# Patient Record
Sex: FEMALE | Race: BLACK OR AFRICAN AMERICAN | NOT HISPANIC OR LATINO | Employment: OTHER | URBAN - METROPOLITAN AREA
[De-identification: names, ages, dates, MRNs, and addresses within clinical notes are randomized per-mention and may not be internally consistent; named-entity substitution may affect disease eponyms.]

---

## 2017-10-06 ENCOUNTER — HOSPITAL ENCOUNTER (EMERGENCY)
Facility: OTHER | Age: 82
Discharge: ANOTHER HOSPITAL | End: 2017-10-07
Attending: EMERGENCY MEDICINE
Payer: MEDICARE

## 2017-10-06 DIAGNOSIS — R10.9 ABDOMINAL PAIN: ICD-10-CM

## 2017-10-06 DIAGNOSIS — N17.9 AKI (ACUTE KIDNEY INJURY): Primary | ICD-10-CM

## 2017-10-06 LAB
ALBUMIN SERPL-MCNC: 3.3 G/DL (ref 3.3–5.5)
ALBUMIN SERPL-MCNC: 3.4 G/DL (ref 3.3–5.5)
ALBUMIN SERPL-MCNC: 3.7 G/DL (ref 3.3–5.5)
ALP SERPL-CCNC: 114 U/L (ref 42–141)
ALP SERPL-CCNC: 125 U/L (ref 42–141)
ALP SERPL-CCNC: 126 U/L (ref 42–141)
BILIRUB SERPL-MCNC: 1.7 MG/DL (ref 0.2–1.6)
BILIRUB SERPL-MCNC: 1.8 MG/DL (ref 0.2–1.6)
BILIRUB SERPL-MCNC: 1.9 MG/DL (ref 0.2–1.6)
BILIRUBIN, POC UA: NEGATIVE
BLOOD, POC UA: ABNORMAL
BUN SERPL-MCNC: 21 MG/DL (ref 7–22)
BUN SERPL-MCNC: 23 MG/DL (ref 7–22)
CALCIUM SERPL-MCNC: 9.2 MG/DL (ref 8–10.3)
CALCIUM SERPL-MCNC: 9.4 MG/DL (ref 8–10.3)
CHLORIDE SERPL-SCNC: 102 MMOL/L (ref 98–108)
CHLORIDE SERPL-SCNC: 108 MMOL/L (ref 98–108)
CLARITY, POC UA: CLEAR
COLOR, POC UA: YELLOW
CREAT SERPL-MCNC: 1.6 MG/DL (ref 0.6–1.2)
CREAT SERPL-MCNC: 1.8 MG/DL (ref 0.6–1.2)
GLUCOSE SERPL-MCNC: 176 MG/DL (ref 73–118)
GLUCOSE SERPL-MCNC: 205 MG/DL (ref 73–118)
GLUCOSE, POC UA: ABNORMAL
HCO3 UR-SCNC: 19.9 MMOL/L (ref 24–28)
KETONES, POC UA: NEGATIVE
LDH SERPL L TO P-CCNC: 1.05 MMOL/L (ref 0.5–2.2)
LEUKOCYTE EST, POC UA: NEGATIVE
NITRITE, POC UA: NEGATIVE
PCO2 BLDA: 26.6 MMHG (ref 35–45)
PH SMN: 7.48 [PH] (ref 7.35–7.45)
PH UR STRIP: 8.5 [PH]
PO2 BLDA: 49 MMHG (ref 40–60)
POC ALT (SGPT): 13 U/L (ref 10–47)
POC ALT (SGPT): 15 U/L (ref 10–47)
POC ALT (SGPT): 17 U/L (ref 10–47)
POC AMYLASE: 41 U/L (ref 14–97)
POC AST (SGOT): 22 U/L (ref 11–38)
POC AST (SGOT): 23 U/L (ref 11–38)
POC AST (SGOT): 24 U/L (ref 11–38)
POC BE: -4 MMOL/L
POC GGT: 49 U/L (ref 5–65)
POC SATURATED O2: 88 % (ref 95–100)
POC TCO2: 21 MMOL/L (ref 24–29)
POC TCO2: 24 MMOL/L (ref 18–33)
POC TCO2: 24 MMOL/L (ref 18–33)
POCT GLUCOSE: 238 MG/DL (ref 70–110)
POTASSIUM BLD-SCNC: 3.8 MMOL/L (ref 3.6–5.1)
POTASSIUM BLD-SCNC: 4.1 MMOL/L (ref 3.6–5.1)
PROTEIN, POC UA: ABNORMAL
PROTEIN, POC: 6.1 G/DL (ref 6.4–8.1)
PROTEIN, POC: 6.8 G/DL (ref 6.4–8.1)
PROTEIN, POC: 7 G/DL (ref 6.4–8.1)
SAMPLE: ABNORMAL
SODIUM BLD-SCNC: 142 MMOL/L (ref 128–145)
SODIUM BLD-SCNC: 143 MMOL/L (ref 128–145)
SPECIFIC GRAVITY, POC UA: 1.02
UROBILINOGEN, POC UA: 0.2 E.U./DL

## 2017-10-06 PROCEDURE — 80053 COMPREHEN METABOLIC PANEL: CPT

## 2017-10-06 PROCEDURE — 85025 COMPLETE CBC W/AUTO DIFF WBC: CPT

## 2017-10-06 PROCEDURE — 82977 ASSAY OF GGT: CPT

## 2017-10-06 PROCEDURE — 99285 EMERGENCY DEPT VISIT HI MDM: CPT | Mod: 25

## 2017-10-06 PROCEDURE — 96361 HYDRATE IV INFUSION ADD-ON: CPT

## 2017-10-06 PROCEDURE — 96360 HYDRATION IV INFUSION INIT: CPT

## 2017-10-06 PROCEDURE — 81003 URINALYSIS AUTO W/O SCOPE: CPT

## 2017-10-06 PROCEDURE — 87086 URINE CULTURE/COLONY COUNT: CPT

## 2017-10-06 PROCEDURE — 25000003 PHARM REV CODE 250: Performed by: EMERGENCY MEDICINE

## 2017-10-06 PROCEDURE — 83605 ASSAY OF LACTIC ACID: CPT

## 2017-10-06 PROCEDURE — 82040 ASSAY OF SERUM ALBUMIN: CPT

## 2017-10-06 RX ORDER — SODIUM CHLORIDE 9 MG/ML
1000 INJECTION, SOLUTION INTRAVENOUS
Status: COMPLETED | OUTPATIENT
Start: 2017-10-06 | End: 2017-10-06

## 2017-10-06 RX ORDER — ACETAMINOPHEN 500 MG
1000 TABLET ORAL
Status: COMPLETED | OUTPATIENT
Start: 2017-10-06 | End: 2017-10-06

## 2017-10-06 RX ADMIN — SODIUM CHLORIDE 1000 ML: 0.9 INJECTION, SOLUTION INTRAVENOUS at 08:10

## 2017-10-06 RX ADMIN — SODIUM CHLORIDE 1000 ML: 0.9 INJECTION, SOLUTION INTRAVENOUS at 10:10

## 2017-10-06 RX ADMIN — ACETAMINOPHEN 1000 MG: 500 TABLET ORAL at 11:10

## 2017-10-07 VITALS
WEIGHT: 155 LBS | HEART RATE: 74 BPM | TEMPERATURE: 99 F | SYSTOLIC BLOOD PRESSURE: 185 MMHG | RESPIRATION RATE: 20 BRPM | OXYGEN SATURATION: 100 % | BODY MASS INDEX: 29.27 KG/M2 | HEIGHT: 61 IN | DIASTOLIC BLOOD PRESSURE: 89 MMHG

## 2017-10-07 PROCEDURE — 96360 HYDRATION IV INFUSION INIT: CPT

## 2017-10-07 PROCEDURE — 25000003 PHARM REV CODE 250: Performed by: EMERGENCY MEDICINE

## 2017-10-07 RX ORDER — SODIUM CHLORIDE 9 MG/ML
1000 INJECTION, SOLUTION INTRAVENOUS
Status: COMPLETED | OUTPATIENT
Start: 2017-10-07 | End: 2017-10-07

## 2017-10-07 RX ADMIN — LIDOCAINE HYDROCHLORIDE: 20 SOLUTION ORAL; TOPICAL at 12:10

## 2017-10-07 RX ADMIN — SODIUM CHLORIDE 1000 ML: 0.9 INJECTION, SOLUTION INTRAVENOUS at 01:10

## 2017-10-07 NOTE — ED NOTES
Report completed at this time; report given to SUKUMAR Georges at ; pt currently awaiting [facility] for transfer

## 2017-10-07 NOTE — ED NOTES
Attempt x3 obtain IV access; all attempts unsuccessful; pt's family member reports, pt has not been eating x1 weeks and reports pt rarely drinks fluids; family member reports when pt does drink, it is usually a Coke

## 2017-10-07 NOTE — ED NOTES
Pt presents with c/o epigastric pain x2 days; pain rated at 10/10 on pain scale; pain described as constant, cramping pain; pt denies GI history; abd soft, tender to palpation, nondistended; BS active x4 quads; pt reports she is unable to eat due to pain; per family member, pt is ordered to take her Rx meds with food as to not upset her stomach, but states she refuses to eat with her meds; pt also c/o nausea, constipation; pt reports taking Pepto Bismol recently due to symptoms; pt reports minimal improvement in pain level; pt reports dark colored stools following self-administration of Pepto Bismol; denies SOB, reports incrseased pain with deep breathing; no resp distress noted; resp clear, E/U; pt on RA; AAOx4; follows commands; speech clear, coherent; moves all extremities; cap refill less than 3 sec; no edema noted; +2 radial pulses; skin warm, dry, intact; NADN: will continue to monitor

## 2017-10-07 NOTE — ED PROVIDER NOTES
Encounter Date: 10/6/2017       History     Chief Complaint   Patient presents with    Abdominal Pain     pt presents to ER with c/o upper abdominal pain since yesterday accompanied by nausea but no vomiting.     83 y.o. Female with past medical history of diabetes, hypertension, GERD and others presents to the emergency department complaining of acute epigastric abdominal cramping that has been constant since yesterday.  She reports taking Pepto-Bismol with persistent symptoms.  She reports mild nausea, constipation (2 scant stool since yesterday), and dysuria without vomiting, fever, appetite change, or bright red blood per rectum.  She states her stools have been darker since she is taking Pepto-Bismol.  She denies chest pain, shortness of breath, lightheadedness, dizziness or syncope.    Patient states she has limited mobility due to frequent falls and uses a wheelchair.  She lives with her daughter and goes to a day program      The history is provided by the patient.     Review of patient's allergies indicates:  No Known Allergies  Past Medical History:   Diagnosis Date    Arthritis     Cataract     Diabetes mellitus     GERD (gastroesophageal reflux disease)     Hypertension      Past Surgical History:   Procedure Laterality Date    CHOLECYSTECTOMY      EYE SURGERY      HYSTERECTOMY      JOINT REPLACEMENT       Family History   Problem Relation Age of Onset    Cancer Mother     Diabetes Mother     Hypertension Mother     Cancer Brother      Social History   Substance Use Topics    Smoking status: Never Smoker    Smokeless tobacco: Not on file    Alcohol use No     Review of Systems   Constitutional: Positive for appetite change (decreased x 4-5 days). Negative for chills, diaphoresis, fatigue, fever and unexpected weight change.   HENT: Negative.    Eyes: Negative.    Respiratory: Negative for cough, shortness of breath and stridor.    Cardiovascular: Positive for leg swelling (left leg,  chronic swelling). Negative for chest pain and palpitations.   Gastrointestinal: Positive for abdominal pain and constipation. Negative for blood in stool, diarrhea, nausea and vomiting.   Genitourinary: Positive for dysuria. Negative for frequency, hematuria and vaginal discharge.   Musculoskeletal: Negative.  Negative for back pain.   Skin: Negative for color change and wound.   Neurological: Negative for dizziness and headaches.   Psychiatric/Behavioral: Negative.    All other systems reviewed and are negative.      Physical Exam     Initial Vitals [10/06/17 1900]   BP Pulse Resp Temp SpO2   (!) 169/67 85 18 98.8 °F (37.1 °C) 98 %      MAP       101         Physical Exam    Nursing note and vitals reviewed.  Constitutional: She appears well-developed. She is not diaphoretic. She is cooperative.  Non-toxic appearance. She has a sickly appearance (appears chronically ill). She appears distressed (appear uncomfortable).   Elderly, frail   HENT:   Head: Normocephalic and atraumatic.   Mouth/Throat: Uvula is midline and oropharynx is clear and moist. Mucous membranes are pale and dry. Abnormal dentition (missing teeth). No oropharyngeal exudate.   Eyes: EOM are normal. Pupils are equal, round, and reactive to light.   Neck: Normal range of motion. Neck supple.   Cardiovascular: Normal rate, regular rhythm and intact distal pulses.   No murmur heard.  Pulmonary/Chest: Breath sounds normal. No stridor. No respiratory distress.   Abdominal: Soft. She exhibits distension. She exhibits no shifting dullness, no fluid wave and no pulsatile midline mass. Bowel sounds are decreased. There is no tenderness. There is no rigidity, no rebound, no guarding, no CVA tenderness, no tenderness at McBurney's point and negative Reddy's sign.       Musculoskeletal: Normal range of motion. She exhibits no edema or tenderness.   Neurological: She is alert and oriented to person, place, and time. She has normal strength. No cranial nerve  deficit. GCS eye subscore is 4. GCS verbal subscore is 5. GCS motor subscore is 6.   Skin: Skin is warm and dry. No erythema. No pallor.   Psychiatric: She has a normal mood and affect.         ED Course   Procedures  Labs Reviewed   POCT URINALYSIS W/O SCOPE - Abnormal; Notable for the following:        Result Value    Glucose, UA Trace (*)     Bilirubin, UA Negative (*)     Ketones, UA Negative (*)     Blood, UA 1+ (*)     Protein, UA 3+ (*)     Nitrite, UA Negative (*)     Leukocytes, UA Negative (*)     All other components within normal limits   POCT GLUCOSE - Abnormal; Notable for the following:     POCT Glucose 238 (*)     All other components within normal limits   ISTAT PROCEDURE - Abnormal; Notable for the following:     POC PH 7.482 (*)     POC PCO2 26.6 (*)     POC HCO3 19.9 (*)     POC SATURATED O2 88 (*)     POC TCO2 21 (*)     All other components within normal limits   POCT LIVER PANEL - Abnormal; Notable for the following:     Bilirubin 1.9 (*)     All other components within normal limits   POCT CMP - Abnormal; Notable for the following:     POC BUN 23 (*)     POC Creatinine 1.8 (*)     POC Glucose 205 (*)     Bilirubin 1.8 (*)     All other components within normal limits   POCT CMP - Abnormal; Notable for the following:     Albumin, POC 3.3 (*)     POC Creatinine 1.6 (*)     POC Glucose 176 (*)     Bilirubin 1.7 (*)     Protein 6.1 (*)     All other components within normal limits   CULTURE, URINE   POCT CBC   POCT URINALYSIS W/O SCOPE   POCT CMP   POCT LIVER PANEL   POCT CMP     EKG Readings: (Independently Interpreted)   Initial Reading: No STEMI. Rhythm: Sinus Arrhythmia. Heart Rate: 90. Ectopy: PACs. Conduction: Normal. ST Segments: Non-Specific ST Segment Depression. ST Segment Depression: V4, V5, V6, I and AVL. T Waves: Normal. Axis: Left Axis Deviation.          Medical Decision Making:   Clinical Tests:   Lab Tests: Ordered and Reviewed       <> Summary of Lab: White count 8.4 H&H 11.4  and 33.9 platelets 232 MCV 85.1 RDW 14.2  Radiological Study: Ordered and Reviewed  Medical Tests: Ordered and Reviewed  ED Management:  Additional history obtained from son.  States patient has not been eating over the last 4 days.  He states she sometimes complains that eating makes her stomach hurt or that she gets food stuck in her throat.    Patient of Dr. LIAM Staton at [place]                   ED Course as of Oct 13 0645   Fri Oct 06, 2017   2236 Pending IVFs then repeat CMP for CTAP with IV contrast  [DL]      ED Course User Index  [DL] Sravani Cassidy MD     Labs Reviewed  Admission on 10/06/2017, Discharged on 10/07/2017   Component Date Value Ref Range Status    POCT Glucose 10/06/2017 238* 70 - 110 mg/dL Final    POC PH 10/06/2017 7.482* 7.35 - 7.45 Final    POC PCO2 10/06/2017 26.6* 35 - 45 mmHg Final    POC PO2 10/06/2017 49  40 - 60 mmHg Final    POC HCO3 10/06/2017 19.9* 24 - 28 mmol/L Final    POC BE 10/06/2017 -4  -2 to 2 mmol/L Final    POC SATURATED O2 10/06/2017 88* 95 - 100 % Final    POC Lactate 10/06/2017 1.05  0.5 - 2.2 mmol/L Final    POC TCO2 10/06/2017 21* 24 - 29 mmol/L Final    Sample 10/06/2017 VENOUS   Final    Albumin, POC 10/06/2017 3.7  3.3 - 5.5 g/dL Final    Alkaline Phosphatase, POC 10/06/2017 125  42 - 141 U/L Final    ALT (SGPT), POC 10/06/2017 17  10 - 47 U/L Final    Amylase, POC 10/06/2017 41  14 - 97 U/L Final    AST (SGOT), POC 10/06/2017 24  11 - 38 U/L Final    POC GGT 10/06/2017 49  5 - 65 U/L Final    Bilirubin 10/06/2017 1.9* 0.2 - 1.6 mg/dL Final    Protein 10/06/2017 7.0  6.4 - 8.1 g/dL Final    Albumin, POC 10/06/2017 3.4  3.3 - 5.5 g/dL Final    Alkaline Phosphatase, POC 10/06/2017 126  42 - 141 U/L Final    ALT (SGPT), POC 10/06/2017 15  10 - 47 U/L Final    AST (SGOT), POC 10/06/2017 23  11 - 38 U/L Final    POC BUN 10/06/2017 23* 7 - 22 mg/dL Final    Calcium, POC 10/06/2017 9.4  8.0 - 10.3 mg/dL Final    POC Chloride 10/06/2017  102  98 - 108 mmol/L Final    POC Creatinine 10/06/2017 1.8* 0.6 - 1.2 mg/dL Final    POC Glucose 10/06/2017 205* 73 - 118 mg/dL Final    POC Potassium 10/06/2017 3.8  3.6 - 5.1 mmol/L Final    POC Sodium 10/06/2017 143  128 - 145 mmol/L Final    Bilirubin 10/06/2017 1.8* 0.2 - 1.6 mg/dL Final    POC TCO2 10/06/2017 24  18 - 33 mmol/L Final    Protein 10/06/2017 6.8  6.4 - 8.1 g/dL Final    Albumin, POC 10/06/2017 3.3* 3.3 - 5.5 g/dL Final    Alkaline Phosphatase, POC 10/06/2017 114  42 - 141 U/L Final    ALT (SGPT), POC 10/06/2017 13  10 - 47 U/L Final    AST (SGOT), POC 10/06/2017 22  11 - 38 U/L Final    POC BUN 10/06/2017 21  7 - 22 mg/dL Final    Calcium, POC 10/06/2017 9.2  8.0 - 10.3 mg/dL Final    POC Chloride 10/06/2017 108  98 - 108 mmol/L Final    POC Creatinine 10/06/2017 1.6* 0.6 - 1.2 mg/dL Final    POC Glucose 10/06/2017 176* 73 - 118 mg/dL Final    POC Potassium 10/06/2017 4.1  3.6 - 5.1 mmol/L Final    POC Sodium 10/06/2017 142  128 - 145 mmol/L Final    Bilirubin 10/06/2017 1.7* 0.2 - 1.6 mg/dL Final    POC TCO2 10/06/2017 24  18 - 33 mmol/L Final    Protein 10/06/2017 6.1* 6.4 - 8.1 g/dL Final    Glucose, UA 10/06/2017 Trace*  Final    Bilirubin, UA 10/06/2017 Negative*  Final    Ketones, UA 10/06/2017 Negative*  Final    Spec Grav UA 10/06/2017 1.020   Final    Blood, UA 10/06/2017 1+*  Final    PH, UA 10/06/2017 8.5   Final    Protein, UA 10/06/2017 3+*  Final    Urobilinogen, UA 10/06/2017 0.2  E.U./dL Final    Nitrite, UA 10/06/2017 Negative*  Final    Leukocytes, UA 10/06/2017 Negative*  Final    Color, UA 10/06/2017 Yellow   Final    Clarity, UA 10/06/2017 Clear   Final    Urine Culture, Routine 10/09/2017 No growth   Final        Imaging Reviewed    Imaging Results          CT Renal Stone Study ABD Pelvis WO (Final result)  Result time 10/07/17 00:26:27    Final result by Bryant Galeana MD (10/07/17 00:26:27)                 Impression:        1. No CT  evidence of acute intra-abdominal abnormality.    2. 4-mm right lower lobe pulmonary micronodule. Per Fleischner Society 2017 guidelines: in a low risk patient, no follow up recommend. In a high risk patient history of cancer/ smoker, consider 12 month CT chest follow up to exclude neoplasia.    3. Hiatal hernia.    4. Air within the urinary bladder. Correlation for recent catheterization is advised.    5. Status post hysterectomy and cholecystectomy.    6. Colonic diverticulosis without evidence of acute diverticulitis.    6. Additional incidental findings as above.      Electronically signed by: JESSICA DENTON  Date:     10/07/17  Time:    00:26              Narrative:    Procedure comments: The patient was surveyed from the lung bases through the pelvis without IV or oral contrast per renal stone protocol and data was reconstructed for coronal, sagittal, and axial images.    Comparison: None.    Findings:    There is a 4 mm pulmonary micronodule within the right lower lobe. The lung bases are otherwise unremarkable.  There is prominent calcification of the mitral valve apparatus. There is no significant pericardial effusion.    The liver is unremarkable in appearance on this limited non-contrast examination.  The gallbladder is surgically absent.  There is no intra-or extrahepatic biliary ductal dilatation.    There is a hiatal hernia. Stomach appears within normal limits. The spleen, pancreas, and adrenal glands are unremarkable.    The kidneys are normal in size and location. There is no evidence of hydronephrosis or nephrolithiasis.  There are multiple low attenuation right renal lesion suggestive of renal cysts. There is air within the urinary bladder. Correlation for recent catheterization is advised. No significant bladder wall thickening is appreciated. The patient is status post hysterectomy. There is no significant free fluid within the pelvis.    The abdominal aorta is normal in course and caliber with  mild atherosclerotic change along its course.The visualized loops of small and large bowel show no evidence of obstruction or inflammation. The appendix appears within normal limits. There is extensive colonic diverticulosis without evidence of acute diverticulitis.  There is no ascites or free intraperitoneal air.     The osseous structures demonstrate advanced degenerative changes of the spine. There is diffuse osteopenia.  The extraperitoneal soft tissues are unremarkable.                             X-Ray Abdomen Flat And Erect (Final result)  Result time 10/06/17 20:11:40    Final result by Refugio Smith MD (10/06/17 20:11:40)                 Impression:    Nonobstructive bowel gas pattern.      Electronically signed by: Refugio Smith  Date:     10/06/17  Time:    20:11              Narrative:    EXAM: ABDOMEN FLAT AND UPRIGHT    CLINICAL INDICATION:  Abdominal pain.    COMPARISON:  None.    TECHNIQUE: Flat and upright views of the abdomen were obtained.    FINDINGS:  Examination is limited by poor penetration on the upright views. No dilated bowel loops are seen.   There is no evidence of mass effect.  No evidence of pneumoperitoneum. No abnormal calcifications are detected. Degenerative changes are noted in the lumbar spine.                              Medications given in ED    Medications   0.9%  NaCl infusion (0 mLs Intravenous Stopped 10/6/17 2230)   0.9%  NaCl infusion (0 mLs Intravenous Stopped 10/6/17 2355)   acetaminophen tablet 1,000 mg (1,000 mg Oral Given 10/6/17 2359)   (pyxis) gi cocktail (mylanta 30 mL, lidocaine 2 % viscous 10 mL, dicyclomine 10 mL) 50 mL ( Oral Given 10/7/17 0058)   0.9%  NaCl infusion (1,000 mLs Intravenous New Bag 10/7/17 0130)         Note was created using voice recognition software. Note may have occasional typographical errors that may not have been identified and edited despite good carlos initial review prior to signing.    Clinical Impression:   The primary  encounter diagnosis was ROXANNE (acute kidney injury). A diagnosis of Abdominal pain was also pertinent to this visit.                           Sravani Cassidy MD  10/13/17 0602

## 2017-10-07 NOTE — ED NOTES
Per Sagrario RN at [facility], pt has been accepting by Dr Hutchison at [facility]; ED to ED transfer

## 2017-10-09 LAB — BACTERIA UR CULT: NO GROWTH

## 2019-03-31 PROBLEM — Z79.4 TYPE 2 DIABETES MELLITUS, WITH LONG-TERM CURRENT USE OF INSULIN: Status: ACTIVE | Noted: 2019-03-31

## 2019-03-31 PROBLEM — E11.9 TYPE 2 DIABETES MELLITUS, WITH LONG-TERM CURRENT USE OF INSULIN: Status: ACTIVE | Noted: 2019-03-31

## 2019-03-31 PROBLEM — N18.6 ESRD (END STAGE RENAL DISEASE): Status: ACTIVE | Noted: 2019-03-31

## 2019-03-31 NOTE — PROGRESS NOTES
Outside Transfer Acceptance Note     Patients name: Joyce Riley    Transferring Physician or Mid-Level provider/Clinic giving report: Lilly Meneses MD (Hospitalist, [facility])    Accepting Physician for admission to hospital: Danya Beckham MD     Date of acceptance:  03/31/2019    Allergies/Intolerances: NKDA     Reason for transfer: Need for second opinion for hemodialysis     HPI:  This is a 85-year-old female with 4, restrictive lung disease, paroxysmal atrial fibrillation, type 2 diabetes mellitus, hypertension, and cerebrovascular disease presented to [facility] on 03/20/2019 with complaints of increased shortness of breath and lower extremity edema. The patient had an H&H of 7.8 and 25 and was transfused 2 units of packed red blood cells.  She has a history of GI bleeding, however there were no signs of hemorrhage during this hospitalization.  The patient was thought to be in fluid overload and was diuresed with an initial creatinine of 2.3 which has worsened to 4.5 today.  There has been a steady decline in renal function and Nephrology does not recommend hemodialysis.  The patient continues to make urine and has a fair appetite.  The family is not willing to proceed to hospice care without a 2nd opinion.  Transfer to another facility for a different Nephrology evaluation is requested.    VS: /75  P 85  R 16  T 98.6F  O2 Sats 99% on RA    Labs: See scanned records in media tab    Radiographs:  As above    To Do List upon arrival:  1.  Consult Nephrology  2.  Consult palliative care  3.  Obtain CMP, Mag, phosphorus

## 2019-04-01 ENCOUNTER — HOSPITAL ENCOUNTER (INPATIENT)
Facility: OTHER | Age: 84
LOS: 8 days | Discharge: NURSING, REHAB OR LONG-TERM CARE | DRG: 682 | End: 2019-04-09
Attending: HOSPITALIST | Admitting: HOSPITALIST
Payer: MEDICARE

## 2019-04-01 DIAGNOSIS — N18.9 ACUTE KIDNEY INJURY SUPERIMPOSED ON CHRONIC KIDNEY DISEASE: ICD-10-CM

## 2019-04-01 DIAGNOSIS — N18.6 ESRD (END STAGE RENAL DISEASE): ICD-10-CM

## 2019-04-01 DIAGNOSIS — I27.23 PULMONARY HYPERTENSION DUE TO LUNG DISEASE: Chronic | ICD-10-CM

## 2019-04-01 DIAGNOSIS — R00.1 BRADYCARDIA: ICD-10-CM

## 2019-04-01 DIAGNOSIS — I50.33 ACUTE ON CHRONIC DIASTOLIC HEART FAILURE: ICD-10-CM

## 2019-04-01 DIAGNOSIS — Z79.4 TYPE 2 DIABETES MELLITUS WITH STAGE 4 CHRONIC KIDNEY DISEASE, WITH LONG-TERM CURRENT USE OF INSULIN: Primary | ICD-10-CM

## 2019-04-01 DIAGNOSIS — N18.4 TYPE 2 DIABETES MELLITUS WITH STAGE 4 CHRONIC KIDNEY DISEASE, WITH LONG-TERM CURRENT USE OF INSULIN: Primary | ICD-10-CM

## 2019-04-01 DIAGNOSIS — H53.9 VISUAL DISTURBANCE: ICD-10-CM

## 2019-04-01 DIAGNOSIS — N18.4 CKD (CHRONIC KIDNEY DISEASE), STAGE IV: ICD-10-CM

## 2019-04-01 DIAGNOSIS — N17.9 ACUTE KIDNEY INJURY SUPERIMPOSED ON CHRONIC KIDNEY DISEASE: ICD-10-CM

## 2019-04-01 DIAGNOSIS — E11.22 TYPE 2 DIABETES MELLITUS WITH STAGE 4 CHRONIC KIDNEY DISEASE, WITH LONG-TERM CURRENT USE OF INSULIN: Primary | ICD-10-CM

## 2019-04-01 DIAGNOSIS — Q21.12 PATENT FORAMEN OVALE WITH RIGHT TO LEFT SHUNT: ICD-10-CM

## 2019-04-01 PROBLEM — J96.00 ACUTE RESPIRATORY FAILURE: Status: ACTIVE | Noted: 2019-04-01

## 2019-04-01 PROBLEM — D64.9 ANEMIA: Status: ACTIVE | Noted: 2019-04-01

## 2019-04-01 LAB
ALBUMIN SERPL BCP-MCNC: 3 G/DL (ref 3.5–5.2)
ALLENS TEST: ABNORMAL
ALP SERPL-CCNC: 112 U/L (ref 55–135)
ALT SERPL W/O P-5'-P-CCNC: 19 U/L (ref 10–44)
ANION GAP SERPL CALC-SCNC: 13 MMOL/L (ref 8–16)
AORTIC ROOT ANNULUS: 2.95 CM
AORTIC VALVE CUSP SEPERATION: 1.84 CM
AST SERPL-CCNC: 23 U/L (ref 10–40)
AV INDEX (PROSTH): 0.75
AV MEAN GRADIENT: 4.84 MMHG
AV PEAK GRADIENT: 9.12 MMHG
AV VALVE AREA: 2.32 CM2
AV VELOCITY RATIO: 0.76
BASOPHILS # BLD AUTO: 0.02 K/UL (ref 0–0.2)
BASOPHILS NFR BLD: 0.2 % (ref 0–1.9)
BILIRUB SERPL-MCNC: 0.5 MG/DL (ref 0.1–1)
BNP SERPL-MCNC: 480 PG/ML (ref 0–99)
BSA FOR ECHO PROCEDURE: 2.11 M2
BUN SERPL-MCNC: 148 MG/DL (ref 8–23)
CALCIUM SERPL-MCNC: 8.3 MG/DL (ref 8.7–10.5)
CHLORIDE SERPL-SCNC: 96 MMOL/L (ref 95–110)
CK SERPL-CCNC: 111 U/L (ref 20–180)
CO2 SERPL-SCNC: 15 MMOL/L (ref 23–29)
CREAT SERPL-MCNC: 4.9 MG/DL (ref 0.5–1.4)
CREAT UR-MCNC: 61.1 MG/DL (ref 15–325)
CV ECHO LV RWT: 0.63 CM
DELSYS: ABNORMAL
DIFFERENTIAL METHOD: ABNORMAL
DOP CALC AO PEAK VEL: 1.51 M/S
DOP CALC AO VTI: 29.82 CM
DOP CALC LVOT AREA: 3.11 CM2
DOP CALC LVOT DIAMETER: 1.99 CM
DOP CALC LVOT PEAK VEL: 1.15 M/S
DOP CALC LVOT STROKE VOLUME: 69.23 CM3
DOP CALCLVOT PEAK VEL VTI: 22.27 CM
E WAVE DECELERATION TIME: 294.44 MSEC
E/A RATIO: 1.29
E/E' RATIO: 23.85
ECHO LV POSTERIOR WALL: 1.42 CM (ref 0.6–1.1)
EOSINOPHIL # BLD AUTO: 0 K/UL (ref 0–0.5)
EOSINOPHIL NFR BLD: 0.1 % (ref 0–8)
ERYTHROCYTE [DISTWIDTH] IN BLOOD BY AUTOMATED COUNT: 18.2 % (ref 11.5–14.5)
EST. GFR  (AFRICAN AMERICAN): 9 ML/MIN/1.73 M^2
EST. GFR  (NON AFRICAN AMERICAN): 8 ML/MIN/1.73 M^2
ESTIMATED AVG GLUCOSE: 143 MG/DL (ref 68–131)
FIO2: 28
FLOW: 2
FRACTIONAL SHORTENING: 45 % (ref 28–44)
GLUCOSE SERPL-MCNC: 196 MG/DL (ref 70–110)
GLUCOSE SERPL-MCNC: 197 MG/DL (ref 70–110)
HBA1C MFR BLD HPLC: 6.6 % (ref 4–5.6)
HCO3 UR-SCNC: 16.6 MMOL/L (ref 24–28)
HCT VFR BLD AUTO: 31.3 % (ref 37–48.5)
HGB BLD-MCNC: 10 G/DL (ref 12–16)
HIV1+2 IGG SERPL QL IA.RAPID: NEGATIVE
INTERVENTRICULAR SEPTUM: 1.41 CM (ref 0.6–1.1)
LA MAJOR: 6.21 CM
LA MINOR: 6.26 CM
LA WIDTH: 4.34 CM
LEFT ATRIUM SIZE: 5.03 CM
LEFT ATRIUM VOLUME INDEX: 57 ML/M2
LEFT ATRIUM VOLUME: 115.69 CM3
LEFT INTERNAL DIMENSION IN SYSTOLE: 2.46 CM (ref 2.1–4)
LEFT VENTRICLE DIASTOLIC VOLUME INDEX: 45.26 ML/M2
LEFT VENTRICLE DIASTOLIC VOLUME: 91.87 ML
LEFT VENTRICLE MASS INDEX: 124 G/M2
LEFT VENTRICLE SYSTOLIC VOLUME INDEX: 10.6 ML/M2
LEFT VENTRICLE SYSTOLIC VOLUME: 21.55 ML
LEFT VENTRICULAR INTERNAL DIMENSION IN DIASTOLE: 4.49 CM (ref 3.5–6)
LEFT VENTRICULAR MASS: 251.62 G
LV LATERAL E/E' RATIO: 22.14
LV SEPTAL E/E' RATIO: 25.83
LYMPHOCYTES # BLD AUTO: 0.6 K/UL (ref 1–4.8)
LYMPHOCYTES NFR BLD: 4.6 % (ref 18–48)
MAGNESIUM SERPL-MCNC: 1.1 MG/DL (ref 1.6–2.6)
MCH RBC QN AUTO: 29.2 PG (ref 27–31)
MCHC RBC AUTO-ENTMCNC: 31.9 G/DL (ref 32–36)
MCV RBC AUTO: 92 FL (ref 82–98)
MODE: ABNORMAL
MONOCYTES # BLD AUTO: 0.8 K/UL (ref 0.3–1)
MONOCYTES NFR BLD: 6.3 % (ref 4–15)
MV PEAK A VEL: 1.2 M/S
MV PEAK E VEL: 1.55 M/S
NEUTROPHILS # BLD AUTO: 10.6 K/UL (ref 1.8–7.7)
NEUTROPHILS NFR BLD: 86 % (ref 38–73)
PCO2 BLDA: 45.1 MMHG (ref 35–45)
PH SMN: 7.17 [PH] (ref 7.35–7.45)
PHOSPHATE SERPL-MCNC: 8.2 MG/DL (ref 2.7–4.5)
PISA TR MAX VEL: 4.06 M/S
PLATELET # BLD AUTO: 286 K/UL (ref 150–350)
PMV BLD AUTO: 11.3 FL (ref 9.2–12.9)
PO2 BLDA: 93 MMHG (ref 80–100)
POC BE: -12 MMOL/L
POC SATURATED O2: 95 % (ref 95–100)
POCT GLUCOSE: 197 MG/DL (ref 70–110)
POTASSIUM SERPL-SCNC: 5.9 MMOL/L (ref 3.5–5.1)
PROT SERPL-MCNC: 6.6 G/DL (ref 6–8.4)
PV PEAK VELOCITY: 1.22 CM/S
RA MAJOR: 6.23 CM
RA PRESSURE: 8 MMHG
RA WIDTH: 4.81 CM
RBC # BLD AUTO: 3.42 M/UL (ref 4–5.4)
SAMPLE: ABNORMAL
SINUS: 2.8 CM
SITE: ABNORMAL
SODIUM SERPL-SCNC: 124 MMOL/L (ref 136–145)
SODIUM UR-SCNC: <20 MMOL/L (ref 20–250)
STJ: 2.27 CM
TDI LATERAL: 0.07
TDI SEPTAL: 0.06
TDI: 0.07
TR MAX PG: 65.93 MMHG
TSH SERPL DL<=0.005 MIU/L-ACNC: 3.67 UIU/ML (ref 0.4–4)
TV REST PULMONARY ARTERY PRESSURE: 74 MMHG
URATE SERPL-MCNC: 7.7 MG/DL (ref 2.4–5.7)
URATE UR-MCNC: 8.3 MG/DL
WBC # BLD AUTO: 12.26 K/UL (ref 3.9–12.7)

## 2019-04-01 PROCEDURE — 86235 NUCLEAR ANTIGEN ANTIBODY: CPT | Mod: 59

## 2019-04-01 PROCEDURE — 83735 ASSAY OF MAGNESIUM: CPT

## 2019-04-01 PROCEDURE — 20000000 HC ICU ROOM

## 2019-04-01 PROCEDURE — 63600175 PHARM REV CODE 636 W HCPCS: Performed by: NURSE PRACTITIONER

## 2019-04-01 PROCEDURE — 86039 ANTINUCLEAR ANTIBODIES (ANA): CPT

## 2019-04-01 PROCEDURE — 25000003 PHARM REV CODE 250: Performed by: INTERNAL MEDICINE

## 2019-04-01 PROCEDURE — 83036 HEMOGLOBIN GLYCOSYLATED A1C: CPT

## 2019-04-01 PROCEDURE — 86592 SYPHILIS TEST NON-TREP QUAL: CPT

## 2019-04-01 PROCEDURE — 84100 ASSAY OF PHOSPHORUS: CPT

## 2019-04-01 PROCEDURE — 94640 AIRWAY INHALATION TREATMENT: CPT

## 2019-04-01 PROCEDURE — 80053 COMPREHEN METABOLIC PANEL: CPT

## 2019-04-01 PROCEDURE — 25000242 PHARM REV CODE 250 ALT 637 W/ HCPCS: Performed by: HOSPITALIST

## 2019-04-01 PROCEDURE — 99900035 HC TECH TIME PER 15 MIN (STAT)

## 2019-04-01 PROCEDURE — 82728 ASSAY OF FERRITIN: CPT

## 2019-04-01 PROCEDURE — 84550 ASSAY OF BLOOD/URIC ACID: CPT

## 2019-04-01 PROCEDURE — 63600175 PHARM REV CODE 636 W HCPCS: Performed by: HOSPITALIST

## 2019-04-01 PROCEDURE — 25000003 PHARM REV CODE 250: Performed by: NURSE PRACTITIONER

## 2019-04-01 PROCEDURE — 99223 1ST HOSP IP/OBS HIGH 75: CPT | Mod: AI,,, | Performed by: HOSPITALIST

## 2019-04-01 PROCEDURE — 86038 ANTINUCLEAR ANTIBODIES: CPT

## 2019-04-01 PROCEDURE — 86160 COMPLEMENT ANTIGEN: CPT | Mod: 59

## 2019-04-01 PROCEDURE — 87205 SMEAR GRAM STAIN: CPT

## 2019-04-01 PROCEDURE — 99223 PR INITIAL HOSPITAL CARE,LEVL III: ICD-10-PCS | Mod: AI,,, | Performed by: HOSPITALIST

## 2019-04-01 PROCEDURE — 94660 CPAP INITIATION&MGMT: CPT

## 2019-04-01 PROCEDURE — 36415 COLL VENOUS BLD VENIPUNCTURE: CPT

## 2019-04-01 PROCEDURE — 86160 COMPLEMENT ANTIGEN: CPT

## 2019-04-01 PROCEDURE — 25000003 PHARM REV CODE 250: Performed by: HOSPITALIST

## 2019-04-01 PROCEDURE — 84300 ASSAY OF URINE SODIUM: CPT

## 2019-04-01 PROCEDURE — 82607 VITAMIN B-12: CPT

## 2019-04-01 PROCEDURE — 36600 WITHDRAWAL OF ARTERIAL BLOOD: CPT

## 2019-04-01 PROCEDURE — S5571 INSULIN DISPOS PEN 3 ML: HCPCS | Performed by: NURSE PRACTITIONER

## 2019-04-01 PROCEDURE — 86255 FLUORESCENT ANTIBODY SCREEN: CPT | Mod: 91

## 2019-04-01 PROCEDURE — 82306 VITAMIN D 25 HYDROXY: CPT

## 2019-04-01 PROCEDURE — 27000221 HC OXYGEN, UP TO 24 HOURS

## 2019-04-01 PROCEDURE — 27000190 HC CPAP FULL FACE MASK W/VALVE

## 2019-04-01 PROCEDURE — 94761 N-INVAS EAR/PLS OXIMETRY MLT: CPT

## 2019-04-01 PROCEDURE — 82550 ASSAY OF CK (CPK): CPT

## 2019-04-01 PROCEDURE — 82803 BLOOD GASES ANY COMBINATION: CPT

## 2019-04-01 PROCEDURE — 82746 ASSAY OF FOLIC ACID SERUM: CPT

## 2019-04-01 PROCEDURE — 85025 COMPLETE CBC W/AUTO DIFF WBC: CPT

## 2019-04-01 PROCEDURE — 83540 ASSAY OF IRON: CPT

## 2019-04-01 PROCEDURE — 80074 ACUTE HEPATITIS PANEL: CPT

## 2019-04-01 PROCEDURE — 84443 ASSAY THYROID STIM HORMONE: CPT

## 2019-04-01 PROCEDURE — 82570 ASSAY OF URINE CREATININE: CPT

## 2019-04-01 PROCEDURE — 84560 ASSAY OF URINE/URIC ACID: CPT

## 2019-04-01 PROCEDURE — 86703 HIV-1/HIV-2 1 RESULT ANTBDY: CPT

## 2019-04-01 PROCEDURE — 83880 ASSAY OF NATRIURETIC PEPTIDE: CPT

## 2019-04-01 RX ORDER — INSULIN ASPART 100 [IU]/ML
4 INJECTION, SOLUTION INTRAVENOUS; SUBCUTANEOUS
Status: DISCONTINUED | OUTPATIENT
Start: 2019-04-01 | End: 2019-04-02

## 2019-04-01 RX ORDER — ACETAMINOPHEN 325 MG/1
650 TABLET ORAL EVERY 4 HOURS PRN
Status: DISCONTINUED | OUTPATIENT
Start: 2019-04-01 | End: 2019-04-09 | Stop reason: HOSPADM

## 2019-04-01 RX ORDER — SODIUM CHLORIDE 0.9 % (FLUSH) 0.9 %
10 SYRINGE (ML) INJECTION
Status: DISCONTINUED | OUTPATIENT
Start: 2019-04-01 | End: 2019-04-09 | Stop reason: HOSPADM

## 2019-04-01 RX ORDER — ONDANSETRON 8 MG/1
8 TABLET, ORALLY DISINTEGRATING ORAL EVERY 8 HOURS PRN
Status: DISCONTINUED | OUTPATIENT
Start: 2019-04-01 | End: 2019-04-09 | Stop reason: HOSPADM

## 2019-04-01 RX ORDER — AMLODIPINE BESYLATE 5 MG/1
10 TABLET ORAL DAILY
Status: DISCONTINUED | OUTPATIENT
Start: 2019-04-02 | End: 2019-04-05

## 2019-04-01 RX ORDER — GLUCAGON 1 MG
1 KIT INJECTION
Status: DISCONTINUED | OUTPATIENT
Start: 2019-04-01 | End: 2019-04-09 | Stop reason: HOSPADM

## 2019-04-01 RX ORDER — GABAPENTIN 300 MG/1
300 CAPSULE ORAL NIGHTLY
Status: DISCONTINUED | OUTPATIENT
Start: 2019-04-01 | End: 2019-04-01

## 2019-04-01 RX ORDER — CARVEDILOL 3.12 MG/1
3.12 TABLET ORAL 2 TIMES DAILY WITH MEALS
Status: DISCONTINUED | OUTPATIENT
Start: 2019-04-01 | End: 2019-04-04

## 2019-04-01 RX ORDER — INSULIN ASPART 100 [IU]/ML
4 INJECTION, SOLUTION INTRAVENOUS; SUBCUTANEOUS
Status: DISCONTINUED | OUTPATIENT
Start: 2019-04-01 | End: 2019-04-01

## 2019-04-01 RX ORDER — CLONIDINE HYDROCHLORIDE 0.1 MG/1
0.1 TABLET ORAL 2 TIMES DAILY
Status: DISCONTINUED | OUTPATIENT
Start: 2019-04-01 | End: 2019-04-01

## 2019-04-01 RX ORDER — IPRATROPIUM BROMIDE AND ALBUTEROL SULFATE 2.5; .5 MG/3ML; MG/3ML
3 SOLUTION RESPIRATORY (INHALATION) EVERY 4 HOURS
Status: DISCONTINUED | OUTPATIENT
Start: 2019-04-01 | End: 2019-04-02

## 2019-04-01 RX ORDER — HEPARIN SODIUM 5000 [USP'U]/ML
5000 INJECTION, SOLUTION INTRAVENOUS; SUBCUTANEOUS EVERY 8 HOURS
Status: DISCONTINUED | OUTPATIENT
Start: 2019-04-01 | End: 2019-04-01

## 2019-04-01 RX ORDER — INSULIN ASPART 100 [IU]/ML
0-5 INJECTION, SOLUTION INTRAVENOUS; SUBCUTANEOUS
Status: DISCONTINUED | OUTPATIENT
Start: 2019-04-01 | End: 2019-04-09 | Stop reason: HOSPADM

## 2019-04-01 RX ORDER — IBUPROFEN 200 MG
16 TABLET ORAL
Status: DISCONTINUED | OUTPATIENT
Start: 2019-04-01 | End: 2019-04-09 | Stop reason: HOSPADM

## 2019-04-01 RX ORDER — ATORVASTATIN CALCIUM 10 MG/1
10 TABLET, FILM COATED ORAL DAILY
Status: DISCONTINUED | OUTPATIENT
Start: 2019-04-01 | End: 2019-04-03

## 2019-04-01 RX ORDER — SODIUM BICARBONATE 650 MG/1
1300 TABLET ORAL 3 TIMES DAILY
Status: DISCONTINUED | OUTPATIENT
Start: 2019-04-01 | End: 2019-04-02

## 2019-04-01 RX ORDER — IBUPROFEN 200 MG
24 TABLET ORAL
Status: DISCONTINUED | OUTPATIENT
Start: 2019-04-01 | End: 2019-04-09 | Stop reason: HOSPADM

## 2019-04-01 RX ADMIN — SODIUM BICARBONATE 650 MG TABLET 1300 MG: at 06:04

## 2019-04-01 RX ADMIN — APIXABAN 2.5 MG: 2.5 TABLET, FILM COATED ORAL at 10:04

## 2019-04-01 RX ADMIN — SODIUM BICARBONATE 650 MG TABLET 1300 MG: at 10:04

## 2019-04-01 RX ADMIN — ATORVASTATIN CALCIUM 10 MG: 10 TABLET, FILM COATED ORAL at 03:04

## 2019-04-01 RX ADMIN — IPRATROPIUM BROMIDE AND ALBUTEROL SULFATE 3 ML: .5; 3 SOLUTION RESPIRATORY (INHALATION) at 11:04

## 2019-04-01 RX ADMIN — IPRATROPIUM BROMIDE AND ALBUTEROL SULFATE 3 ML: .5; 3 SOLUTION RESPIRATORY (INHALATION) at 08:04

## 2019-04-01 RX ADMIN — CARVEDILOL 3.12 MG: 3.12 TABLET, FILM COATED ORAL at 06:04

## 2019-04-01 RX ADMIN — IPRATROPIUM BROMIDE AND ALBUTEROL SULFATE 3 ML: .5; 3 SOLUTION RESPIRATORY (INHALATION) at 04:04

## 2019-04-01 RX ADMIN — INSULIN ASPART 4 UNITS: 100 INJECTION, SOLUTION INTRAVENOUS; SUBCUTANEOUS at 06:04

## 2019-04-01 RX ADMIN — INSULIN DETEMIR 10 UNITS: 100 INJECTION, SOLUTION SUBCUTANEOUS at 10:04

## 2019-04-01 NOTE — SUBJECTIVE & OBJECTIVE
Past Medical History:   Diagnosis Date    Arthritis     Cataract     Diabetes mellitus     GERD (gastroesophageal reflux disease)     GI bleed 2018    Gout     Hypertension        Past Surgical History:   Procedure Laterality Date     SECTION      Patient unsure, believe she had 3-4    CHOLECYSTECTOMY      EYE SURGERY      HYSTERECTOMY      TOTAL KNEE ARTHROPLASTY Right        Review of patient's allergies indicates:  No Known Allergies    No current facility-administered medications on file prior to encounter.      Current Outpatient Medications on File Prior to Encounter   Medication Sig    acetaminophen (TYLENOL) 325 MG tablet Take 325 mg by mouth every 6 (six) hours as needed.      atorvastatin (LIPITOR) 10 MG tablet Take by mouth once daily.      carvedilol (COREG) 3.125 MG tablet Take by mouth 2 (two) times daily with meals.      clonidine (CATAPRES) 0.1 MG tablet Take by mouth 2 (two) times daily.      furosemide (LASIX) 40 MG tablet Take 40 mg by mouth 2 (two) times daily.      insulin aspart (NOVOLOG) 100 unit/mL injection Inject 8 Units into the skin 3 (three) times daily before meals.      insulin glargine (LANTUS) 100 unit/mL injection Inject 10 Units into the skin every evening.     potassium chloride (KLOR-CON) 10 MEQ CR tablet Take 10 mEq by mouth once daily.       Family History     Problem Relation (Age of Onset)    Cancer Mother, Brother    Diabetes Mother    Hypertension Mother        Tobacco Use    Smoking status: Never Smoker    Smokeless tobacco: Never Used   Substance and Sexual Activity    Alcohol use: No    Drug use: No    Sexual activity: Never     Review of Systems   Constitutional: Negative for chills and fever.   HENT: Negative for rhinorrhea and sore throat.    Eyes: Positive for visual disturbance. Negative for photophobia.   Respiratory: Positive for shortness of breath and wheezing. Negative for cough.    Cardiovascular: Negative for chest pain and  palpitations.   Gastrointestinal: Positive for constipation. Negative for blood in stool, diarrhea, nausea and vomiting.   Endocrine: Negative for polydipsia and polyuria.   Genitourinary: Negative for dysuria and frequency.   Musculoskeletal: Positive for gait problem. Negative for back pain.   Neurological: Positive for weakness. Negative for dizziness and headaches.   Psychiatric/Behavioral: Negative for confusion and dysphoric mood.     Objective:     Vital Signs (Most Recent):  Temp: 97.6 °F (36.4 °C) (04/01/19 1518)  Pulse: 74 (04/01/19 1518)  Resp: 18 (04/01/19 1518)  BP: 138/65 (04/01/19 1518)  SpO2: 95 % (04/01/19 1518) Vital Signs (24h Range):  Temp:  [97.6 °F (36.4 °C)-98.3 °F (36.8 °C)] 97.6 °F (36.4 °C)  Pulse:  [71-74] 74  Resp:  [14-75] 18  SpO2:  [95 %-99 %] 95 %  BP: (131-138)/(63-65) 138/65        There is no height or weight on file to calculate BMI.    Physical Exam   Constitutional: She is oriented to person, place, and time. She appears well-developed and well-nourished.   HENT:   Head: Normocephalic.   Eyes: Pupils are equal, round, and reactive to light. Conjunctivae are normal.   Neck: Neck supple. No thyromegaly present.   Cardiovascular: Normal rate, regular rhythm and intact distal pulses. Exam reveals no gallop and no friction rub.   Murmur heard.  Pulmonary/Chest: She is in respiratory distress. She has wheezes. She has rales.   Breathing labored, poor air movement, diffuse crackles and expiratory wheezing throughout.   Abdominal: Soft. Bowel sounds are normal. She exhibits no distension. There is no tenderness.   Musculoskeletal: Normal range of motion. She exhibits no edema.   Lymphadenopathy:     She has no cervical adenopathy.   Neurological: She is alert and oriented to person, place, and time.   Strength equal and symmetric   Skin: Skin is warm and dry. No rash noted.   Psychiatric: She has a normal mood and affect. Her behavior is normal. Thought content normal.         CRANIAL  NERVES     CN III, IV, VI   Pupils are equal, round, and reactive to light.       Significant Labs: All pertinent labs within the past 24 hours have been reviewed.    Significant Imaging: I have reviewed all pertinent imaging results/findings within the past 24 hours.

## 2019-04-01 NOTE — CONSULTS
Consult Note  Nephrology    Consult Requested By: Es Chinchilla MD  Reason for Consult: ROXANNE/ CKD stage 4    SUBJECTIVE:     History of Present Illness:  Patient is a 85 y.o. female was transferred earlier today from [facility] where she was treated for acute respiratory failure, acute on chronic congestive heart failure diastolic, paroxysmal atrial fibrillation, acute anemia which was felt to be secondary to diverticular hemorrhage and ROXANNE on CKD stage 4.  The patient has a baseline creatinine from early February of 2.5.  With diuresis her creatinine has slowly risen to 4.47 as of 03/31/2019.  She also has been persistently hyperkalemic and as of yesterday developed a worsening metabolic acidosis.    The etiology of her respiratory failure is somewhat unclear per the record she has pulmonary function testing from 2012 which notes severe pulmonary hypertension with a restrictive lung disease pattern.  However on this admission she seems to be treated for an acute COPD exacerbation with nebulizer therapy Spiriva and prednisone.  Does not appear that this treatment yielded in the 6s.  She apparently previously had a right heart catheterization performed on 04/09/2014 which confirmed severe pulmonary hypertension with pulmonary artery pressure 71 mm Hg.  Her last echocardiogram appears to have been 02/07/2017 showing right ventricular systolic pressure of 54 and moderate pulmonary hypertension.  It seems that she has not had a follow-up echo since then.  In 2014 the patient had a stroke during that workup she was found to have a patent foramen ovale.  She has been committed to lifelong anticoagulation since that time initially she was on Coumadin but had a gastrointestinal bleeding episode in December of this past year at which time the family tells me that she was switched to Eliquis.    The patient and the family deny any history of melena from the December bleed in till her readmission at [facility] however she was found to be anemic with a hemoglobin of 7.8 during this current admission at [facility].  She had a bleeding scan which showed active bleeding in the left colon thought to be consistent with diverticular bleed.  She was transfused 2 units of packed RBCs which on 2019 and it seems somewhat unclear as to whether she had an appropriate rise since on  hemoglobin was 9.6.    There is previous discussion with Nephrology at [place] and the notes reflected from that consultation states that the patient is not felt to be candidate for dialysis.  The patient's family is certainly willing to consider a palliative approach but wants a 2nd opinion 1st.    Today the patient feels very short of breath stitting upright and has some fluid apparent in the peripheral areas of the thigh and buttock and some crackles on exam most predominantly at the left base    Past Medical History:   Diagnosis Date    Arthritis     Cataract     Diabetes mellitus     GERD (gastroesophageal reflux disease)     GI bleed 2018    Gout     Hypertension      Past Surgical History:   Procedure Laterality Date     SECTION      Patient unsure, believe she had 3-4    CHOLECYSTECTOMY      EYE SURGERY      HYSTERECTOMY      TOTAL KNEE ARTHROPLASTY Right      Family History   Problem Relation Age of Onset    Cancer Mother          age 98    Diabetes Mother     Hypertension Mother     Cancer Brother      Social History     Tobacco Use    Smoking status: Never Smoker    Smokeless tobacco: Never Used   Substance Use Topics    Alcohol use: No    Drug use: No       Medications Prior to Admission   Medication Sig Dispense Refill Last Dose    acetaminophen (TYLENOL) 325 MG tablet Take 325 mg by mouth every 6 (six) hours as needed.     Taking    atorvastatin (LIPITOR) 10 MG tablet Take by mouth once daily.     Taking    carvedilol (COREG) 3.125 MG tablet Take by mouth 2 (two) times daily with  meals.     Taking    clonidine (CATAPRES) 0.1 MG tablet Take by mouth 2 (two) times daily.     Taking    furosemide (LASIX) 40 MG tablet Take 40 mg by mouth 2 (two) times daily.     Taking    insulin aspart (NOVOLOG) 100 unit/mL injection Inject 8 Units into the skin 3 (three) times daily before meals.     Taking    insulin glargine (LANTUS) 100 unit/mL injection Inject 10 Units into the skin every evening.    Taking    potassium chloride (KLOR-CON) 10 MEQ CR tablet Take 10 mEq by mouth once daily.     Taking       Review of patient's allergies indicates:  No Known Allergies     Review of Systems:  Constitutional: No fever or chills, no weight changes.  Eyes: No visual changes or photophobia  HEENT: No nasal congestion or sore throat  Respiratory: No cough +shorness of breath  Cardiovascular: No chest pain or palpitations  Gastrointestinal: poor appetite, no nausea or vomiting, no change in bowel habits  Genitourinary: No hematuria or dysuria  Skin: No rash or pruritis  Hematologic/lymphatic: No easy bruising, bleeding or lymphadenopathy  Musculoskeletal: No arthralgias or myalgias  Neurological: No seizures or tremors  Endocrine: No heat/cold intolerance.  No polyuria/polydipsia.  Psychiatric:  No depression or anxiety.     OBJECTIVE:     Vital Signs (Most Recent)  Temp: 97.6 °F (36.4 °C) (04/01/19 1518)  Pulse: 74 (04/01/19 1518)  Resp: 18 (04/01/19 1518)  BP: 138/65 (04/01/19 1518)  SpO2: 95 % (04/01/19 1518)    Vital Signs Range (Last 24H):  Temp:  [97.6 °F (36.4 °C)-98.3 °F (36.8 °C)]   Pulse:  [71-74]   Resp:  [14-75]   BP: (131-138)/(63-65)   SpO2:  [95 %-99 %]     Physical Exam:    General appearance: Well developed, well nourished  Head: Normocephalic, atraumatic  Eyes:  Conjunctivae nl. Sclera anicteric. PERRL.  HEENT: Lips, mucosa, and tongue normal; teeth and gums normal and oropharynx clear.  Neck: Supple, trachea midline, thyroid not enlarged,   Lungs: Clear to auscultation bilaterally and normal  respiratory effort  Heart: Regular rate and rhythm, 4/6 sys normal, no murmur, click, rub or gallop  Abdomen: obese, Soft, non-tender non-distended; bowel sounds normal; no masses,  no organomegaly  Extremities: +2+ proximal thigh edema. No cyanosis or clubbing. No peripheral edema  Pulses: 2+ and symmetric  Skin: Skin color, texture, turgor normal. No rashes or lesions  Lymph nodes: Cervical, supraclavicular, and axillary nodes normal.  Neurologic: Normal strength and tone. No focal numbness or weakness  Psychiatric:  Alert and oriented times 3.  Affect appropriate.     Diagnostic Results:  Labs: Reviewed  X-Ray: Reviewed  US: Reviewed    ASSESSMENT/PLAN:     1. Multi factorial ROXANNE due to cardiorenal syndrome and acute anemia that may have progressed to ATN at this point on CKD likely due to DM (underlying retinopathy) with proteinuria. Hyperkalemia.  -Baseline Cr 2.5 and recent Pr/ Cr ration of 2.34g/day.  -she still making a reasonable amount of urine and the urine appears mildly dilute.    -Awaiting her chemistry from today.  -An echocardiogram has been ordered along with cardiology consultation.  -Her chemistry from yesterday peers is he has some degree of intravascular volume depletion but in the face of likely ATN am not sure that her renal function will improve.  -Will quantitate urine protein and send proteinuria lab, but feel this is likely DM.  -I think for today it would be prudent to use BiPAP to treat her underlying lung disease obtain adequate studies including echocardiogram urine electrolytes uric acid CPK change omeprazole to H2 blockade.  -After the echo is available would like to discuss with Cardiology and perhaps pulmonology the utility or lack thereof of offering hemodialysis to this patient.  It seems that her renal function just earlier this month was actually advanced but not end-stage with a GFR in the low 20s.  If indeed her underlying lung disease PFO and diastolic failure or such that  she is not going to recover from this then perhaps dialysis would not be reasonable option.  However if they are felt to be reasonably treatable I would feel that a trial of dialysis would be worthwhile.    2. ABLA/ Anemia of chronic disease/ Recent diverticular bleed/ Hx GERD  -recheck Fe, B12, folate and likely start AHSAN.  -monitor Hgb  -change PPI to H2B    3. Acute Hypercapnic on Chronic Respiratory Failure due to ? Restrictive lung disease/ severe Pulmonary HTN ? Mixed respiratory and metabolic acidosis  -ABG noted  -Rec BiPAP, ICU moniotring , unable to give Bicarb due to shortage, thus will try oral until  Decision about HD made. If pH < 7.15 then rec IV bicarb    4. PFO/ Pulm HTN/ Diastolic CHF/ A. Fib  -On renal dose Eliquis  -Awaiting new echo and Cards consult.  -hold diuresis for now.    5. Hyponatremia  -developed with diuresis and now has developed hypochloremia which ngoc points to intravascular volume depletion and renal failure over CHF.  -check urine lytes and follow     6. Chronic gout and Hyperuricemia  -was on Allopurinol now off.  -Await re-check    Case discussed in detail with Dr. Chinchilla  CCT 90min

## 2019-04-01 NOTE — HPI
Ms. Riley is an 85 year old woman who was transferred here from [facility] for a second opinion regarding starting HD vs entering hospice care.  The patient was hospitalized there on 3/20/19 with shortness of breath and lower extremity edema and was found to have fluid overload with heart failure and anemia with H/H 7.8/25.  She was transfused 2 units PRBCs and extensively diuresed while there.  Creatinine was initially 2.3 and gradually worsened to 4.5 at the time of transfer here.  Azotemia was out of proportion to the creatinine with an increase in BUN to 150.  On nephrology evaluation they did not feel she was a candidate for dialysis and consulted palliative care to follow.  Family requested a transfer to see a different nephrology service to see if they agreed.    Medical history includes chronic diastolic heart failure with pulmonary hypertension associated with chronic restrictive lung disease.  She has a patent foramen ovale and a right to left shunt that was thought to contribute to a stroke she had in 2014.  She is anemic and had a diverticular hemorrhage last December while on warfarin.  After recovery she was started on apixaban.  She has paroxysmal atrial fibrillation that is rate controlled, hypertension, diabetes and stage IV CKD.  She has chronically elevated alkaline phosphatase that is not thought to be significant.  At home she uses a wheelchair but can walk to the bathroom with assistance.  She attends an adult day care center.  She used to take allopurinol for gout attacks but this was discontinued when she had the GI bleed.

## 2019-04-01 NOTE — ASSESSMENT & PLAN NOTE
- Patient states her eyes are bothering her but is not much more clear than that.  - Seen by Dr. Thomas (ophthalmology) on 3/28.  Noted cataract surgery both eyes 2013 and history of diabetic retinopathy with macular edema and vitreomacular traction diagnosed in 2013.  Patient had been told to follow up at that time but did not.  On hospital consultation he again recommended outpatient follow up for a complete eye examination and retina evaluation.

## 2019-04-01 NOTE — PLAN OF CARE
Problem: Adult Inpatient Plan of Care  Goal: Plan of Care Review  Outcome: Ongoing (interventions implemented as appropriate)  Patient on 2 liters nasal cannula.

## 2019-04-01 NOTE — ASSESSMENT & PLAN NOTE
- Anemia seems multifactorial.  She has had chronic blood loss and had a diverticular bleed in December.  No bleeding noted on current presentation but she required transfusions at [facility] and they felt she needed a pill endoscopy as the rest of the workup was negative.  - Additional contributing factor of chronic kidney disease.  - currently stable  - monitor labs and for bleeding

## 2019-04-01 NOTE — H&P
[facility] Medicine  History & Physical    Patient Name: Joyce Riley  MRN: [id number]  Admission Date: 4/1/2019  Attending Physician: Es Chinchilla MD   Primary Care Provider: Kalpana Staton MD         Patient information was obtained from patient, relative(s) and past medical records.     Subjective:     Principal Problem:Acute kidney injury superimposed on chronic kidney disease    Chief Complaint: No chief complaint on file.       HPI: Ms. Riley is an 85 year old woman who was transferred here from [facility] for a second opinion regarding starting HD vs entering hospice care.  The patient was hospitalized there on 3/20/19 with shortness of breath and lower extremity edema and was found to have fluid overload with heart failure and anemia with H/H 7.8/25.  She was transfused 2 units PRBCs and extensively diuresed while there.  Creatinine was initially 2.3 and gradually worsened to 4.5 at the time of transfer here.  Azotemia was out of proportion to the creatinine with an increase in BUN to 150.  On nephrology evaluation they did not feel she was a candidate for dialysis and consulted palliative care to follow.  Family requested a transfer to see a different nephrology service to see if they agreed.    Medical history includes chronic diastolic heart failure with pulmonary hypertension associated with chronic restrictive lung disease.  She has a patent foramen ovale and a right to left shunt that was thought to contribute to a stroke she had in 2014.  She is anemic and had a diverticular hemorrhage last December while on warfarin.  After recovery she was started on apixaban.  She has paroxysmal atrial fibrillation that is rate controlled, hypertension, diabetes and stage IV CKD.  She has chronically elevated alkaline phosphatase that is not thought to be significant.  At home she uses a wheelchair but can walk to the bathroom with assistance.  She attends  an adult day care center.  She used to take allopurinol for gout attacks but this was discontinued when she had the GI bleed.    Past Medical History:   Diagnosis Date    Arthritis     Cataract     Diabetes mellitus     GERD (gastroesophageal reflux disease)     GI bleed 2018    Gout     Hypertension        Past Surgical History:   Procedure Laterality Date     SECTION      Patient unsure, believe she had 3-4    CHOLECYSTECTOMY      EYE SURGERY      HYSTERECTOMY      TOTAL KNEE ARTHROPLASTY Right        Review of patient's allergies indicates:  No Known Allergies    No current facility-administered medications on file prior to encounter.      Current Outpatient Medications on File Prior to Encounter   Medication Sig    acetaminophen (TYLENOL) 325 MG tablet Take 325 mg by mouth every 6 (six) hours as needed.      atorvastatin (LIPITOR) 10 MG tablet Take by mouth once daily.      carvedilol (COREG) 3.125 MG tablet Take by mouth 2 (two) times daily with meals.      clonidine (CATAPRES) 0.1 MG tablet Take by mouth 2 (two) times daily.      furosemide (LASIX) 40 MG tablet Take 40 mg by mouth 2 (two) times daily.      insulin aspart (NOVOLOG) 100 unit/mL injection Inject 8 Units into the skin 3 (three) times daily before meals.      insulin glargine (LANTUS) 100 unit/mL injection Inject 10 Units into the skin every evening.     potassium chloride (KLOR-CON) 10 MEQ CR tablet Take 10 mEq by mouth once daily.       Family History     Problem Relation (Age of Onset)    Cancer Mother, Brother    Diabetes Mother    Hypertension Mother        Tobacco Use    Smoking status: Never Smoker    Smokeless tobacco: Never Used   Substance and Sexual Activity    Alcohol use: No    Drug use: No    Sexual activity: Never     Review of Systems   Constitutional: Negative for chills and fever.   HENT: Negative for rhinorrhea and sore throat.    Eyes: Positive for visual disturbance. Negative for photophobia.    Respiratory: Positive for shortness of breath and wheezing. Negative for cough.    Cardiovascular: Negative for chest pain and palpitations.   Gastrointestinal: Positive for constipation. Negative for blood in stool, diarrhea, nausea and vomiting.   Endocrine: Negative for polydipsia and polyuria.   Genitourinary: Negative for dysuria and frequency.   Musculoskeletal: Positive for gait problem. Negative for back pain.   Neurological: Positive for weakness. Negative for dizziness and headaches.   Psychiatric/Behavioral: Negative for confusion and dysphoric mood.     Objective:     Vital Signs (Most Recent):  Temp: 97.6 °F (36.4 °C) (04/01/19 1518)  Pulse: 74 (04/01/19 1518)  Resp: 18 (04/01/19 1518)  BP: 138/65 (04/01/19 1518)  SpO2: 95 % (04/01/19 1518) Vital Signs (24h Range):  Temp:  [97.6 °F (36.4 °C)-98.3 °F (36.8 °C)] 97.6 °F (36.4 °C)  Pulse:  [71-74] 74  Resp:  [14-75] 18  SpO2:  [95 %-99 %] 95 %  BP: (131-138)/(63-65) 138/65        There is no height or weight on file to calculate BMI.    Physical Exam   Constitutional: She is oriented to person, place, and time. She appears well-developed and well-nourished.   HENT:   Head: Normocephalic.   Eyes: Pupils are equal, round, and reactive to light. Conjunctivae are normal.   Neck: Neck supple. No thyromegaly present.   Cardiovascular: Normal rate, regular rhythm and intact distal pulses. Exam reveals no gallop and no friction rub.   Murmur heard.  Pulmonary/Chest: She is in respiratory distress. She has wheezes. She has rales.   Breathing labored, poor air movement, diffuse crackles and expiratory wheezing throughout.   Abdominal: Soft. Bowel sounds are normal. She exhibits no distension. There is no tenderness.   Musculoskeletal: Normal range of motion. She exhibits no edema.   Lymphadenopathy:     She has no cervical adenopathy.   Neurological: She is alert and oriented to person, place, and time.   Strength equal and symmetric   Skin: Skin is warm and dry.  No rash noted.   Psychiatric: She has a normal mood and affect. Her behavior is normal. Thought content normal.         CRANIAL NERVES     CN III, IV, VI   Pupils are equal, round, and reactive to light.       Significant Labs: All pertinent labs within the past 24 hours have been reviewed.    Significant Imaging: I have reviewed all pertinent imaging results/findings within the past 24 hours.    Assessment/Plan:     * Acute kidney injury superimposed on chronic kidney disease  - Patient admitted on 3/20/19 to [facility] with heart failure symptoms and anemia.  Developed severe azotemia and doubled baseline creatinine from 2.3 to 4.5 while there.  Appears to have cardiorenal syndrome.  - Patient and family have been told she is not a HD candidate.  Likely this is due to severe chronic underlying problems including chronic restrictive lung disease, heart failure with a right to left shunt, atrial fibrillation and pulmonary hypertension.  - Nephrology evaluating.  Patient with fluid overload and needs diuresis, although she is making urine.      Acute on chronic diastolic heart failure  - Reportedly has grade II diastolic dysfunction.  Her problem likely stems from the right to left shunt across the PFO due to pulmonary hypertension and restrictive lung disease.  - Nephrology evaluating to consider HD.  In the meantime consult cardiologist to look at echo and evaluate severity of heart failure complicated by shunt.  Reportedly she has a preserved EF.      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  - Pulmonary HTN associated with restrictive lung disease and subsequent diastolic heart failure, as above.  - Needs diuresis.      Patent foramen ovale with right to left shunt  - As above        Visual disturbance  - Patient states her eyes are bothering her but is not much more clear than that.  - Seen by Dr. Thomas (ophthalmology) on 3/28.  Noted cataract surgery both eyes 2013 and history of diabetic retinopathy with  macular edema and vitreomacular traction diagnosed in 2013.  Patient had been told to follow up at that time but did not.  On hospital consultation he again recommended outpatient follow up for a complete eye examination and retina evaluation.      Anemia  - Anemia seems multifactorial.  She has had chronic blood loss and had a diverticular bleed in December.  No bleeding noted on current presentation but she required transfusions at [facility] and they felt she needed a pill endoscopy as the rest of the workup was negative.  - Additional contributing factor of chronic kidney disease.        CKD (chronic kidney disease), stage IV  - As above.  Baseline creatinine around 2.5.      Type 2 diabetes mellitus, with long-term current use of insulin  - HbA1c pending.  - Low dose SSI, Levemir 10 units qhs and 4 units aspart insulin with meals.      VTE Risk Mitigation (From admission, onward)        Ordered     heparin (porcine) injection 5,000 Units  Every 8 hours      04/01/19 1404     IP VTE HIGH RISK PATIENT  Once      04/01/19 1404             Es Oscar MD  Department of Hospital Medicine   [facility]

## 2019-04-01 NOTE — ASSESSMENT & PLAN NOTE
- Patient admitted on 3/20/19 to [facility] with heart failure symptoms and anemia.  Developed severe azotemia and doubled baseline creatinine from 2.3 to 4.5 while there.  Appears to have cardiorenal syndrome.  - Patient and family have been told she is not a HD candidate.  Likely this is due to severe chronic underlying problems including chronic restrictive lung disease, heart failure with a right to left shunt, atrial fibrillation and pulmonary hypertension.  - Nephrology evaluating.  Patient with fluid overload and needs diuresis, although she is making urine.

## 2019-04-01 NOTE — ASSESSMENT & PLAN NOTE
- Reportedly has grade II diastolic dysfunction.  Her problem likely stems from the right to left shunt across the PFO due to pulmonary hypertension and restrictive lung disease.  - Nephrology evaluating to consider HD.  In the meantime consult cardiologist to look at echo and evaluate severity of heart failure complicated by shunt.  Reportedly she has a preserved EF.

## 2019-04-01 NOTE — ASSESSMENT & PLAN NOTE
- Pulmonary HTN associated with restrictive lung disease and subsequent diastolic heart failure, as above.  - Needs diuresis.

## 2019-04-01 NOTE — LETTER
April 4, 2019    Joyce Riley  [address] [place] [id number] [address]  [place] [address]  Phone: [phone]  Fax: [phone] To Whom it may concern,    The above name was hospitalized on April 1, 2019 and is currently still hospitalized.      Thank you,        Tina Simon BSN, RN

## 2019-04-02 PROBLEM — E83.42 HYPOMAGNESEMIA: Status: ACTIVE | Noted: 2019-04-02

## 2019-04-02 PROBLEM — E87.20 METABOLIC ACIDOSIS: Status: ACTIVE | Noted: 2019-04-02

## 2019-04-02 PROBLEM — E87.1 HYPONATREMIA: Status: ACTIVE | Noted: 2019-04-02

## 2019-04-02 PROBLEM — I27.23 PULMONARY HYPERTENSION DUE TO LUNG DISEASE: Chronic | Status: ACTIVE | Noted: 2019-04-01

## 2019-04-02 PROBLEM — E87.5 HYPERKALEMIA: Status: ACTIVE | Noted: 2019-04-02

## 2019-04-02 LAB
25(OH)D3+25(OH)D2 SERPL-MCNC: 11 NG/ML (ref 30–96)
ALBUMIN SERPL BCP-MCNC: 2.5 G/DL (ref 3.5–5.2)
ALLENS TEST: ABNORMAL
ALP SERPL-CCNC: 103 U/L (ref 55–135)
ALT SERPL W/O P-5'-P-CCNC: 20 U/L (ref 10–44)
ANA SER QL IF: POSITIVE
ANA TITR SER IF: NORMAL {TITER}
ANION GAP SERPL CALC-SCNC: 15 MMOL/L (ref 8–16)
AST SERPL-CCNC: 27 U/L (ref 10–40)
BASOPHILS # BLD AUTO: 0.01 K/UL (ref 0–0.2)
BASOPHILS NFR BLD: 0.1 % (ref 0–1.9)
BILIRUB SERPL-MCNC: 0.4 MG/DL (ref 0.1–1)
BUN SERPL-MCNC: 159 MG/DL (ref 8–23)
C3 SERPL-MCNC: 96 MG/DL (ref 50–180)
C4 SERPL-MCNC: 21 MG/DL (ref 11–44)
CALCIUM SERPL-MCNC: 8.1 MG/DL (ref 8.7–10.5)
CHLORIDE SERPL-SCNC: 97 MMOL/L (ref 95–110)
CO2 SERPL-SCNC: 13 MMOL/L (ref 23–29)
CREAT SERPL-MCNC: 4.8 MG/DL (ref 0.5–1.4)
DELSYS: ABNORMAL
DIFFERENTIAL METHOD: ABNORMAL
EOSINOPHIL # BLD AUTO: 0 K/UL (ref 0–0.5)
EOSINOPHIL NFR BLD: 0 % (ref 0–8)
EOSINOPHIL URNS QL WRIGHT STN: ABNORMAL
ERYTHROCYTE [DISTWIDTH] IN BLOOD BY AUTOMATED COUNT: 18.3 % (ref 11.5–14.5)
ERYTHROCYTE [SEDIMENTATION RATE] IN BLOOD BY WESTERGREN METHOD: 19 MM/H
EST. GFR  (AFRICAN AMERICAN): 9 ML/MIN/1.73 M^2
EST. GFR  (NON AFRICAN AMERICAN): 8 ML/MIN/1.73 M^2
FERRITIN SERPL-MCNC: 76 NG/ML (ref 20–300)
FLOW: 2
FOLATE SERPL-MCNC: 8.8 NG/ML (ref 4–24)
GLUCOSE SERPL-MCNC: 163 MG/DL (ref 70–110)
HAV IGM SERPL QL IA: NEGATIVE
HBV CORE IGM SERPL QL IA: NEGATIVE
HBV SURFACE AG SERPL QL IA: NEGATIVE
HCO3 UR-SCNC: 16.4 MMOL/L (ref 24–28)
HCT VFR BLD AUTO: 30.6 % (ref 37–48.5)
HCV AB SERPL QL IA: NEGATIVE
HGB BLD-MCNC: 9.6 G/DL (ref 12–16)
IRON SERPL-MCNC: 127 UG/DL (ref 30–160)
LYMPHOCYTES # BLD AUTO: 0.5 K/UL (ref 1–4.8)
LYMPHOCYTES NFR BLD: 4.9 % (ref 18–48)
MAGNESIUM SERPL-MCNC: 1.2 MG/DL (ref 1.6–2.6)
MCH RBC QN AUTO: 29 PG (ref 27–31)
MCHC RBC AUTO-ENTMCNC: 31.4 G/DL (ref 32–36)
MCV RBC AUTO: 92 FL (ref 82–98)
MODE: ABNORMAL
MONOCYTES # BLD AUTO: 0.7 K/UL (ref 0.3–1)
MONOCYTES NFR BLD: 6.5 % (ref 4–15)
NEUTROPHILS # BLD AUTO: 9.6 K/UL (ref 1.8–7.7)
NEUTROPHILS NFR BLD: 86.5 % (ref 38–73)
PCO2 BLDA: 42.5 MMHG (ref 35–45)
PH SMN: 7.2 [PH] (ref 7.35–7.45)
PHOSPHATE SERPL-MCNC: 8.6 MG/DL (ref 2.7–4.5)
PLATELET # BLD AUTO: 217 K/UL (ref 150–350)
PMV BLD AUTO: 12.2 FL (ref 9.2–12.9)
PO2 BLDA: 93 MMHG (ref 80–100)
POC BE: -12 MMOL/L
POC SATURATED O2: 95 % (ref 95–100)
POCT GLUCOSE: 116 MG/DL (ref 70–110)
POCT GLUCOSE: 124 MG/DL (ref 70–110)
POCT GLUCOSE: 125 MG/DL (ref 70–110)
POCT GLUCOSE: 141 MG/DL (ref 70–110)
POCT GLUCOSE: 154 MG/DL (ref 70–110)
POCT GLUCOSE: 196 MG/DL (ref 70–110)
POCT GLUCOSE: 219 MG/DL (ref 70–110)
POCT GLUCOSE: 60 MG/DL (ref 70–110)
POCT GLUCOSE: 86 MG/DL (ref 70–110)
POTASSIUM SERPL-SCNC: 6.3 MMOL/L (ref 3.5–5.1)
PROT 24H UR-MRATE: 714 MG/SPEC (ref 0–100)
PROT SERPL-MCNC: 6.1 G/DL (ref 6–8.4)
PROT UR-MCNC: 61 MG/DL (ref 0–15)
PTH-INTACT SERPL-MCNC: 780.8 PG/ML (ref 9–77)
RBC # BLD AUTO: 3.31 M/UL (ref 4–5.4)
RPR SER QL: NORMAL
SAMPLE: ABNORMAL
SATURATED IRON: 42 % (ref 20–50)
SITE: ABNORMAL
SODIUM SERPL-SCNC: 125 MMOL/L (ref 136–145)
SP02: 98
TOTAL IRON BINDING CAPACITY: 305 UG/DL (ref 250–450)
TRANSFERRIN SERPL-MCNC: 206 MG/DL (ref 200–375)
URINE COLLECTION DURATION: 24 HR
URINE VOLUME: 1170 ML
VIT B12 SERPL-MCNC: 586 PG/ML (ref 210–950)
WBC # BLD AUTO: 11.04 K/UL (ref 3.9–12.7)

## 2019-04-02 PROCEDURE — 25000003 PHARM REV CODE 250

## 2019-04-02 PROCEDURE — 63600175 PHARM REV CODE 636 W HCPCS: Performed by: HOSPITALIST

## 2019-04-02 PROCEDURE — 36600 WITHDRAWAL OF ARTERIAL BLOOD: CPT

## 2019-04-02 PROCEDURE — 25000003 PHARM REV CODE 250: Performed by: INTERNAL MEDICINE

## 2019-04-02 PROCEDURE — 99233 SBSQ HOSP IP/OBS HIGH 50: CPT | Mod: ,,, | Performed by: INTERNAL MEDICINE

## 2019-04-02 PROCEDURE — 80100014 HC HEMODIALYSIS 1:1

## 2019-04-02 PROCEDURE — 20000000 HC ICU ROOM

## 2019-04-02 PROCEDURE — 84156 ASSAY OF PROTEIN URINE: CPT

## 2019-04-02 PROCEDURE — 27000221 HC OXYGEN, UP TO 24 HOURS

## 2019-04-02 PROCEDURE — 94660 CPAP INITIATION&MGMT: CPT

## 2019-04-02 PROCEDURE — 80053 COMPREHEN METABOLIC PANEL: CPT

## 2019-04-02 PROCEDURE — 83970 ASSAY OF PARATHORMONE: CPT

## 2019-04-02 PROCEDURE — 83735 ASSAY OF MAGNESIUM: CPT

## 2019-04-02 PROCEDURE — 99223 PR INITIAL HOSPITAL CARE,LEVL III: ICD-10-PCS | Mod: ,,, | Performed by: INTERNAL MEDICINE

## 2019-04-02 PROCEDURE — 63600175 PHARM REV CODE 636 W HCPCS: Performed by: INTERNAL MEDICINE

## 2019-04-02 PROCEDURE — 94640 AIRWAY INHALATION TREATMENT: CPT

## 2019-04-02 PROCEDURE — 25000242 PHARM REV CODE 250 ALT 637 W/ HCPCS: Performed by: HOSPITALIST

## 2019-04-02 PROCEDURE — 25000003 PHARM REV CODE 250: Performed by: HOSPITALIST

## 2019-04-02 PROCEDURE — 36415 COLL VENOUS BLD VENIPUNCTURE: CPT

## 2019-04-02 PROCEDURE — 99900035 HC TECH TIME PER 15 MIN (STAT)

## 2019-04-02 PROCEDURE — 63600175 PHARM REV CODE 636 W HCPCS: Performed by: NURSE PRACTITIONER

## 2019-04-02 PROCEDURE — 82803 BLOOD GASES ANY COMBINATION: CPT

## 2019-04-02 PROCEDURE — 36556 INSERT NON-TUNNEL CV CATH: CPT

## 2019-04-02 PROCEDURE — 99223 1ST HOSP IP/OBS HIGH 75: CPT | Mod: ,,, | Performed by: INTERNAL MEDICINE

## 2019-04-02 PROCEDURE — 85025 COMPLETE CBC W/AUTO DIFF WBC: CPT

## 2019-04-02 PROCEDURE — 84100 ASSAY OF PHOSPHORUS: CPT

## 2019-04-02 PROCEDURE — 94761 N-INVAS EAR/PLS OXIMETRY MLT: CPT

## 2019-04-02 PROCEDURE — 99233 PR SUBSEQUENT HOSPITAL CARE,LEVL III: ICD-10-PCS | Mod: ,,, | Performed by: INTERNAL MEDICINE

## 2019-04-02 RX ORDER — IPRATROPIUM BROMIDE AND ALBUTEROL SULFATE 2.5; .5 MG/3ML; MG/3ML
3 SOLUTION RESPIRATORY (INHALATION) EVERY 4 HOURS PRN
Status: DISCONTINUED | OUTPATIENT
Start: 2019-04-02 | End: 2019-04-09 | Stop reason: HOSPADM

## 2019-04-02 RX ORDER — SODIUM POLYSTYRENE SULFONATE 4.1 MEQ/G
60 POWDER, FOR SUSPENSION ORAL; RECTAL ONCE
Status: COMPLETED | OUTPATIENT
Start: 2019-04-02 | End: 2019-04-02

## 2019-04-02 RX ORDER — SODIUM CHLORIDE 9 MG/ML
INJECTION, SOLUTION INTRAVENOUS ONCE
Status: DISCONTINUED | OUTPATIENT
Start: 2019-04-02 | End: 2019-04-03

## 2019-04-02 RX ORDER — LIDOCAINE HYDROCHLORIDE 10 MG/ML
INJECTION INFILTRATION; PERINEURAL
Status: COMPLETED
Start: 2019-04-02 | End: 2019-04-02

## 2019-04-02 RX ORDER — MAGNESIUM SULFATE 1 G/100ML
1 INJECTION INTRAVENOUS ONCE
Status: DISCONTINUED | OUTPATIENT
Start: 2019-04-02 | End: 2019-04-03

## 2019-04-02 RX ORDER — DEXTROSE MONOHYDRATE 25 G/50ML
25 INJECTION, SOLUTION INTRAVENOUS ONCE
Status: COMPLETED | OUTPATIENT
Start: 2019-04-02 | End: 2019-04-02

## 2019-04-02 RX ORDER — AMLODIPINE BESYLATE 5 MG/1
10 TABLET ORAL ONCE
Status: COMPLETED | OUTPATIENT
Start: 2019-04-02 | End: 2019-04-02

## 2019-04-02 RX ORDER — SODIUM BICARBONATE 1 MEQ/ML
100 SYRINGE (ML) INTRAVENOUS ONCE
Status: COMPLETED | OUTPATIENT
Start: 2019-04-02 | End: 2019-04-02

## 2019-04-02 RX ORDER — HEPARIN SODIUM 5000 [USP'U]/ML
5000 INJECTION, SOLUTION INTRAVENOUS; SUBCUTANEOUS
Status: DISCONTINUED | OUTPATIENT
Start: 2019-04-02 | End: 2019-04-09 | Stop reason: HOSPADM

## 2019-04-02 RX ADMIN — INSULIN ASPART 4 UNITS: 100 INJECTION, SOLUTION INTRAVENOUS; SUBCUTANEOUS at 11:04

## 2019-04-02 RX ADMIN — INSULIN HUMAN 10 UNITS: 100 INJECTION, SOLUTION PARENTERAL at 05:04

## 2019-04-02 RX ADMIN — HEPARIN SODIUM 5000 UNITS: 5000 INJECTION, SOLUTION INTRAVENOUS; SUBCUTANEOUS at 04:04

## 2019-04-02 RX ADMIN — HEPARIN SODIUM 5000 UNITS: 5000 INJECTION, SOLUTION INTRAVENOUS; SUBCUTANEOUS at 05:04

## 2019-04-02 RX ADMIN — AMLODIPINE BESYLATE 10 MG: 5 TABLET ORAL at 09:04

## 2019-04-02 RX ADMIN — APIXABAN 2.5 MG: 2.5 TABLET, FILM COATED ORAL at 09:04

## 2019-04-02 RX ADMIN — DEXTROSE MONOHYDRATE 25 G: 500 INJECTION PARENTERAL at 05:04

## 2019-04-02 RX ADMIN — IPRATROPIUM BROMIDE AND ALBUTEROL SULFATE 3 ML: .5; 3 SOLUTION RESPIRATORY (INHALATION) at 07:04

## 2019-04-02 RX ADMIN — IPRATROPIUM BROMIDE AND ALBUTEROL SULFATE 3 ML: .5; 3 SOLUTION RESPIRATORY (INHALATION) at 03:04

## 2019-04-02 RX ADMIN — CARVEDILOL 3.12 MG: 3.12 TABLET, FILM COATED ORAL at 06:04

## 2019-04-02 RX ADMIN — SODIUM POLYSTYRENE SULFONATE 60 G: 1 POWDER ORAL; RECTAL at 05:04

## 2019-04-02 RX ADMIN — DEXTROSE MONOHYDRATE 12.5 G: 500 INJECTION PARENTERAL at 06:04

## 2019-04-02 RX ADMIN — LIDOCAINE HYDROCHLORIDE 200 MG: 10 INJECTION, SOLUTION INFILTRATION; PERINEURAL at 01:04

## 2019-04-02 RX ADMIN — CALCIUM GLUCONATE 1000 MG: 94 INJECTION, SOLUTION INTRAVENOUS at 04:04

## 2019-04-02 RX ADMIN — SODIUM BICARBONATE 100 MEQ: 84 INJECTION, SOLUTION INTRAVENOUS at 04:04

## 2019-04-02 NOTE — PLAN OF CARE
D/C Plan:  Will re-assess  Critical care in room, Ms Riley sedated.  Will follow closely  No family at the bedside.       04/02/19 1313   Discharge Assessment   Assessment Type Discharge Planning Assessment   Confirmed/corrected address and phone number on facesheet? No   Assessment information obtained from? Medical Record   Current cognitive status: Coma/Sedated/Intubated  (dialysis line today (unable to fully assess))   Patient currently being followed by outpatient case management? Unable to determine (comments)   Do you have any problems affording any of your prescribed medications? TBD   Patient/Family in Agreement with Plan unable to assess

## 2019-04-02 NOTE — PLAN OF CARE
Problem: Adult Inpatient Plan of Care  Goal: Plan of Care Review  Pt confused this AM and attempting to get OOB. ABG done which showed slight improvement of acidosis. Pt on nasal cannula. 24 hour urine in progress. Critical lab values treated per nephrology orders. Difficult to obtain IV access after 3 RNs attempted a second access. Bed alarm on and bed low.

## 2019-04-02 NOTE — ASSESSMENT & PLAN NOTE
? Due to fluid overload  bipap as needed  Monitor with hd  Ct chest showed mild cardiomegaly and pulmonary interstitial edema

## 2019-04-02 NOTE — PROCEDURES
"Joyce Riley is a 85 y.o. female patient.    Temp: 97.9 °F (36.6 °C) (04/02/19 1110)  Pulse: 68 (04/02/19 1200)  Resp: 16 (04/02/19 1200)  BP: (!) 169/72 (04/02/19 1200)  SpO2: (!) 93 % (04/02/19 1200)  Weight: 89.7 kg (197 lb 12 oz) (04/02/19 0430)  Height: 5' 4" (162.6 cm) (04/01/19 1340)       Central Line  Date/Time: 4/2/2019 1:26 PM  Location procedure was performed: [facility] PULMONARY MEDICINE  Performed by: Arcadio Peralta MD  Pre-operative Diagnosis: Renal failure  Post-operative diagnosis: renal failure  Consent Done: Yes  Time out: Immediately prior to procedure a "time out" was called to verify the correct patient, procedure, equipment, support staff and site/side marked as required.  Indications: hemodialysis  Anesthesia: local infiltration    Anesthesia:  Local Anesthetic: lidocaine 1% without epinephrine  Preparation: skin prepped with ChloraPrep  Skin prep agent dried: skin prep agent completely dried prior to procedure  Sterile barriers: all five maximum sterile barriers used - cap, mask, sterile gown, sterile gloves, and large sterile sheet  Hand hygiene: hand hygiene performed prior to central venous catheter insertion  Location details: right internal jugular  Catheter type: trialysis  Catheter size: 13 Fr  Catheter Length: 15cm    Ultrasound guidance: yes  Vessel Caliber: medium, patent, compressibility normal  Vascular Doppler: flow caudad  Needle advanced into vessel with real time Ultrasound guidance.  Guidewire confirmed in vessel.  Sterile sheath used.  Manometry: No   Number of attempts: 1  Assessment: placement verified by x-ray,  no pneumothorax on x-ray,  verified by fluoroscopy,  tip termination and successful placement  Estimated blood loss (mL): 7  Post-procedure: line sutured,  chlorhexidine patch,  sterile dressing applied and blood return through all ports  Complications: No          Arcadio Peralta  4/2/2019  "

## 2019-04-02 NOTE — SUBJECTIVE & OBJECTIVE
Interval History: Patient lethargic but arousable, was on nasal cannula for sometime at night as was pulling the mask, was put back on bipap this am, talked to family in room.    Review of Systems   Unable to perform ROS: Mental status change     Objective:     Vital Signs (Most Recent):  Temp: 97.6 °F (36.4 °C) (04/02/19 1515)  Pulse: 64 (04/02/19 1500)  Resp: (!) 25 (04/02/19 1500)  BP: (!) 171/77 (04/02/19 1500)  SpO2: 97 % (04/02/19 1500) Vital Signs (24h Range):  Temp:  [97.3 °F (36.3 °C)-98.2 °F (36.8 °C)] 97.6 °F (36.4 °C)  Pulse:  [58-78] 64  Resp:  [14-31] 25  SpO2:  [91 %-98 %] 97 %  BP: (107-177)/(47-79) 171/77     Weight: 89.7 kg (197 lb 12 oz)  Body mass index is 33.94 kg/m².    Intake/Output Summary (Last 24 hours) at 4/2/2019 1535  Last data filed at 4/2/2019 1510  Gross per 24 hour   Intake --   Output 1675 ml   Net -1675 ml      Physical Exam   Constitutional: No distress.   obese   HENT:   Head: Normocephalic and atraumatic.   Eyes: Pupils are equal, round, and reactive to light. EOM are normal.   Neck: Normal range of motion. Neck supple.   Cardiovascular: Normal rate and regular rhythm.   Pulmonary/Chest: Effort normal and breath sounds normal.   On bipap   Abdominal: Soft. Bowel sounds are normal. She exhibits no distension. There is no tenderness.   Musculoskeletal: She exhibits no edema.   Able to move her toes and hands   Neurological: No cranial nerve deficit.   Arousable, was not able to tell me her name   Skin: Skin is warm.   Psychiatric:   lethargic   Vitals reviewed.      Significant Labs:   BMP:   Recent Labs   Lab 04/02/19  0324   *   *   K 6.3*   CL 97   CO2 13*   *   CREATININE 4.8*   CALCIUM 8.1*   MG 1.2*     CBC:   Recent Labs   Lab 04/01/19  1617 04/02/19  0247   WBC 12.26 11.04   HGB 10.0* 9.6*   HCT 31.3* 30.6*    217       Significant Imaging: I have reviewed all pertinent imaging results/findings within the past 24 hours.

## 2019-04-02 NOTE — PROGRESS NOTES
"Nephrology  Progress Note    Admit Date: 4/1/2019   LOS: 1 day     SUBJECTIVE:     Follow-up For:  Acute kidney injury superimposed on chronic kidney disease    Interval History:     Uneventful night.  Remains essentially Bipap dependent.  Extensive discussion with treatment team and family at bedside.  Answers very simple questions at times.      Review of Systems:  Constitutional: No fever or chills  Respiratory:  shortness of breath  Cardiovascular: No chest pain or palpitations  Gastrointestinal: No nausea or vomiting  Neurological: No confusion or weakness    OBJECTIVE:     Vital Signs Range (Last 24H):  BP (!) 169/77 (BP Location: Left arm, Patient Position: Lying)   Pulse 64   Temp 97.8 °F (36.6 °C) (Axillary)   Resp (!) 25   Ht 5' 4" (1.626 m)   Wt 89.7 kg (197 lb 12 oz)   SpO2 97%   Breastfeeding? No   BMI 33.94 kg/m²     Temp:  [97.3 °F (36.3 °C)-98.3 °F (36.8 °C)]   Pulse:  [61-78]   Resp:  [14-31]   BP: (107-172)/(47-79)   SpO2:  [94 %-99 %]     I & O (Last 24H):    Intake/Output Summary (Last 24 hours) at 4/2/2019 0943  Last data filed at 4/2/2019 0500  Gross per 24 hour   Intake --   Output 1100 ml   Net -1100 ml       Physical Exam:  General appearance:  Ill appearing  Eyes:  Conjunctivae/corneas clear. PERRL.  Lungs:  BiPAP with few rhonchi   Heart: Regular rate and rhythm, S1, S2 normal, + murmur  Abdomen: Soft, non-tender non-distended; bowel sounds normal; no masses,  no organomegaly, obese  Extremities: No cyanosis or clubbing. 1+ edema.    Skin: Skin color, texture, turgor normal. No rashes or lesions  Neurologic:  No focal numbness or weakness   Nava catheter    Laboratory Data:  Recent Labs   Lab 04/02/19  0247   WBC 11.04   RBC 3.31*   HGB 9.6*   HCT 30.6*      MCV 92   MCH 29.0   MCHC 31.4*       BMP:   Recent Labs   Lab 04/02/19  0324   *   *   K 6.3*   CL 97   CO2 13*   *   CREATININE 4.8*   CALCIUM 8.1*   MG 1.2*     Lab Results   Component Value Date "    CALCIUM 8.1 (L) 04/02/2019    PHOS 8.6 (H) 04/02/2019       Lab Results   Component Value Date    CALCIUM 8.1 (L) 04/02/2019    PHOS 8.6 (H) 04/02/2019       Lab Results   Component Value Date    URICACID 7.7 (H) 04/01/2019       BNP  Recent Labs   Lab 04/01/19  1617   *       Medications:  Medication list was reviewed and changes noted under Assessment/Plan.    Diagnostic Results:        ASSESSMENT/PLAN:     1. Multi factorial ROXANNE due to cardiorenal syndrome and acute anemia that may have progressed to ATN at this point on CKD likely due to DM (underlying retinopathy) with proteinuria. Hyperkalemia.  -Baseline Cr 2.5 and recent Pr/ Cr ration of 2.34g/day.  -she still making a reasonable amount of urine and the urine appears mildly dilute.    -Will quantitate urine protein and send proteinuria lab, but feel this is likely DM.  -use BiPAP to treat her underlying lung disease and changed omeprazole to H2 blockade.  -echo noted with hyperdynamic ventricle, diastolic dysfunction and severe pulmonary hypertension.  Discussed with Cardiology and feels that it is not a congested picture.    Plan:  Extensive discussion with treatment team and family at bedside about options.  Offered trial of dialysis to see how she will tolerate.  Awaiting final family decision and discussed with critical care and they will place line if in agreement.  Time frame of renal recovery unknown at this time and will be a day-to-day trial.  Not a great candidate for long-term dialysis.     2. ABLA/ Anemia of chronic disease/ Recent diverticular bleed/ Hx GERD  -repeat iron, B12, folate within limits  -monitor Hgb  -changed PPI to H2B  -will likely start AHSAN soon     3. Acute Hypercapnic on Chronic Respiratory Failure due to ? Restrictive lung disease/ severe Pulmonary HTN ? Mixed respiratory and metabolic acidosis  -ABG noted  -Rec BiPAP, ICU moniotring , unable to give Bicarb due to shortage, thus will try oral until  Decision about HD  made.     4. ?PFO/ Pulm HTN/ Diastolic CHF/ A. Fib  -On renal dose Eliquis  -cardiac consult noted  -hold diuresis for now.     5. Hyponatremia  -developed with diuresis and now has developed hypochloremia which ngoc points to intravascular volume depletion and renal failure over CHF.  -hold IV fluids and diuretics at this time       6. Chronic gout and Hyperuricemia  -was on Allopurinol now off.  -level noted and will resume if family decides against dialysis.       7.  Mixed anion gap metabolic acidosis with hyperkalemia:  -given 2 amps of bicarb IV and will continue oral bicarb for now.      8.  Hypo Mag:  -replace IV      See above  Will follow closely

## 2019-04-02 NOTE — PROGRESS NOTES
[facility]  Cardiology  Progress Note    Patient Name: Joyce Riley  MRN: [id number]  Admission Date: 4/1/2019  Hospital Length of Stay: 1 days  Code Status: Partial Code   Attending Physician: Thelma Mc MD   Primary Care Physician: Kalpana Staton MD  Expected Discharge Date:   Principal Problem:Acute kidney injury superimposed on chronic kidney disease    Subjective:     Brief HPI:    Joyce Riley is a 85 y.o.female with advanced renal failure and fluid overload being transferred to [facility] for consideration of HD. She has advanced hypertensive heart disease with severe diastolic dysfunction. She has pulmonary hypertension. A PFO has been seen. She is SOB on BIPAP. She denies chest pain.    Hospital Course:     4/1/2019: Echo: Normal left ventricular size with hyperdynamic systolic function. EF 80%. Moderate LVH. Moderate diastolic dysfunction. Severely dilated LA. SPAP 74 mmHg. No shunt is visualized with agitated saline.    Interval History:     Remains short of breath. No CP. No arrhythmias.    Review of Systems   Constitution: Positive for malaise/fatigue.   Cardiovascular: Negative for chest pain.   Respiratory: Positive for shortness of breath. Negative for wheezing.      Objective:     Vital Signs (Most Recent):  Temp: 97.8 °F (36.6 °C) (04/02/19 0710)  Pulse: 64 (04/02/19 1100)  Resp: (!) 25 (04/02/19 1100)  BP: (!) 169/73 (04/02/19 1100)  SpO2: (!) 91 % (04/02/19 1100) Vital Signs (24h Range):  Temp:  [97.3 °F (36.3 °C)-98.3 °F (36.8 °C)] 97.8 °F (36.6 °C)  Pulse:  [58-78] 64  Resp:  [14-31] 25  SpO2:  [91 %-99 %] 91 %  BP: (107-177)/(47-79) 169/73     Weight: 89.7 kg (197 lb 12 oz)  Body mass index is 33.94 kg/m².    SpO2: (!) 91 %  O2 Device (Oxygen Therapy): room air      Intake/Output Summary (Last 24 hours) at 4/2/2019 1113  Last data filed at 4/2/2019 1000  Gross per 24 hour   Intake --   Output 1300 ml   Net -1300 ml       Lines/Drains/Airways     Drain                  Urethral Catheter 03/24/19 2300 8 days          Peripheral Intravenous Line                 Peripheral IV - Single Lumen 10/06/17 2035 Left Hand 542 days         Peripheral IV - Single Lumen 04/01/19 Left;Posterior Forearm 1 day         Peripheral IV - Single Lumen 04/02/19 0530 Left Forearm less than 1 day                Physical Exam   Cardiovascular: Normal rate, regular rhythm, S1 normal and S2 normal. Exam reveals gallop and S4.   Murmur heard.  Pulmonary/Chest: She has rales in the right lower field and the left lower field.       Current Medications:     sodium chloride 0.9%   Intravenous Once    albuterol-ipratropium  3 mL Nebulization Q4H    amLODIPine  10 mg Oral Daily    apixaban  2.5 mg Oral BID    atorvastatin  10 mg Oral Daily    carvedilol  3.125 mg Oral BID WM    insulin aspart U-100  4 Units Subcutaneous TIDWM    insulin detemir U-100  10 Units Subcutaneous QHS    sodium bicarbonate  1,300 mg Oral TID     Current Laboratory Results:    Recent Results (from the past 24 hour(s))   Uric acid    Collection Time: 04/01/19  4:10 PM   Result Value Ref Range    Uric Acid 7.7 (H) 2.4 - 5.7 mg/dL   Hemoglobin A1c    Collection Time: 04/01/19  4:17 PM   Result Value Ref Range    Hemoglobin A1C 6.6 (H) 4.0 - 5.6 %    Estimated Avg Glucose 143 (H) 68 - 131 mg/dL   Brain natriuretic peptide    Collection Time: 04/01/19  4:17 PM   Result Value Ref Range     (H) 0 - 99 pg/mL   CBC with Automated Differential    Collection Time: 04/01/19  4:17 PM   Result Value Ref Range    WBC 12.26 3.90 - 12.70 K/uL    RBC 3.42 (L) 4.00 - 5.40 M/uL    Hemoglobin 10.0 (L) 12.0 - 16.0 g/dL    Hematocrit 31.3 (L) 37.0 - 48.5 %    MCV 92 82 - 98 fL    MCH 29.2 27.0 - 31.0 pg    MCHC 31.9 (L) 32.0 - 36.0 g/dL    RDW 18.2 (H) 11.5 - 14.5 %    Platelets 286 150 - 350 K/uL    MPV 11.3 9.2 - 12.9 fL    Gran # (ANC) 10.6 (H) 1.8 - 7.7 K/uL    Lymph # 0.6 (L) 1.0 - 4.8 K/uL    Mono # 0.8 0.3 - 1.0 K/uL    Eos #  0.0 0.0 - 0.5 K/uL    Baso # 0.02 0.00 - 0.20 K/uL    Gran% 86.0 (H) 38.0 - 73.0 %    Lymph% 4.6 (L) 18.0 - 48.0 %    Mono% 6.3 4.0 - 15.0 %    Eosinophil% 0.1 0.0 - 8.0 %    Basophil% 0.2 0.0 - 1.9 %    Differential Method Automated    Comprehensive Metabolic Panel (CMP)    Collection Time: 04/01/19  4:17 PM   Result Value Ref Range    Sodium 124 (L) 136 - 145 mmol/L    Potassium 5.9 (H) 3.5 - 5.1 mmol/L    Chloride 96 95 - 110 mmol/L    CO2 15 (L) 23 - 29 mmol/L    Glucose 196 (H) 70 - 110 mg/dL    BUN, Bld 148 (H) 8 - 23 mg/dL    Creatinine 4.9 (H) 0.5 - 1.4 mg/dL    Calcium 8.3 (L) 8.7 - 10.5 mg/dL    Total Protein 6.6 6.0 - 8.4 g/dL    Albumin 3.0 (L) 3.5 - 5.2 g/dL    Total Bilirubin 0.5 0.1 - 1.0 mg/dL    Alkaline Phosphatase 112 55 - 135 U/L    AST 23 10 - 40 U/L    ALT 19 10 - 44 U/L    Anion Gap 13 8 - 16 mmol/L    eGFR if African American 9 (A) >60 mL/min/1.73 m^2    eGFR if non African American 8 (A) >60 mL/min/1.73 m^2   Magnesium    Collection Time: 04/01/19  4:17 PM   Result Value Ref Range    Magnesium 1.1 (L) 1.6 - 2.6 mg/dL   Phosphorus    Collection Time: 04/01/19  4:17 PM   Result Value Ref Range    Phosphorus 8.2 (H) 2.7 - 4.5 mg/dL   ISTAT PROCEDURE    Collection Time: 04/01/19  4:55 PM   Result Value Ref Range    POC PH 7.175 (LL) 7.35 - 7.45    POC PCO2 45.1 (H) 35 - 45 mmHg    POC PO2 93 80 - 100 mmHg    POC HCO3 16.6 (L) 24 - 28 mmol/L    POC BE -12 -2 to 2 mmol/L    POC SATURATED O2 95 95 - 100 %    Sample ARTERIAL     Site RR     Allens Test Pass     DelSys Nasal Can     Mode SPONT     Flow 2     FiO2 28    POCT glucose    Collection Time: 04/01/19  5:13 PM   Result Value Ref Range    POCT Glucose 197 (H) 70 - 110 mg/dL   POCT glucose    Collection Time: 04/01/19  5:17 PM   Result Value Ref Range    POC Glucose 197 (A) 70 - 110 MG/DL   Ferritin    Collection Time: 04/01/19  6:07 PM   Result Value Ref Range    Ferritin 76 20.0 - 300.0 ng/mL   Iron and TIBC    Collection Time: 04/01/19   6:07 PM   Result Value Ref Range    Iron 127 30 - 160 ug/dL    Transferrin 206 200 - 375 mg/dL    TIBC 305 250 - 450 ug/dL    Saturated Iron 42 20 - 50 %   CK    Collection Time: 04/01/19  6:07 PM   Result Value Ref Range     20 - 180 U/L   Vitamin B12    Collection Time: 04/01/19  6:07 PM   Result Value Ref Range    Vitamin B-12 586 210 - 950 pg/mL   Folate    Collection Time: 04/01/19  6:07 PM   Result Value Ref Range    Folate 8.8 4.0 - 24.0 ng/mL   TSH    Collection Time: 04/01/19  6:07 PM   Result Value Ref Range    TSH 3.665 0.400 - 4.000 uIU/mL   Vitamin D    Collection Time: 04/01/19  6:07 PM   Result Value Ref Range    Vit D, 25-Hydroxy 11 (L) 30 - 96 ng/mL   C4 complement    Collection Time: 04/01/19  6:07 PM   Result Value Ref Range    Complement (C-4) 21 11 - 44 mg/dL   C3 complement    Collection Time: 04/01/19  6:07 PM   Result Value Ref Range    Complement (C-3) 96 50 - 180 mg/dL   Hepatitis panel, acute    Collection Time: 04/01/19  6:07 PM   Result Value Ref Range    Hepatitis B Surface Ag Negative     Hep B C IgM Negative     Hep A IgM Negative     Hepatitis C Ab Negative    Rapid HIV    Collection Time: 04/01/19  6:07 PM   Result Value Ref Range    HIV Rapid Testing Negative Negative   Transthoracic echo (TTE) complete (Cupid Only)    Collection Time: 04/01/19  7:34 PM   Result Value Ref Range    BSA 2.11 m2    TDI SEPTAL 0.06     LV LATERAL E/E' RATIO 22.14     LV SEPTAL E/E' RATIO 25.83     LA WIDTH 4.34 cm    AORTIC VALVE CUSP SEPERATION 1.84 cm    TDI LATERAL 0.07     PV PEAK VELOCITY 1.22 cm/s    LVIDD 4.49 3.5 - 6.0 cm    IVS 1.41 (A) 0.6 - 1.1 cm    PW 1.42 (A) 0.6 - 1.1 cm    Ao root annulus 2.95 cm    LVIDS 2.46 2.1 - 4.0 cm    FS 45 28 - 44 %    LA volume 115.69 cm3    Sinus 2.80 cm    STJ 2.27 cm    LV mass 251.62 g    LA size 5.03 cm    Left Ventricle Relative Wall Thickness 0.63 cm    AV mean gradient 4.84 mmHg    AV valve area 2.32 cm2    AV Velocity Ratio 0.76     AV index  (prosthetic) 0.75     E/A ratio 1.29     Mean e' 0.07     E wave decelartion time 294.44 msec    LVOT diameter 1.99 cm    LVOT area 3.11 cm2    LVOT peak afshin 1.2305932838 m/s    LVOT peak VTI 22.27 cm    Ao peak afshin 1.51 m/s    Ao VTI 29.82 cm    LVOT stroke volume 69.23 cm3    AV peak gradient 9.12 mmHg    E/E' ratio 23.85     MV Peak E Afshin 1.55 m/s    TR Max Afshin 4.06 m/s    MV Peak A Afshin 1.20 m/s    LV Systolic Volume 21.55 mL    LV Systolic Volume Index 10.6 mL/m2    LV Diastolic Volume 91.87 mL    LV Diastolic Volume Index 45.26 mL/m2    LA Volume Index 57.0 mL/m2    LV Mass Index 124.0 g/m2    RA Major Axis 6.23 cm    Left Atrium Minor Axis 6.26 cm    Left Atrium Major Axis 6.21 cm    Triscuspid Valve Regurgitation Peak Gradient 65.93 mmHg    RA Width 4.81 cm    Right Atrial Pressure (from IVC) 8 mmHg    TV rest pulmonary artery pressure 74 mmHg   Sodium, urine, random    Collection Time: 04/01/19  8:11 PM   Result Value Ref Range    Sodium Urine Random <20 20 - 250 mmol/L   Uric Acid, Urine Random    Collection Time: 04/01/19  8:11 PM   Result Value Ref Range    Uric Acid, Ur 8.3 <70.0 mg/dL   Creatinine, urine, random    Collection Time: 04/01/19  8:11 PM   Result Value Ref Range    Creatinine, Random Ur 61.1 15.0 - 325.0 mg/dL   Smith's Stain, Urine Random    Collection Time: 04/01/19  8:11 PM   Result Value Ref Range    Smith's Stain, Ur Occ eosinophils seen (A) No eosinophils seen   POCT glucose    Collection Time: 04/01/19 10:13 PM   Result Value Ref Range    POCT Glucose 219 (H) 70 - 110 mg/dL   CBC with Automated Differential    Collection Time: 04/02/19  2:47 AM   Result Value Ref Range    WBC 11.04 3.90 - 12.70 K/uL    RBC 3.31 (L) 4.00 - 5.40 M/uL    Hemoglobin 9.6 (L) 12.0 - 16.0 g/dL    Hematocrit 30.6 (L) 37.0 - 48.5 %    MCV 92 82 - 98 fL    MCH 29.0 27.0 - 31.0 pg    MCHC 31.4 (L) 32.0 - 36.0 g/dL    RDW 18.3 (H) 11.5 - 14.5 %    Platelets 217 150 - 350 K/uL    MPV 12.2 9.2 - 12.9 fL    Gran #  (ANC) 9.6 (H) 1.8 - 7.7 K/uL    Lymph # 0.5 (L) 1.0 - 4.8 K/uL    Mono # 0.7 0.3 - 1.0 K/uL    Eos # 0.0 0.0 - 0.5 K/uL    Baso # 0.01 0.00 - 0.20 K/uL    Gran% 86.5 (H) 38.0 - 73.0 %    Lymph% 4.9 (L) 18.0 - 48.0 %    Mono% 6.5 4.0 - 15.0 %    Eosinophil% 0.0 0.0 - 8.0 %    Basophil% 0.1 0.0 - 1.9 %    Differential Method Automated    Comprehensive Metabolic Panel (CMP)    Collection Time: 04/02/19  3:24 AM   Result Value Ref Range    Sodium 125 (L) 136 - 145 mmol/L    Potassium 6.3 (HH) 3.5 - 5.1 mmol/L    Chloride 97 95 - 110 mmol/L    CO2 13 (L) 23 - 29 mmol/L    Glucose 163 (H) 70 - 110 mg/dL    BUN, Bld 159 (H) 8 - 23 mg/dL    Creatinine 4.8 (H) 0.5 - 1.4 mg/dL    Calcium 8.1 (L) 8.7 - 10.5 mg/dL    Total Protein 6.1 6.0 - 8.4 g/dL    Albumin 2.5 (L) 3.5 - 5.2 g/dL    Total Bilirubin 0.4 0.1 - 1.0 mg/dL    Alkaline Phosphatase 103 55 - 135 U/L    AST 27 10 - 40 U/L    ALT 20 10 - 44 U/L    Anion Gap 15 8 - 16 mmol/L    eGFR if African American 9 (A) >60 mL/min/1.73 m^2    eGFR if non African American 8 (A) >60 mL/min/1.73 m^2   Magnesium    Collection Time: 04/02/19  3:24 AM   Result Value Ref Range    Magnesium 1.2 (L) 1.6 - 2.6 mg/dL   Phosphorus    Collection Time: 04/02/19  3:24 AM   Result Value Ref Range    Phosphorus 8.6 (H) 2.7 - 4.5 mg/dL   ISTAT PROCEDURE    Collection Time: 04/02/19  4:47 AM   Result Value Ref Range    POC PH 7.195 (LL) 7.35 - 7.45    POC PCO2 42.5 35 - 45 mmHg    POC PO2 93 80 - 100 mmHg    POC HCO3 16.4 (L) 24 - 28 mmol/L    POC BE -12 -2 to 2 mmol/L    POC SATURATED O2 95 95 - 100 %    Rate 19     Sample ARTERIAL     Site RR     Allens Test Pass     DelSys Nasal Can     Mode SPONT     Flow 2     Sp02 98    POCT glucose    Collection Time: 04/02/19  5:47 AM   Result Value Ref Range    POCT Glucose 154 (H) 70 - 110 mg/dL   POCT glucose    Collection Time: 04/02/19  7:52 AM   Result Value Ref Range    POCT Glucose 141 (H) 70 - 110 mg/dL     Current Imaging Results:    CT Chest  Without Contrast   Final Result      1. Mild cardiomegaly and pulmonary interstitial edema without pleural effusion.   2. Diffuse mild dilatation of the pulmonary arteries suggesting a component of pulmonary arterial hypertension.   3. Mild distention of the IVC and hepatic veins as can be seen in the setting of pulmonary arterial hypertension and/or right heart dysfunction.         Electronically signed by: El Hollingsworth   Date:    04/02/2019   Time:    08:48      X-Ray Chest 1 View    (Results Pending)       Assessment and Plan:     Problem List:    Active Diagnoses:    Diagnosis Date Noted POA    PRINCIPAL PROBLEM:  Acute kidney injury superimposed on chronic kidney disease [N17.9, N18.9] 04/01/2019 Yes    CKD (chronic kidney disease), stage IV [N18.4] 04/01/2019 Yes    Patent foramen ovale with right to left shunt [Q21.1] 04/01/2019 Not Applicable    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 Heart Failure, Diastolic, Acute on Chronic              4/1/2019: Echo: Normal left ventricular size with hyperdynamic systolic function. EF 80%. Moderate LVH. Moderate diastolic dysfunction. Severely dilated LA. SPAP 74 mmHg. No shunt is visualized with agitated saline.              Defer decision regarding fluid removal to Dr. Bonita Bardales.     2. Atrial Fibrillation              Paroxysmal atrial fibrillation.              On apixiban.     3. Chronic Anticoagulation              On apixiban.     4. Chronic Kidney Disease, Stage 5        VTE Risk Mitigation (From admission, onward)        Ordered      heparin (porcine) injection 5,000 Units  As needed (PRN)      04/02/19 1001     apixaban tablet 2.5 mg  2 times daily      04/01/19 1716     IP VTE HIGH RISK PATIENT  Once      04/01/19 1404          Marcella Cornejo MD  Cardiology  [facility]

## 2019-04-02 NOTE — UM SECONDARY REVIEW
DMIT FROM AM SHIFT- RE REVIEW MEDICARE - EMAILED TO EHR LEVEL CARE ISSUE -  OBS M/C  Dxl after hr team tdb 4-1      breast and formula feeding

## 2019-04-02 NOTE — CONSULTS
"[facility]  Cardiology  Consult Note    Patient Name: Joyce Riley  MRN: [id number]  Admission Date: 4/1/2019  Hospital Length of Stay: 0 days  Code Status: Partial Code   Attending Provider: Es Chinchilla MD   Consulting Provider: Marcella Cornejo MD  Primary Care Physician: Kalpana Staton MD  Principal Problem:Acute kidney injury superimposed on chronic kidney disease    Patient information was obtained from patient and past medical records.     Inpatient consult to Cardiology  Consult performed by: Marcella Cornejo MD  Consult ordered by: Es Chinchilla MD  Reason for consult: Cardiac evaluation        Subjective:     Chief Complaint:  Shortness of Breath    HPI:     Joyce Riley is a 85 y.o.female with fluid overload and advanced renal failure being transferred to [facility] for consideration of HD.    The history is complex and outlined by a very comnrehensive note by Dr. Bonita Bardales:    "Patient is a 85 y.o. female was transferred earlier today from [facility] where she was treated for acute respiratory failure, acute on chronic congestive heart failure diastolic, paroxysmal atrial fibrillation, acute anemia which was felt to be secondary to diverticular hemorrhage and ROXANNE on CKD stage 4.  The patient has a baseline creatinine from early February of 2.5.  With diuresis her creatinine has slowly risen to 4.47 as of 03/31/2019.  She also has been persistently hyperkalemic and as of yesterday developed a worsening metabolic acidosis.     The etiology of her respiratory failure is somewhat unclear per the record she has pulmonary function testing from 2012 which notes severe pulmonary hypertension with a restrictive lung disease pattern.  However on this admission she seems to be treated for an acute COPD exacerbation with nebulizer therapy Spiriva and prednisone.  Does not appear that this treatment yielded in the 6s.  She apparently previously had " "a right heart catheterization performed on 04/09/2014 which confirmed severe pulmonary hypertension with pulmonary artery pressure 71 mm Hg.  Her last echocardiogram appears to have been 02/07/2017 showing right ventricular systolic pressure of 54 and moderate pulmonary hypertension.  It seems that she has not had a follow-up echo since then.  In 2014 the patient had a stroke during that workup she was found to have a patent foramen ovale.  She has been committed to lifelong anticoagulation since that time initially she was on Coumadin but had a gastrointestinal bleeding episode in December of this past year at which time the family tells me that she was switched to Eliquis.     The patient and the family deny any history of melena from the December bleed in till her readmission at [facility] however she was found to be anemic with a hemoglobin of 7.8 during this current admission at [facility].  She had a bleeding scan which showed active bleeding in the left colon thought to be consistent with diverticular bleed.  She was transfused 2 units of packed RBCs which on 03/20/2019 and it seems somewhat unclear as to whether she had an appropriate rise since on 03/31 hemoglobin was 9.6.     There is previous discussion with Nephrology at [facility] and the notes reflected from that consultation states that the patient is not felt to be candidate for dialysis.  The patient's family is certainly willing to consider a palliative approach but wants a 2nd opinion 1st.     Today the patient feels very short of breath stitting upright and has some fluid apparent in the peripheral areas of the thigh and buttock and some crackles on exam most predominantly at the left base"    When I see her she is SOB on BIPAP. She denies chest pain.    Past Medical History:   Diagnosis Date    Arthritis     Cataract     Diabetes mellitus     GERD (gastroesophageal reflux disease)     GI bleed 12/2018    Gout     Hypertension  "       Past Surgical History:   Procedure Laterality Date     SECTION      Patient unsure, believe she had 3-4    CHOLECYSTECTOMY      EYE SURGERY      HYSTERECTOMY      TOTAL KNEE ARTHROPLASTY Right        Review of patient's allergies indicates:  No Known Allergies    No current facility-administered medications on file prior to encounter.      Current Outpatient Medications on File Prior to Encounter   Medication Sig    acetaminophen (TYLENOL) 325 MG tablet Take 325 mg by mouth every 6 (six) hours as needed.      atorvastatin (LIPITOR) 10 MG tablet Take by mouth once daily.      carvedilol (COREG) 3.125 MG tablet Take by mouth 2 (two) times daily with meals.      clonidine (CATAPRES) 0.1 MG tablet Take by mouth 2 (two) times daily.      furosemide (LASIX) 40 MG tablet Take 40 mg by mouth 2 (two) times daily.      insulin aspart (NOVOLOG) 100 unit/mL injection Inject 8 Units into the skin 3 (three) times daily before meals.      insulin glargine (LANTUS) 100 unit/mL injection Inject 10 Units into the skin every evening.     potassium chloride (KLOR-CON) 10 MEQ CR tablet Take 10 mEq by mouth once daily.       Family History     Problem Relation (Age of Onset)    Cancer Mother, Brother    Diabetes Mother    Hypertension Mother        Tobacco Use    Smoking status: Never Smoker    Smokeless tobacco: Never Used   Substance and Sexual Activity    Alcohol use: No    Drug use: No    Sexual activity: Never     Review of Systems   Constitution: Positive for malaise/fatigue.   Cardiovascular: Negative for chest pain.   Respiratory: Positive for shortness of breath. Negative for wheezing.      Objective:     Vital Signs (Most Recent):  Temp: 97.8 °F (36.6 °C) (19)  Pulse: 66 (19)  Resp: (!) 29 (19)  BP: (!) 141/65 (19)  SpO2: 97 % (19) Vital Signs (24h Range):  Temp:  [97.6 °F (36.4 °C)-98.3 °F (36.8 °C)] 97.8 °F (36.6 °C)  Pulse:  [64-78]  66  Resp:  [14-75] 29  SpO2:  [95 %-99 %] 97 %  BP: (131-157)/(63-70) 141/65     Weight: 99 kg (218 lb 4.1 oz)  Body mass index is 37.46 kg/m².    SpO2: 97 %  O2 Device (Oxygen Therapy): BiPAP      Intake/Output Summary (Last 24 hours) at 4/1/2019 2139  Last data filed at 4/1/2019 2000  Gross per 24 hour   Intake --   Output 675 ml   Net -675 ml       Lines/Drains/Airways     Drain                 Urethral Catheter 03/24/19 2300 7 days          Peripheral Intravenous Line                 Peripheral IV - Single Lumen 10/06/17 2035 Left Hand 542 days                Physical Exam   Constitutional: She appears well-developed and well-nourished. She appears ill. She appears distressed.   Cardiovascular: Normal rate, regular rhythm, S1 normal and S2 normal. Exam reveals gallop and S4. Exam reveals no S3.   Pulmonary/Chest: She has no wheezes. She has rhonchi in the right middle field and the left middle field. She has rales in the right lower field and the left lower field.   Musculoskeletal:        Right ankle: She exhibits swelling.        Left ankle: She exhibits swelling.     Current Medications:     albuterol-ipratropium  3 mL Nebulization Q4H    [START ON 4/2/2019] amLODIPine  10 mg Oral Daily    apixaban  2.5 mg Oral BID    atorvastatin  10 mg Oral Daily    carvedilol  3.125 mg Oral BID WM    insulin aspart U-100  4 Units Subcutaneous TIDWM    insulin detemir U-100  10 Units Subcutaneous QHS    sodium bicarbonate  1,300 mg Oral TID     Current Laboratory Results:    Recent Results (from the past 24 hour(s))   Uric acid    Collection Time: 04/01/19  4:10 PM   Result Value Ref Range    Uric Acid 7.7 (H) 2.4 - 5.7 mg/dL   Hemoglobin A1c    Collection Time: 04/01/19  4:17 PM   Result Value Ref Range    Hemoglobin A1C 6.6 (H) 4.0 - 5.6 %    Estimated Avg Glucose 143 (H) 68 - 131 mg/dL   Brain natriuretic peptide    Collection Time: 04/01/19  4:17 PM   Result Value Ref Range     (H) 0 - 99 pg/mL   CBC with  Automated Differential    Collection Time: 04/01/19  4:17 PM   Result Value Ref Range    WBC 12.26 3.90 - 12.70 K/uL    RBC 3.42 (L) 4.00 - 5.40 M/uL    Hemoglobin 10.0 (L) 12.0 - 16.0 g/dL    Hematocrit 31.3 (L) 37.0 - 48.5 %    MCV 92 82 - 98 fL    MCH 29.2 27.0 - 31.0 pg    MCHC 31.9 (L) 32.0 - 36.0 g/dL    RDW 18.2 (H) 11.5 - 14.5 %    Platelets 286 150 - 350 K/uL    MPV 11.3 9.2 - 12.9 fL    Gran # (ANC) 10.6 (H) 1.8 - 7.7 K/uL    Lymph # 0.6 (L) 1.0 - 4.8 K/uL    Mono # 0.8 0.3 - 1.0 K/uL    Eos # 0.0 0.0 - 0.5 K/uL    Baso # 0.02 0.00 - 0.20 K/uL    Gran% 86.0 (H) 38.0 - 73.0 %    Lymph% 4.6 (L) 18.0 - 48.0 %    Mono% 6.3 4.0 - 15.0 %    Eosinophil% 0.1 0.0 - 8.0 %    Basophil% 0.2 0.0 - 1.9 %    Differential Method Automated    Comprehensive Metabolic Panel (CMP)    Collection Time: 04/01/19  4:17 PM   Result Value Ref Range    Sodium 124 (L) 136 - 145 mmol/L    Potassium 5.9 (H) 3.5 - 5.1 mmol/L    Chloride 96 95 - 110 mmol/L    CO2 15 (L) 23 - 29 mmol/L    Glucose 196 (H) 70 - 110 mg/dL    BUN, Bld 148 (H) 8 - 23 mg/dL    Creatinine 4.9 (H) 0.5 - 1.4 mg/dL    Calcium 8.3 (L) 8.7 - 10.5 mg/dL    Total Protein 6.6 6.0 - 8.4 g/dL    Albumin 3.0 (L) 3.5 - 5.2 g/dL    Total Bilirubin 0.5 0.1 - 1.0 mg/dL    Alkaline Phosphatase 112 55 - 135 U/L    AST 23 10 - 40 U/L    ALT 19 10 - 44 U/L    Anion Gap 13 8 - 16 mmol/L    eGFR if African American 9 (A) >60 mL/min/1.73 m^2    eGFR if non African American 8 (A) >60 mL/min/1.73 m^2   Magnesium    Collection Time: 04/01/19  4:17 PM   Result Value Ref Range    Magnesium 1.1 (L) 1.6 - 2.6 mg/dL   Phosphorus    Collection Time: 04/01/19  4:17 PM   Result Value Ref Range    Phosphorus 8.2 (H) 2.7 - 4.5 mg/dL   ISTAT PROCEDURE    Collection Time: 04/01/19  4:55 PM   Result Value Ref Range    POC PH 7.175 (LL) 7.35 - 7.45    POC PCO2 45.1 (H) 35 - 45 mmHg    POC PO2 93 80 - 100 mmHg    POC HCO3 16.6 (L) 24 - 28 mmol/L    POC BE -12 -2 to 2 mmol/L    POC SATURATED O2 95 95 -  100 %    Sample ARTERIAL     Site RR     Allens Test Pass     DelSys Nasal Can     Mode SPONT     Flow 2     FiO2 28    POCT glucose    Collection Time: 04/01/19  5:13 PM   Result Value Ref Range    POCT Glucose 197 (H) 70 - 110 mg/dL   POCT glucose    Collection Time: 04/01/19  5:17 PM   Result Value Ref Range    POC Glucose 197 (A) 70 - 110 MG/DL   CK    Collection Time: 04/01/19  6:07 PM   Result Value Ref Range     20 - 180 U/L   TSH    Collection Time: 04/01/19  6:07 PM   Result Value Ref Range    TSH 3.665 0.400 - 4.000 uIU/mL   Rapid HIV    Collection Time: 04/01/19  6:07 PM   Result Value Ref Range    HIV Rapid Testing Negative Negative   Transthoracic echo (TTE) complete (Cupid Only)    Collection Time: 04/01/19  7:34 PM   Result Value Ref Range    BSA 2.11 m2    TDI SEPTAL 0.06     LV LATERAL E/E' RATIO 22.14     LV SEPTAL E/E' RATIO 25.83     LA WIDTH 4.34 cm    AORTIC VALVE CUSP SEPERATION 1.84 cm    TDI LATERAL 0.07     PV PEAK VELOCITY 1.22 cm/s    LVIDD 4.49 3.5 - 6.0 cm    IVS 1.41 (A) 0.6 - 1.1 cm    PW 1.42 (A) 0.6 - 1.1 cm    Ao root annulus 2.95 cm    LVIDS 2.46 2.1 - 4.0 cm    FS 45 28 - 44 %    LA volume 115.69 cm3    Sinus 2.80 cm    STJ 2.27 cm    LV mass 251.62 g    LA size 5.03 cm    Left Ventricle Relative Wall Thickness 0.63 cm    AV mean gradient 4.84 mmHg    AV valve area 2.32 cm2    AV Velocity Ratio 0.76     AV index (prosthetic) 0.75     E/A ratio 1.29     Mean e' 0.07     E wave decelartion time 294.44 msec    LVOT diameter 1.99 cm    LVOT area 3.11 cm2    LVOT peak afshin 5.8408631672 m/s    LVOT peak VTI 22.27 cm    Ao peak afshin 1.51 m/s    Ao VTI 29.82 cm    LVOT stroke volume 69.23 cm3    AV peak gradient 9.12 mmHg    E/E' ratio 23.85     MV Peak E Afshin 1.55 m/s    TR Max Afshin 4.06 m/s    MV Peak A Afshin 1.20 m/s    LV Systolic Volume 21.55 mL    LV Systolic Volume Index 10.6 mL/m2    LV Diastolic Volume 91.87 mL    LV Diastolic Volume Index 45.26 mL/m2    LA Volume Index 57.0  mL/m2    LV Mass Index 124.0 g/m2    RA Major Axis 6.23 cm    Left Atrium Minor Axis 6.26 cm    Left Atrium Major Axis 6.21 cm    Triscuspid Valve Regurgitation Peak Gradient 65.93 mmHg    RA Width 4.81 cm    Right Atrial Pressure (from IVC) 8 mmHg    TV rest pulmonary artery pressure 74 mmHg   Sodium, urine, random    Collection Time: 04/01/19  8:11 PM   Result Value Ref Range    Sodium Urine Random <20 20 - 250 mmol/L   Uric Acid, Urine Random    Collection Time: 04/01/19  8:11 PM   Result Value Ref Range    Uric Acid, Ur 8.3 <70.0 mg/dL   Creatinine, urine, random    Collection Time: 04/01/19  8:11 PM   Result Value Ref Range    Creatinine, Random Ur 61.1 15.0 - 325.0 mg/dL     4/1/2019: Echo:    Moderate concentric left ventricular hypertrophy.  Severe left atrial enlargement.  Increased (hyperdynamic) left ventricular systolic function. The estimated ejection fraction is 80%  Grade II (moderate) left ventricular diastolic dysfunction consistent with pseudonormalization.  Elevated left atrial pressure.  Normal right ventricular systolic function.  Moderate mitral sclerosis.  Mild mitral regurgitation.  Mild tricuspid regurgitation.  Intermediate central venous pressure (8 mm Hg).  The estimated PA systolic pressure is 74 mm Hg  Pulmonary hypertension present.  No shunting seen with agitated saline.      Current Imaging Results:    X-Ray Chest 1 View    (Results Pending)   CT Chest Without Contrast    (Results Pending)       Assessment and Plan:     Active Diagnoses:    Diagnosis Date Noted POA    PRINCIPAL PROBLEM:  Acute kidney injury superimposed on chronic kidney disease [N17.9, N18.9] 04/01/2019 Yes    CKD (chronic kidney disease), stage IV [N18.4] 04/01/2019 Yes    Patent foramen ovale with right to left shunt [Q21.1] 04/01/2019 Not Applicable    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 [I27.23] 04/01/2019 Yes    Acute on chronic diastolic heart failure [I50.33] 04/01/2019 Yes    Anemia [D64.9]  04/01/2019 Yes    Visual disturbance [H53.9] 04/01/2019 Yes    Acute respiratory failure [J96.00] 04/01/2019 Yes    Type 2 diabetes mellitus, with long-term current use of insulin [E11.9, Z79.4] 03/31/2019 Not Applicable      Problems Resolved During this Admission:     Assessment and Plan:    1. Heart Failure, Diastolic, Acute on Chronic   4/1/2019: Echo: Normal left ventricular six\ze with hyperdynamic systolic function. EF 80%. Moderate LVH. Moderate diastolic dysfunction. Severely dilated LA. SPAP 74 mmHg. No shunt is visualized with agitated saline.   Defer decision regarding fluid removal to Dr. Bonita Bardales.    2. Atrial Fibrillation   Paroxysmal atrial fibrillation.   On apixiban.    3. Chronic Anticoagulation   On apixiban.    4. Chronic Kidney Disease, Stage 5       VTE Risk Mitigation (From admission, onward)        Ordered     apixaban tablet 2.5 mg  2 times daily      04/01/19 1716     IP VTE HIGH RISK PATIENT  Once      04/01/19 1404          Thank you for your consult.     I will follow-up with patient. Please contact us if you have any additional questions.    Marcella Cornejo MD  Cardiology   [facility]

## 2019-04-02 NOTE — ASSESSMENT & PLAN NOTE
- Patient admitted on 3/20/19 to [facility] with heart failure symptoms and anemia.  Developed severe azotemia and doubled baseline creatinine from 2.3 to 4.5 while there.    - Patient and family have been told she is not a HD candidate.  Likely this is due to severe chronic underlying problems including chronic restrictive lung disease, heart failure with a right to left shunt, atrial fibrillation and pulmonary hypertension.  - Nephrology following  - has high bun/creatinine  - family decided to go ahead with hd and monitor response

## 2019-04-02 NOTE — PLAN OF CARE
Problem: Noninvasive Ventilation Acute  Goal: Effective Unassisted Ventilation and Oxygenation  Outcome: Ongoing (interventions implemented as appropriate)  Patient received on 3L nc. Upon arrival to ICU Dr. Peralta ordered pt be placed on her PRN Bipap, settings as documented.  Sats 97 to 98%. Txs given. Pt  Placed on 3L nc @0200 because she was no longer tolerating Bipap. Sats 97 to 98%. Pt weaned to 2L nc @0352. Bipap on stand by in room. Pt. in no distress, will continue to monitor.

## 2019-04-02 NOTE — EICU
New admit    86 y/o F transferred earlier today from [facility] after presenting there with shortness of breath and LE edema where she was treated for acute respiratory failure, restrictive lung disease, HFpEF, pulmonary HTN SPAP 74, PFO, paroxysmal atrial fibrillation.  She was anemic which was felt to be secondary to diverticular bleed and has received 2 units PRBC. She also developed ROXANNE on CKD stage 4 after diuresis. She was transferred for a second opinion from nephrology before deciding on palliative care.    Camera assessment:  Patient seen resting on BiPap    Telemetry:  /63 HR 72 O2 96%    Data:  WBC 12, H/H 10/31, plt 286  Na 124, K 5.9, Cl 96, CO2 15, albumin 3, corrected AG 15.5    ABG 7.175/45/93  Chest CT, no significant effusion or ground glass opacities to suggest congestion    · Acute respiratory acidosis and non-anion gap metabolic acidosis likely from restrictive lung disease etiology of which unclear to me at his time.   Chest CT without significant evidence of parenchymal disease nor congestion on my review (will await official read).  Agree with BiPap for now

## 2019-04-02 NOTE — CONSULTS
"Pulmonary / Critical Care Medicine  Consult Note    Primary Attending:  Thelma Mc MD   Primary Team: Hospital Medicine   Consultant Attending: Elías Berry MD   Consultant Fellow: Arcadio Peralta MD     Reason for Consult  "restrictive lung disease, pulmonary HTN, right to left shunt across PFO, possible need for HD"     History of Present Illness:  Ms. Riley is an 85 year old lady with a history of HFpEF and CKD III, who presented to [facility] with several days of progressive dyspnea.  She was found to be hypoxemic with acutely decompensated heart failure and diuresed for several days.  Unfortunately, her renal function worsened, precluding additional diuresis.  Nephrology was consulted but deemed her not to be a candidate for dialysis.  Palliative care was consulted but after several lengthy discussions, her family decided that they would like a second opinion regarding her dialysis candidacy.  She was therefore transferred to [facility] [place], operated by [facility].  She was seen by Nephrology yesterday and this morning a decision was reached to attempt a trial of RRT.  Pulmonology was consulted for the above reasons.  When I examined Ms. Riley, she was resting comfortably without complaint.     Past Medical History:   Arthritis     Cataract     Diabetes mellitus     GERD (gastroesophageal reflux disease)     GI bleed 2018    Gout     Hypertension         Past Surgical History:    SECTION      Patient unsure, believe she had 3-4    CHOLECYSTECTOMY      EYE SURGERY      HYSTERECTOMY      TOTAL KNEE ARTHROPLASTY Right         Allergies:  No Known Allergies     Medications:   Home Medications:     acetaminophen (TYLENOL) 325 MG tablet Take 325 mg by mouth every 6 (six) hours as needed.      atorvastatin (LIPITOR) 10 MG tablet Take by mouth once daily.      carvedilol (COREG) 3.125 MG tablet Take by mouth 2 (two) times daily with meals.      clonidine (CATAPRES) 0.1 MG tablet Take by mouth 2 (two) times daily.      " furosemide (LASIX) 40 MG tablet Take 40 mg by mouth 2 (two) times daily.      insulin aspart (NOVOLOG) 100 unit/mL injection Inject 8 Units into the skin 3 (three) times daily before meals.      insulin glargine (LANTUS) 100 unit/mL injection Inject 10 Units into the skin every evening.     potassium chloride (KLOR-CON) 10 MEQ CR tablet Take 10 mEq by mouth once daily.           Current Medications:  Scheduled:   lidocaine HCL 10 mg/ml (1%)        sodium chloride 0.9%   Intravenous Once    albuterol-ipratropium  3 mL Nebulization Q4H    amLODIPine  10 mg Oral Daily    apixaban  2.5 mg Oral BID    atorvastatin  10 mg Oral Daily    carvedilol  3.125 mg Oral BID WM    insulin aspart U-100  4 Units Subcutaneous TIDWM    insulin detemir U-100  10 Units Subcutaneous QHS    sodium bicarbonate  1,300 mg Oral TID       PRN:   acetaminophen, dextrose 50%, dextrose 50%, glucagon (human recombinant), glucose, glucose, heparin (porcine), insulin aspart U-100, ondansetron, sodium chloride 0.9%     Social History:  Tobacco: Lifelong non-smoker   EtOH:  reports that she does not drink alcohol.   Illicit Drugs: None   Occupation Data Unavailable.       Family History:  Mother: family history includes Cancer in her brother and mother; Diabetes in her mother; Hypertension in her mother.   Father: AS above     Review of Systems:  · Other than those symptoms mentioned above, an extensive review of systems was unremarkable.     Vital Signs   Temp:  [97.3 °F (36.3 °C)-98.3 °F (36.8 °C)]   Pulse:  [58-78]   Resp:  [14-31]   BP: (107-177)/(47-79)   SpO2:  [91 %-99 %]    Physical Exam   Gen: well developed, well nourished, no distress   HEENT: lips, mucosa, and tongue normal; teeth and gums normal and no throat erythema; conjunctivae/corneas clear. PERRL.   CVS: regular rate and rhythm, S1, S2 normal, no murmur   Chest: normal respiratory effort, normal percussion bilaterally and rales bilaterally   Abdomen: soft, non-tender  non-distended; bowel sounds normal   Ext: warm, well perfused and no cyanosis or edema, or clubbing   Skin: no rashes, no ecchymoses, no petechiae   Neuro: oriented, normal mood, grossly non-focal   Lines: Central line, Day# 1      Labs      Lab 04/02/19 04/02/19 04/02/19   WBC 11.04  --   --    RBC 3.31*  --   --    HGB 9.6*  --   --    HCT 30.6*  --   --      --   --    MCV 92  --   --    MCH 29.0  --   --    MCHC 31.4*  --   --    NA  --  125*  --    K  --  6.3*  --    CL  --  97  --    CO2  --  13*  --    BUN  --  159*  --    CREATININE  --  4.8*  --    MG  --  1.2*  --    ALT  --  20  --    AST  --  27  --    ALKPHOS  --  103  --    BILITOT  --  0.4  --    PROT  --  6.1  --    ALBUMIN  --  2.5*  --    PH  --   --  7.195*   PCO2  --   --  42.5   PO2  --   --  93   HCO3  --   --  16.4*   POCSATURATED  --   --  95   BE  --   --  -12   CPK  --   --   --       Imaging CT Chest 04/02/2019   I personally reviewed the films and findings are:,    1. Mild cardiomegaly and pulmonary interstitial edema without pleural effusion.  2. Diffuse mild dilatation of the pulmonary arteries suggesting a component of pulmonary arterial hypertension.  3. Mild distention of the IVC and hepatic veins         Other Studies:   PFTs      None on file   Echo 4/2/2019 · Moderate concentric left ventricular hypertrophy.  · Severe left atrial enlargement.  · Increased (hyperdynamic) left ventricular systolic function. The estimated ejection fraction is 80%  · Grade II (moderate) left ventricular diastolic dysfunction consistent with pseudonormalization.  · Elevated left atrial pressure.  · Normal right ventricular systolic function.  · Moderate mitral sclerosis.  · Mild mitral regurgitation.  · Mild tricuspid regurgitation.  · Intermediate central venous pressure (8 mm Hg).  · The estimated PA systolic pressure is 74 mm Hg  · Pulmonary hypertension present.  · No shunting seen with agitated saline.           Micro Blood Cx  None  collected   Sputum Cx  None collected      Assessment/Plan:  1. WHO Group II PAH  2. HFpEF  3. Cardiogenic pulmonary edema  4. No objective evidence of restrictive lung disease  5. Acute renal failure  6. Metabolic acidosis  7. Hyperkalemia  · Will defer to nephrology re: dialysis  · HD catheter placed.  · No evidence of pulmonary pathology and not critically ill.  · Consider transfer out of the Intensive Care Unit after HD.  · PCCM will sign off at this time, please re-consult as needed.    Arcadio Peralta MD  Pulmonary / Critical Care Fellow  Cell [phone]  04/02/2019  1:29 PM

## 2019-04-02 NOTE — HOSPITAL COURSE
Evaluation included a 2D echo that showed hyperdynamic EF of 80%, grade II diastolic dysfunction and pulmonary HTN with PA pressure 74.  CT of the chest showed mild cardiomegaly, pulmonary interstitial edema and diffuse dilation of pulmonary arteries consistent with pulmonary hypertension.  Patient was seen by the nephrology service, and she and her family were offered a trial of HD.  She was dialyzed on 3 consecutive days between 4/2 and 4/4 with good improvement in BUN/creatinine, but she did not tolerate it well and was not considered a good long term candidate for HD.  She was evaluated by PT and OT and a stay in SNF was recommended.  Her renal function was at baseline on discharge but gradually worsening and is not expected to recover fully.

## 2019-04-02 NOTE — ASSESSMENT & PLAN NOTE
-   Lab Results   Component Value Date    HGBA1C 6.6 (H) 04/01/2019       - Low dose SSI, Levemir 10 units qhs   - monitor sugars

## 2019-04-02 NOTE — ASSESSMENT & PLAN NOTE
- Pulmonary HTN associated with ?restrictive lung disease and subsequent diastolic heart failure, as above.

## 2019-04-02 NOTE — PROGRESS NOTES
[facility] Hospital Medicine  Progress Note    Patient Name: Joyce Riley  MRN: [id number]  Patient Class: IP- Inpatient   Admission Date: 4/1/2019  Length of Stay: 1 days  Attending Physician: Thelma Mc MD  Primary Care Provider: Kalpana Staton MD        Subjective:     Principal Problem:Acute kidney injury superimposed on chronic kidney disease    HPI:  Ms. Riley is an 85 year old woman who was transferred here from [facility] for a second opinion regarding starting HD vs entering hospice care.  The patient was hospitalized there on 3/20/19 with shortness of breath and lower extremity edema and was found to have fluid overload with heart failure and anemia with H/H 7.8/25.  She was transfused 2 units PRBCs and extensively diuresed while there.  Creatinine was initially 2.3 and gradually worsened to 4.5 at the time of transfer here.  Azotemia was out of proportion to the creatinine with an increase in BUN to 150.  On nephrology evaluation they did not feel she was a candidate for dialysis and consulted palliative care to follow.  Family requested a transfer to see a different nephrology service to see if they agreed.    Medical history includes chronic diastolic heart failure with pulmonary hypertension associated with chronic restrictive lung disease.  She has a patent foramen ovale and a right to left shunt that was thought to contribute to a stroke she had in 2014.  She is anemic and had a diverticular hemorrhage last December while on warfarin.  After recovery she was started on apixaban.  She has paroxysmal atrial fibrillation that is rate controlled, hypertension, diabetes and stage IV CKD.  She has chronically elevated alkaline phosphatase that is not thought to be significant.  At home she uses a wheelchair but can walk to the bathroom with assistance.  She attends an adult day care center.  She used to take allopurinol for gout attacks but this was  discontinued when she had the GI bleed.    Hospital Course:  Echo showed moderate concentric lvh, severe left atrial enlargement, ef 80%, grade 2 diastolic dysfunction, pa pressure 74, no shunting seen with agitated saline.Her bun and creatinine were worsening, family decided to have HD.Ct chest showed . Mild cardiomegaly and pulmonary interstitial edema without pleural effusion.  2. Diffuse mild dilatation of the pulmonary arteries suggesting a component of pulmonary arterial hypertension.. Mild distention of the IVC and hepatic veins as can be seen in the setting of pulmonary arterial hypertension and/or right heart dysfunction.    Interval History: Patient lethargic but arousable, was on nasal cannula for sometime at night as was pulling the mask, was put back on bipap this am, talked to family in room.    Review of Systems   Unable to perform ROS: Mental status change     Objective:     Vital Signs (Most Recent):  Temp: 97.6 °F (36.4 °C) (04/02/19 1515)  Pulse: 64 (04/02/19 1500)  Resp: (!) 25 (04/02/19 1500)  BP: (!) 171/77 (04/02/19 1500)  SpO2: 97 % (04/02/19 1500) Vital Signs (24h Range):  Temp:  [97.3 °F (36.3 °C)-98.2 °F (36.8 °C)] 97.6 °F (36.4 °C)  Pulse:  [58-78] 64  Resp:  [14-31] 25  SpO2:  [91 %-98 %] 97 %  BP: (107-177)/(47-79) 171/77     Weight: 89.7 kg (197 lb 12 oz)  Body mass index is 33.94 kg/m².    Intake/Output Summary (Last 24 hours) at 4/2/2019 1535  Last data filed at 4/2/2019 1510  Gross per 24 hour   Intake --   Output 1675 ml   Net -1675 ml      Physical Exam   Constitutional: No distress.   obese   HENT:   Head: Normocephalic and atraumatic.   Eyes: Pupils are equal, round, and reactive to light. EOM are normal.   Neck: Normal range of motion. Neck supple.   Cardiovascular: Normal rate and regular rhythm.   Pulmonary/Chest: Effort normal and breath sounds normal.   On bipap   Abdominal: Soft. Bowel sounds are normal. She exhibits no distension. There is no tenderness.    Musculoskeletal: She exhibits no edema.   Able to move her toes and hands   Neurological: No cranial nerve deficit.   Arousable, was not able to tell me her name   Skin: Skin is warm.   Psychiatric:   lethargic   Vitals reviewed.      Significant Labs:   BMP:   Recent Labs   Lab 04/02/19  0324   *   *   K 6.3*   CL 97   CO2 13*   *   CREATININE 4.8*   CALCIUM 8.1*   MG 1.2*     CBC:   Recent Labs   Lab 04/01/19  1617 04/02/19  0247   WBC 12.26 11.04   HGB 10.0* 9.6*   HCT 31.3* 30.6*    217       Significant Imaging: I have reviewed all pertinent imaging results/findings within the past 24 hours.    Assessment/Plan:      * Acute kidney injury superimposed on chronic kidney disease  - Patient admitted on 3/20/19 to [facility] with heart failure symptoms and anemia.  Developed severe azotemia and doubled baseline creatinine from 2.3 to 4.5 while there.    - Patient and family have been told she is not a HD candidate.  Likely this is due to severe chronic underlying problems including chronic restrictive lung disease, heart failure with a right to left shunt, atrial fibrillation and pulmonary hypertension.  - Nephrology following  - has high bun/creatinine  - family decided to go ahead with hd and monitor response    Hyponatremia  Monitor with hd      Hypomagnesemia  Replace iv and monitor labs      Metabolic acidosis  Likely with rosita on ckd  On oral bicarb as short of iv bicarb  Monitor with hd      Hyperkalemia  S/p iv calcium, bicarb, kayelexate today   monitor with hd      Acute respiratory failure  ? Due to fluid overload  bipap as needed  Monitor with hd  Ct chest showed mild cardiomegaly and pulmonary interstitial edema    Visual disturbance  - Patient states her eyes are bothering her but is not much more clear than that.  - Seen by Dr. Thomas (ophthalmology) on 3/28.  Noted cataract surgery both eyes 2013 and history of diabetic retinopathy with macular edema and vitreomacular  traction diagnosed in 2013.  Patient had been told to follow up at that time but did not.  On hospital consultation he again recommended outpatient follow up for a complete eye examination and retina evaluation.      Anemia  - Anemia seems multifactorial.  She has had chronic blood loss and had a diverticular bleed in December.  No bleeding noted on current presentation but she required transfusions at [facility] and they felt she needed a pill endoscopy as the rest of the workup was negative.  - Additional contributing factor of chronic kidney disease.  - currently stable  - monitor labs and for bleeding        Acute on chronic diastolic heart failure  - Reportedly has grade II diastolic dysfunction.    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      Patent foramen ovale with right to left shunt  -no shunting seen on echo  - on eliquis      CKD (chronic kidney disease), stage IV  - As above.  Baseline creatinine around 2.5.      Type 2 diabetes mellitus, with long-term current use of insulin  -   Lab Results   Component Value Date    HGBA1C 6.6 (H) 04/01/2019       - Low dose SSI, Levemir 10 units qhs   - monitor sugars        VTE Risk Mitigation (From admission, onward)        Ordered     heparin (porcine) injection 5,000 Units  As needed (PRN)      04/02/19 1001     apixaban tablet 2.5 mg  2 times daily      04/01/19 1716     IP VTE HIGH RISK PATIENT  Once      04/01/19 1404              Thelma Mc MD  Department of Hospital Medicine   [facility]

## 2019-04-03 PROBLEM — N18.6 ESRD (END STAGE RENAL DISEASE): Status: ACTIVE | Noted: 2019-04-03

## 2019-04-03 PROBLEM — I15.0 RENOVASCULAR HYPERTENSION: Status: ACTIVE | Noted: 2019-04-03

## 2019-04-03 PROBLEM — M79.673 FOOT PAIN: Status: ACTIVE | Noted: 2019-04-03

## 2019-04-03 LAB
ANCA AB TITR SER IF: NORMAL TITER
ANION GAP SERPL CALC-SCNC: 10 MMOL/L (ref 8–16)
ANTI SM ANTIBODY: 2.73 EU (ref 0–19.99)
ANTI SM/RNP ANTIBODY: 1.8 EU (ref 0–19.99)
ANTI-SM INTERPRETATION: NEGATIVE
ANTI-SM/RNP INTERPRETATION: NEGATIVE
ANTI-SSA ANTIBODY: 0.86 EU (ref 0–19.99)
ANTI-SSA INTERPRETATION: NEGATIVE
ANTI-SSB ANTIBODY: 0.42 EU (ref 0–19.99)
ANTI-SSB INTERPRETATION: NEGATIVE
BASOPHILS # BLD AUTO: 0.01 K/UL (ref 0–0.2)
BASOPHILS NFR BLD: 0.1 % (ref 0–1.9)
BUN SERPL-MCNC: 96 MG/DL (ref 8–23)
CALCIUM SERPL-MCNC: 8.2 MG/DL (ref 8.7–10.5)
CHLORIDE SERPL-SCNC: 98 MMOL/L (ref 95–110)
CO2 SERPL-SCNC: 25 MMOL/L (ref 23–29)
CREAT SERPL-MCNC: 3 MG/DL (ref 0.5–1.4)
DIFFERENTIAL METHOD: ABNORMAL
DSDNA AB SER-ACNC: NORMAL [IU]/ML
EOSINOPHIL # BLD AUTO: 0.1 K/UL (ref 0–0.5)
EOSINOPHIL NFR BLD: 1.5 % (ref 0–8)
ERYTHROCYTE [DISTWIDTH] IN BLOOD BY AUTOMATED COUNT: 17.9 % (ref 11.5–14.5)
EST. GFR  (AFRICAN AMERICAN): 16 ML/MIN/1.73 M^2
EST. GFR  (NON AFRICAN AMERICAN): 14 ML/MIN/1.73 M^2
GLUCOSE SERPL-MCNC: 104 MG/DL (ref 70–110)
HCT VFR BLD AUTO: 26.5 % (ref 37–48.5)
HGB BLD-MCNC: 8.6 G/DL (ref 12–16)
LYMPHOCYTES # BLD AUTO: 0.7 K/UL (ref 1–4.8)
LYMPHOCYTES NFR BLD: 7.2 % (ref 18–48)
MAGNESIUM SERPL-MCNC: 1 MG/DL (ref 1.6–2.6)
MCH RBC QN AUTO: 28.7 PG (ref 27–31)
MCHC RBC AUTO-ENTMCNC: 32.5 G/DL (ref 32–36)
MCV RBC AUTO: 88 FL (ref 82–98)
MONOCYTES # BLD AUTO: 0.8 K/UL (ref 0.3–1)
MONOCYTES NFR BLD: 8.5 % (ref 4–15)
NEUTROPHILS # BLD AUTO: 7.6 K/UL (ref 1.8–7.7)
NEUTROPHILS NFR BLD: 81.4 % (ref 38–73)
P-ANCA TITR SER IF: NORMAL TITER
PHOSPHATE SERPL-MCNC: 5.4 MG/DL (ref 2.7–4.5)
PLATELET # BLD AUTO: 197 K/UL (ref 150–350)
PMV BLD AUTO: 11 FL (ref 9.2–12.9)
POCT GLUCOSE: 128 MG/DL (ref 70–110)
POCT GLUCOSE: 133 MG/DL (ref 70–110)
POCT GLUCOSE: 146 MG/DL (ref 70–110)
POCT GLUCOSE: 146 MG/DL (ref 70–110)
POTASSIUM SERPL-SCNC: 4 MMOL/L (ref 3.5–5.1)
RBC # BLD AUTO: 3 M/UL (ref 4–5.4)
SODIUM SERPL-SCNC: 133 MMOL/L (ref 136–145)
WBC # BLD AUTO: 9.32 K/UL (ref 3.9–12.7)

## 2019-04-03 PROCEDURE — 25000003 PHARM REV CODE 250: Performed by: NURSE PRACTITIONER

## 2019-04-03 PROCEDURE — 99233 PR SUBSEQUENT HOSPITAL CARE,LEVL III: ICD-10-PCS | Mod: ,,, | Performed by: INTERNAL MEDICINE

## 2019-04-03 PROCEDURE — 25000003 PHARM REV CODE 250: Performed by: INTERNAL MEDICINE

## 2019-04-03 PROCEDURE — S5571 INSULIN DISPOS PEN 3 ML: HCPCS | Performed by: INTERNAL MEDICINE

## 2019-04-03 PROCEDURE — 83735 ASSAY OF MAGNESIUM: CPT

## 2019-04-03 PROCEDURE — 84100 ASSAY OF PHOSPHORUS: CPT

## 2019-04-03 PROCEDURE — 63600175 PHARM REV CODE 636 W HCPCS: Performed by: NURSE PRACTITIONER

## 2019-04-03 PROCEDURE — 99900035 HC TECH TIME PER 15 MIN (STAT)

## 2019-04-03 PROCEDURE — 25000003 PHARM REV CODE 250: Performed by: HOSPITALIST

## 2019-04-03 PROCEDURE — 11000001 HC ACUTE MED/SURG PRIVATE ROOM

## 2019-04-03 PROCEDURE — 85025 COMPLETE CBC W/AUTO DIFF WBC: CPT

## 2019-04-03 PROCEDURE — 80048 BASIC METABOLIC PNL TOTAL CA: CPT

## 2019-04-03 PROCEDURE — 80100016 HC MAINTENANCE HEMODIALYSIS

## 2019-04-03 PROCEDURE — 94640 AIRWAY INHALATION TREATMENT: CPT

## 2019-04-03 PROCEDURE — 25000242 PHARM REV CODE 250 ALT 637 W/ HCPCS: Performed by: INTERNAL MEDICINE

## 2019-04-03 PROCEDURE — 63600175 PHARM REV CODE 636 W HCPCS: Performed by: INTERNAL MEDICINE

## 2019-04-03 PROCEDURE — 94761 N-INVAS EAR/PLS OXIMETRY MLT: CPT

## 2019-04-03 PROCEDURE — 99233 SBSQ HOSP IP/OBS HIGH 50: CPT | Mod: ,,, | Performed by: INTERNAL MEDICINE

## 2019-04-03 RX ORDER — ALLOPURINOL 100 MG/1
100 TABLET ORAL DAILY
Status: ON HOLD | COMMUNITY
End: 2019-04-09 | Stop reason: HOSPADM

## 2019-04-03 RX ORDER — LIDOCAINE 50 MG/G
1 PATCH TOPICAL
Status: ON HOLD | COMMUNITY
End: 2019-04-09 | Stop reason: HOSPADM

## 2019-04-03 RX ORDER — INSULIN DEGLUDEC 100 U/ML
8 INJECTION, SOLUTION SUBCUTANEOUS NIGHTLY
Status: ON HOLD | COMMUNITY
End: 2019-04-09 | Stop reason: SDUPTHER

## 2019-04-03 RX ORDER — HYDRALAZINE HYDROCHLORIDE 100 MG/1
100 TABLET, FILM COATED ORAL 3 TIMES DAILY
Status: ON HOLD | COMMUNITY
End: 2019-04-09 | Stop reason: HOSPADM

## 2019-04-03 RX ORDER — DEXTROSE MONOHYDRATE 50 MG/ML
INJECTION, SOLUTION INTRAVENOUS CONTINUOUS
Status: DISCONTINUED | OUTPATIENT
Start: 2019-04-03 | End: 2019-04-03

## 2019-04-03 RX ORDER — ATORVASTATIN CALCIUM 20 MG/1
40 TABLET, FILM COATED ORAL DAILY
Status: DISCONTINUED | OUTPATIENT
Start: 2019-04-04 | End: 2019-04-09 | Stop reason: HOSPADM

## 2019-04-03 RX ORDER — HYDROCODONE BITARTRATE AND ACETAMINOPHEN 5; 325 MG/1; MG/1
1 TABLET ORAL EVERY 6 HOURS PRN
Status: DISCONTINUED | OUTPATIENT
Start: 2019-04-03 | End: 2019-04-07

## 2019-04-03 RX ORDER — MORPHINE SULFATE 2 MG/ML
1 INJECTION, SOLUTION INTRAMUSCULAR; INTRAVENOUS ONCE
Status: COMPLETED | OUTPATIENT
Start: 2019-04-03 | End: 2019-04-03

## 2019-04-03 RX ORDER — FAMOTIDINE 20 MG/1
20 TABLET, FILM COATED ORAL DAILY
Status: DISCONTINUED | OUTPATIENT
Start: 2019-04-03 | End: 2019-04-09 | Stop reason: HOSPADM

## 2019-04-03 RX ORDER — AMLODIPINE BESYLATE 5 MG/1
5 TABLET ORAL 2 TIMES DAILY
Status: ON HOLD | COMMUNITY
End: 2019-04-09 | Stop reason: HOSPADM

## 2019-04-03 RX ORDER — OMEPRAZOLE 20 MG/1
20 CAPSULE, DELAYED RELEASE ORAL DAILY
Status: ON HOLD | COMMUNITY
End: 2019-04-09 | Stop reason: HOSPADM

## 2019-04-03 RX ORDER — ERGOCALCIFEROL 1.25 MG/1
50000 CAPSULE ORAL
Status: DISCONTINUED | OUTPATIENT
Start: 2019-04-03 | End: 2019-04-09 | Stop reason: HOSPADM

## 2019-04-03 RX ORDER — LANOLIN ALCOHOL/MO/W.PET/CERES
400 CREAM (GRAM) TOPICAL 2 TIMES DAILY
Status: DISCONTINUED | OUTPATIENT
Start: 2019-04-03 | End: 2019-04-09 | Stop reason: HOSPADM

## 2019-04-03 RX ORDER — METOPROLOL SUCCINATE 50 MG/1
50 TABLET, EXTENDED RELEASE ORAL DAILY
Status: ON HOLD | COMMUNITY
End: 2019-04-09 | Stop reason: HOSPADM

## 2019-04-03 RX ORDER — MAGNESIUM SULFATE 1 G/100ML
1 INJECTION INTRAVENOUS ONCE
Status: COMPLETED | OUTPATIENT
Start: 2019-04-03 | End: 2019-04-03

## 2019-04-03 RX ORDER — CALCITRIOL 0.25 UG/1
0.25 CAPSULE ORAL DAILY
Status: DISCONTINUED | OUTPATIENT
Start: 2019-04-03 | End: 2019-04-09 | Stop reason: HOSPADM

## 2019-04-03 RX ORDER — COLCHICINE 0.6 MG/1
0.6 TABLET, FILM COATED ORAL ONCE
Status: COMPLETED | OUTPATIENT
Start: 2019-04-03 | End: 2019-04-03

## 2019-04-03 RX ADMIN — MORPHINE SULFATE 1 MG: 2 INJECTION, SOLUTION INTRAMUSCULAR; INTRAVENOUS at 03:04

## 2019-04-03 RX ADMIN — ERYTHROPOIETIN 10000 UNITS: 10000 INJECTION, SOLUTION INTRAVENOUS; SUBCUTANEOUS at 01:04

## 2019-04-03 RX ADMIN — APIXABAN 2.5 MG: 2.5 TABLET, FILM COATED ORAL at 08:04

## 2019-04-03 RX ADMIN — IPRATROPIUM BROMIDE AND ALBUTEROL SULFATE 3 ML: .5; 3 SOLUTION RESPIRATORY (INHALATION) at 12:04

## 2019-04-03 RX ADMIN — ONDANSETRON 8 MG: 8 TABLET, ORALLY DISINTEGRATING ORAL at 03:04

## 2019-04-03 RX ADMIN — INSULIN DETEMIR 5 UNITS: 100 INJECTION, SOLUTION SUBCUTANEOUS at 10:04

## 2019-04-03 RX ADMIN — ATORVASTATIN CALCIUM 10 MG: 10 TABLET, FILM COATED ORAL at 08:04

## 2019-04-03 RX ADMIN — CALCITRIOL 0.25 MCG: 0.25 CAPSULE ORAL at 08:04

## 2019-04-03 RX ADMIN — CARVEDILOL 3.12 MG: 3.12 TABLET, FILM COATED ORAL at 04:04

## 2019-04-03 RX ADMIN — CARVEDILOL 3.12 MG: 3.12 TABLET, FILM COATED ORAL at 07:04

## 2019-04-03 RX ADMIN — AMLODIPINE BESYLATE 10 MG: 5 TABLET ORAL at 08:04

## 2019-04-03 RX ADMIN — MAGNESIUM SULFATE IN DEXTROSE 1 G: 10 INJECTION, SOLUTION INTRAVENOUS at 07:04

## 2019-04-03 RX ADMIN — Medication 400 MG: at 08:04

## 2019-04-03 RX ADMIN — COLCHICINE 0.6 MG: 0.6 TABLET, FILM COATED ORAL at 08:04

## 2019-04-03 RX ADMIN — HEPARIN SODIUM 5000 UNITS: 5000 INJECTION, SOLUTION INTRAVENOUS; SUBCUTANEOUS at 01:04

## 2019-04-03 RX ADMIN — FAMOTIDINE 20 MG: 20 TABLET, FILM COATED ORAL at 08:04

## 2019-04-03 RX ADMIN — ERGOCALCIFEROL 50000 UNITS: 1.25 CAPSULE ORAL at 08:04

## 2019-04-03 RX ADMIN — DEXTROSE: 5 SOLUTION INTRAVENOUS at 07:04

## 2019-04-03 RX ADMIN — ACETAMINOPHEN 650 MG: 325 TABLET ORAL at 07:04

## 2019-04-03 NOTE — ASSESSMENT & PLAN NOTE
-   Lab Results   Component Value Date    HGBA1C 6.6 (H) 04/01/2019       - Low dose SSI, Levemir decreased to 5 units qhs   - monitor sugars

## 2019-04-03 NOTE — PROGRESS NOTES
Pt received on RA with normal saturation. RT was called to pts room with complaints of slight distress per Nurse Jd;PRN treatment was given;tolerated well. Will continue to monitor.

## 2019-04-03 NOTE — PROGRESS NOTES
"Nephrology  Progress Note    Admit Date: 4/1/2019   LOS: 2 days     SUBJECTIVE:     Follow-up For:  Acute kidney injury superimposed on chronic kidney disease    Interval History:     Uneventful night.  Tolerated 1st HD session w/o issues.  C/o blurry vision for over a week and gout pains in ankles.  Discussed with team.  Labs noted.  No CP/SOB.  On room air this am.      Review of Systems:  Constitutional: No fever or chills  Respiratory:  shortness of breath better.   Cardiovascular: No chest pain or palpitations  Gastrointestinal: No nausea or vomiting  Neurological: No confusion or weakness    OBJECTIVE:     Vital Signs Range (Last 24H):  BP (!) 165/68 (BP Location: Left arm, Patient Position: Lying)   Pulse 74   Temp 98 °F (36.7 °C) (Oral)   Resp 19   Ht 5' 4" (1.626 m)   Wt 90.9 kg (200 lb 6.4 oz)   SpO2 97%   Breastfeeding? No   BMI 34.40 kg/m²     Temp:  [97.6 °F (36.4 °C)-98 °F (36.7 °C)]   Pulse:  [58-76]   Resp:  [13-29]   BP: (137-185)/(67-95)   SpO2:  [91 %-99 %]     I & O (Last 24H):    Intake/Output Summary (Last 24 hours) at 4/3/2019 0801  Last data filed at 4/3/2019 0700  Gross per 24 hour   Intake 740 ml   Output 2690 ml   Net -1950 ml       Physical Exam:  General appearance:  NAD this am.  Looks better.  Eyes:  Conjunctivae/corneas clear.   Lungs: CTA   Heart: Regular rate and rhythm, S1, S2 normal, + murmur  Abdomen: Soft, non-tender non-distended; bowel sounds normal; no masses,  no organomegaly, obese  Extremities: No cyanosis or clubbing. 1+ edema.    Skin: Skin color, texture, turgor normal. No rashes or lesions  Neurologic:  No focal numbness or weakness   Nava catheter  Right IJ Trialysis    Laboratory Data:  Recent Labs   Lab 04/03/19 0429   WBC 9.32   RBC 3.00*   HGB 8.6*   HCT 26.5*      MCV 88   MCH 28.7   MCHC 32.5       BMP:   Recent Labs   Lab 04/03/19 0429      *   K 4.0   CL 98   CO2 25   BUN 96*   CREATININE 3.0*   CALCIUM 8.2*   MG 1.0*     Lab " Results   Component Value Date    CALCIUM 8.2 (L) 04/03/2019    PHOS 5.4 (H) 04/03/2019       Lab Results   Component Value Date    .8 (H) 04/02/2019    CALCIUM 8.2 (L) 04/03/2019    PHOS 5.4 (H) 04/03/2019       Lab Results   Component Value Date    URICACID 7.7 (H) 04/01/2019       BNP  Recent Labs   Lab 04/01/19  1617   *       Medications:  Medication list was reviewed and changes noted under Assessment/Plan.    Diagnostic Results:        ASSESSMENT/PLAN:     1. Multi factorial ROXANNE due to cardiorenal syndrome and acute anemia that may have progressed to ATN at this point on CKD likely due to DM (underlying retinopathy) with proteinuria. Hyperkalemia.  -Baseline Cr 2.5 and recent Pr/ Cr ration of 2.34g/day.  -she still making a reasonable amount of urine.    -quantifying urine protein and sent proteinuria lab, but feel this is likely DM.  Minimal SOPHIE titer.   -echo noted with hyperdynamic ventricle, diastolic dysfunction and severe pulmonary hypertension.  Discussed with Cardiology and feels that it is not a congested picture.    Plan:  Extensive discussion with treatment team and family at bedside about options yesterday.  Offered trial of dialysis to see how she will tolerate.  Family agreed and had trialysis placed by CCT and underwent 1st HD w/o issues. Continue gentle daily HD for now and monitor response.  Not a great candidate for long-term dialysis.     2. ABLA/ Anemia of chronic disease/ Recent diverticular bleed/ Hx GERD  -repeat iron, B12, folate within limits  -monitor Hgb  -changed PPI to H2B  -will start AHSAN      3. Acute Hypercapnic on Chronic Respiratory Failure due to ? Restrictive lung disease/ severe Pulmonary HTN ? Mixed respiratory and metabolic acidosis  -ABG noted  -Bipap prn.  On room air this am and doing well.     4. ?PFO/ Pulm HTN/ Diastolic CHF/ A. Fib  -On renal dose Eliquis  -cardiac consult noted       5. Hyponatremia  -developed with diuresis  -mild overcorrection  with HD so will use D5 until HD and use lower Na bath.        6. Chronic gout and Hyperuricemia  -was on Allopurinol now off.  -level noted.  -will clear with HD.   -colchicine X 1 for attack.        7.  Mixed anion gap metabolic acidosis with hyperkalemia:  -corrected with HD    8.  Hypo Mag:  -replace IV/PO    9. Vit D/HPTH:  -ergo/calcitriol    10.  Blurry vision  -defer to primary/opthal    See above  Ok to transfer to floor from Renal

## 2019-04-03 NOTE — PHYSICIAN QUERY
PT Name: Joyce Riley  MR #: [id number]     Physician Query Form - Documentation Clarification      CDS/: Yessy Forman RN            Contact information: [phone]    This form is a permanent document in the medical record.     Query Date: April 3, 2019    By submitting this query, we are merely seeking further clarification of documentation. Please utilize your independent clinical judgment when addressing the question(s) below.    The Medical record reflects the following:    Supporting Clinical Findings Location in Medical Record     Vit D/HPTH:    -ergo/calcitriol         4/3 Renal note   Vitamin D, 25-hydroxy  11 (L )    PTH  780.8   4/1 Lab                                                                            Doctor, Please clarify the meaning of the diagnosis  Vit D/HPTH to mean:       Provider Use Only        [   x] Vitamin D deficiency and hyperparathroidism    [   ] Other - please specify meaning__________________________________                                                                                                                         [  ] Clinically Undetermined

## 2019-04-03 NOTE — PLAN OF CARE
Problem: Adult Inpatient Plan of Care  Goal: Plan of Care Review  Outcome: Ongoing (interventions implemented as appropriate)  Received patient on room air, bipap prn as needed at bedside. No respiratory distress noted. Will continue to monitor.

## 2019-04-03 NOTE — PLAN OF CARE
[facility]emo Dialysis status long term ?       04/03/19 1817   Discharge Reassessment   Assessment Type Discharge Planning Reassessment   Provided patient/caregiver education on the expected discharge date and the discharge plan Yes   Do you have any problems affording any of your prescribed medications? TBD   Discharge Plan A Home with family   Discharge Plan B Skilled Nursing Facility   Patient choice form signed by patient/caregiver N/A   Can the patient answer the patient profile reliably? Yes, cognitively intact

## 2019-04-03 NOTE — ASSESSMENT & PLAN NOTE
- Patient admitted on 3/20/19 to [facility] with heart failure symptoms and anemia.  Developed severe azotemia and doubled baseline creatinine from 2.3 to 4.5 while there.    - Patient and family have been told she is not a HD candidate.  Likely this is due to severe chronic underlying problems including chronic restrictive lung disease, heart failure with a right to left shunt, atrial fibrillation and pulmonary hypertension.  - Nephrology following  - had high bun/creatinine  - family decided to go ahead with hd   - s/p hd on 4/2 and plan for 4/3  - improved labs

## 2019-04-03 NOTE — SUBJECTIVE & OBJECTIVE
Interval History: Patient awake, eating food, c/o blurry vision for the past week, no pain or discharge, c/o pain in bilateral foot? Gout    Review of Systems   Constitutional: Negative for chills and fever.   HENT: Negative for trouble swallowing.    Eyes: Positive for visual disturbance.   Respiratory: Negative for cough and shortness of breath.    Cardiovascular: Negative for chest pain and leg swelling.   Gastrointestinal: Negative for abdominal pain, nausea and vomiting.   Genitourinary: Negative for dysuria and hematuria.   Musculoskeletal: Positive for arthralgias.   Skin: Negative for rash.   Neurological: Positive for weakness. Negative for headaches.   Psychiatric/Behavioral: Negative for confusion.     Objective:     Vital Signs (Most Recent):  Temp: 98 °F (36.7 °C) (04/03/19 0705)  Pulse: 70 (04/03/19 1013)  Resp: 16 (04/03/19 0900)  BP: (!) 155/67 (04/03/19 0900)  SpO2: 96 % (04/03/19 0900) Vital Signs (24h Range):  Temp:  [97.6 °F (36.4 °C)-98 °F (36.7 °C)] 98 °F (36.7 °C)  Pulse:  [62-76] 70  Resp:  [13-29] 16  SpO2:  [93 %-99 %] 96 %  BP: (137-185)/(67-95) 155/67     Weight: 90.9 kg (200 lb 6.4 oz)  Body mass index is 34.4 kg/m².    Intake/Output Summary (Last 24 hours) at 4/3/2019 1109  Last data filed at 4/3/2019 0900  Gross per 24 hour   Intake 1100 ml   Output 2665 ml   Net -1565 ml      Physical Exam   Constitutional: No distress.   obese   HENT:   Head: Normocephalic and atraumatic.   Eyes: Pupils are equal, round, and reactive to light. Conjunctivae and EOM are normal.   Neck: Normal range of motion. Neck supple.   Cardiovascular: Normal rate and regular rhythm.   Pulmonary/Chest: Effort normal and breath sounds normal. No respiratory distress.   On bipap   Abdominal: Soft. Bowel sounds are normal. She exhibits no distension. There is no tenderness.   Musculoskeletal: Normal range of motion.   Strength 4/5 bilateral, no redness or swelling seen in feet, 2 plus pulses, 1 -2 plus edema bilateral  lower extremities   Neurological: She is alert. No cranial nerve deficit.   Oriented to person, place   Skin: Skin is warm.   Psychiatric: She has a normal mood and affect.   Vitals reviewed.      Significant Labs:   BMP:   Recent Labs   Lab 04/03/19 0429      *   K 4.0   CL 98   CO2 25   BUN 96*   CREATININE 3.0*   CALCIUM 8.2*   MG 1.0*     CBC:   Recent Labs   Lab 04/01/19  1617 04/02/19  0247 04/03/19 0429   WBC 12.26 11.04 9.32   HGB 10.0* 9.6* 8.6*   HCT 31.3* 30.6* 26.5*    217 197       Significant Imaging: I have reviewed all pertinent imaging results/findings within the past 24 hours.

## 2019-04-03 NOTE — PLAN OF CARE
Problem: Adult Inpatient Plan of Care  Goal: Plan of Care Review  Outcome: Ongoing (interventions implemented as appropriate)  Pt remained free of falls and injuries throughout shift. IV flushed and saline locked. No complaints of pain. VSS on room air. Managed pain with PRN medications. Bed low and locked, call light within reach, side rails up X2. Will continue to monitor.

## 2019-04-03 NOTE — PLAN OF CARE
Problem: Adult Inpatient Plan of Care  Goal: Plan of Care Review  Outcome: Ongoing (interventions implemented as appropriate)  Patient on room air saturation within normal range ,prn aerosol treatment not needed BS clear.

## 2019-04-03 NOTE — PROGRESS NOTES
[facility] Hospital Medicine  Progress Note    Patient Name: Joyce Riley  MRN: [id number]  Patient Class: IP- Inpatient   Admission Date: 4/1/2019  Length of Stay: 2 days  Attending Physician: Thelma Mc MD  Primary Care Provider: Kalpana Staton MD        Subjective:     Principal Problem:Acute kidney injury superimposed on chronic kidney disease    HPI:  Ms. Riley is an 85 year old woman who was transferred here from [facility] for a second opinion regarding starting HD vs entering hospice care.  The patient was hospitalized there on 3/20/19 with shortness of breath and lower extremity edema and was found to have fluid overload with heart failure and anemia with H/H 7.8/25.  She was transfused 2 units PRBCs and extensively diuresed while there.  Creatinine was initially 2.3 and gradually worsened to 4.5 at the time of transfer here.  Azotemia was out of proportion to the creatinine with an increase in BUN to 150.  On nephrology evaluation they did not feel she was a candidate for dialysis and consulted palliative care to follow.  Family requested a transfer to see a different nephrology service to see if they agreed.    Medical history includes chronic diastolic heart failure with pulmonary hypertension associated with chronic restrictive lung disease.  She has a patent foramen ovale and a right to left shunt that was thought to contribute to a stroke she had in 2014.  She is anemic and had a diverticular hemorrhage last December while on warfarin.  After recovery she was started on apixaban.  She has paroxysmal atrial fibrillation that is rate controlled, hypertension, diabetes and stage IV CKD.  She has chronically elevated alkaline phosphatase that is not thought to be significant.  At home she uses a wheelchair but can walk to the bathroom with assistance.  She attends an adult day care center.  She used to take allopurinol for gout attacks but this was  discontinued when she had the GI bleed.    Hospital Course:  Echo showed moderate concentric lvh, severe left atrial enlargement, ef 80%, grade 2 diastolic dysfunction, pa pressure 74, no shunting seen with agitated saline.Her bun and creatinine were worsening, family decided to have HD.Ct chest showed . Mild cardiomegaly and pulmonary interstitial edema without pleural effusion.  2. Diffuse mild dilatation of the pulmonary arteries suggesting a component of pulmonary arterial hypertension.. Mild distention of the IVC and hepatic veins as can be seen in the setting of pulmonary arterial hypertension and/or right heart dysfunction.    Patient was started on HD A on 4/2/2019.    Interval History: Patient awake, eating food, c/o blurry vision for the past week, no pain or discharge, c/o pain in bilateral foot? Gout    Review of Systems   Constitutional: Negative for chills and fever.   HENT: Negative for trouble swallowing.    Eyes: Positive for visual disturbance.   Respiratory: Negative for cough and shortness of breath.    Cardiovascular: Negative for chest pain and leg swelling.   Gastrointestinal: Negative for abdominal pain, nausea and vomiting.   Genitourinary: Negative for dysuria and hematuria.   Musculoskeletal: Positive for arthralgias.   Skin: Negative for rash.   Neurological: Positive for weakness. Negative for headaches.   Psychiatric/Behavioral: Negative for confusion.     Objective:     Vital Signs (Most Recent):  Temp: 98 °F (36.7 °C) (04/03/19 0705)  Pulse: 70 (04/03/19 1013)  Resp: 16 (04/03/19 0900)  BP: (!) 155/67 (04/03/19 0900)  SpO2: 96 % (04/03/19 0900) Vital Signs (24h Range):  Temp:  [97.6 °F (36.4 °C)-98 °F (36.7 °C)] 98 °F (36.7 °C)  Pulse:  [62-76] 70  Resp:  [13-29] 16  SpO2:  [93 %-99 %] 96 %  BP: (137-185)/(67-95) 155/67     Weight: 90.9 kg (200 lb 6.4 oz)  Body mass index is 34.4 kg/m².    Intake/Output Summary (Last 24 hours) at 4/3/2019 1109  Last data filed at 4/3/2019 0900  Gross  per 24 hour   Intake 1100 ml   Output 2665 ml   Net -1565 ml      Physical Exam   Constitutional: No distress.   obese   HENT:   Head: Normocephalic and atraumatic.   Eyes: Pupils are equal, round, and reactive to light. Conjunctivae and EOM are normal.   Neck: Normal range of motion. Neck supple.   Cardiovascular: Normal rate and regular rhythm.   Pulmonary/Chest: Effort normal and breath sounds normal. No respiratory distress.   On bipap   Abdominal: Soft. Bowel sounds are normal. She exhibits no distension. There is no tenderness.   Musculoskeletal: Normal range of motion.   Strength 4/5 bilateral, no redness or swelling seen in feet, 2 plus pulses, 1 -2 plus edema bilateral lower extremities   Neurological: She is alert. No cranial nerve deficit.   Oriented to person, place   Skin: Skin is warm.   Psychiatric: She has a normal mood and affect.   Vitals reviewed.      Significant Labs:   BMP:   Recent Labs   Lab 04/03/19  0429      *   K 4.0   CL 98   CO2 25   BUN 96*   CREATININE 3.0*   CALCIUM 8.2*   MG 1.0*     CBC:   Recent Labs   Lab 04/01/19  1617 04/02/19  0247 04/03/19  0429   WBC 12.26 11.04 9.32   HGB 10.0* 9.6* 8.6*   HCT 31.3* 30.6* 26.5*    217 197       Significant Imaging: I have reviewed all pertinent imaging results/findings within the past 24 hours.    Assessment/Plan:      * Acute kidney injury superimposed on chronic kidney disease  - Patient admitted on 3/20/19 to [facility] with heart failure symptoms and anemia.  Developed severe azotemia and doubled baseline creatinine from 2.3 to 4.5 while there.    - Patient and family have been told she is not a HD candidate.  Likely this is due to severe chronic underlying problems including chronic restrictive lung disease, heart failure with a right to left shunt, atrial fibrillation and pulmonary hypertension.  - Nephrology following  - had high bun/creatinine  - family decided to go ahead with hd   - s/p hd on 4/2 and plan for  4/3  - improved labs    Foot pain  No redness  ? Gout/neuropathy  Uric acid 7.7  Given a dose of colchicine      Renovascular hypertension  Little high, monitor  On norvasc , coreg      Hyponatremia  Improved with hd  Monitor labs    Hypomagnesemia  Replace iv and oral and  monitor labs      Metabolic acidosis  Likely with rosita on ckd  dcd oral bicarb  Improved with hd      Hyperkalemia  S/p iv calcium, bicarb, kayelexate today   monitor with hd, improved      Acute respiratory failure  ? Due to fluid overload  bipap as needed  On room air today   Monitor with hd  Ct chest showed mild cardiomegaly and pulmonary interstitial edema    Visual disturbance  - Patient states her eyes are bothering her but is not much more clear than that.  - Seen by Dr. Thomas (ophthalmology) on 3/28.  Noted cataract surgery both eyes 2013 and history of diabetic retinopathy with macular edema and vitreomacular traction diagnosed in 2013.  Patient had been told to follow up at that time but did not.  On hospital consultation he again recommended outpatient follow up for a complete eye examination and retina evaluation.      Anemia  - Anemia seems multifactorial.  She has had chronic blood loss and had a diverticular bleed in December.  No bleeding noted on current presentation but she required transfusions at [facility] and they felt she needed a pill endoscopy as the rest of the workup was negative.  - Additional contributing factor of chronic kidney disease.  - currently stable  - monitor labs , on epogen      Acute on chronic diastolic heart failure  - Reportedly has grade II diastolic dysfunction.    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      Patent foramen ovale with right to left shunt  -no shunting seen on echo  - on eliquis      CKD (chronic kidney disease), stage IV  - As above.  Baseline creatinine  around 2.5.      Type 2 diabetes mellitus, with long-term current use of insulin  -   Lab Results   Component Value Date    HGBA1C 6.6 (H) 04/01/2019       - Low dose SSI, Levemir decreased to 5 units qhs   - monitor sugars        VTE Risk Mitigation (From admission, onward)        Ordered     heparin (porcine) injection 5,000 Units  As needed (PRN)      04/02/19 1001     apixaban tablet 2.5 mg  2 times daily      04/01/19 1716     IP VTE HIGH RISK PATIENT  Once      04/01/19 1404              Thelma Mc MD  Department of Hospital Medicine   [facility]

## 2019-04-03 NOTE — NURSING TRANSFER
Nursing Transfer Note      4/3/2019     Transfer To: Dialysis     Transfer via stretcher    Transfer with cardiac monitoring    Transported by RN     Medicines sent: yes    Chart send with patient: Yes    Notified: daughter    Patient reassessed at: 4/3/19 at 1000    Upon arrival to floor: cardiac monitor applied, patient oriented to room, call bell in reach and bed in lowest position

## 2019-04-03 NOTE — ASSESSMENT & PLAN NOTE
- Pulmonary HTN associated with ?restrictive lung disease and  diastolic heart failure, as above.

## 2019-04-03 NOTE — PHARMACY MED REC
"Admission Medication Reconciliation - Pharmacy Consult Note    The home medication history was taken by Keri Guardado CPhT.  Based on information gathered and subsequent review by the clinical pharmacist, the items below may need attention.     You may go to "Admission" then "Reconcile Home Medications" tabs to review and/or act upon these items.     Potentially problematic discrepancies with current MAR  o Patient IS taking the following which was not ordered upon admit  o Hydralazine 100 mg PO TID  o Patient is taking a DIFFERENT DRUG than that ordered upon admit  o Metoprolol succinate 50 mg PO daily  - Inpatient order = carvedilol 3.125 mg PO BID  o Patient is taking a drug DIFFERENTLY than how ordered upon admit  o Atorvastatin 80 mg PO daily  - Inpatient order = atorvastatin 10 mg PO daily    Medications Prior to Admission   Medication Sig Dispense Refill Last Dose    acetaminophen (TYLENOL) 325 MG tablet Take 325 mg by mouth every 6 (six) hours as needed.     Taking    allopurinol (ZYLOPRIM) 100 MG tablet Take 100 mg by mouth once daily.       amLODIPine (NORVASC) 5 MG tablet Take 5 mg by mouth 2 (two) times daily.       apixaban (ELIQUIS) 2.5 mg Tab Take 2.5 mg by mouth 2 (two) times daily.       atorvastatin (LIPITOR) 80 MG tablet Take 80 mg by mouth once daily.    Taking    hydrALAZINE (APRESOLINE) 100 MG tablet Take 100 mg by mouth 3 (three) times daily.       insulin degludec (TRESIBA FLEXTOUCH U-100) 100 unit/mL (3 mL) InPn Inject 8 Units into the skin every evening.       lidocaine (LIDODERM) 5 % Place 1 patch onto the skin as needed. Remove & Discard patch within 12 hours or as directed by MD       metoprolol succinate (TOPROL-XL) 50 MG 24 hr tablet Take 50 mg by mouth once daily.       omeprazole (PRILOSEC) 20 MG capsule Take 20 mg by mouth once daily.       furosemide (LASIX) 40 MG tablet Take 40 mg by mouth once daily.    Taking       Please address this information as you see fit.  " Feel free to contact us if you have any questions or require assistance.    Onesimo Fritz, Pharm.D., BCPS  [phone]                .  .

## 2019-04-03 NOTE — EICU
eICU asked about Insulin management.   Patient with A1C 6.5, on Levemir 10 Units QHS, sliding scale Insulin   Has ROXANNE/CKD  Had episode of hypoglycemia FSBS 60, got D50 and came upto 80 , Now it is 116   - will decrease dose of Lantus from 10 to 5 Units   - give only 4 units tonight

## 2019-04-03 NOTE — ASSESSMENT & PLAN NOTE
? Due to fluid overload  bipap as needed  On room air today   Monitor with hd  Ct chest showed mild cardiomegaly and pulmonary interstitial edema

## 2019-04-03 NOTE — PROGRESS NOTES
[facility]  Cardiology  Progress Note    Patient Name: Joyce Riley  MRN: [id number]  Admission Date: 4/1/2019  Hospital Length of Stay: 2 days  Code Status: Partial Code   Attending Physician: Thelma Mc MD   Primary Care Physician: Kalpana Staton MD  Expected Discharge Date:   Principal Problem:Acute kidney injury superimposed on chronic kidney disease    Subjective:     Brief HPI:    Joyce Riley is a 85 y.o.female with advanced renal failure and fluid overload being transferred to [facility] for consideration of HD. She has advanced hypertensive heart disease with severe diastolic dysfunction. She has pulmonary hypertension. A PFO has been seen. She is SOB on BIPAP. She denies chest pain.    Hospital Course:     4/1/2019: Echo: Normal left ventricular size with hyperdynamic systolic function. EF 80%. Moderate LVH. Moderate diastolic dysfunction. Severely dilated LA. SPAP 74 mmHg. No shunt is visualized with agitated saline.    4/3/2019: Began HD.    Interval History:     Breathing easier after HD. Some shortness of breath. No CP. No arrhythmias.    Review of Systems   Constitution: Positive for malaise/fatigue.   Cardiovascular: Negative for chest pain.   Respiratory: Positive for shortness of breath. Negative for wheezing.      Objective:     Vital Signs (Most Recent):  Temp: 97.6 °F (36.4 °C) (04/03/19 1419)  Pulse: 78 (04/03/19 1543)  Resp: 14 (04/03/19 1543)  BP: (!) 168/72 (04/03/19 1543)  SpO2: (!) 94 % (04/03/19 1543) Vital Signs (24h Range):  Temp:  [97.6 °F (36.4 °C)-98 °F (36.7 °C)] 97.6 °F (36.4 °C)  Pulse:  [] 78  Resp:  [13-29] 14  SpO2:  [94 %-99 %] 94 %  BP: (118-185)/(65-95) 168/72     Weight: 90.9 kg (200 lb 6.4 oz)  Body mass index is 34.4 kg/m².    SpO2: (!) 94 %  O2 Device (Oxygen Therapy): room air      Intake/Output Summary (Last 24 hours) at 4/3/2019 1647  Last data filed at 4/3/2019 1330  Gross per 24 hour   Intake 1600 ml   Output 3790 ml    Net -2190 ml       Lines/Drains/Airways     Central Venous Catheter Line                 Trialysis (Dialysis) Catheter 04/02/19 1319 right internal jugular 1 day          Drain                 Urethral Catheter 03/24/19 2300 9 days          Peripheral Intravenous Line                 Peripheral IV - Single Lumen 10/06/17 2035 Left Hand 543 days         Peripheral IV - Single Lumen 04/01/19 Left;Posterior Forearm 2 days         Peripheral IV - Single Lumen 04/02/19 0530 Left Forearm 1 day                Physical Exam   Cardiovascular: Normal rate, regular rhythm, S1 normal and S2 normal. Exam reveals gallop and S4.   Murmur heard.  Pulmonary/Chest: She has rales in the right lower field and the left lower field.       Current Medications:     amLODIPine  10 mg Oral Daily    apixaban  2.5 mg Oral BID    [START ON 4/4/2019] atorvastatin  40 mg Oral Daily    calcitRIOL  0.25 mcg Oral Daily    carvedilol  3.125 mg Oral BID WM    epoetin diana (PROCRIT) injection  10,000 Units Intravenous Every Mon, Wed, Fri    ergocalciferol  50,000 Units Oral Q7 Days    famotidine  20 mg Oral Daily    insulin detemir U-100  4 Units Subcutaneous Once    insulin detemir U-100  5 Units Subcutaneous QHS    magnesium oxide  400 mg Oral BID     Current Laboratory Results:    Recent Results (from the past 24 hour(s))   POCT glucose    Collection Time: 04/02/19  6:33 PM   Result Value Ref Range    POCT Glucose 60 (L) 70 - 110 mg/dL   POCT glucose    Collection Time: 04/02/19  7:19 PM   Result Value Ref Range    POCT Glucose 86 70 - 110 mg/dL   POCT glucose    Collection Time: 04/02/19  9:11 PM   Result Value Ref Range    POCT Glucose 116 (H) 70 - 110 mg/dL   Protein, urine, timed    Collection Time: 04/02/19 10:00 PM   Result Value Ref Range    Urine Volume 1170 mL    Urine Collection Duration 24 Hr    Protein, Urine 61 (H) 0 - 15 mg/dL    Urine Protein, Timed 714 (H) 0 - 100 mg/Spec   POCT glucose    Collection Time: 04/02/19 10:40  PM   Result Value Ref Range    POCT Glucose 125 (H) 70 - 110 mg/dL   CBC with Automated Differential    Collection Time: 04/03/19  4:29 AM   Result Value Ref Range    WBC 9.32 3.90 - 12.70 K/uL    RBC 3.00 (L) 4.00 - 5.40 M/uL    Hemoglobin 8.6 (L) 12.0 - 16.0 g/dL    Hematocrit 26.5 (L) 37.0 - 48.5 %    MCV 88 82 - 98 fL    MCH 28.7 27.0 - 31.0 pg    MCHC 32.5 32.0 - 36.0 g/dL    RDW 17.9 (H) 11.5 - 14.5 %    Platelets 197 150 - 350 K/uL    MPV 11.0 9.2 - 12.9 fL    Gran # (ANC) 7.6 1.8 - 7.7 K/uL    Lymph # 0.7 (L) 1.0 - 4.8 K/uL    Mono # 0.8 0.3 - 1.0 K/uL    Eos # 0.1 0.0 - 0.5 K/uL    Baso # 0.01 0.00 - 0.20 K/uL    Gran% 81.4 (H) 38.0 - 73.0 %    Lymph% 7.2 (L) 18.0 - 48.0 %    Mono% 8.5 4.0 - 15.0 %    Eosinophil% 1.5 0.0 - 8.0 %    Basophil% 0.1 0.0 - 1.9 %    Differential Method Automated    Magnesium    Collection Time: 04/03/19  4:29 AM   Result Value Ref Range    Magnesium 1.0 (L) 1.6 - 2.6 mg/dL   Phosphorus    Collection Time: 04/03/19  4:29 AM   Result Value Ref Range    Phosphorus 5.4 (H) 2.7 - 4.5 mg/dL   Basic metabolic panel    Collection Time: 04/03/19  4:29 AM   Result Value Ref Range    Sodium 133 (L) 136 - 145 mmol/L    Potassium 4.0 3.5 - 5.1 mmol/L    Chloride 98 95 - 110 mmol/L    CO2 25 23 - 29 mmol/L    Glucose 104 70 - 110 mg/dL    BUN, Bld 96 (H) 8 - 23 mg/dL    Creatinine 3.0 (H) 0.5 - 1.4 mg/dL    Calcium 8.2 (L) 8.7 - 10.5 mg/dL    Anion Gap 10 8 - 16 mmol/L    eGFR if African American 16 (A) >60 mL/min/1.73 m^2    eGFR if non African American 14 (A) >60 mL/min/1.73 m^2   POCT glucose    Collection Time: 04/03/19  7:41 AM   Result Value Ref Range    POCT Glucose 133 (H) 70 - 110 mg/dL   POCT glucose    Collection Time: 04/03/19 12:42 PM   Result Value Ref Range    POCT Glucose 146 (H) 70 - 110 mg/dL     Current Imaging Results:    X-Ray Chest 1 View   Final Result      Please see above         Electronically signed by: Tez Garza DO   Date:    04/02/2019   Time:    14:19      CT  Chest Without Contrast   Final Result      1. Mild cardiomegaly and pulmonary interstitial edema without pleural effusion.   2. Diffuse mild dilatation of the pulmonary arteries suggesting a component of pulmonary arterial hypertension.   3. Mild distention of the IVC and hepatic veins as can be seen in the setting of pulmonary arterial hypertension and/or right heart dysfunction.         Electronically signed by: El Hollingsworth   Date:    04/02/2019   Time:    08:48      X-Ray Chest 1 View    (Results Pending)       Assessment and Plan:     Problem List:    Active Diagnoses:    Diagnosis Date Noted POA    PRINCIPAL PROBLEM:  Acute kidney injury superimposed on chronic kidney disease [N17.9, N18.9] 04/01/2019 Yes    ESRD (end stage renal disease) [N18.6] 04/03/2019 Yes    Renovascular hypertension [I15.0] 04/03/2019 Yes    Foot pain [M79.673] 04/03/2019 Yes    Hyperkalemia [E87.5] 04/02/2019 Yes    Metabolic acidosis [E87.2] 04/02/2019 Yes    Hypomagnesemia [E83.42] 04/02/2019 Yes    Hyponatremia [E87.1] 04/02/2019 Yes    CKD (chronic kidney disease), stage IV [N18.4] 04/01/2019 Yes    Patent foramen ovale with right to left shunt [Q21.1] 04/01/2019 Not Applicable    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 Heart Failure, Diastolic, Acute on Chronic              4/1/2019: Echo: Normal left ventricular size with hyperdynamic systolic function. EF 80%. Moderate LVH. Moderate diastolic dysfunction. Severely dilated LA. SPAP 74 mmHg. No shunt is visualized with agitated saline.             Fluid removal with HD.     2. Atrial Fibrillation              Paroxysmal atrial fibrillation.              On apixiban.     3. Chronic Anticoagulation              On apixiban.     4. Chronic Kidney Disease, Stage 5     4/3/2019: Began HD.   Feeling better.    VTE Risk Mitigation (From admission, onward)        Ordered     heparin (porcine) injection 5,000 Units  As needed (PRN)      04/02/19 1001     apixaban tablet 2.5 mg  2 times daily      04/01/19 1716     IP VTE HIGH RISK PATIENT  Once      04/01/19 1404          Marcella Cornejo MD  Cardiology  [facility]

## 2019-04-03 NOTE — PLAN OF CARE
Problem: Adult Inpatient Plan of Care  Goal: Plan of Care Review  Outcome: Ongoing (interventions implemented as appropriate)  Ms. Riley rested well overnight, VSS, NAD. Remains oriented x 3 but periodically disoriented to situation. Spoke with Dr. Bardales last night regarding elevated BP; one time dose 10mg amlodipine administered with moderate affect. Remains on room air, denies SOB or chest pain. Urine output adequate via mclaughlin. 24 hour urine complete and delivered to lab. BM x 2 overnight, large, soft, and brown; what appears to be a prolapsed rectum noted; pt reports this is baseline. Tolerated dinner last night without difficulty; good appetite. Spoke with MD last PM regarding scheduled insulin and hypoglycemic event prior to shift; he ordered 4 units levemir but the patient and her daughter both refused her receiving; glucose on AM labs, 104. Assisted pt with repositioning Q2H overnight. AM bath given and linens changed. Will continue to monitor.

## 2019-04-03 NOTE — ASSESSMENT & PLAN NOTE
- Anemia seems multifactorial.  She has had chronic blood loss and had a diverticular bleed in December.  No bleeding noted on current presentation but she required transfusions at [facility] and they felt she needed a pill endoscopy as the rest of the workup was negative.  - Additional contributing factor of chronic kidney disease.  - currently stable  - monitor labs , on epogen

## 2019-04-04 PROBLEM — E87.5 HYPERKALEMIA: Status: RESOLVED | Noted: 2019-04-02 | Resolved: 2019-04-04

## 2019-04-04 PROBLEM — E87.20 METABOLIC ACIDOSIS: Status: RESOLVED | Noted: 2019-04-02 | Resolved: 2019-04-04

## 2019-04-04 LAB
ANION GAP SERPL CALC-SCNC: 8 MMOL/L (ref 8–16)
BASOPHILS # BLD AUTO: 0.01 K/UL (ref 0–0.2)
BASOPHILS NFR BLD: 0.1 % (ref 0–1.9)
BUN SERPL-MCNC: 48 MG/DL (ref 8–23)
CALCIUM SERPL-MCNC: 8.5 MG/DL (ref 8.7–10.5)
CHLORIDE SERPL-SCNC: 98 MMOL/L (ref 95–110)
CO2 SERPL-SCNC: 29 MMOL/L (ref 23–29)
CREAT SERPL-MCNC: 2 MG/DL (ref 0.5–1.4)
DIFFERENTIAL METHOD: ABNORMAL
EOSINOPHIL # BLD AUTO: 0.2 K/UL (ref 0–0.5)
EOSINOPHIL NFR BLD: 1.7 % (ref 0–8)
ERYTHROCYTE [DISTWIDTH] IN BLOOD BY AUTOMATED COUNT: 18.3 % (ref 11.5–14.5)
EST. GFR  (AFRICAN AMERICAN): 26 ML/MIN/1.73 M^2
EST. GFR  (NON AFRICAN AMERICAN): 22 ML/MIN/1.73 M^2
GLUCOSE SERPL-MCNC: 94 MG/DL (ref 70–110)
HCT VFR BLD AUTO: 26.9 % (ref 37–48.5)
HGB BLD-MCNC: 8.7 G/DL (ref 12–16)
LYMPHOCYTES # BLD AUTO: 0.9 K/UL (ref 1–4.8)
LYMPHOCYTES NFR BLD: 10.1 % (ref 18–48)
MAGNESIUM SERPL-MCNC: 1.4 MG/DL (ref 1.6–2.6)
MCH RBC QN AUTO: 28.9 PG (ref 27–31)
MCHC RBC AUTO-ENTMCNC: 32.3 G/DL (ref 32–36)
MCV RBC AUTO: 89 FL (ref 82–98)
MONOCYTES # BLD AUTO: 0.7 K/UL (ref 0.3–1)
MONOCYTES NFR BLD: 7.5 % (ref 4–15)
NEUTROPHILS # BLD AUTO: 7 K/UL (ref 1.8–7.7)
NEUTROPHILS NFR BLD: 79.4 % (ref 38–73)
PHOSPHATE SERPL-MCNC: 4.1 MG/DL (ref 2.7–4.5)
PLATELET # BLD AUTO: 177 K/UL (ref 150–350)
PMV BLD AUTO: 11.5 FL (ref 9.2–12.9)
POCT GLUCOSE: 103 MG/DL (ref 70–110)
POCT GLUCOSE: 119 MG/DL (ref 70–110)
POCT GLUCOSE: 230 MG/DL (ref 70–110)
POCT GLUCOSE: 77 MG/DL (ref 70–110)
POTASSIUM SERPL-SCNC: 3.6 MMOL/L (ref 3.5–5.1)
RBC # BLD AUTO: 3.01 M/UL (ref 4–5.4)
SODIUM SERPL-SCNC: 135 MMOL/L (ref 136–145)
WBC # BLD AUTO: 8.84 K/UL (ref 3.9–12.7)

## 2019-04-04 PROCEDURE — 25000003 PHARM REV CODE 250: Performed by: INTERNAL MEDICINE

## 2019-04-04 PROCEDURE — 27000221 HC OXYGEN, UP TO 24 HOURS

## 2019-04-04 PROCEDURE — 63600175 PHARM REV CODE 636 W HCPCS: Performed by: INTERNAL MEDICINE

## 2019-04-04 PROCEDURE — 99900035 HC TECH TIME PER 15 MIN (STAT)

## 2019-04-04 PROCEDURE — 11000001 HC ACUTE MED/SURG PRIVATE ROOM

## 2019-04-04 PROCEDURE — 99233 SBSQ HOSP IP/OBS HIGH 50: CPT | Mod: ,,, | Performed by: INTERNAL MEDICINE

## 2019-04-04 PROCEDURE — 94761 N-INVAS EAR/PLS OXIMETRY MLT: CPT

## 2019-04-04 PROCEDURE — 84100 ASSAY OF PHOSPHORUS: CPT

## 2019-04-04 PROCEDURE — 80048 BASIC METABOLIC PNL TOTAL CA: CPT

## 2019-04-04 PROCEDURE — 83735 ASSAY OF MAGNESIUM: CPT

## 2019-04-04 PROCEDURE — 99233 PR SUBSEQUENT HOSPITAL CARE,LEVL III: ICD-10-PCS | Mod: ,,, | Performed by: INTERNAL MEDICINE

## 2019-04-04 PROCEDURE — 85025 COMPLETE CBC W/AUTO DIFF WBC: CPT

## 2019-04-04 PROCEDURE — 80100016 HC MAINTENANCE HEMODIALYSIS

## 2019-04-04 RX ORDER — CARVEDILOL 6.25 MG/1
6.25 TABLET ORAL 2 TIMES DAILY WITH MEALS
Status: DISCONTINUED | OUTPATIENT
Start: 2019-04-04 | End: 2019-04-07

## 2019-04-04 RX ADMIN — HEPARIN SODIUM 5000 UNITS: 5000 INJECTION, SOLUTION INTRAVENOUS; SUBCUTANEOUS at 11:04

## 2019-04-04 RX ADMIN — INSULIN ASPART 2 UNITS: 100 INJECTION, SOLUTION INTRAVENOUS; SUBCUTANEOUS at 01:04

## 2019-04-04 RX ADMIN — APIXABAN 2.5 MG: 2.5 TABLET, FILM COATED ORAL at 09:04

## 2019-04-04 RX ADMIN — Medication 400 MG: at 09:04

## 2019-04-04 RX ADMIN — AMLODIPINE BESYLATE 10 MG: 5 TABLET ORAL at 01:04

## 2019-04-04 RX ADMIN — CALCITRIOL 0.25 MCG: 0.25 CAPSULE ORAL at 09:04

## 2019-04-04 RX ADMIN — CARVEDILOL 6.25 MG: 6.25 TABLET, FILM COATED ORAL at 05:04

## 2019-04-04 RX ADMIN — FAMOTIDINE 20 MG: 20 TABLET, FILM COATED ORAL at 09:04

## 2019-04-04 RX ADMIN — ATORVASTATIN CALCIUM 40 MG: 20 TABLET, FILM COATED ORAL at 09:04

## 2019-04-04 NOTE — PLAN OF CARE
Problem: Adult Inpatient Plan of Care  Goal: Plan of Care Review  Outcome: Ongoing (interventions implemented as appropriate)  Pt in no distress on room air, PRN tx not required. Bipap on standby at bedside. Will continue to monitor.

## 2019-04-04 NOTE — PLAN OF CARE
Problem: Adult Inpatient Plan of Care  Goal: Plan of Care Review  Outcome: Ongoing (interventions implemented as appropriate)  Pt remained free of falls and injuries throughout shift. IV flushed and saline locked. No complaints of pain. VSS on room air. New IV, 22g to right FA.  Bed low and locked, call light within reach, side rails up X2, family at bedside. Will continue to monitor.

## 2019-04-04 NOTE — ASSESSMENT & PLAN NOTE
? Due to fluid overload  IMPROVED  bipap as needed  On room air   Ct chest showed mild cardiomegaly and pulmonary interstitial edema

## 2019-04-04 NOTE — NURSING
Plan of care reviewed with Pt, purposeful hourly rounding performed. VSS on RA. Patient confused throughout the night, reoriented as needed. Telesitter order placed. Bed alarm on and audible. NAD during shift. No c/o at this time. Bed locked and lowered, call light in reach. Will continue to monitor.

## 2019-04-04 NOTE — ASSESSMENT & PLAN NOTE
- Patient admitted on 3/20/19 to [facility] with heart failure symptoms and anemia.  Developed severe azotemia and doubled baseline creatinine from 2.3 to 4.5 while there.    - Patient and family have been told she is not a HD candidate.  Likely this is due to severe chronic underlying problems including chronic restrictive lung disease, heart failure with a right to left shunt, atrial fibrillation and pulmonary hypertension.  - Nephrology following  - had high bun/creatinine on admit   - family decided to go ahead with hd   - s/p hd on 4/2 , 4/3, 4/4  - improved labs  - plan to hold hd and monitor

## 2019-04-04 NOTE — SUBJECTIVE & OBJECTIVE
Interval History: Patient seen in HD,  c/o blurry vision for the past week, no pain or discharge,pain in foot better , also confusion per nursing report  Review of Systems   Constitutional: Negative for chills and fever.   HENT: Negative for trouble swallowing.    Eyes: Positive for visual disturbance.   Respiratory: Negative for cough and shortness of breath.    Cardiovascular: Negative for chest pain and leg swelling.   Gastrointestinal: Negative for abdominal pain, nausea and vomiting.   Genitourinary: Negative for dysuria and hematuria.   Musculoskeletal: Positive for arthralgias.   Skin: Negative for rash.   Neurological: Positive for weakness. Negative for headaches.   Psychiatric/Behavioral: Positive for confusion.     Objective:     Vital Signs (Most Recent):  Temp: 98.2 °F (36.8 °C) (04/04/19 0706)  Pulse: 70 (04/04/19 1000)  Resp: 20 (04/04/19 0706)  BP: (!) 155/65 (04/04/19 0706)  SpO2: 95 % (04/04/19 0706) Vital Signs (24h Range):  Temp:  [97.6 °F (36.4 °C)-98.4 °F (36.9 °C)] 98.2 °F (36.8 °C)  Pulse:  [] 70  Resp:  [14-20] 20  SpO2:  [91 %-95 %] 95 %  BP: (131-179)/(50-89) 155/65     Weight: 98.5 kg (217 lb 2.5 oz)  Body mass index is 37.27 kg/m².    Intake/Output Summary (Last 24 hours) at 4/4/2019 1129  Last data filed at 4/4/2019 0500  Gross per 24 hour   Intake 620 ml   Output 2050 ml   Net -1430 ml      Physical Exam   Constitutional: No distress.   obese   HENT:   Head: Normocephalic and atraumatic.   Eyes: Pupils are equal, round, and reactive to light. Conjunctivae and EOM are normal.   Neck: Normal range of motion. Neck supple.   Cardiovascular: Normal rate and regular rhythm.   Pulmonary/Chest: Effort normal and breath sounds normal. No respiratory distress.   Decreased breath sounds at bases   Abdominal: Soft. Bowel sounds are normal. She exhibits no distension. There is no tenderness.   Musculoskeletal: Normal range of motion.   Strength 4/5 bilateral, no redness or swelling seen in  feet, 2 plus pulses, 1 -2 plus edema bilateral lower extremities   Neurological: She is alert. No cranial nerve deficit.   Oriented to person, place   Skin: Skin is warm.   Psychiatric: She has a normal mood and affect.   Vitals reviewed.      Significant Labs:   BMP:   Recent Labs   Lab 04/04/19  0324   GLU 94   *   K 3.6   CL 98   CO2 29   BUN 48*   CREATININE 2.0*   CALCIUM 8.5*   MG 1.4*     CBC:   Recent Labs   Lab 04/03/19  0429 04/04/19  0324   WBC 9.32 8.84   HGB 8.6* 8.7*   HCT 26.5* 26.9*    177       Significant Imaging: I have reviewed all pertinent imaging results/findings within the past 24 hours.

## 2019-04-04 NOTE — ASSESSMENT & PLAN NOTE
- Anemia seems multifactorial.  She has had chronic blood loss and had a diverticular bleed in December.  No bleeding noted on current presentation but she required transfusions at [facility] and they felt she needed a pill endoscopy as the rest of the workup was negative.  - Additional contributing factor of chronic kidney disease.  - currently stable  - monitor labs , on epogen

## 2019-04-04 NOTE — PROGRESS NOTES
[facility]  Cardiology  Progress Note    Patient Name: Joyce Riley  MRN: [id number]  Admission Date: 4/1/2019  Hospital Length of Stay: 3 days  Code Status: Partial Code   Attending Physician: Thelma Mc MD   Primary Care Physician: Kalpana Staton MD  Expected Discharge Date:   Principal Problem:Acute kidney injury superimposed on chronic kidney disease    Subjective:     Brief HPI:    Joyce Riley is a 85 y.o.female with advanced renal failure and fluid overload being transferred to [facility] for consideration of HD. She has advanced hypertensive heart disease with severe diastolic dysfunction. She has pulmonary hypertension. A PFO has been seen. She is SOB on BIPAP. She denies chest pain.    Hospital Course:     4/1/2019: Echo: Normal left ventricular size with hyperdynamic systolic function. EF 80%. Moderate LVH. Moderate diastolic dysfunction. Severely dilated LA. SPAP 74 mmHg. No shunt is visualized with agitated saline.    4/3/2019: Began HD.    Interval History:     Breathing easier after she began HD and had fluid removed. No CP. No arrhythmias. Quite confused.    Review of Systems   Constitution: Positive for malaise/fatigue.   Cardiovascular: Negative for chest pain.   Respiratory: Positive for shortness of breath. Negative for wheezing.    Psychiatric/Behavioral: Positive for memory loss.     Objective:     Vital Signs (Most Recent):  Temp: 98.2 °F (36.8 °C) (04/04/19 0706)  Pulse: 70 (04/04/19 1000)  Resp: 20 (04/04/19 0706)  BP: (!) 155/65 (04/04/19 0706)  SpO2: 95 % (04/04/19 0706) Vital Signs (24h Range):  Temp:  [97.6 °F (36.4 °C)-98.4 °F (36.9 °C)] 98.2 °F (36.8 °C)  Pulse:  [] 70  Resp:  [14-20] 20  SpO2:  [91 %-95 %] 95 %  BP: (131-179)/(50-89) 155/65     Weight: 98.5 kg (217 lb 2.5 oz)  Body mass index is 37.27 kg/m².    SpO2: 95 %  O2 Device (Oxygen Therapy): room air      Intake/Output Summary (Last 24 hours) at 4/4/2019 1052  Last data filed at  4/4/2019 0500  Gross per 24 hour   Intake 620 ml   Output 2050 ml   Net -1430 ml       Lines/Drains/Airways     Central Venous Catheter Line                 Trialysis (Dialysis) Catheter 04/02/19 1319 right internal jugular 1 day          Drain                 Urethral Catheter 03/24/19 2300 10 days          Peripheral Intravenous Line                 Peripheral IV - Single Lumen 10/06/17 2035 Left Hand 544 days         Peripheral IV - Single Lumen 04/01/19 Left;Posterior Forearm 3 days         Peripheral IV - Single Lumen 04/02/19 0530 Left Forearm 2 days                Physical Exam   Cardiovascular: Normal rate, regular rhythm, S1 normal and S2 normal. Exam reveals gallop and S4.   Murmur heard.  Pulmonary/Chest: She has rales in the right lower field and the left lower field.   Neurological: She is alert. She is disoriented.       Current Medications:     amLODIPine  10 mg Oral Daily    apixaban  2.5 mg Oral BID    atorvastatin  40 mg Oral Daily    calcitRIOL  0.25 mcg Oral Daily    carvedilol  3.125 mg Oral BID WM    epoetin diana (PROCRIT) injection  10,000 Units Intravenous Every Mon, Wed, Fri    ergocalciferol  50,000 Units Oral Q7 Days    famotidine  20 mg Oral Daily    insulin detemir U-100  4 Units Subcutaneous Once    insulin detemir U-100  5 Units Subcutaneous QHS    magnesium oxide  400 mg Oral BID     Current Laboratory Results:    Recent Results (from the past 24 hour(s))   POCT glucose    Collection Time: 04/03/19 12:42 PM   Result Value Ref Range    POCT Glucose 146 (H) 70 - 110 mg/dL   POCT glucose    Collection Time: 04/03/19  5:13 PM   Result Value Ref Range    POCT Glucose 128 (H) 70 - 110 mg/dL   POCT glucose    Collection Time: 04/03/19 10:09 PM   Result Value Ref Range    POCT Glucose 146 (H) 70 - 110 mg/dL   CBC with Automated Differential    Collection Time: 04/04/19  3:24 AM   Result Value Ref Range    WBC 8.84 3.90 - 12.70 K/uL    RBC 3.01 (L) 4.00 - 5.40 M/uL    Hemoglobin 8.7  (L) 12.0 - 16.0 g/dL    Hematocrit 26.9 (L) 37.0 - 48.5 %    MCV 89 82 - 98 fL    MCH 28.9 27.0 - 31.0 pg    MCHC 32.3 32.0 - 36.0 g/dL    RDW 18.3 (H) 11.5 - 14.5 %    Platelets 177 150 - 350 K/uL    MPV 11.5 9.2 - 12.9 fL    Gran # (ANC) 7.0 1.8 - 7.7 K/uL    Lymph # 0.9 (L) 1.0 - 4.8 K/uL    Mono # 0.7 0.3 - 1.0 K/uL    Eos # 0.2 0.0 - 0.5 K/uL    Baso # 0.01 0.00 - 0.20 K/uL    Gran% 79.4 (H) 38.0 - 73.0 %    Lymph% 10.1 (L) 18.0 - 48.0 %    Mono% 7.5 4.0 - 15.0 %    Eosinophil% 1.7 0.0 - 8.0 %    Basophil% 0.1 0.0 - 1.9 %    Differential Method Automated    Magnesium    Collection Time: 04/04/19  3:24 AM   Result Value Ref Range    Magnesium 1.4 (L) 1.6 - 2.6 mg/dL   Phosphorus    Collection Time: 04/04/19  3:24 AM   Result Value Ref Range    Phosphorus 4.1 2.7 - 4.5 mg/dL   Basic metabolic panel    Collection Time: 04/04/19  3:24 AM   Result Value Ref Range    Sodium 135 (L) 136 - 145 mmol/L    Potassium 3.6 3.5 - 5.1 mmol/L    Chloride 98 95 - 110 mmol/L    CO2 29 23 - 29 mmol/L    Glucose 94 70 - 110 mg/dL    BUN, Bld 48 (H) 8 - 23 mg/dL    Creatinine 2.0 (H) 0.5 - 1.4 mg/dL    Calcium 8.5 (L) 8.7 - 10.5 mg/dL    Anion Gap 8 8 - 16 mmol/L    eGFR if African American 26 (A) >60 mL/min/1.73 m^2    eGFR if non African American 22 (A) >60 mL/min/1.73 m^2   POCT glucose    Collection Time: 04/04/19  7:37 AM   Result Value Ref Range    POCT Glucose 77 70 - 110 mg/dL     Current Imaging Results:    X-Ray Chest 1 View   Final Result      Please see above         Electronically signed by: Tez Garza DO   Date:    04/02/2019   Time:    14:19      CT Chest Without Contrast   Final Result      1. Mild cardiomegaly and pulmonary interstitial edema without pleural effusion.   2. Diffuse mild dilatation of the pulmonary arteries suggesting a component of pulmonary arterial hypertension.   3. Mild distention of the IVC and hepatic veins as can be seen in the setting of pulmonary arterial hypertension and/or right heart  dysfunction.         Electronically signed by: El Hollingsworth   Date:    04/02/2019   Time:    08:48      X-Ray Chest 1 View    (Results Pending)       Assessment and Plan:     Problem List:    Active Diagnoses:    Diagnosis Date Noted POA    PRINCIPAL PROBLEM:  Acute kidney injury superimposed on chronic kidney disease [N17.9, N18.9] 04/01/2019 Yes    ESRD (end stage renal disease) [N18.6] 04/03/2019 Yes    Renovascular hypertension [I15.0] 04/03/2019 Yes    Foot pain [M79.673] 04/03/2019 Yes    Hyperkalemia [E87.5] 04/02/2019 Yes    Metabolic acidosis [E87.2] 04/02/2019 Yes    Hypomagnesemia [E83.42] 04/02/2019 Yes    Hyponatremia [E87.1] 04/02/2019 Yes    CKD (chronic kidney disease), stage IV [N18.4] 04/01/2019 Yes    Patent foramen ovale with right to left shunt [Q21.1] 04/01/2019 Not Applicable    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 Heart Failure, Diastolic, Acute on Chronic              4/1/2019: Echo: Normal left ventricular size with hyperdynamic systolic function. EF 80%. Moderate LVH. Moderate diastolic dysfunction. Severely dilated LA. SPAP 74 mmHg. No shunt is visualized with agitated saline.            Fluid removal with HD.     2. Atrial Fibrillation              Paroxysmal atrial fibrillation.              On apixiban.     3. Chronic Anticoagulation              On apixiban.     4. Chronic Kidney Disease, Stage 5     4/3/2019: Began HD.   Feeling better.    VTE Risk Mitigation (From admission, onward)        Ordered     heparin (porcine) injection 5,000 Units  As  needed (PRN)      04/02/19 1001     apixaban tablet 2.5 mg  2 times daily      04/01/19 1716     IP VTE HIGH RISK PATIENT  Once      04/01/19 1404          Marcella Cornejo MD  Cardiology  [facility]

## 2019-04-04 NOTE — PROGRESS NOTES
Pt received on RA;SPO2 89-90%. Placed pt on 2LNC;SPO2 95%. No PRN treatments were needed. Will continue to monitor.

## 2019-04-04 NOTE — ASSESSMENT & PLAN NOTE
-   Lab Results   Component Value Date    HGBA1C 6.6 (H) 04/01/2019       - Low dose SSI, dc levemir as sugars on lower side  - monitor sugars

## 2019-04-04 NOTE — PROGRESS NOTES
[facility] Hospital Medicine  Progress Note    Patient Name: Joyce Riley  MRN: [id number]  Patient Class: IP- Inpatient   Admission Date: 4/1/2019  Length of Stay: 3 days  Attending Physician: Thelma Mc MD  Primary Care Provider: Kalpana Staton MD        Subjective:     Principal Problem:Acute kidney injury superimposed on chronic kidney disease    HPI:  Ms. Riley is an 85 year old woman who was transferred here from [facility] for a second opinion regarding starting HD vs entering hospice care.  The patient was hospitalized there on 3/20/19 with shortness of breath and lower extremity edema and was found to have fluid overload with heart failure and anemia with H/H 7.8/25.  She was transfused 2 units PRBCs and extensively diuresed while there.  Creatinine was initially 2.3 and gradually worsened to 4.5 at the time of transfer here.  Azotemia was out of proportion to the creatinine with an increase in BUN to 150.  On nephrology evaluation they did not feel she was a candidate for dialysis and consulted palliative care to follow.  Family requested a transfer to see a different nephrology service to see if they agreed.    Medical history includes chronic diastolic heart failure with pulmonary hypertension associated with chronic restrictive lung disease.  She has a patent foramen ovale and a right to left shunt that was thought to contribute to a stroke she had in 2014.  She is anemic and had a diverticular hemorrhage last December while on warfarin.  After recovery she was started on apixaban.  She has paroxysmal atrial fibrillation that is rate controlled, hypertension, diabetes and stage IV CKD.  She has chronically elevated alkaline phosphatase that is not thought to be significant.  At home she uses a wheelchair but can walk to the bathroom with assistance.  She attends an adult day care center.  She used to take allopurinol for gout attacks but this was  discontinued when she had the GI bleed.    Hospital Course:  Echo showed moderate concentric lvh, severe left atrial enlargement, ef 80%, grade 2 diastolic dysfunction, pa pressure 74, no shunting seen with agitated saline.Her bun and creatinine were worsening, family decided to have HD.Ct chest showed . Mild cardiomegaly and pulmonary interstitial edema without pleural effusion.  2. Diffuse mild dilatation of the pulmonary arteries suggesting a component of pulmonary arterial hypertension.. Mild distention of the IVC and hepatic veins as can be seen in the setting of pulmonary arterial hypertension and/or right heart dysfunction.    Patient was started on HD on 4/2/2019, 4/3, 4/4.    Interval History: Patient seen in HD,  c/o blurry vision for the past week, no pain or discharge,pain in foot better , also confusion per nursing report  Review of Systems   Constitutional: Negative for chills and fever.   HENT: Negative for trouble swallowing.    Eyes: Positive for visual disturbance.   Respiratory: Negative for cough and shortness of breath.    Cardiovascular: Negative for chest pain and leg swelling.   Gastrointestinal: Negative for abdominal pain, nausea and vomiting.   Genitourinary: Negative for dysuria and hematuria.   Musculoskeletal: Positive for arthralgias.   Skin: Negative for rash.   Neurological: Positive for weakness. Negative for headaches.   Psychiatric/Behavioral: Positive for confusion.     Objective:     Vital Signs (Most Recent):  Temp: 98.2 °F (36.8 °C) (04/04/19 0706)  Pulse: 70 (04/04/19 1000)  Resp: 20 (04/04/19 0706)  BP: (!) 155/65 (04/04/19 0706)  SpO2: 95 % (04/04/19 0706) Vital Signs (24h Range):  Temp:  [97.6 °F (36.4 °C)-98.4 °F (36.9 °C)] 98.2 °F (36.8 °C)  Pulse:  [] 70  Resp:  [14-20] 20  SpO2:  [91 %-95 %] 95 %  BP: (131-179)/(50-89) 155/65     Weight: 98.5 kg (217 lb 2.5 oz)  Body mass index is 37.27 kg/m².    Intake/Output Summary (Last 24 hours) at 4/4/2019 1129  Last data  filed at 4/4/2019 0500  Gross per 24 hour   Intake 620 ml   Output 2050 ml   Net -1430 ml      Physical Exam   Constitutional: No distress.   obese   HENT:   Head: Normocephalic and atraumatic.   Eyes: Pupils are equal, round, and reactive to light. Conjunctivae and EOM are normal.   Neck: Normal range of motion. Neck supple.   Cardiovascular: Normal rate and regular rhythm.   Pulmonary/Chest: Effort normal and breath sounds normal. No respiratory distress.   Decreased breath sounds at bases   Abdominal: Soft. Bowel sounds are normal. She exhibits no distension. There is no tenderness.   Musculoskeletal: Normal range of motion.   Strength 4/5 bilateral, no redness or swelling seen in feet, 2 plus pulses, 1 -2 plus edema bilateral lower extremities   Neurological: She is alert. No cranial nerve deficit.   Oriented to person, place   Skin: Skin is warm.   Psychiatric: She has a normal mood and affect.   Vitals reviewed.      Significant Labs:   BMP:   Recent Labs   Lab 04/04/19  0324   GLU 94   *   K 3.6   CL 98   CO2 29   BUN 48*   CREATININE 2.0*   CALCIUM 8.5*   MG 1.4*     CBC:   Recent Labs   Lab 04/03/19  0429 04/04/19  0324   WBC 9.32 8.84   HGB 8.6* 8.7*   HCT 26.5* 26.9*    177       Significant Imaging: I have reviewed all pertinent imaging results/findings within the past 24 hours.    Assessment/Plan:      * Acute kidney injury superimposed on chronic kidney disease  - Patient admitted on 3/20/19 to [facility] with heart failure symptoms and anemia.  Developed severe azotemia and doubled baseline creatinine from 2.3 to 4.5 while there.    - Patient and family have been told she is not a HD candidate.  Likely this is due to severe chronic underlying problems including chronic restrictive lung disease, heart failure with a right to left shunt, atrial fibrillation and pulmonary hypertension.  - Nephrology following  - had high bun/creatinine on admit   - family decided to go ahead with hd   - s/p  hd on 4/2 , 4/3, 4/4  - improved labs  - plan to hold hd and monitor    Foot pain  No redness  ? Gout/neuropathy  Uric acid 7.7  Given a dose of colchicine 4/3      Renovascular hypertension  Little high, monitor  On norvasc , coreg, increase coreg to 6.25 bid.      Hyponatremia  Improved with hd  Monitor labs    Hypomagnesemia  Replaced iv , continue oral   Improving slowly      Acute respiratory failure  ? Due to fluid overload  IMPROVED  bipap as needed  On room air   Ct chest showed mild cardiomegaly and pulmonary interstitial edema    Visual disturbance  - Patient states her eyes are bothering her but is not much more clear than that.  - Seen by Dr. Thomas (ophthalmology) on 3/28.  Noted cataract surgery both eyes 2013 and history of diabetic retinopathy with macular edema and vitreomacular traction diagnosed in 2013.  Patient had been told to follow up at that time but did not.  On hospital consultation he again recommended outpatient follow up for a complete eye examination and retina evaluation.      Anemia  - Anemia seems multifactorial.  She has had chronic blood loss and had a diverticular bleed in December.  No bleeding noted on current presentation but she required transfusions at [facility] and they felt she needed a pill endoscopy as the rest of the workup was negative.  - Additional contributing factor of chronic kidney disease.  - currently stable  - monitor labs , on epogen      Acute on chronic diastolic heart failure  - Has grade II diastolic dysfunction.    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      Patent foramen ovale with right to left shunt  -no shunting seen on echo  - on eliquis      CKD (chronic kidney disease), stage IV  - As above.  Baseline creatinine around 2.5.      Type 2 diabetes mellitus, with long-term current use of insulin  -   Lab Results   Component  Value Date    HGBA1C 6.6 (H) 04/01/2019       - Low dose SSI, dc levemir as sugars on lower side  - monitor sugars        VTE Risk Mitigation (From admission, onward)        Ordered     heparin (porcine) injection 5,000 Units  As needed (PRN)      04/02/19 1001     apixaban tablet 2.5 mg  2 times daily      04/01/19 1716     IP VTE HIGH RISK PATIENT  Once      04/01/19 1404              Thelma Mc MD  Department of Hospital Medicine   [facility]

## 2019-04-04 NOTE — PROGRESS NOTES
"Nephrology  Progress Note    Admit Date: 4/1/2019   LOS: 3 days     SUBJECTIVE:     Follow-up For:  Acute kidney injury superimposed on chronic kidney disease    Interval History:     Confused overnight.  Complains of eyesight difficulty this morning.  Tolerated 2nd dialysis session without difficulty.  No chest pain or shortness of Breath.    Review of Systems:  Constitutional: No fever or chills  Respiratory:  shortness of breath better.   Cardiovascular: No chest pain or palpitations  Gastrointestinal: No nausea or vomiting  Neurological: No confusion or weakness    OBJECTIVE:     Vital Signs Range (Last 24H):  BP (!) 155/65 (BP Location: Right arm, Patient Position: Lying)   Pulse 70   Temp 98.2 °F (36.8 °C) (Oral)   Resp 20   Ht 5' 4" (1.626 m)   Wt 98.5 kg (217 lb 2.5 oz)   SpO2 95%   Breastfeeding? No   BMI 37.27 kg/m²     Temp:  [97.6 °F (36.4 °C)-98.4 °F (36.9 °C)]   Pulse:  []   Resp:  [14-20]   BP: (131-179)/(50-89)   SpO2:  [91 %-95 %]     I & O (Last 24H):    Intake/Output Summary (Last 24 hours) at 4/4/2019 1024  Last data filed at 4/4/2019 0500  Gross per 24 hour   Intake 620 ml   Output 2050 ml   Net -1430 ml       Physical Exam:  General appearance:  NAD this am.  Looks better.  Eyes:  Conjunctivae/corneas clear.   Lungs: CTA   Heart: Regular rate and rhythm, S1, S2 normal, + murmur  Abdomen: Soft, non-tender non-distended; bowel sounds normal; no masses,  no organomegaly, obese  Extremities: No cyanosis or clubbing. 1+ edema.    Skin: Skin color, texture, turgor normal. No rashes or lesions  Neurologic:  No focal numbness or weakness   Nava catheter  Right IJ Trialysis    Laboratory Data:  Recent Labs   Lab 04/04/19  0324   WBC 8.84   RBC 3.01*   HGB 8.7*   HCT 26.9*      MCV 89   MCH 28.9   MCHC 32.3       BMP:   Recent Labs   Lab 04/04/19  0324   GLU 94   *   K 3.6   CL 98   CO2 29   BUN 48*   CREATININE 2.0*   CALCIUM 8.5*   MG 1.4*     Lab Results   Component Value " Date    CALCIUM 8.5 (L) 04/04/2019    PHOS 4.1 04/04/2019       Lab Results   Component Value Date    .8 (H) 04/02/2019    CALCIUM 8.5 (L) 04/04/2019    PHOS 4.1 04/04/2019       Lab Results   Component Value Date    URICACID 7.7 (H) 04/01/2019       BNP  Recent Labs   Lab 04/01/19  1617   *       Medications:  Medication list was reviewed and changes noted under Assessment/Plan.    Diagnostic Results:        ASSESSMENT/PLAN:     1. Multi factorial ROXANNE due to cardiorenal syndrome and acute anemia that may have progressed to ATN at this point on CKD likely due to DM (underlying retinopathy) with proteinuria. Hyperkalemia.  -Baseline Cr 2.5 and recent Pr/ Cr ration of 2.34g/day.  -she still making a reasonable amount of urine.    -quantified 24 hr urine with 714 mg of protein.  Minimal SOPHIE titer.   -echo noted with hyperdynamic ventricle, diastolic dysfunction and severe pulmonary hypertension.  Discussed with Cardiology and feels that it is not a congested picture.    Plan:  Extensive discussion with treatment team and family at bedside about options yesterday.  Offered trial of dialysis to see how she will tolerate.  Family agreed and had trialysis placed by CCT and underwent 2 HD w/o issues.  Plan on holding dialysis after today and monitor response.  Not a great candidate for long-term dialysis.     2. ABLA/ Anemia of chronic disease/ Recent diverticular bleed/ Hx GERD  -repeat iron, B12, folate within limits  -monitor Hgb  -changed PPI to H2B  -started AHSAN      3. Acute Hypercapnic on Chronic Respiratory Failure due to ? Restrictive lung disease/ severe Pulmonary HTN ? Mixed respiratory and metabolic acidosis  -ABG noted  -Bipap prn.       4. ?PFO/ Pulm HTN/ Diastolic CHF/ A. Fib  -On renal dose Eliquis  -cardiac consult noted       5. Hyponatremia  -developed with diuresis  -correct with dialysis        6. Chronic gout and Hyperuricemia  -was on Allopurinol now off.  -level noted.  -will clear with  HD.   -colchicine X 1 for attack and seems to have helped        7.  Mixed anion gap metabolic acidosis with hyperkalemia:  -corrected with HD    8.  Hypo Mag:  -replace IV/PO    9. Vit D/HPTH:  -ergo/calcitriol    10.  Blurry vision  -defer to primary/opthal    See above

## 2019-04-05 PROBLEM — E87.6 HYPOKALEMIA: Status: ACTIVE | Noted: 2019-04-05

## 2019-04-05 PROBLEM — E87.1 HYPONATREMIA: Status: RESOLVED | Noted: 2019-04-02 | Resolved: 2019-04-05

## 2019-04-05 LAB
ANION GAP SERPL CALC-SCNC: 6 MMOL/L (ref 8–16)
BUN SERPL-MCNC: 34 MG/DL (ref 8–23)
CALCIUM SERPL-MCNC: 8.4 MG/DL (ref 8.7–10.5)
CHLORIDE SERPL-SCNC: 101 MMOL/L (ref 95–110)
CO2 SERPL-SCNC: 29 MMOL/L (ref 23–29)
CREAT SERPL-MCNC: 1.8 MG/DL (ref 0.5–1.4)
EST. GFR  (AFRICAN AMERICAN): 29 ML/MIN/1.73 M^2
EST. GFR  (NON AFRICAN AMERICAN): 25 ML/MIN/1.73 M^2
GLUCOSE SERPL-MCNC: 101 MG/DL (ref 70–110)
MAGNESIUM SERPL-MCNC: 1.4 MG/DL (ref 1.6–2.6)
PHOSPHATE SERPL-MCNC: 3.1 MG/DL (ref 2.7–4.5)
POCT GLUCOSE: 114 MG/DL (ref 70–110)
POCT GLUCOSE: 141 MG/DL (ref 70–110)
POCT GLUCOSE: 164 MG/DL (ref 70–110)
POCT GLUCOSE: 174 MG/DL (ref 70–110)
POTASSIUM SERPL-SCNC: 3.4 MMOL/L (ref 3.5–5.1)
SODIUM SERPL-SCNC: 136 MMOL/L (ref 136–145)

## 2019-04-05 PROCEDURE — 25000003 PHARM REV CODE 250: Performed by: INTERNAL MEDICINE

## 2019-04-05 PROCEDURE — 94761 N-INVAS EAR/PLS OXIMETRY MLT: CPT

## 2019-04-05 PROCEDURE — 63600175 PHARM REV CODE 636 W HCPCS: Performed by: INTERNAL MEDICINE

## 2019-04-05 PROCEDURE — 97162 PT EVAL MOD COMPLEX 30 MIN: CPT

## 2019-04-05 PROCEDURE — 27000221 HC OXYGEN, UP TO 24 HOURS

## 2019-04-05 PROCEDURE — 80048 BASIC METABOLIC PNL TOTAL CA: CPT

## 2019-04-05 PROCEDURE — 99233 PR SUBSEQUENT HOSPITAL CARE,LEVL III: ICD-10-PCS | Mod: ,,, | Performed by: INTERNAL MEDICINE

## 2019-04-05 PROCEDURE — 99233 SBSQ HOSP IP/OBS HIGH 50: CPT | Mod: ,,, | Performed by: INTERNAL MEDICINE

## 2019-04-05 PROCEDURE — 83735 ASSAY OF MAGNESIUM: CPT

## 2019-04-05 PROCEDURE — 84100 ASSAY OF PHOSPHORUS: CPT

## 2019-04-05 PROCEDURE — 99900035 HC TECH TIME PER 15 MIN (STAT)

## 2019-04-05 PROCEDURE — 11000001 HC ACUTE MED/SURG PRIVATE ROOM

## 2019-04-05 PROCEDURE — 97530 THERAPEUTIC ACTIVITIES: CPT

## 2019-04-05 PROCEDURE — 97165 OT EVAL LOW COMPLEX 30 MIN: CPT

## 2019-04-05 RX ORDER — NIFEDIPINE 30 MG/1
60 TABLET, EXTENDED RELEASE ORAL DAILY
Status: DISCONTINUED | OUTPATIENT
Start: 2019-04-05 | End: 2019-04-05

## 2019-04-05 RX ORDER — POTASSIUM CHLORIDE 20 MEQ/15ML
20 SOLUTION ORAL ONCE
Status: COMPLETED | OUTPATIENT
Start: 2019-04-05 | End: 2019-04-05

## 2019-04-05 RX ORDER — NIFEDIPINE 30 MG/1
30 TABLET, EXTENDED RELEASE ORAL DAILY
Status: DISCONTINUED | OUTPATIENT
Start: 2019-04-05 | End: 2019-04-09 | Stop reason: HOSPADM

## 2019-04-05 RX ADMIN — Medication 400 MG: at 09:04

## 2019-04-05 RX ADMIN — ATORVASTATIN CALCIUM 40 MG: 20 TABLET, FILM COATED ORAL at 10:04

## 2019-04-05 RX ADMIN — CARVEDILOL 6.25 MG: 6.25 TABLET, FILM COATED ORAL at 05:04

## 2019-04-05 RX ADMIN — FAMOTIDINE 20 MG: 20 TABLET, FILM COATED ORAL at 10:04

## 2019-04-05 RX ADMIN — NIFEDIPINE 30 MG: 30 TABLET, FILM COATED, EXTENDED RELEASE ORAL at 03:04

## 2019-04-05 RX ADMIN — CALCITRIOL 0.25 MCG: 0.25 CAPSULE ORAL at 10:04

## 2019-04-05 RX ADMIN — AMLODIPINE BESYLATE 10 MG: 5 TABLET ORAL at 10:04

## 2019-04-05 RX ADMIN — POTASSIUM CHLORIDE 20 MEQ: 40 SOLUTION ORAL at 03:04

## 2019-04-05 RX ADMIN — APIXABAN 2.5 MG: 2.5 TABLET, FILM COATED ORAL at 10:04

## 2019-04-05 RX ADMIN — CARVEDILOL 6.25 MG: 6.25 TABLET, FILM COATED ORAL at 10:04

## 2019-04-05 RX ADMIN — Medication 400 MG: at 10:04

## 2019-04-05 RX ADMIN — APIXABAN 2.5 MG: 2.5 TABLET, FILM COATED ORAL at 09:04

## 2019-04-05 RX ADMIN — ERYTHROPOIETIN 10000 UNITS: 10000 INJECTION, SOLUTION INTRAVENOUS; SUBCUTANEOUS at 10:04

## 2019-04-05 NOTE — PT/OT/SLP EVAL
Physical Therapy Evaluation    Patient Name:  Joyce Riley   MRN:  [id number]    Recommendations:     Discharge Recommendations:  home, home health PT   Discharge Equipment Recommendations: none   Barriers to discharge: None    Assessment:     Joyce Riley is a 85 y.o. female admitted with a medical diagnosis of Acute kidney injury superimposed on chronic kidney disease.  She presents with the following impairments/functional limitations:  weakness, impaired endurance, impaired balance, gait instability, impaired functional mobilty, pain, decreased lower extremity function, decreased safety awareness   Pt living with daughter and needing assistance with all care.  Pt was able to stand and transfer to transport w/c with supervision and to amb short household distances with supervision.  During this evaluation, pt refused to stand 2/2 c/o B foot pain which she and her daughter attributes to gout.  According to renal note:   6. Chronic gout and Hyperuricemia  -was on Allopurinol now off.  -level noted.  -will clear with HD.   -colchicine X 1 for attack and seems to have helped   pt will improve function with physical therapy.     Rehab Prognosis: Fair; patient would benefit from acute skilled PT services to address these deficits and reach maximum level of function.    Recent Surgery: * No surgery found *      Plan:     During this hospitalization, patient to be seen 6 x/week to address the identified rehab impairments via gait training, therapeutic activities, therapeutic exercises and progress toward the following goals:    · Plan of Care Expires:  05/05/19    Subjective     Chief Complaint: B foot pain  Patient/Family Comments/goals: I cant move my feet it really hurts  Pain/Comfort:  · Pain Rating 1: 3/10  · Location 1: abdomen  · Pain Addressed 1: Pre-medicate for activity, Distraction  · Pain Rating Post-Intervention 1: 3/10  · Pain Rating 2: 9/10(attibutes to gout)  · Location - Side 2:  Bilateral  · Location 2: foot  · Pain Addressed 2: Distraction  · Pain Rating Post-Intervention 2: 9/10    Patients cultural, spiritual, Confucianist conflicts given the current situation: no    Living Environment:  Lives with daughter in SS house w 6 SPENCER . Tub/shower combo with tub bench and 0 GB.    Prior to admission, patients level of function was supervision.  Equipment used at home: walker, rolling, wheelchair, bath bench.  DME owned (not currently used): none.  Upon discharge, patient will have assistance from daughter.    Objective:     Communicated with patient and daughter prior to session.  Patient found with bed in chair position with central line, peripheral IV, mclaughlin catheter (B mitts on hands 2/2 attempts to pull out central line)  upon PT entry to room.    General Precautions: Standard, fall   Orthopedic Precautions:N/A   Braces: N/A     Exams:  · Cognitive Exam:  Patient is oriented to Person  · Fine Motor Coordination:    · -       Intact  · Gross Motor Coordination:  WFL  · Postural Exam:  Patient presented with the following abnormalities:    · -       Rounded shoulders  · -       Forward head  · Sensation:    · -       Intact  · Skin Integrity/Edema:      · -       Skin integrity: Visible skin intact  · -       Edema: None noted B LE  · RLE ROM: WFL  · RLE Strength: WFL  · LLE ROM: WFL  · LLE Strength: WFL    Functional Mobility:  · Bed Mobility:     · Rolling Right: contact guard assistance  · Supine to Sit: contact guard assistance  · Sit to Supine: contact guard assistance  · Transfers:     · Sit to Stand:  refused 2/2 B foot pain with no AD  · Balance: good sitting dynamic balance    AM-PAC 6 CLICK MOBILITY  Total Score:10     Patient left with bed in chair position with all lines intact, call button in reach, bed alarm on, nurse notified and nurse present.    GOALS:   Multidisciplinary Problems     Physical Therapy Goals        Problem: Physical Therapy Goal    Goal Priority Disciplines  Outcome Goal Variances Interventions   Physical Therapy Goal     PT, PT/OT      Description:  Goals to be met by 19.  1.Roll R/L mod I   2.Sup<>sit mod I  2. Evaluate Sit<>stand when appropriate  3. Evaluate transfer to w/c when appropriate  4. Evaluate amb when appropriate                      History:     Past Medical History:   Diagnosis Date    Arthritis     Cataract     Diabetes mellitus     GERD (gastroesophageal reflux disease)     GI bleed 2018    Gout     Hypertension        Past Surgical History:   Procedure Laterality Date     SECTION      Patient unsure, believe she had 3-4    CHOLECYSTECTOMY      EYE SURGERY      HYSTERECTOMY      TOTAL KNEE ARTHROPLASTY Right        Time Tracking:     PT Received On: 19  PT Start Time: 924     PT Stop Time: 1002  PT Total Time (min): 38 min     Billable Minutes: Evaluation 23 and Therapeutic Activity 15      Cole Ayoub, PT  2019

## 2019-04-05 NOTE — PROGRESS NOTES
[facility]  Cardiology  Progress Note    Patient Name: Joyce Riley  MRN: [id number]  Admission Date: 4/1/2019  Hospital Length of Stay: 4 days  Code Status: Partial Code   Attending Physician: Thelma Mc MD   Primary Care Physician: Kalpana Staton MD  Expected Discharge Date:   Principal Problem:Acute kidney injury superimposed on chronic kidney disease    Subjective:     Brief HPI:    Joyce Riley is a 85 y.o.female with advanced renal failure and fluid overload being transferred to [facility] for consideration of HD. She has advanced hypertensive heart disease with severe diastolic dysfunction. She has pulmonary hypertension. A PFO has been seen. She is SOB on BIPAP. She denies chest pain.    Hospital Course:     4/1/2019: Echo: Normal left ventricular size with hyperdynamic systolic function. EF 80%. Moderate LVH. Moderate diastolic dysfunction. Severely dilated LA. SPAP 74 mmHg. No shunt is visualized with agitated saline.    4/3/2019: Began HD.    Interval History:     Breathing easier after she began HD and had fluid removed. No CP. No arrhythmias. Remains confused.    Review of Systems   Constitution: Positive for malaise/fatigue.   Cardiovascular: Negative for chest pain.   Respiratory: Positive for shortness of breath. Negative for wheezing.    Psychiatric/Behavioral: Positive for memory loss.     Objective:     Vital Signs (Most Recent):  Temp: 97.9 °F (36.6 °C) (04/05/19 1544)  Pulse: 72 (04/05/19 1544)  Resp: 18 (04/05/19 1544)  BP: (!) 173/81 (04/05/19 1544)  SpO2: (!) 94 % (04/05/19 1544) Vital Signs (24h Range):  Temp:  [97.7 °F (36.5 °C)-98.9 °F (37.2 °C)] 97.9 °F (36.6 °C)  Pulse:  [60-85] 72  Resp:  [18-22] 18  SpO2:  [94 %-98 %] 94 %  BP: (150-186)/() 173/81     Weight: 98 kg (216 lb 0.8 oz)  Body mass index is 37.09 kg/m².    SpO2: (!) 94 %  O2 Device (Oxygen Therapy): room air      Intake/Output Summary (Last 24 hours) at 4/5/2019 1556  Last data  filed at 4/4/2019 1600  Gross per 24 hour   Intake --   Output 300 ml   Net -300 ml       Lines/Drains/Airways     Central Venous Catheter Line                 Trialysis (Dialysis) Catheter 04/02/19 1319 right internal jugular 3 days          Drain                 Urethral Catheter 03/24/19 2300 11 days          Peripheral Intravenous Line                 Peripheral IV - Single Lumen 10/06/17 2035 Left Hand 545 days         Peripheral IV - Single Lumen 04/04/19 1850 Anterior;Right Forearm less than 1 day                Physical Exam   Cardiovascular: Normal rate, regular rhythm, S1 normal and S2 normal. Exam reveals gallop and S4.   Murmur heard.  Pulmonary/Chest: She has rales in the right lower field and the left lower field.   Neurological: She is alert. She is disoriented.       Current Medications:     apixaban  2.5 mg Oral BID    atorvastatin  40 mg Oral Daily    calcitRIOL  0.25 mcg Oral Daily    carvedilol  6.25 mg Oral BID WM    epoetin diana (PROCRIT) injection  10,000 Units Intravenous Every Mon, Wed, Fri    ergocalciferol  50,000 Units Oral Q7 Days    famotidine  20 mg Oral Daily    insulin detemir U-100  4 Units Subcutaneous Once    magnesium oxide  400 mg Oral BID    NIFEdipine  30 mg Oral Daily     Current Laboratory Results:    Recent Results (from the past 24 hour(s))   POCT glucose    Collection Time: 04/04/19  4:16 PM   Result Value Ref Range    POCT Glucose 103 70 - 110 mg/dL   POCT glucose    Collection Time: 04/04/19  8:55 PM   Result Value Ref Range    POCT Glucose 119 (H) 70 - 110 mg/dL   Magnesium    Collection Time: 04/05/19  6:16 AM   Result Value Ref Range    Magnesium 1.4 (L) 1.6 - 2.6 mg/dL   Phosphorus    Collection Time: 04/05/19  6:16 AM   Result Value Ref Range    Phosphorus 3.1 2.7 - 4.5 mg/dL   Basic metabolic panel    Collection Time: 04/05/19  6:16 AM   Result Value Ref Range    Sodium 136 136 - 145 mmol/L    Potassium 3.4 (L) 3.5 - 5.1 mmol/L    Chloride 101 95 - 110  mmol/L    CO2 29 23 - 29 mmol/L    Glucose 101 70 - 110 mg/dL    BUN, Bld 34 (H) 8 - 23 mg/dL    Creatinine 1.8 (H) 0.5 - 1.4 mg/dL    Calcium 8.4 (L) 8.7 - 10.5 mg/dL    Anion Gap 6 (L) 8 - 16 mmol/L    eGFR if African American 29 (A) >60 mL/min/1.73 m^2    eGFR if non African American 25 (A) >60 mL/min/1.73 m^2   POCT glucose    Collection Time: 04/05/19  7:43 AM   Result Value Ref Range    POCT Glucose 114 (H) 70 - 110 mg/dL   POCT glucose    Collection Time: 04/05/19 12:11 PM   Result Value Ref Range    POCT Glucose 164 (H) 70 - 110 mg/dL   POCT glucose    Collection Time: 04/05/19  3:42 PM   Result Value Ref Range    POCT Glucose 174 (H) 70 - 110 mg/dL     Current Imaging Results:    X-Ray Chest 1 View   Final Result      Please see above         Electronically signed by: Tez Garza DO   Date:    04/02/2019   Time:    14:19      CT Chest Without Contrast   Final Result      1. Mild cardiomegaly and pulmonary interstitial edema without pleural effusion.   2. Diffuse mild dilatation of the pulmonary arteries suggesting a component of pulmonary arterial hypertension.   3. Mild distention of the IVC and hepatic veins as can be seen in the setting of pulmonary arterial hypertension and/or right heart dysfunction.         Electronically signed by: El Hollingsworth   Date:    04/02/2019   Time:    08:48      X-Ray Chest 1 View    (Results Pending)       Assessment and Plan:     Problem List:    Active Diagnoses:    Diagnosis Date Noted POA    PRINCIPAL PROBLEM:  Acute kidney injury superimposed on chronic kidney disease [N17.9, N18.9] 04/01/2019 Yes    Hypokalemia [E87.6] 04/05/2019 No    ESRD (end stage renal disease) [N18.6] 04/03/2019 Yes    Renovascular hypertension [I15.0] 04/03/2019 Yes    Foot pain [M79.673] 04/03/2019 Yes    Hypomagnesemia [E83.42] 04/02/2019 Yes    CKD (chronic kidney disease), stage IV [N18.4] 04/01/2019 Yes    Patent foramen ovale with right to left shunt [Q21.1] 04/01/2019 Not  Applicable    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 [I27.23] 04/01/2019 Yes     Chronic    Acute on chronic diastolic heart failure [I50.33] 04/01/2019 Yes    Anemia [D64.9] 04/01/2019 Yes    Visual disturbance [H53.9] 04/01/2019 Yes    Acute respiratory failure [J96.00] 04/01/2019 Yes    Type 2 diabetes mellitus, with long-term current use of insulin [E11.9, Z79.4] 03/31/2019 Not Applicable      Problems Resolved During this Admission:    Diagnosis Date Noted Date Resolved POA    Hyperkalemia [E87.5] 04/02/2019 04/04/2019 Yes    Metabolic acidosis [E87.2] 04/02/2019 04/04/2019 Yes    Hyponatremia [E87.1] 04/02/2019 04/05/2019 Yes     Assessment and Plan:     1. Heart Failure, Diastolic, Acute on Chronic              4/1/2019: Echo: Normal left ventricular size with hyperdynamic systolic function. EF 80%. Moderate LVH. Moderate diastolic dysfunction. Severely dilated LA. SPAP 74 mmHg. No shunt is visualized with agitated saline.            Fluid removal with HD.     2. Atrial Fibrillation              Paroxysmal atrial fibrillation.              On apixiban 2.5 mg Q12.     3. Chronic Anticoagulation              On apixiban 2.5 mg Q12.     4. Chronic Kidney Disease, Stage 5     4/3/2019: Began HD.   Feeling better.    VTE Risk Mitigation (From admission, onward)        Ordered     heparin (porcine) injection 5,000 Units  As needed (PRN)      04/02/19 1001     apixaban tablet 2.5 mg  2 times daily      04/01/19 1716     IP VTE HIGH RISK PATIENT  Once      04/01/19 1404          Marcella Cornejo MD  Cardiology  [facility]

## 2019-04-05 NOTE — PLAN OF CARE
Problem: Adult Inpatient Plan of Care  Goal: Plan of Care Review  Outcome: Ongoing (interventions implemented as appropriate)  Patient free from falls or injury throughout shift. Family to remain at bedside. All safety measures in place.

## 2019-04-05 NOTE — PLAN OF CARE
Problem: Adult Inpatient Plan of Care  Goal: Plan of Care Review  Outcome: Ongoing (interventions implemented as appropriate)  PRN tx not required, bipap on standby. Will continue to monitor.

## 2019-04-05 NOTE — ASSESSMENT & PLAN NOTE
- Anemia seems multifactorial.  She has had chronic blood loss and had a diverticular bleed in December.  No bleeding noted on current presentation but she required transfusions at [facility] and they felt she needed a pill endoscopy as the rest of the workup was negative.  - Additional contributing factor of chronic kidney disease.  - currently stable  - monitor labs , on epogen

## 2019-04-05 NOTE — PT/OT/SLP EVAL
Occupational Therapy   Evaluation    Name: Joyce Riley  MRN: [id number]  Admitting Diagnosis:  Acute kidney injury superimposed on chronic kidney disease      Recommendations:     Discharge Recommendations: home with home health  Discharge Equipment Recommendations:  none  Barriers to discharge:  None    Assessment:     Joyce Riley is a 85 y.o. female with a medical diagnosis of Acute kidney injury superimposed on chronic kidney disease.  She presents with pain in stomach.  Performance deficits affecting function: weakness, impaired endurance, impaired self care skills, impaired functional mobilty, decreased lower extremity function, decreased safety awareness, impaired cognition.  Pt somewhat confused and agitated during evaluation.  Pt agreeable to sitting at EOB; however, once seated she stated she wanted to let her food digest so she returned to supine position with HOB elevated after a few minutes.  Pt demonstrates strength and ROM in (B) UE needed for ADLs that is WFL.  Pt able to maintain upright seated position with SBA.  PTA daughter states pt was requiring supervision and light assist for ADLs, and was utilizing transport chair and RW for mobility.  Pt would benefit from skilled OT services to address problems listed below and increase independence with ADLs.  Home health with 24/7 supervision is recommended upon d/c from acute care to further address deficits and help pt improve overall functional independence.        Rehab Prognosis: Fair; patient would benefit from acute skilled OT services to address these deficits and reach maximum level of function.       Plan:     Patient to be seen 4 x/week to address the above listed problems via self-care/home management, therapeutic activities, therapeutic exercises  · Plan of Care Expires:    · Plan of Care Reviewed with: patient, daughter    Subjective     Chief Complaint: Pain in stomach; does not want to wear soft mitten  restraints  Patient/Family Comments/goals: Return home    Occupational Profile:  *Information provided by daughter    Living Environment: Pt lives with daughter in [facility] [place], [address]; bathroom has tub/shower.  Previous level of function: Daughter states pt could do about 90% of ADLs, but required supervision and light assist.  She was utilizing a transport chair and RW for mobility.  Daughter states she would wheel her into bathroom using transport chair, then pt would stand with RW and do what she needed to do.  When she was finished she would wheel her back into another room.  Roles and Routines: Does not drive.  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  She enjoys watching TV, traveling, getting in her car to go somewhere, outside activity, and going to the store.    Equipment Used at Home:  walker, rolling, wheelchair, bath bench(transport chair)  Assistance upon Discharge: Daughter states pt has  from 8:30-4 everyday and then someone is with her in the evenings.  Pt has rotated between children in the past for a few weeks at a time with daughter stating someone is always with her.    Pain/Comfort:  · Pain Rating 1: (pt reported pain in stomach, but did not rate)  · Pain Rating Post-Intervention 1: 0/10    Patients cultural, spiritual, Restorationism conflicts given the current situation: no    Objective:     Communicated with: RN prior to session.  Patient found HOB elevated with central line, peripheral IV, mclaughlin catheter(bilateral soft mittens) upon OT entry to room.  Daughter present    General Precautions: Standard, fall   Orthopedic Precautions:N/A   Braces: N/A     Occupational Performance:    Bed Mobility:    · Patient completed Rolling/Turning to Left with  contact guard assistance  · Patient completed Scooting/Bridging with contact guard assistance    Functional Mobility/Transfers:  · Transfers:  Unable to assess this date 2* pt unwilling to attempt  · Functional Mobility: Unable to assess this  date    Activities of Daily Living:  · Unable to assess this date 2* pt's willingness to participate with therapy limited this date    Cognitive/Visual Perceptual:  Cognitive/Psychosocial Skills:    -       Oriented to: Person, Place, date of birth.  Pt kept asking about how much money she had in the bank and wanted OT to check.  Pt pointed to camera with OT letting pt know that  -       Follows Commands/attention: One step commands inconsistently  -       Communication: Clear  -       Memory:Deficits present  -       Safety awareness/insight to disability: Impaired  -       Mood/Affect/Coping skills/emotional control: slightly agitated/frustrated    Physical Exam:  Postural examination/scapula alignment:    -       Rounded Shoulders  Skin integrity: Visible skin intact  Edema:  None noted  Sensation: -       Intact  Motor Planning: -       WfL  Dominant hand: -       Right  Upper Extremity Range of Motion:    -       Right Upper Extremity: WFL  -       Left Upper Extremity: WFL  Upper Extremity Strength:   -       Right Upper Extremity: WFL  -       Left Upper Extremity: WFL   Strength: 4/5 both hands   Fine Motor Coordination: -       Intact  Gross motor coordination: WFL; need to further assess OOB activity  Balance:  Sitting- SBA    AMPAC 6 Click ADL:  AMPAC Total Score: 12    Treatment & Education:  *Pt educated on role of OT and POC discussed  Education:    Patient left HOB elevated with all lines intact, call button in reach, RN notified and daughter present; soft mittens placed on both hands    GOALS:   Multidisciplinary Problems     Occupational Therapy Goals        Problem: Occupational Therapy Goal    Goal Priority Disciplines Outcome Interventions   Occupational Therapy Goal     OT, PT/OT     Description:  Goals to be met by: 4/12/2019     Patient will increase functional independence with ADLs by performing:    UE Dressing with Minimal Assistance.  LE Dressing with Moderate Assistance.  Grooming  while seated with Stand-by Assistance.  Toileting from bedside commode with Minimal Assistance for hygiene and clothing management.   Toilet transfer to bedside commode with Moderate Assistance.  Pt will follow 75% of one step commands.                        History:     Past Medical History:   Diagnosis Date    Arthritis     Cataract     Diabetes mellitus     GERD (gastroesophageal reflux disease)     GI bleed 2018    Gout     Hypertension        Past Surgical History:   Procedure Laterality Date     SECTION      Patient unsure, believe she had 3-4    CHOLECYSTECTOMY      EYE SURGERY      HYSTERECTOMY      TOTAL KNEE ARTHROPLASTY Right        Time Tracking:     OT Date of Treatment: 19  OT Start Time: 1055  OT Stop Time: 1121  OT Total Time (min): 26 min    Billable Minutes:Evaluation 26    RAHEEL Wiggins  2019

## 2019-04-05 NOTE — ASSESSMENT & PLAN NOTE
-   Lab Results   Component Value Date    HGBA1C 6.6 (H) 04/01/2019       - Low dose SSI, dcd levemir as sugars on lower side  - monitor sugars

## 2019-04-05 NOTE — PLAN OF CARE
Problem: Adult Inpatient Plan of Care  Goal: Plan of Care Review  Outcome: Ongoing (interventions implemented as appropriate)  Pt remains on 1LNC. No resp distress noted.

## 2019-04-05 NOTE — ASSESSMENT & PLAN NOTE
? Due to fluid overload  IMPROVED  bipap as needed  On room air /1 l nasal cannula  Ct chest showed mild cardiomegaly and pulmonary interstitial edema

## 2019-04-05 NOTE — PROGRESS NOTES
[facility] Hospital Medicine  Progress Note    Patient Name: Joyce Riley  MRN: [id number]  Patient Class: IP- Inpatient   Admission Date: 4/1/2019  Length of Stay: 4 days  Attending Physician: Thelma Mc MD  Primary Care Provider: Kalpana Staton MD        Subjective:     Principal Problem:Acute kidney injury superimposed on chronic kidney disease    HPI:  Ms. Riley is an 85 year old woman who was transferred here from [facility] for a second opinion regarding starting HD vs entering hospice care.  The patient was hospitalized there on 3/20/19 with shortness of breath and lower extremity edema and was found to have fluid overload with heart failure and anemia with H/H 7.8/25.  She was transfused 2 units PRBCs and extensively diuresed while there.  Creatinine was initially 2.3 and gradually worsened to 4.5 at the time of transfer here.  Azotemia was out of proportion to the creatinine with an increase in BUN to 150.  On nephrology evaluation they did not feel she was a candidate for dialysis and consulted palliative care to follow.  Family requested a transfer to see a different nephrology service to see if they agreed.    Medical history includes chronic diastolic heart failure with pulmonary hypertension associated with chronic restrictive lung disease.  She has a patent foramen ovale and a right to left shunt that was thought to contribute to a stroke she had in 2014.  She is anemic and had a diverticular hemorrhage last December while on warfarin.  After recovery she was started on apixaban.  She has paroxysmal atrial fibrillation that is rate controlled, hypertension, diabetes and stage IV CKD.  She has chronically elevated alkaline phosphatase that is not thought to be significant.  At home she uses a wheelchair but can walk to the bathroom with assistance.  She attends an adult day care center.  She used to take allopurinol for gout attacks but this was  discontinued when she had the GI bleed.    Hospital Course:  Echo showed moderate concentric lvh, severe left atrial enlargement, ef 80%, grade 2 diastolic dysfunction, pa pressure 74, no shunting seen with agitated saline.Her bun and creatinine were worsening, family decided to have HD.Ct chest showed . Mild cardiomegaly and pulmonary interstitial edema without pleural effusion.  2. Diffuse mild dilatation of the pulmonary arteries suggesting a component of pulmonary arterial hypertension.. Mild distention of the IVC and hepatic veins as can be seen in the setting of pulmonary arterial hypertension and/or right heart dysfunction.    Patient was started on HD on 4/2/2019, 4/3, 4/4.    Interval History: Patient seen while eating food, in mittens as tries to pull her dialysis catheter, denies any pain, off and on confusionReview of Systems   Constitutional: Negative for chills and fever.   HENT: Negative for trouble swallowing.    Eyes: Positive for visual disturbance.   Respiratory: Negative for cough and shortness of breath.    Cardiovascular: Negative for chest pain and leg swelling.   Gastrointestinal: Negative for abdominal pain, nausea and vomiting.   Genitourinary: Negative for dysuria and hematuria.   Musculoskeletal: Positive for arthralgias.   Skin: Negative for rash.   Neurological: Positive for weakness. Negative for headaches.   Psychiatric/Behavioral: Positive for confusion.     Objective:     Vital Signs (Most Recent):  Temp: 98.5 °F (36.9 °C) (04/05/19 1213)  Pulse: 78 (04/05/19 1213)  Resp: 18 (04/05/19 1213)  BP: (!) 185/77 (04/05/19 1213)  SpO2: 97 % (04/05/19 1213) Vital Signs (24h Range):  Temp:  [97.7 °F (36.5 °C)-98.9 °F (37.2 °C)] 98.5 °F (36.9 °C)  Pulse:  [60-85] 78  Resp:  [18-22] 18  SpO2:  [95 %-99 %] 97 %  BP: (151-186)/() 185/77     Weight: 98 kg (216 lb 0.8 oz)  Body mass index is 37.09 kg/m².    Intake/Output Summary (Last 24 hours) at 4/5/2019 1350  Last data filed at 4/4/2019  1600  Gross per 24 hour   Intake --   Output 300 ml   Net -300 ml      Physical Exam   Constitutional: No distress.   obese   HENT:   Head: Normocephalic and atraumatic.   Eyes: Pupils are equal, round, and reactive to light. Conjunctivae and EOM are normal.   Neck: Normal range of motion. Neck supple.   Cardiovascular: Normal rate and regular rhythm.   Pulmonary/Chest: Effort normal and breath sounds normal. No respiratory distress.   Decreased breath sounds at bases   Abdominal: Soft. Bowel sounds are normal. She exhibits no distension. There is no tenderness.   Musculoskeletal: Normal range of motion.   Strength 4/5 bilateral, no redness or swelling seen in feet, 2 plus pulses, 1 -2 plus edema bilateral lower extremities   Neurological: She is alert. No cranial nerve deficit.   Oriented to person, place   Skin: Skin is warm.   Psychiatric: She has a normal mood and affect.   Vitals reviewed.      Significant Labs:   BMP:   Recent Labs   Lab 04/05/19  0616         K 3.4*      CO2 29   BUN 34*   CREATININE 1.8*   CALCIUM 8.4*   MG 1.4*     CBC:   Recent Labs   Lab 04/04/19  0324   WBC 8.84   HGB 8.7*   HCT 26.9*          Significant Imaging: I have reviewed all pertinent imaging results/findings within the past 24 hours.    Assessment/Plan:      * Acute kidney injury superimposed on chronic kidney disease  - Patient admitted on 3/20/19 to [facility] with heart failure symptoms and anemia.  Developed severe azotemia and doubled baseline creatinine from 2.3 to 4.5 while there.    - Patient and family have been told she is not a HD candidate.  Likely this is due to severe chronic underlying problems including chronic restrictive lung disease, heart failure with a right to left shunt, atrial fibrillation and pulmonary hypertension.  - Nephrology following  - had high bun/creatinine on admit   - family decided to go ahead with hd   - s/p hd on 4/2 , 4/3, 4/4  - improved labs  - plan to hold hd  and monitor    Hypokalemia  Replace 20 meq and monitor      Foot pain  No redness  ? Gout/neuropathy  Uric acid 7.7  Given a dose of colchicine 4/3  improved      Renovascular hypertension  Running high  Dc norvasc , start procardia 30 mg  Continue  coreg 6.25 bid.      Hypomagnesemia  Replaced iv , continue oral   Improving slowly      Acute respiratory failure  ? Due to fluid overload  IMPROVED  bipap as needed  On room air /1 l nasal cannula  Ct chest showed mild cardiomegaly and pulmonary interstitial edema    Visual disturbance  - Patient states her eyes are bothering her but is not much more clear than that.  - Seen by Dr. Thomas (ophthalmology) on 3/28.  Noted cataract surgery both eyes 2013 and history of diabetic retinopathy with macular edema and vitreomacular traction diagnosed in 2013.  Patient had been told to follow up at that time but did not.  On hospital consultation he again recommended outpatient follow up for a complete eye examination and retina evaluation.      Anemia  - Anemia seems multifactorial.  She has had chronic blood loss and had a diverticular bleed in December.  No bleeding noted on current presentation but she required transfusions at [facility] and they felt she needed a pill endoscopy as the rest of the workup was negative.  - Additional contributing factor of chronic kidney disease.  - currently stable  - monitor labs , on epogen      Acute on chronic diastolic heart failure  - Has grade II diastolic dysfunction.    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      Patent foramen ovale with right to left shunt  -no shunting seen on echo  - on eliquis      CKD (chronic kidney disease), stage IV  - As above.  Baseline creatinine around 2.5.      Type 2 diabetes mellitus, with long-term current use of insulin  -   Lab Results   Component Value Date    HGBA1C 6.6  (H) 04/01/2019       - Low dose SSI, dcd levemir as sugars on lower side  - monitor sugars        VTE Risk Mitigation (From admission, onward)        Ordered     heparin (porcine) injection 5,000 Units  As needed (PRN)      04/02/19 1001     apixaban tablet 2.5 mg  2 times daily      04/01/19 1716     IP VTE HIGH RISK PATIENT  Once      04/01/19 1404              Thelma Mc MD  Department of Hospital Medicine   [facility]

## 2019-04-05 NOTE — ASSESSMENT & PLAN NOTE
- Patient admitted on 3/20/19 to [facility] with heart failure symptoms and anemia.  Developed severe azotemia and doubled baseline creatinine from 2.3 to 4.5 while there.    - Patient and family have been told she is not a HD candidate.  Likely this is due to severe chronic underlying problems including chronic restrictive lung disease, heart failure with a right to left shunt, atrial fibrillation and pulmonary hypertension.  - Nephrology following  - had high bun/creatinine on admit   - family decided to go ahead with hd   - s/p hd on 4/2 , 4/3, 4/4  - improved labs  - plan to hold hd and monitor

## 2019-04-05 NOTE — PROGRESS NOTES
"Nephrology  Progress Note    Admit Date: 4/1/2019   LOS: 4 days     SUBJECTIVE:     Follow-up For:  Acute kidney injury superimposed on chronic kidney disease    Interval History:     Confused overnight and trying to pull out HD catheter.  Extensive discussion with daughter at bedside.  Updated Dr. Mc.  Hold HD for now and monitor over weekend.      Review of Systems:  Constitutional: No fever or chills  Respiratory:  shortness of breath better.   Cardiovascular: No chest pain or palpitations  Gastrointestinal: No nausea or vomiting  Neurological: No confusion or weakness    OBJECTIVE:     Vital Signs Range (Last 24H):  BP (!) 176/107 (BP Location: Right arm, Patient Position: Lying)   Pulse 72   Temp 98.9 °F (37.2 °C) (Oral)   Resp 18   Ht 5' 4" (1.626 m)   Wt 98 kg (216 lb 0.8 oz)   SpO2 98%   Breastfeeding? No   BMI 37.09 kg/m²     Temp:  [97.7 °F (36.5 °C)-98.9 °F (37.2 °C)]   Pulse:  [60-83]   Resp:  [16-22]   BP: (133-186)/()   SpO2:  [95 %-99 %]     I & O (Last 24H):    Intake/Output Summary (Last 24 hours) at 4/5/2019 0939  Last data filed at 4/4/2019 1600  Gross per 24 hour   Intake 620 ml   Output 1800 ml   Net -1180 ml       Physical Exam:  General appearance:  Elderly and confused this am.   Eyes:  Conjunctivae/corneas clear.   Lungs: CTA   Heart: Regular rate and rhythm, S1, S2 normal, + murmur  Abdomen: Soft, non-tender non-distended; bowel sounds normal; no masses,  no organomegaly, obese  Extremities: No cyanosis or clubbing. 1+ edema.    Skin: Skin color, texture, turgor normal. No rashes or lesions  Neurologic:  No focal numbness or weakness   Nava catheter  Right IJ Trialysis    Laboratory Data:  No results for input(s): WBC, RBC, HGB, HCT, PLT, MCV, MCH, MCHC in the last 24 hours.    BMP:   Recent Labs   Lab 04/05/19  0616         K 3.4*      CO2 29   BUN 34*   CREATININE 1.8*   CALCIUM 8.4*   MG 1.4*     Lab Results   Component Value Date    CALCIUM 8.4 (L) " 04/05/2019    PHOS 3.1 04/05/2019       Lab Results   Component Value Date    .8 (H) 04/02/2019    CALCIUM 8.4 (L) 04/05/2019    PHOS 3.1 04/05/2019       Lab Results   Component Value Date    URICACID 7.7 (H) 04/01/2019       BNP  Recent Labs   Lab 04/01/19  1617   *       Medications:  Medication list was reviewed and changes noted under Assessment/Plan.    Diagnostic Results:        ASSESSMENT/PLAN:     1. Initial Multi factorial ROXANNE due to cardiorenal syndrome and acute anemia that may have progressed to ATN at this point on CKD likely due to DM (underlying retinopathy) with proteinuria. Hyperkalemia.  -Baseline Cr 2.5 and recent Pr/ Cr ration of 2.34g/day.  -she still making a reasonable amount of urine.    -quantified 24 hr urine with 714 mg of protein.  Minimal SOPHIE titer.   -echo noted with hyperdynamic ventricle, diastolic dysfunction and severe pulmonary hypertension.  Discussed with Cardiology and feels that it is not a congested picture.    Plan:  Extensive discussion with treatment team and family at bedside about options yesterday.  Offered trial of dialysis to see how she will tolerate.  Family agreed and had trialysis placed by CCT and underwent 3 HD sessions.  Issues with pt trying to pull out dialysis line noted.  Plan on holding dialysis and monitor renal trends over weekend.  Not a great candidate for long-term dialysis.     2. ABLA/ Anemia of chronic disease/ Recent diverticular bleed/ Hx GERD  -repeat iron, B12, folate within limits  -monitor Hgb  -changed PPI to H2B  -started AHSAN      3. Acute Hypercapnic on Chronic Respiratory Failure due to ? Restrictive lung disease/ severe Pulmonary HTN ? Mixed respiratory and metabolic acidosis  -ABG noted  -Bipap prn.       4. ?PFO/ Pulm HTN/ Diastolic CHF/ A. Fib  -On renal dose Eliquis  -cardiac consult noted       5. Hyponatremia  -developed with diuresis  -corrected with dialysis        6. Chronic gout and Hyperuricemia  -was on  Allopurinol now off.  -level noted.  -will clear with HD.   -colchicine X 1 for attack and seems to have helped        7.  Mixed anion gap metabolic acidosis with hyperkalemia:  -corrected with HD    8.  Hypo Mag:  -replace IV/PO prn    9. Vit D/HPTH:  -ergo/calcitriol        See above

## 2019-04-05 NOTE — PLAN OF CARE
Problem: Occupational Therapy Goal  Goal: Occupational Therapy Goal  Goals to be met by: 4/12/2019     Patient will increase functional independence with ADLs by performing:    UE Dressing with Minimal Assistance.  LE Dressing with Moderate Assistance.  Grooming while seated with Stand-by Assistance.  Toileting from bedside commode with Minimal Assistance for hygiene and clothing management.   Toilet transfer to bedside commode with Moderate Assistance.  Pt will follow 75% of one step commands.        OT evaluation complete and POC established.  Home health is recommended upon d/c from acute care to further address deficits and help pt improve overall functional independence.     RAHEEL Wiggins  4/5/2019

## 2019-04-05 NOTE — PLAN OF CARE
Prior to hospitalization pt lived with son and daughter, uses RW and WC at home.     Spoke with pt and son at bedside, pt confused, in mittens to prevent pulling at lines.    Son states his sister has been looking into nursing homes but is not ready to make that kind of decision yet.    Family is expecting a conference on Monday after pt has been off dialysis a couple days to decide what kind of treatment the pt will need.      Discharge dispo: Hospice vs NH.  CM to discuss again with family after rounds on Monday.        04/05/19 8536   Discharge Reassessment   Assessment Type Discharge Planning Reassessment   Provided patient/caregiver education on the expected discharge date and the discharge plan Yes   Do you have any problems affording any of your prescribed medications? No   Discharge Plan A Inpatient Hospice   Discharge Plan B New Nursing Home placement - half-way care facility   DME Needed Upon Discharge  none   Patient choice form signed by patient/caregiver N/A   Anticipated Discharge Disposition [facility]   Can the patient answer the patient profile reliably? Yes, cognitively intact   How does the patient rate their overall health at the present time? Fair   Describe the patient's ability to walk at the present time. Walks with the help of equipment   How often would a person be available to care for the patient? Whenever needed

## 2019-04-05 NOTE — PLAN OF CARE
Problem: Physical Therapy Goal  Goal: Physical Therapy Goal  Goals to be met by 4-12-19.  1.Roll R/L mod I   2.Sup<>sit mod I  2. Evaluate Sit<>stand when appropriate  3. Evaluate transfer to w/c when appropriate  4. Evaluate amb when appropriate    -    Comments: Physical therapy eval completed.  Recommend home with  PT.  No equipment needed.

## 2019-04-05 NOTE — SUBJECTIVE & OBJECTIVE
Interval History: Patient seen while eating food, in mittens as tries to pull her dialysis catheter, denies any pain, off and on confusionReview of Systems   Constitutional: Negative for chills and fever.   HENT: Negative for trouble swallowing.    Eyes: Positive for visual disturbance.   Respiratory: Negative for cough and shortness of breath.    Cardiovascular: Negative for chest pain and leg swelling.   Gastrointestinal: Negative for abdominal pain, nausea and vomiting.   Genitourinary: Negative for dysuria and hematuria.   Musculoskeletal: Positive for arthralgias.   Skin: Negative for rash.   Neurological: Positive for weakness. Negative for headaches.   Psychiatric/Behavioral: Positive for confusion.     Objective:     Vital Signs (Most Recent):  Temp: 98.5 °F (36.9 °C) (04/05/19 1213)  Pulse: 78 (04/05/19 1213)  Resp: 18 (04/05/19 1213)  BP: (!) 185/77 (04/05/19 1213)  SpO2: 97 % (04/05/19 1213) Vital Signs (24h Range):  Temp:  [97.7 °F (36.5 °C)-98.9 °F (37.2 °C)] 98.5 °F (36.9 °C)  Pulse:  [60-85] 78  Resp:  [18-22] 18  SpO2:  [95 %-99 %] 97 %  BP: (151-186)/() 185/77     Weight: 98 kg (216 lb 0.8 oz)  Body mass index is 37.09 kg/m².    Intake/Output Summary (Last 24 hours) at 4/5/2019 1350  Last data filed at 4/4/2019 1600  Gross per 24 hour   Intake --   Output 300 ml   Net -300 ml      Physical Exam   Constitutional: No distress.   obese   HENT:   Head: Normocephalic and atraumatic.   Eyes: Pupils are equal, round, and reactive to light. Conjunctivae and EOM are normal.   Neck: Normal range of motion. Neck supple.   Cardiovascular: Normal rate and regular rhythm.   Pulmonary/Chest: Effort normal and breath sounds normal. No respiratory distress.   Decreased breath sounds at bases   Abdominal: Soft. Bowel sounds are normal. She exhibits no distension. There is no tenderness.   Musculoskeletal: Normal range of motion.   Strength 4/5 bilateral, no redness or swelling seen in feet, 2 plus pulses, 1 -2  plus edema bilateral lower extremities   Neurological: She is alert. No cranial nerve deficit.   Oriented to person, place   Skin: Skin is warm.   Psychiatric: She has a normal mood and affect.   Vitals reviewed.      Significant Labs:   BMP:   Recent Labs   Lab 04/05/19  0616         K 3.4*      CO2 29   BUN 34*   CREATININE 1.8*   CALCIUM 8.4*   MG 1.4*     CBC:   Recent Labs   Lab 04/04/19  0324   WBC 8.84   HGB 8.7*   HCT 26.9*          Significant Imaging: I have reviewed all pertinent imaging results/findings within the past 24 hours.

## 2019-04-06 PROBLEM — R41.0 DELIRIUM: Status: ACTIVE | Noted: 2019-04-06

## 2019-04-06 LAB
ANION GAP SERPL CALC-SCNC: 8 MMOL/L (ref 8–16)
BUN SERPL-MCNC: 35 MG/DL (ref 8–23)
CALCIUM SERPL-MCNC: 9.2 MG/DL (ref 8.7–10.5)
CHLORIDE SERPL-SCNC: 101 MMOL/L (ref 95–110)
CO2 SERPL-SCNC: 29 MMOL/L (ref 23–29)
CREAT SERPL-MCNC: 2.1 MG/DL (ref 0.5–1.4)
EST. GFR  (AFRICAN AMERICAN): 24 ML/MIN/1.73 M^2
EST. GFR  (NON AFRICAN AMERICAN): 21 ML/MIN/1.73 M^2
GLUCOSE SERPL-MCNC: 129 MG/DL (ref 70–110)
MAGNESIUM SERPL-MCNC: 1.4 MG/DL (ref 1.6–2.6)
POCT GLUCOSE: 148 MG/DL (ref 70–110)
POCT GLUCOSE: 164 MG/DL (ref 70–110)
POCT GLUCOSE: 164 MG/DL (ref 70–110)
POCT GLUCOSE: 168 MG/DL (ref 70–110)
POTASSIUM SERPL-SCNC: 3.8 MMOL/L (ref 3.5–5.1)
SODIUM SERPL-SCNC: 138 MMOL/L (ref 136–145)

## 2019-04-06 PROCEDURE — 97110 THERAPEUTIC EXERCISES: CPT

## 2019-04-06 PROCEDURE — 94761 N-INVAS EAR/PLS OXIMETRY MLT: CPT

## 2019-04-06 PROCEDURE — 97530 THERAPEUTIC ACTIVITIES: CPT

## 2019-04-06 PROCEDURE — 80048 BASIC METABOLIC PNL TOTAL CA: CPT

## 2019-04-06 PROCEDURE — 99233 SBSQ HOSP IP/OBS HIGH 50: CPT | Mod: ,,, | Performed by: INTERNAL MEDICINE

## 2019-04-06 PROCEDURE — 63600175 PHARM REV CODE 636 W HCPCS: Performed by: INTERNAL MEDICINE

## 2019-04-06 PROCEDURE — 11000001 HC ACUTE MED/SURG PRIVATE ROOM

## 2019-04-06 PROCEDURE — 25000003 PHARM REV CODE 250: Performed by: PHYSICIAN ASSISTANT

## 2019-04-06 PROCEDURE — 27000221 HC OXYGEN, UP TO 24 HOURS

## 2019-04-06 PROCEDURE — 83735 ASSAY OF MAGNESIUM: CPT

## 2019-04-06 PROCEDURE — 25000003 PHARM REV CODE 250: Performed by: INTERNAL MEDICINE

## 2019-04-06 PROCEDURE — 99233 PR SUBSEQUENT HOSPITAL CARE,LEVL III: ICD-10-PCS | Mod: ,,, | Performed by: INTERNAL MEDICINE

## 2019-04-06 RX ORDER — RISPERIDONE 0.25 MG/1
0.5 TABLET ORAL NIGHTLY
Status: DISCONTINUED | OUTPATIENT
Start: 2019-04-06 | End: 2019-04-07

## 2019-04-06 RX ORDER — HYDRALAZINE HYDROCHLORIDE 25 MG/1
25 TABLET, FILM COATED ORAL ONCE
Status: COMPLETED | OUTPATIENT
Start: 2019-04-06 | End: 2019-04-06

## 2019-04-06 RX ORDER — MAGNESIUM SULFATE 1 G/100ML
1 INJECTION INTRAVENOUS ONCE
Status: COMPLETED | OUTPATIENT
Start: 2019-04-06 | End: 2019-04-06

## 2019-04-06 RX ADMIN — Medication 400 MG: at 09:04

## 2019-04-06 RX ADMIN — APIXABAN 2.5 MG: 2.5 TABLET, FILM COATED ORAL at 09:04

## 2019-04-06 RX ADMIN — CARVEDILOL 6.25 MG: 6.25 TABLET, FILM COATED ORAL at 04:04

## 2019-04-06 RX ADMIN — HYDRALAZINE HYDROCHLORIDE 25 MG: 25 TABLET, FILM COATED ORAL at 01:04

## 2019-04-06 RX ADMIN — ATORVASTATIN CALCIUM 40 MG: 20 TABLET, FILM COATED ORAL at 09:04

## 2019-04-06 RX ADMIN — NIFEDIPINE 30 MG: 30 TABLET, FILM COATED, EXTENDED RELEASE ORAL at 09:04

## 2019-04-06 RX ADMIN — ONDANSETRON 8 MG: 8 TABLET, ORALLY DISINTEGRATING ORAL at 05:04

## 2019-04-06 RX ADMIN — CARVEDILOL 6.25 MG: 6.25 TABLET, FILM COATED ORAL at 09:04

## 2019-04-06 RX ADMIN — CALCITRIOL 0.25 MCG: 0.25 CAPSULE ORAL at 09:04

## 2019-04-06 RX ADMIN — MAGNESIUM SULFATE IN DEXTROSE 1 G: 10 INJECTION, SOLUTION INTRAVENOUS at 10:04

## 2019-04-06 RX ADMIN — RISPERIDONE 0.5 MG: 0.25 TABLET ORAL at 09:04

## 2019-04-06 RX ADMIN — FAMOTIDINE 20 MG: 20 TABLET, FILM COATED ORAL at 09:04

## 2019-04-06 NOTE — PLAN OF CARE
Problem: Adult Inpatient Plan of Care  Goal: Plan of Care Review  Outcome: Ongoing (interventions implemented as appropriate)  Pt resting in bed. NAD, VSS on 2L NC. Alert to self. Pt not complaining of pain. Pt is bedfast. Worked with PT/OT. DARIA in place. Soft mitten restraints in place. Pt on telemetry. POC reviewed with pt. Bed low and locked. Personal items and call light within reach. Will continue to monitor.

## 2019-04-06 NOTE — PROGRESS NOTES
"Cardiology  Progress Notes            Subjective: "I am doing okay".  Denies shortness of breath.    Vital Signs:  Vitals:    04/06/19 0523 04/06/19 0600 04/06/19 0900 04/06/19 1157   BP: (!) 180/91  (!) 162/74 (!) 142/81   BP Location:   Left arm Left arm   Patient Position:   Lying Lying   Pulse:  88 83 74   Resp:   16 18   Temp:   98.5 °F (36.9 °C) 98.1 °F (36.7 °C)   TempSrc:   Oral Oral   SpO2:   (!) 94% (!) 93%   Weight:       Height:           Physical Exam:  Chest clear  Heart regular no murmur or gallop.  No ankle edema.    Laboratory:  CBC:   Recent Labs   Lab 04/04/19  0324   WBC 8.84   RBC 3.01*   HGB 8.7*   HCT 26.9*      MCV 89   MCH 28.9   MCHC 32.3     BMP:   Recent Labs   Lab 04/06/19  0326   *      K 3.8      CO2 29   BUN 35*   CREATININE 2.1*   CALCIUM 9.2   MG 1.4*     CMP:   Recent Labs   Lab 04/02/19  0324  04/06/19  0326   *   < > 129*   CALCIUM 8.1*   < > 9.2   ALBUMIN 2.5*  --   --    PROT 6.1  --   --    *   < > 138   K 6.3*   < > 3.8   CO2 13*   < > 29   CL 97   < > 101   *   < > 35*   CREATININE 4.8*   < > 2.1*   ALKPHOS 103  --   --    ALT 20  --   --    AST 27  --   --    BILITOT 0.4  --   --     < > = values in this interval not displayed.     LFTs:   Recent Labs   Lab 04/02/19  0324   ALT 20   AST 27   ALKPHOS 103   BILITOT 0.4   PROT 6.1   ALBUMIN 2.5*     Coagulation: No results for input(s): PT, INR, APTT in the last 168 hours.  Cardiac markers: No results for input(s): CKMB, CPKMB, TROPONINT, TROPONINI, MYOGLOBIN in the last 168 hours.    Imaging:  X-Ray Chest 1 View   Final Result      Please see above         Electronically signed by: Tez Garza DO   Date:    04/02/2019   Time:    14:19      CT Chest Without Contrast   Final Result      1. Mild cardiomegaly and pulmonary interstitial edema without pleural effusion.   2. Diffuse mild dilatation of the pulmonary arteries suggesting a component of pulmonary arterial hypertension. "   3. Mild distention of the IVC and hepatic veins as can be seen in the setting of pulmonary arterial hypertension and/or right heart dysfunction.         Electronically signed by: El Hollingsworth   Date:    04/02/2019   Time:    08:48      X-Ray Chest 1 View    (Results Pending)         Problems:   Hypertensive heart with diastolic left ventricular dysfunction.  Paroxysmal atrial fibrillation  End-stage renal disease       Plan of Care: See Orders          Juno Edge

## 2019-04-06 NOTE — PLAN OF CARE
Problem: Adult Inpatient Plan of Care  Goal: Plan of Care Review  Pt remained free of falls and injuries throughout shift. IV flushed and saline locked. No complaints of pain. A. VSS on 1L oxygen per NC. Restraints intact with no signs of injury. Restraint order to  19 at 0254. Bed low and locked, call light within reach, side rails up X2, family at bedside. Will continue to monitor.

## 2019-04-06 NOTE — PROGRESS NOTES
[facility] Hospital Medicine  Progress Note    Patient Name: Joyce Riley  MRN: [id number]  Patient Class: IP- Inpatient   Admission Date: 4/1/2019  Length of Stay: 5 days  Attending Physician: Thelma Mc MD  Primary Care Provider: Kalpana Staton MD        Subjective:     Principal Problem:Acute kidney injury superimposed on chronic kidney disease    HPI:  Ms. Riley is an 85 year old woman who was transferred here from [facility] for a second opinion regarding starting HD vs entering hospice care.  The patient was hospitalized there on 3/20/19 with shortness of breath and lower extremity edema and was found to have fluid overload with heart failure and anemia with H/H 7.8/25.  She was transfused 2 units PRBCs and extensively diuresed while there.  Creatinine was initially 2.3 and gradually worsened to 4.5 at the time of transfer here.  Azotemia was out of proportion to the creatinine with an increase in BUN to 150.  On nephrology evaluation they did not feel she was a candidate for dialysis and consulted palliative care to follow.  Family requested a transfer to see a different nephrology service to see if they agreed.    Medical history includes chronic diastolic heart failure with pulmonary hypertension associated with chronic restrictive lung disease.  She has a patent foramen ovale and a right to left shunt that was thought to contribute to a stroke she had in 2014.  She is anemic and had a diverticular hemorrhage last December while on warfarin.  After recovery she was started on apixaban.  She has paroxysmal atrial fibrillation that is rate controlled, hypertension, diabetes and stage IV CKD.  She has chronically elevated alkaline phosphatase that is not thought to be significant.  At home she uses a wheelchair but can walk to the bathroom with assistance.  She attends an adult day care center.  She used to take allopurinol for gout attacks but this was  discontinued when she had the GI bleed.    Hospital Course:  Echo showed moderate concentric lvh, severe left atrial enlargement, ef 80%, grade 2 diastolic dysfunction, pa pressure 74, no shunting seen with agitated saline.Her bun and creatinine were worsening, family decided to have HD.Ct chest showed . Mild cardiomegaly and pulmonary interstitial edema without pleural effusion.  2. Diffuse mild dilatation of the pulmonary arteries suggesting a component of pulmonary arterial hypertension.. Mild distention of the IVC and hepatic veins as can be seen in the setting of pulmonary arterial hypertension and/or right heart dysfunction.    Patient was started on HD on 4/2/2019, 4/3, 4/4.    Interval History: Patient in soft restraints as tries to pull the hd catheter, eating ok, no painReview of Systems   Constitutional: Negative for chills and fever.   HENT: Negative for trouble swallowing.    Eyes: Positive for visual disturbance.   Respiratory: Negative for cough and shortness of breath.    Cardiovascular: Negative for chest pain and leg swelling.   Gastrointestinal: Negative for abdominal pain, nausea and vomiting.   Genitourinary: Negative for dysuria and hematuria.   Musculoskeletal: Positive for arthralgias.   Skin: Negative for rash.   Neurological: Positive for weakness. Negative for headaches.   Psychiatric/Behavioral: Positive for confusion.     Objective:     Vital Signs (Most Recent):  Temp: 98.1 °F (36.7 °C) (04/06/19 1157)  Pulse: 74 (04/06/19 1157)  Resp: 18 (04/06/19 1157)  BP: (!) 142/81 (04/06/19 1157)  SpO2: (!) 93 % (04/06/19 1157) Vital Signs (24h Range):  Temp:  [97.9 °F (36.6 °C)-98.7 °F (37.1 °C)] 98.1 °F (36.7 °C)  Pulse:  [66-92] 74  Resp:  [16-19] 18  SpO2:  [93 %-95 %] 93 %  BP: (142-198)/(74-91) 142/81     Weight: 98 kg (216 lb 0.8 oz)  Body mass index is 37.09 kg/m².    Intake/Output Summary (Last 24 hours) at 4/6/2019 2079  Last data filed at 4/6/2019 0337  Gross per 24 hour   Intake --    Output 1100 ml   Net -1100 ml      Physical Exam   Constitutional: No distress.   obese   HENT:   Head: Normocephalic and atraumatic.   Eyes: Pupils are equal, round, and reactive to light. Conjunctivae and EOM are normal.   Neck: Normal range of motion. Neck supple.   Cardiovascular: Normal rate and regular rhythm.   Pulmonary/Chest: Effort normal and breath sounds normal.   Decreased breath sounds at bases, mild tachypnea   Abdominal: Soft. Bowel sounds are normal. She exhibits no distension. There is no tenderness.   Musculoskeletal: Normal range of motion.   Strength 4/5 bilateral, no redness or swelling seen in feet, 2 plus pulses, 1 -2 plus edema bilateral lower extremities   Neurological: She is alert. No cranial nerve deficit.   Oriented to person   Skin: Skin is warm.   Psychiatric:   confused   Vitals reviewed.      Significant Labs:   BMP:   Recent Labs   Lab 04/06/19  0326   *      K 3.8      CO2 29   BUN 35*   CREATININE 2.1*   CALCIUM 9.2   MG 1.4*     CBC:   No results for input(s): WBC, HGB, HCT, PLT in the last 48 hours.    Significant Imaging: I have reviewed all pertinent imaging results/findings within the past 24 hours.    Assessment/Plan:      * Acute kidney injury superimposed on chronic kidney disease  - Patient admitted on 3/20/19 to [facility] with heart failure symptoms and anemia.  Developed severe azotemia and doubled baseline creatinine from 2.3 to 4.5 while there.    - Patient and family have been told she is not a HD candidate.  Likely this is due to severe chronic underlying problems including chronic restrictive lung disease, heart failure with a right to left shunt, atrial fibrillation and pulmonary hypertension.  - Nephrology following  - had high bun/creatinine on admit   - family decided to go ahead with hd   - s/p hd on 4/2 , 4/3, 4/4  - stable labs   - plan to hold hd and monitor till Monday     Delirium  In soft restraints  Try low dose seroquel at  night      Hypokalemia  Replaced, improved      Foot pain  No redness  ? Gout/neuropathy  Uric acid 7.7  Given a dose of colchicine 4/3  improved      Renovascular hypertension  Running high  Dcd norvasc , started procardia 30 mg  Continue  coreg 6.25 bid.      Hypomagnesemia  Replace iv , continue oral         Acute respiratory failure  ? Due to fluid overload  IMPROVED  bipap as needed  On room air /1 l nasal cannula  Ct chest showed mild cardiomegaly and pulmonary interstitial edema    Visual disturbance  - Patient states her eyes are bothering her but is not much more clear than that.  - Seen by Dr. Thomas (ophthalmology) on 3/28.  Noted cataract surgery both eyes 2013 and history of diabetic retinopathy with macular edema and vitreomacular traction diagnosed in 2013.  Patient had been told to follow up at that time but did not.  On hospital consultation he again recommended outpatient follow up for a complete eye examination and retina evaluation.      Anemia  - Anemia seems multifactorial.  She has had chronic blood loss and had a diverticular bleed in December.  No bleeding noted on current presentation but she required transfusions at [facility] and they felt she needed a pill endoscopy as the rest of the workup was negative.  - Additional contributing factor of chronic kidney disease.  - currently stable  - monitor labs , on epogen      Acute on chronic diastolic heart failure  - Has grade II diastolic dysfunction.    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      Patent foramen ovale with right to left shunt  -no shunting seen on echo  - on eliquis      CKD (chronic kidney disease), stage IV  - As above.  Baseline creatinine around 2.5.      Type 2 diabetes mellitus, with long-term current use of insulin  -   Lab Results   Component Value Date    HGBA1C 6.6 (H) 04/01/2019       - Low  dose SSI, dcd levemir as sugars on lower side  - monitor sugars        VTE Risk Mitigation (From admission, onward)        Ordered     heparin (porcine) injection 5,000 Units  As needed (PRN)      04/02/19 1001     apixaban tablet 2.5 mg  2 times daily      04/01/19 1716     IP VTE HIGH RISK PATIENT  Once      04/01/19 1404              Thelma Mc MD  Department of Hospital Medicine   [facility]

## 2019-04-06 NOTE — PROGRESS NOTES
"Nephrology  Progress Note    Admit Date: 4/1/2019   LOS: 5 days     SUBJECTIVE:     Follow-up For:  ROXANNE on CKD    Interval History:     No acute issues overnight.  Discussed that we are holding HD for now and monitor renal function over weekend.      Review of Systems:  Constitutional: No fever or chills  Respiratory:  shortness of breath better.   Cardiovascular: No chest pain or palpitations  Gastrointestinal: No nausea or vomiting  Neurological: No confusion or weakness    OBJECTIVE:     Vital Signs Range (Last 24H):  BP (!) 162/74 (BP Location: Left arm, Patient Position: Lying)   Pulse 83   Temp 98.5 °F (36.9 °C) (Oral)   Resp 16   Ht 5' 4" (1.626 m)   Wt 98 kg (216 lb 0.8 oz)   SpO2 (!) 94%   Breastfeeding? No   BMI 37.09 kg/m²     Temp:  [97.9 °F (36.6 °C)-98.7 °F (37.1 °C)]   Pulse:  [66-92]   Resp:  [16-19]   BP: (150-198)/(67-91)   SpO2:  [94 %-97 %]     I & O (Last 24H):    Intake/Output Summary (Last 24 hours) at 4/6/2019 0924  Last data filed at 4/6/2019 0339  Gross per 24 hour   Intake --   Output 1100 ml   Net -1100 ml       Physical Exam:  General appearance:  Elderly and confused this am.   Eyes:  Conjunctivae/corneas clear.   Lungs: CTA   Heart: Regular rate and rhythm, S1, S2 normal, + murmur  Abdomen: Soft, non-tender non-distended; bowel sounds normal; no masses,  no organomegaly, obese  Extremities: No cyanosis or clubbing. 1+ edema.    Skin: Skin color, texture, turgor normal. No rashes or lesions  Neurologic:  No focal numbness or weakness   Nava catheter  Right IJ Trialysis    Laboratory Data:  No results for input(s): WBC, RBC, HGB, HCT, PLT, MCV, MCH, MCHC in the last 24 hours.    BMP:   Recent Labs   Lab 04/06/19  0326   *      K 3.8      CO2 29   BUN 35*   CREATININE 2.1*   CALCIUM 9.2   MG 1.4*     Lab Results   Component Value Date    CALCIUM 9.2 04/06/2019    PHOS 3.1 04/05/2019       Lab Results   Component Value Date    .8 (H) 04/02/2019    CALCIUM " 9.2 04/06/2019    PHOS 3.1 04/05/2019       Lab Results   Component Value Date    URICACID 7.7 (H) 04/01/2019       BNP  Recent Labs   Lab 04/01/19  1617   *       Medications:  Medication list was reviewed and changes noted under Assessment/Plan.    Diagnostic Results:        ASSESSMENT/PLAN:     1. Initial Multi factorial ROXANNE due to cardiorenal syndrome and acute anemia that may have progressed to ATN at this point on CKD likely due to DM (underlying retinopathy) with proteinuria. Hyperkalemia.  -Baseline Cr 2.5 and recent Pr/ Cr ration of 2.34g/day.  -she still making a reasonable amount of urine.    -quantified 24 hr urine with 714 mg of protein.  Minimal SOPHIE titer.   -echo noted with hyperdynamic ventricle, diastolic dysfunction and severe pulmonary hypertension.  Discussed with Cardiology and feels that it is not a congested picture.  -Extensive discussion with treatment team and family at bedside about options yesterday.  Offered trial of dialysis to see how she will tolerate.  Family agreed and had trialysis placed by CCT and underwent 3 HD sessions.  Issues with pt trying to pull out dialysis line.    -Plan on holding dialysis and monitor renal trends over weekend which so far are trending up. Continue to monitor overnight. Not a good candidate for long-term dialysis.     2. ABLA/ Anemia of chronic disease/ Recent diverticular bleed/ Hx GERD  -repeat iron, B12, folate within limits  -monitor Hgb  -changed PPI to H2B  -started AHSAN      3. Acute Hypercapnic on Chronic Respiratory Failure due to ? Restrictive lung disease/ severe Pulmonary HTN ? Mixed respiratory and metabolic acidosis  -ABG noted  -Bipap prn.       4. ?PFO/ Pulm HTN/ Diastolic CHF/ A. Fib  -On renal dose Eliquis  -cardiac consult noted       5. Hyponatremia  -developed with diuresis  -corrected with dialysis        6. Chronic gout and Hyperuricemia  -was on Allopurinol now off.  -level noted.  -colchicine X 1 for attack has resolved  issue       7.  Mixed anion gap metabolic acidosis with hyperkalemia:  -corrected with HD    8.  Hypo Mag:  -replace IV/PO prn again today.    9. Vit D/HPTH:  -ergo/calcitriol

## 2019-04-06 NOTE — SUBJECTIVE & OBJECTIVE
Interval History: Patient in soft restraints as tries to pull the hd catheter, eating ok, no painReview of Systems   Constitutional: Negative for chills and fever.   HENT: Negative for trouble swallowing.    Eyes: Positive for visual disturbance.   Respiratory: Negative for cough and shortness of breath.    Cardiovascular: Negative for chest pain and leg swelling.   Gastrointestinal: Negative for abdominal pain, nausea and vomiting.   Genitourinary: Negative for dysuria and hematuria.   Musculoskeletal: Positive for arthralgias.   Skin: Negative for rash.   Neurological: Positive for weakness. Negative for headaches.   Psychiatric/Behavioral: Positive for confusion.     Objective:     Vital Signs (Most Recent):  Temp: 98.1 °F (36.7 °C) (04/06/19 1157)  Pulse: 74 (04/06/19 1157)  Resp: 18 (04/06/19 1157)  BP: (!) 142/81 (04/06/19 1157)  SpO2: (!) 93 % (04/06/19 1157) Vital Signs (24h Range):  Temp:  [97.9 °F (36.6 °C)-98.7 °F (37.1 °C)] 98.1 °F (36.7 °C)  Pulse:  [66-92] 74  Resp:  [16-19] 18  SpO2:  [93 %-95 %] 93 %  BP: (142-198)/(74-91) 142/81     Weight: 98 kg (216 lb 0.8 oz)  Body mass index is 37.09 kg/m².    Intake/Output Summary (Last 24 hours) at 4/6/2019 1429  Last data filed at 4/6/2019 0339  Gross per 24 hour   Intake --   Output 1100 ml   Net -1100 ml      Physical Exam   Constitutional: No distress.   obese   HENT:   Head: Normocephalic and atraumatic.   Eyes: Pupils are equal, round, and reactive to light. Conjunctivae and EOM are normal.   Neck: Normal range of motion. Neck supple.   Cardiovascular: Normal rate and regular rhythm.   Pulmonary/Chest: Effort normal and breath sounds normal.   Decreased breath sounds at bases, mild tachypnea   Abdominal: Soft. Bowel sounds are normal. She exhibits no distension. There is no tenderness.   Musculoskeletal: Normal range of motion.   Strength 4/5 bilateral, no redness or swelling seen in feet, 2 plus pulses, 1 -2 plus edema bilateral lower extremities    Neurological: She is alert. No cranial nerve deficit.   Oriented to person   Skin: Skin is warm.   Psychiatric:   confused   Vitals reviewed.      Significant Labs:   BMP:   Recent Labs   Lab 04/06/19  0326   *      K 3.8      CO2 29   BUN 35*   CREATININE 2.1*   CALCIUM 9.2   MG 1.4*     CBC:   No results for input(s): WBC, HGB, HCT, PLT in the last 48 hours.    Significant Imaging: I have reviewed all pertinent imaging results/findings within the past 24 hours.

## 2019-04-06 NOTE — ASSESSMENT & PLAN NOTE
- Patient admitted on 3/20/19 to [facility] with heart failure symptoms and anemia.  Developed severe azotemia and doubled baseline creatinine from 2.3 to 4.5 while there.    - Patient and family have been told she is not a HD candidate.  Likely this is due to severe chronic underlying problems including chronic restrictive lung disease, heart failure with a right to left shunt, atrial fibrillation and pulmonary hypertension.  - Nephrology following  - had high bun/creatinine on admit   - family decided to go ahead with hd   - s/p hd on 4/2 , 4/3, 4/4  - stable labs   - plan to hold hd and monitor till Monday

## 2019-04-06 NOTE — PLAN OF CARE
Problem: Adult Inpatient Plan of Care  Goal: Plan of Care Review  Outcome: Ongoing (interventions implemented as appropriate)  Patient placed back on 1 l/m nasal cannula. Will continue to monitor.

## 2019-04-06 NOTE — PLAN OF CARE
Problem: Adult Inpatient Plan of Care  Goal: Plan of Care Review  Outcome: Ongoing (interventions implemented as appropriate)  Plan of care reviewed with Pt, purposeful hourly rounding performed. VSS on RA, except BP. Bilateral soft wrist restraints in place. Telesitter at the bedside. NAD during shift. No c/o at this time. Bed locked and lowered, call light in reach. Bed alarm on and audible. Will continue to monitor.

## 2019-04-06 NOTE — PLAN OF CARE
Problem: Adult Inpatient Plan of Care  Goal: Plan of Care Review  Outcome: Ongoing (interventions implemented as appropriate)  Pt in no distress on room 1L NC, prn tx not required. Bipap on standby at bedside.

## 2019-04-06 NOTE — PLAN OF CARE
"Problem: Physical Therapy Goal  Goal: Physical Therapy Goal  Goals to be met by 4-12-19.  1.Roll R/L mod I   2.Sup<>sit mod I  2. Evaluate Sit<>stand when appropriate-REVISED 4/6/19  Sit to stand with min A and RW   3. Evaluate transfer to w/c when appropriate-REVISED 4/6/19  Transfer to chair with mod A  4. Evaluate amb when appropriate-REVISED 4/6/19  Pt amb 5' with RW and Mod A     Outcome: Ongoing (interventions implemented as appropriate)  Goals revised.  Able to stand EOB with assist of 2.  Took sidestep with R leg only.  Very confused-at "[facility]" despite repeated attempts to reorient  REC:  Per CM note, family wants hospice versus NH longterm placement       "

## 2019-04-06 NOTE — PT/OT/SLP PROGRESS
Physical Therapy Treatment    Patient Name:  Joyce Riley   MRN:  [id number]    Recommendations:     Discharge Recommendations:  ([facility] NH vs hospice)   Discharge Equipment Recommendations: none   Barriers to discharge: increased CG burden     Assessment:     Joyce Riley is a 85 y.o. female admitted with a medical diagnosis of Acute kidney injury superimposed on chronic kidney disease.  She presents with the following impairments/functional limitations:  weakness, impaired endurance, impaired self care skills, impaired balance, gait instability, impaired functional mobilty, impaired cognition, decreased safety awareness, decreased lower extremity function .  Very confused , poor endurance and overall weakness.  Will need 24/7 care     Rehab Prognosis: Fair; patient would benefit from acute skilled PT services to address these deficits and reach maximum level of function.    Recent Surgery: * No surgery found *      Plan:     During this hospitalization, patient to be seen 5 x/week to address the identified rehab impairments via gait training, therapeutic activities, therapeutic exercises, neuromuscular re-education and progress toward the following goals:    · Plan of Care Expires:  05/05/19    Subjective     Chief Complaint: tired  Patient/Family Comments/goals: need to sit down, cant step   Pain/Comfort:  · Pain Rating 1: 0/10  · Pain Rating Post-Intervention 1: 0/10      Objective:     Communicated with nurse prior to session.  Patient found HOB elevated with central line, peripheral IV, mclaughlin catheter, restraints upon PT entry to room.     General Precautions: Standard, fall   Orthopedic Precautions:N/A   Braces: N/A     Functional Mobility:  · Bed Mobility:     · Supine to Sit: minimum assistance and HOB elevated with use of side rail  · Scooting-total assist of 2  · Sit to Supine: moderate assistance  · Transfers:     · Sit to Stand:  min x1 + mod x 1 to stand EOB with RW, cues for  sequence and hand placement   · Gait: pt was able to sidestep to HOB with R foot only.    · Balance: sitting-fair, standing poor      AM-PAC 6 CLICK MOBILITY  Turning over in bed (including adjusting bedclothes, sheets and blankets)?: 3  Sitting down on and standing up from a chair with arms (e.g., wheelchair, bedside commode, etc.): 2  Moving from lying on back to sitting on the side of the bed?: 3  Moving to and from a bed to a chair (including a wheelchair)?: 1  Need to walk in hospital room?: 1  Climbing 3-5 steps with a railing?: 1  Basic Mobility Total Score: 11       Therapeutic Activities and Exercises:   assist to EOB and 3 trials of sit to stand.  Pt stood for < 30 secs each time and required assist to lower self onto bed.  Unable to control descent.  On last stand able to take 1 sidestep.  Performed supine ex  Ankle pumps  Heel slides  Hip abd./add  SLR with assist    Repositioned in bed and pillows placed under hands/forearms    Patient left HOB elevated with all lines intact, call button in reach, bed alarm on, restraints reapplied at end of session and nurse notified..    GOALS:   Multidisciplinary Problems     Physical Therapy Goals        Problem: Physical Therapy Goal    Goal Priority Disciplines Outcome Goal Variances Interventions   Physical Therapy Goal     PT, PT/OT Ongoing (interventions implemented as appropriate)     Description:  Goals to be met by 4-12-19.  1.Roll R/L mod I   2.Sup<>sit mod I  2. Evaluate Sit<>stand when appropriate-REVISED 4/6/19  Sit to stand with min A and RW   3. Evaluate transfer to w/c when appropriate-REVISED 4/6/19  Transfer to chair with mod A  4. Evaluate amb when appropriate-REVISED 4/6/19  Pt amb 5' with RW and Mod A                       Time Tracking:     PT Received On: 04/06/19  PT Start Time: 1349     PT Stop Time: 1417  PT Total Time (min): 28 min     Billable Minutes: Therapeutic Activity 18 and Therapeutic Exercise 10    Treatment Type:  Treatment  PT/PTA: PT     PTA Visit Number: 0     Siri Sheldon, PT  04/06/2019

## 2019-04-07 PROBLEM — M79.673 FOOT PAIN: Status: RESOLVED | Noted: 2019-04-03 | Resolved: 2019-04-07

## 2019-04-07 PROBLEM — E87.6 HYPOKALEMIA: Status: RESOLVED | Noted: 2019-04-05 | Resolved: 2019-04-07

## 2019-04-07 PROBLEM — R40.0 DROWSINESS: Status: ACTIVE | Noted: 2019-04-07

## 2019-04-07 LAB
ALLENS TEST: ABNORMAL
ANION GAP SERPL CALC-SCNC: 6 MMOL/L (ref 8–16)
BASOPHILS # BLD AUTO: 0.02 K/UL (ref 0–0.2)
BASOPHILS NFR BLD: 0.2 % (ref 0–1.9)
BUN SERPL-MCNC: 39 MG/DL (ref 8–23)
CALCIUM SERPL-MCNC: 8.9 MG/DL (ref 8.7–10.5)
CHLORIDE SERPL-SCNC: 102 MMOL/L (ref 95–110)
CO2 SERPL-SCNC: 30 MMOL/L (ref 23–29)
CREAT SERPL-MCNC: 2.6 MG/DL (ref 0.5–1.4)
DELSYS: ABNORMAL
DIFFERENTIAL METHOD: ABNORMAL
EOSINOPHIL # BLD AUTO: 0.2 K/UL (ref 0–0.5)
EOSINOPHIL NFR BLD: 2.4 % (ref 0–8)
ERYTHROCYTE [DISTWIDTH] IN BLOOD BY AUTOMATED COUNT: 19 % (ref 11.5–14.5)
ERYTHROCYTE [SEDIMENTATION RATE] IN BLOOD BY WESTERGREN METHOD: 25 MM/H
EST. GFR  (AFRICAN AMERICAN): 19 ML/MIN/1.73 M^2
EST. GFR  (NON AFRICAN AMERICAN): 16 ML/MIN/1.73 M^2
FIO2: 100
FLOW: 8
GLUCOSE SERPL-MCNC: 104 MG/DL (ref 70–110)
HCO3 UR-SCNC: 31.9 MMOL/L (ref 24–28)
HCT VFR BLD AUTO: 28.7 % (ref 37–48.5)
HGB BLD-MCNC: 8.9 G/DL (ref 12–16)
LYMPHOCYTES # BLD AUTO: 1.4 K/UL (ref 1–4.8)
LYMPHOCYTES NFR BLD: 13.7 % (ref 18–48)
MAGNESIUM SERPL-MCNC: 1.6 MG/DL (ref 1.6–2.6)
MCH RBC QN AUTO: 29.2 PG (ref 27–31)
MCHC RBC AUTO-ENTMCNC: 31 G/DL (ref 32–36)
MCV RBC AUTO: 94 FL (ref 82–98)
MODE: ABNORMAL
MONOCYTES # BLD AUTO: 0.4 K/UL (ref 0.3–1)
MONOCYTES NFR BLD: 4.3 % (ref 4–15)
NEUTROPHILS # BLD AUTO: 7.9 K/UL (ref 1.8–7.7)
NEUTROPHILS NFR BLD: 78.9 % (ref 38–73)
PCO2 BLDA: 59.8 MMHG (ref 35–45)
PH SMN: 7.33 [PH] (ref 7.35–7.45)
PHOSPHATE SERPL-MCNC: 3.8 MG/DL (ref 2.7–4.5)
PLATELET # BLD AUTO: 165 K/UL (ref 150–350)
PMV BLD AUTO: 11.1 FL (ref 9.2–12.9)
PO2 BLDA: 199 MMHG (ref 80–100)
POC BE: 6 MMOL/L
POC SATURATED O2: 100 % (ref 95–100)
POCT GLUCOSE: 101 MG/DL (ref 70–110)
POCT GLUCOSE: 104 MG/DL (ref 70–110)
POCT GLUCOSE: 115 MG/DL (ref 70–110)
POCT GLUCOSE: 130 MG/DL (ref 70–110)
POCT GLUCOSE: 152 MG/DL (ref 70–110)
POTASSIUM SERPL-SCNC: 4.3 MMOL/L (ref 3.5–5.1)
RBC # BLD AUTO: 3.05 M/UL (ref 4–5.4)
SAMPLE: ABNORMAL
SITE: ABNORMAL
SODIUM SERPL-SCNC: 138 MMOL/L (ref 136–145)
SP02: 100
WBC # BLD AUTO: 10.05 K/UL (ref 3.9–12.7)

## 2019-04-07 PROCEDURE — 93010 ELECTROCARDIOGRAM REPORT: CPT | Mod: ,,, | Performed by: INTERNAL MEDICINE

## 2019-04-07 PROCEDURE — 93005 ELECTROCARDIOGRAM TRACING: CPT

## 2019-04-07 PROCEDURE — 84100 ASSAY OF PHOSPHORUS: CPT

## 2019-04-07 PROCEDURE — 99233 PR SUBSEQUENT HOSPITAL CARE,LEVL III: ICD-10-PCS | Mod: ,,, | Performed by: INTERNAL MEDICINE

## 2019-04-07 PROCEDURE — 11000001 HC ACUTE MED/SURG PRIVATE ROOM

## 2019-04-07 PROCEDURE — 93010 EKG 12-LEAD: ICD-10-PCS | Mod: ,,, | Performed by: INTERNAL MEDICINE

## 2019-04-07 PROCEDURE — 25000242 PHARM REV CODE 250 ALT 637 W/ HCPCS: Performed by: INTERNAL MEDICINE

## 2019-04-07 PROCEDURE — 27000221 HC OXYGEN, UP TO 24 HOURS

## 2019-04-07 PROCEDURE — 99900035 HC TECH TIME PER 15 MIN (STAT)

## 2019-04-07 PROCEDURE — 25000003 PHARM REV CODE 250: Performed by: INTERNAL MEDICINE

## 2019-04-07 PROCEDURE — 80048 BASIC METABOLIC PNL TOTAL CA: CPT

## 2019-04-07 PROCEDURE — 85025 COMPLETE CBC W/AUTO DIFF WBC: CPT

## 2019-04-07 PROCEDURE — 99233 SBSQ HOSP IP/OBS HIGH 50: CPT | Mod: ,,, | Performed by: INTERNAL MEDICINE

## 2019-04-07 PROCEDURE — 94640 AIRWAY INHALATION TREATMENT: CPT

## 2019-04-07 PROCEDURE — 94761 N-INVAS EAR/PLS OXIMETRY MLT: CPT

## 2019-04-07 PROCEDURE — 83735 ASSAY OF MAGNESIUM: CPT

## 2019-04-07 PROCEDURE — 94660 CPAP INITIATION&MGMT: CPT

## 2019-04-07 RX ADMIN — NIFEDIPINE 30 MG: 30 TABLET, FILM COATED, EXTENDED RELEASE ORAL at 01:04

## 2019-04-07 RX ADMIN — ACETAMINOPHEN 650 MG: 325 TABLET ORAL at 11:04

## 2019-04-07 RX ADMIN — ATORVASTATIN CALCIUM 40 MG: 20 TABLET, FILM COATED ORAL at 01:04

## 2019-04-07 RX ADMIN — IPRATROPIUM BROMIDE AND ALBUTEROL SULFATE 3 ML: .5; 3 SOLUTION RESPIRATORY (INHALATION) at 05:04

## 2019-04-07 RX ADMIN — CALCITRIOL 0.25 MCG: 0.25 CAPSULE ORAL at 01:04

## 2019-04-07 RX ADMIN — APIXABAN 2.5 MG: 2.5 TABLET, FILM COATED ORAL at 01:04

## 2019-04-07 RX ADMIN — Medication 400 MG: at 01:04

## 2019-04-07 RX ADMIN — APIXABAN 2.5 MG: 2.5 TABLET, FILM COATED ORAL at 09:04

## 2019-04-07 RX ADMIN — FAMOTIDINE 20 MG: 20 TABLET, FILM COATED ORAL at 01:04

## 2019-04-07 RX ADMIN — ACETAMINOPHEN 650 MG: 325 TABLET ORAL at 06:04

## 2019-04-07 RX ADMIN — Medication 400 MG: at 09:04

## 2019-04-07 NOTE — PROGRESS NOTES
[facility] Hospital Medicine  Progress Note    Patient Name: Joyce Riley  MRN: [id number]  Patient Class: IP- Inpatient   Admission Date: 4/1/2019  Length of Stay: 6 days  Attending Physician: Thelma Mc MD  Primary Care Provider: Kalpana Staton MD        Subjective:     Principal Problem:Acute kidney injury superimposed on chronic kidney disease    HPI:  Ms. Riley is an 85 year old woman who was transferred here from [facility] for a second opinion regarding starting HD vs entering hospice care.  The patient was hospitalized there on 3/20/19 with shortness of breath and lower extremity edema and was found to have fluid overload with heart failure and anemia with H/H 7.8/25.  She was transfused 2 units PRBCs and extensively diuresed while there.  Creatinine was initially 2.3 and gradually worsened to 4.5 at the time of transfer here.  Azotemia was out of proportion to the creatinine with an increase in BUN to 150.  On nephrology evaluation they did not feel she was a candidate for dialysis and consulted palliative care to follow.  Family requested a transfer to see a different nephrology service to see if they agreed.    Medical history includes chronic diastolic heart failure with pulmonary hypertension associated with chronic restrictive lung disease.  She has a patent foramen ovale and a right to left shunt that was thought to contribute to a stroke she had in 2014.  She is anemic and had a diverticular hemorrhage last December while on warfarin.  After recovery she was started on apixaban.  She has paroxysmal atrial fibrillation that is rate controlled, hypertension, diabetes and stage IV CKD.  She has chronically elevated alkaline phosphatase that is not thought to be significant.  At home she uses a wheelchair but can walk to the bathroom with assistance.  She attends an adult day care center.  She used to take allopurinol for gout attacks but this was  discontinued when she had the GI bleed.    Hospital Course:  Echo showed moderate concentric lvh, severe left atrial enlargement, ef 80%, grade 2 diastolic dysfunction, pa pressure 74, no shunting seen with agitated saline.Her bun and creatinine were worsening, family decided to have HD.Ct chest showed . Mild cardiomegaly and pulmonary interstitial edema without pleural effusion.  2. Diffuse mild dilatation of the pulmonary arteries suggesting a component of pulmonary arterial hypertension.. Mild distention of the IVC and hepatic veins as can be seen in the setting of pulmonary arterial hypertension and/or right heart dysfunction.    Patient was started on HD on 4/2/2019, 4/3, 4/4.  Patient was given resperidal on 4/6 and was more drowsy and had to be put on bipap.    Interval History: Patient seen on bipap,was drowsy this am, abg showed mild respiratory acidosis , patient was put on bipap , cxr showed vascular congestion,  arousable, follows commandsiReview of Systems   Unable to perform ROS: Mental status change     Objective:     Vital Signs (Most Recent):  Temp: 98.6 °F (37 °C) (04/07/19 1140)  Pulse: 67 (04/07/19 1140)  Resp: 19 (04/07/19 1140)  BP: (!) 163/70 (04/07/19 1140)  SpO2: (!) 94 % (04/07/19 1140) Vital Signs (24h Range):  Temp:  [97.4 °F (36.3 °C)-99.1 °F (37.3 °C)] 98.6 °F (37 °C)  Pulse:  [48-77] 67  Resp:  [17-24] 19  SpO2:  [84 %-96 %] 94 %  BP: (139-181)/(65-75) 163/70     Weight: 98 kg (216 lb 0.8 oz)  Body mass index is 37.09 kg/m².    Intake/Output Summary (Last 24 hours) at 4/7/2019 1407  Last data filed at 4/7/2019 1025  Gross per 24 hour   Intake 240 ml   Output 950 ml   Net -710 ml      Physical Exam   Constitutional: No distress.   obese   HENT:   Head: Normocephalic and atraumatic.   Eyes: Pupils are equal, round, and reactive to light. Conjunctivae and EOM are normal.   Neck: Normal range of motion. Neck supple.   Cardiovascular: Normal rate and regular rhythm.   Pulmonary/Chest:  Effort normal and breath sounds normal.   Decreased breath sounds at bases, on bipap   Abdominal: Soft. Bowel sounds are normal. She exhibits no distension. There is no tenderness.   Musculoskeletal: Normal range of motion.   Strength 4/5 bilateral, no redness or swelling seen in feet, 2 plus pulses, 1 -2 plus edema bilateral lower extremities   Neurological: No cranial nerve deficit.   Drowsy, arousable, follows commands   Skin: Skin is warm.   Psychiatric:   confused   Vitals reviewed.      Significant Labs:   BMP:   Recent Labs   Lab 04/07/19  0401         K 4.3      CO2 30*   BUN 39*   CREATININE 2.6*   CALCIUM 8.9   MG 1.6     CBC:   Recent Labs   Lab 04/07/19  0521   WBC 10.05   HGB 8.9*   HCT 28.7*          Significant Imaging: I have reviewed all pertinent imaging results/findings within the past 24 hours.    Assessment/Plan:      * Acute kidney injury superimposed on chronic kidney disease  - Patient admitted on 3/20/19 to [facility] with heart failure symptoms and anemia.  Developed severe azotemia and doubled baseline creatinine from 2.3 to 4.5 while there.    - Patient and family have been told she is not a HD candidate.  Likely this is due to severe chronic underlying problems including chronic restrictive lung disease, heart failure with a right to left shunt, atrial fibrillation and pulmonary hypertension.  - Nephrology following  - had high bun/creatinine on admit   - family decided to go ahead with hd   - s/p hd on 4/2 , 4/3, 4/4  - creatinine worsening   - plan to hold hd and monitor till Monday   - talked to son, does not look like want to stop HD if needed  - also will discuss code status with family    Drowsiness  Likely due to med side effect  Dc resperidone  Patient getting more awake      Delirium   soft restraints as needed  Dc resperidol as caused drowsiness      Renovascular hypertension  Running high  Dcd norvasc , started procardia 30 mg  Hold coreg with  bradycardia today  ekg showed sinus kranthi      Hypomagnesemia  Replace iv , continue oral         Acute respiratory failure  ? Due to fluid overload  Was able to be taken off bipap until 4/7 which was likely due to med effect  Ct chest showed mild cardiomegaly and pulmonary interstitial edema  Wean off bipap today    Visual disturbance  - Patient states her eyes are bothering her but is not much more clear than that.  - Seen by Dr. Thomas (ophthalmology) on 3/28.  Noted cataract surgery both eyes 2013 and history of diabetic retinopathy with macular edema and vitreomacular traction diagnosed in 2013.  Patient had been told to follow up at that time but did not.  On hospital consultation he again recommended outpatient follow up for a complete eye examination and retina evaluation.      Anemia  - Anemia seems multifactorial.  She has had chronic blood loss and had a diverticular bleed in December.  No bleeding noted on current presentation but she required transfusions at [facility] and they felt she needed a pill endoscopy as the rest of the workup was negative.  - Additional contributing factor of chronic kidney disease.  - currently stable  - monitor labs , on epogen      Acute on chronic diastolic heart failure  - Has grade II diastolic dysfunction.    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      Patent foramen ovale with right to left shunt  -no shunting seen on echo  - on eliquis      CKD (chronic kidney disease), stage IV  - As above.  Baseline creatinine around 2.5.      Type 2 diabetes mellitus, with long-term current use of insulin  -   Lab Results   Component Value Date    HGBA1C 6.6 (H) 04/01/2019       - Low dose SSI, dcd levemir as sugars on lower side  - monitor sugars        VTE Risk Mitigation (From admission, onward)        Ordered     heparin (porcine) injection 5,000 Units   As needed (PRN)      04/02/19 1001     apixaban tablet 2.5 mg  2 times daily      04/01/19 1716     IP VTE HIGH RISK PATIENT  Once      04/01/19 1404              Thelma Mc MD  Department of Hospital Medicine   [facility]

## 2019-04-07 NOTE — SIGNIFICANT EVENT
Called to rapid response. Patient with decreased LOC and responsiveness as compared to normal. RN states tonight was first night patient got Risperidone. Upon entering room, patient receiving neb breathing treatment. Responds to calling her name by tracking with eyes. Answers questions with shaking and nodding her head. Appears mildly to moderately distressed. O2 sats curently 100% on 3L supplemental O2 during breathing treatment. Auscultated lungs, air movement seems good. Patient does have some grunting with respirations. STAT CXR & ABG ordered. Will continue to monitor.

## 2019-04-07 NOTE — ASSESSMENT & PLAN NOTE
? Due to fluid overload  Was able to be taken off bipap until 4/7 which was likely due to med effect  Ct chest showed mild cardiomegaly and pulmonary interstitial edema  Wean off bipap today

## 2019-04-07 NOTE — SUBJECTIVE & OBJECTIVE
Interval History: Patient seen on bipap,was drowsy this am, abg showed mild respiratory acidosis , patient was put on bipap , cxr showed vascular congestion,  arousable, follows commandsiReview of Systems   Unable to perform ROS: Mental status change     Objective:     Vital Signs (Most Recent):  Temp: 98.6 °F (37 °C) (04/07/19 1140)  Pulse: 67 (04/07/19 1140)  Resp: 19 (04/07/19 1140)  BP: (!) 163/70 (04/07/19 1140)  SpO2: (!) 94 % (04/07/19 1140) Vital Signs (24h Range):  Temp:  [97.4 °F (36.3 °C)-99.1 °F (37.3 °C)] 98.6 °F (37 °C)  Pulse:  [48-77] 67  Resp:  [17-24] 19  SpO2:  [84 %-96 %] 94 %  BP: (139-181)/(65-75) 163/70     Weight: 98 kg (216 lb 0.8 oz)  Body mass index is 37.09 kg/m².    Intake/Output Summary (Last 24 hours) at 4/7/2019 1407  Last data filed at 4/7/2019 1025  Gross per 24 hour   Intake 240 ml   Output 950 ml   Net -710 ml      Physical Exam   Constitutional: No distress.   obese   HENT:   Head: Normocephalic and atraumatic.   Eyes: Pupils are equal, round, and reactive to light. Conjunctivae and EOM are normal.   Neck: Normal range of motion. Neck supple.   Cardiovascular: Normal rate and regular rhythm.   Pulmonary/Chest: Effort normal and breath sounds normal.   Decreased breath sounds at bases, on bipap   Abdominal: Soft. Bowel sounds are normal. She exhibits no distension. There is no tenderness.   Musculoskeletal: Normal range of motion.   Strength 4/5 bilateral, no redness or swelling seen in feet, 2 plus pulses, 1 -2 plus edema bilateral lower extremities   Neurological: No cranial nerve deficit.   Drowsy, arousable, follows commands   Skin: Skin is warm.   Psychiatric:   confused   Vitals reviewed.      Significant Labs:   BMP:   Recent Labs   Lab 04/07/19  0401         K 4.3      CO2 30*   BUN 39*   CREATININE 2.6*   CALCIUM 8.9   MG 1.6     CBC:   Recent Labs   Lab 04/07/19  0521   WBC 10.05   HGB 8.9*   HCT 28.7*          Significant Imaging: I have  reviewed all pertinent imaging results/findings within the past 24 hours.

## 2019-04-07 NOTE — NURSING
Nurse informed by PCT that patient had an O2 sat of 84% on NC. Upon entering room, nurse assessed patient was drowsier than usual, diaphoretic, taking shallow breaths and slow to respond to tactile stimulation. Nurse increased O2 to 3L, Charge nurse informed. Respiratory called for breathing treatment, rapid response called. . (See rapid response charting). Patient now on continuous Bipap. VSS. Awaiting Stat CXR. Will continue to monitor.

## 2019-04-07 NOTE — PLAN OF CARE
Problem: Adult Inpatient Plan of Care  Goal: Plan of Care Review  Outcome: Ongoing (interventions implemented as appropriate)  Pt resting in bed. NAD, VSS on NC, Alert to self. Pt not complaining of pain. Pt bedfast. Nava catheter in place and draining to gravity. Bipap at bedside.POC reviewed with pt. Family at bedside. Bed low and locked. Personal items and call light within reach. Will continue to monitor.

## 2019-04-07 NOTE — ASSESSMENT & PLAN NOTE
Running high  Dcd norvasc , started procardia 30 mg  Hold coreg with bradycardia today  ekg showed sinus kranthi

## 2019-04-07 NOTE — ASSESSMENT & PLAN NOTE
- Anemia seems multifactorial.  She has had chronic blood loss and had a diverticular bleed in December.  No bleeding noted on current presentation but she required transfusions at [facility] and they felt she needed a pill endoscopy as the rest of the workup was negative.  - Additional contributing factor of chronic kidney disease.  - currently stable  - monitor labs , on epogen

## 2019-04-07 NOTE — PLAN OF CARE
Problem: Adult Inpatient Plan of Care  Goal: Plan of Care Review  Outcome: Ongoing (interventions implemented as appropriate)  Adequate saturation on nasal O2. Has not been requiring CPAP.

## 2019-04-07 NOTE — ASSESSMENT & PLAN NOTE
- Patient admitted on 3/20/19 to [facility] with heart failure symptoms and anemia.  Developed severe azotemia and doubled baseline creatinine from 2.3 to 4.5 while there.    - Patient and family have been told she is not a HD candidate.  Likely this is due to severe chronic underlying problems including chronic restrictive lung disease, heart failure with a right to left shunt, atrial fibrillation and pulmonary hypertension.  - Nephrology following  - had high bun/creatinine on admit   - family decided to go ahead with hd   - s/p hd on 4/2 , 4/3, 4/4  - creatinine worsening   - plan to hold hd and monitor till Monday   - talked to son, does not look like want to stop HD if needed  - also will discuss code status with family

## 2019-04-07 NOTE — PROGRESS NOTES
"Nephrology  Progress Note    Admit Date: 4/1/2019   LOS: 6 days     SUBJECTIVE:     Follow-up For:  ROXANNE on CKD    Interval History:     Seen still on BiPAP this am. Awakens and denies c/o. UOP 650mL    Review of Systems:  Constitutional: No fever or chills  Respiratory:  shortness of breath better.   Cardiovascular: No chest pain or palpitations  Gastrointestinal: No nausea or vomiting  Neurological: No confusion or weakness    OBJECTIVE:     Vital Signs Range (Last 24H):  BP (!) 163/70 (BP Location: Right arm, Patient Position: Lying)   Pulse 67   Temp 98.6 °F (37 °C) (Oral)   Resp 19   Ht 5' 4" (1.626 m)   Wt 98 kg (216 lb 0.8 oz)   SpO2 (!) 94%   Breastfeeding? No   BMI 37.09 kg/m²     Temp:  [97.4 °F (36.3 °C)-99.1 °F (37.3 °C)]   Pulse:  [48-77]   Resp:  [17-24]   BP: (139-181)/(65-75)   SpO2:  [84 %-96 %]     I & O (Last 24H):    Intake/Output Summary (Last 24 hours) at 4/7/2019 1207  Last data filed at 4/7/2019 1025  Gross per 24 hour   Intake 340 ml   Output 950 ml   Net -610 ml       Physical Exam:  General appearance:  BiPAP in place.   Eyes:  Conjunctivae/corneas clear.   Lungs: CTA   Heart: Regular rate and rhythm, S1, S2 normal, + murmur  Abdomen: Soft, non-tender non-distended; bowel sounds normal; no masses,  no organomegaly, obese  Extremities: No cyanosis or clubbing. 1+ edema.    Skin: Skin color, texture, turgor normal. No rashes or lesions  Neurologic:  No focal numbness or weakness   Nava catheter  Right IJ Trialysis    Laboratory Data:  Recent Labs   Lab 04/07/19  0521   WBC 10.05   RBC 3.05*   HGB 8.9*   HCT 28.7*      MCV 94   MCH 29.2   MCHC 31.0*       BMP:   Recent Labs   Lab 04/07/19  0401         K 4.3      CO2 30*   BUN 39*   CREATININE 2.6*   CALCIUM 8.9   MG 1.6     Lab Results   Component Value Date    CALCIUM 8.9 04/07/2019    PHOS 3.8 04/07/2019       Lab Results   Component Value Date    .8 (H) 04/02/2019    CALCIUM 8.9 04/07/2019    PHOS " 3.8 04/07/2019       Lab Results   Component Value Date    URICACID 7.7 (H) 04/01/2019       BNP  Recent Labs   Lab 04/01/19  1617   *       Medications:  Medication list was reviewed and changes noted under Assessment/Plan.    Diagnostic Results:        ASSESSMENT/PLAN:     1. Initial Multi factorial ROXANNE due to cardiorenal syndrome and acute anemia that may have progressed to ATN at this point on CKD likely due to DM (underlying retinopathy) with proteinuria. Hyperkalemia.  -Baseline Cr 2.5 and recent Pr/ Cr ration of 2.34g/day.  -she still making a reasonable amount of urine.    -quantified 24 hr urine with 714 mg of protein.  Minimal SOPHIE titer.   -echo noted with hyperdynamic ventricle, diastolic dysfunction and severe pulmonary hypertension.  Discussed with Cardiology and feels that it is not a congested picture.  -Extensive discussion with treatment team and family at bedside about options yesterday.  Offered trial of dialysis to see how she will tolerate.  Family agreed and had trialysis placed by CCT and underwent 3 HD sessions.  Issues with pt trying to pull out dialysis line.    -Plan on holding dialysis and monitor renal trends over weekend which so far are trending up. Baseline though 2.5. Continue to monitor overnight. Not a good candidate for long-term dialysis.     2. ABLA/ Anemia of chronic disease/ Recent diverticular bleed/ Hx GERD  -repeat iron, B12, folate within limits  -monitor Hgb  -changed PPI to H2B  -started AHSAN      3. Acute Hypercapnic on Chronic Respiratory Failure due to ? Restrictive lung disease/ severe Pulmonary HTN ? Mixed respiratory and metabolic acidosis  -ABG noted  -Bipap prn.       4. ?PFO/ Pulm HTN/ Diastolic CHF/ A. Fib  -On renal dose Eliquis  -cardiac consult noted       5. Hyponatremia  -developed with diuresis  -corrected with dialysis        6. Chronic gout and Hyperuricemia  -was on Allopurinol now off.  -level noted.  -colchicine X 1 for attack has resolved  issue       7.  Mixed anion gap metabolic acidosis with hyperkalemia:  -corrected with HD    8.  Hypo Mag:  -replace IV/PO prn again today.    9. Vit D/HPTH:  -ergo/calcitriol

## 2019-04-07 NOTE — PROGRESS NOTES
Cardiology  Progress Notes            Subjective:  Sleeping soundly, on BiPAP    Vital Signs:  Vitals:    04/07/19 1023 04/07/19 1025 04/07/19 1058 04/07/19 1140   BP:   (!) 160/75 (!) 163/70   BP Location:    Right arm   Patient Position:    Lying   Pulse: (!) 48 (!) 49  67   Resp:    19   Temp:    98.6 °F (37 °C)   TempSrc:    Oral   SpO2:    (!) 94%   Weight:       Height:           Physical Exam:  Chest clear  Heart regular no gallop.  Extremities trace ankle edema.  Laboratory:  CBC:   Recent Labs   Lab 04/07/19  0521   WBC 10.05   RBC 3.05*   HGB 8.9*   HCT 28.7*      MCV 94   MCH 29.2   MCHC 31.0*     BMP:   Recent Labs   Lab 04/07/19  0401         K 4.3      CO2 30*   BUN 39*   CREATININE 2.6*   CALCIUM 8.9   MG 1.6     CMP:   Recent Labs   Lab 04/02/19  0324  04/07/19  0401   *   < > 104   CALCIUM 8.1*   < > 8.9   ALBUMIN 2.5*  --   --    PROT 6.1  --   --    *   < > 138   K 6.3*   < > 4.3   CO2 13*   < > 30*   CL 97   < > 102   *   < > 39*   CREATININE 4.8*   < > 2.6*   ALKPHOS 103  --   --    ALT 20  --   --    AST 27  --   --    BILITOT 0.4  --   --     < > = values in this interval not displayed.     LFTs:   Recent Labs   Lab 04/02/19 0324   ALT 20   AST 27   ALKPHOS 103   BILITOT 0.4   PROT 6.1   ALBUMIN 2.5*     Coagulation: No results for input(s): PT, INR, APTT in the last 168 hours.  Cardiac markers: No results for input(s): CKMB, CPKMB, TROPONINT, TROPONINI, MYOGLOBIN in the last 168 hours.    Imaging:  X-Ray Chest AP Portable   Final Result      As above         Electronically signed by: Bryant Galeana MD   Date:    04/07/2019   Time:    06:37      X-Ray Chest 1 View   Final Result      Please see above         Electronically signed by: Tez Garza DO   Date:    04/02/2019   Time:    14:19      CT Chest Without Contrast   Final Result      1. Mild cardiomegaly and pulmonary interstitial edema without pleural effusion.   2. Diffuse mild dilatation  of the pulmonary arteries suggesting a component of pulmonary arterial hypertension.   3. Mild distention of the IVC and hepatic veins as can be seen in the setting of pulmonary arterial hypertension and/or right heart dysfunction.         Electronically signed by: El Hollingsworth   Date:    04/02/2019   Time:    08:48      X-Ray Chest 1 View    (Results Pending)         Problems:   1. Acute on chronic hypercapnic respiratory failure  2. Hypertensive heart disease  3. Chronic renal failure  4. Anemia of chronic disease  5.   Paroxysmal atrial fibrillation      Plan of Care: See Orders          Juno Edge

## 2019-04-07 NOTE — PLAN OF CARE
Problem: Adult Inpatient Plan of Care  Goal: Plan of Care Review  Outcome: Ongoing (interventions implemented as appropriate)  Called for PRN rx and ABG, results given to PRESTON Mays. Patient placed on Bipap.

## 2019-04-08 PROBLEM — R40.0 DROWSINESS: Status: RESOLVED | Noted: 2019-04-07 | Resolved: 2019-04-08

## 2019-04-08 PROBLEM — R19.7 DIARRHEA: Status: ACTIVE | Noted: 2019-04-08

## 2019-04-08 PROBLEM — J96.02 ACUTE RESPIRATORY FAILURE WITH HYPERCAPNIA: Status: ACTIVE | Noted: 2019-04-01

## 2019-04-08 PROBLEM — R53.81 PHYSICAL DECONDITIONING: Status: ACTIVE | Noted: 2019-04-08

## 2019-04-08 PROBLEM — E83.42 HYPOMAGNESEMIA: Status: RESOLVED | Noted: 2019-04-02 | Resolved: 2019-04-08

## 2019-04-08 LAB
ANION GAP SERPL CALC-SCNC: 6 MMOL/L (ref 8–16)
BUN SERPL-MCNC: 42 MG/DL (ref 8–23)
C DIFF GDH STL QL: NEGATIVE
C DIFF TOX A+B STL QL IA: NEGATIVE
CALCIUM SERPL-MCNC: 9.2 MG/DL (ref 8.7–10.5)
CHLORIDE SERPL-SCNC: 102 MMOL/L (ref 95–110)
CO2 SERPL-SCNC: 29 MMOL/L (ref 23–29)
CREAT SERPL-MCNC: 2.6 MG/DL (ref 0.5–1.4)
EST. GFR  (AFRICAN AMERICAN): 19 ML/MIN/1.73 M^2
EST. GFR  (NON AFRICAN AMERICAN): 16 ML/MIN/1.73 M^2
GLUCOSE SERPL-MCNC: 117 MG/DL (ref 70–110)
MAGNESIUM SERPL-MCNC: 1.7 MG/DL (ref 1.6–2.6)
POCT GLUCOSE: 107 MG/DL (ref 70–110)
POCT GLUCOSE: 133 MG/DL (ref 70–110)
POCT GLUCOSE: 158 MG/DL (ref 70–110)
POCT GLUCOSE: 169 MG/DL (ref 70–110)
POTASSIUM SERPL-SCNC: 4.5 MMOL/L (ref 3.5–5.1)
SODIUM SERPL-SCNC: 137 MMOL/L (ref 136–145)

## 2019-04-08 PROCEDURE — 97530 THERAPEUTIC ACTIVITIES: CPT

## 2019-04-08 PROCEDURE — 25000003 PHARM REV CODE 250: Performed by: INTERNAL MEDICINE

## 2019-04-08 PROCEDURE — 80048 BASIC METABOLIC PNL TOTAL CA: CPT

## 2019-04-08 PROCEDURE — 99900035 HC TECH TIME PER 15 MIN (STAT)

## 2019-04-08 PROCEDURE — 87449 NOS EACH ORGANISM AG IA: CPT

## 2019-04-08 PROCEDURE — 94761 N-INVAS EAR/PLS OXIMETRY MLT: CPT

## 2019-04-08 PROCEDURE — 86580 TB INTRADERMAL TEST: CPT | Performed by: INTERNAL MEDICINE

## 2019-04-08 PROCEDURE — 99233 PR SUBSEQUENT HOSPITAL CARE,LEVL III: ICD-10-PCS | Mod: ,,, | Performed by: INTERNAL MEDICINE

## 2019-04-08 PROCEDURE — 63600175 PHARM REV CODE 636 W HCPCS: Performed by: INTERNAL MEDICINE

## 2019-04-08 PROCEDURE — 83735 ASSAY OF MAGNESIUM: CPT

## 2019-04-08 PROCEDURE — 25000242 PHARM REV CODE 250 ALT 637 W/ HCPCS: Performed by: INTERNAL MEDICINE

## 2019-04-08 PROCEDURE — 94660 CPAP INITIATION&MGMT: CPT

## 2019-04-08 PROCEDURE — 25000003 PHARM REV CODE 250: Performed by: PHYSICIAN ASSISTANT

## 2019-04-08 PROCEDURE — 94640 AIRWAY INHALATION TREATMENT: CPT

## 2019-04-08 PROCEDURE — 99233 SBSQ HOSP IP/OBS HIGH 50: CPT | Mod: ,,, | Performed by: INTERNAL MEDICINE

## 2019-04-08 PROCEDURE — 11000001 HC ACUTE MED/SURG PRIVATE ROOM

## 2019-04-08 RX ORDER — HYDRALAZINE HYDROCHLORIDE 25 MG/1
25 TABLET, FILM COATED ORAL ONCE
Status: COMPLETED | OUTPATIENT
Start: 2019-04-08 | End: 2019-04-08

## 2019-04-08 RX ADMIN — CALCITRIOL 0.25 MCG: 0.25 CAPSULE ORAL at 09:04

## 2019-04-08 RX ADMIN — FAMOTIDINE 20 MG: 20 TABLET, FILM COATED ORAL at 09:04

## 2019-04-08 RX ADMIN — APIXABAN 2.5 MG: 2.5 TABLET, FILM COATED ORAL at 09:04

## 2019-04-08 RX ADMIN — TUBERCULIN PURIFIED PROTEIN DERIVATIVE 5 UNITS: 5 INJECTION, SOLUTION INTRADERMAL at 02:04

## 2019-04-08 RX ADMIN — NIFEDIPINE 30 MG: 30 TABLET, FILM COATED, EXTENDED RELEASE ORAL at 09:04

## 2019-04-08 RX ADMIN — IPRATROPIUM BROMIDE AND ALBUTEROL SULFATE 3 ML: .5; 3 SOLUTION RESPIRATORY (INHALATION) at 06:04

## 2019-04-08 RX ADMIN — HYDRALAZINE HYDROCHLORIDE 25 MG: 25 TABLET, FILM COATED ORAL at 10:04

## 2019-04-08 RX ADMIN — ATORVASTATIN CALCIUM 40 MG: 20 TABLET, FILM COATED ORAL at 09:04

## 2019-04-08 RX ADMIN — Medication 400 MG: at 09:04

## 2019-04-08 RX ADMIN — ONDANSETRON 8 MG: 8 TABLET, ORALLY DISINTEGRATING ORAL at 05:04

## 2019-04-08 NOTE — ASSESSMENT & PLAN NOTE
-   Lab Results   Component Value Date    HGBA1C 6.6 (H) 04/01/2019       - Low dose SSI, not on any long acting as sugars were low  - monitor sugars

## 2019-04-08 NOTE — PLAN OF CARE
Problem: Occupational Therapy Goal  Goal: Occupational Therapy Goal  Goals to be met by: 4/12/2019     Patient will increase functional independence with ADLs by performing:    UE Dressing with Minimal Assistance.  LE Dressing with Moderate Assistance.  Grooming while seated with Stand-by Assistance.  MET 4/8/2019  Toileting from bedside commode with Minimal Assistance for hygiene and clothing management.   Toilet transfer to bedside commode with Moderate Assistance.  Pt will follow 75% of one step commands.         Pt is making progress towards goals.     RAHEEL Wiggins  4/8/2019

## 2019-04-08 NOTE — PLAN OF CARE
Problem: Adult Inpatient Plan of Care  Goal: Plan of Care Review  Outcome: Ongoing (interventions implemented as appropriate)  Pt on RA. Sats 93%. No distress noted. PRN tx not needed. Will continue to monitor.

## 2019-04-08 NOTE — PLAN OF CARE
Spoke with pt and family at bedside.  They have been reviewing NH for several months, [facility]    Top choices for SNF to alf: [facility], [facility], [facility], [facility] and [facility].      All clinicals sent via referral in [facility].  Spoke with [facility], and [facility] , they will reach out to family to arrange a tour.  Aura at [facility] states chart is under review by DON.      CM to continue to follow.       04/08/19 1416   Discharge Reassessment   Assessment Type Discharge Planning Reassessment   Provided patient/caregiver education on the expected discharge date and the discharge plan Yes   Do you have any problems affording any of your prescribed medications? No   Discharge Plan A Skilled Nursing Facility   Discharge Plan B New Nursing Home placement - alf care facility   DME Needed Upon Discharge  none   Anticipated Discharge Disposition SNF

## 2019-04-08 NOTE — ASSESSMENT & PLAN NOTE
- Hypercapnic respiratory failure, treated with BiPAP as needed initially, resumed 4/7 due to over sedation from risperidone (discontinued)  - Multifactorial, due to heart failure with interstitial edema, and restrictive lung disease

## 2019-04-08 NOTE — PT/OT/SLP PROGRESS
Occupational Therapy   Treatment    Name: Joyce Riley  MRN: [id number]  Admitting Diagnosis:  Acute kidney injury superimposed on chronic kidney disease       Recommendations:     Discharge Recommendations: SNF  Discharge Equipment Recommendations:  none  Barriers to discharge:  None    Assessment:     Joyce Riley is a 85 y.o. female with a medical diagnosis of Acute kidney injury superimposed on chronic kidney disease.  She presents with the following performance deficits affecting function are weakness, impaired endurance, impaired self care skills, impaired balance, gait instability, impaired functional mobilty, decreased safety awareness, edema, decreased lower extremity function.  Pt pleasant and agreeable to participating in therapy.  She demonstrated improvement with level of alertness and participation as compared to evaluation on Friday (4/5/19).  In addition, pt able to perform grooming task while seated up in chair with SBA.  Pt displayed some fatigue during UB exercises, but overall tolerated session well.  OT returned about an hour after session ended to assist pt with getting back to bed.  Pt required Max A and RW (with assist of 2) to perform stand pivot from chair to bed with cues required for sequencing of task and RW management.  At this time pt requires increased level of assist for ADLs and mobility.  Pt would continue to benefit from skilled OT services to address problems listed above and increase independence with ADLs.  SNF is recommended upon d/c from acute care to further address deficits and help pt improve overall functional independence.            Rehab Prognosis:  Good; patient would benefit from acute skilled OT services to address these deficits and reach maximum level of function.       Plan:     Patient to be seen 4 x/week to address the above listed problems via self-care/home management, therapeutic activities, therapeutic exercises  · Plan of Care Expires:   "  · Plan of Care Reviewed with: patient, family    Subjective     Pain/Comfort:  · Pain Rating 1: 0/10  · Pain Rating Post-Intervention 1: 0/10    Objective:     Communicated with: RN prior to session.  Patient found up in chair with central line, mclaughlin catheter, telemetry; (daughter and son present) upon OT entry to room.    General Precautions: Standard, fall   Orthopedic Precautions:N/A   Braces: N/A     Occupational Performance:     Bed Mobility:    · Patient completed Scooting/Bridging with total assistance via draw sheet towards HOB  · Patient completed Supine to Sit with moderate assistance for LE management     Functional Mobility/Transfers:  · Stand Pivot from chair <> bed:  Max A and RW (with assist of 2).  OT stood in front of pt while RN stood on pt's right side.  Additional nurse present standing behind pt for safety.  During transfer pt stated, "I can't do this" and attempted to sit down mid way.  OT and RN provided encouragement and cues to help pt safely moved body and sit at EOB.    · Functional Mobility: Pt unable to take steps this date    Activities of Daily Living:  · Grooming: stand by assistance for washing face with cloth while seated up in chair.  · Feeding:  SBA for taking sips of water from styafoam cup      [facility] 6 Click ADL: 14    Treatment & Education:  *Pt performed grooming task as noted above  *Pt performed UB exercises while seated up in chair to promote increased endurance and ROM needed for ADLs.  Education provided on purpose of performing exercises; pt and family verbalized understanding.  Fatigue noted during task.  Rest break taken in between each set.  -Shoulder flexion: 2 sets x 10 reps  -Chest press: 2 sets x 10 reps  -Bicep curls: 2 sets x 5 reps  -Wrist flexion/extension: 2 sets x 10 reps  *Pt performed seated marches: 2 sets x 10 reps to provide stretch  *POC reviewed with pt.      Other:   *At end of session pt expressed she would like to remain seated up in chair.  OT " returned an hour later with RN (Mary Alice) to assist pt with performing stand pivot transfer back to bed    Patient left HOB elevated with call button in reach and RN and son presentEducation:  ; LUE positioned in elevated position on purple wedge cushion and (B) LE elevated in recliner    GOALS:   Multidisciplinary Problems     Occupational Therapy Goals        Problem: Occupational Therapy Goal    Goal Priority Disciplines Outcome Interventions   Occupational Therapy Goal     OT, PT/OT     Description:  Goals to be met by: 4/12/2019     Patient will increase functional independence with ADLs by performing:    UE Dressing with Minimal Assistance.  LE Dressing with Moderate Assistance.  Grooming while seated with Stand-by Assistance.  MET 4/8/2019  Toileting from bedside commode with Minimal Assistance for hygiene and clothing management.   Toilet transfer to bedside commode with Moderate Assistance.  Pt will follow 75% of one step commands.                         Time Tracking:     OT Date of Treatment: 04/08/19  OT Start Time: 1105  OT Stop Time: 1134  OT Total Time (min): 41 min   *OT returned to room to help pt transfer back to bed from 12:40-12:52    Billable Minutes:Therapeutic Activity 41    RAHEEL Wiggins  4/8/2019

## 2019-04-08 NOTE — ASSESSMENT & PLAN NOTE
Initially  Due to fluid overload  Was able to be taken off bipap until 4/7 when put back on it, which was likely due to med effect  Ct chest showed mild cardiomegaly and pulmonary interstitial edema  Off bipap and on room air, monitor

## 2019-04-08 NOTE — ASSESSMENT & PLAN NOTE
- Anemia seems multifactorial.  She has had chronic blood loss and had a diverticular bleed in December.  No bleeding noted on current presentation but she required transfusions at [facility] and they felt she needed a pill endoscopy as the rest of the workup was negative.  - Additional contributing factor of chronic kidney disease.  - currently stable  - monitor labs , on epogen

## 2019-04-08 NOTE — PLAN OF CARE
KENDAL contacted [place] [facility], patient does not require a LOCET as she already has a medicaid wavier.     No mental health dx listed. KENDAL completed [place] and faxed in to the state with KAM Ley email address listed for 611       04/08/19 8888   Post-Acute Status   Post-Acute Authorization Placement   Post-Acute Placement Status Referrals Sent

## 2019-04-08 NOTE — ASSESSMENT & PLAN NOTE
- Anemia seems multifactorial.  She has had chronic blood loss and had a diverticular bleed in December.  No bleeding noted on current presentation but she required transfusions at [facility] [place] [facility] and they felt she needed a pill endoscopy as the rest of the workup was negative.  - Additional contributing factor of chronic kidney disease.  - Stable currently on epogen

## 2019-04-08 NOTE — ASSESSMENT & PLAN NOTE
With likely some underlying dementia   soft restraints as needed  Dcd resperidol as caused drowsiness  Will likely not need it with removal of mclaughlin and hd line

## 2019-04-08 NOTE — PROGRESS NOTES
"Nephrology  Progress Note    Admit Date: 4/1/2019   LOS: 7 days     SUBJECTIVE:     Follow-up For:  ROXANNE on CKD    Interval History:     C/o nausea and dizziness this am.  Diarrhea about the same.  Labs stable and UOP great.  Extensive discussion with pt/family/Dr. Mc at bedside.  See below.     Review of Systems:  Constitutional: No fever or chills  Respiratory:  shortness of breath better.   Cardiovascular: No chest pain or palpitations  Gastrointestinal: No nausea or vomiting  Neurological: No confusion or weakness    OBJECTIVE:     Vital Signs Range (Last 24H):  BP (!) (P) 169/74   Pulse 87   Temp 97.9 °F (36.6 °C) (Oral)   Resp 20   Ht 5' 4" (1.626 m)   Wt 93.1 kg (205 lb 4 oz)   SpO2 (!) 92%   Breastfeeding? No   BMI 35.23 kg/m²     Temp:  [97.9 °F (36.6 °C)-99 °F (37.2 °C)]   Pulse:  [48-87]   Resp:  [18-24]   BP: (160-176)/(70-75)   SpO2:  [91 %-94 %]     I & O (Last 24H):    Intake/Output Summary (Last 24 hours) at 4/8/2019 0950  Last data filed at 4/8/2019 0600  Gross per 24 hour   Intake 1200 ml   Output 1925 ml   Net -725 ml       Physical Exam:  General appearance:  Elderly/frail and pleasantly confused at times.    Eyes:  Conjunctivae/corneas clear.   Lungs: few rhonchi.   Heart: Regular rate and rhythm, S1, S2 normal, + murmur  Abdomen: Soft, non-tender non-distended; bowel sounds normal; no masses,  no organomegaly, obese  Extremities: No cyanosis or clubbing. 1+ edema.    Skin: Skin color, texture, turgor normal. No rashes or lesions  Neurologic:  No focal numbness or weakness   Nava catheter  Right IJ Trialysis    Laboratory Data:  No results for input(s): WBC, RBC, HGB, HCT, PLT, MCV, MCH, MCHC in the last 24 hours.    BMP:   Recent Labs   Lab 04/08/19  0320   *      K 4.5      CO2 29   BUN 42*   CREATININE 2.6*   CALCIUM 9.2   MG 1.7     Lab Results   Component Value Date    CALCIUM 9.2 04/08/2019    PHOS 3.8 04/07/2019       Lab Results   Component Value Date    " .8 (H) 04/02/2019    CALCIUM 9.2 04/08/2019    PHOS 3.8 04/07/2019       Lab Results   Component Value Date    URICACID 7.7 (H) 04/01/2019       BNP  Recent Labs   Lab 04/01/19  1617   *       Medications:  Medication list was reviewed and changes noted under Assessment/Plan.    Diagnostic Results:        ASSESSMENT/PLAN:     1. Initial Multi factorial ROXANNE due to cardiorenal syndrome and acute anemia that may have progressed to ATN at this point on CKD likely due to DM (underlying retinopathy) with proteinuria. Hyperkalemia.  -Baseline Cr 2.5 and recent Pr/ Cr ration of 2.34g/day.  -making a good amount of urine.    -quantified 24 hr urine with 714 mg of protein.  Minimal SOPHIE titer.   -echo noted with hyperdynamic ventricle, diastolic dysfunction and severe pulmonary hypertension.  Discussed with Cardiology and feels that it is not a congested picture.  -Extensive discussion with treatment team and family at bedside about options yesterday.  Offered trial of dialysis to see how she will tolerate.  Family agreed and had trialysis placed by CCT and underwent 3 HD sessions.  Issues with pt trying to pull out dialysis line on each session.    -held HD over weekend and creat remains stable and likely at her baseline.  No need for further HD at this time.  Explained to family in detail that with her age, mental status, heart fxn, etc that she would not be a great candidate for long term HD.  Would benefit from Hospice eval for long term goals.  Remove HD line and mclaughlin.       2. ABLA/ Anemia of chronic disease/ Recent diverticular bleed/ Hx GERD  -repeat iron, B12, folate within limits  -monitor Hgb  -changed PPI to H2B  -started AHSAN      3. Acute Hypercapnic on Chronic Respiratory Failure due to ? Restrictive lung disease/ severe Pulmonary HTN ? Mixed respiratory and metabolic acidosis  -ABG noted  -Bipap prn.       4. ?PFO/ Pulm HTN/ Diastolic CHF/ A. Fib  -On renal dose Eliquis  -cardiac consult  noted       5. Hyponatremia  -developed with diuresis  -corrected with dialysis        6. Chronic gout and Hyperuricemia  -was on Allopurinol now off.  -level noted.  -colchicine X 1 for attack has resolved issue       7.  Mixed anion gap metabolic acidosis with hyperkalemia:  -corrected with HD    8.  Hypo Mag:  -replace IV/PO prn     9. Vit D/HPTH:  -ergo/calcitriol      Dispo:    See above  No more HD.  Consider palliative care eval for long term goals.

## 2019-04-08 NOTE — SUBJECTIVE & OBJECTIVE
Interval History: Patient on room air, c/o weakness when sat up in bed this am, is confused, did have diarrhea last night but patient says did not, orthostatics from lying to sitting were negative, talked to daughter in room and explained the plan.iReview of Systems   Constitutional: Negative for chills and fever.   HENT: Negative for trouble swallowing.    Respiratory: Positive for shortness of breath. Negative for cough.    Cardiovascular: Negative for chest pain and leg swelling.   Gastrointestinal: Positive for diarrhea. Negative for abdominal pain, nausea and vomiting.   Genitourinary: Negative for dysuria.   Musculoskeletal: Negative for gait problem.   Skin: Negative for rash.   Neurological: Positive for weakness. Negative for headaches.   Psychiatric/Behavioral: Positive for confusion.     Objective:     Vital Signs (Most Recent):  Temp: 97 °F (36.1 °C) (04/08/19 0952)  Pulse: 82 (04/08/19 0952)  Resp: (!) 24 (04/08/19 0952)  BP: (!) 166/77 (04/08/19 0952)  SpO2: (!) 91 % (04/08/19 0952) Vital Signs (24h Range):  Temp:  [97 °F (36.1 °C)-99 °F (37.2 °C)] 97 °F (36.1 °C)  Pulse:  [67-87] 82  Resp:  [18-24] 24  SpO2:  [91 %-94 %] 91 %  BP: (160-176)/(70-77) 166/77     Weight: 93.1 kg (205 lb 4 oz)  Body mass index is 35.23 kg/m².    Intake/Output Summary (Last 24 hours) at 4/8/2019 1032  Last data filed at 4/8/2019 0600  Gross per 24 hour   Intake 1200 ml   Output 1650 ml   Net -450 ml      Physical Exam   Constitutional: No distress.   obese   HENT:   Head: Normocephalic and atraumatic.   Eyes: Pupils are equal, round, and reactive to light. Conjunctivae and EOM are normal.   Neck: Normal range of motion. Neck supple.   Cardiovascular: Normal rate and regular rhythm.   Pulmonary/Chest: Breath sounds normal.   Decreased breath sounds at bases, mild tachypnea   Abdominal: Soft. Bowel sounds are normal. She exhibits no distension. There is no tenderness.   Musculoskeletal: Normal range of motion.   Strength 4/5  bilateral, no redness or swelling seen in feet, 2 plus pulses, 1 -2 plus edema bilateral lower extremities   Neurological: No cranial nerve deficit.   Awake, oriented to person,  follows commands   Skin: Skin is warm.   Psychiatric:   confused   Vitals reviewed.      Significant Labs:   BMP:   Recent Labs   Lab 04/08/19  0320   *      K 4.5      CO2 29   BUN 42*   CREATININE 2.6*   CALCIUM 9.2   MG 1.7     CBC:   Recent Labs   Lab 04/07/19  0521   WBC 10.05   HGB 8.9*   HCT 28.7*          Significant Imaging: I have reviewed all pertinent imaging results/findings within the past 24 hours.

## 2019-04-08 NOTE — ASSESSMENT & PLAN NOTE
- Has grade II diastolic dysfunction associated with restrictive lung disease and pulmonary HTN.    - Continue diuretics as needed.

## 2019-04-08 NOTE — PLAN OF CARE
Problem: Physical Therapy Goal  Goal: Physical Therapy Goal  Goals to be met by 4-12-19.  1.Roll R/L mod I   2.Sup<>sit mod I  2. Evaluate Sit<>stand when appropriate-REVISED 4/6/19  Sit to stand with min A and RW   3. Evaluate transfer to w/c when appropriate-REVISED 4/6/19  Transfer to chair with mod A  4. Evaluate amb when appropriate-REVISED 4/6/19  Pt amb 5' with RW and Mod A      Outcome: Ongoing (interventions implemented as appropriate)    Patient tolerated treatment session well but still presents with limited endurance and strength. Bed mobility with Moderate A, transfers Moderate A of 2 people, SPT from bed to bedside chair with RW with Min A.

## 2019-04-08 NOTE — ASSESSMENT & PLAN NOTE
- Patient transferred here 4/1 for consideration of HD after an 11 day stay at [facility] where she developed severe azotemia and doubled baseline creatinine from 2.3 to 4.5.  - Patient dialyzed 4/2-4/4 and HD was discontinued.  Since then creatinine has worsened but is returned to her baseline.  - Urine output improved without diuretics  - Poor long term HD candidate due to confusion and frailty, advanced age.  Family has been resistant to stopping.  - Family considering patient's code status, no compressions for now in chart  - Palliative care consulted for advance directives and goals of care

## 2019-04-08 NOTE — ASSESSMENT & PLAN NOTE
- Patient admitted on 3/20/19 to [facility] with heart failure symptoms and anemia.  Developed severe azotemia and doubled baseline creatinine from 2.3 to 4.5 while there.    - Patient and family have been told she is not a HD candidate.  Likely this is due to severe chronic underlying problems including chronic restrictive lung disease, heart failure with a right to left shunt, atrial fibrillation and pulmonary hypertension.  - Nephrology following  - had high bun/creatinine on admit   - family decided to go ahead with hd   - s/p hd on 4/2 , 4/3, 4/4  - creatinine worsening since hd stopped but back to her baseline  - no plan for hd , dc hd catheter today  - had good diuresis yesterday evening without diuretics  - talked to son yesterday and daughter today, explained not a good long term HD candidate with confusion, age,  not sure if ready to accept that as her kidney function will get worse in future  - family to decide on her code status, no compressions for now in chart  - will consult palliative care  For advance directives and goals of care

## 2019-04-08 NOTE — PROGRESS NOTES
Nava catheter and HD access removed.  Pt tolerated procedures well.  Urine output of 1000 mL recorded.  Pressure applied to pt's left neck for 15 minutes.  No bleeding observed.  Gauze and tegaderm dressing applied.

## 2019-04-08 NOTE — PLAN OF CARE
[facility]   Department of Hospital Medicine  [address], [place] [address]  [phone] (phone)  [phone] (fax)      Facility Transfer Orders                        04/09/2019    Joyce Riley    Admit to: SNF    Diagnoses:  Active Hospital Problems    Diagnosis  POA    *Acute kidney injury superimposed on chronic kidney disease [N17.9, N18.9]  Yes    Diarrhea [R19.7]  Yes    Physical deconditioning [R53.81]  Yes    Delirium [R41.0]  Yes    ESRD (end stage renal disease) [N18.6]  Yes    Renovascular hypertension [I15.0]  Yes    Hypomagnesemia [E83.42]  Yes    CKD (chronic kidney disease), stage IV [N18.4]  Yes    Patent foramen ovale with right to left shunt [Q21.1]  Not Applicable    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 [I27.23]  Yes     Chronic    Acute on chronic diastolic heart failure [I50.33]  Yes    Anemia [D64.9]  Yes    Visual disturbance [H53.9]  Yes    Acute respiratory failure [J96.00]  Yes    Type 2 diabetes mellitus, with long-term current use of insulin [E11.9, Z79.4]  Not Applicable      Resolved Hospital Problems    Diagnosis Date Resolved POA    Drowsiness [R40.0] 04/08/2019 No    Hypokalemia [E87.6] 04/07/2019 No    Foot pain [M79.673] 04/07/2019 Yes    Hyperkalemia [E87.5] 04/04/2019 Yes    Metabolic acidosis [E87.2] 04/04/2019 Yes    Hyponatremia [E87.1] 04/05/2019 Yes       Allergies:Review of patient's allergies indicates:  No Known Allergies    Vitals:       Every shift (Skilled Nursing patients)    Diet: Diabetic 2000 calorie, Renal     Supplement:  1 can every three times a day with meals                         Type:    Nepro     Activity:      - Up in a chair each morning as tolerated   - Ambulate with assistance to bathroom    Nursing Precautions:     - Fall precautions   - Decubitus precautions:   - Pressure reducing foam mattress   - Turn patient every two hours. Use wedge pillows to anchor  "patient    Oxygen to keep sats >91%  BiPAP with settings 10/5 at night and as needed for shortness of breath during the day.  Titrate settings for patient comfort.    CONSULTS:      PT to evaluate and treat - three times a week     OT to evaluate and treat - three times a week     ST to evaluate and treat     Nutrition to evaluate and recommend diet        DIABETES CARE:      Check blood sugar:      Fingerstick blood sugar AC and HS   Fingerstick blood sugar every 6 hours if unable to eat      Report CBG < 60 or > 400 to physician.                                       **LOW CORRECTION DOSE**   Blood Glucose   mg/dL                  Pre-meal                Bedtime   151-200                0 unit                      0 unit   201-250                2 units                    1 unit   251-300                3 units                    1 unit   301-350                4 units                    2 units   >350                     5 units                    3 units   Administer subcutaneously if needed at times designated by monitoring schedule.    DO NOT HOLD correction dose insulin for patients who are  NPO.   "HIGH ALERT MEDICATION" - Administer with meals or TF/TPN.            Medications: Discontinue all previous medication orders, if any. See new list below.   Joyce Riley   Home Medication Instructions WILLA:[id number]    Printed on:04/09/19 4099   Medication Information                      acetaminophen (TYLENOL) 325 MG tablet  Take 2 tablets (650 mg total) by mouth every 4 (four) hours as needed.             albuterol-ipratropium (DUO-NEB) 2.5 mg-0.5 mg/3 mL nebulizer solution  Take 3 mLs by nebulization every 4 (four) hours as needed for Wheezing. Rescue             apixaban (ELIQUIS) 2.5 mg Tab  Take 2.5 mg by mouth 2 (two) times daily.             atorvastatin (LIPITOR) 40 MG tablet  Take 1 tablet (40 mg total) by mouth once daily.             calcitRIOL (ROCALTROL) 0.25 MCG Cap  Take 1 capsule (0.25 " mcg total) by mouth once daily.             ergocalciferol (ERGOCALCIFEROL) 50,000 unit Cap  Take 1 capsule (50,000 Units total) by mouth every 7 days.             famotidine (PEPCID) 20 MG tablet  Take 1 tablet (20 mg total) by mouth once daily.             furosemide (LASIX) 40 MG tablet  Take 1 tablet (40 mg total) by mouth 2 (two) times daily.             insulin degludec (TRESIBA FLEXTOUCH U-100) 100 unit/mL (3 mL) InPn  Inject 5 Units into the skin every evening.             NIFEdipine (PROCARDIA-XL) 30 MG (OSM) 24 hr tablet  Take 1 tablet (30 mg total) by mouth once daily.                   _______________________  Es Chinchilla MD  04/09/2019

## 2019-04-08 NOTE — ASSESSMENT & PLAN NOTE
Dcd norvasc ,on procardia 30 mg  Holding  coreg with bradycardia 4/7  ekg showed sinus kranthi  monitor

## 2019-04-08 NOTE — PROGRESS NOTES
Lab results showed patient negative for C. Diff antigen and toxin. Special Contact isolation dc'd.

## 2019-04-08 NOTE — PROGRESS NOTES
[facility] Hospital Medicine  Progress Note    Patient Name: Joyce Riley  MRN: [id number]  Patient Class: IP- Inpatient   Admission Date: 4/1/2019  Length of Stay: 7 days  Attending Physician: Thelma Mc MD  Primary Care Provider: Kalpana Staton MD        Subjective:     Principal Problem:Acute kidney injury superimposed on chronic kidney disease    HPI:  Ms. Riley is an 85 year old woman who was transferred here from [facility] for a second opinion regarding starting HD vs entering hospice care.  The patient was hospitalized there on 3/20/19 with shortness of breath and lower extremity edema and was found to have fluid overload with heart failure and anemia with H/H 7.8/25.  She was transfused 2 units PRBCs and extensively diuresed while there.  Creatinine was initially 2.3 and gradually worsened to 4.5 at the time of transfer here.  Azotemia was out of proportion to the creatinine with an increase in BUN to 150.  On nephrology evaluation they did not feel she was a candidate for dialysis and consulted palliative care to follow.  Family requested a transfer to see a different nephrology service to see if they agreed.    Medical history includes chronic diastolic heart failure with pulmonary hypertension associated with chronic restrictive lung disease.  She has a patent foramen ovale and a right to left shunt that was thought to contribute to a stroke she had in 2014.  She is anemic and had a diverticular hemorrhage last December while on warfarin.  After recovery she was started on apixaban.  She has paroxysmal atrial fibrillation that is rate controlled, hypertension, diabetes and stage IV CKD.  She has chronically elevated alkaline phosphatase that is not thought to be significant.  At home she uses a wheelchair but can walk to the bathroom with assistance.  She attends an adult day care center.  She used to take allopurinol for gout attacks but this was  discontinued when she had the GI bleed.    Hospital Course:  Echo showed moderate concentric lvh, severe left atrial enlargement, ef 80%, grade 2 diastolic dysfunction, pa pressure 74, no shunting seen with agitated saline.Her bun and creatinine were worsening, family decided to have HD.Ct chest showed . Mild cardiomegaly and pulmonary interstitial edema without pleural effusion.  2. Diffuse mild dilatation of the pulmonary arteries suggesting a component of pulmonary arterial hypertension.. Mild distention of the IVC and hepatic veins as can be seen in the setting of pulmonary arterial hypertension and/or right heart dysfunction.    Patient was started on HD on 4/2/2019, 4/3, 4/4.    Patient was given resperidal on 4/6 and was more drowsy and had to be put on bipap which was discontinued in the evening of 4/7 when patient was more awake.Creatinine stable, no plan for hd now and not a long term candidate for HD per renal recs.    Interval History: Patient on room air, c/o weakness when sat up in bed this am, is confused, did have diarrhea last night but patient says did not, orthostatics from lying to sitting were negative, talked to daughter in room and explained the plan.iReview of Systems   Constitutional: Negative for chills and fever.   HENT: Negative for trouble swallowing.    Respiratory: Positive for shortness of breath. Negative for cough.    Cardiovascular: Negative for chest pain and leg swelling.   Gastrointestinal: Positive for diarrhea. Negative for abdominal pain, nausea and vomiting.   Genitourinary: Negative for dysuria.   Musculoskeletal: Negative for gait problem.   Skin: Negative for rash.   Neurological: Positive for weakness. Negative for headaches.   Psychiatric/Behavioral: Positive for confusion.     Objective:     Vital Signs (Most Recent):  Temp: 97 °F (36.1 °C) (04/08/19 0952)  Pulse: 82 (04/08/19 0952)  Resp: (!) 24 (04/08/19 0952)  BP: (!) 166/77 (04/08/19 0952)  SpO2: (!) 91 % (04/08/19  0952) Vital Signs (24h Range):  Temp:  [97 °F (36.1 °C)-99 °F (37.2 °C)] 97 °F (36.1 °C)  Pulse:  [67-87] 82  Resp:  [18-24] 24  SpO2:  [91 %-94 %] 91 %  BP: (160-176)/(70-77) 166/77     Weight: 93.1 kg (205 lb 4 oz)  Body mass index is 35.23 kg/m².    Intake/Output Summary (Last 24 hours) at 4/8/2019 1032  Last data filed at 4/8/2019 0600  Gross per 24 hour   Intake 1200 ml   Output 1650 ml   Net -450 ml      Physical Exam   Constitutional: No distress.   obese   HENT:   Head: Normocephalic and atraumatic.   Eyes: Pupils are equal, round, and reactive to light. Conjunctivae and EOM are normal.   Neck: Normal range of motion. Neck supple.   Cardiovascular: Normal rate and regular rhythm.   Pulmonary/Chest: Breath sounds normal.   Decreased breath sounds at bases, mild tachypnea   Abdominal: Soft. Bowel sounds are normal. She exhibits no distension. There is no tenderness.   Musculoskeletal: Normal range of motion.   Strength 4/5 bilateral, no redness or swelling seen in feet, 2 plus pulses, 1 -2 plus edema bilateral lower extremities   Neurological: No cranial nerve deficit.   Awake, oriented to person,  follows commands   Skin: Skin is warm.   Psychiatric:   confused   Vitals reviewed.      Significant Labs:   BMP:   Recent Labs   Lab 04/08/19  0320   *      K 4.5      CO2 29   BUN 42*   CREATININE 2.6*   CALCIUM 9.2   MG 1.7     CBC:   Recent Labs   Lab 04/07/19  0521   WBC 10.05   HGB 8.9*   HCT 28.7*          Significant Imaging: I have reviewed all pertinent imaging results/findings within the past 24 hours.    Assessment/Plan:      * Acute kidney injury superimposed on chronic kidney disease  - Patient admitted on 3/20/19 to [facility] with heart failure symptoms and anemia.  Developed severe azotemia and doubled baseline creatinine from 2.3 to 4.5 while there.    - Patient and family have been told she is not a HD candidate.  Likely this is due to severe chronic underlying problems  including chronic restrictive lung disease, heart failure with a right to left shunt, atrial fibrillation and pulmonary hypertension.  - Nephrology following  - had high bun/creatinine on admit   - family decided to go ahead with hd   - s/p hd on 4/2 , 4/3, 4/4  - creatinine worsening since hd stopped but back to her baseline  - no plan for hd , dc hd catheter today  - had good diuresis yesterday evening without diuretics  - talked to son yesterday and daughter today, explained not a good long term HD candidate with confusion, age,  not sure if ready to accept that as her kidney function will get worse in future  - family to decide on her code status, no compressions for now in chart  - will consult palliative care  For advance directives and goals of care    Physical deconditioning  Pt/ot following  Family says cant take home  Skilled eval      Diarrhea  Follow cdiff  monitor      Delirium  With likely some underlying dementia   soft restraints as needed  Dcd resperidol as caused drowsiness  Will likely not need it with removal of mclaughlin and hd line    Renovascular hypertension    Dcd norvasc ,on procardia 30 mg  Holding  coreg with bradycardia 4/7  ekg showed sinus kranthi  monitor      Hypomagnesemia  Replaced iv , continue oral , improved        Acute respiratory failure  Initially  Due to fluid overload  Was able to be taken off bipap until 4/7 when put back on it, which was likely due to med effect  Ct chest showed mild cardiomegaly and pulmonary interstitial edema  Off bipap and on room air, monitor    Visual disturbance  - Patient states her eyes are bothering her but is not much more clear than that.  - Seen by Dr. Thomas (ophthalmology) on 3/28.  Noted cataract surgery both eyes 2013 and history of diabetic retinopathy with macular edema and vitreomacular traction diagnosed in 2013.  Patient had been told to follow up at that time but did not.  On hospital consultation he again recommended outpatient  follow up for a complete eye examination and retina evaluation.      Anemia  - Anemia seems multifactorial.  She has had chronic blood loss and had a diverticular bleed in December.  No bleeding noted on current presentation but she required transfusions at [facility] and they felt she needed a pill endoscopy as the rest of the workup was negative.  - Additional contributing factor of chronic kidney disease.  - currently stable  - monitor labs , on epogen      Acute on chronic diastolic heart failure  - Has grade II diastolic dysfunction.    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      Patent foramen ovale with right to left shunt  -no shunting seen on echo  - on eliquis      CKD (chronic kidney disease), stage IV  - As above.  Baseline creatinine around 2.5.      Type 2 diabetes mellitus, with long-term current use of insulin  -   Lab Results   Component Value Date    HGBA1C 6.6 (H) 04/01/2019       - Low dose SSI, not on any long acting as sugars were low  - monitor sugars        VTE Risk Mitigation (From admission, onward)        Ordered     heparin (porcine) injection 5,000 Units  As needed (PRN)      04/02/19 1001     apixaban tablet 2.5 mg  2 times daily      04/01/19 1716     IP VTE HIGH RISK PATIENT  Once      04/01/19 1404              Thelma Mc MD  Department of Hospital Medicine   [facility]

## 2019-04-08 NOTE — PT/OT/SLP PROGRESS
"Physical Therapy Treatment    Patient Name:  Joyce Riley   MRN:  [id number]    Recommendations:     Discharge Recommendations:  (custodal care vs Hospice per Initial eval )   Discharge Equipment Recommendations: (TBD )   Barriers to discharge: patient will required increase assistance upon discharge     Assessment:     Joyce Riley is a 85 y.o. female admitted with a medical diagnosis of Acute kidney injury superimposed on chronic kidney disease.  She presents with the following impairments/functional limitations:  weakness, impaired endurance, impaired self care skills, impaired balance, gait instability, impaired functional mobilty, decreased safety awareness, edema patient tolerated treatment session well but complained of being dizzy. BP seated 171/74, then after standing 184/84.    Rehab Prognosis: Good; patient would benefit from acute skilled PT services to address these deficits and reach maximum level of function.    Recent Surgery: * No surgery found *      Plan:     During this hospitalization, patient to be seen 5 x/week to address the identified rehab impairments via gait training, therapeutic activities, therapeutic exercises, neuromuscular re-education and progress toward the following goals:    · Plan of Care Expires:  05/05/19    Subjective     Chief Complaint: patient stated " I feel dizzy"   Patient/Family Comments/goals: I want to go home   Pain/Comfort:  · Pain Rating 1: 0/10(patient complained of feeling weak)  · Pain Rating Post-Intervention 1: 0/10      Objective:     Communicated with nurse prior to session.  Patient found supine with central line, mclaughlin catheter, telemetry upon PT entry to room.     General Precautions: Standard, fall   Orthopedic Precautions:N/A   Braces: N/A     Functional Mobility:  · Supine to sit with Moderate Assistance for trunk elevation  · Sit to stand from bedside with Rolling walker with Moderate A of 2 people X 3 trials   · SPT from bed to " bedside chair with Rolling walker with Moderate A for weight shifting and walker management.     AM-PAC 6 CLICK MOBILITY  Turning over in bed (including adjusting bedclothes, sheets and blankets)?: 3  Sitting down on and standing up from a chair with arms (e.g., wheelchair, bedside commode, etc.): 2  Moving from lying on back to sitting on the side of the bed?: 3  Moving to and from a bed to a chair (including a wheelchair)?: 3  Need to walk in hospital room?: 1  Climbing 3-5 steps with a railing?: 1  Basic Mobility Total Score: 13       Therapeutic Activities and Exercises:   patient sat on edge of bed 25 minutes working on trunk and postural control  Patient left up in chair with all lines intact, call button in reach, nurse notified and family present..    GOALS:   Multidisciplinary Problems     Physical Therapy Goals        Problem: Physical Therapy Goal    Goal Priority Disciplines Outcome Goal Variances Interventions   Physical Therapy Goal     PT, PT/OT Ongoing (interventions implemented as appropriate)     Description:  Goals to be met by 4-12-19.  1.Roll R/L mod I   2.Sup<>sit mod I  2. Evaluate Sit<>stand when appropriate-REVISED 4/6/19  Sit to stand with min A and RW   3. Evaluate transfer to w/c when appropriate-REVISED 4/6/19  Transfer to chair with mod A  4. Evaluate amb when appropriate-REVISED 4/6/19  Pt amb 5' with RW and Mod A                       Time Tracking:     PT Received On: 04/08/19  PT Start Time: 0813     PT Stop Time: 0907  PT Total Time (min): 54 min     Billable Minutes: Therapeutic Activity 54    Treatment Type: Treatment  PT/PTA: PTA     PTA Visit Number: 1     Yaquelin Sol PTA  04/08/2019

## 2019-04-08 NOTE — PLAN OF CARE
Problem: Adult Inpatient Plan of Care  Goal: Plan of Care Review  Outcome: Ongoing (interventions implemented as appropriate)  This evening the patient was resting comfortably in no distress, no Bipap or PRN rx given.

## 2019-04-09 VITALS
RESPIRATION RATE: 18 BRPM | TEMPERATURE: 99 F | SYSTOLIC BLOOD PRESSURE: 175 MMHG | BODY MASS INDEX: 35.04 KG/M2 | HEIGHT: 64 IN | WEIGHT: 205.25 LBS | DIASTOLIC BLOOD PRESSURE: 80 MMHG | OXYGEN SATURATION: 97 % | HEART RATE: 84 BPM

## 2019-04-09 LAB
ANION GAP SERPL CALC-SCNC: 7 MMOL/L (ref 8–16)
BUN SERPL-MCNC: 39 MG/DL (ref 8–23)
CALCIUM SERPL-MCNC: 9.8 MG/DL (ref 8.7–10.5)
CHLORIDE SERPL-SCNC: 103 MMOL/L (ref 95–110)
CO2 SERPL-SCNC: 27 MMOL/L (ref 23–29)
CREAT SERPL-MCNC: 2.7 MG/DL (ref 0.5–1.4)
EST. GFR  (AFRICAN AMERICAN): 18 ML/MIN/1.73 M^2
EST. GFR  (NON AFRICAN AMERICAN): 15 ML/MIN/1.73 M^2
GLUCOSE SERPL-MCNC: 162 MG/DL (ref 70–110)
MAGNESIUM SERPL-MCNC: 1.9 MG/DL (ref 1.6–2.6)
POCT GLUCOSE: 157 MG/DL (ref 70–110)
POCT GLUCOSE: 179 MG/DL (ref 70–110)
POTASSIUM SERPL-SCNC: 4.8 MMOL/L (ref 3.5–5.1)
SODIUM SERPL-SCNC: 137 MMOL/L (ref 136–145)

## 2019-04-09 PROCEDURE — 25000003 PHARM REV CODE 250: Performed by: NURSE PRACTITIONER

## 2019-04-09 PROCEDURE — 25000003 PHARM REV CODE 250: Performed by: INTERNAL MEDICINE

## 2019-04-09 PROCEDURE — 36415 COLL VENOUS BLD VENIPUNCTURE: CPT

## 2019-04-09 PROCEDURE — 99900035 HC TECH TIME PER 15 MIN (STAT)

## 2019-04-09 PROCEDURE — 83735 ASSAY OF MAGNESIUM: CPT

## 2019-04-09 PROCEDURE — 94640 AIRWAY INHALATION TREATMENT: CPT

## 2019-04-09 PROCEDURE — 94660 CPAP INITIATION&MGMT: CPT

## 2019-04-09 PROCEDURE — 25000242 PHARM REV CODE 250 ALT 637 W/ HCPCS: Performed by: INTERNAL MEDICINE

## 2019-04-09 PROCEDURE — 80048 BASIC METABOLIC PNL TOTAL CA: CPT

## 2019-04-09 PROCEDURE — 27000221 HC OXYGEN, UP TO 24 HOURS

## 2019-04-09 PROCEDURE — 99238 HOSP IP/OBS DSCHRG MGMT 30/<: CPT | Mod: ,,, | Performed by: HOSPITALIST

## 2019-04-09 PROCEDURE — 25000003 PHARM REV CODE 250: Performed by: PHYSICIAN ASSISTANT

## 2019-04-09 PROCEDURE — 99238 PR HOSPITAL DISCHARGE DAY,<30 MIN: ICD-10-PCS | Mod: ,,, | Performed by: HOSPITALIST

## 2019-04-09 PROCEDURE — 94761 N-INVAS EAR/PLS OXIMETRY MLT: CPT

## 2019-04-09 RX ORDER — ATORVASTATIN CALCIUM 40 MG/1
40 TABLET, FILM COATED ORAL DAILY
Start: 2019-04-10 | End: 2019-06-12

## 2019-04-09 RX ORDER — ACETAMINOPHEN 325 MG/1
650 TABLET ORAL EVERY 4 HOURS PRN
Refills: 0 | Status: ON HOLD | COMMUNITY
Start: 2019-04-09 | End: 2021-05-12 | Stop reason: SDUPTHER

## 2019-04-09 RX ORDER — FUROSEMIDE 40 MG/1
40 TABLET ORAL 2 TIMES DAILY
Status: DISCONTINUED | OUTPATIENT
Start: 2019-04-09 | End: 2019-04-09

## 2019-04-09 RX ORDER — IPRATROPIUM BROMIDE AND ALBUTEROL SULFATE 2.5; .5 MG/3ML; MG/3ML
3 SOLUTION RESPIRATORY (INHALATION) EVERY 4 HOURS PRN
Status: ON HOLD
Start: 2019-04-09 | End: 2021-06-28 | Stop reason: HOSPADM

## 2019-04-09 RX ORDER — FUROSEMIDE 40 MG/1
40 TABLET ORAL 2 TIMES DAILY
Status: ON HOLD
Start: 2019-04-09 | End: 2019-04-17 | Stop reason: HOSPADM

## 2019-04-09 RX ORDER — INSULIN DEGLUDEC 100 U/ML
5 INJECTION, SOLUTION SUBCUTANEOUS NIGHTLY
Status: ON HOLD
Start: 2019-04-09 | End: 2021-01-09 | Stop reason: HOSPADM

## 2019-04-09 RX ORDER — FUROSEMIDE 40 MG/1
40 TABLET ORAL 2 TIMES DAILY
Status: DISCONTINUED | OUTPATIENT
Start: 2019-04-09 | End: 2019-04-09 | Stop reason: HOSPADM

## 2019-04-09 RX ORDER — NIFEDIPINE 30 MG/1
30 TABLET, EXTENDED RELEASE ORAL DAILY
Status: ON HOLD
Start: 2019-04-10 | End: 2019-04-17 | Stop reason: HOSPADM

## 2019-04-09 RX ORDER — CALCITRIOL 0.25 UG/1
0.25 CAPSULE ORAL DAILY
Status: ON HOLD
Start: 2019-04-10 | End: 2019-04-15 | Stop reason: HOSPADM

## 2019-04-09 RX ORDER — FAMOTIDINE 20 MG/1
20 TABLET, FILM COATED ORAL DAILY
Status: ON HOLD
Start: 2019-04-10 | End: 2020-03-13 | Stop reason: HOSPADM

## 2019-04-09 RX ORDER — HYDRALAZINE HYDROCHLORIDE 25 MG/1
25 TABLET, FILM COATED ORAL ONCE
Status: COMPLETED | OUTPATIENT
Start: 2019-04-09 | End: 2019-04-09

## 2019-04-09 RX ORDER — ERGOCALCIFEROL 1.25 MG/1
50000 CAPSULE ORAL
Status: ON HOLD
Start: 2019-04-10 | End: 2021-06-28 | Stop reason: HOSPADM

## 2019-04-09 RX ADMIN — IPRATROPIUM BROMIDE AND ALBUTEROL SULFATE 3 ML: .5; 3 SOLUTION RESPIRATORY (INHALATION) at 05:04

## 2019-04-09 RX ADMIN — FAMOTIDINE 20 MG: 20 TABLET, FILM COATED ORAL at 09:04

## 2019-04-09 RX ADMIN — IPRATROPIUM BROMIDE AND ALBUTEROL SULFATE 3 ML: .5; 3 SOLUTION RESPIRATORY (INHALATION) at 10:04

## 2019-04-09 RX ADMIN — Medication 400 MG: at 09:04

## 2019-04-09 RX ADMIN — FUROSEMIDE 40 MG: 40 TABLET ORAL at 10:04

## 2019-04-09 RX ADMIN — ACETAMINOPHEN 650 MG: 325 TABLET ORAL at 04:04

## 2019-04-09 RX ADMIN — APIXABAN 2.5 MG: 2.5 TABLET, FILM COATED ORAL at 09:04

## 2019-04-09 RX ADMIN — HYDRALAZINE HYDROCHLORIDE 25 MG: 25 TABLET, FILM COATED ORAL at 06:04

## 2019-04-09 RX ADMIN — ATORVASTATIN CALCIUM 40 MG: 20 TABLET, FILM COATED ORAL at 09:04

## 2019-04-09 RX ADMIN — CALCITRIOL 0.25 MCG: 0.25 CAPSULE ORAL at 09:04

## 2019-04-09 RX ADMIN — NIFEDIPINE 30 MG: 30 TABLET, FILM COATED, EXTENDED RELEASE ORAL at 09:04

## 2019-04-09 RX ADMIN — ONDANSETRON 8 MG: 8 TABLET, ORALLY DISINTEGRATING ORAL at 03:04

## 2019-04-09 NOTE — PLAN OF CARE
Spoke with daughter, Cassie [phone].  All paperwork signed.   [facility] has received orcers.  Pt going to room 209B.   Report to be called to [address] Nurse [phone].     Packet provided to bedside RN.   [facility] to arrive by 1530. ADT 30 placed at 1400.  Any issues with transport: please call the Patient Flow Center at EXT: [phone].     No further DC needs from  perspective.       04/09/19 1412   Final Note   Assessment Type Final Discharge Note   What phone number can be called within the next 1-3 days to see how you are doing after discharge? [phone]   Hospital Follow Up  Appt(s) scheduled? Yes   Discharge plans and expectations educations in teach back method with documentation complete? Yes   Right Care Referral Info   Post Acute Recommendation SNF / Sub-Acute Rehab   Facility Name [facility]

## 2019-04-09 NOTE — NURSING
Patient educated on discharge instructions and verbalized understanding.  All questions answered to patient's satisfaction.  Patient to be transported off unit via stretcher to [facility].  Report given to Sandra Sexton at [place].  Patient will travel on 3L 02.

## 2019-04-09 NOTE — PROGRESS NOTES
"Nephrology  Progress Note    Admit Date: 4/1/2019   LOS: 8 days     SUBJECTIVE:     Follow-up For:  ROXANNE on CKD    Interval History:     Confused but able to answer simple questions.  Labored resp at times but more AGUILAR.  Discussed with family at bedside.  Labs noted.  Awaiting palliative care conference today. No CP.      Review of Systems:  Constitutional: No fever or chills  Respiratory:  shortness of breath better.   Cardiovascular: No chest pain or palpitations  Gastrointestinal: No nausea or vomiting  Neurological: No confusion or weakness    OBJECTIVE:     Vital Signs Range (Last 24H):  BP (!) 145/66 (BP Location: Left arm, Patient Position: Lying)   Pulse 82   Temp 98.7 °F (37.1 °C) (Axillary)   Resp 18   Ht 5' 4" (1.626 m)   Wt 93.1 kg (205 lb 4 oz)   SpO2 (!) 94%   Breastfeeding? No   BMI 35.23 kg/m²     Temp:  [98.4 °F (36.9 °C)-98.7 °F (37.1 °C)]   Pulse:  []   Resp:  [18-20]   BP: (145-197)/(66-86)   SpO2:  [92 %-97 %]     I & O (Last 24H):    Intake/Output Summary (Last 24 hours) at 4/9/2019 1031  Last data filed at 4/9/2019 0600  Gross per 24 hour   Intake 1320 ml   Output 1100 ml   Net 220 ml       Physical Exam:  General appearance:  Elderly/frail and pleasantly confused at times.    Eyes:  Conjunctivae/corneas clear.   Lungs: few rhonchi.   Heart: Regular rate and rhythm, S1, S2 normal, + murmur  Abdomen: Soft, non-tender non-distended; bowel sounds normal; no masses,  no organomegaly, obese  Extremities: No cyanosis or clubbing. 1+ edema.    Skin: Skin color, texture, turgor normal. No rashes or lesions  Neurologic:  No focal numbness or weakness; confused/demented.      Laboratory Data:  No results for input(s): WBC, RBC, HGB, HCT, PLT, MCV, MCH, MCHC in the last 24 hours.    BMP:   Recent Labs   Lab 04/09/19  0440   *      K 4.8      CO2 27   BUN 39*   CREATININE 2.7*   CALCIUM 9.8   MG 1.9     Lab Results   Component Value Date    CALCIUM 9.8 04/09/2019    PHOS " 3.8 04/07/2019       Lab Results   Component Value Date    .8 (H) 04/02/2019    CALCIUM 9.8 04/09/2019    PHOS 3.8 04/07/2019       Lab Results   Component Value Date    URICACID 7.7 (H) 04/01/2019       BNP  No results for input(s): BNP, BNPTRIAGEBLO in the last 168 hours.    Medications:  Medication list was reviewed and changes noted under Assessment/Plan.    Diagnostic Results:        ASSESSMENT/PLAN:     1. Initial Multi factorial ROXANNE due to cardiorenal syndrome and acute anemia that may have progressed to ATN at this point on CKD likely due to DM (underlying retinopathy) with proteinuria. Hyperkalemia.  -Baseline Cr 2.5 and recent Pr/ Cr ration of 2.34g/day.  -making a good amount of urine.    -quantified 24 hr urine with 714 mg of protein.  Minimal SOPHIE titer.   -echo noted with hyperdynamic ventricle, diastolic dysfunction and severe pulmonary hypertension.  Discussed with Cardiology and feels that it is not a congested picture.  -Extensive discussion with treatment team and family at bedside about options yesterday.  Offered trial of dialysis to see how she will tolerate.  Family agreed and had trialysis placed by CCT and underwent 3 HD sessions.  Issues with pt trying to pull out dialysis line on each session.    -held HD over weekend and creat remained stable and likely at her baseline.  No need for further HD at this time.  Explained to family in detail that with her age, mental status, heart fxn, etc that she would not be a great candidate for long term HD.  Would benefit from Hospice eval for long term goals and meeting planned for today.  Removed HD line and mclaughlin.    -start lasix po and monitor.  -Renally dose meds, avoid nephrotoxins, and monitor I/O's closely.       2. ABLA/ Anemia of chronic disease/ Recent diverticular bleed/ Hx GERD  -repeat iron, B12, folate within limits  -monitor Hgb  -changed PPI to H2B  -started AHSAN      3. Acute Hypercapnic on Chronic Respiratory Failure due to ?  Restrictive lung disease/ severe Pulmonary HTN ? Mixed respiratory and metabolic acidosis  -ABG noted  -Bipap prn.       4. ?PFO/ Pulm HTN/ Diastolic CHF/ A. Fib  -On renal dose Eliquis  -cardiac consult noted       5. Hyponatremia  -developed with diuresis  -corrected with dialysis        6. Chronic gout and Hyperuricemia  -was on Allopurinol now off.  -level noted.  -colchicine X 1 for attack has resolved issue       7.  Mixed anion gap metabolic acidosis with hyperkalemia:  -corrected with HD    8.  Hypo Mag:  -replace IV/PO prn     9. Vit D/HPTH:  -ergo/calcitriol      Dispo:    See above  No more HD.  Awaiting palliative care eval for long term goals.

## 2019-04-09 NOTE — PLAN OF CARE
Pt accepted to [facility] to assisted.    Spoke with daughter, Cassie Long [phone].  Their first choice is [facility] in [facility].  They plan to go sign paperwork there today before noon.      Still awaiting 142 and signed transfer orders prior to discharge.    CM to continue to follow.

## 2019-04-09 NOTE — PLAN OF CARE
Problem: Adult Inpatient Plan of Care  Goal: Plan of Care Review  Outcome: Ongoing (interventions implemented as appropriate)  No respiratory distress noted, pt on 2 lpm nasal cannula SpO2 97%. BiPAP on standby as needed. Will continue to monitor.

## 2019-04-09 NOTE — CONSULTS
Consult Note  Palliative Care      Consult Requested By: Thelma Zimmerman MD   Reason for Consult: GOC and advance directives    SUBJECTIVE:     History of Present Illness:  Subjective:            Chief Complaint:  Shortness of Breath     HPI:      Joyce Riley is a 85 y.o.female with fluid overload and advanced renal failure being [facility] of [facility].     Patient is a 85 y.o. female was transferred from [facility] where she was treated for acute respiratory failure, acute on chronic congestive heart failure diastolic, paroxysmal atrial fibrillation, acute anemia which was felt to be secondary to diverticular hemorrhage and ROXANNE on CKD IV.  The patient has a baseline creatinine from early February of 2.5.  With diuresis, her creatinine had slowly risen to 4.47 as of 03/31/2019.  4/9/19 creatinine 2.7. She also has been persistently hyperkalemic. Potassium has been slowly increasing, 4/9/19 K+ 4.8 from 4.5.     The etiology of her respiratory failure is somewhat unclear. Per the record, she has pulmonary function testing from 2012 which notes severe pulmonary hypertension with a restrictive lung disease pattern.  She has been  treated for an acute COPD exacerbation with nebulizer therapy Spiriva and prednisone during this admission.  She previously had a right heart catheterization performed on 04/09/2014 which confirmed severe pulmonary hypertension with pulmonary artery pressure 71 mm Hg.  Echocardiogram on  02/07/2017 showed right ventricular systolic pressure of 54 and moderate pulmonary hypertension.  It seems that she has not had a follow-up echo since then.  In 2014 the patient had a stroke, during which time it was noted that she was found to have a patent foramen ovale.  She was initially on Coumadin, but had a gastrointestinal bleeding episode in December 2018. It was at this  time she was changed to Eliquis.     The patient and the family deny any history  of melena from the December bleed in till her readmission at [facility] however she was found to be anemic with a hemoglobin of 7.8 during this current admission at [facility].  She had a bleeding scan which showed active bleeding in the left colon thought to be consistent with diverticular bleed.  She was transfused 2 units of packed RBCs which on 2019 and it seems somewhat unclear as to whether she had an appropriate rise since on  hemoglobin was 9.6.     There is previous discussion with Nephrology at [facility] and the notes reflected from that consultation states that the patient is not felt to be candidate for dialysis.  The patient's family states they are willing to consider a palliative approach but wanted a 2nd opinion 1st.     Hospital Course:  Echo showed moderate concentric LVH, severe left atrial enlargement, EF 80%, Diastolic Dysfunction Grade 2, PA pressure 74. Her kidney function was worsening, family decided to have HD.  CT chest showed  (1)mild cardiomegaly and pulmonary interstitial edema without pleural effusion.  (2) Diffuse mild dilatation of the pulmonary arteries suggesting a component of pulmonary arterial hypertension (3) mild distention of the IVC and hepatic veins as can be seen in the setting of pulmonary arterial hypertension and/or right heart dysfunction.     Patient was started on HD on , 4/3, . Nephrology had discussion with family regarding GOC. Nephrology reports Pt is likely at creatinine baseline of 2.5. Pt is stable at this time  and not a good candidate for long-term HD. Nephrology recommends Hospice evaluation. Nava catheter and HD access have been removed.          Past Medical History:   Diagnosis Date    Arthritis     Cataract     Diabetes mellitus     GERD (gastroesophageal reflux disease)     GI bleed 2018    Gout     Hypertension      Past Surgical History:   Procedure Laterality Date     SECTION      Patient unsure, believe  "she had 3-4    CHOLECYSTECTOMY      EYE SURGERY      HYSTERECTOMY      TOTAL KNEE ARTHROPLASTY Right      Family History   Problem Relation Age of Onset    Cancer Mother          age 98    Diabetes Mother     Hypertension Mother     Cancer Brother      Social History     Tobacco Use    Smoking status: Never Smoker    Smokeless tobacco: Never Used   Substance Use Topics    Alcohol use: No    Drug use: No       Mental Status: AAOx2. Pt is able to state full name and month/day of birthday, not year. Pt is unable to state date, time of day. Pt demonstrates confusion regarding place. Pt believes she is at home.     ECOG Performance Status rdGrdrrdarddrderd:rd rd3rd Review of Systems:  Constitutional: Well nourished, elderly female  Eyes: Positive for slightly cloudy bilateral pupils and glasses.  Respiratory: positive for SOB, increased respiratory effort with accessory muscle usage, fine crackles at bases R >L, 93% O2 sat noted  Cardiovascular: Positive murmur, +2 bilateral edema to feet, +1 BLE edema noted from ankles extending to lower thighs.  Gastrointestinal: Positive loose, brown BM x2 in early am on 19  Genitourinary: Positive moderate amount of urine noted on pad  Integument/Breast: Positive discoloration noted to bilateral interior forearms  Neurological: Positive for confusion.   Behavioral/Psych: Negative for agitation or behavior disturbance    OBJECTIVE:     Pain Assessment: No pain reported at this time    Decision-Making Capacity: Pt is unable to make complex medical decisions but is able to state her desires.    Advanced Directives:  Living Will: None  Do Not Resuscitate Status: Partial per family, No CPR  Medical Power of : None  Registered Organ Donor: Unknown    Living Arrangements: [address] [place],  [address]    Psychosocial, Spiritual, Cultural:  Patient's most important priorities:  1. "Be with family"    Patient's biggest concerns/fears:  1. "Nothing"    Previous " "death/end of life care history:  Unable to assess- "I don't know"    Patient's goals/hopes:  Unable to assess- "I don't know"        ASSESSMENT/PLAN:   Son Javed called this am to make contact and schedule family conference meeting. Introduced Palliative Care program intended to support Pt and caregivers who have chronic illness (es).  Javed to talk to siblings and call back to notify of time. He reports the family toured 4 or 5 facilities yesterday.     Pt sitting up in bed. Primary RN and CNA at . Pt respirations labored with accessory muscle usage on 4L O2 via NC. 24 to 28/BPM counted. 93% O2 saturation noted on pulse oxygen.  Pt denies SOB. Pt is able to answer questions and hold minimum conversation. Pt able to swallow am medications. Pt BP was elevated this am at 183/77, given 1x dose of Hydralazine 25mg PO at 0636. BP reassessed at 0740 and noted to be 145/66. Liajuany states she has notified primary MD. HD access to right neck removed, DSG is D and I. Pt voided moderate amount of dark urine on pad. Assisted CNA in bed bath and linen change. Pt repeatedly asks, "Is the bank open? I have to get money to pay for these medications.". Pt reoriented that she is in the hospital and does not need to pay for medication or services. Pt denies pain or distress. Pt refused breakfast, reports she isn't hungry. Grandson came at end of assessment to sit with Pt.    Recommendations:  1. Skilled Facility placement of family's choosing    2. Hospice information visit of family's choosing    3.  Consider restarting BP medication and adding a PRN medication with parameters for      treatment    4.  Emotional support    5. Education    Thank you for allowing Palliative Care to participate in Mrs. Cook's care.    Sveta Palomo  Palliative Care RN  [facility]    Time Spent: 25 minute chart review,10 minute family phone call,  5 minute team update, 60 minute BS assessment, 10 minute team update, 20 minutes to " chart

## 2019-04-09 NOTE — PT/OT/SLP DISCHARGE
Physical Therapy Discharge Summary    Name: Joyce Riley  MRN: [id number]   Principal Problem: Acute kidney injury superimposed on chronic kidney disease     Patient Discharged from acute Physical Therapy on 4/9/19.  Please refer to prior PT noted date on 4/9/19 for functional status.     Assessment:     Patient appropriate for care in another setting. Patient has not met goals.    Objective:     GOALS:   Multidisciplinary Problems     Physical Therapy Goals        Problem: Physical Therapy Goal    Goal Priority Disciplines Outcome Goal Variances Interventions   Physical Therapy Goal     PT, PT/OT Ongoing (interventions implemented as appropriate)     Description:  Goals to be met by 4-12-19.  1.Roll R/L mod I   2.Sup<>sit mod I  2. Evaluate Sit<>stand when appropriate-REVISED 4/6/19  Sit to stand with min A and RW   3. Evaluate transfer to w/c when appropriate-REVISED 4/6/19  Transfer to chair with mod A  4. Evaluate amb when appropriate-REVISED 4/6/19  Pt amb 5' with RW and Mod A                       Reasons for Discontinuation of Therapy Services  Transfer to alternate level of care.      Plan:     Patient Discharged to: Skilled Nursing Facility. Prior CM progress/POC notes state dispo NH vs prison placement with therapy rec in agreement.    PT of record not available for documentation.    Latanya Rodriguez, PT  4/9/2019

## 2019-04-09 NOTE — PROGRESS NOTES
[facility]  Cardiology  Progress Note    Patient Name: Joyce Riley  MRN: [id number]  Admission Date: 4/1/2019  Hospital Length of Stay: 8 days  Code Status: Partial Code   Attending Physician: Es Chinchilla MD   Primary Care Physician: Kalpana Staton MD  Expected Discharge Date:   Principal Problem:Acute kidney injury superimposed on chronic kidney disease    Subjective:     Brief HPI:    Joyce Riley is a 85 y.o.female with advanced renal failure and fluid overload being transferred to [facility] for consideration of HD. She has advanced hypertensive heart disease with severe diastolic dysfunction. She has pulmonary hypertension. A PFO has been seen. She is SOB on BIPAP. She denies chest pain.    Hospital Course:     4/1/2019: Echo: Normal left ventricular size with hyperdynamic systolic function. EF 80%. Moderate LVH. Moderate diastolic dysfunction. Severely dilated LA. SPAP 74 mmHg. No shunt is visualized with agitated saline.    4/3/2019: Began HD.    4/8/2019: Removal of HD catheter,    Interval History:     Denies CP. Denies being SOB this am. Remains confused.    Review of Systems   Constitution: Positive for malaise/fatigue.   Cardiovascular: Negative for chest pain.   Respiratory: Positive for shortness of breath. Negative for wheezing.    Psychiatric/Behavioral: Positive for memory loss.     Objective:     Vital Signs (Most Recent):  Temp: 98.7 °F (37.1 °C) (04/09/19 0740)  Pulse: 88 (04/09/19 1004)  Resp: 18 (04/09/19 1004)  BP: (!) 145/66 (04/09/19 0740)  SpO2: (!) 94 % (04/09/19 1004) Vital Signs (24h Range):  Temp:  [98.4 °F (36.9 °C)-98.7 °F (37.1 °C)] 98.7 °F (37.1 °C)  Pulse:  [] 88  Resp:  [18-20] 18  SpO2:  [92 %-97 %] 94 %  BP: (145-197)/(66-86) 145/66     Weight: 93.1 kg (205 lb 4 oz)  Body mass index is 35.23 kg/m².    SpO2: (!) 94 %  O2 Device (Oxygen Therapy): nasal cannula      Intake/Output Summary (Last 24 hours) at 4/9/2019 1007  Last data  filed at 4/9/2019 0600  Gross per 24 hour   Intake 1320 ml   Output 1100 ml   Net 220 ml       Lines/Drains/Airways          None          Physical Exam   Constitutional: She appears well-developed and well-nourished.   Cardiovascular: Normal rate, regular rhythm, S1 normal and S2 normal. Exam reveals gallop and S4. Exam reveals no S3.   Murmur heard.  Pulmonary/Chest: She has rales in the right lower field and the left lower field.   Neurological: She is alert. She is disoriented.       Current Medications:     apixaban  2.5 mg Oral BID    atorvastatin  40 mg Oral Daily    calcitRIOL  0.25 mcg Oral Daily    [START ON 4/10/2019] epoetin diana (PROCRIT) injection  10,000 Units Subcutaneous Q7 Days    ergocalciferol  50,000 Units Oral Q7 Days    famotidine  20 mg Oral Daily    furosemide  40 mg Oral BID    magnesium oxide  400 mg Oral BID    NIFEdipine  30 mg Oral Daily     Current Laboratory Results:    Recent Results (from the past 24 hour(s))   POCT glucose    Collection Time: 04/08/19 12:27 PM   Result Value Ref Range    POCT Glucose 133 (H) 70 - 110 mg/dL   POCT glucose    Collection Time: 04/08/19  4:04 PM   Result Value Ref Range    POCT Glucose 158 (H) 70 - 110 mg/dL   POCT glucose    Collection Time: 04/08/19  9:14 PM   Result Value Ref Range    POCT Glucose 169 (H) 70 - 110 mg/dL   Magnesium    Collection Time: 04/09/19  4:40 AM   Result Value Ref Range    Magnesium 1.9 1.6 - 2.6 mg/dL   Basic metabolic panel    Collection Time: 04/09/19  4:40 AM   Result Value Ref Range    Sodium 137 136 - 145 mmol/L    Potassium 4.8 3.5 - 5.1 mmol/L    Chloride 103 95 - 110 mmol/L    CO2 27 23 - 29 mmol/L    Glucose 162 (H) 70 - 110 mg/dL    BUN, Bld 39 (H) 8 - 23 mg/dL    Creatinine 2.7 (H) 0.5 - 1.4 mg/dL    Calcium 9.8 8.7 - 10.5 mg/dL    Anion Gap 7 (L) 8 - 16 mmol/L    eGFR if African American 18 (A) >60 mL/min/1.73 m^2    eGFR if non African American 15 (A) >60 mL/min/1.73 m^2   POCT glucose    Collection  Time: 04/09/19  7:41 AM   Result Value Ref Range    POCT Glucose 157 (H) 70 - 110 mg/dL     Current Imaging Results:    X-Ray Chest 1 View   Final Result      Findings suggestive of interstitial edema with small bilateral pleural effusions, similar to prior exam.         Electronically signed by: Bryant Galeana MD   Date:    04/09/2019   Time:    06:30      X-Ray Chest AP Portable   Final Result      As above         Electronically signed by: Bryant Galeana MD   Date:    04/07/2019   Time:    06:37      X-Ray Chest 1 View   Final Result      Please see above         Electronically signed by: Tez Garza DO   Date:    04/02/2019   Time:    14:19      CT Chest Without Contrast   Final Result      1. Mild cardiomegaly and pulmonary interstitial edema without pleural effusion.   2. Diffuse mild dilatation of the pulmonary arteries suggesting a component of pulmonary arterial hypertension.   3. Mild distention of the IVC and hepatic veins as can be seen in the setting of pulmonary arterial hypertension and/or right heart dysfunction.         Electronically signed by: El Hollingsworth   Date:    04/02/2019   Time:    08:48      X-Ray Chest 1 View    (Results Pending)       Assessment and Plan:     Problem List:    Active Diagnoses:    Diagnosis Date Noted POA    PRINCIPAL PROBLEM:  Acute kidney injury superimposed on chronic kidney disease [N17.9, N18.9] 04/01/2019 Yes    Diarrhea [R19.7] 04/08/2019 Yes    Physical deconditioning [R53.81] 04/08/2019 Yes    Delirium [R41.0] 04/06/2019 Yes    Renovascular hypertension [I15.0] 04/03/2019 Yes    CKD (chronic kidney disease), stage IV [N18.4] 04/01/2019 Yes    Patent foramen ovale with right to left shunt [Q21.1] 04/01/2019 Not Applicable    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 [I27.23] 04/01/2019 Yes     Chronic    Acute on chronic diastolic heart failure [I50.33] 04/01/2019 Yes    Anemia [D64.9] 04/01/2019 Yes    Visual disturbance [H53.9] 04/01/2019 Yes     Acute respiratory failure with hypercapnia [J96.02] 04/01/2019 Yes    Type 2 diabetes mellitus, with long-term current use of insulin [E11.9, Z79.4] 03/31/2019 Not Applicable      Problems Resolved During this Admission:    Diagnosis Date Noted Date Resolved POA    Drowsiness [R40.0] 04/07/2019 04/08/2019 No    Hypokalemia [E87.6] 04/05/2019 04/07/2019 No    Foot pain [M79.673] 04/03/2019 04/07/2019 Yes    Hyperkalemia [E87.5] 04/02/2019 04/04/2019 Yes    Metabolic acidosis [E87.2] 04/02/2019 04/04/2019 Yes    Hypomagnesemia [E83.42] 04/02/2019 04/08/2019 Yes    Hyponatremia [E87.1] 04/02/2019 04/05/2019 Yes     Assessment and Plan:     1. Heart Failure, Diastolic, Acute on Chronic              4/1/2019: Echo: Normal left ventricular size with hyperdynamic systolic function. EF 80%. Moderate LVH. Moderate diastolic dysfunction. Severely dilated LA. SPAP 74 mmHg. No shunt is visualized with agitated saline.            Received fluid removal with HD.   On furosemide 40 mg Q12.     2. Atrial Fibrillation              Paroxysmal atrial fibrillation.              On apixiban 2.5 mg Q12.     3. Chronic Anticoagulation              On apixiban 2.5 mg Q12.     4. Chronic Kidney Disease, Stage 5     4/3/2019: Began trial of HD.   4/8/2019: BUN/crea 42/2.6. CrCl 19.    VTE Risk Mitigation (From admission, onward)        Ordered     heparin (porcine) injection 5,000 Units  As needed (PRN)      04/02/19 1001     apixaban tablet 2.5 mg  2 times daily      04/01/19 1716     IP VTE HIGH RISK PATIENT  Once      04/01/19 1404          Marcella Cornejo MD  Cardiology  [facility]

## 2019-04-09 NOTE — NURSING
Called RT for a prn neb treatment for this patient rt wheezing.  They said they were on their way.

## 2019-04-09 NOTE — PROGRESS NOTES
Patient was attempted this a.m. patient was lethargic and difficulty arousing. Respiratory therapist and nurse were present  patient with noted  labored breathing. Per nurse hold will attempt as scheduled Yaquelin Sol 4/9/2019

## 2019-04-09 NOTE — PROGRESS NOTES
Arrived to patient room, pt seems to use more accessory muscles with audible wheezes. Prn breathing treatment given, and pt placed on BiPAP.  NP notified, will continue to monitor.

## 2019-04-09 NOTE — PLAN OF CARE
Problem: Adult Inpatient Plan of Care  Goal: Plan of Care Review  Pt's mclaughlin was removed and HD catheter taken out at approximately 2:30 p.m.  Pt had urinated by 5:30 p.m.  C diff test came back negative.  No complaints of pain today but pt did complain of nausea late in the afternoon and was given Zofran.  Purposeful hourly rounding completed.  Safety measures in place.  Will continue to monitor.

## 2019-04-09 NOTE — ASSESSMENT & PLAN NOTE
- Likely has contributor of underlying dementia  - Risperidone discontinued due to drowsiness  - Removal of HD line and Nava catheter might help.

## 2019-04-09 NOTE — PT/OT/SLP DISCHARGE
Occupational Therapy Discharge Summary    Joyce Riley  MRN: [id number]   Principal Problem: Acute kidney injury superimposed on chronic kidney disease      Patient Discharged from acute Occupational Therapy on 4/9/2019.  Please refer to prior OT note dated 4/8/2019 for functional status.    Assessment:      Patient appropriate for care in another setting.    Objective:     GOALS:   Multidisciplinary Problems     Occupational Therapy Goals        Problem: Occupational Therapy Goal    Goal Priority Disciplines Outcome Interventions   Occupational Therapy Goal     OT, PT/OT     Description:  Goals to be met by: 4/12/2019     Patient will increase functional independence with ADLs by performing:    UE Dressing with Minimal Assistance.  LE Dressing with Moderate Assistance.  Grooming while seated with Stand-by Assistance.  MET 4/8/2019  Toileting from bedside commode with Minimal Assistance for hygiene and clothing management.   Toilet transfer to bedside commode with Moderate Assistance.  Pt will follow 75% of one step commands.                         Reasons for Discontinuation of Therapy Services  Transfer to alternate level of care.      Plan:     Patient Discharged to: Skilled Nursing Facility    RAHEEL Wiggins  4/9/2019

## 2019-04-09 NOTE — NURSING
Patient using more accessory muscles to breathe accompanied by audible wheezing.  RT came to give prn neb treatment.  He also placed the patient back on bi-pap.  Called to let Dr. Chavez know.  She ordered a stat chest X-ray.  The patient also had a bp of 197/79 with a HR of 93.  Dr. Chavez also ordered another dose of PO hydralazine which I gave.

## 2019-04-09 NOTE — NURSING
I made two attempts to gain IV access to no avail.  My charge nurse Fanta attempted to gain IV access a couple of times to no avail.  New nurse to attempt.

## 2019-04-09 NOTE — PLAN OF CARE
Signed snf orders and 142 sent to [facility].   Family scheduled to sign paperwork at noon, awaiting confirmation from [facility].     CM to continue to follow.

## 2019-04-09 NOTE — PLAN OF CARE
Problem: Adult Inpatient Plan of Care  Goal: Plan of Care Review  Outcome: Ongoing (interventions implemented as appropriate)  Report given to SUKUMAR Díaz. Pt resting in bed. NAD, VSS on 2L NC, alert to self and place. Pt not complaining of pain. Pt chair fast. Pure wick in place. POC reviewed with pt and son at bedside. Bed low and locked. Personal items and call light within reach. Will continue to monitor.

## 2019-04-09 NOTE — PLAN OF CARE
04/09/19 0900   Medicare Message   Important Message from Medicare regarding Discharge Appeal Rights Given to patient/caregiver;Explained to patient/caregiver;Signed/date by patient/caregiver   Date IMM was signed 04/09/19   Time IMM was signed 0900

## 2019-04-09 NOTE — PROGRESS NOTES
[facility]  Cardiology  Progress Note    Patient Name: Joyce Riley  MRN: [id number]  Admission Date: 4/1/2019  Hospital Length of Stay: 7 days  Code Status: Partial Code   Attending Physician: Thelma Mc MD   Primary Care Physician: Kalpana Staton MD  Expected Discharge Date:   Principal Problem:Acute kidney injury superimposed on chronic kidney disease    Subjective:     Brief HPI:    Joyce Riley is a 85 y.o.female with advanced renal failure and fluid overload being transferred to [facility] for consideration of HD. She has advanced hypertensive heart disease with severe diastolic dysfunction. She has pulmonary hypertension. A PFO has been seen. She is SOB on BIPAP. She denies chest pain.    Hospital Course:     4/1/2019: Echo: Normal left ventricular size with hyperdynamic systolic function. EF 80%. Moderate LVH. Moderate diastolic dysfunction. Severely dilated LA. SPAP 74 mmHg. No shunt is visualized with agitated saline.    4/3/2019: Began HD.    4/8/2019: Removal of HD catheter,    Interval History:     Says she is SOB. Denied CP. Remains confused.    Review of Systems   Constitution: Positive for malaise/fatigue.   Cardiovascular: Negative for chest pain.   Respiratory: Positive for shortness of breath. Negative for wheezing.    Psychiatric/Behavioral: Positive for memory loss.     Objective:     Vital Signs (Most Recent):  Temp: 98.7 °F (37.1 °C) (04/08/19 2030)  Pulse: 90 (04/08/19 2030)  Resp: 20 (04/08/19 2030)  BP: (!) 183/77 (04/08/19 2030)  SpO2: (!) 92 % (04/08/19 2030) Vital Signs (24h Range):  Temp:  [97 °F (36.1 °C)-98.7 °F (37.1 °C)] 98.7 °F (37.1 °C)  Pulse:  [74-90] 90  Resp:  [18-24] 20  SpO2:  [91 %-93 %] 92 %  BP: (166-183)/(70-86) 183/77     Weight: 93.1 kg (205 lb 4 oz)  Body mass index is 35.23 kg/m².    SpO2: (!) 92 %  O2 Device (Oxygen Therapy): nasal cannula      Intake/Output Summary (Last 24 hours) at 4/8/2019 2045  Last data filed at 4/8/2019  1800  Gross per 24 hour   Intake 1200 ml   Output 2125 ml   Net -925 ml       Lines/Drains/Airways     Peripheral Intravenous Line                 Peripheral IV - Single Lumen 10/06/17 2035 Left Hand 549 days         Peripheral IV - Single Lumen 04/04/19 1850 Anterior;Right Forearm 4 days                Physical Exam   Cardiovascular: Normal rate, regular rhythm, S1 normal and S2 normal. Exam reveals gallop and S4.   Murmur heard.  Pulmonary/Chest: She has rales in the right lower field and the left lower field.   Neurological: She is alert. She is disoriented.       Current Medications:     apixaban  2.5 mg Oral BID    atorvastatin  40 mg Oral Daily    calcitRIOL  0.25 mcg Oral Daily    [START ON 4/10/2019] epoetin diana (PROCRIT) injection  10,000 Units Subcutaneous Q7 Days    ergocalciferol  50,000 Units Oral Q7 Days    famotidine  20 mg Oral Daily    magnesium oxide  400 mg Oral BID    NIFEdipine  30 mg Oral Daily     Current Laboratory Results:    Recent Results (from the past 24 hour(s))   POCT glucose    Collection Time: 04/07/19  9:15 PM   Result Value Ref Range    POCT Glucose 152 (H) 70 - 110 mg/dL   Clostridium difficile EIA    Collection Time: 04/08/19  2:44 AM   Result Value Ref Range    C. diff Antigen Negative Negative    C difficile Toxins A+B, EIA Negative Negative   Magnesium    Collection Time: 04/08/19  3:20 AM   Result Value Ref Range    Magnesium 1.7 1.6 - 2.6 mg/dL   Basic metabolic panel    Collection Time: 04/08/19  3:20 AM   Result Value Ref Range    Sodium 137 136 - 145 mmol/L    Potassium 4.5 3.5 - 5.1 mmol/L    Chloride 102 95 - 110 mmol/L    CO2 29 23 - 29 mmol/L    Glucose 117 (H) 70 - 110 mg/dL    BUN, Bld 42 (H) 8 - 23 mg/dL    Creatinine 2.6 (H) 0.5 - 1.4 mg/dL    Calcium 9.2 8.7 - 10.5 mg/dL    Anion Gap 6 (L) 8 - 16 mmol/L    eGFR if African American 19 (A) >60 mL/min/1.73 m^2    eGFR if non African American 16 (A) >60 mL/min/1.73 m^2   POCT glucose    Collection Time:  04/08/19  7:51 AM   Result Value Ref Range    POCT Glucose 107 70 - 110 mg/dL   POCT glucose    Collection Time: 04/08/19 12:27 PM   Result Value Ref Range    POCT Glucose 133 (H) 70 - 110 mg/dL   POCT glucose    Collection Time: 04/08/19  4:04 PM   Result Value Ref Range    POCT Glucose 158 (H) 70 - 110 mg/dL     Current Imaging Results:    X-Ray Chest AP Portable   Final Result      As above         Electronically signed by: Bryant Galeana MD   Date:    04/07/2019   Time:    06:37      X-Ray Chest 1 View   Final Result      Please see above         Electronically signed by: Tez Garza DO   Date:    04/02/2019   Time:    14:19      CT Chest Without Contrast   Final Result      1. Mild cardiomegaly and pulmonary interstitial edema without pleural effusion.   2. Diffuse mild dilatation of the pulmonary arteries suggesting a component of pulmonary arterial hypertension.   3. Mild distention of the IVC and hepatic veins as can be seen in the setting of pulmonary arterial hypertension and/or right heart dysfunction.         Electronically signed by: El Hollingsworth   Date:    04/02/2019   Time:    08:48      X-Ray Chest 1 View    (Results Pending)       Assessment and Plan:     Problem List:    Active Diagnoses:    Diagnosis Date Noted POA    PRINCIPAL PROBLEM:  Acute kidney injury superimposed on chronic kidney disease [N17.9, N18.9] 04/01/2019 Yes    Diarrhea [R19.7] 04/08/2019 Yes    Physical deconditioning [R53.81] 04/08/2019 Yes    Delirium [R41.0] 04/06/2019 Yes    Renovascular hypertension [I15.0] 04/03/2019 Yes    CKD (chronic kidney disease), stage IV [N18.4] 04/01/2019 Yes    Patent foramen ovale with right to left shunt [Q21.1] 04/01/2019 Not Applicable    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 [I27.23] 04/01/2019 Yes     Chronic    Acute on chronic diastolic heart failure [I50.33] 04/01/2019 Yes    Anemia [D64.9] 04/01/2019 Yes    Visual disturbance [H53.9] 04/01/2019 Yes    Acute  respiratory failure with hypercapnia [J96.02] 04/01/2019 Yes    Type 2 diabetes mellitus, with long-term current use of insulin [E11.9, Z79.4] 03/31/2019 Not Applicable      Problems Resolved During this Admission:    Diagnosis Date Noted Date Resolved POA    Drowsiness [R40.0] 04/07/2019 04/08/2019 No    Hypokalemia [E87.6] 04/05/2019 04/07/2019 No    Foot pain [M79.673] 04/03/2019 04/07/2019 Yes    Hyperkalemia [E87.5] 04/02/2019 04/04/2019 Yes    Metabolic acidosis [E87.2] 04/02/2019 04/04/2019 Yes    Hypomagnesemia [E83.42] 04/02/2019 04/08/2019 Yes    Hyponatremia [E87.1] 04/02/2019 04/05/2019 Yes     Assessment and Plan:     1. Heart Failure, Diastolic, Acute on Chronic              4/1/2019: Echo: Normal left ventricular size with hyperdynamic systolic function. EF 80%. Moderate LVH. Moderate diastolic dysfunction. Severely dilated LA. SPAP 74 mmHg. No shunt is visualized with agitated saline.            Received fluid removal with HD.     2. Atrial Fibrillation              Paroxysmal atrial fibrillation.              On apixiban 2.5 mg Q12.     3. Chronic Anticoagulation              On apixiban 2.5 mg Q12.     4. Chronic Kidney Disease, Stage 5     4/3/2019: Began trial of HD.   4/8/2019: BUN/crea 42/2.6. CrCl 19.    VTE Risk Mitigation (From admission, onward)        Ordered     heparin (porcine) injection 5,000 Units  As needed (PRN)      04/02/19 1001     apixaban tablet 2.5 mg  2 times daily      04/01/19 1716     IP VTE HIGH RISK PATIENT  Once      04/01/19 1404          Marcella Cornejo MD  Cardiology  [facility]

## 2019-04-09 NOTE — DISCHARGE SUMMARY
[facility] Hospital Medicine  Discharge Summary      Patient Name: Joyce Riley  MRN: [id number]  Admission Date: 4/1/2019  Hospital Length of Stay: 8 days  Discharge Date and Time: 4/9/2019  4:02 PM  Attending Physician: Reanna att. providers found   Discharging Provider: Es Oscar MD  Primary Care Provider: Kalpana Staton MD      HPI:   Ms. Riley is an 85 year old woman who was transferred here from [facility] for a second opinion regarding starting HD vs entering hospice care.  The patient was hospitalized there on 3/20/19 with shortness of breath and lower extremity edema and was found to have fluid overload with heart failure and anemia with H/H 7.8/25.  She was transfused 2 units PRBCs and extensively diuresed while there.  Creatinine was initially 2.3 and gradually worsened to 4.5 at the time of transfer here.  Azotemia was out of proportion to the creatinine with an increase in BUN to 150.  On nephrology evaluation they did not feel she was a candidate for dialysis and consulted palliative care to follow.  Family requested a transfer to see a different nephrology service to see if they agreed.    Medical history includes chronic diastolic heart failure with pulmonary hypertension associated with chronic restrictive lung disease.  She has a patent foramen ovale and a right to left shunt that was thought to contribute to a stroke she had in 2014.  She is anemic and had a diverticular hemorrhage last December while on warfarin.  After recovery she was started on apixaban.  She has paroxysmal atrial fibrillation that is rate controlled, hypertension, diabetes and stage IV CKD.  She has chronically elevated alkaline phosphatase that is not thought to be significant.  At home she uses a wheelchair but can walk to the bathroom with assistance.  She attends an adult day care center.  She used to take allopurinol for gout attacks but this was discontinued when she had  the GI bleed.          Hospital Course:   Evaluation included a 2D echo that showed hyperdynamic EF of 80%, grade II diastolic dysfunction and pulmonary HTN with PA pressure 74.  CT of the chest showed mild cardiomegaly, pulmonary interstitial edema and diffuse dilation of pulmonary arteries consistent with pulmonary hypertension.  Patient was seen by the nephrology service, and she and her family were offered a trial of HD.  She was dialyzed on 3 consecutive days between 4/2 and 4/4 with good improvement in BUN/creatinine, but she did not tolerate it well and was not considered a good long term candidate for HD.  She was evaluated by PT and OT and a stay in SNF was recommended.  Her renal function was at baseline on discharge but gradually worsening and is not expected to recover fully.     Consults:   Consults (From admission, onward)        Status Ordering Provider     Inpatient consult to Cardiology  Once     Provider:  Marcella Cornejo MD    Completed JOSE CARLOS CASTELAN     Inpatient consult to Nephrology-[facility]  Once     Provider:  Bonita Bardales MD    Completed CHELY SIDDIQI     Inpatient consult to Palliative Care  Once     Provider:  Nicole Palomo RN    Completed LIZZY JASSO     Inpatient consult to Pulmonary Critical Care  Once     Provider:  Arcadio Peralta MD    Completed JOSE CARLOS CASTELAN          Final Active Diagnoses:    Diagnosis Date Noted POA    PRINCIPAL PROBLEM:  Acute kidney injury superimposed on chronic kidney disease [N17.9, N18.9] 04/01/2019 Yes    Acute on chronic diastolic heart failure [I50.33] 04/01/2019 Yes    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 [I27.23] 04/01/2019 Yes     Chronic    Patent foramen ovale with right to left shunt [Q21.1] 04/01/2019 Not Applicable    Diarrhea [R19.7] 04/08/2019 Yes    Physical deconditioning [R53.81] 04/08/2019 Yes    Delirium [R41.0] 04/06/2019 Yes    Renovascular hypertension [I15.0] 04/03/2019 Yes    CKD (chronic  kidney disease), stage IV [N18.4] 04/01/2019 Yes    Anemia [D64.9] 04/01/2019 Yes    Visual disturbance [H53.9] 04/01/2019 Yes    Acute respiratory failure with hypercapnia [J96.02] 04/01/2019 Yes    Type 2 diabetes mellitus, with long-term current use of insulin [E11.9, Z79.4] 03/31/2019 Not Applicable      Problems Resolved During this Admission:    Diagnosis Date Noted Date Resolved POA    Drowsiness [R40.0] 04/07/2019 04/08/2019 No    Hypokalemia [E87.6] 04/05/2019 04/07/2019 No    Foot pain [M79.673] 04/03/2019 04/07/2019 Yes    Hyperkalemia [E87.5] 04/02/2019 04/04/2019 Yes    Metabolic acidosis [E87.2] 04/02/2019 04/04/2019 Yes    Hypomagnesemia [E83.42] 04/02/2019 04/08/2019 Yes    Hyponatremia [E87.1] 04/02/2019 04/05/2019 Yes       Discharged Condition: fair    Disposition: Skilled Nursing Facility    Follow Up:  Follow-up Information     Kalpana Staton MD.    Specialty:  General Practice  Why:  Follow up for the next available appointment after discharge from SNF  Contact information:  [address] [place] [address]  [id number]  [place] [address] [phone]                 Patient Instructions:      Diet diabetic     Activity as tolerated       Medications:  Reconciled Home Medications:      Medication List      START taking these medications    albuterol-ipratropium 2.5 mg-0.5 mg/3 mL nebulizer solution  Commonly known as:  DUO-NEB  Take 3 mLs by nebulization every 4 (four) hours as needed for Wheezing. Rescue     calcitRIOL 0.25 MCG Cap  Commonly known as:  ROCALTROL  Take 1 capsule (0.25 mcg total) by mouth once daily.  Start taking on:  4/10/2019     ergocalciferol 50,000 unit Cap  Commonly known as:  ERGOCALCIFEROL  Take 1 capsule (50,000 Units total) by mouth every 7 days.  Start taking on:  4/10/2019     famotidine 20 MG tablet  Commonly known as:  PEPCID  Take 1 tablet (20 mg total) by mouth once daily.  Start taking on:  4/10/2019     NIFEdipine 30 MG (OSM) 24 hr tablet  Commonly known  as:  PROCARDIA-XL  Take 1 tablet (30 mg total) by mouth once daily.  Start taking on:  4/10/2019        CHANGE how you take these medications    acetaminophen 325 MG tablet  Commonly known as:  TYLENOL  Take 2 tablets (650 mg total) by mouth every 4 (four) hours as needed.  What changed:    · how much to take  · when to take this     atorvastatin 40 MG tablet  Commonly known as:  LIPITOR  Take 1 tablet (40 mg total) by mouth once daily.  Start taking on:  4/10/2019  What changed:    · medication strength  · how much to take     furosemide 40 MG tablet  Commonly known as:  LASIX  Take 1 tablet (40 mg total) by mouth 2 (two) times daily.  What changed:  when to take this     insulin degludec 100 unit/mL (3 mL) Inpn  Commonly known as:  TRESIBA FLEXTOUCH U-100  Inject 5 Units into the skin every evening.  What changed:  how much to take        CONTINUE taking these medications    ELIQUIS 2.5 mg Tab  Generic drug:  apixaban  Take 2.5 mg by mouth 2 (two) times daily.        STOP taking these medications    allopurinol 100 MG tablet  Commonly known as:  ZYLOPRIM     amLODIPine 5 MG tablet  Commonly known as:  NORVASC     hydrALAZINE 100 MG tablet  Commonly known as:  APRESOLINE     lidocaine 5 %  Commonly known as:  LIDODERM     metoprolol succinate 50 MG 24 hr tablet  Commonly known as:  TOPROL-XL     omeprazole 20 MG capsule  Commonly known as:  PRILOSEC          Time spent on the discharge of patient: <30 minutes  Patient was seen and examined on the date of discharge and determined to be suitable for discharge.         Es Oscar MD  Department of Hospital Medicine  [facility]

## 2019-04-09 NOTE — CONSULTS
Palliative Care Progress Note:    Son Javed Riley called ([phone]) to schedule time today for family conference to discuss goals of care. Son to call siblings and call back with time.

## 2019-04-10 ENCOUNTER — HOSPITAL ENCOUNTER (INPATIENT)
Facility: HOSPITAL | Age: 84
LOS: 7 days | Discharge: NURSING, REHAB OR LONG-TERM CARE | DRG: 291 | End: 2019-04-17
Attending: EMERGENCY MEDICINE | Admitting: HOSPITALIST
Payer: MEDICARE

## 2019-04-10 DIAGNOSIS — I50.9 CONGESTIVE HEART FAILURE, UNSPECIFIED HF CHRONICITY, UNSPECIFIED HEART FAILURE TYPE: Primary | ICD-10-CM

## 2019-04-10 DIAGNOSIS — I50.33 ACUTE ON CHRONIC DIASTOLIC CONGESTIVE HEART FAILURE: ICD-10-CM

## 2019-04-10 DIAGNOSIS — R09.02 HYPOXIA: ICD-10-CM

## 2019-04-10 DIAGNOSIS — E87.5 HYPERKALEMIA: ICD-10-CM

## 2019-04-10 DIAGNOSIS — R06.02 SOB (SHORTNESS OF BREATH): ICD-10-CM

## 2019-04-10 PROBLEM — I50.32 CHRONIC DIASTOLIC HEART FAILURE: Chronic | Status: ACTIVE | Noted: 2019-04-10

## 2019-04-10 PROBLEM — N17.9 ACUTE KIDNEY INJURY SUPERIMPOSED ON CHRONIC KIDNEY DISEASE: Status: RESOLVED | Noted: 2019-04-01 | Resolved: 2019-04-10

## 2019-04-10 PROBLEM — H53.9 VISUAL DISTURBANCE: Status: RESOLVED | Noted: 2019-04-01 | Resolved: 2019-04-10

## 2019-04-10 PROBLEM — J96.02 ACUTE RESPIRATORY FAILURE WITH HYPERCAPNIA: Status: RESOLVED | Noted: 2019-04-01 | Resolved: 2019-04-10

## 2019-04-10 PROBLEM — I12.9 RENAL HYPERTENSION: Chronic | Status: ACTIVE | Noted: 2019-04-10

## 2019-04-10 PROBLEM — D63.1 ANEMIA OF RENAL DISEASE: Chronic | Status: ACTIVE | Noted: 2019-04-01

## 2019-04-10 PROBLEM — J96.01 ACUTE RESPIRATORY FAILURE WITH HYPOXIA AND HYPERCAPNIA: Status: ACTIVE | Noted: 2019-04-10

## 2019-04-10 PROBLEM — E11.29 TYPE 2 DIABETES MELLITUS, CONTROLLED, WITH RENAL COMPLICATIONS: Chronic | Status: ACTIVE | Noted: 2019-03-31

## 2019-04-10 PROBLEM — I48.0 PAROXYSMAL ATRIAL FIBRILLATION: Chronic | Status: ACTIVE | Noted: 2019-04-10

## 2019-04-10 PROBLEM — E78.5 HYPERLIPIDEMIA: Chronic | Status: ACTIVE | Noted: 2019-04-10

## 2019-04-10 PROBLEM — N18.5 CKD (CHRONIC KIDNEY DISEASE), SYMPTOM MANAGEMENT ONLY, STAGE 5: Chronic | Status: ACTIVE | Noted: 2019-04-01

## 2019-04-10 PROBLEM — I15.0 RENOVASCULAR HYPERTENSION: Status: RESOLVED | Noted: 2019-04-03 | Resolved: 2019-04-10

## 2019-04-10 PROBLEM — Z79.01 CHRONIC ANTICOAGULATION: Chronic | Status: ACTIVE | Noted: 2019-04-10

## 2019-04-10 PROBLEM — R53.81 PHYSICAL DECONDITIONING: Status: RESOLVED | Noted: 2019-04-08 | Resolved: 2019-04-10

## 2019-04-10 PROBLEM — R41.0 DELIRIUM: Status: RESOLVED | Noted: 2019-04-06 | Resolved: 2019-04-10

## 2019-04-10 PROBLEM — N18.9 ANEMIA OF RENAL DISEASE: Chronic | Status: ACTIVE | Noted: 2019-04-01

## 2019-04-10 PROBLEM — E66.9 OBESITY, CLASS II, BMI 35-39.9: Chronic | Status: ACTIVE | Noted: 2019-04-10

## 2019-04-10 PROBLEM — K21.9 GERD (GASTROESOPHAGEAL REFLUX DISEASE): Chronic | Status: ACTIVE | Noted: 2019-04-10

## 2019-04-10 PROBLEM — J96.02 ACUTE RESPIRATORY FAILURE WITH HYPOXIA AND HYPERCAPNIA: Status: ACTIVE | Noted: 2019-04-10

## 2019-04-10 PROBLEM — N18.9 ACUTE KIDNEY INJURY SUPERIMPOSED ON CHRONIC KIDNEY DISEASE: Status: RESOLVED | Noted: 2019-04-01 | Resolved: 2019-04-10

## 2019-04-10 PROBLEM — R19.7 DIARRHEA: Status: RESOLVED | Noted: 2019-04-08 | Resolved: 2019-04-10

## 2019-04-10 LAB
ALBUMIN SERPL BCP-MCNC: 2.9 G/DL (ref 3.5–5.2)
ALLENS TEST: ABNORMAL
ALP SERPL-CCNC: 147 U/L (ref 55–135)
ALT SERPL W/O P-5'-P-CCNC: 19 U/L (ref 10–44)
ANION GAP SERPL CALC-SCNC: 8 MMOL/L (ref 8–16)
AST SERPL-CCNC: 20 U/L (ref 10–40)
BACTERIA #/AREA URNS HPF: ABNORMAL /HPF
BASOPHILS # BLD AUTO: 0.02 K/UL (ref 0–0.2)
BASOPHILS NFR BLD: 0.2 % (ref 0–1.9)
BILIRUB SERPL-MCNC: 0.9 MG/DL (ref 0.1–1)
BILIRUB UR QL STRIP: NEGATIVE
BNP SERPL-MCNC: 796 PG/ML (ref 0–99)
BUN SERPL-MCNC: 45 MG/DL (ref 8–23)
CALCIUM SERPL-MCNC: 9.8 MG/DL (ref 8.7–10.5)
CHLORIDE SERPL-SCNC: 103 MMOL/L (ref 95–110)
CLARITY UR: ABNORMAL
CO2 SERPL-SCNC: 27 MMOL/L (ref 23–29)
COLOR UR: YELLOW
CREAT SERPL-MCNC: 3.1 MG/DL (ref 0.5–1.4)
DACRYOCYTES BLD QL SMEAR: ABNORMAL
DELSYS: ABNORMAL
DIFFERENTIAL METHOD: ABNORMAL
EOSINOPHIL # BLD AUTO: 0.1 K/UL (ref 0–0.5)
EOSINOPHIL NFR BLD: 0.5 % (ref 0–8)
EP: 5
ERYTHROCYTE [DISTWIDTH] IN BLOOD BY AUTOMATED COUNT: 18.6 % (ref 11.5–14.5)
ERYTHROCYTE [SEDIMENTATION RATE] IN BLOOD BY WESTERGREN METHOD: 12 MM/H
EST. GFR  (AFRICAN AMERICAN): 15 ML/MIN/1.73 M^2
EST. GFR  (NON AFRICAN AMERICAN): 13 ML/MIN/1.73 M^2
FIO2: 40
GIANT PLATELETS BLD QL SMEAR: PRESENT
GLUCOSE SERPL-MCNC: 166 MG/DL (ref 70–110)
GLUCOSE UR QL STRIP: NEGATIVE
HCO3 UR-SCNC: 30.2 MMOL/L (ref 24–28)
HCT VFR BLD AUTO: 29.2 % (ref 37–48.5)
HGB BLD-MCNC: 8.8 G/DL (ref 12–16)
HGB UR QL STRIP: NEGATIVE
HYALINE CASTS #/AREA URNS LPF: 0 /LPF
IP: 10
KETONES UR QL STRIP: NEGATIVE
LACTATE SERPL-SCNC: 0.7 MMOL/L (ref 0.5–2.2)
LEUKOCYTE ESTERASE UR QL STRIP: ABNORMAL
LYMPHOCYTES # BLD AUTO: 0.8 K/UL (ref 1–4.8)
LYMPHOCYTES NFR BLD: 7.7 % (ref 18–48)
MCH RBC QN AUTO: 29 PG (ref 27–31)
MCHC RBC AUTO-ENTMCNC: 30.1 G/DL (ref 32–36)
MCV RBC AUTO: 96 FL (ref 82–98)
MICROSCOPIC COMMENT: ABNORMAL
MODE: ABNORMAL
MONOCYTES # BLD AUTO: 0.4 K/UL (ref 0.3–1)
MONOCYTES NFR BLD: 4 % (ref 4–15)
NEUTROPHILS # BLD AUTO: 8.7 K/UL (ref 1.8–7.7)
NEUTROPHILS NFR BLD: 88 % (ref 38–73)
NITRITE UR QL STRIP: NEGATIVE
OVALOCYTES BLD QL SMEAR: ABNORMAL
PCO2 BLDA: 61 MMHG (ref 35–45)
PH SMN: 7.3 [PH] (ref 7.35–7.45)
PH UR STRIP: 5 [PH] (ref 5–8)
PLATELET # BLD AUTO: 135 K/UL (ref 150–350)
PLATELET BLD QL SMEAR: ABNORMAL
PMV BLD AUTO: 11.5 FL (ref 9.2–12.9)
PO2 BLDA: 44 MMHG (ref 80–100)
POC BE: 3 MMOL/L
POC SATURATED O2: 73 % (ref 95–100)
POC TCO2: 32 MMOL/L (ref 23–27)
POCT GLUCOSE: 107 MG/DL (ref 70–110)
POTASSIUM SERPL-SCNC: 5.4 MMOL/L (ref 3.5–5.1)
PROT SERPL-MCNC: 6.8 G/DL (ref 6–8.4)
PROT UR QL STRIP: ABNORMAL
RBC # BLD AUTO: 3.03 M/UL (ref 4–5.4)
RBC #/AREA URNS HPF: 1 /HPF (ref 0–4)
SAMPLE: ABNORMAL
SCHISTOCYTES BLD QL SMEAR: PRESENT
SITE: ABNORMAL
SODIUM SERPL-SCNC: 138 MMOL/L (ref 136–145)
SP GR UR STRIP: 1.01 (ref 1–1.03)
SP02: 96
SPONT RATE: 26
SQUAMOUS #/AREA URNS HPF: 25 /HPF
TROPONIN I SERPL DL<=0.01 NG/ML-MCNC: 0.06 NG/ML (ref 0–0.03)
URN SPEC COLLECT METH UR: ABNORMAL
UROBILINOGEN UR STRIP-ACNC: NEGATIVE EU/DL
WBC # BLD AUTO: 9.97 K/UL (ref 3.9–12.7)
WBC #/AREA URNS HPF: 12 /HPF (ref 0–5)
WBC CLUMPS URNS QL MICRO: ABNORMAL
YEAST URNS QL MICRO: ABNORMAL

## 2019-04-10 PROCEDURE — 63600175 PHARM REV CODE 636 W HCPCS: Performed by: EMERGENCY MEDICINE

## 2019-04-10 PROCEDURE — 96374 THER/PROPH/DIAG INJ IV PUSH: CPT

## 2019-04-10 PROCEDURE — 25000003 PHARM REV CODE 250: Performed by: INTERNAL MEDICINE

## 2019-04-10 PROCEDURE — 87088 URINE BACTERIA CULTURE: CPT

## 2019-04-10 PROCEDURE — 93005 ELECTROCARDIOGRAM TRACING: CPT

## 2019-04-10 PROCEDURE — 85025 COMPLETE CBC W/AUTO DIFF WBC: CPT

## 2019-04-10 PROCEDURE — 27000190 HC CPAP FULL FACE MASK W/VALVE

## 2019-04-10 PROCEDURE — S0171 BUMETANIDE 0.5 MG: HCPCS | Performed by: EMERGENCY MEDICINE

## 2019-04-10 PROCEDURE — 87186 SC STD MICRODIL/AGAR DIL: CPT

## 2019-04-10 PROCEDURE — 82803 BLOOD GASES ANY COMBINATION: CPT

## 2019-04-10 PROCEDURE — 25000003 PHARM REV CODE 250: Performed by: EMERGENCY MEDICINE

## 2019-04-10 PROCEDURE — 96375 TX/PRO/DX INJ NEW DRUG ADDON: CPT

## 2019-04-10 PROCEDURE — 99900035 HC TECH TIME PER 15 MIN (STAT)

## 2019-04-10 PROCEDURE — 87086 URINE CULTURE/COLONY COUNT: CPT

## 2019-04-10 PROCEDURE — 87040 BLOOD CULTURE FOR BACTERIA: CPT

## 2019-04-10 PROCEDURE — 81000 URINALYSIS NONAUTO W/SCOPE: CPT

## 2019-04-10 PROCEDURE — 21400001 HC TELEMETRY ROOM

## 2019-04-10 PROCEDURE — 94761 N-INVAS EAR/PLS OXIMETRY MLT: CPT

## 2019-04-10 PROCEDURE — 84484 ASSAY OF TROPONIN QUANT: CPT

## 2019-04-10 PROCEDURE — 83605 ASSAY OF LACTIC ACID: CPT

## 2019-04-10 PROCEDURE — 93010 ELECTROCARDIOGRAM REPORT: CPT | Mod: ,,, | Performed by: INTERNAL MEDICINE

## 2019-04-10 PROCEDURE — 87077 CULTURE AEROBIC IDENTIFY: CPT

## 2019-04-10 PROCEDURE — 83880 ASSAY OF NATRIURETIC PEPTIDE: CPT

## 2019-04-10 PROCEDURE — 94660 CPAP INITIATION&MGMT: CPT

## 2019-04-10 PROCEDURE — 63600175 PHARM REV CODE 636 W HCPCS: Performed by: INTERNAL MEDICINE

## 2019-04-10 PROCEDURE — 99291 CRITICAL CARE FIRST HOUR: CPT | Mod: 25

## 2019-04-10 PROCEDURE — 93010 EKG 12-LEAD: ICD-10-PCS | Mod: ,,, | Performed by: INTERNAL MEDICINE

## 2019-04-10 PROCEDURE — 80053 COMPREHEN METABOLIC PANEL: CPT

## 2019-04-10 PROCEDURE — 36600 WITHDRAWAL OF ARTERIAL BLOOD: CPT

## 2019-04-10 RX ORDER — IBUPROFEN 200 MG
24 TABLET ORAL
Status: DISCONTINUED | OUTPATIENT
Start: 2019-04-10 | End: 2019-04-10

## 2019-04-10 RX ORDER — INSULIN ASPART 100 [IU]/ML
0-5 INJECTION, SOLUTION INTRAVENOUS; SUBCUTANEOUS
Status: DISCONTINUED | OUTPATIENT
Start: 2019-04-10 | End: 2019-04-10

## 2019-04-10 RX ORDER — FAMOTIDINE 20 MG/1
20 TABLET, FILM COATED ORAL DAILY
Status: DISCONTINUED | OUTPATIENT
Start: 2019-04-11 | End: 2019-04-17 | Stop reason: HOSPADM

## 2019-04-10 RX ORDER — INSULIN ASPART 100 [IU]/ML
3 INJECTION, SOLUTION INTRAVENOUS; SUBCUTANEOUS
Status: DISCONTINUED | OUTPATIENT
Start: 2019-04-11 | End: 2019-04-10

## 2019-04-10 RX ORDER — IBUPROFEN 200 MG
24 TABLET ORAL
Status: DISCONTINUED | OUTPATIENT
Start: 2019-04-11 | End: 2019-04-17 | Stop reason: HOSPADM

## 2019-04-10 RX ORDER — FUROSEMIDE 10 MG/ML
80 INJECTION INTRAMUSCULAR; INTRAVENOUS 2 TIMES DAILY
Status: DISCONTINUED | OUTPATIENT
Start: 2019-04-10 | End: 2019-04-10

## 2019-04-10 RX ORDER — ATORVASTATIN CALCIUM 40 MG/1
40 TABLET, FILM COATED ORAL DAILY
Status: DISCONTINUED | OUTPATIENT
Start: 2019-04-11 | End: 2019-04-17 | Stop reason: HOSPADM

## 2019-04-10 RX ORDER — AMOXICILLIN 250 MG
1 CAPSULE ORAL 2 TIMES DAILY PRN
Status: DISCONTINUED | OUTPATIENT
Start: 2019-04-10 | End: 2019-04-17 | Stop reason: HOSPADM

## 2019-04-10 RX ORDER — SODIUM CHLORIDE 0.9 % (FLUSH) 0.9 %
10 SYRINGE (ML) INJECTION
Status: DISCONTINUED | OUTPATIENT
Start: 2019-04-10 | End: 2019-04-10

## 2019-04-10 RX ORDER — RAMELTEON 8 MG/1
8 TABLET ORAL NIGHTLY PRN
Status: DISCONTINUED | OUTPATIENT
Start: 2019-04-10 | End: 2019-04-17 | Stop reason: HOSPADM

## 2019-04-10 RX ORDER — SODIUM BICARBONATE 1 MEQ/ML
50 SYRINGE (ML) INTRAVENOUS
Status: COMPLETED | OUTPATIENT
Start: 2019-04-10 | End: 2019-04-10

## 2019-04-10 RX ORDER — INSULIN ASPART 100 [IU]/ML
0-5 INJECTION, SOLUTION INTRAVENOUS; SUBCUTANEOUS
Status: DISCONTINUED | OUTPATIENT
Start: 2019-04-11 | End: 2019-04-17 | Stop reason: HOSPADM

## 2019-04-10 RX ORDER — FUROSEMIDE 10 MG/ML
80 INJECTION INTRAMUSCULAR; INTRAVENOUS 2 TIMES DAILY
Status: DISCONTINUED | OUTPATIENT
Start: 2019-04-10 | End: 2019-04-12

## 2019-04-10 RX ORDER — LEVOFLOXACIN 5 MG/ML
750 INJECTION, SOLUTION INTRAVENOUS
Status: COMPLETED | OUTPATIENT
Start: 2019-04-10 | End: 2019-04-10

## 2019-04-10 RX ORDER — IBUPROFEN 200 MG
16 TABLET ORAL
Status: DISCONTINUED | OUTPATIENT
Start: 2019-04-10 | End: 2019-04-10

## 2019-04-10 RX ORDER — GLUCAGON 1 MG
1 KIT INJECTION
Status: DISCONTINUED | OUTPATIENT
Start: 2019-04-10 | End: 2019-04-10

## 2019-04-10 RX ORDER — CLONIDINE HYDROCHLORIDE 0.1 MG/1
0.1 TABLET ORAL 3 TIMES DAILY PRN
Status: DISCONTINUED | OUTPATIENT
Start: 2019-04-10 | End: 2019-04-16

## 2019-04-10 RX ORDER — INSULIN ASPART 100 [IU]/ML
3 INJECTION, SOLUTION INTRAVENOUS; SUBCUTANEOUS
Status: DISCONTINUED | OUTPATIENT
Start: 2019-04-11 | End: 2019-04-17 | Stop reason: HOSPADM

## 2019-04-10 RX ORDER — ONDANSETRON 2 MG/ML
4 INJECTION INTRAMUSCULAR; INTRAVENOUS EVERY 6 HOURS PRN
Status: DISCONTINUED | OUTPATIENT
Start: 2019-04-10 | End: 2019-04-10

## 2019-04-10 RX ORDER — NIFEDIPINE 30 MG/1
30 TABLET, EXTENDED RELEASE ORAL DAILY
Status: DISCONTINUED | OUTPATIENT
Start: 2019-04-11 | End: 2019-04-16

## 2019-04-10 RX ORDER — BUMETANIDE 0.25 MG/ML
2 INJECTION INTRAMUSCULAR; INTRAVENOUS
Status: COMPLETED | OUTPATIENT
Start: 2019-04-10 | End: 2019-04-10

## 2019-04-10 RX ORDER — CALCITRIOL 0.25 UG/1
0.25 CAPSULE ORAL DAILY
Status: DISCONTINUED | OUTPATIENT
Start: 2019-04-11 | End: 2019-04-15

## 2019-04-10 RX ORDER — AMOXICILLIN 250 MG
1 CAPSULE ORAL DAILY PRN
Status: DISCONTINUED | OUTPATIENT
Start: 2019-04-10 | End: 2019-04-10

## 2019-04-10 RX ORDER — ACETAMINOPHEN 500 MG
500 TABLET ORAL EVERY 6 HOURS PRN
Status: DISCONTINUED | OUTPATIENT
Start: 2019-04-10 | End: 2019-04-17 | Stop reason: HOSPADM

## 2019-04-10 RX ORDER — ONDANSETRON 2 MG/ML
8 INJECTION INTRAMUSCULAR; INTRAVENOUS EVERY 8 HOURS PRN
Status: DISCONTINUED | OUTPATIENT
Start: 2019-04-10 | End: 2019-04-17 | Stop reason: HOSPADM

## 2019-04-10 RX ORDER — GLUCAGON 1 MG
1 KIT INJECTION
Status: DISCONTINUED | OUTPATIENT
Start: 2019-04-11 | End: 2019-04-17 | Stop reason: HOSPADM

## 2019-04-10 RX ORDER — IBUPROFEN 200 MG
16 TABLET ORAL
Status: DISCONTINUED | OUTPATIENT
Start: 2019-04-11 | End: 2019-04-17 | Stop reason: HOSPADM

## 2019-04-10 RX ORDER — ACETAMINOPHEN 325 MG/1
650 TABLET ORAL EVERY 6 HOURS PRN
Status: DISCONTINUED | OUTPATIENT
Start: 2019-04-10 | End: 2019-04-10

## 2019-04-10 RX ADMIN — BUMETANIDE 2 MG: 0.25 INJECTION INTRAMUSCULAR; INTRAVENOUS at 04:04

## 2019-04-10 RX ADMIN — NITROGLYCERIN 1 INCH: 20 OINTMENT TOPICAL at 03:04

## 2019-04-10 RX ADMIN — FUROSEMIDE 80 MG: 10 INJECTION, SOLUTION INTRAVENOUS at 09:04

## 2019-04-10 RX ADMIN — SODIUM BICARBONATE 50 MEQ: 84 INJECTION INTRAVENOUS at 05:04

## 2019-04-10 RX ADMIN — LEVOFLOXACIN 750 MG: 750 INJECTION, SOLUTION INTRAVENOUS at 05:04

## 2019-04-10 RX ADMIN — APIXABAN 2.5 MG: 2.5 TABLET, FILM COATED ORAL at 09:04

## 2019-04-10 NOTE — ED PROVIDER NOTES
Encounter Date: 4/10/2019       History     Chief Complaint   Patient presents with    Shortness of Breath     Per EMS, pt became SOB after OT. Per Nursing home pt was 93%on RA this AM and then oxygen sats went down to 70-80s. nursing home placed pt on NRB with sats of 86%     85 y.o. female Past Medical History:  No date: Arthritis  No date: Cataract  No date: Diabetes mellitus  No date: GERD (gastroesophageal reflux disease)  12/2018: GI bleed  No date: Gout  No date: Hypertension     CHF, recently admitted for acute renal failure and had dialysis 4/2-4/6 however refused dialysis after that, was at nursing facility today and noted to be in respiratory distress. Medics note o2 sat 77% upon their arrival. Pt is a DNR. Family notes pt was never a smoker and has never had copd.        Pt was seen by cardiology Dr. Reddy yesterday with the following plan:    1. Heart Failure, Diastolic, Acute on Chronic              4/1/2019: Echo: Normal left ventricular size with hyperdynamic systolic function. EF 80%. Moderate LVH. Moderate diastolic dysfunction. Severely dilated LA. SPAP 74 mmHg. No shunt is visualized with agitated saline.                      Received fluid removal with HD.              On furosemide 40 mg Q12.     2. Atrial Fibrillation              Paroxysmal atrial fibrillation.              On apixiban 2.5 mg Q12.     3. Chronic Anticoagulation              On apixiban 2.5 mg Q12.     4. Chronic Kidney Disease, Stage 5                4/3/2019: Began trial of HD.              4/8/2019: BUN/crea 42/2.6. CrCl 19.               Echo 4/1/19    · Moderate concentric left ventricular hypertrophy.  · Severe left atrial enlargement.  · Increased (hyperdynamic) left ventricular systolic function. The estimated ejection fraction is 80%  · Grade II (moderate) left ventricular diastolic dysfunction consistent with pseudonormalization.  · Elevated left atrial pressure.  · Normal right ventricular systolic  function.  · Moderate mitral sclerosis.  · Mild mitral regurgitation.  · Mild tricuspid regurgitation.  · Intermediate central venous pressure (8 mm Hg).  · The estimated PA systolic pressure is 74 mm Hg  · Pulmonary hypertension present.  · No shunting seen with agitated saline.               Review of patient's allergies indicates:  No Known Allergies  Past Medical History:   Diagnosis Date    Arthritis     Cataract     Diabetes mellitus     GERD (gastroesophageal reflux disease)     GI bleed 2018    Gout     Hypertension      Past Surgical History:   Procedure Laterality Date     SECTION      Patient unsure, believe she had 3-4    CHOLECYSTECTOMY      EYE SURGERY      HYSTERECTOMY      TOTAL KNEE ARTHROPLASTY Right      Family History   Problem Relation Age of Onset    Cancer Mother          age 98    Diabetes Mother     Hypertension Mother     Cancer Brother      Social History     Tobacco Use    Smoking status: Never Smoker    Smokeless tobacco: Never Used   Substance Use Topics    Alcohol use: No    Drug use: No     Review of Systems   Constitutional: Negative for fever.   HENT: Negative for sore throat.    Respiratory: Positive for shortness of breath. Negative for cough.    Cardiovascular: Negative for chest pain.   Gastrointestinal: Negative for nausea.   Genitourinary: Negative for dysuria.   Musculoskeletal: Negative for back pain.   Skin: Negative for rash.   Neurological: Negative for weakness.   Hematological: Does not bruise/bleed easily.   All other systems reviewed and are negative.      Physical Exam     Initial Vitals [04/10/19 1532]   BP Pulse Resp Temp SpO2   (!) 150/70 81 (!) 26 98.4 °F (36.9 °C) (!) 77 %      MAP       --         Physical Exam    Nursing note and vitals reviewed.  Constitutional: She appears well-developed and well-nourished.   HENT:   Head: Normocephalic and atraumatic.   Eyes: Conjunctivae and EOM are normal. Pupils are equal, round, and  reactive to light.   Neck: Normal range of motion.   Cardiovascular: Normal rate and regular rhythm.   Pulmonary/Chest: Breath sounds normal. No respiratory distress. She has no wheezes. She has no rales.   Abdominal: Soft. She exhibits no distension. There is no tenderness. There is no rebound.   Musculoskeletal: Normal range of motion.   Neurological: She is alert. No cranial nerve deficit. GCS score is 15. GCS eye subscore is 4. GCS verbal subscore is 5. GCS motor subscore is 6.   Skin: Skin is warm and dry.   Psychiatric: She has a normal mood and affect. Thought content normal.         ED Course   Procedures  Labs Reviewed   CULTURE, BLOOD   CULTURE, BLOOD   CBC W/ AUTO DIFFERENTIAL   COMPREHENSIVE METABOLIC PANEL   B-TYPE NATRIURETIC PEPTIDE   TROPONIN I   LACTIC ACID, PLASMA     EKG Readings: (Independently Interpreted)   Hr 84, sinus,  nl axis/intervals, no marilyn/twi, non acute, no stemi.        Imaging Results    None          Medical Decision Making:   Initial Assessment:   Pt in resp distress, DNR, have placed on bipap, have placed nitro patch given hx chf and need for dialysis in past.     Review of records from [facility] shows that pt felt to have Cr 2.5, was given short course of HD and Cr improved to her baseline. Renal felt pt was a poor long term candidate for dialysis and recommended palliative care consult.    Palliative care consult recommended SNF placement and providing pt with instructional materials for hospice.              Attending Attestation:         Attending Critical Care:   Critical Care Times:   Direct Patient Care (initial evaluation, reassessments, and time considering the case)................................................................30 minutes.   Additional History from reviewing old medical records or taking additional history from the family, EMS, PCP, etc.......................10 minutes.   Ordering, Reviewing, and Interpreting Diagnostic  Studies...............................................................................................................10 minutes.   Documentation..................................................................................................................................................................................10 minutes.   Consultation with other Physicians. .................................................................................................................................................10 minutes.   ==============================================================  · Total Critical Care Time - exclusive of procedural time: 70 minutes.  ==============================================================                 Clinical Impression:       ICD-10-CM ICD-9-CM   1. Congestive heart failure, unspecified HF chronicity, unspecified heart failure type I50.9 428.0   2. SOB (shortness of breath) R06.02 786.05   3. Hypoxia R09.02 799.02                                Rayray Lai MD  04/10/19 1714       Rayray Lai MD  04/10/19 174

## 2019-04-10 NOTE — ED TRIAGE NOTES
Pt to the ED via EMS with c/o resp distress and SOB from [facility]. Pt recently discharged, hospice encouraged. Family sent pt to [facility]. Pt is DNR. Pt reports nausea and SOB. Pt on 8L NC sat 98%. Resp at bedside and placed pt on Bipap. Pt tolerating well at this time. Pt placed on cardiac monitor, continuous pulse ox and BP cuff. Daughter at bedside.

## 2019-04-11 LAB
ALBUMIN SERPL BCP-MCNC: 2.8 G/DL (ref 3.5–5.2)
ALP SERPL-CCNC: 130 U/L (ref 55–135)
ALT SERPL W/O P-5'-P-CCNC: 16 U/L (ref 10–44)
ANION GAP SERPL CALC-SCNC: 9 MMOL/L (ref 8–16)
AST SERPL-CCNC: 14 U/L (ref 10–40)
BASOPHILS # BLD AUTO: 0.01 K/UL (ref 0–0.2)
BASOPHILS NFR BLD: 0.1 % (ref 0–1.9)
BILIRUB SERPL-MCNC: 1.1 MG/DL (ref 0.1–1)
BUN SERPL-MCNC: 46 MG/DL (ref 8–23)
CALCIUM SERPL-MCNC: 10 MG/DL (ref 8.7–10.5)
CHLORIDE SERPL-SCNC: 103 MMOL/L (ref 95–110)
CO2 SERPL-SCNC: 30 MMOL/L (ref 23–29)
CREAT SERPL-MCNC: 3.1 MG/DL (ref 0.5–1.4)
DIFFERENTIAL METHOD: ABNORMAL
EOSINOPHIL # BLD AUTO: 0.1 K/UL (ref 0–0.5)
EOSINOPHIL NFR BLD: 1.3 % (ref 0–8)
ERYTHROCYTE [DISTWIDTH] IN BLOOD BY AUTOMATED COUNT: 18.3 % (ref 11.5–14.5)
EST. GFR  (AFRICAN AMERICAN): 15 ML/MIN/1.73 M^2
EST. GFR  (NON AFRICAN AMERICAN): 13 ML/MIN/1.73 M^2
GLUCOSE SERPL-MCNC: 118 MG/DL (ref 70–110)
HCT VFR BLD AUTO: 27.3 % (ref 37–48.5)
HGB BLD-MCNC: 8.2 G/DL (ref 12–16)
LYMPHOCYTES # BLD AUTO: 0.7 K/UL (ref 1–4.8)
LYMPHOCYTES NFR BLD: 7.3 % (ref 18–48)
MAGNESIUM SERPL-MCNC: 1.7 MG/DL (ref 1.6–2.6)
MCH RBC QN AUTO: 29 PG (ref 27–31)
MCHC RBC AUTO-ENTMCNC: 30 G/DL (ref 32–36)
MCV RBC AUTO: 97 FL (ref 82–98)
MONOCYTES # BLD AUTO: 0.4 K/UL (ref 0.3–1)
MONOCYTES NFR BLD: 4.4 % (ref 4–15)
NEUTROPHILS # BLD AUTO: 8.5 K/UL (ref 1.8–7.7)
NEUTROPHILS NFR BLD: 86.9 % (ref 38–73)
PHOSPHATE SERPL-MCNC: 3.6 MG/DL (ref 2.7–4.5)
PLATELET # BLD AUTO: 180 K/UL (ref 150–350)
PMV BLD AUTO: 11.2 FL (ref 9.2–12.9)
POCT GLUCOSE: 115 MG/DL (ref 70–110)
POCT GLUCOSE: 116 MG/DL (ref 70–110)
POCT GLUCOSE: 117 MG/DL (ref 70–110)
POCT GLUCOSE: 125 MG/DL (ref 70–110)
POCT GLUCOSE: 160 MG/DL (ref 70–110)
POCT GLUCOSE: 82 MG/DL (ref 70–110)
POTASSIUM SERPL-SCNC: 5.2 MMOL/L (ref 3.5–5.1)
PROT SERPL-MCNC: 6.4 G/DL (ref 6–8.4)
RBC # BLD AUTO: 2.83 M/UL (ref 4–5.4)
SODIUM SERPL-SCNC: 142 MMOL/L (ref 136–145)
WBC # BLD AUTO: 9.83 K/UL (ref 3.9–12.7)

## 2019-04-11 PROCEDURE — 21400001 HC TELEMETRY ROOM

## 2019-04-11 PROCEDURE — S5571 INSULIN DISPOS PEN 3 ML: HCPCS | Performed by: INTERNAL MEDICINE

## 2019-04-11 PROCEDURE — 83735 ASSAY OF MAGNESIUM: CPT

## 2019-04-11 PROCEDURE — 25000003 PHARM REV CODE 250: Performed by: INTERNAL MEDICINE

## 2019-04-11 PROCEDURE — 85025 COMPLETE CBC W/AUTO DIFF WBC: CPT

## 2019-04-11 PROCEDURE — 36415 COLL VENOUS BLD VENIPUNCTURE: CPT

## 2019-04-11 PROCEDURE — 99900035 HC TECH TIME PER 15 MIN (STAT)

## 2019-04-11 PROCEDURE — 94761 N-INVAS EAR/PLS OXIMETRY MLT: CPT

## 2019-04-11 PROCEDURE — 94660 CPAP INITIATION&MGMT: CPT

## 2019-04-11 PROCEDURE — 84100 ASSAY OF PHOSPHORUS: CPT

## 2019-04-11 PROCEDURE — 63600175 PHARM REV CODE 636 W HCPCS: Performed by: INTERNAL MEDICINE

## 2019-04-11 PROCEDURE — 80053 COMPREHEN METABOLIC PANEL: CPT

## 2019-04-11 RX ADMIN — FUROSEMIDE 80 MG: 10 INJECTION, SOLUTION INTRAVENOUS at 09:04

## 2019-04-11 RX ADMIN — CALCITRIOL 0.25 MCG: 0.25 CAPSULE ORAL at 09:04

## 2019-04-11 RX ADMIN — NIFEDIPINE 30 MG: 30 TABLET, FILM COATED, EXTENDED RELEASE ORAL at 09:04

## 2019-04-11 RX ADMIN — APIXABAN 2.5 MG: 2.5 TABLET, FILM COATED ORAL at 09:04

## 2019-04-11 RX ADMIN — FUROSEMIDE 80 MG: 10 INJECTION, SOLUTION INTRAVENOUS at 05:04

## 2019-04-11 RX ADMIN — INSULIN ASPART 3 UNITS: 100 INJECTION, SOLUTION INTRAVENOUS; SUBCUTANEOUS at 01:04

## 2019-04-11 RX ADMIN — RAMELTEON 8 MG: 8 TABLET, FILM COATED ORAL at 10:04

## 2019-04-11 RX ADMIN — INSULIN ASPART 3 UNITS: 100 INJECTION, SOLUTION INTRAVENOUS; SUBCUTANEOUS at 09:04

## 2019-04-11 RX ADMIN — FAMOTIDINE 20 MG: 20 TABLET ORAL at 09:04

## 2019-04-11 RX ADMIN — ATORVASTATIN CALCIUM 40 MG: 40 TABLET, FILM COATED ORAL at 09:04

## 2019-04-11 RX ADMIN — INSULIN ASPART 3 UNITS: 100 INJECTION, SOLUTION INTRAVENOUS; SUBCUTANEOUS at 05:04

## 2019-04-11 RX ADMIN — INSULIN DETEMIR 10 UNITS: 100 INJECTION, SOLUTION SUBCUTANEOUS at 09:04

## 2019-04-11 NOTE — ASSESSMENT & PLAN NOTE
Patient was just recently discharged from [facility] yesterday and was found to be dyspneic at the nursing home today with hypoxia.  She was transferred to this facility where her initial ambient air oxygen saturation was 77%.  She was placed on NIPPV with BPAP with improvement of her oxygen saturation.  I personally reviewed her chest radiograph and was significant for mild pulmonary edema. She was only mildly hypertensive so I doubt hypertensive emergency.  An ABG was obtained which was revealing for 7.302  61.0  44  30.2 on BPAP 10  5  40%.  She does have a elevated BNP of 796 which is higher than her previous measurement.  She also has pulmonary hypertension with a EPAP of 74 mmHg.  Will continue intravenous diuresis with careful monitoring of her intake/output.  Of note, she is DNR.

## 2019-04-11 NOTE — H&P
[facility] Medicine  History & Physical    Patient Name: Joyce Riley  MRN: [id number]  Admission Date: 4/10/2019  Attending Physician: Jody Cota MD   Primary Care Provider: Kalpana Staton MD         Patient information was obtained from patient and children.     Subjective:     Principal Problem:Acute on chronic diastolic congestive heart failure    Chief Complaint:  Shortness of breath today.    HPI: Mrs. Joyce Riley is a 85 y.o. female [facility] resident with renal hypertension, type 2 diabetes mellitus (HbA1c 6.6% Apr 2019), hyperlipidemia (LDL unknown), chronic diastolic heart failure (LVEF 80% Apr 2019), CKD stage 5, paroxysmal atrial fibrillation ([id number]-[id number] score 5) on chronic anticoagulation, anemia of renal disease, obesity (BMI 35.2), and GERD who presents to [facility] ED with complaints of dyspnea for one day.  [facility].  She underwent a few sessions and was noted to poorly tolerate it.  She was discontinue from hemodialysis and encouraged hospice.  The family was not ready for that so she was transferred to [place] for rehabilitation yesterday.  One of her sons was there to visit with her and reports that she was doing well.  He was concerned that the nursing home was about to serve her fried chicken and mashed potatoes with gravy.  About 10 min after he left to go home, he was called by the nursing home to reports that she was short of breath.  EMS was activated and upon their arrival found that her oxygen saturation on ambient air was 77%.  She denies any chest pain, palpitations, nausea, vomiting, nor any abdominal pain.  She does report lower extremity edema. Further history is otherwise limited at this time.    Chart Review:  Previous Hospitalizations  Date Hospital Diagnosis   Apr [facility], [facility] End-[facility]     Outpatient Follow-Up  Date of Visit  Physician Service   2012 Jeanne Sexton MD Primary Care     Past Medical History:   Diagnosis Date    Anticoagulant long-term use     Arthritis     Atrial fibrillation     Cataract     CHF (congestive heart failure)     CKD (chronic kidney disease), stage V     Diabetes mellitus     GERD (gastroesophageal reflux disease)     GI bleed 2018    Gout     Hypertension     Obese     Requires assistance with all daily activities     Stroke     Wheelchair dependent        Past Surgical History:   Procedure Laterality Date     SECTION      Patient unsure, believe she had 3-4    CHOLECYSTECTOMY      EYE SURGERY      HYSTERECTOMY      JOINT REPLACEMENT Right     knee    TOTAL KNEE ARTHROPLASTY Right        Review of patient's allergies indicates:  No Known Allergies    No current facility-administered medications on file prior to encounter.      Current Outpatient Medications on File Prior to Encounter   Medication Sig    apixaban (ELIQUIS) 2.5 mg Tab Take 2.5 mg by mouth 2 (two) times daily.    atorvastatin (LIPITOR) 40 MG tablet Take 1 tablet (40 mg total) by mouth once daily.    calcitRIOL (ROCALTROL) 0.25 MCG Cap Take 1 capsule (0.25 mcg total) by mouth once daily.    ergocalciferol (ERGOCALCIFEROL) 50,000 unit Cap Take 1 capsule (50,000 Units total) by mouth every 7 days.    famotidine (PEPCID) 20 MG tablet Take 1 tablet (20 mg total) by mouth once daily.    furosemide (LASIX) 40 MG tablet Take 1 tablet (40 mg total) by mouth 2 (two) times daily.    insulin degludec (TRESIBA FLEXTOUCH U-100) 100 unit/mL (3 mL) InPn Inject 5 Units into the skin every evening.    NIFEdipine (PROCARDIA-XL) 30 MG (OSM) 24 hr tablet Take 1 tablet (30 mg total) by mouth once daily.    acetaminophen (TYLENOL) 325 MG tablet Take 2 tablets (650 mg total) by mouth every 4 (four) hours as needed.    albuterol-ipratropium (DUO-NEB) 2.5 mg-0.5 mg/3 mL nebulizer solution Take 3 mLs by nebulization  every 4 (four) hours as needed for Wheezing. Rescue     Family History     Problem Relation (Age of Onset)    Cancer Mother, Brother    Diabetes Mother    Hypertension Mother        Tobacco Use    Smoking status: Never Smoker    Smokeless tobacco: Never Used   Substance and Sexual Activity    Alcohol use: No    Drug use: No    Sexual activity: Never     Review of Systems   Constitutional: Negative for activity change, appetite change, chills, diaphoresis, fatigue, fever and unexpected weight change.   HENT: Negative.    Eyes: Negative.    Respiratory: Positive for cough and shortness of breath. Negative for chest tightness and wheezing.    Cardiovascular: Positive for leg swelling. Negative for chest pain and palpitations.   Gastrointestinal: Negative for abdominal distention, abdominal pain, blood in stool, constipation, diarrhea, nausea and vomiting.   Genitourinary: Negative for dysuria and hematuria.   Musculoskeletal: Negative.    Skin: Negative.    Neurological: Negative for dizziness, seizures, syncope, weakness and light-headedness.   Psychiatric/Behavioral: Negative.      Objective:     Vital Signs (Most Recent):  Temp: 97.6 °F (36.4 °C) (04/10/19 1940)  Pulse: 81 (04/10/19 1940)  Resp: 14 (04/10/19 1940)  BP: (!) 165/75 (04/10/19 1940)  SpO2: 96 % (04/10/19 1940) Vital Signs (24h Range):  Temp:  [97.6 °F (36.4 °C)-98.4 °F (36.9 °C)] 97.6 °F (36.4 °C)  Pulse:  [74-82] 81  Resp:  [14-46] 14  SpO2:  [77 %-96 %] 96 %  BP: (136-166)/(64-75) 165/75     Weight: 87.3 kg (192 lb 7.4 oz)  Body mass index is 33.04 kg/m².    Physical Exam   Constitutional: She is oriented to person, place, and time. She appears well-developed and well-nourished. No distress.   HENT:   Head: Normocephalic and atraumatic.   Right Ear: External ear normal.   Left Ear: External ear normal.   Nose: Nose normal.   BPAP mask in place   Eyes: Right eye exhibits no discharge. Left eye exhibits no discharge.   Neck: Normal range of motion.    Cardiovascular: Normal rate, regular rhythm, normal heart sounds and intact distal pulses. Exam reveals no gallop and no friction rub.   No murmur heard.  Pulmonary/Chest:   On BPAP with moderately increased respiratory effort and diffuse crackles worse at the bases.  The there is no wheezing.   Abdominal: Soft. Bowel sounds are normal. She exhibits no distension. There is no tenderness. There is no rebound and no guarding.   Musculoskeletal: Normal range of motion. She exhibits edema (There is 1+ pitting edema to the knees bilaterally).   Neurological: She is alert and oriented to person, place, and time.   Skin: Skin is warm and dry. She is not diaphoretic. No erythema.   Psychiatric: She has a normal mood and affect. Her behavior is normal. Judgment and thought content normal.   Nursing note and vitals reviewed.          Significant Labs: All pertinent labs within the past 24 hours have been reviewed.    Significant Imaging: I have reviewed and interpreted all pertinent imaging results/findings within the past 24 hours.    Assessment/Plan:     * Acute on chronic diastolic congestive heart failure  Patient was just recently discharged from [facility] yesterday and was found to be dyspneic at the nursing home today with hypoxia.  She was transferred to this facility where her initial ambient air oxygen saturation was 77%.  She was placed on NIPPV with BPAP with improvement of her oxygen saturation.  I personally reviewed her chest radiograph and was significant for mild pulmonary edema. She was only mildly hypertensive so I doubt hypertensive emergency.  An ABG was obtained which was revealing for 7.302  61.0  44  30.2 on BPAP 10  5  40%.  She does have a elevated BNP of 796 which is higher than her previous measurement.  She also has pulmonary hypertension with a EPAP of 74 mmHg.  Will continue intravenous diuresis with careful monitoring of her intake/output.  Of note, she is DNR.    Acute respiratory  failure with hypoxia and hypercapnia  As addressed above.    Hyperkalemia  Likely from her chronic kidney disease.  There are no EKG changes.  Will repeat her potassium level in the morning.    Renal hypertension  Patient's blood pressure is fairly-controlled; will continue home regimen of furosemide and nifedipine, and provide as-needed clonidine.    Type 2 diabetes mellitus, controlled, with renal complications  Well-controlled on a home regimen of basal insulin therapy; will provide basal-prandial insulin along with insulin sliding scale.    Hyperlipidemia  Will continue her home regimen of atorvastatin.    Chronic diastolic heart failure  As addressed above.    CKD stage 5  Her renal function appears to be near baseline; will continue to monitor urine output.    Paroxysmal atrial fibrillation  Patient is currently in sinus rhythm; will continue her home regimen of apixaban.    Chronic anticoagulation  As addressed above.    Anemia of renal disease  The patient's H/H is stable and consistent with previous laboratory measurements, and the patient exhibits no signs or symptoms of acute bleeding; there is no indication for transfusion.  Will continue to monitor.    Obesity, Class II, BMI 35-39.9  The patient has been counseled on the negative impact that obesity imparts on her health and was encouraged to make lifestyle changes in order to lose weight and decrease her modifiable risk factors.    GERD (gastroesophageal reflux disease)  Will continue home regimen of famotidine.    VTE Risk Mitigation (From admission, onward)        Ordered     apixaban tablet 2.5 mg  2 times daily      04/10/19 1934     IP VTE HIGH RISK PATIENT  Once      04/10/19 1934     Place SANTOS hose  Until discontinued      04/10/19 1815             Leonard Gonzalez M.D.  Staff Nocturnist  Department of Hospital Medicine  [facility]  Pager: [phone]          N.B.: Portions of this note was dictated using M*Modal  [employer]ency Direct--there may be phonetic errors occasionally missed on review.

## 2019-04-11 NOTE — ASSESSMENT & PLAN NOTE
Her renal function appears to be near baseline; will continue to monitor urine output. Attempt to diurese. Appreciate nephrology recs. May do best with management of diuretics with Palliative care/Hospice. She is a poor candidate for HD due to her age and pulmonary hypertension.

## 2019-04-11 NOTE — HPI
Principal Problem:Acute on chronic diastolic congestive heart failure     Chief Complaint:  Shortness of breath today.     HPI: Mrs. Joyce Riley is a 85 y.o. female [facility] resident with renal hypertension, type 2 diabetes mellitus (HbA1c 6.6% Apr 2019), hyperlipidemia (LDL unknown), chronic diastolic heart failure (LVEF 80% Apr 2019), CKD stage 5, paroxysmal atrial fibrillation ([id number] score 5) on chronic anticoagulation, anemia of renal disease, obesity (BMI 35.2), and GERD who presents to [facility] ED with complaints of dyspnea for one day.  She was recently discharged from [facility] after initiating hemodialysis.  She underwent a few sessions and was noted to poorly tolerate it.  She was discontinue from hemodialysis and encouraged hospice.  The family was not ready for that so she was transferred to [place] for rehabilitation yesterday.  One of her sons was there to visit with her and reports that she was doing well.  He was concerned that the nursing home was about to serve her fried chicken and mashed potatoes with gravy.  About 10 min after he left to go home, he was called by the nursing home to reports that she was short of breath.  EMS was activated and upon their arrival found that her oxygen saturation on ambient air was 77%.  She denies any chest pain, palpitations, nausea, vomiting, nor any abdominal pain.  She does report lower extremity edema. Further history is otherwise limited at this time.

## 2019-04-11 NOTE — ASSESSMENT & PLAN NOTE
Patient's blood pressure is fairly-controlled; will continue home regimen of furosemide and nifedipine, and provide as-needed clonidine.

## 2019-04-11 NOTE — SUBJECTIVE & OBJECTIVE
Past Medical History:   Diagnosis Date    Anticoagulant long-term use     Arthritis     Atrial fibrillation     Cataract     CHF (congestive heart failure)     CKD (chronic kidney disease), stage V     Diabetes mellitus     GERD (gastroesophageal reflux disease)     GI bleed 2018    Gout     Hypertension     Obese     Requires assistance with all daily activities     Stroke     Wheelchair dependent        Past Surgical History:   Procedure Laterality Date     SECTION      Patient unsure, believe she had 3-4    CHOLECYSTECTOMY      EYE SURGERY      HYSTERECTOMY      JOINT REPLACEMENT Right     knee    TOTAL KNEE ARTHROPLASTY Right        Review of patient's allergies indicates:  No Known Allergies    Medications:  Continuous Infusions:  Scheduled Meds:   apixaban  2.5 mg Oral BID    atorvastatin  40 mg Oral Daily    calcitRIOL  0.25 mcg Oral Daily    famotidine  20 mg Oral Daily    furosemide  80 mg Intravenous BID    insulin aspart U-100  3 Units Subcutaneous TIDWM    insulin detemir U-100  10 Units Subcutaneous QHS    NIFEdipine  30 mg Oral Daily     PRN Meds:acetaminophen, cloNIDine, dextrose 50%, dextrose 50%, glucagon (human recombinant), glucose, glucose, insulin aspart U-100, ondansetron, promethazine (PHENERGAN) IVPB, ramelteon, senna-docusate 8.6-50 mg    Family History     Problem Relation (Age of Onset)    Cancer Mother, Brother    Diabetes Mother    Hypertension Mother        Tobacco Use    Smoking status: Never Smoker    Smokeless tobacco: Never Used   Substance and Sexual Activity    Alcohol use: No    Drug use: No    Sexual activity: Never       Review of Systems   Constitutional: Positive for activity change.   Respiratory: Positive for shortness of breath.    Genitourinary: Positive for dysuria.     Objective:     Vital Signs (Most Recent):  Temp: 99.7 °F (37.6 °C) (19 1054)  Pulse: 93 (19 1054)  Resp: 18 (19 1054)  BP: (!) 161/70  (04/11/19 1054)  SpO2: 97 % (04/11/19 1141) Vital Signs (24h Range):  Temp:  [97.6 °F (36.4 °C)-99.7 °F (37.6 °C)] 99.7 °F (37.6 °C)  Pulse:  [74-96] 93  Resp:  [14-46] 18  SpO2:  [77 %-97 %] 97 %  BP: (136-171)/(64-75) 161/70     Weight: 87.1 kg (192 lb 0.3 oz)  Body mass index is 32.96 kg/m².    Review of Symptoms  Symptom Assessment (ESAS 0-10 scale)   ESAS 0 1 2 3 4 5 6 7 8 9 10   Pain              Dyspnea x             Anxiety x             Nausea x             Depression  x             Anorexia x             Fatigue x             Insomnia x             Restlessness  x             Agitation x                   Physical Exam   Constitutional: She appears well-developed and well-nourished.   Neck: Neck supple.   Cardiovascular: Normal rate and regular rhythm.   Pulmonary/Chest:   Oxygen BNC, diminished breath sounds    Musculoskeletal: She exhibits edema.   Neurological: She is alert.   confused   Skin: Skin is warm and dry.   Nursing note and vitals reviewed.      Significant Labs: All pertinent labs within the past 24 hours have been reviewed.  CBC:   Recent Labs   Lab 04/11/19  0415   WBC 9.83   HGB 8.2*   HCT 27.3*   MCV 97        BMP:  Recent Labs   Lab 04/11/19  0415   *      K 5.2*      CO2 30*   BUN 46*   CREATININE 3.1*   CALCIUM 10.0   MG 1.7     LFT:  Lab Results   Component Value Date    AST 14 04/11/2019    ALKPHOS 130 04/11/2019    BILITOT 1.1 (H) 04/11/2019     Albumin:   Albumin   Date Value Ref Range Status   04/11/2019 2.8 (L) 3.5 - 5.2 g/dL Final     Protein:   Total Protein   Date Value Ref Range Status   04/11/2019 6.4 6.0 - 8.4 g/dL Final     Lactic acid:   Lab Results   Component Value Date    LACTATE 0.7 04/10/2019       Significant Imaging: I have reviewed all pertinent imaging results/findings within the past 24 hours.    Advance Care Planning   Advanced Directives::  Living Will: No  LaPOST: No  Do Not Resuscitate Status:  Medical Power of : No. Patient  "has 7 living children    Decision-Making Capacity: Patient answered questions, Family answered questions       ASSESSMENT/PLAN:    Palliative encounter:    Bedside consult. Patient is alert and oriented to self. She is unable to tell me why she is in the hospital or why she has been in the hospital several times in the last month. She has oxygen BNC and sats 97%. She ate most of her lunch. No c/o any discomfort except that the air wick catheter is causing her to have burning when trying to urinate. Nurse to turn down to see if this will provide relief.  When discussing patient's current condition son reports while at rehab facility patient ate a large meal of fried chicken with potatoes and gravy and maybe got too much salt.. Discussed cardiac and salt intake and daughter reports pt being on such a strict no salt diet at hospital and then when she got to rehab she ate salt. Daughter states" she wouldn't have had trouble with just one meal would she?" Not sure why she was not on cardiac/renal diet ????  Explained with her cardiorenal issues it doesn't take much salt for her to retain the fluid and then she can't get it off so it builds up and she has trouble breathing. Explained how all organs work together. Family are waiting to hear from Nephrologist and hoping for a different answer regarding HD it seems. They seem somewhat unrealistic.  Son reports there is to be a family meeting tomorrow afternoon with entire family to discuss options. They have seen Palliative care recently and hospice has been discussed and recommended several times but they do not seem ready to accept their mother's decline.  I expect they will want to try SNF once again and it may take this before they realize she is not going to do well.Support provided.     -recommend cardiology consult  -Patient will need a LaPOST at discharge  -Palliative will continue to follow       "

## 2019-04-11 NOTE — ASSESSMENT & PLAN NOTE
Patient was just recently discharged from [facility] the day prior to presentation and was found to be dyspneic at the nursing home with hypoxia.  She was transferred to this facility where her initial ambient air oxygen saturation was 77%.  She was placed on NIPPV with BPAP with improvement of her oxygen saturation.  CXR w/ pulmonary edema. She was only mildly hypertensive so unlikely hypertensive emergency.  An ABG was obtained which was revealing for 7.302  61.0  44  30.2 on BPAP 10  5  40%.  She does have a elevated BNP of 796 which is higher than her previous measurement.  She also has pulmonary hypertension with a EPAP of 74 mmHg.  Will continue intravenous diuresis with careful monitoring of her intake/output.  Of note, she is DNR.

## 2019-04-11 NOTE — CONSULTS
[facility]  Nephrology  Consult Note    Patient Name: Joyce Riley  MRN: [id number]  Admission Date: 4/10/2019  Hospital Length of Stay: 1 days  Attending Provider: Adrianna Rockwell MD   Primary Care Physician: Kalpana Staton MD  Principal Problem:Acute on chronic diastolic congestive heart failure  Date of service 4/11/2019    Inpatient consult to Nephrology  Consult performed by: Samanta Bcaa MD  Consult ordered by: Virgilio Rhodes MD  Reason for consult: Renal insufficiency        Subjective:     HPI: 84yo F with PMH CKD with recent ROXANNE last week and underwent HD trial.  She was noted to have significant uremia and concerns for hypervolemia.  She underwent 3 HD sessions on 4/2, 4/3, and 4/4.  Documentation reports that patient pulled at HD lines during treatments on all the sessions.  She presented to our facility from rehab facility 1 day after discharge from [facility].  Family reports that she was given fried chicken just prior to being sent to ER.  She is noted to have SOB that required bipap overnight, given IV lasix with diuresis and wean to O2 NC. Family reports that she continues to be unable to make her own medical decisions but confusion is improved overall and carries on normal conversations with family and doesn't seem confused regarding this.  She reports SOB has improved.  Good UOP but c/o discomfort from purewick.  Family reports PO intake was low before dialysis but greatly improved since her last admission.    Prior to recent admission, patient lived at home with her daughter and was able to walk around the house and use the bathroom but not able to do other ADLs or advanced ADLs.    Past Medical History:   Diagnosis Date    Anticoagulant long-term use     Arthritis     Atrial fibrillation     Cataract     CHF (congestive heart failure)     CKD (chronic kidney disease), stage V     Diabetes mellitus     GERD (gastroesophageal reflux disease)      GI bleed 2018    Gout     Hypertension     Obese     Requires assistance with all daily activities     Stroke     Wheelchair dependent        Past Surgical History:   Procedure Laterality Date     SECTION      Patient unsure, believe she had 3-4    CHOLECYSTECTOMY      EYE SURGERY      HYSTERECTOMY      JOINT REPLACEMENT Right     knee    TOTAL KNEE ARTHROPLASTY Right        Review of patient's allergies indicates:  No Known Allergies  Current Facility-Administered Medications   Medication Frequency    acetaminophen tablet 500 mg Q6H PRN    apixaban tablet 2.5 mg BID    atorvastatin tablet 40 mg Daily    calcitRIOL capsule 0.25 mcg Daily    cloNIDine tablet 0.1 mg TID PRN    dextrose 50% injection 12.5 g PRN    dextrose 50% injection 25 g PRN    famotidine tablet 20 mg Daily    furosemide injection 80 mg BID    glucagon (human recombinant) injection 1 mg PRN    glucose chewable tablet 16 g PRN    glucose chewable tablet 24 g PRN    insulin aspart U-100 pen 0-5 Units PRN    insulin aspart U-100 pen 3 Units TIDWM    insulin detemir U-100 pen 10 Units QHS    NIFEdipine 24 hr tablet 30 mg Daily    ondansetron injection 8 mg Q8H PRN    promethazine (PHENERGAN) 6.25 mg in dextrose 5 % 50 mL IVPB Q6H PRN    ramelteon tablet 8 mg Nightly PRN    senna-docusate 8.6-50 mg per tablet 1 tablet BID PRN     Family History     Problem Relation (Age of Onset)    Cancer Mother, Brother    Diabetes Mother    Hypertension Mother        Tobacco Use    Smoking status: Never Smoker    Smokeless tobacco: Never Used   Substance and Sexual Activity    Alcohol use: No    Drug use: No    Sexual activity: Never     Review of Systems   All other systems reviewed and are negative.     Objective:     Vital Signs (Most Recent):  Temp: 98.3 °F (36.8 °C) (19 152)  Pulse: 84 (19 152)  Resp: 18 (19 152)  BP: (!) 150/66 (19 1521)  SpO2: 95 % (19 152)  O2 Device  (Oxygen Therapy): nasal cannula (04/11/19 1141) Vital Signs (24h Range):  Temp:  [97.6 °F (36.4 °C)-99.7 °F (37.6 °C)] 98.3 °F (36.8 °C)  Pulse:  [74-96] 84  Resp:  [14-33] 18  SpO2:  [92 %-97 %] 95 %  BP: (146-171)/(64-75) 150/66     Weight: 87.1 kg (192 lb 0.3 oz) (04/11/19 0452)  Body mass index is 32.96 kg/m².  Body surface area is 1.98 meters squared.    I/O last 3 completed shifts:  In: -   Out: 600 [Urine:600]    Physical Exam   Constitutional: She is oriented to person, place, and time. She appears well-developed and well-nourished. No distress.   HENT:   Head: Normocephalic and atraumatic.   Eyes: EOM are normal. No scleral icterus.   Cardiovascular: Normal rate, regular rhythm and normal heart sounds. Exam reveals no friction rub.   No murmur heard.  Pulmonary/Chest: Effort normal. She has no wheezes. She has rales.   O2 NC  Dyspneic with speech   Abdominal: Soft. She exhibits no distension. There is no tenderness.   Musculoskeletal: She exhibits edema (2+ BLE edema). She exhibits no deformity.   Neurological: She is alert and oriented to person, place, and time.   NO asterixis   Skin: Skin is warm and dry. No rash noted. She is not diaphoretic.   Psychiatric: She has a normal mood and affect. Her behavior is normal.   Nursing note and vitals reviewed.      Significant Labs:  CBC:   Recent Labs   Lab 04/11/19 0415   WBC 9.83   RBC 2.83*   HGB 8.2*   HCT 27.3*      MCV 97   MCH 29.0   MCHC 30.0*     CMP:   Recent Labs   Lab 04/11/19 0415   *   CALCIUM 10.0   ALBUMIN 2.8*   PROT 6.4      K 5.2*   CO2 30*      BUN 46*   CREATININE 3.1*   ALKPHOS 130   ALT 16   AST 14   BILITOT 1.1*     Recent Labs   Lab 04/10/19  0272   COLORU Yellow   SPECGRAV 1.010   PHUR 5.0   PROTEINUA 2+*   BACTERIA Moderate*   NITRITE Negative   LEUKOCYTESUR Trace*   UROBILINOGEN Negative   HYALINECASTS 0     All labs within the past 24 hours have been reviewed.    Significant Imaging:  CXR: There is patchy  opacification of the pulmonary parenchyma similar to prior exam.    Echo 4/1/2019:  · Moderate concentric left ventricular hypertrophy.  · Severe left atrial enlargement.  · Increased (hyperdynamic) left ventricular systolic function. The estimated ejection fraction is 80%  · Grade II (moderate) left ventricular diastolic dysfunction consistent with pseudonormalization.  · Elevated left atrial pressure.  · Normal right ventricular systolic function.  · Moderate mitral sclerosis.  · Mild mitral regurgitation.  · Mild tricuspid regurgitation.  · Intermediate central venous pressure (8 mm Hg).  · The estimated PA systolic pressure is 74 mm Hg  · Pulmonary hypertension present.  · No shunting seen with agitated saline.    Assessment/Plan:     Active Diagnoses:    Diagnosis Date Noted POA    PRINCIPAL PROBLEM:  Acute on chronic diastolic congestive heart failure [I50.33] 04/10/2019 Yes    Acute respiratory failure with hypoxia and hypercapnia [J96.01, J96.02] 04/10/2019 Yes    Hyperkalemia [E87.5] 04/10/2019 Yes    Renal hypertension [I12.9] 04/10/2019 Yes     Chronic    Hyperlipidemia [E78.5] 04/10/2019 Yes     Chronic    Chronic diastolic heart failure [I50.32] 04/10/2019 Yes     Chronic    Paroxysmal atrial fibrillation [I48.0] 04/10/2019 Yes     Chronic    Chronic anticoagulation [Z79.01] 04/10/2019 Not Applicable     Chronic    Obesity, Class II, BMI 35-39.9 [E66.9] 04/10/2019 Yes     Chronic    GERD (gastroesophageal reflux disease) [K21.9] 04/10/2019 Yes     Chronic    CKD stage 5 [N18.5] 04/01/2019 Yes     Chronic    Anemia of renal disease [D63.1] 04/01/2019 Yes     Chronic    Type 2 diabetes mellitus, controlled, 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] 03/31/2019 Yes     Chronic      Problems Resolved During this Admission:         ROXANNE on CKD5  - noted recent HD trial at [facility] last week with recommendation for palliative care 2/2 patient pulling at lines  - continue IV diuresis as tolerated by  renal function, UOP acceptable, no urgent indications for dialysis  - patient appears more cognitively intact at this time and could potentially be more cooperative with dialysis at this point if necessary so another trial could be considered if indicated and refractory to medical management.  Family would like to discuss options further. Risks of dialysis were discussed extensively.  - will also review hospital nursing notes closely to monitor her behavior and likelihood of cooperation with therapies  - check BMP daily  - monitor daily weights, strict I&Os  - avoid nephrotoxic agents including NSAIDs, renally dose medications for GFR <15    AOCD, DEYVI  - start Epo qwk, monitor CBC    Hyperkalemia  - low k diet, monitor daily BMP, continue diuresis    Pulmonary edema  - continue IV diuresis, discussions surrounding dialysis as above    DM  pAFib  pulm HTN  DHF    Thank you for allowing me to participate in the care of your patient.  Please call with any questions.    Date of service: 4/11/2019  Samanta Baca MD   Nephrology  [facility]  Office [phone]   [facility]

## 2019-04-11 NOTE — HPI
Mrs. Joyce Riley is a 85 y.o. female [facility] resident with renal hypertension, type 2 diabetes mellitus (HbA1c 6.6% Apr 2019), hyperlipidemia (LDL unknown), chronic diastolic heart failure (LVEF 80% Apr 2019), CKD stage 5, paroxysmal atrial fibrillation (DOH0OZ5-PDBt score 5) on chronic anticoagulation, anemia of renal disease, obesity (BMI 35.2), and GERD who presents to [facility] ED with complaints of dyspnea for one day.  She was recently discharged from [facility] after initiating hemodialysis.  She underwent a few sessions and was noted to poorly tolerate it.  She was discontinue from hemodialysis and encouraged hospice.  The family was not ready for that so she was transferred to [place] for rehabilitation yesterday.  One of her sons was there to visit with her and reports that she was doing well.  He was concerned that the nursing home was about to serve her fried chicken and mashed potatoes with gravy.  About 10 min after he left to go home, he was called by the nursing home to reports that she was short of breath.  EMS was activated and upon their arrival found that her oxygen saturation on ambient air was 77%.  She denies any chest pain, palpitations, nausea, vomiting, nor any abdominal pain.  She does report lower extremity edema. Further history is otherwise limited at this time.

## 2019-04-11 NOTE — PROGRESS NOTES
[facility] Hospital Medicine  Progress Note    Patient Name: Joyce Riley  MRN: [id number]  Patient Class: IP- Inpatient   Admission Date: 4/10/2019  Length of Stay: 1 days  Attending Physician: Adrianna Rockwell MD  Primary Care Provider: Kalpana Staton MD        Subjective:     Principal Problem:Acute on chronic diastolic congestive heart failure    HPI:  Mrs. Joyce Riley is a 85 y.o. female [facility] resident with renal hypertension, type 2 diabetes mellitus (HbA1c 6.6% Apr 2019), hyperlipidemia (LDL unknown), chronic diastolic heart failure (LVEF 80% Apr 2019), CKD stage 5, paroxysmal atrial fibrillation (FSO7KS5-LPDd score 5) on chronic anticoagulation, anemia of renal disease, obesity (BMI 35.2), and GERD who presents to [facility] ED with complaints of dyspnea for one day.  She was recently discharged from [facility] after initiating hemodialysis.  She underwent a few sessions and was noted to poorly tolerate it.  She was discontinue from hemodialysis and encouraged hospice.  The family was not ready for that so she was transferred to [place] for rehabilitation yesterday.  One of her sons was there to visit with her and reports that she was doing well.  He was concerned that the nursing home was about to serve her fried chicken and mashed potatoes with gravy.  About 10 min after he left to go home, he was called by the nursing home to reports that she was short of breath.  EMS was activated and upon their arrival found that her oxygen saturation on ambient air was 77%.  She denies any chest pain, palpitations, nausea, vomiting, nor any abdominal pain.  She does report lower extremity edema. Further history is otherwise limited at this time.    Hospital Course:  Ms. Riley has recently been admitted to [facility], [facility], and now our facility for similar presentations. She has CHF and progressive CKD. At [facility] she was not felt to be a candidate for dialysis  and hospice was recommended. The patient was transferred to [facility] for a second opinion. She underwent three dialysis sessions but was not thought to be a candidate for long term dialysis, and again, hospice was recommended. The family wanted the patient discharged to SNF hoping that she would improve. The day following discharge, she presented to our ER for shortness of breath and found to have edema on CXR and exam consistent with volume overload.   A third nephrology group has now been consulted. Palliative care was again consulted. She was breifly on BiPAP in the ER but was soon doing better on NC. Attempt to diurese with medication.    Interval history:  Feeling better. No current complaints of pain or discomfort. O2 sats good on NC.    Review of Systems   Constitutional: Negative for chills and fever.   Respiratory: Positive for cough and shortness of breath. Negative for chest tightness and wheezing.    Cardiovascular: Positive for leg swelling. Negative for chest pain.   Gastrointestinal: Negative for abdominal pain, constipation, diarrhea, nausea and vomiting.   Genitourinary: Negative for dysuria and hematuria.   Neurological: Negative for dizziness, seizures, syncope, weakness and light-headedness.     Objective:     Vital Signs (Most Recent):  Temp: 98.3 °F (36.8 °C) (04/11/19 1521)  Pulse: 84 (04/11/19 1521)  Resp: 18 (04/11/19 1521)  BP: (!) 150/66 (04/11/19 1521)  SpO2: 95 % (04/11/19 1521) Vital Signs (24h Range):  Temp:  [97.6 °F (36.4 °C)-99.7 °F (37.6 °C)] 98.3 °F (36.8 °C)  Pulse:  [74-96] 84  Resp:  [14-33] 18  SpO2:  [92 %-97 %] 95 %  BP: (146-171)/(64-75) 150/66     Weight: 87.1 kg (192 lb 0.3 oz)  Body mass index is 32.96 kg/m².    Physical Exam   Constitutional: She is oriented to person, place, and time. She appears well-developed and well-nourished. No distress.   HENT:   Right Ear: External ear normal.   Left Ear: External ear normal.   Nose: Nose normal.   Mouth/Throat:  Oropharynx is clear and moist.   Eyes: Right eye exhibits no discharge. Left eye exhibits no discharge.   Neck: Normal range of motion.   Cardiovascular: Normal rate and normal heart sounds. Exam reveals no gallop and no friction rub.   No murmur heard.  Pulmonary/Chest:   NC in place. No distress. Crackles at bases. Poor inspiratory effort likely contributed to by morbid obesity.   Abdominal: Soft. Bowel sounds are normal. She exhibits no distension. There is no tenderness.   Morbidly obese   Musculoskeletal: Normal range of motion. She exhibits edema (There is 1-2+ pitting edema to the knees bilaterally ).   Neurological: She is alert and oriented to person, place, and time.   Skin: Skin is warm and dry. She is not diaphoretic. No erythema.   Nursing note and vitals reviewed.          Significant Labs: All pertinent labs within the past 24 hours have been reviewed.    Significant Imaging: I have reviewed and interpreted all pertinent imaging results/findings within the past 24 hours.    Assessment/Plan:      * Acute on chronic diastolic congestive heart failure  Patient was just recently discharged from [facility] the day prior to presentation and was found to be dyspneic at the nursing home with hypoxia.  She was transferred to this facility where her initial ambient air oxygen saturation was 77%.  She was placed on NIPPV with BPAP with improvement of her oxygen saturation.  CXR w/ pulmonary edema. She was only mildly hypertensive so unlikely hypertensive emergency.  An ABG was obtained which was revealing for 7.302  61.0  44  30.2 on BPAP 10  5  40%.  She does have a elevated BNP of 796 which is higher than her previous measurement.  She also has pulmonary hypertension with a EPAP of 74 mmHg.  Will continue intravenous diuresis with careful monitoring of her intake/output.  Of note, she is DNR.    Acute respiratory failure with hypoxia and hypercapnia  As addressed above. Improved and now stable on  NC.    CKD stage 5  Her renal function appears to be near baseline; will continue to monitor urine output. Attempt to diurese. Appreciate nephrology recs. May do best with management of diuretics with Palliative care/Hospice. She is a poor candidate for HD due to her age and pulmonary hypertension.    GERD (gastroesophageal reflux disease)  Will continue home regimen of famotidine.    Obesity, Class II, BMI 35-39.9  The patient has been counseled on the negative impact that obesity imparts on her health and was encouraged to make lifestyle changes in order to lose weight and decrease her modifiable risk factors.    Chronic anticoagulation  See PAF.    Paroxysmal atrial fibrillation  Patient is currently in sinus rhythm; will continue her home regimen of apixaban.    Chronic diastolic heart failure  As addressed above.    Hyperlipidemia  Will continue her home regimen of atorvastatin.    Renal hypertension  Patient's blood pressure is fairly-controlled; will continue home regimen of furosemide and nifedipine, and provide as-needed clonidine.    Hyperkalemia  Likely from her chronic kidney disease.  There are no EKG changes.  Monitor.    Anemia of renal disease  The patient's H/H is stable and consistent with previous laboratory measurements, and the patient exhibits no signs or symptoms of acute bleeding; there is no indication for transfusion.  Will continue to monitor.    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     Type 2 diabetes mellitus, controlled, with renal complications  Well-controlled on a home regimen of basal insulin therapy; will provide basal-prandial insulin along with insulin sliding scale.    VTE Risk Mitigation (From admission, onward)        Ordered     apixaban tablet 2.5 mg  2 times daily      04/10/19 1934     IP VTE HIGH RISK PATIENT  Once      04/10/19 1934     Place SANTOS hose  Until discontinued      04/10/19 1815               Adrianna Rockwell MD  Department of Hospital Medicine   [facility]

## 2019-04-11 NOTE — PLAN OF CARE
Problem: Diabetes Comorbidity  Goal: Blood Glucose Level Within Desired Range    Intervention: Maintain Glycemic Control  Blood glucose monitored every 4 hours as ordered    04/11/19 0427   Monitor and Manage Ketoacidosis   Glycemic Management blood glucose monitoring

## 2019-04-11 NOTE — HOSPITAL COURSE
Ms. Riley has recently been admitted to [facility], [facility], and now our facility for similar presentations. She has CHF and progressive CKD. At [facility] she was not felt to be a candidate for dialysis and hospice was recommended. The patient was transferred to [facility] for a second opinion. She underwent three dialysis sessions but was not thought to be a candidate for long term dialysis, and again, hospice was recommended. The family wanted the patient discharged to SNF hoping that she would improve. The day following discharge, she presented to our ER for shortness of breath and found to have edema on CXR and exam consistent with volume overload.   She was breifly on BiPAP in the ER but was soon doing better on NC. A third nephrology group was consulted, and Dr. Baca assessed the patient. Palliative care was also consulted. She was started on IV Lasix and then metolazone in attempt to diurese with medication. Fluid restriction placed. Extensive family meeting with five of her children on 4/12/2019. The patient's goals of care and risks of dialysis were discussed. The family was not sure at that time whether they would want longterm HD given the impact on quality of life. Continued to diurese and monitor for response. She had good output with increase in diuretics. Her volume status improved significantly. She does not appear to need dialysis at this time. She was transitioned to a PO regimen with Lasix 40mg BID and metolazone 2.5mg daily on 4/15/2019. Lasix has now been switched to daily. Creat has remained stable. Patient is hemodynamically stable and looks very comfortable, but still requires 4L of oxygen. Also gets dyspnea with minimal exertion. Long term prognosis remains poor with high risk of frequent readmissions. She will be discharged to SNF.

## 2019-04-11 NOTE — NURSING
Patient visiting with family at bedside she is forgetful she forgot my name and who I was so I reoriented her to who I am and where she is . Her daughter is here and another male gentleman. No changes on telemetry. Side rail up x 3 bed alarm on .

## 2019-04-11 NOTE — SUBJECTIVE & OBJECTIVE
Past Medical History:   Diagnosis Date    Anticoagulant long-term use     Arthritis     Atrial fibrillation     Cataract     CHF (congestive heart failure)     CKD (chronic kidney disease), stage V     Diabetes mellitus     GERD (gastroesophageal reflux disease)     GI bleed 2018    Gout     Hypertension     Obese     Requires assistance with all daily activities     Stroke     Wheelchair dependent        Past Surgical History:   Procedure Laterality Date     SECTION      Patient unsure, believe she had 3-4    CHOLECYSTECTOMY      EYE SURGERY      HYSTERECTOMY      JOINT REPLACEMENT Right     knee    TOTAL KNEE ARTHROPLASTY Right        Review of patient's allergies indicates:  No Known Allergies    No current facility-administered medications on file prior to encounter.      Current Outpatient Medications on File Prior to Encounter   Medication Sig    apixaban (ELIQUIS) 2.5 mg Tab Take 2.5 mg by mouth 2 (two) times daily.    atorvastatin (LIPITOR) 40 MG tablet Take 1 tablet (40 mg total) by mouth once daily.    calcitRIOL (ROCALTROL) 0.25 MCG Cap Take 1 capsule (0.25 mcg total) by mouth once daily.    ergocalciferol (ERGOCALCIFEROL) 50,000 unit Cap Take 1 capsule (50,000 Units total) by mouth every 7 days.    famotidine (PEPCID) 20 MG tablet Take 1 tablet (20 mg total) by mouth once daily.    furosemide (LASIX) 40 MG tablet Take 1 tablet (40 mg total) by mouth 2 (two) times daily.    insulin degludec (TRESIBA FLEXTOUCH U-100) 100 unit/mL (3 mL) InPn Inject 5 Units into the skin every evening.    NIFEdipine (PROCARDIA-XL) 30 MG (OSM) 24 hr tablet Take 1 tablet (30 mg total) by mouth once daily.    acetaminophen (TYLENOL) 325 MG tablet Take 2 tablets (650 mg total) by mouth every 4 (four) hours as needed.    albuterol-ipratropium (DUO-NEB) 2.5 mg-0.5 mg/3 mL nebulizer solution Take 3 mLs by nebulization every 4 (four) hours as needed for Wheezing. Rescue     Family  History     Problem Relation (Age of Onset)    Cancer Mother, Brother    Diabetes Mother    Hypertension Mother        Tobacco Use    Smoking status: Never Smoker    Smokeless tobacco: Never Used   Substance and Sexual Activity    Alcohol use: No    Drug use: No    Sexual activity: Never     Review of Systems   Constitutional: Negative for activity change, appetite change, chills, diaphoresis, fatigue, fever and unexpected weight change.   HENT: Negative.    Eyes: Negative.    Respiratory: Positive for cough and shortness of breath. Negative for chest tightness and wheezing.    Cardiovascular: Positive for leg swelling. Negative for chest pain and palpitations.   Gastrointestinal: Negative for abdominal distention, abdominal pain, blood in stool, constipation, diarrhea, nausea and vomiting.   Genitourinary: Negative for dysuria and hematuria.   Musculoskeletal: Negative.    Skin: Negative.    Neurological: Negative for dizziness, seizures, syncope, weakness and light-headedness.   Psychiatric/Behavioral: Negative.      Objective:     Vital Signs (Most Recent):  Temp: 97.6 °F (36.4 °C) (04/10/19 1940)  Pulse: 81 (04/10/19 1940)  Resp: 14 (04/10/19 1940)  BP: (!) 165/75 (04/10/19 1940)  SpO2: 96 % (04/10/19 1940) Vital Signs (24h Range):  Temp:  [97.6 °F (36.4 °C)-98.4 °F (36.9 °C)] 97.6 °F (36.4 °C)  Pulse:  [74-82] 81  Resp:  [14-46] 14  SpO2:  [77 %-96 %] 96 %  BP: (136-166)/(64-75) 165/75     Weight: 87.3 kg (192 lb 7.4 oz)  Body mass index is 33.04 kg/m².    Physical Exam   Constitutional: She is oriented to person, place, and time. She appears well-developed and well-nourished. No distress.   HENT:   Head: Normocephalic and atraumatic.   Right Ear: External ear normal.   Left Ear: External ear normal.   Nose: Nose normal.   BPAP mask in place   Eyes: Right eye exhibits no discharge. Left eye exhibits no discharge.   Neck: Normal range of motion.   Cardiovascular: Normal rate, regular rhythm, normal heart  sounds and intact distal pulses. Exam reveals no gallop and no friction rub.   No murmur heard.  Pulmonary/Chest:   On BPAP with moderately increased respiratory effort and diffuse crackles worse at the bases.  The there is no wheezing.   Abdominal: Soft. Bowel sounds are normal. She exhibits no distension. There is no tenderness. There is no rebound and no guarding.   Musculoskeletal: Normal range of motion. She exhibits edema (There is 1+ pitting edema to the knees bilaterally).   Neurological: She is alert and oriented to person, place, and time.   Skin: Skin is warm and dry. She is not diaphoretic. No erythema.   Psychiatric: She has a normal mood and affect. Her behavior is normal. Judgment and thought content normal.   Nursing note and vitals reviewed.          Significant Labs: All pertinent labs within the past 24 hours have been reviewed.    Significant Imaging: I have reviewed and interpreted all pertinent imaging results/findings within the past 24 hours.

## 2019-04-11 NOTE — NURSING
0710- bedside rounding report received from bhumi thomas lpn on patients progress and updated handoff report sheet . Assessment completed plan of care updated on board and reviewed with patient . Placed on nasal canula at 3 liters put bipap on standby . Call light in reach head of bed up side rail up x 3 bed alarm on.       0915- no acute distress or changes on telemetry patient at her tray no problems with appetite daughter visiting . Tolerating oxygen at 3 liters with nasal canula. Continue to monitor. No changes on telemetry.     1111- no acute distress or changes on telemetry continue to monitor.

## 2019-04-11 NOTE — ASSESSMENT & PLAN NOTE
Likely from her chronic kidney disease.  There are no EKG changes.  Will repeat her potassium level in the morning.

## 2019-04-11 NOTE — MEDICAL/APP STUDENT
[facility]  History & Physical    Subjective:      Chief Complaint/Reason for Admission: shortness of breath    Joyce Riley is a 85 y.o. female who presented from NH with SOB following OT. O2 sats dropped to 70-80s on RA.    Patient's family said she received hemodialysis this week for her CHF and kidney failure. She has HTN, DM and had a stroke.    She was satting 96% on BiPap .    Past Medical History:   Diagnosis Date    Anticoagulant long-term use     Arthritis     Atrial fibrillation     Cataract     CHF (congestive heart failure)     CKD (chronic kidney disease), stage V     Diabetes mellitus     GERD (gastroesophageal reflux disease)     GI bleed 2018    Gout     Hypertension     Obese     Requires assistance with all daily activities     Stroke     Wheelchair dependent      Past Surgical History:   Procedure Laterality Date     SECTION      Patient unsure, believe she had 3-4    CHOLECYSTECTOMY      EYE SURGERY      HYSTERECTOMY      JOINT REPLACEMENT Right     knee    TOTAL KNEE ARTHROPLASTY Right      Family History   Problem Relation Age of Onset    Cancer Mother          age 98    Diabetes Mother     Hypertension Mother     Cancer Brother      Social History     Tobacco Use    Smoking status: Never Smoker    Smokeless tobacco: Never Used   Substance Use Topics    Alcohol use: No    Drug use: No       PTA Medications   Medication Sig    apixaban (ELIQUIS) 2.5 mg Tab Take 2.5 mg by mouth 2 (two) times daily.    atorvastatin (LIPITOR) 40 MG tablet Take 1 tablet (40 mg total) by mouth once daily.    calcitRIOL (ROCALTROL) 0.25 MCG Cap Take 1 capsule (0.25 mcg total) by mouth once daily.    ergocalciferol (ERGOCALCIFEROL) 50,000 unit Cap Take 1 capsule (50,000 Units total) by mouth every 7 days.    famotidine (PEPCID) 20 MG tablet Take 1 tablet (20 mg total) by mouth once daily.    furosemide (LASIX) 40 MG tablet Take 1  tablet (40 mg total) by mouth 2 (two) times daily.    insulin degludec (TRESIBA FLEXTOUCH U-100) 100 unit/mL (3 mL) InPn Inject 5 Units into the skin every evening.    NIFEdipine (PROCARDIA-XL) 30 MG (OSM) 24 hr tablet Take 1 tablet (30 mg total) by mouth once daily.    acetaminophen (TYLENOL) 325 MG tablet Take 2 tablets (650 mg total) by mouth every 4 (four) hours as needed.    albuterol-ipratropium (DUO-NEB) 2.5 mg-0.5 mg/3 mL nebulizer solution Take 3 mLs by nebulization every 4 (four) hours as needed for Wheezing. Rescue     Review of patient's allergies indicates:  No Known Allergies     ROS    Objective:      Vital Signs (Most Recent)  Temp: 97.6 °F (36.4 °C) (04/10/19 1940)  Pulse: 81 (04/10/19 1940)  Resp: 14 (04/10/19 1940)  BP: (!) 165/75 (04/10/19 1940)  SpO2: 96 % (04/10/19 1940)    Vital Signs Range (Last 24H):  Temp:  [97.6 °F (36.4 °C)-98.4 °F (36.9 °C)]   Pulse:  [74-82]   Resp:  [14-46]   BP: (136-166)/(64-75)   SpO2:  [77 %-96 %]     Physical Exam    Data Review:    CBC:   Lab Results   Component Value Date    WBC 9.97 04/10/2019    RBC 3.03 (L) 04/10/2019    HGB 8.8 (L) 04/10/2019    HCT 29.2 (L) 04/10/2019     (L) 04/10/2019     BMP:   Lab Results   Component Value Date     (H) 04/10/2019     04/10/2019    K 5.4 (H) 04/10/2019     04/10/2019    CO2 27 04/10/2019    BUN 45 (H) 04/10/2019    CREATININE 3.1 (H) 04/10/2019    CALCIUM 9.8 04/10/2019     ABGs:   Lab Results   Component Value Date    PH 7.302 (L) 04/10/2019    PO2 44 (LL) 04/10/2019    PCO2 61.0 (HH) 04/10/2019      Recent Labs   Lab 04/10/19  1749   COLORU Yellow   SPECGRAV 1.010   PHUR 5.0   PROTEINUA 2+*   BACTERIA Moderate*     X-ray Chest Ap Portable    Result Date: 4/10/2019  There are findings suggesting edema or pneumonia similar to prior exam. Electronically signed by: Marjan Robledo MD Date:    04/10/2019 Time:    16:15  ECG: no prior ECG.     Assessment:      Active Hospital Problems     Diagnosis  POA    *Acute on chronic diastolic congestive heart failure [I50.33]  Yes    Acute respiratory failure with hypoxia and hypercapnia [J96.01, J96.02]  Yes    Hyperkalemia [E87.5]  Yes    Renal hypertension [I12.9]  Yes     Chronic    Hyperlipidemia [E78.5]  Yes     Chronic    Chronic diastolic heart failure [I50.32]  Yes     Chronic    Paroxysmal atrial fibrillation [I48.0]  Yes     Chronic    Chronic anticoagulation [Z79.01]  Not Applicable     Chronic    Obesity, Class II, BMI 35-39.9 [E66.9]  Yes     Chronic    GERD (gastroesophageal reflux disease) [K21.9]  Yes     Chronic    CKD stage 5 [N18.5]  Yes     Chronic    Anemia of renal disease [D63.1]  Yes     Chronic    Type 2 diabetes mellitus, controlled, 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       Plan:    Ms Riley is an 85 year old female with acute respiratory failure on a background of CHF, CKD stage V, DM2, HTN, HLD, paroxsymal atrial fibrillation and GERD.    *Acute Respiratory Failure  ARF is defined as hypoxia (Pao2 <60) awith hypercapnia (PCO2 >50).  It is further defined by either inadequate oxygenation or inadequate ventilation.  A-a gradient is elevated, indicating there is a V/Q or shunt.  This indicated hypoxemic respiratory failure. Causes include atelectasis or fluid buildup in the lungs-pneumonia, pulmonary edema, shunts.  CXR suggests pneumonia or pulmonary edema. This may be explained by her underlying CHF and CKD.     -Bipap  -Duonebs  -Antibiotics-levofloxacin 750mg     *CHF  Patient has known CHF with hyperdynamic systolic function and an EF 80%. Severely dilated LA. Precipitants of acute on chronic HF include renal failure, MI or ischemia, drugs and arrhythmias. Patient has multiple risk factors. Treatment includes vasodilation, morphine, lasix. HFpEF (diastolic HF) shows no benefit to continuing ACEi/ARB  Chads2 score is 6, indicating  anticoagulation recommended. 9% stroke risk over lifetime.  -nitroglyucerin patch   -Apixaban 2.5mg  -Furosemide 80mg  -bumetanide 2mg  -nifedipine 30mg    *Hyperkalemia  Likely 2/2 to CKD stage V.  -Furosemide 80mg  -Monitor CBC    *Hyperlipidemia  -Continue atorvastatin    *Chronic Atrial Fibrillation  Patient currently takes apixaban 2.5mg  -Continue Eliquis    *DM2  Patient takes insulin degludec 3ml 5u every evening.  -?    *CKD Stage 5  Consult to nephrology, as above  -Renal diet    Dispo: Monitor with telemetry and repeat ABGs. Consult with family-DNR status. SNF if stable?

## 2019-04-11 NOTE — SUBJECTIVE & OBJECTIVE
Interval history:  Feeling better. No current complaints of pain or discomfort. O2 sats good on NC.    Review of Systems   Constitutional: Negative for chills and fever.   Respiratory: Positive for cough and shortness of breath. Negative for chest tightness and wheezing.    Cardiovascular: Positive for leg swelling. Negative for chest pain.   Gastrointestinal: Negative for abdominal pain, constipation, diarrhea, nausea and vomiting.   Genitourinary: Negative for dysuria and hematuria.   Neurological: Negative for dizziness, seizures, syncope, weakness and light-headedness.     Objective:     Vital Signs (Most Recent):  Temp: 98.3 °F (36.8 °C) (04/11/19 1521)  Pulse: 84 (04/11/19 1521)  Resp: 18 (04/11/19 1521)  BP: (!) 150/66 (04/11/19 1521)  SpO2: 95 % (04/11/19 1521) Vital Signs (24h Range):  Temp:  [97.6 °F (36.4 °C)-99.7 °F (37.6 °C)] 98.3 °F (36.8 °C)  Pulse:  [74-96] 84  Resp:  [14-33] 18  SpO2:  [92 %-97 %] 95 %  BP: (146-171)/(64-75) 150/66     Weight: 87.1 kg (192 lb 0.3 oz)  Body mass index is 32.96 kg/m².    Physical Exam   Constitutional: She is oriented to person, place, and time. She appears well-developed and well-nourished. No distress.   HENT:   Right Ear: External ear normal.   Left Ear: External ear normal.   Nose: Nose normal.   Mouth/Throat: Oropharynx is clear and moist.   Eyes: Right eye exhibits no discharge. Left eye exhibits no discharge.   Neck: Normal range of motion.   Cardiovascular: Normal rate and normal heart sounds. Exam reveals no gallop and no friction rub.   No murmur heard.  Pulmonary/Chest:   NC in place. No distress. Crackles at bases. Poor inspiratory effort likely contributed to by morbid obesity.   Abdominal: Soft. Bowel sounds are normal. She exhibits no distension. There is no tenderness.   Morbidly obese   Musculoskeletal: Normal range of motion. She exhibits edema (There is 1-2+ pitting edema to the knees bilaterally ).   Neurological: She is alert and oriented to  person, place, and time.   Skin: Skin is warm and dry. She is not diaphoretic. No erythema.   Nursing note and vitals reviewed.          Significant Labs: All pertinent labs within the past 24 hours have been reviewed.    Significant Imaging: I have reviewed and interpreted all pertinent imaging results/findings within the past 24 hours.

## 2019-04-11 NOTE — PLAN OF CARE
04/11/19 1234   Discharge Assessment   Confirmed/corrected address and phone number on facesheet? Yes   Assessment information obtained from? Patient;Caregiver   Prior to hospitilization cognitive status: Alert/Oriented   Prior to hospitalization functional status: Assistive Equipment   Current cognitive status: Alert/Oriented   Current Functional Status: Assistive Equipment   Facility Arrived From: Home    Lives With child(jessica), adult   Able to Return to Prior Arrangements yes   Is patient able to care for self after discharge? Unable to determine at this time (comments)   Who are your caregiver(s) and their phone number(s)? Lars pt's son, [phone]   Patient's perception of discharge disposition admitted as an inpatient   Readmission Within the Last 30 Days current reason for admission unrelated to previous admission   Patient currently being followed by outpatient case management? No   Patient currently receives any other outside agency services? Yes   Equipment Currently Used at Home bath bench;walker, rolling;wheelchair   Do you have any problems affording any of your prescribed medications? No   Is the patient taking medications as prescribed? yes   Does the patient have transportation home? Yes   Transportation Anticipated family or friend will provide   Dialysis Name and Scheduled days N/A    Does the patient receive services at the [facility]? No   Discharge Plan A Home with family;Home Health   Discharge Plan B   (TBD)   DME Needed Upon Discharge  none   Patient/Family in Agreement with Plan yes   Readmission Questionnaire   At the time of your discharge, did someone talk to you about what your health problems were? Yes   At the time of discharge, did someone talk to you about what to watch out for regarding worsening of your health problem? Yes   At the time of discharge, did someone talk to you about what to do if you experienced worsening of your health problem? Yes   At the time of  discharge, did someone talk to you about which medication to take when you left the hospital and which ones to stop taking? Yes   At the time of discharge, did someone talk to you about when and where to follow up with a doctor after you left the hospital? Yes   What do you believe caused you to be sick enough to be re-admitted? SOB   How often do you need to have someone help you when you read instructions, pamphlets, or other written material from your doctor or pharmacy? Always   Do you have problems taking your medications as prescribed? No   Do you have any problems affording any of  your prescribed medications? No   Do you have problems obtaining/receiving your medications? No   Does the patient have transportation to healthcare appointments? Yes   Living Arrangements house   Does the patient have family/friends to help with healtcare needs after discharge? yes   Does your caregiver provide all the help you need? Yes   Are you currently feeling confused? Yes   Are you currently having problems thinking? Yes   Are you currently having memory problems? Yes   Have you felt down, depressed, or hopeless? 2   Have you felt little interest or pleasure in doing things? 0   In the last 7 days, my sleep quality was: good     SW to patient's room to discuss Helping the patient manage care at home. Pt's son, Hafsa, at bedside and explains, pt was previously at [facility]. Per Hafsa, the discharge goal is to have pt return to [facility].     TN/SW role explained to pt.  Patient identified using two identifiers:  Name and date of birth.    SW's name and contact info placed on white board.     Person who will help at home if needed:  Hafsa, pt's son.     [place]   [facility] [place] [address]  [address] [facility]  [place] [address]  Phone: [phone] Fax: [phone]

## 2019-04-11 NOTE — ASSESSMENT & PLAN NOTE
Well-controlled on a home regimen of basal insulin therapy; will provide basal-prandial insulin along with insulin sliding scale.

## 2019-04-12 LAB
ANION GAP SERPL CALC-SCNC: 8 MMOL/L (ref 8–16)
ANISOCYTOSIS BLD QL SMEAR: SLIGHT
BASOPHILS # BLD AUTO: 0.05 K/UL (ref 0–0.2)
BASOPHILS NFR BLD: 0.6 % (ref 0–1.9)
BUN SERPL-MCNC: 48 MG/DL (ref 8–23)
CALCIUM SERPL-MCNC: 9.6 MG/DL (ref 8.7–10.5)
CHLORIDE SERPL-SCNC: 101 MMOL/L (ref 95–110)
CO2 SERPL-SCNC: 30 MMOL/L (ref 23–29)
CREAT SERPL-MCNC: 3 MG/DL (ref 0.5–1.4)
DACRYOCYTES BLD QL SMEAR: ABNORMAL
DIFFERENTIAL METHOD: ABNORMAL
EOSINOPHIL # BLD AUTO: 0.1 K/UL (ref 0–0.5)
EOSINOPHIL NFR BLD: 0.8 % (ref 0–8)
ERYTHROCYTE [DISTWIDTH] IN BLOOD BY AUTOMATED COUNT: 17.9 % (ref 11.5–14.5)
EST. GFR  (AFRICAN AMERICAN): 16 ML/MIN/1.73 M^2
EST. GFR  (NON AFRICAN AMERICAN): 14 ML/MIN/1.73 M^2
GLUCOSE SERPL-MCNC: 71 MG/DL (ref 70–110)
HCT VFR BLD AUTO: 24.4 % (ref 37–48.5)
HGB BLD-MCNC: 7.5 G/DL (ref 12–16)
LYMPHOCYTES # BLD AUTO: 0.7 K/UL (ref 1–4.8)
LYMPHOCYTES NFR BLD: 9.1 % (ref 18–48)
MAGNESIUM SERPL-MCNC: 1.6 MG/DL (ref 1.6–2.6)
MCH RBC QN AUTO: 29.3 PG (ref 27–31)
MCHC RBC AUTO-ENTMCNC: 30.7 G/DL (ref 32–36)
MCV RBC AUTO: 95 FL (ref 82–98)
MONOCYTES # BLD AUTO: 0.4 K/UL (ref 0.3–1)
MONOCYTES NFR BLD: 4.6 % (ref 4–15)
NEUTROPHILS # BLD AUTO: 6.6 K/UL (ref 1.8–7.7)
NEUTROPHILS NFR BLD: 84.9 % (ref 38–73)
OVALOCYTES BLD QL SMEAR: ABNORMAL
PHOSPHATE SERPL-MCNC: 3.3 MG/DL (ref 2.7–4.5)
PLATELET # BLD AUTO: 176 K/UL (ref 150–350)
PLATELET BLD QL SMEAR: ABNORMAL
PMV BLD AUTO: 10.5 FL (ref 9.2–12.9)
POCT GLUCOSE: 101 MG/DL (ref 70–110)
POCT GLUCOSE: 102 MG/DL (ref 70–110)
POCT GLUCOSE: 131 MG/DL (ref 70–110)
POCT GLUCOSE: 141 MG/DL (ref 70–110)
POCT GLUCOSE: 153 MG/DL (ref 70–110)
POCT GLUCOSE: 43 MG/DL (ref 70–110)
POCT GLUCOSE: 72 MG/DL (ref 70–110)
POCT GLUCOSE: 75 MG/DL (ref 70–110)
POCT GLUCOSE: 77 MG/DL (ref 70–110)
POCT GLUCOSE: 91 MG/DL (ref 70–110)
POCT GLUCOSE: 95 MG/DL (ref 70–110)
POTASSIUM SERPL-SCNC: 5.5 MMOL/L (ref 3.5–5.1)
RBC # BLD AUTO: 2.56 M/UL (ref 4–5.4)
SODIUM SERPL-SCNC: 139 MMOL/L (ref 136–145)
WBC # BLD AUTO: 7.91 K/UL (ref 3.9–12.7)

## 2019-04-12 PROCEDURE — 94761 N-INVAS EAR/PLS OXIMETRY MLT: CPT

## 2019-04-12 PROCEDURE — 85025 COMPLETE CBC W/AUTO DIFF WBC: CPT

## 2019-04-12 PROCEDURE — 21400001 HC TELEMETRY ROOM

## 2019-04-12 PROCEDURE — 97166 OT EVAL MOD COMPLEX 45 MIN: CPT

## 2019-04-12 PROCEDURE — 94660 CPAP INITIATION&MGMT: CPT

## 2019-04-12 PROCEDURE — 63600175 PHARM REV CODE 636 W HCPCS: Performed by: INTERNAL MEDICINE

## 2019-04-12 PROCEDURE — 25000003 PHARM REV CODE 250: Performed by: INTERNAL MEDICINE

## 2019-04-12 PROCEDURE — 99900035 HC TECH TIME PER 15 MIN (STAT)

## 2019-04-12 PROCEDURE — 97530 THERAPEUTIC ACTIVITIES: CPT

## 2019-04-12 PROCEDURE — 83735 ASSAY OF MAGNESIUM: CPT

## 2019-04-12 PROCEDURE — 84100 ASSAY OF PHOSPHORUS: CPT

## 2019-04-12 PROCEDURE — 27000221 HC OXYGEN, UP TO 24 HOURS

## 2019-04-12 PROCEDURE — 80048 BASIC METABOLIC PNL TOTAL CA: CPT

## 2019-04-12 PROCEDURE — 97161 PT EVAL LOW COMPLEX 20 MIN: CPT

## 2019-04-12 PROCEDURE — 36415 COLL VENOUS BLD VENIPUNCTURE: CPT

## 2019-04-12 PROCEDURE — 97110 THERAPEUTIC EXERCISES: CPT

## 2019-04-12 RX ORDER — METOLAZONE 5 MG/1
5 TABLET ORAL DAILY
Status: DISCONTINUED | OUTPATIENT
Start: 2019-04-12 | End: 2019-04-15

## 2019-04-12 RX ORDER — FUROSEMIDE 10 MG/ML
80 INJECTION INTRAMUSCULAR; INTRAVENOUS 3 TIMES DAILY
Status: DISCONTINUED | OUTPATIENT
Start: 2019-04-12 | End: 2019-04-13

## 2019-04-12 RX ADMIN — ERYTHROPOIETIN 10000 UNITS: 10000 INJECTION, SOLUTION INTRAVENOUS; SUBCUTANEOUS at 09:04

## 2019-04-12 RX ADMIN — ONDANSETRON 8 MG: 2 INJECTION INTRAMUSCULAR; INTRAVENOUS at 05:04

## 2019-04-12 RX ADMIN — FUROSEMIDE 80 MG: 10 INJECTION, SOLUTION INTRAVENOUS at 04:04

## 2019-04-12 RX ADMIN — INSULIN ASPART 3 UNITS: 100 INJECTION, SOLUTION INTRAVENOUS; SUBCUTANEOUS at 09:04

## 2019-04-12 RX ADMIN — DOCUSATE SODIUM AND SENNOSIDES 1 TABLET: 8.6; 5 TABLET, FILM COATED ORAL at 03:04

## 2019-04-12 RX ADMIN — RAMELTEON 8 MG: 8 TABLET, FILM COATED ORAL at 10:04

## 2019-04-12 RX ADMIN — FAMOTIDINE 20 MG: 20 TABLET ORAL at 09:04

## 2019-04-12 RX ADMIN — CLONIDINE HYDROCHLORIDE 0.1 MG: 0.1 TABLET ORAL at 12:04

## 2019-04-12 RX ADMIN — METOLAZONE 5 MG: 5 TABLET ORAL at 10:04

## 2019-04-12 RX ADMIN — FUROSEMIDE 80 MG: 10 INJECTION, SOLUTION INTRAVENOUS at 09:04

## 2019-04-12 RX ADMIN — INSULIN ASPART 3 UNITS: 100 INJECTION, SOLUTION INTRAVENOUS; SUBCUTANEOUS at 05:04

## 2019-04-12 RX ADMIN — CALCITRIOL 0.25 MCG: 0.25 CAPSULE ORAL at 09:04

## 2019-04-12 RX ADMIN — SODIUM POLYSTYRENE SULFONATE 15 G: 15 SUSPENSION ORAL; RECTAL at 11:04

## 2019-04-12 RX ADMIN — INSULIN ASPART 3 UNITS: 100 INJECTION, SOLUTION INTRAVENOUS; SUBCUTANEOUS at 11:04

## 2019-04-12 RX ADMIN — INSULIN DETEMIR 10 UNITS: 100 INJECTION, SOLUTION SUBCUTANEOUS at 09:04

## 2019-04-12 RX ADMIN — NIFEDIPINE 30 MG: 30 TABLET, FILM COATED, EXTENDED RELEASE ORAL at 09:04

## 2019-04-12 RX ADMIN — DEXTROSE MONOHYDRATE 25 G: 25 INJECTION, SOLUTION INTRAVENOUS at 04:04

## 2019-04-12 RX ADMIN — APIXABAN 2.5 MG: 2.5 TABLET, FILM COATED ORAL at 09:04

## 2019-04-12 RX ADMIN — ATORVASTATIN CALCIUM 40 MG: 40 TABLET, FILM COATED ORAL at 09:04

## 2019-04-12 NOTE — SUBJECTIVE & OBJECTIVE
Interval history:  Feeling better. No current complaints of pain or discomfort. O2 sats good on NC.    Review of Systems   Constitutional: Negative for chills and fever.   Respiratory: Positive for shortness of breath. Negative for chest tightness and wheezing.    Cardiovascular: Positive for leg swelling. Negative for chest pain.   Gastrointestinal: Negative for abdominal pain, constipation and vomiting.   Genitourinary: Negative for dysuria and hematuria.   Neurological: Negative for seizures and syncope.     Objective:     Vital Signs (Most Recent):  Temp: 98 °F (36.7 °C) (04/12/19 1139)  Pulse: 84 (04/12/19 1139)  Resp: 18 (04/12/19 1139)  BP: (!) 159/69 (04/12/19 1139)  SpO2: 97 % (04/12/19 1139) Vital Signs (24h Range):  Temp:  [98 °F (36.7 °C)-99.1 °F (37.3 °C)] 98 °F (36.7 °C)  Pulse:  [] 84  Resp:  [18-20] 18  SpO2:  [90 %-97 %] 97 %  BP: (140-184)/(56-74) 159/69     Weight: 87.1 kg (192 lb 0.3 oz)  Body mass index is 32.96 kg/m².    Physical Exam   Constitutional: She is oriented to person, place, and time. She appears well-developed and well-nourished. No distress.   HENT:   Right Ear: External ear normal.   Left Ear: External ear normal.   Nose: Nose normal.   Mouth/Throat: Oropharynx is clear and moist.   Eyes: Right eye exhibits no discharge. Left eye exhibits no discharge.   Neck: Normal range of motion.   Cardiovascular: Normal rate and normal heart sounds. Exam reveals no gallop and no friction rub.   No murmur heard.  Pulmonary/Chest:   NC in place. No distress. Crackles at bases. Poor inspiratory effort likely contributed to by morbid obesity.   Abdominal: Soft. Bowel sounds are normal. She exhibits no distension. There is no tenderness.   Morbidly obese   Musculoskeletal: Normal range of motion. She exhibits edema (There is 1-2+ pitting edema to the knees bilaterally ).   Neurological: She is alert and oriented to person, place, and time.   Skin: Skin is warm and dry. She is not  diaphoretic. No erythema.   Nursing note and vitals reviewed.          Significant Labs: All pertinent labs within the past 24 hours have been reviewed.    Significant Imaging: I have reviewed and interpreted all pertinent imaging results/findings within the past 24 hours.

## 2019-04-12 NOTE — ASSESSMENT & PLAN NOTE
Her renal function appears to be near baseline; will continue to monitor urine output. Attempt to diurese. Appreciate nephrology recs. May do best with management of diuretics with Palliative care/Hospice. She is a poor candidate for HD due to her age, pulmonary hypertension, and multiple other comorbidities.

## 2019-04-12 NOTE — PLAN OF CARE
Problem: Physical Therapy Goal  Goal: Physical Therapy Goal  Goals to be met by: 2019     Patient will increase functional independence with mobility by performin. Supine to sit with Stand-by Assistance  2. Bed to chair transfer with Minimal Assistance    3. Wheelchair propulsion x150 feet with Supervision   4. Lower extremity exercise program x10 reps per handout, with supervision    Outcome: Ongoing (interventions implemented as appropriate)  Initial PT evaluation performed.  Pt could benefit from skilled PT services 3x/wk in order to maximize function prior to D/C.  Return to SNF recommended

## 2019-04-12 NOTE — NURSING
Bedside rounding report given to lokesh vallejo lpn on patients progress and updated handoff report sheet. No acute events or changes.

## 2019-04-12 NOTE — PT/OT/SLP EVAL
Occupational Therapy   Evaluation    Name: Joyce Riley  MRN: [id number]  Admitting Diagnosis:  Acute on chronic diastolic congestive heart failure      Recommendations:     Discharge Recommendations: nursing facility, skilled  Discharge Equipment Recommendations:  commode  Barriers to discharge:  None    Assessment:     Joyce Riley is a 85 y.o. female with a medical diagnosis of Acute on chronic diastolic congestive heart failure.  She presents with decreased ADL participation, decreased functional mobility, and generalized weakness. Performance deficits affecting function: weakness, impaired functional mobilty, decreased safety awareness, impaired cardiopulmonary response to activity, impaired endurance, gait instability, impaired balance, decreased upper extremity function, impaired self care skills, decreased lower extremity function, decreased ROM, edema.      Rehab Prognosis: Fair; patient would benefit from acute skilled OT services to address these deficits and reach maximum level of function.       Plan:     Patient to be seen 3 x/week to address the above listed problems via self-care/home management, therapeutic activities, therapeutic exercises  · Plan of Care Expires: 04/26/19  · Plan of Care Reviewed with: patient, daughter    Subjective     Chief Complaint: SOB, nausea   Patient/Family Comments/goals: to go back to the SNF    Occupational Profile:  Living Environment: Pt admitted from SNF.  Previously lived with daughter in a [facility] with 6 SPENCER  Previous level of function: Per daughter, pt was able to transfer from the bed to the wheelchair with supervision and able to propel her wheelchair. Per daughter, pt required some assistance with dressing and showers.   Roles and Routines: self-care  Equipment Used at Home:  bath bench, walker, rolling, wheelchair  Assistance upon Discharge: daughter    Pain/Comfort:  · Pain Rating 1: 0/10    Patients cultural, spiritual, Tenriism conflicts  given the current situation: no    Objective:     Communicated with: nurseSamanta, prior to session.  Patient found HOB elevated with bed alarm, oxygen, telemetry, PureWick upon OT entry to room.    General Precautions: Standard, fall   Orthopedic Precautions:N/A   Braces: N/A     Occupational Performance:    Bed Mobility:    · Patient completed Rolling/Turning to Left with  minimum assistance with side rail  · Patient completed Rolling/Turning to Right with minimum assistance with side rail  · Patient completed Scooting/Bridging with moderate assistance  · Patient completed Supine to Sit with maximal assistance and 2 persons with HOB elevated to 35*  · Patient completed Sit to Supine with maximal assistance and 2 persons with HOB elevated to 35*    Functional Mobility/Transfers:  · Patient completed Sit <> Stand Transfer with maximal assistance and of 2 persons  with  no assistive device and hand-held assist   · Patient completed Bed <> Chair Transfer using Stand Pivot technique with maximal assistance and of 2 persons with no assistive device, hand-held assist to BSC  · Functional Mobility: Pt participated in stand pivot with max x2 A to BSC. 2 persons necessary for safety. Mod verbal cues for hand placement and safety.     Activities of Daily Living:  · Grooming: stand by assistance with HOB elevated to 35*  · Upper Body Dressing: minimum assistance with hospital gown      Cognitive/Visual Perceptual:  Cognitive/Psychosocial Skills:     -       Oriented to: Person   -       Follows Commands/attention:Follows one-step commands  -       Communication: clear/fluent  -       Memory: Impaired STM and Poor immediate recall  -       Safety awareness/insight to disability: impaired   -       Mood/Affect/Coping skills/emotional control: Lethargic  Visual/Perceptual:      -Intact  R/L discrimination      Physical Exam:  Balance:    -       sitting CGA; poor standing balance  Postural examination/scapula alignment:    -        Rounded shoulders  -       Forward head  Skin integrity: Visible skin intact  Edema:  Moderate   and Pitting  of L hand  Sensation:    -       Intact  Dominant hand:    -       R hand  Upper Extremity Range of Motion:     -       Right Upper Extremity: AROM ~90*, PROM WFL  -       Left Upper Extremity: AROM ~()* shoudler flexion/extension; PROM WFL   Upper Extremity Strength:    -       Right Upper Extremity: 3/5  -       Left Upper Extremity: 3/5   Strength:    -       Right Upper Extremity: WFL  -       Left Upper Extremity: WFL  Fine Motor Coordination:    -       Intact  Gross motor coordination:   WFL    AMPAC 6 Click ADL:  AMPAC Total Score: 13    Treatment & Education:  Pt consented to OT eval. Pt  and daughter educated on L UE exercises d/t moderate pitting edema in L hand. Pt participated in 2 sets of 10 hand squeezes and easily fatigued, requiring a rest break. Pt required mod verbal cueing with safety in functional transfer.   Education:    Patient left HOB elevated with all lines intact, call button in reach, bed alarm on, nurse, Samanta, notified and daughter present    GOALS:   Multidisciplinary Problems     Occupational Therapy Goals        Problem: Occupational Therapy Goal    Goal Priority Disciplines Outcome Interventions   Occupational Therapy Goal     OT, PT/OT Ongoing (interventions implemented as appropriate)    Description:  Goals to be met by: 04/26/19    Patient will increase functional independence with ADLs by performing:    Feeding with Modified [place].  UE Dressing with Set-up Assistance.  LE Dressing with Moderate Assistance.  Grooming while seated with Set-up Assistance.  Toileting from bedside commode with Moderate Assistance for hygiene and clothing management.   Sitting at edge of bed x20 minutes with Supervision.  Rolling to Bilateral with Stand-by Assistance.   Supine to sit with Stand-by Assistance.  Step transfer with Moderate Assistance  Toilet transfer to  bedside commode with Moderate Assistance.  Upper extremity exercise program x15 reps per handout, with assistance as needed.                      History:     Past Medical History:   Diagnosis Date    Anticoagulant long-term use     Arthritis     Atrial fibrillation     Cataract     CHF (congestive heart failure)     CKD (chronic kidney disease), stage V     Diabetes mellitus     GERD (gastroesophageal reflux disease)     GI bleed 2018    Gout     Hypertension     Obese     Requires assistance with all daily activities     Stroke     Wheelchair dependent        Past Surgical History:   Procedure Laterality Date     SECTION      Patient unsure, believe she had 3-4    CHOLECYSTECTOMY      EYE SURGERY      HYSTERECTOMY      JOINT REPLACEMENT Right     knee    TOTAL KNEE ARTHROPLASTY Right        Time Tracking:     OT Date of Treatment: 19  OT Start Time: 1050  OT Stop Time: 1124  OT Total Time (min): 34 min    Billable Minutes:Evaluation 15 min  Therapeutic Exercise 10 min  Total Time 34 min (co-treat with PT)    Laurita Valencia OT  2019

## 2019-04-12 NOTE — NURSING
Diuresis very well v ia purewick clear yellow urine no distress noted multiple BMs following kayexalate, pericare given throughout shift.All safety measures effective HOB elevated SOB upon exertion.

## 2019-04-12 NOTE — PLAN OF CARE
Problem: Occupational Therapy Goal  Goal: Occupational Therapy Goal  Goals to be met by: 04/26/19    Patient will increase functional independence with ADLs by performing:    Feeding with Modified [place].  UE Dressing with Set-up Assistance.  LE Dressing with Moderate Assistance.  Grooming while seated with Set-up Assistance.  Toileting from bedside commode with Moderate Assistance for hygiene and clothing management.   Sitting at edge of bed x20 minutes with Supervision.  Rolling to Bilateral with Stand-by Assistance.   Supine to sit with Stand-by Assistance.  Step transfer with Moderate Assistance  Toilet transfer to bedside commode with Moderate Assistance.  Upper extremity exercise program x15 reps per handout, with assistance as needed.     Outcome: Ongoing (interventions implemented as appropriate)  Pt will benefit from continued OT acute services. OT rec. back to SNF at d/c.

## 2019-04-12 NOTE — PLAN OF CARE
Problem: Diabetes Comorbidity  Goal: Blood Glucose Level Within Desired Range    Intervention: Maintain Glycemic Control     04/12/19 0422   Monitor and Manage Ketoacidosis   Glycemic Management blood glucose monitoring;supplemental insulin given   Had to give Dextrose during shift for blood sugar dropping to 43. After recheck blood sugar, PRN Dextrose was effective. Crackers and milk was given to pt to keep blood sugar up. Rational explained to pt.      Comments: PRN insomnia med given to pt and was effective. PRN BP med given and was effective. Stool softener given but no stool as of yet. Wore Bipap for a few hours during night then switched back to NC @ 5 lpm. Call light at side and bed alarm active.

## 2019-04-12 NOTE — PROGRESS NOTES
""I am not feeling good." Noticed that pt was sweaty. Had to give Dextrose for blood sugar dropping to 43. After recheck blood sugar, PRN Dextrose was effective. . Crackers and milk provided to pt to keep blood sugar up. Rational explained to pt. Verbalized understanding.   "

## 2019-04-12 NOTE — ASSESSMENT & PLAN NOTE
Patient was just recently discharged from [facility] the day prior to presentation and was found to be dyspneic at the nursing home with hypoxia.  She was transferred to this facility where her initial ambient air oxygen saturation was 77%.  She was placed on NIPPV with BPAP with improvement of her oxygen saturation.  CXR w/ pulmonary edema. She was only mildly hypertensive so unlikely hypertensive emergency.  An ABG was obtained which was revealing for 7.302  61.0  44  30.2 on BPAP 10  5  40%.  She does have a elevated BNP of 796 which is higher than her previous measurement.  She also has pulmonary hypertension with a EPAP of 74 mmHg.  Will continue intravenous diuresis with careful monitoring of her intake/output.  Of note, she is DNR. Fluid restriction added 4/12/2019.

## 2019-04-12 NOTE — PROGRESS NOTES
[facility] contacted [facility] @ [phone] to determine if they will accept pt back for services. According to [facility], they will accept pt back at d/c.

## 2019-04-12 NOTE — PROGRESS NOTES
[facility] Hospital Medicine  Progress Note    Patient Name: Joyce Riley  MRN: [id number]  Patient Class: IP- Inpatient   Admission Date: 4/10/2019  Length of Stay: 2 days  Attending Physician: Adrianna Rockwell MD  Primary Care Provider: Kalpana Staton MD        Subjective:     Principal Problem:Acute on chronic diastolic congestive heart failure    HPI:  Mrs. Joyce Riley is a 85 y.o. female [facility] resident with renal hypertension, type 2 diabetes mellitus (HbA1c 6.6% Apr 2019), hyperlipidemia (LDL unknown), chronic diastolic heart failure (LVEF 80% Apr 2019), CKD stage 5, paroxysmal atrial fibrillation (RND0HK2-GDIl score 5) on chronic anticoagulation, anemia of renal disease, obesity (BMI 35.2), and GERD who presents to [facility] ED with complaints of dyspnea for one day.  She was recently discharged from [facility] after initiating hemodialysis.  She underwent a few sessions and was noted to poorly tolerate it.  She was discontinue from hemodialysis and encouraged hospice.  The family was not ready for that so she was transferred to [facility] for rehabilitation yesterday.  One of her sons was there to visit with her and reports that she was doing well.  He was concerned that the nursing home was about to serve her fried chicken and mashed potatoes with gravy.  About 10 min after he left to go home, he was called by the nursing home to reports that she was short of breath.  EMS was activated and upon their arrival found that her oxygen saturation on ambient air was 77%.  She denies any chest pain, palpitations, nausea, vomiting, nor any abdominal pain.  She does report lower extremity edema. Further history is otherwise limited at this time.    Hospital Course:  Ms. Riley has recently been admitted to [facility], and now our facility for similar presentations. She has CHF and progressive CKD. At [facility] she was not felt to be a candidate for dialysis  and hospice was recommended. The patient was transferred to [facility] for a second opinion. She underwent three dialysis sessions but was not thought to be a candidate for long term dialysis, and again, hospice was recommended. The family wanted the patient discharged to SNF hoping that she would improve. The day following discharge, she presented to our ER for shortness of breath and found to have edema on CXR and exam consistent with volume overload.   She was breifly on BiPAP in the ER but was soon doing better on NC. A third nephrology group was consulted. Palliative care was again consulted. Attempting to diurese with medication. Fluid restriction placed. Extensive family meeting with five of her children on 4/12/2019. The patient's goals of care and risks of dialysis were discussed. The family was not sure at that time whether they would want longterm HD given the impact on quality of life. Continue to diurese and monitor for response. Appreciate nephrology recs.    Interval history:  Feeling better. No current complaints of pain or discomfort. O2 sats good on NC.    Review of Systems   Constitutional: Negative for chills and fever.   Respiratory: Positive for shortness of breath. Negative for chest tightness and wheezing.    Cardiovascular: Positive for leg swelling. Negative for chest pain.   Gastrointestinal: Negative for abdominal pain, constipation and vomiting.   Genitourinary: Negative for dysuria and hematuria.   Neurological: Negative for seizures and syncope.     Objective:     Vital Signs (Most Recent):  Temp: 98 °F (36.7 °C) (04/12/19 1139)  Pulse: 84 (04/12/19 1139)  Resp: 18 (04/12/19 1139)  BP: (!) 159/69 (04/12/19 1139)  SpO2: 97 % (04/12/19 1139) Vital Signs (24h Range):  Temp:  [98 °F (36.7 °C)-99.1 °F (37.3 °C)] 98 °F (36.7 °C)  Pulse:  [] 84  Resp:  [18-20] 18  SpO2:  [90 %-97 %] 97 %  BP: (140-184)/(56-74) 159/69     Weight: 87.1 kg (192 lb 0.3 oz)  Body mass index is 32.96  kg/m².    Physical Exam   Constitutional: She is oriented to person, place, and time. She appears well-developed and well-nourished. No distress.   HENT:   Right Ear: External ear normal.   Left Ear: External ear normal.   Nose: Nose normal.   Mouth/Throat: Oropharynx is clear and moist.   Eyes: Right eye exhibits no discharge. Left eye exhibits no discharge.   Neck: Normal range of motion.   Cardiovascular: Normal rate and normal heart sounds. Exam reveals no gallop and no friction rub.   No murmur heard.  Pulmonary/Chest:   NC in place. No distress. Crackles at bases. Poor inspiratory effort likely contributed to by morbid obesity.   Abdominal: Soft. Bowel sounds are normal. She exhibits no distension. There is no tenderness.   Morbidly obese   Musculoskeletal: Normal range of motion. She exhibits edema (There is 1-2+ pitting edema to the knees bilaterally ).   Neurological: She is alert and oriented to person, place, and time.   Skin: Skin is warm and dry. She is not diaphoretic. No erythema.   Nursing note and vitals reviewed.          Significant Labs: All pertinent labs within the past 24 hours have been reviewed.    Significant Imaging: I have reviewed and interpreted all pertinent imaging results/findings within the past 24 hours.    Assessment/Plan:      * Acute on chronic diastolic congestive heart failure  Patient was just recently discharged from [facility] the day prior to presentation and was found to be dyspneic at the nursing home with hypoxia.  She was transferred to this facility where her initial ambient air oxygen saturation was 77%.  She was placed on NIPPV with BPAP with improvement of her oxygen saturation.  CXR w/ pulmonary edema. She was only mildly hypertensive so unlikely hypertensive emergency.  An ABG was obtained which was revealing for 7.302  61.0  44  30.2 on BPAP 10  5  40%.  She does have a elevated BNP of 796 which is higher than her previous measurement.  She also has  pulmonary hypertension with a EPAP of 74 mmHg.  Will continue intravenous diuresis with careful monitoring of her intake/output.  Of note, she is DNR. Fluid restriction added 4/12/2019.     Acute respiratory failure with hypoxia and hypercapnia  As addressed above. Improved and now stable on NC.    CKD stage 5  Her renal function appears to be near baseline; will continue to monitor urine output. Attempt to diurese. Appreciate nephrology recs. May do best with management of diuretics with Palliative care/Hospice. She is a poor candidate for HD due to her age, pulmonary hypertension, and multiple other comorbidities.    GERD (gastroesophageal reflux disease)  Will continue home regimen of famotidine.    Obesity, Class II, BMI 35-39.9  The patient has been counseled on the negative impact that obesity imparts on her health and was encouraged to make lifestyle changes in order to lose weight and decrease her modifiable risk factors.    Chronic anticoagulation  See PAF.    Paroxysmal atrial fibrillation  Patient is currently in sinus rhythm; will continue her home regimen of apixaban.    Chronic diastolic heart failure  As addressed above.    Hyperlipidemia  Will continue her home regimen of atorvastatin.    Renal hypertension  Patient's blood pressure is fairly-controlled; will continue home regimen of furosemide and nifedipine, and provide as-needed clonidine.    Hyperkalemia  Likely from her chronic kidney disease.  There are no EKG changes.  Monitor.     Anemia of renal disease  The patient's H/H is stable and consistent with previous laboratory measurements, and the patient exhibits no signs or symptoms of acute bleeding; there is no indication for transfusion.  Will continue to monitor.    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     Type 2 diabetes mellitus, controlled, with renal complications  Well-controlled on a home regimen of basal insulin  therapy; will provide basal-prandial insulin along with insulin sliding scale.      VTE Risk Mitigation (From admission, onward)        Ordered     apixaban tablet 2.5 mg  2 times daily      04/10/19 1934     IP VTE HIGH RISK PATIENT  Once      04/10/19 1934     Place SANTOS hose  Until discontinued      04/10/19 1815              Adrianna Rockwell MD  Department of Hospital Medicine   [facility]

## 2019-04-12 NOTE — PT/OT/SLP EVAL
Physical Therapy Evaluation    Patient Name:  Joyce Riley   MRN:  [id number]    Recommendations:     Discharge Recommendations:  nursing facility, skilled   Discharge Equipment Recommendations: commode   Barriers to discharge: Decreased caregiver support    Assessment:     Joyce Riley is a 85 y.o. female admitted with a medical diagnosis of Acute on chronic diastolic congestive heart failure.  She presents with the following impairments/functional limitations:  weakness, impaired functional mobilty, impaired balance, decreased upper extremity function, decreased safety awareness, impaired coordination, impaired cardiopulmonary response to activity, impaired endurance, impaired self care skills, gait instability, decreased coordination, decreased lower extremity function, impaired fine motor .    Rehab Prognosis: Fair; patient would benefit from acute skilled PT services to address these deficits and reach maximum level of function.    Recent Surgery: * No surgery found *      Plan:     During this hospitalization, patient to be seen 3 x/week to address the identified rehab impairments via therapeutic activities, therapeutic exercises, wheelchair management/training and progress toward the following goals:    · Plan of Care Expires:  04/19/19    Subjective     Chief Complaint: SOB, nausea  Patient/Family Comments/goals: SNF  Pain/Comfort:  · Pain Rating 1: 0/10    Patients cultural, spiritual, Gnosticist conflicts given the current situation: no    Living Environment:  Pt admitted from SNF.  Previously lived with daughter in [facility] with 6 SPENCER  Prior to admission, daughter states that she was t/fing bed to W/C with supervision and able to propel her W/C   Equipment used at home: bath bench, walker, rolling, wheelchair.  DME owned (not currently used): none.  Upon discharge, patient will have assistance from Daughter.    Objective:     Communicated with nsg prior to session.  Patient found HOB  elevated with bed alarm, oxygen, PureWick, telemetry  upon PT entry to room.    General Precautions: Standard, fall   Orthopedic Precautions:N/A   Braces: N/A     Exams:  · Cognitive Exam:  Patient is oriented to Person  · Gross Motor Coordination:  impaired 2/2 weakness and deconditioning  · Postural Exam:  Patient presented with the following abnormalities:    · -       Rounded shoulders  · -       Forward head  · Sensation:    · -       Intact  light/touch B LE's  · Skin Integrity/Edema:      · -       Skin integrity: Visible skin intact  · RLE ROM: WFL  · RLE Strength: WFL except Dflx 2+/5  · LLE ROM: WFL  · LLE Strength: WFL except Dflx 2+/5    Functional Mobility:  · Bed Mobility:     · Scooting: moderate assistance  · Supine to Sit: minimum assistance  · Sit to Supine: maximal assistance  · Transfers:     · Sit to Stand:  maximal assistance and of 2 persons with no AD with Vc for hand placement/safety  · Bed to Chair: maximal assistance and of 2 persons with  no AD  using  Stand Pivot with VC for hand placement/safety  · Gait: N/A  · Balance: Fair+ sit, poor stand      Therapeutic Activities and Exercises:   eval and t/f training as above    AM-PAC 6 CLICK MOBILITY  Total Score:12     Patient left HOB elevated with all lines intact, call button in reach, bed alarm on, nsg notified and daughter present.    GOALS:   Multidisciplinary Problems     Physical Therapy Goals        Problem: Physical Therapy Goal    Goal Priority Disciplines Outcome Goal Variances Interventions   Physical Therapy Goal     PT, PT/OT Ongoing (interventions implemented as appropriate)     Description:  Goals to be met by: 2019     Patient will increase functional independence with mobility by performin. Supine to sit with Stand-by Assistance  2. Bed to chair transfer with Minimal Assistance    3. Wheelchair propulsion x150 feet with Supervision   4. Lower extremity exercise program x10 reps per handout, with supervision                       History:     Past Medical History:   Diagnosis Date    Anticoagulant long-term use     Arthritis     Atrial fibrillation     Cataract     CHF (congestive heart failure)     CKD (chronic kidney disease), stage V     Diabetes mellitus     GERD (gastroesophageal reflux disease)     GI bleed 2018    Gout     Hypertension     Obese     Requires assistance with all daily activities     Stroke     Wheelchair dependent        Past Surgical History:   Procedure Laterality Date     SECTION      Patient unsure, believe she had 3-4    CHOLECYSTECTOMY      EYE SURGERY      HYSTERECTOMY      JOINT REPLACEMENT Right     knee    TOTAL KNEE ARTHROPLASTY Right        Time Tracking:     PT Received On: 19  PT Start Time: 1100     PT Stop Time: 1124  PT Total Time (min): 24 min     Billable Minutes: Evaluation 15 and Therapeutic Activity 9      Anastacia Clemente, PT  2019

## 2019-04-12 NOTE — PROGRESS NOTES
[facility]  Nephrology  Progress Note    Patient Name: Joyce Riley  MRN: [id number]  Admission Date: 4/10/2019  Hospital Length of Stay: 2 days  Attending Provider: Adrianna Rockwell MD   Primary Care Physician: Kalpana Staton MD  Principal Problem:Acute on chronic diastolic congestive heart failure  Date of service 4/12/2019    Consults   Inpatient consult to Nephrology  Consult performed by: Samanta Baca MD  Consult ordered by: Virgilio Rhodes MD  Reason for consult: Renal insufficiency    Subjective:     Interval History: She does not notice any change to SOB although notes edema is improving, O2 requirements are stable, I=Os per 24 hrs, weights are relatively stable.  Long discussion with multiple family members regarding her care plan, her current status and risks vs benefits of dialysis given her co-morbities.  Nursing notes reviewed with no significant note of lack of cooperation with therapies.    Review of patient's allergies indicates:  No Known Allergies  Current Facility-Administered Medications   Medication Frequency    acetaminophen tablet 500 mg Q6H PRN    apixaban tablet 2.5 mg BID    atorvastatin tablet 40 mg Daily    calcitRIOL capsule 0.25 mcg Daily    cloNIDine tablet 0.1 mg TID PRN    dextrose 50% injection 12.5 g PRN    dextrose 50% injection 25 g PRN    epoetin diana injection 10,000 Units Q7 Days    famotidine tablet 20 mg Daily    furosemide injection 80 mg TID    glucagon (human recombinant) injection 1 mg PRN    glucose chewable tablet 16 g PRN    glucose chewable tablet 24 g PRN    insulin aspart U-100 pen 0-5 Units PRN    insulin aspart U-100 pen 3 Units TIDWM    insulin detemir U-100 pen 10 Units QHS    NIFEdipine 24 hr tablet 30 mg Daily    ondansetron injection 8 mg Q8H PRN    promethazine (PHENERGAN) 6.25 mg in dextrose 5 % 50 mL IVPB Q6H PRN    ramelteon tablet 8 mg Nightly PRN    senna-docusate 8.6-50 mg per tablet 1 tablet  BID PRN    sodium polystyrene 15 gram/60 mL suspension 15 g Daily       Objective:     Vital Signs (Most Recent):  Temp: 98 °F (36.7 °C) (04/12/19 1139)  Pulse: 84 (04/12/19 1139)  Resp: 18 (04/12/19 1139)  BP: (!) 159/69 (04/12/19 1139)  SpO2: 97 % (04/12/19 1139)  O2 Device (Oxygen Therapy): nasal cannula (04/12/19 0741) Vital Signs (24h Range):  Temp:  [98 °F (36.7 °C)-99.1 °F (37.3 °C)] 98 °F (36.7 °C)  Pulse:  [] 84  Resp:  [18-20] 18  SpO2:  [90 %-97 %] 97 %  BP: (140-184)/(56-74) 159/69     Weight: 87.1 kg (192 lb 0.3 oz) (04/11/19 0452)  Body mass index is 32.96 kg/m².  Body surface area is 1.98 meters squared.    I/O last 3 completed shifts:  In: 1508 [P.O.:1505; I.V.:3]  Out: 2000 [Urine:2000]    Physical Exam   Constitutional: She is oriented to person, place, and time. She appears well-developed and well-nourished. No distress.   HENT:   Head: Normocephalic and atraumatic.   Eyes: EOM are normal.   Cardiovascular: Normal rate and regular rhythm. Exam reveals no friction rub.   No murmur heard.  Pulmonary/Chest: She has no wheezes. She has rales.   O2 NC  Dyspneic with speech    Abdominal: Soft. She exhibits no distension. There is no tenderness. There is no guarding.   Musculoskeletal: She exhibits edema (1+ BLE edema). She exhibits no tenderness.   Neurological: She is alert and oriented to person, place, and time.   No Asterixis   Skin: Skin is warm and dry. No rash noted. She is not diaphoretic. No erythema.   Psychiatric: She has a normal mood and affect. Her behavior is normal. Thought content normal.   Nursing note and vitals reviewed.      Significant Labs:  CBC:   Recent Labs   Lab 04/12/19  0830   WBC 7.91   RBC 2.56*   HGB 7.5*   HCT 24.4*      MCV 95   MCH 29.3   MCHC 30.7*     CMP:   Recent Labs   Lab 04/11/19  0415 04/12/19  0830   * 71   CALCIUM 10.0 9.6   ALBUMIN 2.8*  --    PROT 6.4  --     139   K 5.2* 5.5*   CO2 30* 30*    101   BUN 46* 48*   CREATININE  3.1* 3.0*   ALKPHOS 130  --    ALT 16  --    AST 14  --    BILITOT 1.1*  --      All labs within the past 24 hours have been reviewed.    Significant Imaging:  X-Ray: Reviewed    Assessment/Plan:     Active Diagnoses:    Diagnosis Date Noted POA    PRINCIPAL PROBLEM:  Acute on chronic diastolic congestive heart failure [I50.33] 04/10/2019 Yes    Acute respiratory failure with hypoxia and hypercapnia [J96.01, J96.02] 04/10/2019 Yes    Hyperkalemia [E87.5] 04/10/2019 Yes    Renal hypertension [I12.9] 04/10/2019 Yes     Chronic    Hyperlipidemia [E78.5] 04/10/2019 Yes     Chronic    Chronic diastolic heart failure [I50.32] 04/10/2019 Yes     Chronic    Paroxysmal atrial fibrillation [I48.0] 04/10/2019 Yes     Chronic    Chronic anticoagulation [Z79.01] 04/10/2019 Not Applicable     Chronic    Obesity, Class II, BMI 35-39.9 [E66.9] 04/10/2019 Yes     Chronic    GERD (gastroesophageal reflux disease) [K21.9] 04/10/2019 Yes     Chronic    CKD stage 5 [N18.5] 04/01/2019 Yes     Chronic    Anemia of renal disease [D63.1] 04/01/2019 Yes     Chronic    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 [I27.23] 04/01/2019 Yes     Chronic    Type 2 diabetes mellitus, controlled, 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] 03/31/2019 Yes     Chronic      Problems Resolved During this Admission:       ROXANNE on CKD5  - noted recent HD trial at [facility] last week with recommendation for palliative care 2/2 patient pulling at lines  - continue IV diuresis as tolerated by renal function, UOP acceptable, no urgent indications for dialysis  - patient appears more cognitively intact at this time and could potentially be more cooperative with dialysis at this point if necessary so another trial could be considered if indicated and refractory to medical management.  Family would like to discuss options further. Risks of dialysis were discussed extensively.  - will also review hospital nursing notes closely to monitor her behavior and  likelihood of cooperation with therapies  - family is leaning away from aggressive therapies and consideration for more palliative measures although would like to discuss further.  I informed family that this would be a very reasonable route given her co-morbidities and explained in detail that dialysis has risks and could potentially hasten death in some cases.  My partner, Dr Orr, will continue to monitor through the weekend while family has discussions regarding care plan.  - Cr stable with diuresis, agree with increase in diuresis.  If renal function does not tolerate or insufficient response, can consider converting to bumex gtt tomorrow  - check BMP daily  - monitor daily weights, strict I&Os  - avoid nephrotoxic agents including NSAIDs, renally dose medications for GFR <15     AOCD, DEYVI  - continue Epo qwk, monitor CBC  - transfuse for Hgb <7, titrate Epo PRN     Hyperkalemia  - low k diet, monitor daily BMP, continue diuresis  - add scheduled kayexalate     Pulmonary edema  - insufficient response, weight is relatively stable  - agree with increase in diuresis to lasix 80 TID.  If renal function does not tolerate or insufficient response, can consider converting to bumex gtt tomorrow  - discussions surrounding dialysis as above  - monitor I&Os, daily weights     DM  pAFib  pulm HTN  DHF    Discussed case with Dr Rockwell  Thank you for allowing me to participate in the care of your patient.  Please call with any questions.    Date of service: 4/12/2019  Samanta Baca MD   Nephrology  [facility]  Office [phone]   [facility]

## 2019-04-13 LAB
ALLENS TEST: ABNORMAL
ANION GAP SERPL CALC-SCNC: 8 MMOL/L (ref 8–16)
BACTERIA UR CULT: NORMAL
BUN SERPL-MCNC: 47 MG/DL (ref 8–23)
CALCIUM SERPL-MCNC: 9.3 MG/DL (ref 8.7–10.5)
CHLORIDE SERPL-SCNC: 100 MMOL/L (ref 95–110)
CO2 SERPL-SCNC: 32 MMOL/L (ref 23–29)
CREAT SERPL-MCNC: 3 MG/DL (ref 0.5–1.4)
DELSYS: ABNORMAL
EP: 5
ERYTHROCYTE [SEDIMENTATION RATE] IN BLOOD BY WESTERGREN METHOD: 12 MM/H
EST. GFR  (AFRICAN AMERICAN): 16 ML/MIN/1.73 M^2
EST. GFR  (NON AFRICAN AMERICAN): 14 ML/MIN/1.73 M^2
FIO2: 40
GLUCOSE SERPL-MCNC: 50 MG/DL (ref 70–110)
HCO3 UR-SCNC: 33.6 MMOL/L (ref 24–28)
IP: 10
MAGNESIUM SERPL-MCNC: 1.5 MG/DL (ref 1.6–2.6)
MIN VOL: 8.84
MODE: ABNORMAL
PCO2 BLDA: 51.6 MMHG (ref 35–45)
PH SMN: 7.42 [PH] (ref 7.35–7.45)
PHOSPHATE SERPL-MCNC: 3.4 MG/DL (ref 2.7–4.5)
PO2 BLDA: 63 MMHG (ref 80–100)
POC BE: 8 MMOL/L
POC SATURATED O2: 92 % (ref 95–100)
POC TCO2: 35 MMOL/L (ref 23–27)
POCT GLUCOSE: 112 MG/DL (ref 70–110)
POCT GLUCOSE: 114 MG/DL (ref 70–110)
POCT GLUCOSE: 122 MG/DL (ref 70–110)
POCT GLUCOSE: 139 MG/DL (ref 70–110)
POCT GLUCOSE: 168 MG/DL (ref 70–110)
POCT GLUCOSE: 36 MG/DL (ref 70–110)
POTASSIUM SERPL-SCNC: 4.6 MMOL/L (ref 3.5–5.1)
SAMPLE: ABNORMAL
SITE: ABNORMAL
SODIUM SERPL-SCNC: 140 MMOL/L (ref 136–145)
SP02: 93
SPONT RATE: 29

## 2019-04-13 PROCEDURE — 80048 BASIC METABOLIC PNL TOTAL CA: CPT

## 2019-04-13 PROCEDURE — 94660 CPAP INITIATION&MGMT: CPT

## 2019-04-13 PROCEDURE — 99900035 HC TECH TIME PER 15 MIN (STAT)

## 2019-04-13 PROCEDURE — 36600 WITHDRAWAL OF ARTERIAL BLOOD: CPT

## 2019-04-13 PROCEDURE — 84100 ASSAY OF PHOSPHORUS: CPT

## 2019-04-13 PROCEDURE — 94761 N-INVAS EAR/PLS OXIMETRY MLT: CPT

## 2019-04-13 PROCEDURE — 63600175 PHARM REV CODE 636 W HCPCS: Performed by: INTERNAL MEDICINE

## 2019-04-13 PROCEDURE — 27000221 HC OXYGEN, UP TO 24 HOURS

## 2019-04-13 PROCEDURE — 83735 ASSAY OF MAGNESIUM: CPT

## 2019-04-13 PROCEDURE — 21400001 HC TELEMETRY ROOM

## 2019-04-13 PROCEDURE — 82803 BLOOD GASES ANY COMBINATION: CPT

## 2019-04-13 PROCEDURE — 25000003 PHARM REV CODE 250: Performed by: INTERNAL MEDICINE

## 2019-04-13 PROCEDURE — 36415 COLL VENOUS BLD VENIPUNCTURE: CPT

## 2019-04-13 RX ORDER — FUROSEMIDE 10 MG/ML
40 INJECTION INTRAMUSCULAR; INTRAVENOUS 2 TIMES DAILY
Status: DISCONTINUED | OUTPATIENT
Start: 2019-04-13 | End: 2019-04-15

## 2019-04-13 RX ADMIN — CLONIDINE HYDROCHLORIDE 0.1 MG: 0.1 TABLET ORAL at 12:04

## 2019-04-13 RX ADMIN — INSULIN ASPART 3 UNITS: 100 INJECTION, SOLUTION INTRAVENOUS; SUBCUTANEOUS at 12:04

## 2019-04-13 RX ADMIN — INSULIN ASPART 3 UNITS: 100 INJECTION, SOLUTION INTRAVENOUS; SUBCUTANEOUS at 07:04

## 2019-04-13 RX ADMIN — FUROSEMIDE 80 MG: 10 INJECTION, SOLUTION INTRAVENOUS at 09:04

## 2019-04-13 RX ADMIN — NIFEDIPINE 30 MG: 30 TABLET, FILM COATED, EXTENDED RELEASE ORAL at 09:04

## 2019-04-13 RX ADMIN — FAMOTIDINE 20 MG: 20 TABLET ORAL at 09:04

## 2019-04-13 RX ADMIN — RAMELTEON 8 MG: 8 TABLET, FILM COATED ORAL at 10:04

## 2019-04-13 RX ADMIN — APIXABAN 2.5 MG: 2.5 TABLET, FILM COATED ORAL at 08:04

## 2019-04-13 RX ADMIN — APIXABAN 2.5 MG: 2.5 TABLET, FILM COATED ORAL at 09:04

## 2019-04-13 RX ADMIN — FUROSEMIDE 40 MG: 10 INJECTION, SOLUTION INTRAMUSCULAR; INTRAVENOUS at 06:04

## 2019-04-13 RX ADMIN — METOLAZONE 5 MG: 5 TABLET ORAL at 09:04

## 2019-04-13 RX ADMIN — CALCITRIOL 0.25 MCG: 0.25 CAPSULE ORAL at 09:04

## 2019-04-13 RX ADMIN — INSULIN ASPART 3 UNITS: 100 INJECTION, SOLUTION INTRAVENOUS; SUBCUTANEOUS at 04:04

## 2019-04-13 RX ADMIN — ATORVASTATIN CALCIUM 40 MG: 40 TABLET, FILM COATED ORAL at 09:04

## 2019-04-13 RX ADMIN — DEXTROSE MONOHYDRATE 25 G: 25 INJECTION, SOLUTION INTRAVENOUS at 06:04

## 2019-04-13 RX ADMIN — SODIUM POLYSTYRENE SULFONATE 15 G: 15 SUSPENSION ORAL; RECTAL at 09:04

## 2019-04-13 RX ADMIN — CLONIDINE HYDROCHLORIDE 0.1 MG: 0.1 TABLET ORAL at 04:04

## 2019-04-13 NOTE — NURSING
Pt off BIBP for now to eat, color and resp improved with minimal labored breathing assessed, increased alertness noted will cont to monitor

## 2019-04-13 NOTE — NURSING
Labored breathing not improved, conferenced with resp therapist to place on BIPAP for rest. resp agreed and MD notified.

## 2019-04-13 NOTE — PLAN OF CARE
Problem: Diabetes Comorbidity  Goal: Blood Glucose Level Within Desired Range    Intervention: Maintain Glycemic Control     04/13/19 0230   Monitor and Manage Ketoacidosis   Glycemic Management blood glucose monitoring;carbohydrate replacement provided         Comments: Blood sugar remains WNL. Snack provided during shift and she ate it. Wore Bipap for only 2.5 hours.

## 2019-04-13 NOTE — ASSESSMENT & PLAN NOTE
Her renal function appears to be near baseline; will continue to monitor urine output. Diurese with Lasix. Appreciate nephrology recs. May do best with management of diuretics with Palliative care/Hospice. She is a poor candidate for HD due to her age, pulmonary hypertension, and multiple other comorbidities. Goals of care discussed with family. Would prefer medical management over HD at this time.

## 2019-04-13 NOTE — NURSING
Pt in bed labored breathing continues O2 at 5liters reposition pt in bed HOB elevated will continue to observe.

## 2019-04-13 NOTE — ASSESSMENT & PLAN NOTE
Patient was just recently discharged from [facility] the day prior to presentation and was found to be dyspneic at the nursing home with hypoxia.  She was transferred to this facility where her initial ambient air oxygen saturation was 77%.  She was placed on NIPPV with BPAP with improvement of her oxygen saturation.  CXR w/ pulmonary edema. She was only mildly hypertensive so unlikely hypertensive emergency.  An ABG was obtained which was revealing for 7.302  61.0  44  30.2 on BPAP 10  5  40%.  She does have a elevated BNP of 796 which is higher than her previous measurement.  She also has pulmonary hypertension with a EPAP of 74 mmHg.  Will continue intravenous diuresis with careful monitoring of her intake/output.  Of note, she is DNR. Fluid restriction added 4/12/2019 and diuretics increased with a significant improvement in negative fluid balance. Continue to monitor.

## 2019-04-13 NOTE — PROGRESS NOTES
Results for ROBERT RAMIREZ (MRN [id number]) as of 4/13/2019 14:05   Ref. Range 4/13/2019 13:57   POC PH Latest Ref Range: 7.35 - 7.45  7.422   POC PCO2 Latest Ref Range: 35 - 45 mmHg 51.6 (H)   POC PO2 Latest Ref Range: 80 - 100 mmHg 63 (L)   POC BE Latest Ref Range: -2 to 2 mmol/L 8   POC HCO3 Latest Ref Range: 24 - 28 mmol/L 33.6 (H)   POC SATURATED O2 Latest Ref Range: 95 - 100 % 92 (L)   POC TCO2 Latest Ref Range: 23 - 27 mmol/L 35 (H)   FiO2 Unknown 40   Sample Unknown ARTERIAL   DelSys Unknown CPAP/BiPAP   Allens Test Unknown Pass   Site Unknown LR   Mode Unknown BiPAP   Rate Unknown 12   EP Unknown 5   IP Unknown 10   Min Vol Unknown 8.840   Sp02 Unknown 93   Spont Rate Unknown 29

## 2019-04-13 NOTE — PROGRESS NOTES
[facility] Hospital Medicine  Progress Note    Patient Name: Joyce Riley  MRN: [id number]  Patient Class: IP- Inpatient   Admission Date: 4/10/2019  Length of Stay: 3 days  Attending Physician: Adrianna Rockwell MD  Primary Care Provider: Kalpana Staton MD        Subjective:     Principal Problem:Acute on chronic diastolic congestive heart failure    HPI:  Mrs. Joyce Riley is a 85 y.o. female [facility] resident with renal hypertension, type 2 diabetes mellitus (HbA1c 6.6% Apr 2019), hyperlipidemia (LDL unknown), chronic diastolic heart failure (LVEF 80% Apr 2019), CKD stage 5, paroxysmal atrial fibrillation (EHT9ER5-OLDm score 5) on chronic anticoagulation, anemia of renal disease, obesity (BMI 35.2), and GERD who presents to [facility] ED with complaints of dyspnea for one day.  She was recently discharged from [facility] after initiating hemodialysis.  She underwent a few sessions and was noted to poorly tolerate it.  She was discontinue from hemodialysis and encouraged hospice.  The family was not ready for that so she was transferred to [facility] for rehabilitation yesterday.  One of her sons was there to visit with her and reports that she was doing well.  He was concerned that the nursing home was about to serve her fried chicken and mashed potatoes with gravy.  About 10 min after he left to go home, he was called by the nursing home to reports that she was short of breath.  EMS was activated and upon their arrival found that her oxygen saturation on ambient air was 77%.  She denies any chest pain, palpitations, nausea, vomiting, nor any abdominal pain.  She does report lower extremity edema. Further history is otherwise limited at this time.    Hospital Course:  Ms. Riley has recently been admitted to [facility], and now our facility for similar presentations. She has CHF and progressive CKD. At [facility] she was not felt to be a candidate for dialysis  and hospice was recommended. The patient was transferred to [facility] for a second opinion. She underwent three dialysis sessions but was not thought to be a candidate for long term dialysis, and again, hospice was recommended. The family wanted the patient discharged to SNF hoping that she would improve. The day following discharge, she presented to our ER for shortness of breath and found to have edema on CXR and exam consistent with volume overload.   She was breifly on BiPAP in the ER but was soon doing better on NC. A third nephrology group was consulted. Palliative care was again consulted. Attempting to diurese with medication. Fluid restriction placed. Extensive family meeting with five of her children on 4/12/2019. The patient's goals of care and risks of dialysis were discussed. The family was not sure at that time whether they would want longterm HD given the impact on quality of life. Continue to diurese and monitor for response. Appreciate nephrology recs. Good output with increase in diuretics. Edema markedly improved. Decreased Lasix 4/13. Continue to monitor.    Interval history:  Negative 2L yesterday with increase in diuretics. Ankle edema markedly improved. Decreased Lasix to avoid over diuresis.    Review of Systems   Constitutional: Negative for chills and fever.   Respiratory: Positive for shortness of breath. Negative for chest tightness and wheezing.    Cardiovascular: Positive for leg swelling. Negative for chest pain.   Gastrointestinal: Negative for abdominal pain, constipation and vomiting.   Genitourinary: Negative for dysuria and hematuria.   Neurological: Negative for seizures and syncope.     Objective:     Vital Signs (Most Recent):  Temp: 97.9 °F (36.6 °C) (04/13/19 0601)  Pulse: 95 (04/13/19 0601)  Resp: 19 (04/13/19 0601)  BP: (!) 158/60 (04/13/19 0619)  SpO2: 97 % (04/13/19 0601) Vital Signs (24h Range):  Temp:  [97.9 °F (36.6 °C)-99.1 °F (37.3 °C)] 97.9 °F (36.6  °C)  Pulse:  [83-97] 95  Resp:  [18-20] 19  SpO2:  [92 %-98 %] 97 %  BP: (135-182)/(56-83) 158/60     Weight: 83.8 kg (184 lb 11.9 oz)  Body mass index is 31.71 kg/m².    Physical Exam   Constitutional: She is oriented to person, place, and time. She appears well-developed and well-nourished. No distress.   HENT:   Right Ear: External ear normal.   Left Ear: External ear normal.   Nose: Nose normal.   Mouth/Throat: Oropharynx is clear and moist.   Eyes: Right eye exhibits no discharge. Left eye exhibits no discharge.   Neck: Normal range of motion.   Cardiovascular: Normal rate and normal heart sounds. Exam reveals no gallop and no friction rub.   No murmur heard.  Pulmonary/Chest:   NC in place. No distress. No crackles.   Abdominal: Soft. Bowel sounds are normal. She exhibits no distension. There is no tenderness.   Morbidly obese   Musculoskeletal: Normal range of motion. She exhibits edema (trace).   Neurological: She is alert and oriented to person, place, and time.   Skin: Skin is warm and dry. She is not diaphoretic. No erythema.   Nursing note and vitals reviewed.          Significant Labs: All pertinent labs within the past 24 hours have been reviewed.      Assessment/Plan:      * Acute on chronic diastolic congestive heart failure  Patient was just recently discharged from [facility] the day prior to presentation and was found to be dyspneic at the nursing home with hypoxia.  She was transferred to this facility where her initial ambient air oxygen saturation was 77%.  She was placed on NIPPV with BPAP with improvement of her oxygen saturation.  CXR w/ pulmonary edema. She was only mildly hypertensive so unlikely hypertensive emergency.  An ABG was obtained which was revealing for 7.302  61.0  44  30.2 on BPAP 10  5  40%.  She does have a elevated BNP of 796 which is higher than her previous measurement.  She also has pulmonary hypertension with a EPAP of 74 mmHg.  Will continue intravenous diuresis  with careful monitoring of her intake/output.  Of note, she is DNR. Fluid restriction added 4/12/2019 and diuretics increased with a significant improvement in negative fluid balance. Continue to monitor.    Acute respiratory failure with hypoxia and hypercapnia  As addressed above. Improved and now stable on NC.    CKD stage 5  Her renal function appears to be near baseline; will continue to monitor urine output. Diurese with Lasix. Appreciate nephrology recs. May do best with management of diuretics with Palliative care/Hospice. She is a poor candidate for HD due to her age, pulmonary hypertension, and multiple other comorbidities. Goals of care discussed with family. Would prefer medical management over HD at this time.    GERD (gastroesophageal reflux disease)  Will continue home regimen of famotidine.    Obesity, Class II, BMI 35-39.9  The patient has been counseled on the negative impact that obesity imparts on her health and was encouraged to make lifestyle changes in order to lose weight and decrease her modifiable risk factors.    Chronic anticoagulation  See PAF.    Paroxysmal atrial fibrillation  Patient is currently in sinus rhythm; will continue her home regimen of apixaban.    Chronic diastolic heart failure  As addressed above.    Hyperlipidemia  Will continue her home regimen of atorvastatin.    Renal hypertension  Patient's blood pressure is fairly-controlled; will continue home regimen of furosemide and nifedipine, and provide as-needed clonidine.    Hyperkalemia  Likely from her chronic kidney disease.  There are no EKG changes.  Monitor.     Anemia of renal disease  The patient's H/H is stable and consistent with previous laboratory measurements, and the patient exhibits no signs or symptoms of acute bleeding; there is no indication for transfusion.  Will continue to monitor.    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     Type 2  diabetes mellitus, controlled, with renal complications  Well-controlled on a home regimen of basal insulin therapy; will provide basal-prandial insulin along with insulin sliding scale.    VTE Risk Mitigation (From admission, onward)        Ordered     apixaban tablet 2.5 mg  2 times daily      04/10/19 1934     IP VTE HIGH RISK PATIENT  Once      04/10/19 1934     Place SANTOS hose  Until discontinued      04/10/19 1815              Adrianna Rockwell MD  Department of Hospital Medicine   [facility]

## 2019-04-13 NOTE — PLAN OF CARE
Problem: Diabetes Comorbidity  Goal: Blood Glucose Level Within Desired Range    Intervention: Maintain Glycemic Control     04/13/19 0607   Monitor and Manage Ketoacidosis   Glycemic Management blood glucose monitoring;carbohydrate replacement provided   Drop of blood sugar, 36. Dextrose PRN administered as ordered and pt able to consume a bowl of cereal with milk and sugar. Blood sugar increased to 139 after checking 15 min later.       Comments: 1500 mL output noted. Obvious decrease in edema to BLE. Wore Bipap for 3 hours during night with encouragement. Skin remains intact.

## 2019-04-13 NOTE — SUBJECTIVE & OBJECTIVE
Interval history:  Negative 2L yesterday with increase in diuretics.    Review of Systems   Constitutional: Negative for chills and fever.   Respiratory: Positive for shortness of breath. Negative for chest tightness and wheezing.    Cardiovascular: Positive for leg swelling. Negative for chest pain.   Gastrointestinal: Negative for abdominal pain, constipation and vomiting.   Genitourinary: Negative for dysuria and hematuria.   Neurological: Negative for seizures and syncope.     Objective:     Vital Signs (Most Recent):  Temp: 97.9 °F (36.6 °C) (04/13/19 0601)  Pulse: 95 (04/13/19 0601)  Resp: 19 (04/13/19 0601)  BP: (!) 158/60 (04/13/19 0619)  SpO2: 97 % (04/13/19 0601) Vital Signs (24h Range):  Temp:  [97.9 °F (36.6 °C)-99.1 °F (37.3 °C)] 97.9 °F (36.6 °C)  Pulse:  [83-97] 95  Resp:  [18-20] 19  SpO2:  [92 %-98 %] 97 %  BP: (135-182)/(56-83) 158/60     Weight: 83.8 kg (184 lb 11.9 oz)  Body mass index is 31.71 kg/m².    Physical Exam   Constitutional: She is oriented to person, place, and time. She appears well-developed and well-nourished. No distress.   HENT:   Right Ear: External ear normal.   Left Ear: External ear normal.   Nose: Nose normal.   Mouth/Throat: Oropharynx is clear and moist.   Eyes: Right eye exhibits no discharge. Left eye exhibits no discharge.   Neck: Normal range of motion.   Cardiovascular: Normal rate and normal heart sounds. Exam reveals no gallop and no friction rub.   No murmur heard.  Pulmonary/Chest:   NC in place. No distress. Crackles at bases. Poor inspiratory effort likely contributed to by morbid obesity.   Abdominal: Soft. Bowel sounds are normal. She exhibits no distension. There is no tenderness.   Morbidly obese   Musculoskeletal: Normal range of motion. She exhibits edema (There is 1-2+ pitting edema to the knees bilaterally ).   Neurological: She is alert and oriented to person, place, and time.   Skin: Skin is warm and dry. She is not diaphoretic. No erythema.   Nursing  note and vitals reviewed.          Significant Labs: All pertinent labs within the past 24 hours have been reviewed.

## 2019-04-13 NOTE — ASSESSMENT & PLAN NOTE
Well-controlled on a home regimen of basal insulin therapy; will provide basal-prandial insulin along with insulin sliding scale. Adjusting insulin as needed.

## 2019-04-14 LAB
ANION GAP SERPL CALC-SCNC: 9 MMOL/L (ref 8–16)
BUN SERPL-MCNC: 51 MG/DL (ref 8–23)
CALCIUM SERPL-MCNC: 9.7 MG/DL (ref 8.7–10.5)
CHLORIDE SERPL-SCNC: 98 MMOL/L (ref 95–110)
CO2 SERPL-SCNC: 32 MMOL/L (ref 23–29)
CREAT SERPL-MCNC: 3.2 MG/DL (ref 0.5–1.4)
EST. GFR  (AFRICAN AMERICAN): 15 ML/MIN/1.73 M^2
EST. GFR  (NON AFRICAN AMERICAN): 13 ML/MIN/1.73 M^2
GLUCOSE SERPL-MCNC: 98 MG/DL (ref 70–110)
MAGNESIUM SERPL-MCNC: 1.3 MG/DL (ref 1.6–2.6)
POCT GLUCOSE: 112 MG/DL (ref 70–110)
POCT GLUCOSE: 129 MG/DL (ref 70–110)
POCT GLUCOSE: 151 MG/DL (ref 70–110)
POTASSIUM SERPL-SCNC: 4.4 MMOL/L (ref 3.5–5.1)
SODIUM SERPL-SCNC: 139 MMOL/L (ref 136–145)

## 2019-04-14 PROCEDURE — 21400001 HC TELEMETRY ROOM

## 2019-04-14 PROCEDURE — 63600175 PHARM REV CODE 636 W HCPCS: Performed by: INTERNAL MEDICINE

## 2019-04-14 PROCEDURE — 25000003 PHARM REV CODE 250: Performed by: INTERNAL MEDICINE

## 2019-04-14 PROCEDURE — 80048 BASIC METABOLIC PNL TOTAL CA: CPT

## 2019-04-14 PROCEDURE — 99900035 HC TECH TIME PER 15 MIN (STAT)

## 2019-04-14 PROCEDURE — 94660 CPAP INITIATION&MGMT: CPT

## 2019-04-14 PROCEDURE — 80069 RENAL FUNCTION PANEL: CPT

## 2019-04-14 PROCEDURE — 83735 ASSAY OF MAGNESIUM: CPT

## 2019-04-14 PROCEDURE — 36415 COLL VENOUS BLD VENIPUNCTURE: CPT

## 2019-04-14 PROCEDURE — 27000221 HC OXYGEN, UP TO 24 HOURS

## 2019-04-14 RX ORDER — MAGNESIUM SULFATE HEPTAHYDRATE 40 MG/ML
2 INJECTION, SOLUTION INTRAVENOUS ONCE
Status: COMPLETED | OUTPATIENT
Start: 2019-04-14 | End: 2019-04-14

## 2019-04-14 RX ADMIN — INSULIN ASPART 3 UNITS: 100 INJECTION, SOLUTION INTRAVENOUS; SUBCUTANEOUS at 04:04

## 2019-04-14 RX ADMIN — METOLAZONE 5 MG: 5 TABLET ORAL at 09:04

## 2019-04-14 RX ADMIN — ATORVASTATIN CALCIUM 40 MG: 40 TABLET, FILM COATED ORAL at 09:04

## 2019-04-14 RX ADMIN — ACETAMINOPHEN 500 MG: 500 TABLET, FILM COATED ORAL at 09:04

## 2019-04-14 RX ADMIN — FAMOTIDINE 20 MG: 20 TABLET ORAL at 09:04

## 2019-04-14 RX ADMIN — INSULIN ASPART 3 UNITS: 100 INJECTION, SOLUTION INTRAVENOUS; SUBCUTANEOUS at 11:04

## 2019-04-14 RX ADMIN — CALCITRIOL 0.25 MCG: 0.25 CAPSULE ORAL at 09:04

## 2019-04-14 RX ADMIN — INSULIN ASPART 3 UNITS: 100 INJECTION, SOLUTION INTRAVENOUS; SUBCUTANEOUS at 07:04

## 2019-04-14 RX ADMIN — APIXABAN 2.5 MG: 2.5 TABLET, FILM COATED ORAL at 09:04

## 2019-04-14 RX ADMIN — CLONIDINE HYDROCHLORIDE 0.1 MG: 0.1 TABLET ORAL at 09:04

## 2019-04-14 RX ADMIN — ONDANSETRON 8 MG: 2 INJECTION INTRAMUSCULAR; INTRAVENOUS at 11:04

## 2019-04-14 RX ADMIN — FUROSEMIDE 40 MG: 10 INJECTION, SOLUTION INTRAMUSCULAR; INTRAVENOUS at 06:04

## 2019-04-14 RX ADMIN — NIFEDIPINE 30 MG: 30 TABLET, FILM COATED, EXTENDED RELEASE ORAL at 09:04

## 2019-04-14 RX ADMIN — FUROSEMIDE 40 MG: 10 INJECTION, SOLUTION INTRAMUSCULAR; INTRAVENOUS at 09:04

## 2019-04-14 RX ADMIN — MAGNESIUM SULFATE IN WATER 2 G: 40 INJECTION, SOLUTION INTRAVENOUS at 05:04

## 2019-04-14 RX ADMIN — ONDANSETRON 8 MG: 2 INJECTION INTRAMUSCULAR; INTRAVENOUS at 05:04

## 2019-04-14 NOTE — NURSING
Report received from SUKUMAR [place]. Patient resting in bed. No obvious signs of distress noted. Family at bedside. Educated on call light usage

## 2019-04-14 NOTE — PROGRESS NOTES
[facility]  Nephrology  Progress Note    Patient Name: Joyce Riley  MRN: [id number]  Admission Date: 4/10/2019  Hospital Length of Stay: 3 days  Attending Provider: Adrianna Rockwell MD   Primary Care Physician: Kalpana Staton MD  Principal Problem:Acute on chronic diastolic congestive heart failure    Subjective:     HPI: Following for ROXANNE on CKD    Interval History: UOP 2850cc + 2x yesterday on lasix 80mg IV TID. No events overnight. Only wore BiPAP for around 3 hours. Developed labored breathing late this morning; ABG with resp acidosis. Improved with BiPAP. Patient doing well this afternoon. Reports SOB has improved. Denies any improvement in LE edema. Agreed to wear BiPAP for longer tonight.     Review of patient's allergies indicates:  No Known Allergies  Current Facility-Administered Medications   Medication Frequency    acetaminophen tablet 500 mg Q6H PRN    apixaban tablet 2.5 mg BID    atorvastatin tablet 40 mg Daily    calcitRIOL capsule 0.25 mcg Daily    cloNIDine tablet 0.1 mg TID PRN    dextrose 50% injection 12.5 g PRN    dextrose 50% injection 25 g PRN    epoetin diana injection 10,000 Units Q7 Days    famotidine tablet 20 mg Daily    furosemide injection 40 mg BID    glucagon (human recombinant) injection 1 mg PRN    glucose chewable tablet 16 g PRN    glucose chewable tablet 24 g PRN    insulin aspart U-100 pen 0-5 Units PRN    insulin aspart U-100 pen 3 Units TIDWM    insulin detemir U-100 pen 5 Units QHS    metOLazone tablet 5 mg Daily    NIFEdipine 24 hr tablet 30 mg Daily    ondansetron injection 8 mg Q8H PRN    promethazine (PHENERGAN) 6.25 mg in dextrose 5 % 50 mL IVPB Q6H PRN    ramelteon tablet 8 mg Nightly PRN    senna-docusate 8.6-50 mg per tablet 1 tablet BID PRN       Objective:     Vital Signs (Most Recent):  Temp: 99 °F (37.2 °C) (04/13/19 1919)  Pulse: 83 (04/13/19 1919)  Resp: 18 (04/13/19 1919)  BP: (!) 146/68 (04/13/19  1919)  SpO2: (!) 94 % (04/13/19 1919)  O2 Device (Oxygen Therapy): nasal cannula (04/13/19 1919) Vital Signs (24h Range):  Temp:  [97.9 °F (36.6 °C)-99 °F (37.2 °C)] 99 °F (37.2 °C)  Pulse:  [] 83  Resp:  [18-22] 18  SpO2:  [90 %-98 %] 94 %  BP: (135-182)/(60-87) 146/68     Weight: 83.8 kg (184 lb 11.9 oz) (04/13/19 0601)  Body mass index is 31.71 kg/m².  Body surface area is 1.95 meters squared.    I/O last 3 completed shifts:  In: 735 [P.O.:735]  Out: 3702 [Urine:3700; Stool:2]    Physical Exam   Constitutional: She appears well-developed and well-nourished. No distress.   HENT:   Head: Normocephalic and atraumatic.   Mouth/Throat: Oropharynx is clear and moist and mucous membranes are normal.   Eyes: Conjunctivae and EOM are normal.   Cardiovascular: Normal rate and regular rhythm.   Pulmonary/Chest: Effort normal. She has wheezes (mild). She has rales (faint).   Abdominal: Soft. She exhibits no distension.   Musculoskeletal: She exhibits edema. She exhibits no deformity.   Skin: Skin is warm and dry. She is not diaphoretic.       Significant Labs:  CBC:   Recent Labs   Lab 04/12/19  0830   WBC 7.91   RBC 2.56*   HGB 7.5*   HCT 24.4*      MCV 95   MCH 29.3   MCHC 30.7*     CMP:   Recent Labs   Lab 04/11/19  0415  04/13/19  0331   *   < > 50*   CALCIUM 10.0   < > 9.3   ALBUMIN 2.8*  --   --    PROT 6.4  --   --       < > 140   K 5.2*   < > 4.6   CO2 30*   < > 32*      < > 100   BUN 46*   < > 47*   CREATININE 3.1*   < > 3.0*   ALKPHOS 130  --   --    ALT 16  --   --    AST 14  --   --    BILITOT 1.1*  --   --     < > = values in this interval not displayed.     All labs within the past 24 hours have been reviewed.     Significant Imaging:  Labs: Reviewed    Assessment/Plan:     ROXANNE on CKD stage 5  - renal function stable today; eGFR 16%  - no need for RRT today  - continue diuresis as below  - daily labs; strict I/Os  - renally dose medications. Avoid nephrotoxic  agents    Hyperkalemia   - 2/2 ROXANNE  - improved with medical management  - K 4.6 today  - continue low K diet  - will continue to trend    Metabolic alkalosis  - 2/2 diuresis  - lasix 80mg IV TID weaned to lasix 40mg IV BID today. Also on metolazone.   - will reassess volume status in AM; low threshold to increase to 80mg IV BID if worsening edema or hypoxia    Anemia of CKD  - continue weekly AHSAN  - transfuse for hgb < 7      Thank you for your consult. I will follow-up with patient. Please contact us if you have any additional questions.    Ina Orr MD  Nephrology  [facility], [facility]  Office: [phone]  [facility]    Date of service: 04/13/2019

## 2019-04-14 NOTE — SUBJECTIVE & OBJECTIVE
Interval History: UOP 2850cc + 2x yesterday on lasix 80mg IV TID. No events overnight. Only wore BiPAP for around 3 hours. Developed labored breathing late this morning; ABG with resp acidosis. Improved with BiPAP. Patient doing well this afternoon. Reports SOB has improved. Denies any improvement in LE edema. Agreed to wear BiPAP for longer tonight.     Review of patient's allergies indicates:  No Known Allergies  Current Facility-Administered Medications   Medication Frequency    acetaminophen tablet 500 mg Q6H PRN    apixaban tablet 2.5 mg BID    atorvastatin tablet 40 mg Daily    calcitRIOL capsule 0.25 mcg Daily    cloNIDine tablet 0.1 mg TID PRN    dextrose 50% injection 12.5 g PRN    dextrose 50% injection 25 g PRN    epoetin diana injection 10,000 Units Q7 Days    famotidine tablet 20 mg Daily    furosemide injection 40 mg BID    glucagon (human recombinant) injection 1 mg PRN    glucose chewable tablet 16 g PRN    glucose chewable tablet 24 g PRN    insulin aspart U-100 pen 0-5 Units PRN    insulin aspart U-100 pen 3 Units TIDWM    insulin detemir U-100 pen 5 Units QHS    metOLazone tablet 5 mg Daily    NIFEdipine 24 hr tablet 30 mg Daily    ondansetron injection 8 mg Q8H PRN    promethazine (PHENERGAN) 6.25 mg in dextrose 5 % 50 mL IVPB Q6H PRN    ramelteon tablet 8 mg Nightly PRN    senna-docusate 8.6-50 mg per tablet 1 tablet BID PRN       Objective:     Vital Signs (Most Recent):  Temp: 99 °F (37.2 °C) (04/13/19 1919)  Pulse: 83 (04/13/19 1919)  Resp: 18 (04/13/19 1919)  BP: (!) 146/68 (04/13/19 1919)  SpO2: (!) 94 % (04/13/19 1919)  O2 Device (Oxygen Therapy): nasal cannula (04/13/19 1919) Vital Signs (24h Range):  Temp:  [97.9 °F (36.6 °C)-99 °F (37.2 °C)] 99 °F (37.2 °C)  Pulse:  [] 83  Resp:  [18-22] 18  SpO2:  [90 %-98 %] 94 %  BP: (135-182)/(60-87) 146/68     Weight: 83.8 kg (184 lb 11.9 oz) (04/13/19 0601)  Body mass index is 31.71 kg/m².  Body surface area is 1.95  meters squared.    I/O last 3 completed shifts:  In: 735 [P.O.:735]  Out: 3702 [Urine:3700; Stool:2]    Physical Exam   Constitutional: She appears well-developed and well-nourished. No distress.   HENT:   Head: Normocephalic and atraumatic.   Mouth/Throat: Oropharynx is clear and moist and mucous membranes are normal.   Eyes: Conjunctivae and EOM are normal.   Cardiovascular: Normal rate and regular rhythm.   Pulmonary/Chest: Effort normal. She has wheezes (mild). She has rales (faint).   Abdominal: Soft. She exhibits no distension.   Musculoskeletal: She exhibits edema. She exhibits no deformity.   Skin: Skin is warm and dry. She is not diaphoretic.       Significant Labs:  CBC:   Recent Labs   Lab 04/12/19  0830   WBC 7.91   RBC 2.56*   HGB 7.5*   HCT 24.4*      MCV 95   MCH 29.3   MCHC 30.7*     CMP:   Recent Labs   Lab 04/11/19  0415  04/13/19  0331   *   < > 50*   CALCIUM 10.0   < > 9.3   ALBUMIN 2.8*  --   --    PROT 6.4  --   --       < > 140   K 5.2*   < > 4.6   CO2 30*   < > 32*      < > 100   BUN 46*   < > 47*   CREATININE 3.1*   < > 3.0*   ALKPHOS 130  --   --    ALT 16  --   --    AST 14  --   --    BILITOT 1.1*  --   --     < > = values in this interval not displayed.     All labs within the past 24 hours have been reviewed.     Significant Imaging:  Labs: Reviewed

## 2019-04-15 PROBLEM — E87.5 HYPERKALEMIA: Status: RESOLVED | Noted: 2019-04-10 | Resolved: 2019-04-15

## 2019-04-15 LAB
ALBUMIN SERPL BCP-MCNC: 2.4 G/DL (ref 3.5–5.2)
ALBUMIN SERPL BCP-MCNC: 2.5 G/DL (ref 3.5–5.2)
ANION GAP SERPL CALC-SCNC: 13 MMOL/L (ref 8–16)
ANION GAP SERPL CALC-SCNC: 7 MMOL/L (ref 8–16)
BACTERIA BLD CULT: NORMAL
BACTERIA BLD CULT: NORMAL
BASOPHILS # BLD AUTO: 0.02 K/UL (ref 0–0.2)
BASOPHILS NFR BLD: 0.5 % (ref 0–1.9)
BUN SERPL-MCNC: 49 MG/DL (ref 8–23)
BUN SERPL-MCNC: 51 MG/DL (ref 8–23)
CALCIUM SERPL-MCNC: 9 MG/DL (ref 8.7–10.5)
CALCIUM SERPL-MCNC: 9.6 MG/DL (ref 8.7–10.5)
CHLORIDE SERPL-SCNC: 98 MMOL/L (ref 95–110)
CHLORIDE SERPL-SCNC: 99 MMOL/L (ref 95–110)
CO2 SERPL-SCNC: 28 MMOL/L (ref 23–29)
CO2 SERPL-SCNC: 36 MMOL/L (ref 23–29)
CREAT SERPL-MCNC: 3.2 MG/DL (ref 0.5–1.4)
CREAT SERPL-MCNC: 3.2 MG/DL (ref 0.5–1.4)
DIFFERENTIAL METHOD: ABNORMAL
EOSINOPHIL # BLD AUTO: 0.1 K/UL (ref 0–0.5)
EOSINOPHIL NFR BLD: 3.2 % (ref 0–8)
ERYTHROCYTE [DISTWIDTH] IN BLOOD BY AUTOMATED COUNT: 17.6 % (ref 11.5–14.5)
EST. GFR  (AFRICAN AMERICAN): 15 ML/MIN/1.73 M^2
EST. GFR  (AFRICAN AMERICAN): 15 ML/MIN/1.73 M^2
EST. GFR  (NON AFRICAN AMERICAN): 13 ML/MIN/1.73 M^2
EST. GFR  (NON AFRICAN AMERICAN): 13 ML/MIN/1.73 M^2
GLUCOSE SERPL-MCNC: 50 MG/DL (ref 70–110)
GLUCOSE SERPL-MCNC: 86 MG/DL (ref 70–110)
HCT VFR BLD AUTO: 25.3 % (ref 37–48.5)
HGB BLD-MCNC: 7.7 G/DL (ref 12–16)
LYMPHOCYTES # BLD AUTO: 0.8 K/UL (ref 1–4.8)
LYMPHOCYTES NFR BLD: 18.1 % (ref 18–48)
MAGNESIUM SERPL-MCNC: 1.7 MG/DL (ref 1.6–2.6)
MCH RBC QN AUTO: 28.9 PG (ref 27–31)
MCHC RBC AUTO-ENTMCNC: 30.4 G/DL (ref 32–36)
MCV RBC AUTO: 95 FL (ref 82–98)
MONOCYTES # BLD AUTO: 0.3 K/UL (ref 0.3–1)
MONOCYTES NFR BLD: 6 % (ref 4–15)
NEUTROPHILS # BLD AUTO: 3.1 K/UL (ref 1.8–7.7)
NEUTROPHILS NFR BLD: 72.2 % (ref 38–73)
PHOSPHATE SERPL-MCNC: 3.7 MG/DL (ref 2.7–4.5)
PHOSPHATE SERPL-MCNC: 3.8 MG/DL (ref 2.7–4.5)
PLATELET # BLD AUTO: 212 K/UL (ref 150–350)
PMV BLD AUTO: 9.7 FL (ref 9.2–12.9)
POCT GLUCOSE: 105 MG/DL (ref 70–110)
POCT GLUCOSE: 151 MG/DL (ref 70–110)
POCT GLUCOSE: 243 MG/DL (ref 70–110)
POCT GLUCOSE: 52 MG/DL (ref 70–110)
POTASSIUM SERPL-SCNC: 4.5 MMOL/L (ref 3.5–5.1)
POTASSIUM SERPL-SCNC: 4.7 MMOL/L (ref 3.5–5.1)
RBC # BLD AUTO: 2.66 M/UL (ref 4–5.4)
SODIUM SERPL-SCNC: 140 MMOL/L (ref 136–145)
SODIUM SERPL-SCNC: 141 MMOL/L (ref 136–145)
WBC # BLD AUTO: 4.32 K/UL (ref 3.9–12.7)

## 2019-04-15 PROCEDURE — 63600175 PHARM REV CODE 636 W HCPCS: Performed by: INTERNAL MEDICINE

## 2019-04-15 PROCEDURE — 94761 N-INVAS EAR/PLS OXIMETRY MLT: CPT

## 2019-04-15 PROCEDURE — 99900035 HC TECH TIME PER 15 MIN (STAT)

## 2019-04-15 PROCEDURE — 80069 RENAL FUNCTION PANEL: CPT

## 2019-04-15 PROCEDURE — 21400001 HC TELEMETRY ROOM

## 2019-04-15 PROCEDURE — 25000003 PHARM REV CODE 250: Performed by: INTERNAL MEDICINE

## 2019-04-15 PROCEDURE — 36415 COLL VENOUS BLD VENIPUNCTURE: CPT

## 2019-04-15 PROCEDURE — 27000221 HC OXYGEN, UP TO 24 HOURS

## 2019-04-15 PROCEDURE — 85025 COMPLETE CBC W/AUTO DIFF WBC: CPT

## 2019-04-15 PROCEDURE — 83735 ASSAY OF MAGNESIUM: CPT

## 2019-04-15 RX ORDER — INSULIN ASPART 100 [IU]/ML
0-5 INJECTION, SOLUTION INTRAVENOUS; SUBCUTANEOUS
Refills: 0 | Status: ON HOLD
Start: 2019-04-15 | End: 2019-07-22 | Stop reason: HOSPADM

## 2019-04-15 RX ORDER — FUROSEMIDE 40 MG/1
40 TABLET ORAL 2 TIMES DAILY
Status: DISCONTINUED | OUTPATIENT
Start: 2019-04-15 | End: 2019-04-16

## 2019-04-15 RX ORDER — METOLAZONE 2.5 MG/1
2.5 TABLET ORAL DAILY
Qty: 30 TABLET | Refills: 11 | Status: ON HOLD | OUTPATIENT
Start: 2019-04-16 | End: 2020-03-13 | Stop reason: HOSPADM

## 2019-04-15 RX ORDER — CALCITRIOL 0.25 UG/1
0.25 CAPSULE ORAL EVERY OTHER DAY
Status: ON HOLD
Start: 2019-04-16 | End: 2021-06-28 | Stop reason: HOSPADM

## 2019-04-15 RX ORDER — METOLAZONE 2.5 MG/1
2.5 TABLET ORAL DAILY
Status: DISCONTINUED | OUTPATIENT
Start: 2019-04-16 | End: 2019-04-17 | Stop reason: HOSPADM

## 2019-04-15 RX ORDER — CALCITRIOL 0.25 UG/1
0.25 CAPSULE ORAL EVERY OTHER DAY
Status: DISCONTINUED | OUTPATIENT
Start: 2019-04-16 | End: 2019-04-17 | Stop reason: HOSPADM

## 2019-04-15 RX ADMIN — INSULIN ASPART 3 UNITS: 100 INJECTION, SOLUTION INTRAVENOUS; SUBCUTANEOUS at 11:04

## 2019-04-15 RX ADMIN — ATORVASTATIN CALCIUM 40 MG: 40 TABLET, FILM COATED ORAL at 08:04

## 2019-04-15 RX ADMIN — NIFEDIPINE 30 MG: 30 TABLET, FILM COATED, EXTENDED RELEASE ORAL at 08:04

## 2019-04-15 RX ADMIN — FUROSEMIDE 40 MG: 40 TABLET ORAL at 05:04

## 2019-04-15 RX ADMIN — INSULIN ASPART 3 UNITS: 100 INJECTION, SOLUTION INTRAVENOUS; SUBCUTANEOUS at 04:04

## 2019-04-15 RX ADMIN — FUROSEMIDE 40 MG: 10 INJECTION, SOLUTION INTRAMUSCULAR; INTRAVENOUS at 08:04

## 2019-04-15 RX ADMIN — APIXABAN 2.5 MG: 2.5 TABLET, FILM COATED ORAL at 09:04

## 2019-04-15 RX ADMIN — APIXABAN 2.5 MG: 2.5 TABLET, FILM COATED ORAL at 08:04

## 2019-04-15 RX ADMIN — CALCITRIOL 0.25 MCG: 0.25 CAPSULE ORAL at 08:04

## 2019-04-15 RX ADMIN — ACETAMINOPHEN 500 MG: 500 TABLET, FILM COATED ORAL at 09:04

## 2019-04-15 RX ADMIN — CLONIDINE HYDROCHLORIDE 0.1 MG: 0.1 TABLET ORAL at 09:04

## 2019-04-15 RX ADMIN — METOLAZONE 5 MG: 5 TABLET ORAL at 08:04

## 2019-04-15 RX ADMIN — INSULIN ASPART 1 UNITS: 100 INJECTION, SOLUTION INTRAVENOUS; SUBCUTANEOUS at 09:04

## 2019-04-15 RX ADMIN — FAMOTIDINE 20 MG: 20 TABLET ORAL at 08:04

## 2019-04-15 NOTE — ASSESSMENT & PLAN NOTE
Patient was just recently discharged from [facility] the day prior to presentation and was found to be dyspneic at the nursing home with hypoxia.  She was transferred to this facility where her initial ambient air oxygen saturation was 77%.  She was placed on NIPPV with BPAP with improvement of her oxygen saturation.  CXR w/ pulmonary edema. She was only mildly hypertensive so unlikely hypertensive emergency.  An ABG was obtained which was revealing for 7.302  61.0  44  30.2 on BPAP 10  5  40%.  She did have a elevated BNP of 796 which is higher than her previous measurement.  She also has pulmonary hypertension with a EPAP of 74 mmHg.  Started intravenous diuresis with careful monitoring of her intake/output.  Of note, she is DNR. Fluid restriction added 4/12/2019 and diuretics increased with a significant improvement in negative fluid balance. Appeared euvolemic and transitioned to PO regimen 4/15/2019.

## 2019-04-15 NOTE — PROGRESS NOTES
[facility]  Nephrology  Progress Note    Patient Name: Joyce Riley  MRN: [id number]  Admission Date: 4/10/2019  Hospital Length of Stay: 5 days  Attending Provider: Adrianna Rockwell MD   Primary Care Physician: Kalpana Staton MD  Principal Problem:Acute on chronic diastolic congestive heart failure    Subjective:     HPI: Following for ROXANNE on CKD    Interval History:   Currently on lasix 40mg IV BID and metolazone 5mg daily. No events overnight. Reports she wore her BiPAP all night. SOB improved. Feels much better today. Asking for grapes. No edema. Family at bedside. Family concerned about her serum glucose of 50 this morning. The asked about stopping SSI; will defer to primary team.     Review of patient's allergies indicates:  No Known Allergies  Current Facility-Administered Medications   Medication Frequency    acetaminophen tablet 500 mg Q6H PRN    apixaban tablet 2.5 mg BID    atorvastatin tablet 40 mg Daily    [START ON 4/16/2019] calcitRIOL capsule 0.25 mcg Every other day    cloNIDine tablet 0.1 mg TID PRN    dextrose 50% injection 12.5 g PRN    dextrose 50% injection 25 g PRN    epoetin diana injection 10,000 Units Q7 Days    famotidine tablet 20 mg Daily    furosemide tablet 40 mg BID    glucagon (human recombinant) injection 1 mg PRN    glucose chewable tablet 16 g PRN    glucose chewable tablet 24 g PRN    insulin aspart U-100 pen 0-5 Units PRN    insulin aspart U-100 pen 3 Units TIDWM    insulin detemir U-100 pen 5 Units QHS    [START ON 4/16/2019] metOLazone tablet 2.5 mg Daily    NIFEdipine 24 hr tablet 30 mg Daily    ondansetron injection 8 mg Q8H PRN    promethazine (PHENERGAN) 6.25 mg in dextrose 5 % 50 mL IVPB Q6H PRN    ramelteon tablet 8 mg Nightly PRN    senna-docusate 8.6-50 mg per tablet 1 tablet BID PRN       Objective:     Vital Signs (Most Recent):  Temp: 98.5 °F (36.9 °C) (04/15/19 1119)  Pulse: 85 (04/15/19 1119)  Resp: 18 (04/15/19  1119)  BP: (!) 163/70 (04/15/19 1119)  SpO2: (!) 90 % (04/15/19 1119)  O2 Device (Oxygen Therapy): nasal cannula w/ humidification (04/15/19 0816) Vital Signs (24h Range):  Temp:  [97.6 °F (36.4 °C)-98.5 °F (36.9 °C)] 98.5 °F (36.9 °C)  Pulse:  [68-88] 85  Resp:  [17-20] 18  SpO2:  [90 %-98 %] 90 %  BP: (140-169)/(65-74) 163/70     Weight: 81.8 kg (180 lb 5.4 oz) (04/14/19 0400)  Body mass index is 30.95 kg/m².  Body surface area is 1.92 meters squared.    I/O last 3 completed shifts:  In: 360 [P.O.:360]  Out: 850 [Urine:850]    Physical Exam   Constitutional: She appears well-developed and well-nourished. No distress.   HENT:   Head: Normocephalic and atraumatic.   Mouth/Throat: Oropharynx is clear and moist and mucous membranes are normal.   Eyes: Conjunctivae and EOM are normal.   Cardiovascular: Normal rate and regular rhythm.   Pulmonary/Chest: Effort normal and breath sounds normal. She has no wheezes.   Dry crackles   Abdominal: Soft. She exhibits no distension.   Musculoskeletal: She exhibits no edema or deformity.   Skin: Skin is warm and dry. She is not diaphoretic.   Psychiatric: She has a normal mood and affect. Her behavior is normal.       Significant Labs:  CBC:   Recent Labs   Lab 04/15/19  0451   WBC 4.32   RBC 2.66*   HGB 7.7*   HCT 25.3*      MCV 95   MCH 28.9   MCHC 30.4*     CMP:   Recent Labs   Lab 04/11/19  0415  04/15/19  0451   *   < > 50*   CALCIUM 10.0   < > 9.0   ALBUMIN 2.8*   < > 2.4*   PROT 6.4  --   --       < > 141   K 5.2*   < > 4.5   CO2 30*   < > 36*      < > 98   BUN 46*   < > 51*   CREATININE 3.1*   < > 3.2*   ALKPHOS 130  --   --    ALT 16  --   --    AST 14  --   --    BILITOT 1.1*  --   --     < > = values in this interval not displayed.     All labs within the past 24 hours have been reviewed.     Significant Imaging:  Labs: Reviewed    Assessment/Plan:     ROXANNE on CKD stage 5  - sCr stable at 3.2 today with GFR 15%  - BUN stable as well  - no need  for RRT at this time  - adjusting diuretic regimen as below  - will arrange for close f/u with Dr. Baca upon discharge  - continue daily labs; strict I/Os  - renally dose medications. Avoid nephrotoxic agents      Hypomagnesemia  - improved s/p mag sulf 2g IV yesterday  - will trend     Metabolic alkalosis  - likely 2/2 combination of contraction alkalosis and compensation from respiratory acidosis  - worsening  - BiPAP compliance encouraged  - euvolemic on exam. Will decrease diuretic regimen today: will change lasix IV to PO at 40mg po BID and decrease metolazone from 5mg to 2.5mg daily  - will reassess volume status daily     Anemia of CKD  - continue weekly AHSAN  - transfuse for hgb < 7    CKD-MBD  - corrected Ca mildly elevated; phos level normal  - will decrease calcitriol to 0.25mcg QOD  - continue to trend       Case discussed with Dr. Rockwell.   Thank you for your consult. I will follow-up with patient. Please contact us if you have any additional questions.     Ina Orr MD  Nephrology  [facility], [facility]  Office: [phone]  [facility]    Date of service: 04/15/2019

## 2019-04-15 NOTE — PLAN OF CARE
Problem: Fall Injury Risk  Goal: Absence of Fall and Fall-Related Injury    Intervention: Identify and Manage Contributors to Fall Injury Risk     04/15/19 1020   Manage Acute Allergic Reaction   Medication Review/Management medications reviewed   Identify and Manage Contributors to Fall Injury Risk   Self-Care Promotion meal setup provided;BADL personal objects within reach     Intervention: Promote Injury-Free Environment     04/15/19 1020   Optimize [place] and Functional Mobility   Environmental Safety Modification assistive device/personal items within reach;clutter free environment maintained;room near unit station;mobility aid in reach;room organization consistent   Optimize Balance and Safe Activity   Safety Promotion/Fall Prevention assistive device/personal item within reach;bed alarm set;room near unit station;side rails raised x 2;instructed to call staff for mobility

## 2019-04-15 NOTE — PHYSICIAN QUERY
"PT Name: Joyce Riley  MR #: [id number]    Physician Query Form - Heart  Condition Clarification     CDS/: Vida Weldon               Contact information:[email or web address]  This form is a permanent document in the medical record.     Query Date: April 15, 2019    By submitting this query, we are merely seeking further clarification of documentation. Please utilize your independent clinical judgment when addressing the question(s) below.    The medical record contains the following   Indicators     Supporting Clinical Findings Location in Medical Record   x BNP [id number] Lab 4-10    EF     x Radiology findings CXR w/ pulmonary edema   HM pn 4-11    Echo Results      "Ascites" documented      "SOB" or "AGUILAR" documented      "Hypoxia" documented     x Heart Failure documented Chronic diastolic heart failure   Acute on chronic diastolic congestive heart failure    H&P    "Edema" documented     x Diuretics/Meds IV Furosemide Mar 4-10 to 4-15    Treatment:      Other:      Heart failure (HF) can be acute, chronic or both. It is generally further specificed as systolic, diastolic, or combined. Lastly, it is important to identify an underlying etiology if known or suspected.     Common clues to acute exacerbation:  Rapidly progressive symptoms (w/in 2 weeks of presentation), using IV diuretics to treat, using supplemental O2, pulmonary edema on Xray, MI w/in 4 weeks, and/or BNP >500    Systolic Heart Failure: is defined as chart documentation of a left ventricular ejection fraction (LVEF) less than 40%     Diastolic Heart Failure: is defined as a left ventricular ejection fraction (LVEF) greater than 40%   +      Evidence of diastolic dysfunction on echocardiography OR    Right heart catheterization wedge pressure above 12 mm Hg OR    Left heart catheterization left ventricular end diastolic pressure 18 mm Hg or above.    References: *American Heart Association    The clinical guidelines " noted below are only system guidelines, and do not replace the providers clinical judgment.     Provider, please specify the diagnosis associated with above clinical findings    Provider please specify the conflicting CHF diagnosis.        [ x  ] Acute on Chronic Diastolic Heart Failure -    Pre-existing diastoic HF diagnosis.  EF > 40%  and acute HF symptoms documented                                 [   ] Chronic Diastolic Heart Failure - Pre-existing diastolic HF diagnosis.  EF > 40%  without  acute HF symptoms documented  [   ] Other Type of Heart Failure (please specify type): _________________________  [   ] Other (please specify): ___________________________________  [   ] Clinically Undetermined                          Please document in your progress notes daily for the duration of treatment until resolved and include in your discharge summary.

## 2019-04-15 NOTE — SUBJECTIVE & OBJECTIVE
Interval History:   No events overnight. Reports increased compliance with BiPAP. Had a good day today; no dyspneic episodes. Enjoying dinner with her family now. Denies SOB. No edema. Remains on lasix 40mg IV BID.     Review of patient's allergies indicates:  No Known Allergies  Current Facility-Administered Medications   Medication Frequency    acetaminophen tablet 500 mg Q6H PRN    apixaban tablet 2.5 mg BID    atorvastatin tablet 40 mg Daily    calcitRIOL capsule 0.25 mcg Daily    cloNIDine tablet 0.1 mg TID PRN    dextrose 50% injection 12.5 g PRN    dextrose 50% injection 25 g PRN    epoetin diana injection 10,000 Units Q7 Days    famotidine tablet 20 mg Daily    furosemide injection 40 mg BID    glucagon (human recombinant) injection 1 mg PRN    glucose chewable tablet 16 g PRN    glucose chewable tablet 24 g PRN    insulin aspart U-100 pen 0-5 Units PRN    insulin aspart U-100 pen 3 Units TIDWM    insulin detemir U-100 pen 5 Units QHS    metOLazone tablet 5 mg Daily    NIFEdipine 24 hr tablet 30 mg Daily    ondansetron injection 8 mg Q8H PRN    promethazine (PHENERGAN) 6.25 mg in dextrose 5 % 50 mL IVPB Q6H PRN    ramelteon tablet 8 mg Nightly PRN    senna-docusate 8.6-50 mg per tablet 1 tablet BID PRN       Objective:     Vital Signs (Most Recent):  Temp: 98.9 °F (37.2 °C) (04/13/19 2340)  Pulse: 71 (04/13/19 2340)  Resp: 18 (04/13/19 2340)  BP: (!) 147/68 (04/13/19 2340)  SpO2: 96 % (04/13/19 2340)  O2 Device (Oxygen Therapy): nasal cannula (04/13/19 2340) Vital Signs (24h Range):  Temp:  [98.9 °F (37.2 °C)] 98.9 °F (37.2 °C)  Pulse:  [71] 71  Resp:  [18] 18  SpO2:  [96 %] 96 %  BP: (147)/(68) 147/68     Weight: 83.8 kg (184 lb 11.9 oz) (04/13/19 0601)  Body mass index is 31.71 kg/m².  Body surface area is 1.95 meters squared.    I/O last 3 completed shifts:  In: 50 [P.O.:50]  Out: 1552 [Urine:1550; Stool:2]    Physical Exam   Constitutional: She appears well-developed and  well-nourished. No distress.   HENT:   Head: Normocephalic and atraumatic.   Mouth/Throat: Oropharynx is clear and moist and mucous membranes are normal.   Eyes: Conjunctivae and EOM are normal.   Cardiovascular: Normal rate and regular rhythm.   Pulmonary/Chest: Effort normal and breath sounds normal. She has no rales.   Abdominal: Soft. She exhibits no distension.   Musculoskeletal: She exhibits no edema or deformity.   Skin: Skin is warm and dry. She is not diaphoretic.       Significant Labs:  CBC:   Recent Labs   Lab 04/12/19  0830   WBC 7.91   RBC 2.56*   HGB 7.5*   HCT 24.4*      MCV 95   MCH 29.3   MCHC 30.7*     CMP:   Recent Labs   Lab 04/11/19  0415  04/14/19  1819   *   < > 98   CALCIUM 10.0   < > 9.7   ALBUMIN 2.8*  --   --    PROT 6.4  --   --       < > 139   K 5.2*   < > 4.4   CO2 30*   < > 32*      < > 98   BUN 46*   < > 51*   CREATININE 3.1*   < > 3.2*   ALKPHOS 130  --   --    ALT 16  --   --    AST 14  --   --    BILITOT 1.1*  --   --     < > = values in this interval not displayed.     All labs within the past 24 hours have been reviewed.     Significant Imaging:  Labs: Reviewed

## 2019-04-15 NOTE — NURSING
Pt resting in bed no distress noted; complaint of pain 4/10 pain scale prn tylenol given; scheduled and prn meds given w/out difficulty; accu checks monitor; tele monitor #[id number]; IV saline lock; [place] in placed; safety measures maintained call light w/in reach will cont to monitor

## 2019-04-15 NOTE — SUBJECTIVE & OBJECTIVE
Interval History:   Currently on lasix 40mg IV BID and metolazone 5mg daily. No events overnight. Reports she wore her BiPAP all night. SOB improved. Feels much better today. Asking for grapes. No edema. Family at bedside. Family concerned about her serum glucose of 50 this morning. The asked about stopping SSI; will defer to primary team.     Review of patient's allergies indicates:  No Known Allergies  Current Facility-Administered Medications   Medication Frequency    acetaminophen tablet 500 mg Q6H PRN    apixaban tablet 2.5 mg BID    atorvastatin tablet 40 mg Daily    [START ON 4/16/2019] calcitRIOL capsule 0.25 mcg Every other day    cloNIDine tablet 0.1 mg TID PRN    dextrose 50% injection 12.5 g PRN    dextrose 50% injection 25 g PRN    epoetin diana injection 10,000 Units Q7 Days    famotidine tablet 20 mg Daily    furosemide tablet 40 mg BID    glucagon (human recombinant) injection 1 mg PRN    glucose chewable tablet 16 g PRN    glucose chewable tablet 24 g PRN    insulin aspart U-100 pen 0-5 Units PRN    insulin aspart U-100 pen 3 Units TIDWM    insulin detemir U-100 pen 5 Units QHS    [START ON 4/16/2019] metOLazone tablet 2.5 mg Daily    NIFEdipine 24 hr tablet 30 mg Daily    ondansetron injection 8 mg Q8H PRN    promethazine (PHENERGAN) 6.25 mg in dextrose 5 % 50 mL IVPB Q6H PRN    ramelteon tablet 8 mg Nightly PRN    senna-docusate 8.6-50 mg per tablet 1 tablet BID PRN       Objective:     Vital Signs (Most Recent):  Temp: 98.5 °F (36.9 °C) (04/15/19 1119)  Pulse: 85 (04/15/19 1119)  Resp: 18 (04/15/19 1119)  BP: (!) 163/70 (04/15/19 1119)  SpO2: (!) 90 % (04/15/19 1119)  O2 Device (Oxygen Therapy): nasal cannula w/ humidification (04/15/19 0816) Vital Signs (24h Range):  Temp:  [97.6 °F (36.4 °C)-98.5 °F (36.9 °C)] 98.5 °F (36.9 °C)  Pulse:  [68-88] 85  Resp:  [17-20] 18  SpO2:  [90 %-98 %] 90 %  BP: (140-169)/(65-74) 163/70     Weight: 81.8 kg (180 lb 5.4 oz) (04/14/19  0400)  Body mass index is 30.95 kg/m².  Body surface area is 1.92 meters squared.    I/O last 3 completed shifts:  In: 360 [P.O.:360]  Out: 850 [Urine:850]    Physical Exam   Constitutional: She appears well-developed and well-nourished. No distress.   HENT:   Head: Normocephalic and atraumatic.   Mouth/Throat: Oropharynx is clear and moist and mucous membranes are normal.   Eyes: Conjunctivae and EOM are normal.   Cardiovascular: Normal rate and regular rhythm.   Pulmonary/Chest: Effort normal and breath sounds normal. She has no wheezes.   Dry crackles   Abdominal: Soft. She exhibits no distension.   Musculoskeletal: She exhibits no edema or deformity.   Skin: Skin is warm and dry. She is not diaphoretic.   Psychiatric: She has a normal mood and affect. Her behavior is normal.       Significant Labs:  CBC:   Recent Labs   Lab 04/15/19  0451   WBC 4.32   RBC 2.66*   HGB 7.7*   HCT 25.3*      MCV 95   MCH 28.9   MCHC 30.4*     CMP:   Recent Labs   Lab 04/11/19  0415  04/15/19  0451   *   < > 50*   CALCIUM 10.0   < > 9.0   ALBUMIN 2.8*   < > 2.4*   PROT 6.4  --   --       < > 141   K 5.2*   < > 4.5   CO2 30*   < > 36*      < > 98   BUN 46*   < > 51*   CREATININE 3.1*   < > 3.2*   ALKPHOS 130  --   --    ALT 16  --   --    AST 14  --   --    BILITOT 1.1*  --   --     < > = values in this interval not displayed.     All labs within the past 24 hours have been reviewed.     Significant Imaging:  Labs: Reviewed

## 2019-04-15 NOTE — NURSING
Pt resting in bed w/eyes closed no distress noted; pt complaint of nausea prn Zofran given; pt placed on BiPap; safety measures maintained will cont to monitor

## 2019-04-15 NOTE — PROGRESS NOTES
[facility] Hospital Medicine  Progress Note    Patient Name: Joyce Riley  MRN: [id number]  Patient Class: IP- Inpatient   Admission Date: 4/10/2019  Length of Stay: 5 days  Attending Physician: Adrianna Rockwell MD  Primary Care Provider: Kalpana Staton MD        Subjective:     Principal Problem:Acute on chronic diastolic congestive heart failure    HPI:  Mrs. Joyce Riley is a 85 y.o. female [facility] resident with renal hypertension, type 2 diabetes mellitus (HbA1c 6.6% Apr 2019), hyperlipidemia (LDL unknown), chronic diastolic heart failure (LVEF 80% Apr 2019), CKD stage 5, paroxysmal atrial fibrillation ([id number] score 5) on chronic anticoagulation, anemia of renal disease, obesity (BMI 35.2), and GERD who presents to [facility] ED with complaints of dyspnea for one day.  She was recently discharged from [facility] after initiating hemodialysis.  She underwent a few sessions and was noted to poorly tolerate it.  She was discontinue from hemodialysis and encouraged hospice.  The family was not ready for that so she was transferred to [place] for rehabilitation yesterday.  One of her sons was there to visit with her and reports that she was doing well.  He was concerned that the nursing home was about to serve her fried chicken and mashed potatoes with gravy.  About 10 min after he left to go home, he was called by the nursing home to reports that she was short of breath.  EMS was activated and upon their arrival found that her oxygen saturation on ambient air was 77%.  She denies any chest pain, palpitations, nausea, vomiting, nor any abdominal pain.  She does report lower extremity edema. Further history is otherwise limited at this time.    Hospital Course:  Ms. Riley has recently been admitted to [facility], and now our facility for similar presentations. She has CHF and progressive CKD. At [facility] she was not felt to be a candidate for dialysis  and hospice was recommended. The patient was transferred to [facility] for a second opinion. She underwent three dialysis sessions but was not thought to be a candidate for long term dialysis, and again, hospice was recommended. The family wanted the patient discharged to [facility] hoping that she would improve. The day following discharge, she presented to our ER for shortness of breath and found to have edema on CXR and exam consistent with volume overload.   She was breifly on BiPAP in the ER but was soon doing better on NC. A third nephrology group was consulted, and Dr. Baca assessed the patient. Palliative care was also consulted. She was started on IV Lasix and then metolazone in attempt to diurese with medication. Fluid restriction placed. Extensive family meeting with five of her children on 4/12/2019. The patient's goals of care and risks of dialysis were discussed. The family was not sure at that time whether they would want longterm HD given the impact on quality of life. Continued to diurese and monitor for response. She had good output with increase in diuretics. Her volume status improved significantly. She does not appear to need dialysis at this time. She was transitioned to a PO regimen with Lasix 40mg BID and metolazone 2.5mg daily on 4/15/2019. Monitor overnight on PO diuretics. DC back to [facility] on 4/16/19 if she continues to do well. She will follow up with Dr. Baca in clinic on Wed 4/24/2019.     Interval history:  Good UOP on diuretics. Transition to PO.    Review of Systems   Constitutional: Negative for chills and fever.   Respiratory: Negative for chest tightness, shortness of breath and wheezing.    Cardiovascular: Negative for chest pain and leg swelling.   Gastrointestinal: Negative for abdominal pain, constipation and vomiting.   Genitourinary: Negative for dysuria and hematuria.   Neurological: Negative for seizures and syncope.     Objective:     Vital Signs (Most  Recent):  Temp: 98.5 °F (36.9 °C) (04/15/19 1119)  Pulse: 85 (04/15/19 1119)  Resp: 18 (04/15/19 1119)  BP: (!) 163/70 (04/15/19 1119)  SpO2: (!) 90 % (04/15/19 1119) Vital Signs (24h Range):  Temp:  [97.6 °F (36.4 °C)-98.5 °F (36.9 °C)] 98.5 °F (36.9 °C)  Pulse:  [68-88] 85  Resp:  [17-20] 18  SpO2:  [90 %-98 %] 90 %  BP: (140-169)/(65-74) 163/70     Weight: 81.8 kg (180 lb 5.4 oz)  Body mass index is 30.95 kg/m².    Physical Exam   Constitutional: She is oriented to person, place, and time. She appears well-developed and well-nourished. No distress.   HENT:   Right Ear: External ear normal.   Left Ear: External ear normal.   Nose: Nose normal.   Mouth/Throat: Oropharynx is clear and moist.   Eyes: Right eye exhibits no discharge. Left eye exhibits no discharge.   Neck: Normal range of motion.   Cardiovascular: Normal rate and normal heart sounds. Exam reveals no gallop and no friction rub.   No murmur heard.  Pulmonary/Chest: No respiratory distress. She has no wheezes. She has no rales.   NC in place.   Abdominal: Soft. Bowel sounds are normal. She exhibits no distension. There is no tenderness.   Morbidly obese   Musculoskeletal: Normal range of motion. She exhibits no edema.   Neurological: She is alert and oriented to person, place, and time.   Skin: Skin is warm and dry. She is not diaphoretic. No erythema.   Nursing note and vitals reviewed.          Significant Labs: All pertinent labs within the past 24 hours have been reviewed.      Assessment/Plan:      * Acute on chronic diastolic congestive heart failure  Patient was just recently discharged from [facility] the day prior to presentation and was found to be dyspneic at the nursing home with hypoxia.  She was transferred to this facility where her initial ambient air oxygen saturation was 77%.  She was placed on NIPPV with BPAP with improvement of her oxygen saturation.  CXR w/ pulmonary edema. She was only mildly hypertensive so unlikely hypertensive  emergency.  An ABG was obtained which was revealing for 7.302  61.0  44  30.2 on BPAP 10  5  40%.  She did have a elevated BNP of 796 which is higher than her previous measurement.  She also has pulmonary hypertension with a EPAP of 74 mmHg.  Started intravenous diuresis with careful monitoring of her intake/output.  Of note, she is DNR. Fluid restriction added 4/12/2019 and diuretics increased with a significant improvement in negative fluid balance. Appeared euvolemic and transitioned to PO regimen 4/15/2019.    Acute respiratory failure with hypoxia and hypercapnia  As addressed above. Improved and now stable on NC.    CKD stage 5  Her renal function appears to be near baseline; will continue to monitor urine output. Diurese with Lasix. Appreciate nephrology recs. She is a poor candidate for HD due to her age, pulmonary hypertension, and multiple other comorbidities. Goals of care discussed with family. Would prefer medical management over HD at this time. May do best with management of diuretics with Palliative care, but she will follow up with nephrology for now. Diet compliance as well as medication compliance discussed at length with the patient and her children.    GERD (gastroesophageal reflux disease)  Will continue home regimen of famotidine.    Obesity, Class II, BMI 35-39.9  The patient has been counseled on the negative impact that obesity imparts on her health and was encouraged to make lifestyle changes in order to lose weight and decrease her modifiable risk factors.    Chronic anticoagulation  See PAF.    Paroxysmal atrial fibrillation  Patient is currently in sinus rhythm; will continue her home regimen of apixaban.    Chronic diastolic heart failure  As addressed above.    Hyperlipidemia  Will continue her home regimen of atorvastatin.    Renal hypertension  Patient's blood pressure is fairly-controlled; will continue home regimen of furosemide and nifedipine, and provide as-needed  clonidine.    Anemia of renal disease  The patient's H/H is stable and consistent with previous laboratory measurements, and the patient exhibits no signs or symptoms of acute bleeding; there is no indication for transfusion.  Will continue to monitor.    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     Type 2 diabetes mellitus, controlled, with renal complications  Well-controlled on a home regimen of basal insulin therapy; will provide basal-prandial insulin along with insulin sliding scale. Adjusting insulin as needed.      VTE Risk Mitigation (From admission, onward)        Ordered     apixaban tablet 2.5 mg  2 times daily      04/10/19 1934     IP VTE HIGH RISK PATIENT  Once      04/10/19 1934     Place SANTOS hose  Until discontinued      04/10/19 1815              Adrianna Rockwell MD  Department of Hospital Medicine   [facility]

## 2019-04-15 NOTE — PROGRESS NOTES
[facility]  Nephrology  Progress Note    Patient Name: Joyce Riley  MRN: [id number]  Admission Date: 4/10/2019  Hospital Length of Stay: 4 days  Attending Provider: Adrianna Rockwell MD   Primary Care Physician: Kalpana Staton MD  Principal Problem:Acute on chronic diastolic congestive heart failure    Subjective:     HPI: Following for ROXANNE on CKD    Interval History:   No events overnight. Reports increased compliance with BiPAP. Had a good day today; no dyspneic episodes. Enjoying dinner with her family now. Denies SOB. No edema. Remains on lasix 40mg IV BID.     Review of patient's allergies indicates:  No Known Allergies  Current Facility-Administered Medications   Medication Frequency    acetaminophen tablet 500 mg Q6H PRN    apixaban tablet 2.5 mg BID    atorvastatin tablet 40 mg Daily    calcitRIOL capsule 0.25 mcg Daily    cloNIDine tablet 0.1 mg TID PRN    dextrose 50% injection 12.5 g PRN    dextrose 50% injection 25 g PRN    epoetin diana injection 10,000 Units Q7 Days    famotidine tablet 20 mg Daily    furosemide injection 40 mg BID    glucagon (human recombinant) injection 1 mg PRN    glucose chewable tablet 16 g PRN    glucose chewable tablet 24 g PRN    insulin aspart U-100 pen 0-5 Units PRN    insulin aspart U-100 pen 3 Units TIDWM    insulin detemir U-100 pen 5 Units QHS    metOLazone tablet 5 mg Daily    NIFEdipine 24 hr tablet 30 mg Daily    ondansetron injection 8 mg Q8H PRN    promethazine (PHENERGAN) 6.25 mg in dextrose 5 % 50 mL IVPB Q6H PRN    ramelteon tablet 8 mg Nightly PRN    senna-docusate 8.6-50 mg per tablet 1 tablet BID PRN       Objective:     Vital Signs (Most Recent):  Temp: 98.9 °F (37.2 °C) (04/13/19 2340)  Pulse: 71 (04/13/19 2340)  Resp: 18 (04/13/19 2340)  BP: (!) 147/68 (04/13/19 2340)  SpO2: 96 % (04/13/19 2340)  O2 Device (Oxygen Therapy): nasal cannula (04/13/19 2340) Vital Signs (24h Range):  Temp:  [98.9 °F (37.2 °C)]  98.9 °F (37.2 °C)  Pulse:  [71] 71  Resp:  [18] 18  SpO2:  [96 %] 96 %  BP: (147)/(68) 147/68     Weight: 83.8 kg (184 lb 11.9 oz) (04/13/19 0601)  Body mass index is 31.71 kg/m².  Body surface area is 1.95 meters squared.    I/O last 3 completed shifts:  In: 50 [P.O.:50]  Out: 1552 [Urine:1550; Stool:2]    Physical Exam   Constitutional: She appears well-developed and well-nourished. No distress.   HENT:   Head: Normocephalic and atraumatic.   Mouth/Throat: Oropharynx is clear and moist and mucous membranes are normal.   Eyes: Conjunctivae and EOM are normal.   Cardiovascular: Normal rate and regular rhythm.   Pulmonary/Chest: Effort normal and breath sounds normal. She has no rales.   Abdominal: Soft. She exhibits no distension.   Musculoskeletal: She exhibits no edema or deformity.   Skin: Skin is warm and dry. She is not diaphoretic.       Significant Labs:  CBC:   Recent Labs   Lab 04/12/19  0830   WBC 7.91   RBC 2.56*   HGB 7.5*   HCT 24.4*      MCV 95   MCH 29.3   MCHC 30.7*     CMP:   Recent Labs   Lab 04/11/19  0415  04/14/19  1819   *   < > 98   CALCIUM 10.0   < > 9.7   ALBUMIN 2.8*  --   --    PROT 6.4  --   --       < > 139   K 5.2*   < > 4.4   CO2 30*   < > 32*      < > 98   BUN 46*   < > 51*   CREATININE 3.1*   < > 3.2*   ALKPHOS 130  --   --    ALT 16  --   --    AST 14  --   --    BILITOT 1.1*  --   --     < > = values in this interval not displayed.     All labs within the past 24 hours have been reviewed.     Significant Imaging:  Labs: Reviewed    Assessment/Plan:      ROXANNE on CKD stage 5  - renal function stable yesterday; mild bump in sCr today however patient appears improved  - continue current diuretic regimen; see below  - no need for RRT at this time  - if renal function remains stable tomorrow, will consider transitioning to oral diuretic regimen   - daily labs; strict I/Os  - renally dose medications. Avoid nephrotoxic agents     Hyperkalemia   -  resolved    Hypomagnesemia  - Mg 1.3 today  - ordered mag sulf 2g IV x1  - will trend     Metabolic alkalosis  - 2/2 diuresis  - unchanged from yesterday  - continue lasix 40mg IV BID; will possibly wean to PO regimen tomorrow  - continue metolazone  - will reassess volume status daily     Anemia of CKD  - continue weekly AHSAN  - transfuse for hgb < 7   - will check CBC tomorrow.     Case discussed with Dr. Rockwell.   Thank you for your consult. I will follow-up with patient. Please contact us if you have any additional questions.     Ina Orr MD  Nephrology  [facility], [facility]  Office: [phone]  [facility]    Date of service: 04/14/2019

## 2019-04-15 NOTE — SUBJECTIVE & OBJECTIVE
Interval history:  Good UOP on diuretics. Transition to PO.    Review of Systems   Constitutional: Negative for chills and fever.   Respiratory: Negative for chest tightness, shortness of breath and wheezing.    Cardiovascular: Negative for chest pain and leg swelling.   Gastrointestinal: Negative for abdominal pain, constipation and vomiting.   Genitourinary: Negative for dysuria and hematuria.   Neurological: Negative for seizures and syncope.     Objective:     Vital Signs (Most Recent):  Temp: 98.5 °F (36.9 °C) (04/15/19 1119)  Pulse: 85 (04/15/19 1119)  Resp: 18 (04/15/19 1119)  BP: (!) 163/70 (04/15/19 1119)  SpO2: (!) 90 % (04/15/19 1119) Vital Signs (24h Range):  Temp:  [97.6 °F (36.4 °C)-98.5 °F (36.9 °C)] 98.5 °F (36.9 °C)  Pulse:  [68-88] 85  Resp:  [17-20] 18  SpO2:  [90 %-98 %] 90 %  BP: (140-169)/(65-74) 163/70     Weight: 81.8 kg (180 lb 5.4 oz)  Body mass index is 30.95 kg/m².    Physical Exam   Constitutional: She is oriented to person, place, and time. She appears well-developed and well-nourished. No distress.   HENT:   Right Ear: External ear normal.   Left Ear: External ear normal.   Nose: Nose normal.   Mouth/Throat: Oropharynx is clear and moist.   Eyes: Right eye exhibits no discharge. Left eye exhibits no discharge.   Neck: Normal range of motion.   Cardiovascular: Normal rate and normal heart sounds. Exam reveals no gallop and no friction rub.   No murmur heard.  Pulmonary/Chest: No respiratory distress. She has no wheezes. She has no rales.   NC in place.   Abdominal: Soft. Bowel sounds are normal. She exhibits no distension. There is no tenderness.   Morbidly obese   Musculoskeletal: Normal range of motion. She exhibits no edema.   Neurological: She is alert and oriented to person, place, and time.   Skin: Skin is warm and dry. She is not diaphoretic. No erythema.   Nursing note and vitals reviewed.          Significant Labs: All pertinent labs within the past 24 hours have been  reviewed.

## 2019-04-15 NOTE — NURSING
Pt resting in bed no distress noted no complaint of pain; Bipap on pt; safety measures maintained will cont to monitor

## 2019-04-15 NOTE — NURSING
Received report from KETAN Briceno. Patient lying in bed, BI-PAP in place, NAD noted, safety precautions maintained, will monitor.

## 2019-04-15 NOTE — PLAN OF CARE
04/15/19 1007   Discharge Reassessment   Assessment Type Discharge Planning Reassessment   Provided patient/caregiver education on the expected discharge date and the discharge plan Yes   Do you have any problems affording any of your prescribed medications? No   Discharge Plan A Skilled Nursing Facility   Discharge Plan B Return to Nursing Home   DME Needed Upon Discharge  none   Patient choice form signed by patient/caregiver N/A   Anticipated Discharge Disposition SNF   Can the patient answer the patient profile reliably? Yes, cognitively intact   How does the patient rate their overall health at the present time? Fair   Describe the patient's ability to walk at the present time. Major restrictions/daily assistance from another person   How often would a person be available to care for the patient? Whenever needed   Number of comorbid conditions (as recorded on the chart) Five or more   During the past month, has the patient often been bothered by feeling down, depressed or hopeless? No   During the past month, has the patient often been bothered by little interest or pleasure in doing things? No   Post-Acute Status   Post-Acute Authorization Placement   Post-Acute Placement Status Awaiting Internal Medical Clearance   Discharge Delays None     [facility] faxed 3 day report, labs, MAR, nephrology notes, palliative care notes, and PT/OT notes to [facility] via [facility].

## 2019-04-15 NOTE — ASSESSMENT & PLAN NOTE
Her renal function appears to be near baseline; will continue to monitor urine output. Diurese with Lasix. Appreciate nephrology recs. She is a poor candidate for HD due to her age, pulmonary hypertension, and multiple other comorbidities. Goals of care discussed with family. Would prefer medical management over HD at this time. May do best with management of diuretics with Palliative care, but she will follow up with nephrology for now. Diet compliance as well as medication compliance discussed at length with the patient and her children.

## 2019-04-15 NOTE — PLAN OF CARE
[facility]  [address] [address]    Facility Transfer Orders                        04/16/2019    Admit to: SNF    Diagnoses:  Active Hospital Problems    Diagnosis  POA    *Acute on chronic diastolic congestive heart failure [I50.33]  Yes     Priority: 1 - High    Acute respiratory failure with hypoxia and hypercapnia [J96.01, J96.02]  Yes     Priority: 2     CKD stage 5 [N18.5]  Yes     Priority: 4      Chronic    Renal hypertension [I12.9]  Yes     Chronic    Hyperlipidemia [E78.5]  Yes     Chronic    Chronic diastolic heart failure [I50.32]  Yes     Chronic    Paroxysmal atrial fibrillation [I48.0]  Yes     Chronic    Chronic anticoagulation [Z79.01]  Not Applicable     Chronic    Obesity, Class II, BMI 35-39.9 [E66.9]  Yes     Chronic    GERD (gastroesophageal reflux disease) [K21.9]  Yes     Chronic    Anemia of renal disease [D63.1]  Yes     Chronic    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 [I27.23]  Yes     Chronic    Type 2 diabetes mellitus, controlled, 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]  Yes     Chronic      Resolved Hospital Problems    Diagnosis Date Resolved POA    Hyperkalemia [E87.5] 04/15/2019 Yes       Allergies:Review of patient's allergies indicates:  No Known Allergies    Vitals: Every shift (Skilled Nursing patients)    Diet: Low sodium, heart healthy, renal diet    Activity:     - Up in a chair each morning as tolerated   - Ambulate with assistance to bathroom   - Turn Q2H     Nursing Precautions:     - Aspiration precautions:             - Total assistance with meals            -  Upright 90 degrees befor during and after meals             -  Suction at bedside          - Fall precautions   - Seizure precaution   - Decubitus precautions:        -  for positioning   - Pressure reducing foam mattress   - Turn patient every two hours. Use wedge pillows to anchor patient    Oxygen: O2 at 4L per NC--titrate for sats>88%    CONSULTS:       PT to evaluate and treat - five times a week     OT to evaluate and treat - five times a week    Palliative Care consult    DIABETES CARE:      Check blood sugar:      Fingerstick blood sugar AC and HS   Fingerstick blood sugar every 6 hours if unable to eat      Report CBG < 60 or > 400 to physician.                                          Insulin Sliding Scale          Glucose  Novolog Insulin Subcutaneous        0 - 60   Orange juice or glucose tablet, hold insulin      No insulin   201-250  2 units   251-300  4 units   301-350  6 units   351-400  8 units   >400   10 units then call physician      Medications: Discontinue all previous medication orders, if any. See new list below.     Joyce Riley   Home Medication Instructions WILLA:[id number]    Printed on:04/17/19 1024   Medication Information                      acetaminophen (TYLENOL) 325 MG tablet  Take 2 tablets (650 mg total) by mouth every 4 (four) hours as needed for pain, headache or temp>100             albuterol-ipratropium (DUO-NEB) 2.5 mg-0.5 mg/3 mL nebulizer solution  Take 3 mLs by nebulization every 4 (four) hours as needed for Wheezing             apixaban (ELIQUIS) 2.5 mg Tab  Take 2.5 mg by mouth 2 (two) times daily.             atorvastatin (LIPITOR) 40 MG tablet  Take 1 tablet (40 mg total) by mouth once daily.             calcitRIOL (ROCALTROL) 0.25 MCG Cap  Take 1 capsule (0.25 mcg total) by mouth every other day.             carvedilol (COREG) 3.125 MG tablet  Take 1 tablet (3.125 mg total) by mouth 2 (two) times daily.             ergocalciferol (ERGOCALCIFEROL) 50,000 unit Cap  Take 1 capsule (50,000 Units total) by mouth every 7 days. Start on 4/22.             famotidine (PEPCID) 20 MG tablet  Take 1 tablet (20 mg total) by mouth once daily.             furosemide (LASIX) 40 MG tablet  Take 1 tablet (40 mg total) by mouth once daily.                        insulin degludec (TRESIBA FLEXTOUCH U-100) 100 unit/mL (3  mL) InPn  Inject 5 Units into the skin every evening.             metOLazone (ZAROXOLYN) 2.5 MG tablet  Take 1 tablet (2.5 mg total) by mouth once daily.             NIFEdipine (PROCARDIA-XL) 60 MG (OSM) 24 hr tablet  Take 1 tablet (60 mg total) by mouth once daily.             polyethylene glycol (GLYCOLAX) 17 gram PwPk  Take 17 g by mouth 2 (two) times daily as needed.             senna-docusate 8.6-50 mg (PERICOLACE) 8.6-50 mg per tablet  Take 1 tablet by mouth 2 (two) times daily.               _________________________________  Adrianna Rockwell MD  04/15/2019

## 2019-04-15 NOTE — UM SECONDARY REVIEW
[facility]    PA - Medical Necessity Issue  Hospital Course:  Ms. Riley has recently been admitted to [facility], [facility], and now our facility for similar presentations. She has CHF and progressive CKD. At [facility] she was not felt to be a candidate for dialysis and hospice was recommended. The patient was transferred to [facility] for a second opinion. She underwent three dialysis sessions but was not thought to be a candidate for long term dialysis, and again, hospice was recommended. The family wanted the patient discharged to SNF hoping that she would improve. The day following discharge, she presented to our ER for shortness of breath and found to have edema on CXR and exam consistent with volume overload.   She was breifly on BiPAP in the ER but was soon doing better on NC. A third nephrology group was consulted. Palliative care was again consulted. Attempting to diurese with medication. Fluid restriction placed. Extensive family meeting with five of her children on 4/12/2019. The patient's goals of care and risks of dialysis were discussed. The family was not sure at that time whether they would want longterm HD given the impact on quality of life. Continue to diurese and monitor for response. Appreciate nephrology recs.       Palliative care consulted, but family not ready for Hospice yet, so will return to Nursing Home where she resides  Plan today is to change Diuretics from IV to PO and if stable will discharge tomorrow    {[facility] CM Review Outcome:02526}

## 2019-04-16 LAB
ALBUMIN SERPL BCP-MCNC: 2.7 G/DL (ref 3.5–5.2)
ANION GAP SERPL CALC-SCNC: 17 MMOL/L (ref 8–16)
BUN SERPL-MCNC: 57 MG/DL (ref 8–23)
CALCIUM SERPL-MCNC: 9.5 MG/DL (ref 8.7–10.5)
CHLORIDE SERPL-SCNC: 99 MMOL/L (ref 95–110)
CO2 SERPL-SCNC: 26 MMOL/L (ref 23–29)
CREAT SERPL-MCNC: 3.2 MG/DL (ref 0.5–1.4)
EST. GFR  (AFRICAN AMERICAN): 15 ML/MIN/1.73 M^2
EST. GFR  (NON AFRICAN AMERICAN): 13 ML/MIN/1.73 M^2
GLUCOSE SERPL-MCNC: 136 MG/DL (ref 70–110)
MAGNESIUM SERPL-MCNC: 1.6 MG/DL (ref 1.6–2.6)
PHOSPHATE SERPL-MCNC: 3.8 MG/DL (ref 2.7–4.5)
POCT GLUCOSE: 138 MG/DL (ref 70–110)
POCT GLUCOSE: 142 MG/DL (ref 70–110)
POCT GLUCOSE: 148 MG/DL (ref 70–110)
POCT GLUCOSE: 201 MG/DL (ref 70–110)
POCT GLUCOSE: 243 MG/DL (ref 70–110)
POTASSIUM SERPL-SCNC: 5.2 MMOL/L (ref 3.5–5.1)
SODIUM SERPL-SCNC: 142 MMOL/L (ref 136–145)

## 2019-04-16 PROCEDURE — 80069 RENAL FUNCTION PANEL: CPT

## 2019-04-16 PROCEDURE — 83735 ASSAY OF MAGNESIUM: CPT

## 2019-04-16 PROCEDURE — 25000003 PHARM REV CODE 250: Performed by: HOSPITALIST

## 2019-04-16 PROCEDURE — 27000221 HC OXYGEN, UP TO 24 HOURS

## 2019-04-16 PROCEDURE — 97110 THERAPEUTIC EXERCISES: CPT

## 2019-04-16 PROCEDURE — 97116 GAIT TRAINING THERAPY: CPT

## 2019-04-16 PROCEDURE — 94761 N-INVAS EAR/PLS OXIMETRY MLT: CPT

## 2019-04-16 PROCEDURE — 94660 CPAP INITIATION&MGMT: CPT

## 2019-04-16 PROCEDURE — 36415 COLL VENOUS BLD VENIPUNCTURE: CPT

## 2019-04-16 PROCEDURE — 25000003 PHARM REV CODE 250: Performed by: INTERNAL MEDICINE

## 2019-04-16 PROCEDURE — 63600175 PHARM REV CODE 636 W HCPCS: Performed by: INTERNAL MEDICINE

## 2019-04-16 PROCEDURE — 99900035 HC TECH TIME PER 15 MIN (STAT)

## 2019-04-16 PROCEDURE — 21400001 HC TELEMETRY ROOM

## 2019-04-16 RX ORDER — POLYETHYLENE GLYCOL 3350 17 G/17G
17 POWDER, FOR SOLUTION ORAL 2 TIMES DAILY PRN
Status: DISCONTINUED | OUTPATIENT
Start: 2019-04-16 | End: 2019-04-17 | Stop reason: HOSPADM

## 2019-04-16 RX ORDER — FUROSEMIDE 40 MG/1
40 TABLET ORAL DAILY
Status: DISCONTINUED | OUTPATIENT
Start: 2019-04-17 | End: 2019-04-17 | Stop reason: HOSPADM

## 2019-04-16 RX ORDER — NIFEDIPINE 30 MG/1
60 TABLET, EXTENDED RELEASE ORAL DAILY
Status: DISCONTINUED | OUTPATIENT
Start: 2019-04-16 | End: 2019-04-17 | Stop reason: HOSPADM

## 2019-04-16 RX ORDER — CLONIDINE HYDROCHLORIDE 0.1 MG/1
0.2 TABLET ORAL 3 TIMES DAILY PRN
Status: DISCONTINUED | OUTPATIENT
Start: 2019-04-16 | End: 2019-04-17 | Stop reason: HOSPADM

## 2019-04-16 RX ADMIN — NIFEDIPINE 60 MG: 30 TABLET, FILM COATED, EXTENDED RELEASE ORAL at 09:04

## 2019-04-16 RX ADMIN — INSULIN ASPART 2 UNITS: 100 INJECTION, SOLUTION INTRAVENOUS; SUBCUTANEOUS at 04:04

## 2019-04-16 RX ADMIN — APIXABAN 2.5 MG: 2.5 TABLET, FILM COATED ORAL at 08:04

## 2019-04-16 RX ADMIN — POLYETHYLENE GLYCOL 3350 17 G: 17 POWDER, FOR SOLUTION ORAL at 06:04

## 2019-04-16 RX ADMIN — FUROSEMIDE 40 MG: 40 TABLET ORAL at 08:04

## 2019-04-16 RX ADMIN — CLONIDINE HYDROCHLORIDE 0.2 MG: 0.1 TABLET ORAL at 08:04

## 2019-04-16 RX ADMIN — METOLAZONE 2.5 MG: 2.5 TABLET ORAL at 09:04

## 2019-04-16 RX ADMIN — INSULIN ASPART 3 UNITS: 100 INJECTION, SOLUTION INTRAVENOUS; SUBCUTANEOUS at 12:04

## 2019-04-16 RX ADMIN — ACETAMINOPHEN 500 MG: 500 TABLET, FILM COATED ORAL at 08:04

## 2019-04-16 RX ADMIN — INSULIN ASPART 3 UNITS: 100 INJECTION, SOLUTION INTRAVENOUS; SUBCUTANEOUS at 04:04

## 2019-04-16 RX ADMIN — ATORVASTATIN CALCIUM 40 MG: 40 TABLET, FILM COATED ORAL at 08:04

## 2019-04-16 RX ADMIN — FAMOTIDINE 20 MG: 20 TABLET ORAL at 08:04

## 2019-04-16 RX ADMIN — INSULIN ASPART 3 UNITS: 100 INJECTION, SOLUTION INTRAVENOUS; SUBCUTANEOUS at 08:04

## 2019-04-16 RX ADMIN — CALCITRIOL 0.25 MCG: 0.25 CAPSULE ORAL at 08:04

## 2019-04-16 RX ADMIN — CLONIDINE HYDROCHLORIDE 0.1 MG: 0.1 TABLET ORAL at 04:04

## 2019-04-16 RX ADMIN — DOCUSATE SODIUM AND SENNOSIDES 1 TABLET: 8.6; 5 TABLET, FILM COATED ORAL at 04:04

## 2019-04-16 NOTE — PLAN OF CARE
Patient in and out of other hospitals for similar admits.  From [facility], [place] spoke to Alix at [facility], update that pt is on 5l/nc and plans to wean down.     04/16/19 5942   Post-Acute Status   Post-Acute Authorization Placement  (return to [facility])   Post-Acute Placement Status Awaiting Internal Medical Clearance   Discharge Delays (!) Other  (Will need to get oxygen down from 5l/nc)

## 2019-04-16 NOTE — PLAN OF CARE
Problem: Occupational Therapy Goal  Goal: Occupational Therapy Goal  Goals to be met by: 04/26/19    Patient will increase functional independence with ADLs by performing:    Feeding with Modified [place].  UE Dressing with Set-up Assistance.  LE Dressing with Moderate Assistance.  Grooming while seated with Set-up Assistance. Goal met 04/16/19. Upgrade to Mod I  Toileting from bedside commode with Moderate Assistance for hygiene and clothing management.   Sitting at edge of bed x20 minutes with Supervision.  Rolling to Bilateral with Stand-by Assistance.   Supine to sit with Stand-by Assistance.  Step transfer with Moderate Assistance  Toilet transfer to bedside commode with Moderate Assistance.  Upper extremity exercise program x15 reps per handout, with assistance as needed.     Outcome: Ongoing (interventions implemented as appropriate)  Pt will continue to benefit from continued OT services. OT rec. SNF at d/c.

## 2019-04-16 NOTE — PROGRESS NOTES
[facility]  Nephrology  Progress Note    Patient Name: Joyce Riley  MRN: [id number]  Admission Date: 4/10/2019  Hospital Length of Stay: 6 days  Attending Provider: Rodrigo Orosco MD   Primary Care Physician: Kalpana Staton MD  Principal Problem:Acute on chronic diastolic congestive heart failure    Subjective:     HPI: Following for ROXANNE on CKD    Interval History:   Lasix weaned from IV to PO yesterday and metolazone dose decreased. No events overnight. UOP 8x yesterday. Patient doing well this afternoon. Reports she will be going to SNF tomorrow. She reports respiratory status is stable. No edema. Doesn't feel dry; no muscle cramps. Good appetite.     Review of patient's allergies indicates:  No Known Allergies  Current Facility-Administered Medications   Medication Frequency    acetaminophen tablet 500 mg Q6H PRN    apixaban tablet 2.5 mg BID    atorvastatin tablet 40 mg Daily    calcitRIOL capsule 0.25 mcg Every other day    cloNIDine tablet 0.2 mg TID PRN    dextrose 50% injection 12.5 g PRN    dextrose 50% injection 25 g PRN    epoetin diana injection 10,000 Units Q7 Days    famotidine tablet 20 mg Daily    [START ON 4/17/2019] furosemide tablet 40 mg Daily    glucagon (human recombinant) injection 1 mg PRN    glucose chewable tablet 16 g PRN    glucose chewable tablet 24 g PRN    insulin aspart U-100 pen 0-5 Units PRN    insulin aspart U-100 pen 3 Units TIDWM    metOLazone tablet 2.5 mg Daily    NIFEdipine 24 hr tablet 60 mg Daily    ondansetron injection 8 mg Q8H PRN    promethazine (PHENERGAN) 6.25 mg in dextrose 5 % 50 mL IVPB Q6H PRN    ramelteon tablet 8 mg Nightly PRN    senna-docusate 8.6-50 mg per tablet 1 tablet BID PRN       Objective:     Vital Signs (Most Recent):  Temp: 97.9 °F (36.6 °C) (04/16/19 1118)  Pulse: 79 (04/16/19 1118)  Resp: 18 (04/16/19 1118)  BP: (!) 154/58 (04/16/19 1139)  SpO2: (!) 90 % (04/16/19 1118)  O2 Device (Oxygen  Therapy): nasal cannula (04/16/19 0906) Vital Signs (24h Range):  Temp:  [97.9 °F (36.6 °C)-99.6 °F (37.6 °C)] 97.9 °F (36.6 °C)  Pulse:  [] 79  Resp:  [18-20] 18  SpO2:  [90 %-96 %] 90 %  BP: (152-179)/(58-81) 154/58     Weight: 78.1 kg (172 lb 2.9 oz) (04/16/19 0435)  Body mass index is 29.55 kg/m².  Body surface area is 1.88 meters squared.    I/O last 3 completed shifts:  In: 600 [P.O.:600]  Out: -     Physical Exam   Constitutional: She appears well-developed and well-nourished. No distress.   HENT:   Head: Normocephalic and atraumatic.   Mouth/Throat: Oropharynx is clear and moist and mucous membranes are normal.   Eyes: Conjunctivae and EOM are normal.   Cardiovascular: Normal rate and regular rhythm.   Pulmonary/Chest: Effort normal and breath sounds normal. She has no wheezes. She has no rales.   Abdominal: Soft. She exhibits no distension.   Musculoskeletal: She exhibits no edema or deformity.   Skin: Skin is warm and dry. She is not diaphoretic.   Psychiatric: She has a normal mood and affect. Her behavior is normal.       Significant Labs:  CBC:   Recent Labs   Lab 04/15/19  0451   WBC 4.32   RBC 2.66*   HGB 7.7*   HCT 25.3*      MCV 95   MCH 28.9   MCHC 30.4*     CMP:   Recent Labs   Lab 04/11/19  0415  04/16/19  0512   *   < > 136*   CALCIUM 10.0   < > 9.5   ALBUMIN 2.8*   < > 2.7*   PROT 6.4  --   --       < > 142   K 5.2*   < > 5.2*   CO2 30*   < > 26      < > 99   BUN 46*   < > 57*   CREATININE 3.1*   < > 3.2*   ALKPHOS 130  --   --    ALT 16  --   --    AST 14  --   --    BILITOT 1.1*  --   --     < > = values in this interval not displayed.     All labs within the past 24 hours have been reviewed.     Significant Imaging:  Labs: Reviewed    Assessment/Plan:     ROXANNE on CKD stage 5  - sCr stable at 3.2 with GFR 15% x 3 days; technically CKD stage 4  - mild rise in BUN, K, Na, and albumin level today concerning for mild volume depletion. Interesting that serum bicarb  is finally normal; but BiPAP compliance is increasing  - will decrease lasix to 40mg once daily. Continue metolazone 2.5mg daily  - will continue to trend closely and adjust daily while inpatient  - will arrange for close f/u with Dr. Baca upon discharge  - continue daily labs; strict I/Os  - renally dose medications. Avoid nephrotoxic agents      Hypomagnesemia  - improved s/p replacement  - continue to trend     Metabolic alkalosis  - likely 2/2 combination of contraction alkalosis and compensation from respiratory acidosis  - improved today  - adjusting diuretics as above  - encourage compliance with BiPAP     Anemia of CKD  - continue weekly AHSAN  - transfuse for hgb < 7     CKD-MBD  - mild hypercalcemia: decreased calcitriol to 0.25mcg QOD on 4/15  - continue to trend CCa and phos levels daily     Isolated systolic HTN  - adequately controlled; keep DBP > 60 to ensure renal perfusion     Thank you for your consult. I will follow-up with patient. Please contact us if you have any additional questions.     Ina Orr MD  Nephrology  [facility], [facility]  Office: [phone]  [facility]    Date of service: 04/16/2019

## 2019-04-16 NOTE — SUBJECTIVE & OBJECTIVE
Interval History: Feeling better, but remains on 5L oxygen.    Review of Systems   HENT: Negative for ear discharge and ear pain.    Eyes: Negative for pain and itching.   Endocrine: Negative for polyphagia and polyuria.   Neurological: Negative for seizures and syncope.     Objective:     Vital Signs (Most Recent):  Temp: 97.9 °F (36.6 °C) (04/16/19 1118)  Pulse: 79 (04/16/19 1118)  Resp: 18 (04/16/19 1118)  BP: (!) 154/58 (04/16/19 1139)  SpO2: (!) 90 % (04/16/19 1118) Vital Signs (24h Range):  Temp:  [97.9 °F (36.6 °C)-99.6 °F (37.6 °C)] 97.9 °F (36.6 °C)  Pulse:  [] 79  Resp:  [18-20] 18  SpO2:  [90 %-96 %] 90 %  BP: (152-179)/(58-81) 154/58     Weight: 78.1 kg (172 lb 2.9 oz)  Body mass index is 29.55 kg/m².    Intake/Output Summary (Last 24 hours) at 4/16/2019 1504  Last data filed at 4/16/2019 1420  Gross per 24 hour   Intake 795 ml   Output --   Net 795 ml      Physical Exam   Constitutional: She is oriented to person, place, and time. She appears well-developed and well-nourished. No distress.   HENT:   Right Ear: External ear normal.   Left Ear: External ear normal.   Nose: Nose normal.   Mouth/Throat: Oropharynx is clear and moist.   Eyes: Right eye exhibits no discharge. Left eye exhibits no discharge.   Neck: Normal range of motion.   Cardiovascular: Normal rate and normal heart sounds. Exam reveals no gallop and no friction rub.   No murmur heard.  Pulmonary/Chest: No respiratory distress. She has no wheezes. She has no rales.   NC in place.   Abdominal: Soft. Bowel sounds are normal. She exhibits no distension. There is no tenderness.   Morbidly obese   Musculoskeletal: Normal range of motion. She exhibits no edema.   Neurological: She is alert and oriented to person, place, and time.   Skin: Skin is warm and dry. She is not diaphoretic. No erythema.   Nursing note and vitals reviewed.      Significant Labs:   BMP:   Recent Labs   Lab 04/16/19  0512   *      K 5.2*   CL 99   CO2 26    BUN 57*   CREATININE 3.2*   CALCIUM 9.5   MG 1.6     CBC:   Recent Labs   Lab 04/15/19  0451   WBC 4.32   HGB 7.7*   HCT 25.3*

## 2019-04-16 NOTE — PROGRESS NOTES
[facility] Hospital Medicine  Progress Note    Patient Name: Joyce Riley  MRN: [id number]  Patient Class: IP- Inpatient   Admission Date: 4/10/2019  Length of Stay: 6 days  Attending Physician: Rodrigo Orosco MD  Primary Care Provider: Kalpana Staton MD        Subjective:     Principal Problem:Acute on chronic diastolic congestive heart failure    HPI:  Mrs. Joyce Riley is a 85 y.o. female [facility] resident with renal hypertension, type 2 diabetes mellitus (HbA1c 6.6% Apr 2019), hyperlipidemia (LDL unknown), chronic diastolic heart failure (LVEF 80% Apr 2019), CKD stage 5, paroxysmal atrial fibrillation ([id number]-KCEy score 5) on chronic anticoagulation, anemia of renal disease, obesity (BMI 35.2), and GERD who presents to [facility] ED with complaints of dyspnea for one day.  She was recently discharged from [facility] after initiating hemodialysis.  She underwent a few sessions and was noted to poorly tolerate it.  She was discontinue from hemodialysis and encouraged hospice.  The family was not ready for that so she was transferred to [place] for rehabilitation yesterday.  One of her sons was there to visit with her and reports that she was doing well.  He was concerned that the nursing home was about to serve her fried chicken and mashed potatoes with gravy.  About 10 min after he left to go home, he was called by the nursing home to reports that she was short of breath.  EMS was activated and upon their arrival found that her oxygen saturation on ambient air was 77%.  She denies any chest pain, palpitations, nausea, vomiting, nor any abdominal pain.  She does report lower extremity edema. Further history is otherwise limited at this time.    Hospital Course:  Ms. Riley has recently been admitted to [facility], [facility], and now our facility for similar presentations. She has CHF and progressive CKD. At [facility] she was not felt to be a candidate for dialysis and  hospice was recommended. The patient was transferred to [facility] for a second opinion. She underwent three dialysis sessions but was not thought to be a candidate for long term dialysis, and again, hospice was recommended. The family wanted the patient discharged to [facility] hoping that she would improve. The day following discharge, she presented to our ER for shortness of breath and found to have edema on CXR and exam consistent with volume overload.   She was breifly on BiPAP in the ER but was soon doing better on NC. A third nephrology group was consulted, and Dr. Baca assessed the patient. Palliative care was also consulted. She was started on IV Lasix and then metolazone in attempt to diurese with medication. Fluid restriction placed. Extensive family meeting with five of her children on 4/12/2019. The patient's goals of care and risks of dialysis were discussed. The family was not sure at that time whether they would want longterm HD given the impact on quality of life. Continued to diurese and monitor for response. She had good output with increase in diuretics. Her volume status improved significantly. She does not appear to need dialysis at this time. She was transitioned to a PO regimen with Lasix 40mg BID and metolazone 2.5mg daily on 4/15/2019. Monitor overnight on PO diuretics. DC back to [facility] on 4/16/19 if she continues to do well. She will follow up with Dr. Baca in clinic on Wed 4/24/2019. Patient remains on 5L oxygen per NC.    Interval History: Feeling better, but remains on 5L oxygen.    Review of Systems   HENT: Negative for ear discharge and ear pain.    Eyes: Negative for pain and itching.   Endocrine: Negative for polyphagia and polyuria.   Neurological: Negative for seizures and syncope.     Objective:     Vital Signs (Most Recent):  Temp: 97.9 °F (36.6 °C) (04/16/19 1118)  Pulse: 79 (04/16/19 1118)  Resp: 18 (04/16/19 1118)  BP: (!) 154/58 (04/16/19 1139)  SpO2: (!) 90 %  (04/16/19 1118) Vital Signs (24h Range):  Temp:  [97.9 °F (36.6 °C)-99.6 °F (37.6 °C)] 97.9 °F (36.6 °C)  Pulse:  [] 79  Resp:  [18-20] 18  SpO2:  [90 %-96 %] 90 %  BP: (152-179)/(58-81) 154/58     Weight: 78.1 kg (172 lb 2.9 oz)  Body mass index is 29.55 kg/m².    Intake/Output Summary (Last 24 hours) at 4/16/2019 1504  Last data filed at 4/16/2019 1420  Gross per 24 hour   Intake 795 ml   Output --   Net 795 ml      Physical Exam   Constitutional: She is oriented to person, place, and time. She appears well-developed and well-nourished. No distress.   HENT:   Right Ear: External ear normal.   Left Ear: External ear normal.   Nose: Nose normal.   Mouth/Throat: Oropharynx is clear and moist.   Eyes: Right eye exhibits no discharge. Left eye exhibits no discharge.   Neck: Normal range of motion.   Cardiovascular: Normal rate and normal heart sounds. Exam reveals no gallop and no friction rub.   No murmur heard.  Pulmonary/Chest: No respiratory distress. She has no wheezes. She has no rales.   NC in place.   Abdominal: Soft. Bowel sounds are normal. She exhibits no distension. There is no tenderness.   Morbidly obese   Musculoskeletal: Normal range of motion. She exhibits no edema.   Neurological: She is alert and oriented to person, place, and time.   Skin: Skin is warm and dry. She is not diaphoretic. No erythema.   Nursing note and vitals reviewed.      Significant Labs:   BMP:   Recent Labs   Lab 04/16/19  0512   *      K 5.2*   CL 99   CO2 26   BUN 57*   CREATININE 3.2*   CALCIUM 9.5   MG 1.6     CBC:   Recent Labs   Lab 04/15/19  0451   WBC 4.32   HGB 7.7*   HCT 25.3*        Assessment/Plan:      * Acute on chronic diastolic congestive heart failure  Patient was just recently discharged from [facility] the day prior to presentation and was found to be dyspneic at the nursing home with hypoxia.  She was transferred to this facility where her initial ambient air oxygen saturation was  77%.  She was placed on NIPPV with BPAP with improvement of her oxygen saturation.  CXR w/ pulmonary edema. She was only mildly hypertensive so unlikely hypertensive emergency.  An ABG was obtained which was revealing for 7.302  61.0  44  30.2 on BPAP 10  5  40%.  She did have a elevated BNP of 796 which is higher than her previous measurement.  She also has pulmonary hypertension with a EPAP of 74 mmHg.  Started intravenous diuresis with careful monitoring of her intake/output.  Of note, she is DNR. Fluid restriction added 4/12/2019 and diuretics increased with a significant improvement in negative fluid balance. Appeared euvolemic and transitioned to PO regimen 4/15/2019.  Doing better, but remains on 5L oxygen.  Will try to wean down as much as possible and SNF soon.    GERD (gastroesophageal reflux disease)  Will continue home regimen of famotidine.    Obesity, Class II, BMI 35-39.9  The patient has been counseled on the negative impact that obesity imparts on her health and was encouraged to make lifestyle changes in order to lose weight and decrease her modifiable risk factors.    Chronic anticoagulation  See PAF.    Paroxysmal atrial fibrillation  Patient is currently in sinus rhythm; will continue her home regimen of apixaban.    Chronic diastolic heart failure  As addressed above.    Hyperlipidemia  Will continue her home regimen of atorvastatin.    Renal hypertension  Patient's blood pressure is fairly-controlled; will continue home regimen of furosemide and nifedipine, and provide as-needed clonidine.    Acute respiratory failure with hypoxia and hypercapnia  As addressed above. Improved and now stable on NC.    Anemia of renal disease  The patient's H/H is stable and consistent with previous laboratory measurements, and the patient exhibits no signs or symptoms of acute bleeding; there is no indication for transfusion.  Will continue to monitor.    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  PAP 74  making her preload dependent and high risk for HD.     CKD stage 5  Her renal function appears to be near baseline; will continue to monitor urine output. Diurese with Lasix. Appreciate nephrology recs. She is a poor candidate for HD due to her age, pulmonary hypertension, and multiple other comorbidities. Goals of care discussed with family. Would prefer medical management over HD at this time. May do best with management of diuretics with Palliative care, but she will follow up with nephrology for now. Diet compliance as well as medication compliance discussed at length with the patient and her children.    Type 2 diabetes mellitus, controlled, with renal complications  Well-controlled on a home regimen of basal insulin therapy; will provide basal-prandial insulin along with insulin sliding scale. Adjusting insulin as needed.      VTE Risk Mitigation (From admission, onward)        Ordered     apixaban tablet 2.5 mg  2 times daily      04/10/19 1934     IP VTE HIGH RISK PATIENT  Once      04/10/19 1934     Place SANTOS hose  Until discontinued      04/10/19 1815              Rodrigo Orosco MD  Department of Hospital Medicine   [facility]

## 2019-04-16 NOTE — PT/OT/SLP PROGRESS
Physical Therapy Treatment    Patient Name:  Joyce Riley   MRN:  [id number]    Recommendations:     Discharge Recommendations:  nursing facility, skilled   Discharge Equipment Recommendations: commode   Barriers to discharge: None    Assessment:     Joyce Riley is a 85 y.o. female admitted with a medical diagnosis of Acute on chronic diastolic congestive heart failure.  She presents with the following impairments/functional limitations:  weakness, impaired functional mobilty, decreased safety awareness, impaired endurance, gait instability, decreased coordination, impaired balance, decreased upper extremity function, decreased lower extremity function, impaired self care skills, decreased ROM, impaired skin, impaired cardiopulmonary response to activity .    Rehab Prognosis: Fair; patient would benefit from acute skilled PT services to address these deficits and reach maximum level of function.    Recent Surgery: * No surgery found *      Plan:     During this hospitalization, patient to be seen 3 x/week to address the identified rehab impairments via gait training, therapeutic activities, therapeutic exercises and progress toward the following goals:    · Plan of Care Expires:  04/19/19    Subjective     Chief Complaint: SOB, fatigue  Patient/Family Comments/goals: SNF  Pain/Comfort:  · Pain Rating 1: 0/10      Objective:     Communicated with nsg prior to session.  Patient found HOB elevated with bed alarm, oxygen, telemetry upon PT entry to room.     General Precautions: Standard, fall   Orthopedic Precautions:N/A   Braces: N/A     Functional Mobility:  · Bed Mobility:     · Scooting: moderate assistance and of 2 persons  · Supine to Sit: moderate assistance and of 2 persons  · Sit to Supine: moderate assistance and of 2 persons  · Transfers:     · Sit to Stand:  moderate assistance and of 2 persons with rolling walker and and VC for hand placement/safety x 2 trials  · Balance: Fair+ sit, Fair  stand      AM-PAC 6 CLICK MOBILITY  Turning over in bed (including adjusting bedclothes, sheets and blankets)?: 3  Sitting down on and standing up from a chair with arms (e.g., wheelchair, bedside commode, etc.): 2  Moving from lying on back to sitting on the side of the bed?: 3  Moving to and from a bed to a chair (including a wheelchair)?: 2  Need to walk in hospital room?: 1  Climbing 3-5 steps with a railing?: 1  Basic Mobility Total Score: 12       Therapeutic Activities and Exercises:   Pt sat atEOB and performed B FAQ's x 10 reps.  T/f training as above.  Pt able to stand with Min/Mod A and Vc for distribution of weight through UE's to walker approx 30sec x 2 trials.  Pt performed B AP's and TKE's and GS x 10 rpes     Patient left HOB elevated with all lines intact, call button in reach, bed alarm on, nsg notified and son present..    GOALS:   Multidisciplinary Problems     Physical Therapy Goals        Problem: Physical Therapy Goal    Goal Priority Disciplines Outcome Goal Variances Interventions   Physical Therapy Goal     PT, PT/OT Ongoing (interventions implemented as appropriate)     Description:  Goals to be met by: 2019     Patient will increase functional independence with mobility by performin. Supine to sit with Stand-by Assistance  2. Bed to chair transfer with Minimal Assistance    3. Wheelchair propulsion x150 feet with Supervision   4. Lower extremity exercise program x10 reps per handout, with supervision                      Time Tracking:     PT Received On: 19  PT Start Time: 1002     PT Stop Time: 1027  PT Total Time (min): 25 min     Billable Minutes: Therapeutic Activity 13 OT present    Treatment Type: Treatment  PT/PTA: PT     PTA Visit Number: 0     Anastacia Clemente, PT  2019

## 2019-04-16 NOTE — SUBJECTIVE & OBJECTIVE
Interval History:   Lasix weaned from IV to PO yesterday and metolazone dose decreased. No events overnight. UOP 8x yesterday. Patient doing well this afternoon. Reports she will be going to SNF tomorrow. She reports respiratory status is stable. No edema. Doesn't feel dry; no muscle cramps. Good appetite.     Review of patient's allergies indicates:  No Known Allergies  Current Facility-Administered Medications   Medication Frequency    acetaminophen tablet 500 mg Q6H PRN    apixaban tablet 2.5 mg BID    atorvastatin tablet 40 mg Daily    calcitRIOL capsule 0.25 mcg Every other day    cloNIDine tablet 0.2 mg TID PRN    dextrose 50% injection 12.5 g PRN    dextrose 50% injection 25 g PRN    epoetin diana injection 10,000 Units Q7 Days    famotidine tablet 20 mg Daily    [START ON 4/17/2019] furosemide tablet 40 mg Daily    glucagon (human recombinant) injection 1 mg PRN    glucose chewable tablet 16 g PRN    glucose chewable tablet 24 g PRN    insulin aspart U-100 pen 0-5 Units PRN    insulin aspart U-100 pen 3 Units TIDWM    metOLazone tablet 2.5 mg Daily    NIFEdipine 24 hr tablet 60 mg Daily    ondansetron injection 8 mg Q8H PRN    promethazine (PHENERGAN) 6.25 mg in dextrose 5 % 50 mL IVPB Q6H PRN    ramelteon tablet 8 mg Nightly PRN    senna-docusate 8.6-50 mg per tablet 1 tablet BID PRN       Objective:     Vital Signs (Most Recent):  Temp: 97.9 °F (36.6 °C) (04/16/19 1118)  Pulse: 79 (04/16/19 1118)  Resp: 18 (04/16/19 1118)  BP: (!) 154/58 (04/16/19 1139)  SpO2: (!) 90 % (04/16/19 1118)  O2 Device (Oxygen Therapy): nasal cannula (04/16/19 0460) Vital Signs (24h Range):  Temp:  [97.9 °F (36.6 °C)-99.6 °F (37.6 °C)] 97.9 °F (36.6 °C)  Pulse:  [] 79  Resp:  [18-20] 18  SpO2:  [90 %-96 %] 90 %  BP: (152-179)/(58-81) 154/58     Weight: 78.1 kg (172 lb 2.9 oz) (04/16/19 0435)  Body mass index is 29.55 kg/m².  Body surface area is 1.88 meters squared.    I/O last 3 completed shifts:  In:  600 [P.O.:600]  Out: -     Physical Exam   Constitutional: She appears well-developed and well-nourished. No distress.   HENT:   Head: Normocephalic and atraumatic.   Mouth/Throat: Oropharynx is clear and moist and mucous membranes are normal.   Eyes: Conjunctivae and EOM are normal.   Cardiovascular: Normal rate and regular rhythm.   Pulmonary/Chest: Effort normal and breath sounds normal. She has no wheezes. She has no rales.   Abdominal: Soft. She exhibits no distension.   Musculoskeletal: She exhibits no edema or deformity.   Skin: Skin is warm and dry. She is not diaphoretic.   Psychiatric: She has a normal mood and affect. Her behavior is normal.       Significant Labs:  CBC:   Recent Labs   Lab 04/15/19  0451   WBC 4.32   RBC 2.66*   HGB 7.7*   HCT 25.3*      MCV 95   MCH 28.9   MCHC 30.4*     CMP:   Recent Labs   Lab 04/11/19  0415  04/16/19  0512   *   < > 136*   CALCIUM 10.0   < > 9.5   ALBUMIN 2.8*   < > 2.7*   PROT 6.4  --   --       < > 142   K 5.2*   < > 5.2*   CO2 30*   < > 26      < > 99   BUN 46*   < > 57*   CREATININE 3.1*   < > 3.2*   ALKPHOS 130  --   --    ALT 16  --   --    AST 14  --   --    BILITOT 1.1*  --   --     < > = values in this interval not displayed.     All labs within the past 24 hours have been reviewed.     Significant Imaging:  Labs: Reviewed

## 2019-04-16 NOTE — PT/OT/SLP PROGRESS
Occupational Therapy   Treatment    Name: Joyce Riley  MRN: [id number]  Admitting Diagnosis:  Acute on chronic diastolic congestive heart failure       Recommendations:     Discharge Recommendations: nursing facility, skilled  Discharge Equipment Recommendations:  commode  Barriers to discharge:  None    Assessment:     Joyce Riley is a 85 y.o. female with a medical diagnosis of Acute on chronic diastolic congestive heart failure.  She presents with increased weakness d/t limited OOB activities. Pt was limited with bed mobility and therapeutic exercises d/t SOB and weakness. Pt required increased time for rest breaks and to implement pursed lip breathing. Performance deficits affecting function are weakness, impaired functional mobilty, impaired balance, decreased upper extremity function, decreased safety awareness, impaired cardiopulmonary response to activity, impaired endurance, impaired self care skills, gait instability, decreased lower extremity function, decreased ROM, impaired skin.     Rehab Prognosis:  Fair; patient would benefit from acute skilled OT services to address these deficits and reach maximum level of function.       Plan:     Patient to be seen 3 x/week to address the above listed problems via self-care/home management, therapeutic activities, therapeutic exercises  · Plan of Care Expires: 04/26/19  · Plan of Care Reviewed with: patient    Subjective     Pain/Comfort:  · Pain Rating 1: 0/10    Objective:     Communicated with: nurseHarriett, prior to session.  Patient found HOB elevated with bed alarm, oxygen, telemetry upon OT entry to room.    General Precautions: Standard, fall   Orthopedic Precautions:N/A   Braces: N/A     Occupational Performance:     Bed Mobility:    · Patient completed Rolling/Turning to Left with  stand by assistance  · Patient completed Rolling/Turning to Right with stand by assistance  · Patient completed Scooting/Bridging to EOB with mod A x2 and  hand held A with increased fear of sliding off the bed; scooting at EOB to the HOB with mod A x2.  · Patient completed Supine to Sit with stand by assistance  · Patient completed Sit to Supine with minimum assistance     Functional Mobility/Transfers:  · Patient completed Sit <> Stand Transfer with moderate assistance and of 2 persons  with  rolling walker - 2 attempts  · Functional Mobility: Pt stood 2x for ~ 30 seconds each with mod verbal cueing to push UE down on the RW, as she tended to lift the RW. Pt required mod verbal cueing while standing to implement pursed lip breathing. Pt required increased rest break in between stands d/t weakness.     Activities of Daily Living:  · Feeding:  stand by assistance    · Upper Body Dressing: minimum assistance with hospital gown       [facility] 6 Click ADL: 13    Treatment & Education:  Pt consented to treatment and son was present. Pt completed 10 reps of elbow flexion/extension exercises at EOB. Pt required mod tactile and verbal cueing to complete 10 shoulder flexion/extension exercises at EOB. D/t fatigue, pt requested use of pillow at her side to lean on while completing UE exercises, performing one UE at a time. Pt required mod verbal cues for encouragement to stand at EOB using RW with mod Ax2.      Patient left HOB elevated with all lines intact, call button in reach, bed alarm on and nurseHarriett, notifiedEducation:      GOALS:   Multidisciplinary Problems     Occupational Therapy Goals        Problem: Occupational Therapy Goal    Goal Priority Disciplines Outcome Interventions   Occupational Therapy Goal     OT, PT/OT Ongoing (interventions implemented as appropriate)    Description:  Goals to be met by: 04/26/19    Patient will increase functional independence with ADLs by performing:    Feeding with Modified [place].  UE Dressing with Set-up Assistance.  LE Dressing with Moderate Assistance.  Grooming while seated with Set-up Assistance. Goal met 04/16/19.  Upgrade to Mod I  Toileting from bedside commode with Moderate Assistance for hygiene and clothing management.   Sitting at edge of bed x20 minutes with Supervision.  Rolling to Bilateral with Stand-by Assistance.   Supine to sit with Stand-by Assistance.  Step transfer with Moderate Assistance  Toilet transfer to bedside commode with Moderate Assistance.  Upper extremity exercise program x15 reps per handout, with assistance as needed.                       Time Tracking:     OT Date of Treatment: 04/16/19  OT Start Time: 1003  OT Stop Time: 1028  OT Total Time (min): 25 min    Billable Minutes:Therapeutic Exercise 12 min   Total Time 25 min (co-treat with PT)     Laurita Valencia OT  4/16/2019

## 2019-04-16 NOTE — PLAN OF CARE
Problem: Physical Therapy Goal  Goal: Physical Therapy Goal  Goals to be met by: 2019     Patient will increase functional independence with mobility by performin. Supine to sit with Stand-by Assistance  2. Bed to chair transfer with Minimal Assistance    3. Wheelchair propulsion x150 feet with Supervision   4. Lower extremity exercise program x10 reps per handout, with supervision     Outcome: Ongoing (interventions implemented as appropriate)  Pt pleasant and cooperative, but limited by SOB and poor endurance.  Continue with POC

## 2019-04-17 VITALS
OXYGEN SATURATION: 92 % | DIASTOLIC BLOOD PRESSURE: 58 MMHG | RESPIRATION RATE: 18 BRPM | SYSTOLIC BLOOD PRESSURE: 172 MMHG | HEART RATE: 85 BPM | BODY MASS INDEX: 28.46 KG/M2 | WEIGHT: 166.69 LBS | HEIGHT: 64 IN | TEMPERATURE: 97 F

## 2019-04-17 PROBLEM — J96.01 ACUTE RESPIRATORY FAILURE WITH HYPOXIA AND HYPERCAPNIA: Status: RESOLVED | Noted: 2019-04-10 | Resolved: 2019-04-17

## 2019-04-17 PROBLEM — J96.02 ACUTE RESPIRATORY FAILURE WITH HYPOXIA AND HYPERCAPNIA: Status: RESOLVED | Noted: 2019-04-10 | Resolved: 2019-04-17

## 2019-04-17 PROBLEM — I50.33 ACUTE ON CHRONIC DIASTOLIC CONGESTIVE HEART FAILURE: Status: RESOLVED | Noted: 2019-04-10 | Resolved: 2019-04-17

## 2019-04-17 LAB
ALBUMIN SERPL BCP-MCNC: 2.8 G/DL (ref 3.5–5.2)
ANION GAP SERPL CALC-SCNC: 10 MMOL/L (ref 8–16)
BASOPHILS # BLD AUTO: 0.03 K/UL (ref 0–0.2)
BASOPHILS NFR BLD: 0.6 % (ref 0–1.9)
BUN SERPL-MCNC: 51 MG/DL (ref 8–23)
CALCIUM SERPL-MCNC: 9.6 MG/DL (ref 8.7–10.5)
CHLORIDE SERPL-SCNC: 96 MMOL/L (ref 95–110)
CO2 SERPL-SCNC: 34 MMOL/L (ref 23–29)
CREAT SERPL-MCNC: 3.2 MG/DL (ref 0.5–1.4)
DIFFERENTIAL METHOD: ABNORMAL
EOSINOPHIL # BLD AUTO: 0.1 K/UL (ref 0–0.5)
EOSINOPHIL NFR BLD: 2.1 % (ref 0–8)
ERYTHROCYTE [DISTWIDTH] IN BLOOD BY AUTOMATED COUNT: 17.6 % (ref 11.5–14.5)
EST. GFR  (AFRICAN AMERICAN): 15 ML/MIN/1.73 M^2
EST. GFR  (NON AFRICAN AMERICAN): 13 ML/MIN/1.73 M^2
GLUCOSE SERPL-MCNC: 184 MG/DL (ref 70–110)
HCT VFR BLD AUTO: 26 % (ref 37–48.5)
HGB BLD-MCNC: 7.8 G/DL (ref 12–16)
LYMPHOCYTES # BLD AUTO: 0.9 K/UL (ref 1–4.8)
LYMPHOCYTES NFR BLD: 18.5 % (ref 18–48)
MCH RBC QN AUTO: 28.4 PG (ref 27–31)
MCHC RBC AUTO-ENTMCNC: 30 G/DL (ref 32–36)
MCV RBC AUTO: 95 FL (ref 82–98)
MONOCYTES # BLD AUTO: 0.3 K/UL (ref 0.3–1)
MONOCYTES NFR BLD: 7 % (ref 4–15)
NEUTROPHILS # BLD AUTO: 3.4 K/UL (ref 1.8–7.7)
NEUTROPHILS NFR BLD: 71.6 % (ref 38–73)
PHOSPHATE SERPL-MCNC: 3.6 MG/DL (ref 2.7–4.5)
PLATELET # BLD AUTO: 286 K/UL (ref 150–350)
PMV BLD AUTO: 9.4 FL (ref 9.2–12.9)
POCT GLUCOSE: 145 MG/DL (ref 70–110)
POCT GLUCOSE: 201 MG/DL (ref 70–110)
POTASSIUM SERPL-SCNC: 4.2 MMOL/L (ref 3.5–5.1)
RBC # BLD AUTO: 2.75 M/UL (ref 4–5.4)
SODIUM SERPL-SCNC: 140 MMOL/L (ref 136–145)
WBC # BLD AUTO: 4.7 K/UL (ref 3.9–12.7)

## 2019-04-17 PROCEDURE — 80069 RENAL FUNCTION PANEL: CPT

## 2019-04-17 PROCEDURE — 94660 CPAP INITIATION&MGMT: CPT

## 2019-04-17 PROCEDURE — 36415 COLL VENOUS BLD VENIPUNCTURE: CPT

## 2019-04-17 PROCEDURE — 97110 THERAPEUTIC EXERCISES: CPT

## 2019-04-17 PROCEDURE — 25000003 PHARM REV CODE 250: Performed by: HOSPITALIST

## 2019-04-17 PROCEDURE — 99900035 HC TECH TIME PER 15 MIN (STAT)

## 2019-04-17 PROCEDURE — 85025 COMPLETE CBC W/AUTO DIFF WBC: CPT

## 2019-04-17 PROCEDURE — 25000003 PHARM REV CODE 250: Performed by: INTERNAL MEDICINE

## 2019-04-17 RX ORDER — POLYETHYLENE GLYCOL 3350 17 G/17G
17 POWDER, FOR SOLUTION ORAL 2 TIMES DAILY PRN
Refills: 0 | Status: ON HOLD
Start: 2019-04-17 | End: 2020-08-04 | Stop reason: HOSPADM

## 2019-04-17 RX ORDER — CARVEDILOL 3.12 MG/1
3.12 TABLET ORAL 2 TIMES DAILY
Qty: 60 TABLET | Refills: 11 | Status: ON HOLD
Start: 2019-04-17 | End: 2020-03-13 | Stop reason: HOSPADM

## 2019-04-17 RX ORDER — NIFEDIPINE 60 MG/1
60 TABLET, EXTENDED RELEASE ORAL DAILY
Qty: 30 TABLET | Refills: 11 | Status: ON HOLD
Start: 2019-04-18 | End: 2020-08-04 | Stop reason: HOSPADM

## 2019-04-17 RX ORDER — AMOXICILLIN 250 MG
1 CAPSULE ORAL 2 TIMES DAILY
Status: ON HOLD
Start: 2019-04-17 | End: 2021-06-28 | Stop reason: HOSPADM

## 2019-04-17 RX ORDER — FUROSEMIDE 40 MG/1
40 TABLET ORAL DAILY
Qty: 30 TABLET | Refills: 11 | Status: ON HOLD
Start: 2019-04-18 | End: 2019-07-22 | Stop reason: HOSPADM

## 2019-04-17 RX ADMIN — DOCUSATE SODIUM AND SENNOSIDES 1 TABLET: 8.6; 5 TABLET, FILM COATED ORAL at 10:04

## 2019-04-17 RX ADMIN — ATORVASTATIN CALCIUM 40 MG: 40 TABLET, FILM COATED ORAL at 08:04

## 2019-04-17 RX ADMIN — FAMOTIDINE 20 MG: 20 TABLET ORAL at 08:04

## 2019-04-17 RX ADMIN — CLONIDINE HYDROCHLORIDE 0.2 MG: 0.1 TABLET ORAL at 12:04

## 2019-04-17 RX ADMIN — INSULIN ASPART 2 UNITS: 100 INJECTION, SOLUTION INTRAVENOUS; SUBCUTANEOUS at 12:04

## 2019-04-17 RX ADMIN — METOLAZONE 2.5 MG: 2.5 TABLET ORAL at 08:04

## 2019-04-17 RX ADMIN — POLYETHYLENE GLYCOL 3350 17 G: 17 POWDER, FOR SOLUTION ORAL at 10:04

## 2019-04-17 RX ADMIN — INSULIN ASPART 3 UNITS: 100 INJECTION, SOLUTION INTRAVENOUS; SUBCUTANEOUS at 12:04

## 2019-04-17 RX ADMIN — INSULIN ASPART 3 UNITS: 100 INJECTION, SOLUTION INTRAVENOUS; SUBCUTANEOUS at 08:04

## 2019-04-17 RX ADMIN — APIXABAN 2.5 MG: 2.5 TABLET, FILM COATED ORAL at 08:04

## 2019-04-17 RX ADMIN — FUROSEMIDE 40 MG: 40 TABLET ORAL at 08:04

## 2019-04-17 RX ADMIN — RAMELTEON 8 MG: 8 TABLET, FILM COATED ORAL at 12:04

## 2019-04-17 RX ADMIN — NIFEDIPINE 60 MG: 30 TABLET, FILM COATED, EXTENDED RELEASE ORAL at 08:04

## 2019-04-17 NOTE — NURSING
Pt transported via w/c for d/c to [facility]. Titrated O2 to 2L NC. Given d/c paperwork to pt's son, Hafsa, and also implemented teaching of pt's care, no questions were asked. No signs of distress noted. Given report to KETAN Hatch.

## 2019-04-17 NOTE — PLAN OF CARE
Problem: Occupational Therapy Goal  Goal: Occupational Therapy Goal  Goals to be met by: 04/26/19    Patient will increase functional independence with ADLs by performing:    Feeding with Modified [place].  UE Dressing with Set-up Assistance.  LE Dressing with Moderate Assistance.  Grooming while seated with Set-up Assistance. Goal met 04/16/19. Upgrade to Mod I  Toileting from bedside commode with Moderate Assistance for hygiene and clothing management.   Sitting at edge of bed x20 minutes with Supervision.  Rolling to Bilateral with Stand-by Assistance.   Supine to sit with Stand-by Assistance. Goal met 04/17/19. Upgrade to SUP.   Step transfer with Moderate Assistance  Toilet transfer to bedside commode with Moderate Assistance.  Upper extremity exercise program x15 reps per handout, with assistance as needed.      Outcome: Ongoing (interventions implemented as appropriate)  Pt will continue to benefit from continued OT acute services while in the hospital. OT rec. SNF at d/c.

## 2019-04-17 NOTE — PROGRESS NOTES
[facility]  Nephrology  Progress Note    Patient Name: Joyce Riley  MRN: [id number]  Admission Date: 4/10/2019  Hospital Length of Stay: 7 days  Attending Provider: Rodrigo Orosco MD   Primary Care Physician: Kalpana Staton MD  Principal Problem:Acute on chronic diastolic congestive heart failure    Subjective:     HPI: Following for ROXANNE on CKD    Interval History:   Lasix decreased to once daily yesterday. No events overnight. UOP 14x yesterday. Patient complaining of constipation this morning; asking for a laxative. O2 weaned from 5L to 2L this AM. Possibly being discharged today.     Review of patient's allergies indicates:  No Known Allergies  Current Facility-Administered Medications   Medication Frequency    acetaminophen tablet 500 mg Q6H PRN    apixaban tablet 2.5 mg BID    atorvastatin tablet 40 mg Daily    calcitRIOL capsule 0.25 mcg Every other day    cloNIDine tablet 0.2 mg TID PRN    dextrose 50% injection 12.5 g PRN    dextrose 50% injection 25 g PRN    epoetin diana injection 10,000 Units Q7 Days    famotidine tablet 20 mg Daily    furosemide tablet 40 mg Daily    glucagon (human recombinant) injection 1 mg PRN    glucose chewable tablet 16 g PRN    glucose chewable tablet 24 g PRN    insulin aspart U-100 pen 0-5 Units PRN    insulin aspart U-100 pen 3 Units TIDWM    metOLazone tablet 2.5 mg Daily    NIFEdipine 24 hr tablet 60 mg Daily    ondansetron injection 8 mg Q8H PRN    polyethylene glycol packet 17 g BID PRN    promethazine (PHENERGAN) 6.25 mg in dextrose 5 % 50 mL IVPB Q6H PRN    ramelteon tablet 8 mg Nightly PRN    senna-docusate 8.6-50 mg per tablet 1 tablet BID PRN       Objective:     Vital Signs (Most Recent):  Temp: 97.2 °F (36.2 °C) (04/17/19 0716)  Pulse: 85 (04/17/19 0716)  Resp: 18 (04/17/19 0716)  BP: (!) 174/74 (04/17/19 0716)  SpO2: (!) 94 % (04/17/19 0716)  O2 Device (Oxygen Therapy): nasal cannula (04/16/19 1951) Vital Signs  (24h Range):  Temp:  [97.2 °F (36.2 °C)-99.1 °F (37.3 °C)] 97.2 °F (36.2 °C)  Pulse:  [79-96] 85  Resp:  [17-21] 18  SpO2:  [90 %-95 %] 94 %  BP: (151-176)/(58-89) 174/74     Weight: 75.6 kg (166 lb 10.7 oz) (04/17/19 0440)  Body mass index is 28.61 kg/m².  Body surface area is 1.85 meters squared.    I/O last 3 completed shifts:  In: 705 [P.O.:705]  Out: -     Physical Exam   Constitutional: She appears well-developed and well-nourished. No distress.   HENT:   Head: Normocephalic and atraumatic.   Mouth/Throat: Oropharynx is clear and moist and mucous membranes are normal.   Eyes: Conjunctivae and EOM are normal.   Cardiovascular: Normal rate and regular rhythm.   Pulmonary/Chest: Effort normal and breath sounds normal. She has no wheezes. She has no rales.   Abdominal: Soft. She exhibits no distension.   Musculoskeletal: She exhibits no edema or deformity.   Skin: Skin is warm and dry. She is not diaphoretic.   Psychiatric: She has a normal mood and affect. Her behavior is normal.       Significant Labs:  CBC:   Recent Labs   Lab 04/17/19  0612   WBC 4.70   RBC 2.75*   HGB 7.8*   HCT 26.0*      MCV 95   MCH 28.4   MCHC 30.0*     CMP:   Recent Labs   Lab 04/11/19  0415  04/16/19  0512   *   < > 136*   CALCIUM 10.0   < > 9.5   ALBUMIN 2.8*   < > 2.7*   PROT 6.4  --   --       < > 142   K 5.2*   < > 5.2*   CO2 30*   < > 26      < > 99   BUN 46*   < > 57*   CREATININE 3.1*   < > 3.2*   ALKPHOS 130  --   --    ALT 16  --   --    AST 14  --   --    BILITOT 1.1*  --   --     < > = values in this interval not displayed.     All labs within the past 24 hours have been reviewed.     Significant Imaging:  Labs: Reviewed    Assessment/Plan:     ROXANNE on CKD stage 5  - sCr stable at 3.2 with GFR 15% x 3 days; technically CKD stage 4  - lasix decreased from 40mg BID to once daily yesterday  - RFP not collected this AM; placed STAT order now. Will f/u  - tentatively planning to continue lasix 40mg daily  and metolazone 2.5mg daily upon discharge  - will arrange for f/u with Dr. Baca on 4/24/19  - continue daily labs; strict I/Os  - renally dose medications. Avoid nephrotoxic agents      Hypomagnesemia  - improved s/p replacement  - continue to trend     Metabolic alkalosis  - likely 2/2 combination of contraction alkalosis and compensation from respiratory acidosis  - improved yesterday; today's labs are still pending  - adjusting diuretics as above  - encourage compliance with BiPAP     Anemia of CKD  - stable  - continue weekly AHSAN  - transfuse for hgb < 7     CKD-MBD  - mild hypercalcemia: decreased calcitriol to 0.25mcg QOD on 4/15  - continue to trend CCa and phos levels daily     Isolated systolic HTN  - adequately controlled; keep DBP > 60 to ensure renal perfusion     Thank you for your consult. I will follow-up with patient. Please contact us if you have any additional questions.     Ina Orr MD  Nephrology  [facility]  Office: [phone]  [facility]    Date of service: 04/17/2019

## 2019-04-17 NOTE — PROGRESS NOTES
TN  Provided call report to telemetry, nurse Aicha to [facility] [phone]..Zenaida Costello RN, BSN, STN [facility]  4/17/2019

## 2019-04-17 NOTE — PROGRESS NOTES
TN sent orders through [facility]  And called to [facility] spoke to Alix...Zenaida Costello RN, BSN, [facility]  4/17/2019  '

## 2019-04-17 NOTE — PT/OT/SLP PROGRESS
Physical Therapy      Patient Name:  Joyce Riley   MRN:  [id number]    Patient not seen today secondary to Unavailable ( MD present and  Nursing care ). Will follow-up as able later hour/day .    Angi Alonso, PTA

## 2019-04-17 NOTE — SUBJECTIVE & OBJECTIVE
Interval History:   Lasix decreased to once daily yesterday. No events overnight. UOP 14x yesterday. Patient complaining of constipation this morning; asking for a laxative. O2 weaned from 5L to 2L this AM. Possibly being discharged today.     Review of patient's allergies indicates:  No Known Allergies  Current Facility-Administered Medications   Medication Frequency    acetaminophen tablet 500 mg Q6H PRN    apixaban tablet 2.5 mg BID    atorvastatin tablet 40 mg Daily    calcitRIOL capsule 0.25 mcg Every other day    cloNIDine tablet 0.2 mg TID PRN    dextrose 50% injection 12.5 g PRN    dextrose 50% injection 25 g PRN    epoetin diana injection 10,000 Units Q7 Days    famotidine tablet 20 mg Daily    furosemide tablet 40 mg Daily    glucagon (human recombinant) injection 1 mg PRN    glucose chewable tablet 16 g PRN    glucose chewable tablet 24 g PRN    insulin aspart U-100 pen 0-5 Units PRN    insulin aspart U-100 pen 3 Units TIDWM    metOLazone tablet 2.5 mg Daily    NIFEdipine 24 hr tablet 60 mg Daily    ondansetron injection 8 mg Q8H PRN    polyethylene glycol packet 17 g BID PRN    promethazine (PHENERGAN) 6.25 mg in dextrose 5 % 50 mL IVPB Q6H PRN    ramelteon tablet 8 mg Nightly PRN    senna-docusate 8.6-50 mg per tablet 1 tablet BID PRN       Objective:     Vital Signs (Most Recent):  Temp: 97.2 °F (36.2 °C) (04/17/19 0716)  Pulse: 85 (04/17/19 0716)  Resp: 18 (04/17/19 0716)  BP: (!) 174/74 (04/17/19 0716)  SpO2: (!) 94 % (04/17/19 0716)  O2 Device (Oxygen Therapy): nasal cannula (04/16/19 1951) Vital Signs (24h Range):  Temp:  [97.2 °F (36.2 °C)-99.1 °F (37.3 °C)] 97.2 °F (36.2 °C)  Pulse:  [79-96] 85  Resp:  [17-21] 18  SpO2:  [90 %-95 %] 94 %  BP: (151-176)/(58-89) 174/74     Weight: 75.6 kg (166 lb 10.7 oz) (04/17/19 0440)  Body mass index is 28.61 kg/m².  Body surface area is 1.85 meters squared.    I/O last 3 completed shifts:  In: 705 [P.O.:705]  Out: -     Physical Exam    Constitutional: She appears well-developed and well-nourished. No distress.   HENT:   Head: Normocephalic and atraumatic.   Mouth/Throat: Oropharynx is clear and moist and mucous membranes are normal.   Eyes: Conjunctivae and EOM are normal.   Cardiovascular: Normal rate and regular rhythm.   Pulmonary/Chest: Effort normal and breath sounds normal. She has no wheezes. She has no rales.   Abdominal: Soft. She exhibits no distension.   Musculoskeletal: She exhibits no edema or deformity.   Skin: Skin is warm and dry. She is not diaphoretic.   Psychiatric: She has a normal mood and affect. Her behavior is normal.       Significant Labs:  CBC:   Recent Labs   Lab 04/17/19  0612   WBC 4.70   RBC 2.75*   HGB 7.8*   HCT 26.0*      MCV 95   MCH 28.4   MCHC 30.0*     CMP:   Recent Labs   Lab 04/11/19  0415  04/16/19  0512   *   < > 136*   CALCIUM 10.0   < > 9.5   ALBUMIN 2.8*   < > 2.7*   PROT 6.4  --   --       < > 142   K 5.2*   < > 5.2*   CO2 30*   < > 26      < > 99   BUN 46*   < > 57*   CREATININE 3.1*   < > 3.2*   ALKPHOS 130  --   --    ALT 16  --   --    AST 14  --   --    BILITOT 1.1*  --   --     < > = values in this interval not displayed.     All labs within the past 24 hours have been reviewed.     Significant Imaging:  Labs: Reviewed

## 2019-04-17 NOTE — PROGRESS NOTES
Call report information:  [phone]/[facility]. Pt is returning to Room 209B. Report should be called in to the 200 pod nurse. KEITH is who we use for transfer.

## 2019-04-17 NOTE — DISCHARGE SUMMARY
[facility] Hospital Medicine  Discharge Summary      Patient Name: Joyce Riley  MRN: [id number]  Admission Date: 4/10/2019  Hospital Length of Stay: 7 days  Discharge Date and Time:  04/17/2019 10:22 AM  Attending Physician: Rodrigo Orosco MD   Discharging Provider: Rodrigo Orosco MD  Primary Care Provider: Kalpana Staton MD      HPI:   Mrs. Joyce Riley is a 85 y.o. female [facility] resident with renal hypertension, type 2 diabetes mellitus (HbA1c 6.6% Apr 2019), hyperlipidemia (LDL unknown), chronic diastolic heart failure (LVEF 80% Apr 2019), CKD stage 5, paroxysmal atrial fibrillation ([id number] score 5) on chronic anticoagulation, anemia of renal disease, obesity (BMI 35.2), and GERD who presents to [facility] ED with complaints of dyspnea for one day.  She was recently discharged from [facility] after initiating hemodialysis.  She underwent a few sessions and was noted to poorly tolerate it.  She was discontinue from hemodialysis and encouraged hospice.  The family was not ready for that so she was transferred to [place] for rehabilitation yesterday.  One of her sons was there to visit with her and reports that she was doing well.  He was concerned that the nursing home was about to serve her fried chicken and mashed potatoes with gravy.  About 10 min after he left to go home, he was called by the nursing home to reports that she was short of breath.  EMS was activated and upon their arrival found that her oxygen saturation on ambient air was 77%.  She denies any chest pain, palpitations, nausea, vomiting, nor any abdominal pain.  She does report lower extremity edema. Further history is otherwise limited at this time.    * No surgery found *      Hospital Course:   Ms. Riley has recently been admitted to [facility], [facility], and now our facility for similar presentations. She has CHF and progressive CKD. At [facility] she was not felt to be a candidate for  dialysis and hospice was recommended. The patient was transferred to [facility] for a second opinion. She underwent three dialysis sessions but was not thought to be a candidate for long term dialysis, and again, hospice was recommended. The family wanted the patient discharged to SNF hoping that she would improve. The day following discharge, she presented to our ER for shortness of breath and found to have edema on CXR and exam consistent with volume overload.   She was breifly on BiPAP in the ER but was soon doing better on NC. A third nephrology group was consulted, and Dr. Baca assessed the patient. Palliative care was also consulted. She was started on IV Lasix and then metolazone in attempt to diurese with medication. Fluid restriction placed. Extensive family meeting with five of her children on 4/12/2019. The patient's goals of care and risks of dialysis were discussed. The family was not sure at that time whether they would want longterm HD given the impact on quality of life. Continued to diurese and monitor for response. She had good output with increase in diuretics. Her volume status improved significantly. She does not appear to need dialysis at this time. She was transitioned to a PO regimen with Lasix 40mg BID and metolazone 2.5mg daily on 4/15/2019. Lasix has now been switched to daily. Creat has remained stable. Patient is hemodynamically stable and looks very comfortable, but still requires 4L of oxygen. Also gets dyspnea with minimal exertion. Long term prognosis remains poor with high risk of frequent readmissions. She will be discharged to SNF.     Consults:   Consults (From admission, onward)        Status Ordering Provider     Inpatient consult to Nephrology  Once     Provider:  Love Rice MD    Completed MATTHEW CORDERO     Inpatient consult to Palliative Care  Once     Provider:  Bonita Coreas NP    Completed JANY SYED consult to case management   Once     Provider:  (Not yet assigned)    MATTHEW Garcia            Final Active Diagnoses:    Diagnosis Date Noted POA    Renal hypertension [I12.9] 04/10/2019 Yes     Chronic    Hyperlipidemia [E78.5] 04/10/2019 Yes     Chronic    Chronic diastolic heart failure [I50.32] 04/10/2019 Yes     Chronic    Paroxysmal atrial fibrillation [I48.0] 04/10/2019 Yes     Chronic    Chronic anticoagulation [Z79.01] 04/10/2019 Not Applicable     Chronic    Obesity, Class II, BMI 35-39.9 [E66.9] 04/10/2019 Yes     Chronic    GERD (gastroesophageal reflux disease) [K21.9] 04/10/2019 Yes     Chronic    CKD stage 5 [N18.5] 04/01/2019 Yes     Chronic    Anemia of renal disease [D63.1] 04/01/2019 Yes     Chronic    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 [I27.23] 04/01/2019 Yes     Chronic    Type 2 diabetes mellitus, controlled, 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] 03/31/2019 Yes     Chronic      Problems Resolved During this Admission:    Diagnosis Date Noted Date Resolved POA    PRINCIPAL PROBLEM:  Acute on chronic diastolic congestive heart failure [I50.33] 04/10/2019 04/17/2019 Yes    Acute respiratory failure with hypoxia and hypercapnia [J96.01, J96.02] 04/10/2019 04/17/2019 Yes    Hyperkalemia [E87.5] 04/10/2019 04/15/2019 Yes       Discharged Condition: stable    Disposition: Skilled Nursing Facility    Follow Up:  Follow-up Information     Kalpana Staton MD In 1 week.    Specialty:  General Practice  Contact information:  [address] [place] [address]  [address]  [place] [id number] [phone]             Susan Baca MD In 2 weeks.    Specialty:  Nephrology  Contact information:  [facility]  [address]  [place] [address] [phone]                 Patient Instructions:      Diet renal     Diet diabetic     Notify your health care provider if you experience any of the following:  temperature >100.4     Notify your health care provider if you experience any of the following:  persistent  nausea and vomiting or diarrhea     Notify your health care provider if you experience any of the following:  severe uncontrolled pain     Notify your health care provider if you experience any of the following:  difficulty breathing or increased cough     Notify your health care provider if you experience any of the following:  persistent dizziness, light-headedness, or visual disturbances     Notify your health care provider if you experience any of the following:  increased confusion or weakness     Activity as tolerated         Pending Diagnostic Studies:     Procedure Component Value Units Date/Time    Renal function panel [[id number]]     Order Status:  Sent Lab Status:  No result     Specimen:  Blood     Rhythm strip [[id number]]     Order Status:  Sent Lab Status:  No result          Medications:  Reconciled Home Medications:      Medication List      START taking these medications    carvedilol 3.125 MG tablet  Commonly known as:  COREG  Take 1 tablet (3.125 mg total) by mouth 2 (two) times daily.     insulin aspart U-100 100 unit/mL Inpn pen  Commonly known as:  NovoLOG  Inject 0-5 Units into the skin as needed (Hyperglycemia).     metOLazone 2.5 MG tablet  Commonly known as:  ZAROXOLYN  Take 1 tablet (2.5 mg total) by mouth once daily.     polyethylene glycol 17 gram Pwpk  Commonly known as:  GLYCOLAX  Take 17 g by mouth 2 (two) times daily as needed.     senna-docusate 8.6-50 mg 8.6-50 mg per tablet  Commonly known as:  PERICOLACE  Take 1 tablet by mouth 2 (two) times daily.        CHANGE how you take these medications    calcitRIOL 0.25 MCG Cap  Commonly known as:  ROCALTROL  Take 1 capsule (0.25 mcg total) by mouth every other day.  What changed:  when to take this     furosemide 40 MG tablet  Commonly known as:  LASIX  Take 1 tablet (40 mg total) by mouth once daily.  Start taking on:  4/18/2019  What changed:  when to take this     NIFEdipine 60 MG (OSM) 24 hr tablet  Commonly known as:   PROCARDIA-XL  Take 1 tablet (60 mg total) by mouth once daily.  Start taking on:  4/18/2019  What changed:    · medication strength  · how much to take        CONTINUE taking these medications    acetaminophen 325 MG tablet  Commonly known as:  TYLENOL  Take 2 tablets (650 mg total) by mouth every 4 (four) hours as needed.     albuterol-ipratropium 2.5 mg-0.5 mg/3 mL nebulizer solution  Commonly known as:  DUO-NEB  Take 3 mLs by nebulization every 4 (four) hours as needed for Wheezing. Rescue     atorvastatin 40 MG tablet  Commonly known as:  LIPITOR  Take 1 tablet (40 mg total) by mouth once daily.     ELIQUIS 2.5 mg Tab  Generic drug:  apixaban  Take 2.5 mg by mouth 2 (two) times daily.     ergocalciferol 50,000 unit Cap  Commonly known as:  ERGOCALCIFEROL  Take 1 capsule (50,000 Units total) by mouth every 7 days.     famotidine 20 MG tablet  Commonly known as:  PEPCID  Take 1 tablet (20 mg total) by mouth once daily.     insulin degludec 100 unit/mL (3 mL) Inpn  Commonly known as:  TRESIBA FLEXTOUCH U-100  Inject 5 Units into the skin every evening.            Indwelling Lines/Drains at time of discharge:   Lines/Drains/Airways          None          Time spent on the discharge of patient: >30 minutes  Patient was seen and examined on the date of discharge and determined to be suitable for discharge.         Rodrigo Orosco MD  Department of Hospital Medicine  [facility]

## 2019-04-17 NOTE — PLAN OF CARE
04/17/19 0926   Post-Acute Status   Post-Acute Authorization Placement   Post-Acute Placement Status Awaiting Orders for SNF  (To return to )   Discharge Delays None  (Retrun to  [facility])

## 2019-04-17 NOTE — PT/OT/SLP PROGRESS
Occupational Therapy   Treatment    Name: Joyce Riley  MRN: [id number]  Admitting Diagnosis:  Acute on chronic diastolic congestive heart failure       Recommendations:     Discharge Recommendations: nursing facility, skilled  Discharge Equipment Recommendations:  commode  Barriers to discharge:  None    Assessment:     Joyce Riley is a 85 y.o. female with a medical diagnosis of Acute on chronic diastolic congestive heart failure. Pt required decreased assistance with bed mobility to sit EOB, but experienced nausea since she just ate breakfast. Pt demo'd ~3 min sitting EOB with SBA then back to bed with request for medicine for nausea from nurse. Pt demo'd increased ROM with UE  therapeutic exercises compared to session on 4/16/19. Performance deficits affecting function are weakness, impaired functional mobilty, impaired balance, decreased upper extremity function, decreased safety awareness, impaired skin, impaired cardiopulmonary response to activity, impaired endurance, impaired self care skills, gait instability, decreased lower extremity function, pain, decreased ROM.     Rehab Prognosis:  Fair; patient would benefit from acute skilled OT services to address these deficits and reach maximum level of function.       Plan:     Patient to be seen 3 x/week to address the above listed problems via self-care/home management, therapeutic activities, therapeutic exercises  · Plan of Care Expires: 04/26/19  · Plan of Care Reviewed with: patient, daughter    Subjective     Pain/Comfort:  · Pain Rating 1: 8/10  · Location - Side 1: Bilateral  · Location 1: abdomen  · Pain Addressed 1: Pre-medicate for activity, Cessation of Activity, Distraction, Nurse notified    Objective:     Communicated with: nurseHarriett, prior to session.  Patient found HOB elevated with bed alarm, telemetry, oxygen upon OT entry to room.    General Precautions: Standard, fall   Orthopedic Precautions:N/A   Braces: N/A      Occupational Performance:     Bed Mobility:    · Patient completed Rolling/Turning to Left with  stand by assistance and with side rail  · Patient completed Rolling/Turning to Right with stand by assistance and with side rail  · Patient completed Supine to Sit with stand by assistance and with side rail  · Patient completed Sit to Supine with stand by assistance and with side rail     Functional Mobility/Transfers:  · Functional Mobility: Not tested.     Activities of Daily Living:  · Upper Body Dressing: minimum assistance with personal jacket       [facility] 6 Click ADL: 14    Treatment & Education:  Pt consented to treatment with daughter present. Pt presented with nausea upon sitting up at EOB after completing breakfast recently. Pt sat EOB ~3 min and requested to complete therapy session in supine. Nurse notified of nausea. Pt completed 10 reps of elbow flexion/extension and 10 reps of shoulder flexion/extension with min tactile and verbal cueing. Pt required demo from OT and mod verbal cueing to complete pursed lip breathing with bed mobility and exercises.     Patient left HOB elevated with all lines intact, call button in reach, bed alarm on, nurse, Harriett, notified and Harriett and daughter presentEducation:      GOALS:   Multidisciplinary Problems     Occupational Therapy Goals        Problem: Occupational Therapy Goal    Goal Priority Disciplines Outcome Interventions   Occupational Therapy Goal     OT, PT/OT Ongoing (interventions implemented as appropriate)    Description:  Goals to be met by: 04/26/19    Patient will increase functional independence with ADLs by performing:    Feeding with Modified [place].  UE Dressing with Set-up Assistance.  LE Dressing with Moderate Assistance.  Grooming while seated with Set-up Assistance. Goal met 04/16/19. Upgrade to Mod I  Toileting from bedside commode with Moderate Assistance for hygiene and clothing management.   Sitting at edge of bed x20 minutes with  Supervision.  Rolling to Bilateral with Stand-by Assistance.   Supine to sit with Stand-by Assistance. Goal met 04/17/19. Upgrade to SUP.   Step transfer with Moderate Assistance  Toilet transfer to bedside commode with Moderate Assistance.  Upper extremity exercise program x15 reps per handout, with assistance as needed.                        Time Tracking:     OT Date of Treatment: 04/17/19  OT Start Time: 0932  OT Stop Time: 0946  OT Total Time (min): 14 min    Billable Minutes:Therapeutic Exercise 14 min    Laurita Valencia OT  4/17/2019

## 2019-04-17 NOTE — PLAN OF CARE
Problem: Skin Injury Risk Increased  Goal: Skin Health and Integrity  Outcome: Ongoing (interventions implemented as appropriate)  Intervention: Optimize Skin Protection     04/17/19 0556   Prevent Additional Skin Injury   Head of Bed (HOB) HOB elevated   Pressure Reduction Devices pressure-redistributing mattress utilized   Pressure Reduction Techniques frequent weight shift encouraged   Monitor and Manage Hypervolemia   Skin Protection adhesive use limited;incontinence pads utilized     Intervention: Promote and Optimize Oral Intake     04/17/19 0556   Monitor and Manage Anemia   Oral Nutrition Promotion rest periods promoted         Problem: Heart Failure Comorbidity  Goal: Maintenance of Heart Failure Symptom Control  Outcome: Ongoing (interventions implemented as appropriate)  Intervention: Maintain Heart Failure-Management Strategies     04/17/19 0556   Manage Acute Allergic Reaction   Medication Review/Management medications reviewed         Problem: Hypertension Comorbidity  Goal: Blood Pressure in Desired Range  Outcome: Ongoing (interventions implemented as appropriate)  Intervention: Maintain Hypertension-Management Strategies     04/17/19 0556   Manage Acute Allergic Reaction   Medication Review/Management medications reviewed   Monitor and Manage Postpartum Bleeding   Syncope Management position changed slowly

## 2019-04-17 NOTE — PT/OT/SLP DISCHARGE
Occupational Therapy Discharge Summary    Joyce Riley  MRN: [id number]   Principal Problem: Acute on chronic diastolic congestive heart failure      Patient Discharged from acute Occupational Therapy on 04/17/19.  Please refer to prior OT notes for functional status.    Assessment:      Patient appropriate for care in another setting.    Objective:     GOALS:   Multidisciplinary Problems     Occupational Therapy Goals        Problem: Occupational Therapy Goal    Goal Priority Disciplines Outcome Interventions   Occupational Therapy Goal     OT, PT/OT Ongoing (interventions implemented as appropriate)    Description:  Goals to be met by: 04/26/19    Patient will increase functional independence with ADLs by performing:    Feeding with Modified [place].  UE Dressing with Set-up Assistance.  LE Dressing with Moderate Assistance.  Grooming while seated with Set-up Assistance. Goal met 04/16/19. Upgrade to Mod I  Toileting from bedside commode with Moderate Assistance for hygiene and clothing management.   Sitting at edge of bed x20 minutes with Supervision.  Rolling to Bilateral with Stand-by Assistance.   Supine to sit with Stand-by Assistance. Goal met 04/17/19. Upgrade to SUP.   Step transfer with Moderate Assistance  Toilet transfer to bedside commode with Moderate Assistance.  Upper extremity exercise program x15 reps per handout, with assistance as needed.                        Reasons for Discontinuation of Therapy Services  Transfer to alternate level of care.      Plan:     Patient Discharged to: Skilled Nursing Facility    Laurita Valencia OT  4/17/2019

## 2019-04-23 NOTE — PLAN OF CARE
04/17/19 0952   Medicare Message   Important Message from Medicare regarding Discharge Appeal Rights Given to patient/caregiver;Explained to patient/caregiver;Signed/date by patient/caregiver   Date IMM was signed 04/17/19   Time IMM was signed 0952

## 2019-06-28 PROBLEM — N18.4 TYPE 2 DIABETES MELLITUS WITH STAGE 4 CHRONIC KIDNEY DISEASE AND HYPERTENSION: Status: ACTIVE | Noted: 2019-06-28

## 2019-06-28 PROBLEM — I12.9 TYPE 2 DIABETES MELLITUS WITH STAGE 4 CHRONIC KIDNEY DISEASE AND HYPERTENSION: Status: ACTIVE | Noted: 2019-06-28

## 2019-06-28 PROBLEM — E11.22 TYPE 2 DIABETES MELLITUS WITH STAGE 4 CHRONIC KIDNEY DISEASE AND HYPERTENSION: Status: ACTIVE | Noted: 2019-06-28

## 2019-07-17 ENCOUNTER — HOSPITAL ENCOUNTER (INPATIENT)
Facility: HOSPITAL | Age: 84
LOS: 5 days | Discharge: NURSING, REHAB OR LONG-TERM CARE | DRG: 683 | End: 2019-07-22
Attending: EMERGENCY MEDICINE | Admitting: INTERNAL MEDICINE
Payer: MEDICARE

## 2019-07-17 DIAGNOSIS — N18.9 ACUTE RENAL FAILURE SUPERIMPOSED ON CHRONIC KIDNEY DISEASE, UNSPECIFIED CKD STAGE, UNSPECIFIED ACUTE RENAL FAILURE TYPE: Primary | ICD-10-CM

## 2019-07-17 DIAGNOSIS — N18.5 ACUTE RENAL FAILURE SUPERIMPOSED ON STAGE 5 CHRONIC KIDNEY DISEASE, NOT ON CHRONIC DIALYSIS, UNSPECIFIED ACUTE RENAL FAILURE TYPE: ICD-10-CM

## 2019-07-17 DIAGNOSIS — N17.9 ACUTE RENAL FAILURE SUPERIMPOSED ON STAGE 5 CHRONIC KIDNEY DISEASE, NOT ON CHRONIC DIALYSIS, UNSPECIFIED ACUTE RENAL FAILURE TYPE: ICD-10-CM

## 2019-07-17 DIAGNOSIS — N17.9 ACUTE RENAL FAILURE SUPERIMPOSED ON CHRONIC KIDNEY DISEASE, UNSPECIFIED CKD STAGE, UNSPECIFIED ACUTE RENAL FAILURE TYPE: Primary | ICD-10-CM

## 2019-07-17 DIAGNOSIS — Z51.5 PALLIATIVE CARE ENCOUNTER: ICD-10-CM

## 2019-07-17 PROBLEM — I10 ESSENTIAL HYPERTENSION: Status: ACTIVE | Noted: 2019-07-17

## 2019-07-17 PROBLEM — N39.0 URINARY TRACT INFECTION WITHOUT HEMATURIA: Status: ACTIVE | Noted: 2019-07-17

## 2019-07-17 LAB
ALBUMIN SERPL BCP-MCNC: 3.1 G/DL (ref 3.5–5.2)
ALP SERPL-CCNC: 123 U/L (ref 55–135)
ALT SERPL W/O P-5'-P-CCNC: 11 U/L (ref 10–44)
ANION GAP SERPL CALC-SCNC: 13 MMOL/L (ref 8–16)
AST SERPL-CCNC: 13 U/L (ref 10–40)
BACTERIA #/AREA URNS HPF: ABNORMAL /HPF
BASOPHILS # BLD AUTO: 0.04 K/UL (ref 0–0.2)
BASOPHILS NFR BLD: 0.5 % (ref 0–1.9)
BILIRUB SERPL-MCNC: 0.2 MG/DL (ref 0.1–1)
BILIRUB UR QL STRIP: NEGATIVE
BUN SERPL-MCNC: 116 MG/DL (ref 8–23)
CALCIUM SERPL-MCNC: 9.2 MG/DL (ref 8.7–10.5)
CHLORIDE SERPL-SCNC: 103 MMOL/L (ref 95–110)
CLARITY UR: CLEAR
CO2 SERPL-SCNC: 22 MMOL/L (ref 23–29)
COLOR UR: ABNORMAL
CREAT SERPL-MCNC: 3.9 MG/DL (ref 0.5–1.4)
DIFFERENTIAL METHOD: ABNORMAL
EOSINOPHIL # BLD AUTO: 0.3 K/UL (ref 0–0.5)
EOSINOPHIL NFR BLD: 3.1 % (ref 0–8)
ERYTHROCYTE [DISTWIDTH] IN BLOOD BY AUTOMATED COUNT: 16.5 % (ref 11.5–14.5)
EST. GFR  (AFRICAN AMERICAN): 11 ML/MIN/1.73 M^2
EST. GFR  (NON AFRICAN AMERICAN): 10 ML/MIN/1.73 M^2
GLUCOSE SERPL-MCNC: 93 MG/DL (ref 70–110)
GLUCOSE UR QL STRIP: NEGATIVE
HCT VFR BLD AUTO: 27.6 % (ref 37–48.5)
HGB BLD-MCNC: 8.5 G/DL (ref 12–16)
HGB UR QL STRIP: ABNORMAL
HYALINE CASTS #/AREA URNS LPF: 0 /LPF
KETONES UR QL STRIP: NEGATIVE
LEUKOCYTE ESTERASE UR QL STRIP: ABNORMAL
LYMPHOCYTES # BLD AUTO: 1.5 K/UL (ref 1–4.8)
LYMPHOCYTES NFR BLD: 17.3 % (ref 18–48)
MAGNESIUM SERPL-MCNC: 1.5 MG/DL (ref 1.6–2.6)
MCH RBC QN AUTO: 28.8 PG (ref 27–31)
MCHC RBC AUTO-ENTMCNC: 30.8 G/DL (ref 32–36)
MCV RBC AUTO: 94 FL (ref 82–98)
MICROSCOPIC COMMENT: ABNORMAL
MONOCYTES # BLD AUTO: 0.6 K/UL (ref 0.3–1)
MONOCYTES NFR BLD: 6.4 % (ref 4–15)
NEUTROPHILS # BLD AUTO: 6.3 K/UL (ref 1.8–7.7)
NEUTROPHILS NFR BLD: 72.9 % (ref 38–73)
NITRITE UR QL STRIP: NEGATIVE
NON-SQ EPI CELLS #/AREA URNS HPF: 4 /HPF
PH UR STRIP: 5 [PH] (ref 5–8)
PLATELET # BLD AUTO: 310 K/UL (ref 150–350)
PMV BLD AUTO: 9.4 FL (ref 9.2–12.9)
POCT GLUCOSE: 86 MG/DL (ref 70–110)
POTASSIUM SERPL-SCNC: 4.1 MMOL/L (ref 3.5–5.1)
PROT SERPL-MCNC: 7.6 G/DL (ref 6–8.4)
PROT UR QL STRIP: ABNORMAL
RBC # BLD AUTO: 2.95 M/UL (ref 4–5.4)
RBC #/AREA URNS HPF: 5 /HPF (ref 0–4)
SODIUM SERPL-SCNC: 138 MMOL/L (ref 136–145)
SP GR UR STRIP: 1.01 (ref 1–1.03)
URN SPEC COLLECT METH UR: ABNORMAL
UROBILINOGEN UR STRIP-ACNC: NEGATIVE EU/DL
WBC # BLD AUTO: 8.72 K/UL (ref 3.9–12.7)
WBC #/AREA URNS HPF: 20 /HPF (ref 0–5)

## 2019-07-17 PROCEDURE — 99285 EMERGENCY DEPT VISIT HI MDM: CPT

## 2019-07-17 PROCEDURE — 63600175 PHARM REV CODE 636 W HCPCS: Performed by: HOSPITALIST

## 2019-07-17 PROCEDURE — 83036 HEMOGLOBIN GLYCOSYLATED A1C: CPT

## 2019-07-17 PROCEDURE — 80053 COMPREHEN METABOLIC PANEL: CPT

## 2019-07-17 PROCEDURE — 85025 COMPLETE CBC W/AUTO DIFF WBC: CPT

## 2019-07-17 PROCEDURE — 83735 ASSAY OF MAGNESIUM: CPT

## 2019-07-17 PROCEDURE — 21400001 HC TELEMETRY ROOM

## 2019-07-17 PROCEDURE — 36415 COLL VENOUS BLD VENIPUNCTURE: CPT

## 2019-07-17 PROCEDURE — 87086 URINE CULTURE/COLONY COUNT: CPT

## 2019-07-17 PROCEDURE — 81000 URINALYSIS NONAUTO W/SCOPE: CPT

## 2019-07-17 PROCEDURE — 25000003 PHARM REV CODE 250: Performed by: HOSPITALIST

## 2019-07-17 RX ORDER — CLONIDINE HYDROCHLORIDE 0.1 MG/1
0.1 TABLET ORAL EVERY 4 HOURS PRN
Status: DISCONTINUED | OUTPATIENT
Start: 2019-07-17 | End: 2019-07-22 | Stop reason: HOSPADM

## 2019-07-17 RX ORDER — NIFEDIPINE 30 MG/1
30 TABLET, EXTENDED RELEASE ORAL DAILY
Status: DISCONTINUED | OUTPATIENT
Start: 2019-07-17 | End: 2019-07-18

## 2019-07-17 RX ORDER — ACETAMINOPHEN 325 MG/1
650 TABLET ORAL EVERY 6 HOURS PRN
Status: DISCONTINUED | OUTPATIENT
Start: 2019-07-17 | End: 2019-07-18

## 2019-07-17 RX ORDER — SODIUM CHLORIDE 9 MG/ML
INJECTION, SOLUTION INTRAVENOUS CONTINUOUS
Status: DISCONTINUED | OUTPATIENT
Start: 2019-07-17 | End: 2019-07-17

## 2019-07-17 RX ORDER — SODIUM CHLORIDE 9 MG/ML
INJECTION, SOLUTION INTRAVENOUS CONTINUOUS
Status: DISCONTINUED | OUTPATIENT
Start: 2019-07-17 | End: 2019-07-18

## 2019-07-17 RX ORDER — IBUPROFEN 200 MG
24 TABLET ORAL
Status: DISCONTINUED | OUTPATIENT
Start: 2019-07-17 | End: 2019-07-22 | Stop reason: HOSPADM

## 2019-07-17 RX ORDER — CARVEDILOL 3.12 MG/1
3.12 TABLET ORAL 2 TIMES DAILY
Status: DISCONTINUED | OUTPATIENT
Start: 2019-07-17 | End: 2019-07-18

## 2019-07-17 RX ORDER — INSULIN ASPART 100 [IU]/ML
0-5 INJECTION, SOLUTION INTRAVENOUS; SUBCUTANEOUS
Status: DISCONTINUED | OUTPATIENT
Start: 2019-07-17 | End: 2019-07-22 | Stop reason: HOSPADM

## 2019-07-17 RX ORDER — GLUCAGON 1 MG
1 KIT INJECTION
Status: DISCONTINUED | OUTPATIENT
Start: 2019-07-17 | End: 2019-07-22 | Stop reason: HOSPADM

## 2019-07-17 RX ORDER — SODIUM CHLORIDE 0.9 % (FLUSH) 0.9 %
10 SYRINGE (ML) INJECTION
Status: DISCONTINUED | OUTPATIENT
Start: 2019-07-17 | End: 2019-07-22 | Stop reason: HOSPADM

## 2019-07-17 RX ORDER — IBUPROFEN 200 MG
16 TABLET ORAL
Status: DISCONTINUED | OUTPATIENT
Start: 2019-07-17 | End: 2019-07-22 | Stop reason: HOSPADM

## 2019-07-17 RX ADMIN — CARVEDILOL 3.12 MG: 3.12 TABLET, FILM COATED ORAL at 09:07

## 2019-07-17 RX ADMIN — ACETAMINOPHEN 650 MG: 325 TABLET, FILM COATED ORAL at 06:07

## 2019-07-17 RX ADMIN — NIFEDIPINE 30 MG: 30 TABLET, FILM COATED, EXTENDED RELEASE ORAL at 09:07

## 2019-07-17 RX ADMIN — SODIUM CHLORIDE: 0.9 INJECTION, SOLUTION INTRAVENOUS at 06:07

## 2019-07-17 RX ADMIN — CEFTRIAXONE 1 G: 1 INJECTION, SOLUTION INTRAVENOUS at 06:07

## 2019-07-17 NOTE — H&P
[facility] Medicine  History & Physical    Patient Name: Joyce Riley  MRN: [id number]  Admission Date: 7/17/2019  Attending Physician: stephen  Primary Care Provider: Kalpana Staton MD         Patient information was obtained from patient and ER records.     Subjective:     Principal Problem:Acute renal failure superimposed on stage 5 chronic kidney disease, not on chronic dialysis    Chief Complaint:   Chief Complaint   Patient presents with    Abnormal Lab     Pt from [facility] with no complaints, sent by MD due to worsening kidney function, states she has not been urinating much lately         HPI:  85 y.o F with a hx of Anemia, Anticoagulant long-term use with eliquis,P A-fib,, CKD stage V, DM, GERD, GI bleed duo to rectal hemorrhoids, HTN and Stroke presents to the ED via EMS from [facility] for emergent evaluation of an abnormal lab. The pt was sent to the ED by her nephrologist .for further evaluation of decreased kidney function. The pt states she was able to urinate a small amount this AM, but has not voided since despite consuming an increased amount of water today. She was last seen in this ED yesterday for rectal bleeding duo to hemorrhoid,and renal insufficiency,ecieved IVF and as returned back to NH,in the past has luisana recommended Hosice and she is DNR,. She denies fever, chills, diaphoresis, nausea, emesis, diarrhea, abdominal pain, chest pain, SOB, dysuria and rash. No prior tx.her CKD 5 in recent few months is worsening,CRT today is 3.9,she has bene started on IVF and her nephrology is consulted.    Past Medical History:   Diagnosis Date    Anemia     Anticoagulant long-term use     Arthritis     Atrial fibrillation     Cataract     CHF (congestive heart failure)     CKD (chronic kidney disease), stage V     Diabetes mellitus     Type 2    GERD (gastroesophageal reflux disease)     GI bleed 12/2018    Gout     Hypertension      Obese     Requires assistance with all daily activities     Stroke 2014    Wheelchair dependent        Past Surgical History:   Procedure Laterality Date     SECTION      Patient unsure, believe she had 3-4    CHOLECYSTECTOMY      EYE SURGERY      HYSTERECTOMY      JOINT REPLACEMENT Right     knee    TOTAL KNEE ARTHROPLASTY Right        Review of patient's allergies indicates:   Allergen Reactions    Indomethacin     Nsaids (non-steroidal anti-inflammatory drug)        No current facility-administered medications on file prior to encounter.      Current Outpatient Medications on File Prior to Encounter   Medication Sig    apixaban (ELIQUIS) 2.5 mg Tab Take 2.5 mg by mouth 2 (two) times daily.    calcitRIOL (ROCALTROL) 0.25 MCG Cap Take 1 capsule (0.25 mcg total) by mouth every other day.    carvedilol (COREG) 3.125 MG tablet Take 1 tablet (3.125 mg total) by mouth 2 (two) times daily.    colchicine (COLCRYS) 0.6 mg tablet Take 0.6 mg by mouth once daily.    ergocalciferol (ERGOCALCIFEROL) 50,000 unit Cap Take 1 capsule (50,000 Units total) by mouth every 7 days.    famotidine (PEPCID) 20 MG tablet Take 1 tablet (20 mg total) by mouth once daily.    furosemide (LASIX) 40 MG tablet Take 1 tablet (40 mg total) by mouth once daily.    insulin aspart U-100 (NOVOLOG) 100 unit/mL InPn pen Inject 0-5 Units into the skin as needed (Hyperglycemia).    insulin degludec (TRESIBA FLEXTOUCH U-100) 100 unit/mL (3 mL) InPn Inject 5 Units into the skin every evening.    metOLazone (ZAROXOLYN) 2.5 MG tablet Take 1 tablet (2.5 mg total) by mouth once daily.    NIFEdipine (PROCARDIA-XL) 60 MG (OSM) 24 hr tablet Take 1 tablet (60 mg total) by mouth once daily.    acetaminophen (TYLENOL) 325 MG tablet Take 2 tablets (650 mg total) by mouth every 4 (four) hours as needed.    albuterol-ipratropium (DUO-NEB) 2.5 mg-0.5 mg/3 mL nebulizer solution Take 3 mLs by nebulization every 4 (four) hours as needed  for Wheezing. Rescue    cephALEXin (KEFLEX) 500 MG capsule Take 1 capsule (500 mg total) by mouth every 6 (six) hours. for 10 days    polyethylene glycol (GLYCOLAX) 17 gram PwPk Take 17 g by mouth 2 (two) times daily as needed.    senna-docusate 8.6-50 mg (PERICOLACE) 8.6-50 mg per tablet Take 1 tablet by mouth 2 (two) times daily.     Family History     Problem Relation (Age of Onset)    Cancer Mother, Brother    Diabetes Mother    Hypertension Mother        Tobacco Use    Smoking status: Never Smoker    Smokeless tobacco: Never Used   Substance and Sexual Activity    Alcohol use: No    Drug use: No    Sexual activity: Never     Review of Systems   Constitutional: Positive for activity change and appetite change.   HENT: Negative for congestion and dental problem.    Eyes: Negative for discharge and itching.   Respiratory: Negative for apnea and chest tightness.    Cardiovascular: Negative for chest pain and leg swelling.   Gastrointestinal: Negative for abdominal distention and abdominal pain.   Endocrine: Negative for heat intolerance.   Genitourinary: Negative for difficulty urinating and dyspareunia.   Musculoskeletal: Positive for arthralgias. Negative for back pain.   Skin: Negative for color change and pallor.   Allergic/Immunologic: Negative for environmental allergies and food allergies.   Neurological: Negative for dizziness.   Hematological: Negative for adenopathy. Does not bruise/bleed easily.   Psychiatric/Behavioral: Negative for agitation and behavioral problems.     Objective:     Vital Signs (Most Recent):  Temp: 98 °F (36.7 °C) (07/17/19 1531)  Pulse: 70 (07/17/19 1646)  Resp: 18 (07/17/19 1531)  BP: (!) 149/70 (07/17/19 1646)  SpO2: 100 % (07/17/19 1646) Vital Signs (24h Range):  Temp:  [98 °F (36.7 °C)-98.1 °F (36.7 °C)] 98 °F (36.7 °C)  Pulse:  [67-76] 70  Resp:  [18] 18  SpO2:  [99 %-100 %] 100 %  BP: (134-149)/(63-71) 149/70     Weight: 75.3 kg (166 lb)  Body mass index is 26.79  kg/m².    Physical Exam   Constitutional: She is oriented to person, place, and time. No distress.   HENT:   Head: Atraumatic.   Eyes: EOM are normal.   Neck: Neck supple.   Cardiovascular: Normal rate and regular rhythm.   Pulmonary/Chest: Effort normal and breath sounds normal.   Abdominal: Soft. Bowel sounds are normal.   Musculoskeletal: Normal range of motion.   Neurological: She is oriented to person, place, and time. No cranial nerve deficit. Coordination normal.   Skin: Skin is warm and dry.   Psychiatric: She has a normal mood and affect. Her behavior is normal.         CRANIAL NERVES     CN III, IV, VI   Extraocular motions are normal.        Significant Labs:   BMP:   Recent Labs   Lab 07/17/19  1511   GLU 93      K 4.1      CO2 22*   *   CREATININE 3.9*   CALCIUM 9.2   MG 1.5*     CBC:   Recent Labs   Lab 07/16/19  1311 07/17/19  1511   WBC 10.62 8.72   HGB 9.5* 8.5*   HCT 30.9* 27.6*   * 310       Significant Imaging: received     Assessment/Plan:     * Acute renal failure superimposed on stage 5 chronic kidney disease, not on chronic dialysis  Worsening,Will  continue with IVF,  Consult her nephrology,in the past reccommended hospice,consult palliative care.    Urinary tract infection without hematuria  Will continue with IV Abx,folow cultures.      Essential hypertension  Resume home medications and added prn clonidine.      Paroxysmal atrial fibrillation  On BB,hold OAC duo to recent rectal hemorhoid at this time.      Anemia of renal disease  Stable,will monitor.      Type 2 diabetes mellitus, controlled, with renal complications  On SSI.        VTE Risk Mitigation (From admission, onward)    None             Veronica Berry MD  Department of Hospital Medicine   [facility]

## 2019-07-17 NOTE — ASSESSMENT & PLAN NOTE
Worsening,Will  continue with IVF,  Consult her nephrology,in the past reccommended hospice,consult palliative care.

## 2019-07-17 NOTE — ED NOTES
Attempted IV on patient. Patient was hollering to take the IV out. IV removed. NKD. Did not see another spot to try again. Will get another nurse to attempt.

## 2019-07-17 NOTE — SUBJECTIVE & OBJECTIVE
Past Medical History:   Diagnosis Date    Anemia     Anticoagulant long-term use     Arthritis     Atrial fibrillation     Cataract     CHF (congestive heart failure)     CKD (chronic kidney disease), stage V     Diabetes mellitus     Type 2    GERD (gastroesophageal reflux disease)     GI bleed 2018    Gout     Hypertension     Obese     Requires assistance with all daily activities     Stroke     Wheelchair dependent        Past Surgical History:   Procedure Laterality Date     SECTION      Patient unsure, believe she had 3-4    CHOLECYSTECTOMY      EYE SURGERY      HYSTERECTOMY      JOINT REPLACEMENT Right     knee    TOTAL KNEE ARTHROPLASTY Right        Review of patient's allergies indicates:   Allergen Reactions    Indomethacin     Nsaids (non-steroidal anti-inflammatory drug)        No current facility-administered medications on file prior to encounter.      Current Outpatient Medications on File Prior to Encounter   Medication Sig    apixaban (ELIQUIS) 2.5 mg Tab Take 2.5 mg by mouth 2 (two) times daily.    calcitRIOL (ROCALTROL) 0.25 MCG Cap Take 1 capsule (0.25 mcg total) by mouth every other day.    carvedilol (COREG) 3.125 MG tablet Take 1 tablet (3.125 mg total) by mouth 2 (two) times daily.    colchicine (COLCRYS) 0.6 mg tablet Take 0.6 mg by mouth once daily.    ergocalciferol (ERGOCALCIFEROL) 50,000 unit Cap Take 1 capsule (50,000 Units total) by mouth every 7 days.    famotidine (PEPCID) 20 MG tablet Take 1 tablet (20 mg total) by mouth once daily.    furosemide (LASIX) 40 MG tablet Take 1 tablet (40 mg total) by mouth once daily.    insulin aspart U-100 (NOVOLOG) 100 unit/mL InPn pen Inject 0-5 Units into the skin as needed (Hyperglycemia).    insulin degludec (TRESIBA FLEXTOUCH U-100) 100 unit/mL (3 mL) InPn Inject 5 Units into the skin every evening.    metOLazone (ZAROXOLYN) 2.5 MG tablet Take 1 tablet (2.5 mg total) by mouth once daily.     NIFEdipine (PROCARDIA-XL) 60 MG (OSM) 24 hr tablet Take 1 tablet (60 mg total) by mouth once daily.    acetaminophen (TYLENOL) 325 MG tablet Take 2 tablets (650 mg total) by mouth every 4 (four) hours as needed.    albuterol-ipratropium (DUO-NEB) 2.5 mg-0.5 mg/3 mL nebulizer solution Take 3 mLs by nebulization every 4 (four) hours as needed for Wheezing. Rescue    cephALEXin (KEFLEX) 500 MG capsule Take 1 capsule (500 mg total) by mouth every 6 (six) hours. for 10 days    polyethylene glycol (GLYCOLAX) 17 gram PwPk Take 17 g by mouth 2 (two) times daily as needed.    senna-docusate 8.6-50 mg (PERICOLACE) 8.6-50 mg per tablet Take 1 tablet by mouth 2 (two) times daily.     Family History     Problem Relation (Age of Onset)    Cancer Mother, Brother    Diabetes Mother    Hypertension Mother        Tobacco Use    Smoking status: Never Smoker    Smokeless tobacco: Never Used   Substance and Sexual Activity    Alcohol use: No    Drug use: No    Sexual activity: Never     Review of Systems   Constitutional: Positive for activity change and appetite change.   HENT: Negative for congestion and dental problem.    Eyes: Negative for discharge and itching.   Respiratory: Negative for apnea and chest tightness.    Cardiovascular: Negative for chest pain and leg swelling.   Gastrointestinal: Negative for abdominal distention and abdominal pain.   Endocrine: Negative for heat intolerance.   Genitourinary: Negative for difficulty urinating and dyspareunia.   Musculoskeletal: Positive for arthralgias. Negative for back pain.   Skin: Negative for color change and pallor.   Allergic/Immunologic: Negative for environmental allergies and food allergies.   Neurological: Negative for dizziness.   Hematological: Negative for adenopathy. Does not bruise/bleed easily.   Psychiatric/Behavioral: Negative for agitation and behavioral problems.     Objective:     Vital Signs (Most Recent):  Temp: 98 °F (36.7 °C) (07/17/19  1531)  Pulse: 70 (07/17/19 1646)  Resp: 18 (07/17/19 1531)  BP: (!) 149/70 (07/17/19 1646)  SpO2: 100 % (07/17/19 1646) Vital Signs (24h Range):  Temp:  [98 °F (36.7 °C)-98.1 °F (36.7 °C)] 98 °F (36.7 °C)  Pulse:  [67-76] 70  Resp:  [18] 18  SpO2:  [99 %-100 %] 100 %  BP: (134-149)/(63-71) 149/70     Weight: 75.3 kg (166 lb)  Body mass index is 26.79 kg/m².    Physical Exam   Constitutional: She is oriented to person, place, and time. No distress.   HENT:   Head: Atraumatic.   Eyes: EOM are normal.   Neck: Neck supple.   Cardiovascular: Normal rate and regular rhythm.   Pulmonary/Chest: Effort normal and breath sounds normal.   Abdominal: Soft. Bowel sounds are normal.   Musculoskeletal: Normal range of motion.   Neurological: She is oriented to person, place, and time. No cranial nerve deficit. Coordination normal.   Skin: Skin is warm and dry.   Psychiatric: She has a normal mood and affect. Her behavior is normal.         CRANIAL NERVES     CN III, IV, VI   Extraocular motions are normal.        Significant Labs:   BMP:   Recent Labs   Lab 07/17/19  1511   GLU 93      K 4.1      CO2 22*   *   CREATININE 3.9*   CALCIUM 9.2   MG 1.5*     CBC:   Recent Labs   Lab 07/16/19  1311 07/17/19  1511   WBC 10.62 8.72   HGB 9.5* 8.5*   HCT 30.9* 27.6*   * 310       Significant Imaging: received

## 2019-07-17 NOTE — ED PROVIDER NOTES
Encounter Date: 2019    SCRIBE #1 NOTE: I, Clay Obrien, am scribing for, and in the presence of,  Goyo Jay MD. I have scribed the following portions of the note - Other sections scribed: HPI, ROS.       History     Chief Complaint   Patient presents with    Abnormal Lab     Pt from [facility] with no complaints, sent by MD due to worsening kidney function, states she has not been urinating much lately      CC: Abnormal Lab    HPI: This 85 y.o F with a hx of Anemia, Anticoagulant long-term use, A-fib, CHF, CKD stage V, DM, GERD, GI bleed, HTN and Stroke presents to the ED via EMS from [facility] for emergent evaluation of an abnormal lab. The pt was sent to the ED by her nephrologist for further evaluation of decreased kidney function. The pt states she was able to urinate a small amount this AM, but has not voided since despite consuming an increased amount of water today. She was last seen in this ED yesterday for rectal bleeding and renal insufficiency. She denies fever, chills, diaphoresis, nausea, emesis, diarrhea, abdominal pain, chest pain, SOB, dysuria and rash. No prior tx.        Review of patient's allergies indicates:   Allergen Reactions    Indomethacin     Nsaids (non-steroidal anti-inflammatory drug)      Past Medical History:   Diagnosis Date    Anemia     Anticoagulant long-term use     Arthritis     Atrial fibrillation     Cataract     CHF (congestive heart failure)     CKD (chronic kidney disease), stage V     Diabetes mellitus     Type 2    GERD (gastroesophageal reflux disease)     GI bleed 2018    Gout     Hypertension     Obese     Requires assistance with all daily activities     Stroke     Wheelchair dependent      Past Surgical History:   Procedure Laterality Date     SECTION      Patient unsure, believe she had 3-4    CHOLECYSTECTOMY      EYE SURGERY      HYSTERECTOMY      JOINT REPLACEMENT Right     knee    TOTAL KNEE  ARTHROPLASTY Right      Family History   Problem Relation Age of Onset    Cancer Mother          age 98    Diabetes Mother     Hypertension Mother     Cancer Brother      Social History     Tobacco Use    Smoking status: Never Smoker    Smokeless tobacco: Never Used   Substance Use Topics    Alcohol use: No    Drug use: No     Review of Systems   Constitutional: Negative for chills and fever.   HENT: Negative for congestion, ear pain, rhinorrhea and sore throat.    Eyes: Negative for pain and visual disturbance.   Respiratory: Negative for cough and shortness of breath.    Cardiovascular: Negative for chest pain.   Gastrointestinal: Negative for abdominal pain, diarrhea, nausea and vomiting.   Genitourinary: Positive for decreased urine volume. Negative for dysuria.   Musculoskeletal: Negative for back pain and neck pain.   Skin: Negative for rash.   Neurological: Negative for headaches.       Physical Exam     Initial Vitals [19 1351]   BP Pulse Resp Temp SpO2   (!) 148/67 75 18 98.1 °F (36.7 °C) 99 %      MAP       --         Physical Exam    Nursing note and vitals reviewed.  HENT:   Head: Atraumatic.   Eyes: Conjunctivae and EOM are normal.   Neck: Normal range of motion.   Cardiovascular: Exam reveals no gallop and no friction rub.    No murmur heard.  Pulmonary/Chest: Breath sounds normal. No respiratory distress.   Abdominal: Soft. There is no tenderness.   Musculoskeletal: Normal range of motion. She exhibits no edema.   Neurological: She is alert and oriented to person, place, and time.   Psychiatric: She has a normal mood and affect.         ED Course   Procedures  Labs Reviewed   COMPREHENSIVE METABOLIC PANEL - Abnormal; Notable for the following components:       Result Value    CO2 22 (*)     BUN, Bld 116 (*)     Creatinine 3.9 (*)     Albumin 3.1 (*)     eGFR if  11 (*)     eGFR if non  10 (*)     All other components within normal limits   CBC W/ AUTO  DIFFERENTIAL - Abnormal; Notable for the following components:    RBC 2.95 (*)     Hemoglobin 8.5 (*)     Hematocrit 27.6 (*)     Mean Corpuscular Hemoglobin Conc 30.8 (*)     RDW 16.5 (*)     Lymph% 17.3 (*)     All other components within normal limits   MAGNESIUM - Abnormal; Notable for the following components:    Magnesium 1.5 (*)     All other components within normal limits   URINALYSIS, REFLEX TO URINE CULTURE - Abnormal; Notable for the following components:    Protein, UA 2+ (*)     Occult Blood UA 2+ (*)     Leukocytes, UA 3+ (*)     All other components within normal limits    Narrative:     Preferred Collection Type->Urine, Clean Catch   URINALYSIS MICROSCOPIC - Abnormal; Notable for the following components:    RBC, UA 5 (*)     WBC, UA 20 (*)     Non-Squam Epith 4 (*)     All other components within normal limits    Narrative:     Preferred Collection Type->Urine, Clean Catch   CULTURE, URINE          Imaging Results    None          Medical Decision Making:   Initial Assessment:   85-year-old female here for evaluation of abnormal labs.  Patient was seen here yesterday and metabolic panel revealed worsening renal failure.  Patient was given IV fluids and discharged.  She has not urinated much.  On exam she has no complaints.  Abdomen is benign.  Vital signs unremarkable. Labs today show creatinine at 3.9.  GFR is dropped roughly 10 points in the last couple months.  On review of discharge summary from April, it was recommended by multiple providers at the patient be on hospice however multiple pains were sought out. [place] signed in April states that patient is DNR and that comfort measures only be undertaken but she can also receive antibiotics if life can be prolonged.  Discussed case with Dr. Chinchilla.  He would like light IV fluids given and height of Care consult.            Scribe Attestation:   Scribe #1: I performed the above scribed service and the documentation accurately describes the  services I performed. I attest to the accuracy of the note.    Attending Attestation:           Physician Attestation for Scribe:  Physician Attestation Statement for Scribe #1: I, Goyo Jay MD, reviewed documentation, as scribed by Clay Obrien in my presence, and it is both accurate and complete.                    Clinical Impression:       ICD-10-CM ICD-9-CM   1. Acute renal failure superimposed on chronic kidney disease, unspecified CKD stage, unspecified acute renal failure type N17.9 584.9    N18.9 585.9                                Goyo Jay MD  07/17/19 1636

## 2019-07-17 NOTE — HPI
85 y.o F with a hx of Anemia, Anticoagulant long-term use with eliquis,P A-fib,, CKD stage V, DM, GERD, GI bleed duo to rectal hemorrhoids, HTN and Stroke presents to the ED via EMS from [facility] for emergent evaluation of an abnormal lab. The pt was sent to the ED by her nephrologist .for further evaluation of decreased kidney function. The pt states she was able to urinate a small amount this AM, but has not voided since despite consuming an increased amount of water today. She was last seen in this ED yesterday for rectal bleeding duo to hemorrhoid,and renal insufficiency,ecieved IVF and as returned back to NH,in the past has luisana recommended Hosice and she is DNR,. She denies fever, chills, diaphoresis, nausea, emesis, diarrhea, abdominal pain, chest pain, SOB, dysuria and rash. No prior tx.her CKD 5 in recent few months is worsening,CRT today is 3.9,she has bene started on IVF and her nephrology is consulted.

## 2019-07-17 NOTE — ED TRIAGE NOTES
Pt from [facility], sent by Dr. Arenas due to worsening kidney function. Pt has nop complaints at this time except for posterior neck pain x2 weeks which she was seen in this ER and evaluated for previously

## 2019-07-18 PROBLEM — Z51.5 PALLIATIVE CARE ENCOUNTER: Status: ACTIVE | Noted: 2019-07-18

## 2019-07-18 LAB
ALBUMIN SERPL BCP-MCNC: 2.8 G/DL (ref 3.5–5.2)
ALP SERPL-CCNC: 108 U/L (ref 55–135)
ALT SERPL W/O P-5'-P-CCNC: 10 U/L (ref 10–44)
ANION GAP SERPL CALC-SCNC: 10 MMOL/L (ref 8–16)
ANION GAP SERPL CALC-SCNC: 10 MMOL/L (ref 8–16)
AST SERPL-CCNC: 11 U/L (ref 10–40)
BASOPHILS # BLD AUTO: 0.05 K/UL (ref 0–0.2)
BASOPHILS # BLD AUTO: 0.05 K/UL (ref 0–0.2)
BASOPHILS NFR BLD: 0.7 % (ref 0–1.9)
BASOPHILS NFR BLD: 0.7 % (ref 0–1.9)
BILIRUB SERPL-MCNC: 0.3 MG/DL (ref 0.1–1)
BUN SERPL-MCNC: 106 MG/DL (ref 8–23)
BUN SERPL-MCNC: 106 MG/DL (ref 8–23)
CALCIUM SERPL-MCNC: 8.8 MG/DL (ref 8.7–10.5)
CALCIUM SERPL-MCNC: 8.8 MG/DL (ref 8.7–10.5)
CHLORIDE SERPL-SCNC: 107 MMOL/L (ref 95–110)
CHLORIDE SERPL-SCNC: 107 MMOL/L (ref 95–110)
CO2 SERPL-SCNC: 23 MMOL/L (ref 23–29)
CO2 SERPL-SCNC: 23 MMOL/L (ref 23–29)
CREAT SERPL-MCNC: 3.3 MG/DL (ref 0.5–1.4)
CREAT SERPL-MCNC: 3.3 MG/DL (ref 0.5–1.4)
DIFFERENTIAL METHOD: ABNORMAL
DIFFERENTIAL METHOD: ABNORMAL
EOSINOPHIL # BLD AUTO: 0.4 K/UL (ref 0–0.5)
EOSINOPHIL # BLD AUTO: 0.4 K/UL (ref 0–0.5)
EOSINOPHIL NFR BLD: 4.8 % (ref 0–8)
EOSINOPHIL NFR BLD: 4.8 % (ref 0–8)
ERYTHROCYTE [DISTWIDTH] IN BLOOD BY AUTOMATED COUNT: 16.4 % (ref 11.5–14.5)
ERYTHROCYTE [DISTWIDTH] IN BLOOD BY AUTOMATED COUNT: 16.4 % (ref 11.5–14.5)
EST. GFR  (AFRICAN AMERICAN): 14 ML/MIN/1.73 M^2
EST. GFR  (AFRICAN AMERICAN): 14 ML/MIN/1.73 M^2
EST. GFR  (NON AFRICAN AMERICAN): 12 ML/MIN/1.73 M^2
EST. GFR  (NON AFRICAN AMERICAN): 12 ML/MIN/1.73 M^2
ESTIMATED AVG GLUCOSE: 128 MG/DL (ref 68–131)
GLUCOSE SERPL-MCNC: 131 MG/DL (ref 70–110)
GLUCOSE SERPL-MCNC: 131 MG/DL (ref 70–110)
HBA1C MFR BLD HPLC: 6.1 % (ref 4–5.6)
HCT VFR BLD AUTO: 25.8 % (ref 37–48.5)
HCT VFR BLD AUTO: 25.8 % (ref 37–48.5)
HGB BLD-MCNC: 8.3 G/DL (ref 12–16)
HGB BLD-MCNC: 8.3 G/DL (ref 12–16)
LYMPHOCYTES # BLD AUTO: 1.5 K/UL (ref 1–4.8)
LYMPHOCYTES # BLD AUTO: 1.5 K/UL (ref 1–4.8)
LYMPHOCYTES NFR BLD: 20.4 % (ref 18–48)
LYMPHOCYTES NFR BLD: 20.4 % (ref 18–48)
MAGNESIUM SERPL-MCNC: 1.3 MG/DL (ref 1.6–2.6)
MCH RBC QN AUTO: 29.6 PG (ref 27–31)
MCH RBC QN AUTO: 29.6 PG (ref 27–31)
MCHC RBC AUTO-ENTMCNC: 32.2 G/DL (ref 32–36)
MCHC RBC AUTO-ENTMCNC: 32.2 G/DL (ref 32–36)
MCV RBC AUTO: 92 FL (ref 82–98)
MCV RBC AUTO: 92 FL (ref 82–98)
MONOCYTES # BLD AUTO: 0.4 K/UL (ref 0.3–1)
MONOCYTES # BLD AUTO: 0.4 K/UL (ref 0.3–1)
MONOCYTES NFR BLD: 5.4 % (ref 4–15)
MONOCYTES NFR BLD: 5.4 % (ref 4–15)
NEUTROPHILS # BLD AUTO: 5 K/UL (ref 1.8–7.7)
NEUTROPHILS # BLD AUTO: 5 K/UL (ref 1.8–7.7)
NEUTROPHILS NFR BLD: 68.8 % (ref 38–73)
NEUTROPHILS NFR BLD: 68.8 % (ref 38–73)
PLATELET # BLD AUTO: 290 K/UL (ref 150–350)
PLATELET # BLD AUTO: 290 K/UL (ref 150–350)
PMV BLD AUTO: 9.2 FL (ref 9.2–12.9)
PMV BLD AUTO: 9.2 FL (ref 9.2–12.9)
POCT GLUCOSE: 131 MG/DL (ref 70–110)
POCT GLUCOSE: 163 MG/DL (ref 70–110)
POCT GLUCOSE: 192 MG/DL (ref 70–110)
POCT GLUCOSE: 93 MG/DL (ref 70–110)
POTASSIUM SERPL-SCNC: 3.8 MMOL/L (ref 3.5–5.1)
POTASSIUM SERPL-SCNC: 3.8 MMOL/L (ref 3.5–5.1)
PROT SERPL-MCNC: 6.9 G/DL (ref 6–8.4)
RBC # BLD AUTO: 2.8 M/UL (ref 4–5.4)
RBC # BLD AUTO: 2.8 M/UL (ref 4–5.4)
SODIUM SERPL-SCNC: 140 MMOL/L (ref 136–145)
SODIUM SERPL-SCNC: 140 MMOL/L (ref 136–145)
WBC # BLD AUTO: 7.22 K/UL (ref 3.9–12.7)
WBC # BLD AUTO: 7.22 K/UL (ref 3.9–12.7)

## 2019-07-18 PROCEDURE — 97165 OT EVAL LOW COMPLEX 30 MIN: CPT

## 2019-07-18 PROCEDURE — 83735 ASSAY OF MAGNESIUM: CPT

## 2019-07-18 PROCEDURE — 25000003 PHARM REV CODE 250: Performed by: NURSE PRACTITIONER

## 2019-07-18 PROCEDURE — 21400001 HC TELEMETRY ROOM

## 2019-07-18 PROCEDURE — 25000003 PHARM REV CODE 250: Performed by: INTERNAL MEDICINE

## 2019-07-18 PROCEDURE — 80053 COMPREHEN METABOLIC PANEL: CPT

## 2019-07-18 PROCEDURE — 99223 PR INITIAL HOSPITAL CARE,LEVL III: ICD-10-PCS | Mod: ,,, | Performed by: NURSE PRACTITIONER

## 2019-07-18 PROCEDURE — 99223 1ST HOSP IP/OBS HIGH 75: CPT | Mod: ,,, | Performed by: NURSE PRACTITIONER

## 2019-07-18 PROCEDURE — 85025 COMPLETE CBC W/AUTO DIFF WBC: CPT

## 2019-07-18 PROCEDURE — 63600175 PHARM REV CODE 636 W HCPCS: Performed by: INTERNAL MEDICINE

## 2019-07-18 PROCEDURE — 30200315 PPD INTRADERMAL TEST REV CODE 302: Performed by: INTERNAL MEDICINE

## 2019-07-18 PROCEDURE — 97161 PT EVAL LOW COMPLEX 20 MIN: CPT | Performed by: PHYSICAL THERAPIST

## 2019-07-18 PROCEDURE — 63600175 PHARM REV CODE 636 W HCPCS: Performed by: HOSPITALIST

## 2019-07-18 PROCEDURE — 36415 COLL VENOUS BLD VENIPUNCTURE: CPT

## 2019-07-18 PROCEDURE — 25000003 PHARM REV CODE 250: Performed by: HOSPITALIST

## 2019-07-18 PROCEDURE — 94761 N-INVAS EAR/PLS OXIMETRY MLT: CPT

## 2019-07-18 PROCEDURE — 86580 TB INTRADERMAL TEST: CPT | Performed by: INTERNAL MEDICINE

## 2019-07-18 PROCEDURE — S5571 INSULIN DISPOS PEN 3 ML: HCPCS | Performed by: INTERNAL MEDICINE

## 2019-07-18 RX ORDER — NIFEDIPINE 30 MG/1
60 TABLET, EXTENDED RELEASE ORAL DAILY
Status: DISCONTINUED | OUTPATIENT
Start: 2019-07-18 | End: 2019-07-18 | Stop reason: SDUPTHER

## 2019-07-18 RX ORDER — CARVEDILOL 3.12 MG/1
3.12 TABLET ORAL 2 TIMES DAILY
Status: DISCONTINUED | OUTPATIENT
Start: 2019-07-18 | End: 2019-07-18 | Stop reason: SDUPTHER

## 2019-07-18 RX ORDER — POLYETHYLENE GLYCOL 3350 17 G/17G
17 POWDER, FOR SOLUTION ORAL 2 TIMES DAILY PRN
Status: DISCONTINUED | OUTPATIENT
Start: 2019-07-18 | End: 2019-07-22 | Stop reason: HOSPADM

## 2019-07-18 RX ORDER — AMOXICILLIN 250 MG
1 CAPSULE ORAL 2 TIMES DAILY
Status: DISCONTINUED | OUTPATIENT
Start: 2019-07-18 | End: 2019-07-22 | Stop reason: HOSPADM

## 2019-07-18 RX ORDER — COLCHICINE 0.6 MG/1
0.6 TABLET ORAL DAILY
Status: DISCONTINUED | OUTPATIENT
Start: 2019-07-18 | End: 2019-07-22 | Stop reason: HOSPADM

## 2019-07-18 RX ORDER — MAGNESIUM SULFATE HEPTAHYDRATE 40 MG/ML
2 INJECTION, SOLUTION INTRAVENOUS ONCE
Status: COMPLETED | OUTPATIENT
Start: 2019-07-18 | End: 2019-07-18

## 2019-07-18 RX ORDER — FAMOTIDINE 20 MG/1
20 TABLET, FILM COATED ORAL DAILY
Status: DISCONTINUED | OUTPATIENT
Start: 2019-07-18 | End: 2019-07-22 | Stop reason: HOSPADM

## 2019-07-18 RX ORDER — NIFEDIPINE 30 MG/1
60 TABLET, EXTENDED RELEASE ORAL DAILY
Status: DISCONTINUED | OUTPATIENT
Start: 2019-07-19 | End: 2019-07-22 | Stop reason: HOSPADM

## 2019-07-18 RX ORDER — IPRATROPIUM BROMIDE AND ALBUTEROL SULFATE 2.5; .5 MG/3ML; MG/3ML
3 SOLUTION RESPIRATORY (INHALATION) EVERY 4 HOURS PRN
Status: DISCONTINUED | OUTPATIENT
Start: 2019-07-18 | End: 2019-07-22 | Stop reason: HOSPADM

## 2019-07-18 RX ORDER — CARVEDILOL 3.12 MG/1
3.12 TABLET ORAL 2 TIMES DAILY
Status: DISCONTINUED | OUTPATIENT
Start: 2019-07-18 | End: 2019-07-22 | Stop reason: HOSPADM

## 2019-07-18 RX ORDER — CALCITRIOL 0.25 UG/1
0.25 CAPSULE ORAL EVERY OTHER DAY
Status: DISCONTINUED | OUTPATIENT
Start: 2019-07-19 | End: 2019-07-22 | Stop reason: HOSPADM

## 2019-07-18 RX ORDER — OXYCODONE AND ACETAMINOPHEN 5; 325 MG/1; MG/1
1 TABLET ORAL EVERY 6 HOURS PRN
Status: DISCONTINUED | OUTPATIENT
Start: 2019-07-18 | End: 2019-07-22 | Stop reason: HOSPADM

## 2019-07-18 RX ORDER — ACETAMINOPHEN 325 MG/1
650 TABLET ORAL EVERY 4 HOURS PRN
Status: DISCONTINUED | OUTPATIENT
Start: 2019-07-18 | End: 2019-07-22 | Stop reason: HOSPADM

## 2019-07-18 RX ADMIN — COLCHICINE 0.6 MG: 0.6 TABLET, FILM COATED ORAL at 12:07

## 2019-07-18 RX ADMIN — ACETAMINOPHEN 650 MG: 325 TABLET, FILM COATED ORAL at 09:07

## 2019-07-18 RX ADMIN — MAGNESIUM SULFATE 2 G: 2 INJECTION INTRAVENOUS at 12:07

## 2019-07-18 RX ADMIN — FAMOTIDINE 20 MG: 20 TABLET ORAL at 12:07

## 2019-07-18 RX ADMIN — CARVEDILOL 3.12 MG: 3.12 TABLET, FILM COATED ORAL at 09:07

## 2019-07-18 RX ADMIN — INSULIN DETEMIR 5 UNITS: 100 INJECTION, SOLUTION SUBCUTANEOUS at 09:07

## 2019-07-18 RX ADMIN — Medication 5 UNITS: at 06:07

## 2019-07-18 RX ADMIN — NIFEDIPINE 30 MG: 30 TABLET, FILM COATED, EXTENDED RELEASE ORAL at 09:07

## 2019-07-18 RX ADMIN — CEFTRIAXONE 1 G: 1 INJECTION, SOLUTION INTRAVENOUS at 06:07

## 2019-07-18 RX ADMIN — SENNOSIDES AND DOCUSATE SODIUM 1 TABLET: 8.6; 5 TABLET ORAL at 10:07

## 2019-07-18 RX ADMIN — APIXABAN 2.5 MG: 2.5 TABLET, FILM COATED ORAL at 09:07

## 2019-07-18 RX ADMIN — SODIUM CHLORIDE: 0.9 INJECTION, SOLUTION INTRAVENOUS at 03:07

## 2019-07-18 RX ADMIN — ACETAMINOPHEN 650 MG: 325 TABLET, FILM COATED ORAL at 12:07

## 2019-07-18 RX ADMIN — SENNOSIDES AND DOCUSATE SODIUM 1 TABLET: 8.6; 5 TABLET ORAL at 09:07

## 2019-07-18 RX ADMIN — APIXABAN 2.5 MG: 2.5 TABLET, FILM COATED ORAL at 12:07

## 2019-07-18 NOTE — SUBJECTIVE & OBJECTIVE
Interval History: No new issues.       Review of Systems   Constitutional: Negative for activity change.   HENT: Negative for congestion.    Respiratory: Negative for chest tightness and shortness of breath.    Cardiovascular: Negative for chest pain.   Gastrointestinal: Negative for abdominal pain.   Genitourinary: Negative for difficulty urinating.     Objective:     Vital Signs (Most Recent):  Temp: 98 °F (36.7 °C) (07/18/19 0749)  Pulse: 68 (07/18/19 0749)  Resp: 18 (07/18/19 0749)  BP: (!) 154/60 (07/18/19 0809)  SpO2: 99 % (07/18/19 0749) Vital Signs (24h Range):  Temp:  [97 °F (36.1 °C)-98.1 °F (36.7 °C)] 98 °F (36.7 °C)  Pulse:  [62-76] 68  Resp:  [16-18] 18  SpO2:  [97 %-100 %] 99 %  BP: (134-192)/(60-79) 154/60     Weight: 73 kg (160 lb 15 oz)  Body mass index is 25.98 kg/m².    Intake/Output Summary (Last 24 hours) at 7/18/2019 1008  Last data filed at 7/18/2019 0538  Gross per 24 hour   Intake 1720 ml   Output 100 ml   Net 1620 ml      Physical Exam   Constitutional: She is oriented to person, place, and time. She appears well-developed and well-nourished.   HENT:   Head: Atraumatic.   Neurological: She is alert and oriented to person, place, and time.   Psychiatric: She has a normal mood and affect. Her behavior is normal.   Nursing note and vitals reviewed.      Significant Labs:   BMP:   Recent Labs   Lab 07/17/19  1511 07/18/19  0547   GLU 93 131*  131*    140  140   K 4.1 3.8  3.8    107  107   CO2 22* 23  23   * 106*  106*   CREATININE 3.9* 3.3*  3.3*   CALCIUM 9.2 8.8  8.8   MG 1.5*  --      CBC:   Recent Labs   Lab 07/16/19  1311 07/17/19  1511 07/18/19  0547   WBC 10.62 8.72 7.22  7.22   HGB 9.5* 8.5* 8.3*  8.3*   HCT 30.9* 27.6* 25.8*  25.8*   * 310 290  290       Significant Imaging:

## 2019-07-18 NOTE — CONSULTS
[facility]  Palliative Medicine  Consult Note    Patient Name: Joyce Riley  MRN: [id number]  Admission Date: 7/17/2019  Hospital Length of Stay: 1 days  Code Status: DNR   Attending Provider: Shahab Chinchilla MD  Consulting Provider: Bonita Coreas NP  Primary Care Physician: Kalpana Staton MD  Principal Problem:Acute renal failure superimposed on stage 5 chronic kidney disease, not on chronic dialysis    Patient information was obtained from patient, relative(s), past medical records and ER records.      Thank you for your consult.     Subjective:       Principal Problem:Acute renal failure superimposed on stage 5 chronic kidney disease, not on chronic dialysis        HPI:   85 y.o F with a hx of Anemia, Anticoagulant long-term use with eliquis,P A-fib,, CKD stage V, DM, GERD, GI bleed duo to rectal hemorrhoids, HTN and Stroke presents to the ED via EMS from [facility] for emergent evaluation of an abnormal lab. The pt was sent to the ED by her nephrologist .for further evaluation of decreased kidney function. The pt states she was able to urinate a small amount this AM, but has not voided since despite consuming an increased amount of water today. She was last seen in this ED yesterday for rectal bleeding duo to hemorrhoid,and renal insufficiency,ecieved IVF and as returned back to NH,in the past has luisana recommended Hosice and she is DNR,. She denies fever, chills, diaphoresis, nausea, emesis, diarrhea, abdominal pain, chest pain, SOB, dysuria and rash. No prior tx.her CKD 5 in recent few months is worsening,CRT today is 3.9,she has bene started on IVF and her nephrology is consulted.     Overview/Hospital Course:  85 y.o F with a hx of Anemia, Anticoagulant long-term use with eliquis,P A-fib,, CKD stage V, DM, GERD, GI bleed duo to rectal hemorrhoids, HTN and Stroke presents to the ED via EMS from [facility] for emergent evaluation of an abnormal lab. The pt was  sent to the ED by her nephrologist .for further evaluation of decreased kidney function.   She was started on IV fluids with some improvement in renal function. She in the past was not a candidate for dialysis and the recommendation was for hospice.  Nephrology and palliative care were consulted.           Hospital Course:  No notes on file        Past Medical History:   Diagnosis Date    Anemia     Anticoagulant long-term use     Arthritis     Atrial fibrillation     Cataract     CHF (congestive heart failure)     CKD (chronic kidney disease), stage V     Diabetes mellitus     Type 2    GERD (gastroesophageal reflux disease)     GI bleed 2018    Gout     Hypertension     Obese     Requires assistance with all daily activities     Stroke     Wheelchair dependent        Past Surgical History:   Procedure Laterality Date     SECTION      Patient unsure, believe she had 3-4    CHOLECYSTECTOMY      EYE SURGERY      HYSTERECTOMY      JOINT REPLACEMENT Right     knee    TOTAL KNEE ARTHROPLASTY Right        Review of patient's allergies indicates:   Allergen Reactions    Indomethacin     Nsaids (non-steroidal anti-inflammatory drug)        Medications:  Continuous Infusions:   sodium chloride 0.9% 125 mL/hr at 19 0339     Scheduled Meds:   apixaban  2.5 mg Oral BID    [START ON 2019] calcitRIOL  0.25 mcg Oral Every other day    carvedilol  3.125 mg Oral BID    cefTRIAXone (ROCEPHIN) IVPB  1 g Intravenous Q24H    colchicine  0.6 mg Oral Daily    famotidine  20 mg Oral Daily    insulin detemir U-100  5 Units Subcutaneous QHS    [START ON 2019] NIFEdipine  60 mg Oral Daily    senna-docusate 8.6-50 mg  1 tablet Oral BID     PRN Meds:acetaminophen, albuterol-ipratropium, cloNIDine, dextrose 50%, dextrose 50%, glucagon (human recombinant), glucose, glucose, insulin aspart U-100, polyethylene glycol, sodium chloride 0.9%    Family History     Problem  Relation (Age of Onset)    Cancer Mother, Brother    Diabetes Mother    Hypertension Mother        Tobacco Use    Smoking status: Never Smoker    Smokeless tobacco: Never Used   Substance and Sexual Activity    Alcohol use: No    Drug use: No    Sexual activity: Never       Review of Systems   Gastrointestinal: Positive for constipation.     Objective:     Vital Signs (Most Recent):  Temp: 98 °F (36.7 °C) (07/18/19 0749)  Pulse: 68 (07/18/19 0749)  Resp: 18 (07/18/19 0749)  BP: (!) 154/60 (07/18/19 0809)  SpO2: 99 % (07/18/19 0749) Vital Signs (24h Range):  Temp:  [97 °F (36.1 °C)-98.1 °F (36.7 °C)] 98 °F (36.7 °C)  Pulse:  [62-76] 68  Resp:  [16-18] 18  SpO2:  [97 %-100 %] 99 %  BP: (134-192)/(60-79) 154/60     Weight: 73 kg (160 lb 15 oz)  Body mass index is 25.98 kg/m².    Review of Symptoms  Symptom Assessment (ESAS 0-10 scale)   ESAS 0 1 2 3 4 5 6 7 8 9 10   Pain x             Dyspnea x             Anxiety x             Nausea x             Depression  x             Anorexia x             Fatigue x             Insomnia x             Restlessness  x             Agitation x                 Physical Exam   Constitutional: She is oriented to person, place, and time.   obese   Cardiovascular: Normal rate and regular rhythm.   Pulmonary/Chest: Effort normal and breath sounds normal.   Abdominal: Soft. Bowel sounds are normal.   Neurological: She is alert and oriented to person, place, and time.   Intermittent forgetfulness   Skin: Skin is warm and dry.       Significant Labs: All pertinent labs within the past 24 hours have been reviewed.  CBC:   Recent Labs   Lab 07/18/19  0547   WBC 7.22  7.22   HGB 8.3*  8.3*   HCT 25.8*  25.8*   MCV 92  92     290     BMP:  Recent Labs   Lab 07/17/19  1511 07/18/19  0547   GLU 93 131*  131*    140  140   K 4.1 3.8  3.8    107  107   CO2 22* 23  23   * 106*  106*   CREATININE 3.9* 3.3*  3.3*   CALCIUM 9.2 8.8  8.8   MG 1.5*  --       LFT:  Lab Results   Component Value Date    AST 11 07/18/2019    ALKPHOS 108 07/18/2019    BILITOT 0.3 07/18/2019     Albumin:   Albumin   Date Value Ref Range Status   07/18/2019 2.8 (L) 3.5 - 5.2 g/dL Final   06/06/2019 3.4 3.2 - 5.6 Final     Protein:   Total Protein   Date Value Ref Range Status   07/18/2019 6.9 6.0 - 8.4 g/dL Final     Lactic acid:   Lab Results   Component Value Date    LACTATE 1.6 07/16/2019    LACTATE 0.7 04/10/2019       Significant Imaging: I have reviewed all pertinent imaging results/findings within the past 24 hours.    Advance Care Planning   Advanced Directives::  Living Will: No  LaPOST: Yes  Do Not Resuscitate Status: Patient is a DNR  Medical Power of : No    Decision-Making Capacity: Patient answered questions, Family answered questions       ASSESSMENT/PLAN:    Palliative encounter:    Bedside consult with patient and her daughter Antonia Fu. Patient alert and oriented x 3 with some forgetfulness. She c/o constipation and no BM for 4 days. Daughter states patient came to hospital per the direction of the Nephrologist for abnormal labs.   Advance Care Planning     Code Status  Patient has a LaPOST in [facility] with DNR and comfort measures so this was addressed as there was some confusion in ED as to why the patient presented to ED for treatment if comfort only, and if she is on hospice (which should be the only reason one would select comfort measures only). Patient is not on hospice.   We reviewed tthe patients preferences for care and patient and family would like treatment for things that can be reversed which is reasonable. Patient does not wish to have CPR or other invasive treatments performed when her heart and/or breathing stops so DNR was reaffirmed. We completed a new LaPOST and patient wanted daughter to sign. Updated Dr Chinchilla    -Reaffirmed DNR but would like conservative medical management for reversible conditions  -New LaPOST completed with DNR/selective  treatment. Old LaPOST voided and attached to New updated LaPOST to be scanned in as one copy   -symptom management (home meds re-ordered for constipation)              > 50% of 70 min visit spent in chart review, face to face discussion of goals of care,  symptom assessment, coordination of care and emotional support.    Bonita Coreas, NP  Palliative Medicine  [facility]

## 2019-07-18 NOTE — PLAN OF CARE
Problem: Diabetes Comorbidity  Goal: Blood Glucose Level Within Desired Range  Outcome: Ongoing (interventions implemented as appropriate)  Intervention: Maintain Glycemic Control     07/18/19 0517   Monitor and Manage Ketoacidosis   Glycemic Management blood glucose monitoring;other (see comments)

## 2019-07-18 NOTE — HPI
Principal Problem:Acute renal failure superimposed on stage 5 chronic kidney disease, not on chronic dialysis        HPI:   85 y.o F with a hx of Anemia, Anticoagulant long-term use with eliquis,P A-fib,, CKD stage V, DM, GERD, GI bleed duo to rectal hemorrhoids, HTN and Stroke presents to the ED via EMS from [facility] for emergent evaluation of an abnormal lab. The pt was sent to the ED by her nephrologist .for further evaluation of decreased kidney function. The pt states she was able to urinate a small amount this AM, but has not voided since despite consuming an increased amount of water today. She was last seen in this ED yesterday for rectal bleeding duo to hemorrhoid,and renal insufficiency,ecieved IVF and as returned back to NH,in the past has luisana recommended Hosice and she is DNR,. She denies fever, chills, diaphoresis, nausea, emesis, diarrhea, abdominal pain, chest pain, SOB, dysuria and rash. No prior tx.her CKD 5 in recent few months is worsening,CRT today is 3.9,she has bene started on IVF and her nephrology is consulted.     Overview/Hospital Course:  85 y.o F with a hx of Anemia, Anticoagulant long-term use with eliquis,P A-fib,, CKD stage V, DM, GERD, GI bleed duo to rectal hemorrhoids, HTN and Stroke presents to the ED via EMS from [facility] for emergent evaluation of an abnormal lab. The pt was sent to the ED by her nephrologist .for further evaluation of decreased kidney function.   She was started on IV fluids with some improvement in renal function. She in the past was not a candidate for dialysis and the recommendation was for hospice.  Nephrology and palliative care were consulted.

## 2019-07-18 NOTE — HPI
Ms. Riley is an 84 yo AAF with afib, CHF, CKD stage 4, HTN, gout, h/o CVA, and dementia who was admitted yesterday with ROXANNE for which nephrology is consulted. She has CKD stage 4 with baseline Cr 2.3-3.2 followed outpatient by Dr. Baca; last visit 6/12/19. She has a recent h/o ROXANNE resulting in need for dialysis; however dialysis treatments were complicated by her dementia and pulling at lines. Fortunately her renal function recovered and she was discharged home without need for further dialysis. Her volume status is managed outpatient with lasix and metolazone. She presented to the ED on 7/16 with GIB. She received IVF and was discharged same day with outpatient follow-up. She called nephrology clinic yesterday with complaint of no UOP since hospital discharge. She was encouraged to present to the hospital. Her Cr was 4.3 on 7/16 and was 3.9 on day of arrival. She was started on IVF as well as abx for UTI. Her Cr has improved to 3.3 this AM. She reports to be doing okay this afternoon and is feeling improved. She reports UOP has improved. No SOB or edema.

## 2019-07-18 NOTE — CONSULTS
[facility]  Nephrology  Consult Note    Patient Name: Joyce Riley  MRN: [id number]  Admission Date: 7/17/2019  Hospital Length of Stay: 1 days  Attending Provider: Shahab Chinchilla MD   Primary Care Physician: Kalpana Staton MD  Principal Problem:Acute renal failure superimposed on stage 5 chronic kidney disease, not on chronic dialysis    Inpatient consult to Nephrology  Consult performed by: Ina Glass MD  Consult ordered by: Veronica Berry MD  Reason for consult: ROXANNE on CKD        Subjective:     HPI: Ms. Riley is an 86 yo AAF with afib, CHF, CKD stage 4, HTN, gout, h/o CVA, and dementia who was admitted yesterday with ROXANNE for which nephrology is consulted. She has CKD stage 4 with baseline Cr 2.3-3.2 followed outpatient by Dr. Baca; last visit 6/12/19. She has a recent h/o ROXANNE resulting in need for dialysis; however dialysis treatments were complicated by her dementia and pulling at lines. Fortunately her renal function recovered and she was discharged home without need for further dialysis. Her volume status is managed outpatient with lasix and metolazone. She presented to the ED on 7/16 with GIB. She received IVF and was discharged same day with outpatient follow-up. She called nephrology clinic yesterday with complaint of no UOP since hospital discharge. She was encouraged to present to the hospital. Her Cr was 4.3 on 7/16 and was 3.9 on day of arrival. She was started on IVF as well as abx for UTI. Her Cr has improved to 3.3 this AM. She reports to be doing okay this afternoon and is feeling improved. She reports UOP has improved. No SOB or edema.     Past Medical History:   Diagnosis Date    Anemia     Anticoagulant long-term use     Arthritis     Atrial fibrillation     Cataract     CHF (congestive heart failure)     CKD (chronic kidney disease), stage V     Diabetes mellitus     Type 2    GERD (gastroesophageal reflux disease)     GI  bleed 2018    Gout     Hypertension     Obese     Requires assistance with all daily activities     Stroke 2014    Wheelchair dependent        Past Surgical History:   Procedure Laterality Date     SECTION      Patient unsure, believe she had 3-4    CHOLECYSTECTOMY      EYE SURGERY      HYSTERECTOMY      JOINT REPLACEMENT Right     knee    TOTAL KNEE ARTHROPLASTY Right        Review of patient's allergies indicates:   Allergen Reactions    Indomethacin     Nsaids (non-steroidal anti-inflammatory drug)      Current Facility-Administered Medications   Medication Frequency    0.9%  NaCl infusion Continuous    acetaminophen tablet 650 mg Q4H PRN    albuterol-ipratropium 2.5 mg-0.5 mg/3 mL nebulizer solution 3 mL Q4H PRN    apixaban tablet 2.5 mg BID    [START ON 2019] calcitRIOL capsule 0.25 mcg Every other day    carvedilol tablet 3.125 mg BID    cefTRIAXone (ROCEPHIN) 1 g in dextrose 5 % 50 mL IVPB Q24H    cloNIDine tablet 0.1 mg Q4H PRN    colchicine tablet 0.6 mg Daily    dextrose 50% injection 12.5 g PRN    dextrose 50% injection 25 g PRN    famotidine tablet 20 mg Daily    glucagon (human recombinant) injection 1 mg PRN    glucose chewable tablet 16 g PRN    glucose chewable tablet 24 g PRN    insulin aspart U-100 pen 0-5 Units QID (AC + HS) PRN    insulin detemir U-100 pen 5 Units QHS    magnesium sulfate 2g in water 50mL IVPB (premix) Once    [START ON 2019] NIFEdipine 24 hr tablet 60 mg Daily    oxyCODONE-acetaminophen 5-325 mg per tablet 1 tablet Q6H PRN    polyethylene glycol packet 17 g BID PRN    senna-docusate 8.6-50 mg per tablet 1 tablet BID    sodium chloride 0.9% flush 10 mL PRN     Family History     Problem Relation (Age of Onset)    Cancer Mother, Brother    Diabetes Mother    Hypertension Mother        Tobacco Use    Smoking status: Never Smoker    Smokeless tobacco: Never Used   Substance and Sexual Activity    Alcohol use: No    Drug  use: No    Sexual activity: Never     Review of Systems   Constitutional: Negative for activity change.   Respiratory: Negative for shortness of breath.    Cardiovascular: Negative for chest pain and leg swelling.   All other systems reviewed and are negative.    Objective:     Vital Signs (Most Recent):  Temp: 97.1 °F (36.2 °C) (07/18/19 1135)  Pulse: 65 (07/18/19 1135)  Resp: 18 (07/18/19 1135)  BP: (!) 174/73 (07/18/19 1135)  SpO2: 99 % (07/18/19 1135)  O2 Device (Oxygen Therapy): room air (07/18/19 0958) Vital Signs (24h Range):  Temp:  [97 °F (36.1 °C)-98.1 °F (36.7 °C)] 97.1 °F (36.2 °C)  Pulse:  [62-76] 65  Resp:  [16-18] 18  SpO2:  [97 %-100 %] 99 %  BP: (134-192)/(60-79) 174/73     Weight: 73 kg (160 lb 15 oz) (07/17/19 2022)  Body mass index is 25.98 kg/m².  Body surface area is 1.84 meters squared.    I/O last 3 completed shifts:  In: 1720 [P.O.:420; I.V.:1250; IV Piggyback:50]  Out: 100 [Urine:100]    Physical Exam   Constitutional: She appears well-developed and well-nourished. No distress.   HENT:   Head: Normocephalic and atraumatic.   Mouth/Throat: Oropharynx is clear and moist and mucous membranes are normal.   Eyes: Conjunctivae and EOM are normal.   Cardiovascular: Normal rate and regular rhythm.   Pulmonary/Chest: Effort normal. She has decreased breath sounds. She has no rales.   Abdominal: Soft. She exhibits no distension.   Musculoskeletal: She exhibits no edema or deformity.   Neurological: She is alert.   Oriented to person, month, and location   Skin: Skin is warm and dry. She is not diaphoretic.   Psychiatric: She has a normal mood and affect. Her behavior is normal.       Significant Labs:  CBC:   Recent Labs   Lab 07/18/19  0547   WBC 7.22  7.22   RBC 2.80*  2.80*   HGB 8.3*  8.3*   HCT 25.8*  25.8*     290   MCV 92  92   MCH 29.6  29.6   MCHC 32.2  32.2     CMP:   Recent Labs   Lab 07/18/19  0547   *  131*   CALCIUM 8.8  8.8   ALBUMIN 2.8*   PROT 6.9      140   K 3.8  3.8   CO2 23  23     107   *  106*   CREATININE 3.3*  3.3*   ALKPHOS 108   ALT 10   AST 11   BILITOT 0.3     All labs within the past 24 hours have been reviewed.    Significant Imaging:  Labs: Reviewed  Echo: Reviewed   TTE 4/2019: EF 80%; grade 2 diastolic dysfunction    Prior outpatient nephrology notes and [facility] records obtained, reviewed, and summarized as above.     Assessment/Plan:     ROXANNE on CKD stage 4  - baseline Cr 2.3-3.2; followed outpatient by Dr. Baca  - h/o dialysis-dependent ATN; subsequently improved and no longer needs dialysis. Of note, my team feels that patient IS a candidate for outpatient dialysis if this were to become needed in the future  - Cr 4.3 --> 3.3 today after receiving IVF; likely pre-renal in etiology  - will d/c IVF today. Will f/u renal function in AM. Given h/o CHF, anticipating that diuretics will need to be restarted soon but will continue to hold for now  - no need for RRT at this time; renal function almost back to baseline  - renally dose medications  - avoid nephrotoxic agents  - daily labs; strict I/Os    Blood loss anemia  - recent h/o GIB; outpatient GI eval pending  - continue to trend    Secondary HPTH  - continue calcitriol  - CCa at goal; will check phos level  - renal diet    Metabolic acidosis  - bicarb currently at goal for CKD  - continue to trend for now  - renal diet    Case discussed with Dr. Baca.   Thank you for your consult. I will follow-up with patient. Please contact us if you have any additional questions.    Ina Orr MD  Nephrology  [facility], [facility]  Office: [phone]  [facility]  Date of service: 07/18/2019

## 2019-07-18 NOTE — ASSESSMENT & PLAN NOTE
Will continue with IV Abx,folow cultures.  E-coli. Not sure if colonization or infection. No sepsis.

## 2019-07-18 NOTE — PT/OT/SLP EVAL
"Occupational Therapy   Evaluation    Name: Joyce Riley  MRN: [id number]  Admitting Diagnosis:  Acute renal failure superimposed on stage 5 chronic kidney disease, not on chronic dialysis      Recommendations:     Discharge Recommendations: nursing facility, skilled  Discharge Equipment Recommendations:  none  Barriers to discharge:  None    Assessment:     Joyce Riley is a 85 y.o. female with a medical diagnosis of Acute renal failure superimposed on stage 5 chronic kidney disease, not on chronic dialysis.  She presents with decreased I with ADLs and refused attempt at functional transfer. Pt did demo good participation with bed mobility without assistance with increased time required. Performance deficits affecting function: weakness, impaired functional mobilty, impaired endurance, pain, impaired balance, decreased upper extremity function, impaired self care skills, decreased lower extremity function, decreased ROM.      Rehab Prognosis: fair +; patient would benefit from acute skilled OT services to address these deficits and reach maximum level of function.       Plan:     Patient to be seen 3 x/week to address the above listed problems via self-care/home management, therapeutic activities, therapeutic exercises  · Plan of Care Expires: 08/01/19  · Plan of Care Reviewed with: patient    Subjective     Chief Complaint: "I can't walk"   Patient/Family Comments/goals: reposition in bed d/t neck pain     Occupational Profile:  Living Environment: Pt reports getting skilled therapy at [facility] for the past ~2 weeks. Her goal is to return to her daughter's home eventually. ( [facility], [address])   Previous level of function: Pt uses a wheelchair, most of the time having someone push her instead of manually propelling self. Pt is able to do grooming and UE dressing, but requires assistance with other ADLs and functional transfers. Pt reports using a w/c for the past year. 0 falls reported within the last " 6 months.   [facility]   Equipment Used at Home:  wheelchair(handicap bathroom )  Assistance upon Discharge: NH staff (son and daughter visit)     Pain/Comfort:  · Pain Rating 1: 8/10  · Location - Side 1: Left  · Location - Orientation 1: generalized  · Location 1: neck(and knee (chronic))  · Pain Addressed 1: Reposition, Distraction, Cessation of Activity  · Pain Rating Post-Intervention 1: (improved with reposition in bed )    Patients cultural, spiritual, Mosque conflicts given the current situation: no    Objective:     Communicated with: nurseRadha, prior to session.  Patient found HOB elevated with bed alarm, peripheral IV upon OT entry to room.    General Precautions: Standard, fall, diabetic   Orthopedic Precautions:N/A   Braces: N/A     Occupational Performance:    Bed Mobility:    · Patient completed Rolling/Turning to Left with  stand by assistance, with side rail and HOB elevated  · Patient completed Scooting/Bridging with minimum assistance  · Patient completed Supine to Sit with contact guard assistance, with side rail and HOB elevated; increased time required  · Patient completed Sit to Supine with stand by assistance and pt able to lift B/L legs into bed with cueing     Functional Mobility/Transfers:  · Functional Mobility: refused attempt to scoot at EOB to HOB nor attempt to stand with assist     Activities of Daily Living:  · Grooming: minimum assistance with personal head cover/ scarf   · Lower Body Dressing: set-up with socks while sitting up in bed       Cognitive/Visual Perceptual:  Cognitive/Psychosocial Skills:     -       Oriented to: Person and Place   -       Follows Commands/attention:Follows multistep  commands  -       Communication: clear/fluent  -       Memory: Poor immediate recall  -       Safety awareness/insight to disability: pt safe to sit EOB for meals with SUP; will continue to monitor safety with more participation    -       Mood/Affect/Coping  skills/emotional control: Pleasant  Visual/Perceptual:      -Intact  acuity      Physical Exam:  Balance:    -       sitting: SUP; standing: refused assessment  Postural examination/scapula alignment:    -       Rounded shoulders  -       Forward head  Skin integrity: Visible skin intact  Edema:  no BUE edema noted  Sensation:    -       Intact  light/touch BUE  Dominant hand:    -       Right  Upper Extremity Range of Motion:     -       Right Upper Extremity: WFL except limitations d/t neck pain (but able to complete ADL needs)  -       Left Upper Extremity: WFL except limitations d/t neck pain (but able to complete ADL needs)  Upper Extremity Strength:    -       Right Upper Extremity: WFL  -       Left Upper Extremity: WFL   Strength:    -       Right Upper Extremity: WFL  -       Left Upper Extremity: WFL  Fine Motor Coordination:    -       Intact  Left hand, manipulation of objects and Right hand, manipulation of objects  Gross motor coordination:   unable to assess; pending ongoing participation with functional mobility     AMPA 6 Click ADL:  AMPAC Total Score: 19    Treatment & Education:  Pt educated on OT role/POC.   Importance of EOB activity with staff assistance, e.g. sitting EOB for meals    Safety during bed mobility and hand placement to increase independence   White board updated   Multiple self-care tasks/bed mobility completed- assistance level noted above   All questions/concerns answered within OT scope of practice     Education:    Patient left HOB elevated with all lines intact, call button in reach, bed alarm on and PCT notified    GOALS:   Multidisciplinary Problems     Occupational Therapy Goals        Problem: Occupational Therapy Goal    Goal Priority Disciplines Outcome Interventions   Occupational Therapy Goal     OT, PT/OT Ongoing (interventions implemented as appropriate)    Description:  Goals to be met by: 08/01/19     Patient will increase functional independence with ADLs by  performing:    Feeding with [place].  UE Dressing with Set-up Assistance.  LE Dressing with Set-up Assistance.  Grooming while seated with Set-up Assistance.  Sitting at edge of bed x15 minutes with Modified [place].  Rolling to Bilateral with Modified [place].   Supine to sit with Modified [place].  Upper extremity exercise program x15 reps per handout, with independence.                      History:     Past Medical History:   Diagnosis Date    Anemia     Anticoagulant long-term use     Arthritis     Atrial fibrillation     Cataract     CHF (congestive heart failure)     CKD (chronic kidney disease), stage V     Diabetes mellitus     Type 2    GERD (gastroesophageal reflux disease)     GI bleed 2018    Gout     Hypertension     Obese     Requires assistance with all daily activities     Stroke 2014    Wheelchair dependent        Past Surgical History:   Procedure Laterality Date     SECTION      Patient unsure, believe she had 3-4    CHOLECYSTECTOMY      EYE SURGERY      HYSTERECTOMY      JOINT REPLACEMENT Right     knee    TOTAL KNEE ARTHROPLASTY Right        Time Tracking:     OT Date of Treatment: 19  OT Start Time: 1457  OT Stop Time: 1517  OT Total Time (min): 20 min    Billable Minutes:Evaluation 20 min    Laurita Valencia OT  2019

## 2019-07-18 NOTE — PLAN OF CARE
Problem: Fall Injury Risk  Goal: Absence of Fall and Fall-Related Injury  Outcome: Ongoing (interventions implemented as appropriate)  Intervention: Promote Injury-Free Environment     07/18/19 0516   Optimize [place] and Functional Mobility   Environmental Safety Modification assistive device/personal items within reach;clutter free environment maintained;lighting adjusted   Optimize Balance and Safe Activity   Safety Promotion/Fall Prevention assistive device/personal item within reach;bed alarm set;Fall Risk reviewed with patient/family;high risk medications identified;instructed to call staff for mobility;lighting adjusted;medications reviewed;family to remain at bedside

## 2019-07-18 NOTE — ASSESSMENT & PLAN NOTE
Worsening,Will  continue with IVF,  Consult her nephrology,in the past reccommended hospice,consult palliative care.    Improved renal function some with fluids. Evidently not dialysis candidate. Nephrology and palliative care consulted. Continue IV fluids for now

## 2019-07-18 NOTE — NURSING
Received patient from ED per stretcher, accompanied by transport. Patient is awake and alert, AAOx4. NO apparent distress noted. Pain free as claimed. IV fluid infusing well at left forarm. Safety and fall precautions maintained. Oriented patient/son to room set up. Plan of care explained, verbalized understanding. Will continue to monitor.

## 2019-07-18 NOTE — PLAN OF CARE
Problem: Occupational Therapy Goal  Goal: Occupational Therapy Goal  Goals to be met by: 08/01/19     Patient will increase functional independence with ADLs by performing:    Feeding with [place].  UE Dressing with Set-up Assistance.  LE Dressing with Set-up Assistance.  Grooming while seated with Set-up Assistance.  Sitting at edge of bed x15 minutes with Modified [place].  Rolling to Bilateral with Modified [place].   Supine to sit with Modified [place].  Upper extremity exercise program x15 reps per handout, with independence.    Outcome: Ongoing (interventions implemented as appropriate)  Pt will continue to benefit from acute OT. OT rec. SNF at d/c.

## 2019-07-18 NOTE — NURSING
Bedside shift report received from SUKUMAR Estevez. Communication board has been updated. NAD noted. Will continue to monitor. Pt asleep but easily aroused. She is still c/o pain to knee and neck at an 8 (0-10).

## 2019-07-18 NOTE — SUBJECTIVE & OBJECTIVE
Past Medical History:   Diagnosis Date    Anemia     Anticoagulant long-term use     Arthritis     Atrial fibrillation     Cataract     CHF (congestive heart failure)     CKD (chronic kidney disease), stage V     Diabetes mellitus     Type 2    GERD (gastroesophageal reflux disease)     GI bleed 2018    Gout     Hypertension     Obese     Requires assistance with all daily activities     Stroke     Wheelchair dependent        Past Surgical History:   Procedure Laterality Date     SECTION      Patient unsure, believe she had 3-4    CHOLECYSTECTOMY      EYE SURGERY      HYSTERECTOMY      JOINT REPLACEMENT Right     knee    TOTAL KNEE ARTHROPLASTY Right        Review of patient's allergies indicates:   Allergen Reactions    Indomethacin     Nsaids (non-steroidal anti-inflammatory drug)        Medications:  Continuous Infusions:   sodium chloride 0.9% 125 mL/hr at 19 0339     Scheduled Meds:   apixaban  2.5 mg Oral BID    [START ON 2019] calcitRIOL  0.25 mcg Oral Every other day    carvedilol  3.125 mg Oral BID    cefTRIAXone (ROCEPHIN) IVPB  1 g Intravenous Q24H    colchicine  0.6 mg Oral Daily    famotidine  20 mg Oral Daily    insulin detemir U-100  5 Units Subcutaneous QHS    [START ON 2019] NIFEdipine  60 mg Oral Daily    senna-docusate 8.6-50 mg  1 tablet Oral BID     PRN Meds:acetaminophen, albuterol-ipratropium, cloNIDine, dextrose 50%, dextrose 50%, glucagon (human recombinant), glucose, glucose, insulin aspart U-100, polyethylene glycol, sodium chloride 0.9%    Family History     Problem Relation (Age of Onset)    Cancer Mother, Brother    Diabetes Mother    Hypertension Mother        Tobacco Use    Smoking status: Never Smoker    Smokeless tobacco: Never Used   Substance and Sexual Activity    Alcohol use: No    Drug use: No    Sexual activity: Never       Review of Systems   Gastrointestinal: Positive for constipation.     Objective:      Vital Signs (Most Recent):  Temp: 98 °F (36.7 °C) (07/18/19 0749)  Pulse: 68 (07/18/19 0749)  Resp: 18 (07/18/19 0749)  BP: (!) 154/60 (07/18/19 0809)  SpO2: 99 % (07/18/19 0749) Vital Signs (24h Range):  Temp:  [97 °F (36.1 °C)-98.1 °F (36.7 °C)] 98 °F (36.7 °C)  Pulse:  [62-76] 68  Resp:  [16-18] 18  SpO2:  [97 %-100 %] 99 %  BP: (134-192)/(60-79) 154/60     Weight: 73 kg (160 lb 15 oz)  Body mass index is 25.98 kg/m².    Review of Symptoms  Symptom Assessment (ESAS 0-10 scale)   ESAS 0 1 2 3 4 5 6 7 8 9 10   Pain x             Dyspnea x             Anxiety x             Nausea x             Depression  x             Anorexia x             Fatigue x             Insomnia x             Restlessness  x             Agitation x                 Physical Exam   Constitutional: She is oriented to person, place, and time.   obese   Cardiovascular: Normal rate and regular rhythm.   Pulmonary/Chest: Effort normal and breath sounds normal.   Abdominal: Soft. Bowel sounds are normal.   Neurological: She is alert and oriented to person, place, and time.   Intermittent forgetfulness   Skin: Skin is warm and dry.       Significant Labs: All pertinent labs within the past 24 hours have been reviewed.  CBC:   Recent Labs   Lab 07/18/19  0547   WBC 7.22  7.22   HGB 8.3*  8.3*   HCT 25.8*  25.8*   MCV 92  92     290     BMP:  Recent Labs   Lab 07/17/19  1511 07/18/19  0547   GLU 93 131*  131*    140  140   K 4.1 3.8  3.8    107  107   CO2 22* 23  23   * 106*  106*   CREATININE 3.9* 3.3*  3.3*   CALCIUM 9.2 8.8  8.8   MG 1.5*  --      LFT:  Lab Results   Component Value Date    AST 11 07/18/2019    ALKPHOS 108 07/18/2019    BILITOT 0.3 07/18/2019     Albumin:   Albumin   Date Value Ref Range Status   07/18/2019 2.8 (L) 3.5 - 5.2 g/dL Final   06/06/2019 3.4 3.2 - 5.6 Final     Protein:   Total Protein   Date Value Ref Range Status   07/18/2019 6.9 6.0 - 8.4 g/dL Final     Lactic acid:   Lab  Results   Component Value Date    LACTATE 1.6 07/16/2019    LACTATE 0.7 04/10/2019       Significant Imaging: I have reviewed all pertinent imaging results/findings within the past 24 hours.    Advance Care Planning   Advanced Directives::  Living Will: No  LaPOST: Yes  Do Not Resuscitate Status: Patient is a DNR  Medical Power of : No    Decision-Making Capacity: Patient answered questions, Family answered questions       ASSESSMENT/PLAN:    Palliative encounter:    Bedside consult with patient and her daughter Antonia Fu. Patient alert and oriented x 3 with some forgetfulness. She c/o constipation and no BM for 4 days. Daughter states patient came to hospital per the direction of the Nephrologist for abnormal labs.   Advance Care Planning     Code Status  Patient has a LaPOST in Epic with DNR and comfort measures so this was addressed as there was some confusion in ED as to why the patient presented to ED for treatment if comfort only, and if she is on hospice (which should be the only reason one would select comfort measures only). Patient is not on hospice.   We reviewed tthe patients preferences for care and patient and family would like treatment for things that can be reversed which is reasonable. Patient does not wish to have CPR or other invasive treatments performed when her heart and/or breathing stops so DNR was reaffirmed. We completed a new LaPOST and patient wanted daughter to sign. Updated Dr Chinchilla    -Reaffirmed DNR but would like conservative medical management for reversible conditions  -New LaPOST completed with DNR/selective treatment. Old LaPOST voided and attached to New updated LaPOST to be scanned in as one copy   -symptom management (home meds re-ordered for constipation)

## 2019-07-18 NOTE — SUBJECTIVE & OBJECTIVE
Past Medical History:   Diagnosis Date    Anemia     Anticoagulant long-term use     Arthritis     Atrial fibrillation     Cataract     CHF (congestive heart failure)     CKD (chronic kidney disease), stage V     Diabetes mellitus     Type 2    GERD (gastroesophageal reflux disease)     GI bleed 2018    Gout     Hypertension     Obese     Requires assistance with all daily activities     Stroke     Wheelchair dependent        Past Surgical History:   Procedure Laterality Date     SECTION      Patient unsure, believe she had 3-4    CHOLECYSTECTOMY      EYE SURGERY      HYSTERECTOMY      JOINT REPLACEMENT Right     knee    TOTAL KNEE ARTHROPLASTY Right        Review of patient's allergies indicates:   Allergen Reactions    Indomethacin     Nsaids (non-steroidal anti-inflammatory drug)      Current Facility-Administered Medications   Medication Frequency    0.9%  NaCl infusion Continuous    acetaminophen tablet 650 mg Q4H PRN    albuterol-ipratropium 2.5 mg-0.5 mg/3 mL nebulizer solution 3 mL Q4H PRN    apixaban tablet 2.5 mg BID    [START ON 2019] calcitRIOL capsule 0.25 mcg Every other day    carvedilol tablet 3.125 mg BID    cefTRIAXone (ROCEPHIN) 1 g in dextrose 5 % 50 mL IVPB Q24H    cloNIDine tablet 0.1 mg Q4H PRN    colchicine tablet 0.6 mg Daily    dextrose 50% injection 12.5 g PRN    dextrose 50% injection 25 g PRN    famotidine tablet 20 mg Daily    glucagon (human recombinant) injection 1 mg PRN    glucose chewable tablet 16 g PRN    glucose chewable tablet 24 g PRN    insulin aspart U-100 pen 0-5 Units QID (AC + HS) PRN    insulin detemir U-100 pen 5 Units QHS    magnesium sulfate 2g in water 50mL IVPB (premix) Once    [START ON 2019] NIFEdipine 24 hr tablet 60 mg Daily    oxyCODONE-acetaminophen 5-325 mg per tablet 1 tablet Q6H PRN    polyethylene glycol packet 17 g BID PRN    senna-docusate 8.6-50 mg per tablet 1 tablet BID    sodium  chloride 0.9% flush 10 mL PRN     Family History     Problem Relation (Age of Onset)    Cancer Mother, Brother    Diabetes Mother    Hypertension Mother        Tobacco Use    Smoking status: Never Smoker    Smokeless tobacco: Never Used   Substance and Sexual Activity    Alcohol use: No    Drug use: No    Sexual activity: Never     Review of Systems   Constitutional: Negative for activity change.   Respiratory: Negative for shortness of breath.    Cardiovascular: Negative for chest pain and leg swelling.   All other systems reviewed and are negative.    Objective:     Vital Signs (Most Recent):  Temp: 97.1 °F (36.2 °C) (07/18/19 1135)  Pulse: 65 (07/18/19 1135)  Resp: 18 (07/18/19 1135)  BP: (!) 174/73 (07/18/19 1135)  SpO2: 99 % (07/18/19 1135)  O2 Device (Oxygen Therapy): room air (07/18/19 0958) Vital Signs (24h Range):  Temp:  [97 °F (36.1 °C)-98.1 °F (36.7 °C)] 97.1 °F (36.2 °C)  Pulse:  [62-76] 65  Resp:  [16-18] 18  SpO2:  [97 %-100 %] 99 %  BP: (134-192)/(60-79) 174/73     Weight: 73 kg (160 lb 15 oz) (07/17/19 2022)  Body mass index is 25.98 kg/m².  Body surface area is 1.84 meters squared.    I/O last 3 completed shifts:  In: 1720 [P.O.:420; I.V.:1250; IV Piggyback:50]  Out: 100 [Urine:100]    Physical Exam   Constitutional: She appears well-developed and well-nourished. No distress.   HENT:   Head: Normocephalic and atraumatic.   Mouth/Throat: Oropharynx is clear and moist and mucous membranes are normal.   Eyes: Conjunctivae and EOM are normal.   Cardiovascular: Normal rate and regular rhythm.   Pulmonary/Chest: Effort normal. She has decreased breath sounds. She has no rales.   Abdominal: Soft. She exhibits no distension.   Musculoskeletal: She exhibits no edema or deformity.   Neurological: She is alert.   Oriented to person, month, and location   Skin: Skin is warm and dry. She is not diaphoretic.   Psychiatric: She has a normal mood and affect. Her behavior is normal.       Significant  Labs:  CBC:   Recent Labs   Lab 07/18/19  0547   WBC 7.22  7.22   RBC 2.80*  2.80*   HGB 8.3*  8.3*   HCT 25.8*  25.8*     290   MCV 92  92   MCH 29.6  29.6   MCHC 32.2  32.2     CMP:   Recent Labs   Lab 07/18/19  0547   *  131*   CALCIUM 8.8  8.8   ALBUMIN 2.8*   PROT 6.9     140   K 3.8  3.8   CO2 23  23     107   *  106*   CREATININE 3.3*  3.3*   ALKPHOS 108   ALT 10   AST 11   BILITOT 0.3     All labs within the past 24 hours have been reviewed.    Significant Imaging:  Labs: Reviewed  Echo: Reviewed   TTE 4/2019: EF 80%; grade 2 diastolic dysfunction    Prior outpatient nephrology notes and C records obtained, reviewed, and summarized as above.

## 2019-07-18 NOTE — HOSPITAL COURSE
85 y.o F with a hx of Anemia, Anticoagulant long-term use with eliquis,P A-fib,, CKD stage V, DM, GERD, GI bleed duo to rectal hemorrhoids, HTN and Stroke presents to the ED via EMS from [facility] for emergent evaluation of an abnormal lab. The pt was sent to the ED by her nephrologist .for further evaluation of decreased kidney function.   She was started on IV fluids with some improvement in renal function. She in the past was not a candidate for dialysis and the recommendation was for hospice.  Nephrology and palliative care were consulted. Nephrology noted that patient IS a dialysis candidate if needed in the future. CRT went from 4.8 to 2.3.  The patient was to be discharged to  SNF on 7/19, but had a bloody BM and discharge held. GI was consulted and decided to monitor only. H/H remained stable.   PT/OT continued to follow. The patient will be discharged to  SNF today. Activity as tolerated. Diet- low NA, ADA 1800 sruthi diet. Follow up with PCP in one week. BMP in 5 days

## 2019-07-18 NOTE — PROGRESS NOTES
[facility] [place] Medicine  Progress Note    Patient Name: Joyce Riley  MRN: [id number]  Patient Class: IP- Inpatient   Admission Date: 7/17/2019  Length of Stay: 1 days  Attending Physician: Shahab Chinchilla MD  Primary Care Provider: Kalpana Staton MD        Subjective:     Principal Problem:Acute renal failure superimposed on stage 5 chronic kidney disease, not on chronic dialysis      HPI:   85 y.o F with a hx of Anemia, Anticoagulant long-term use with eliquis,P A-fib,, CKD stage V, DM, GERD, GI bleed duo to rectal hemorrhoids, HTN and Stroke presents to the ED via EMS from [facility] for emergent evaluation of an abnormal lab. The pt was sent to the ED by her nephrologist .for further evaluation of decreased kidney function. The pt states she was able to urinate a small amount this AM, but has not voided since despite consuming an increased amount of water today. She was last seen in this ED yesterday for rectal bleeding duo to hemorrhoid,and renal insufficiency,ecieved IVF and as returned back to NH,in the past has luisana recommended Hosice and she is DNR,. She denies fever, chills, diaphoresis, nausea, emesis, diarrhea, abdominal pain, chest pain, SOB, dysuria and rash. No prior tx.her CKD 5 in recent few months is worsening,CRT today is 3.9,she has bene started on IVF and her nephrology is consulted.    Overview/Hospital Course:  85 y.o F with a hx of Anemia, Anticoagulant long-term use with eliquis,P A-fib,, CKD stage V, DM, GERD, GI bleed duo to rectal hemorrhoids, HTN and Stroke presents to the ED via EMS from [facility] for emergent evaluation of an abnormal lab. The pt was sent to the ED by her nephrologist .for further evaluation of decreased kidney function.   She was started on IV fluids with some improvement in renal function. She in the past was not a candidate for dialysis and the recommendation was for hospice.  Nephrology and palliative  care were consulted.       Interval History: No new issues.       Review of Systems   Constitutional: Negative for activity change.   HENT: Negative for congestion.    Respiratory: Negative for chest tightness and shortness of breath.    Cardiovascular: Negative for chest pain.   Gastrointestinal: Negative for abdominal pain.   Genitourinary: Negative for difficulty urinating.     Objective:     Vital Signs (Most Recent):  Temp: 98 °F (36.7 °C) (07/18/19 0749)  Pulse: 68 (07/18/19 0749)  Resp: 18 (07/18/19 0749)  BP: (!) 154/60 (07/18/19 0809)  SpO2: 99 % (07/18/19 0749) Vital Signs (24h Range):  Temp:  [97 °F (36.1 °C)-98.1 °F (36.7 °C)] 98 °F (36.7 °C)  Pulse:  [62-76] 68  Resp:  [16-18] 18  SpO2:  [97 %-100 %] 99 %  BP: (134-192)/(60-79) 154/60     Weight: 73 kg (160 lb 15 oz)  Body mass index is 25.98 kg/m².    Intake/Output Summary (Last 24 hours) at 7/18/2019 1008  Last data filed at 7/18/2019 0538  Gross per 24 hour   Intake 1720 ml   Output 100 ml   Net 1620 ml      Physical Exam   Constitutional: She is oriented to person, place, and time. She appears well-developed and well-nourished.   HENT:   Head: Atraumatic.   Neurological: She is alert and oriented to person, place, and time.   Psychiatric: She has a normal mood and affect. Her behavior is normal.   Nursing note and vitals reviewed.      Significant Labs:   BMP:   Recent Labs   Lab 07/17/19  1511 07/18/19  0547   GLU 93 131*  131*    140  140   K 4.1 3.8  3.8    107  107   CO2 22* 23  23   * 106*  106*   CREATININE 3.9* 3.3*  3.3*   CALCIUM 9.2 8.8  8.8   MG 1.5*  --      CBC:   Recent Labs   Lab 07/16/19  1311 07/17/19  1511 07/18/19  0547   WBC 10.62 8.72 7.22  7.22   HGB 9.5* 8.5* 8.3*  8.3*   HCT 30.9* 27.6* 25.8*  25.8*   * 310 290  290       Significant Imaging:       Assessment/Plan:      * Acute renal failure superimposed on stage 5 chronic kidney disease, not on chronic dialysis  Worsening,Will  continue  with IVF,  Consult her nephrology,in the past reccommended hospice,consult palliative care.    Improved renal function some with fluids. Evidently not dialysis candidate. Nephrology and palliative care consulted. Continue IV fluids for now     Urinary tract infection without hematuria  Will continue with IV Abx,folow cultures.  E-coli. Not sure if colonization or infection. No sepsis.         Essential hypertension  Resume home medications and added prn clonidine.      Paroxysmal atrial fibrillation  On BB,hold OAC duo to recent rectal hemorhoid at this time.      Anemia of renal disease  Stable,will monitor.      Type 2 diabetes mellitus, controlled, with renal complications  On SSI.      hypomagnesemia- will re-check this am.     VTE Risk Mitigation (From admission, onward)        Ordered     apixaban tablet 2.5 mg  2 times daily      07/18/19 1006     IP VTE HIGH RISK PATIENT  Once      07/17/19 2023     Place SANTOS hose  Until discontinued      07/17/19 2023     Place sequential compression device  Until discontinued      07/17/19 2023                Shahab Oscar MD  Department of Hospital Medicine   [facility] [place]

## 2019-07-18 NOTE — NURSING
Bedside hand off given to SUKUMAR Garcia. Patient is awake and alert. AAOx4. Needs made know to staff. Safety and fall precautions maintained.

## 2019-07-19 PROBLEM — D62 ACUTE BLOOD LOSS ANEMIA: Status: ACTIVE | Noted: 2019-07-19

## 2019-07-19 LAB
ALBUMIN SERPL BCP-MCNC: 2.8 G/DL (ref 3.5–5.2)
ANION GAP SERPL CALC-SCNC: 8 MMOL/L (ref 8–16)
BASOPHILS # BLD AUTO: 0.07 K/UL (ref 0–0.2)
BASOPHILS NFR BLD: 0.8 % (ref 0–1.9)
BUN SERPL-MCNC: 94 MG/DL (ref 8–23)
CALCIUM SERPL-MCNC: 9.1 MG/DL (ref 8.7–10.5)
CHLORIDE SERPL-SCNC: 110 MMOL/L (ref 95–110)
CO2 SERPL-SCNC: 21 MMOL/L (ref 23–29)
CREAT SERPL-MCNC: 2.8 MG/DL (ref 0.5–1.4)
DIFFERENTIAL METHOD: ABNORMAL
EOSINOPHIL # BLD AUTO: 0.3 K/UL (ref 0–0.5)
EOSINOPHIL NFR BLD: 3.8 % (ref 0–8)
ERYTHROCYTE [DISTWIDTH] IN BLOOD BY AUTOMATED COUNT: 16.5 % (ref 11.5–14.5)
EST. GFR  (AFRICAN AMERICAN): 17 ML/MIN/1.73 M^2
EST. GFR  (NON AFRICAN AMERICAN): 15 ML/MIN/1.73 M^2
GLUCOSE SERPL-MCNC: 84 MG/DL (ref 70–110)
HCT VFR BLD AUTO: 23.9 % (ref 37–48.5)
HCT VFR BLD AUTO: 24 % (ref 37–48.5)
HCT VFR BLD AUTO: 25.1 % (ref 37–48.5)
HGB BLD-MCNC: 7.6 G/DL (ref 12–16)
HGB BLD-MCNC: 7.7 G/DL (ref 12–16)
HGB BLD-MCNC: 8.1 G/DL (ref 12–16)
LYMPHOCYTES # BLD AUTO: 1.5 K/UL (ref 1–4.8)
LYMPHOCYTES NFR BLD: 16.1 % (ref 18–48)
MAGNESIUM SERPL-MCNC: 1.8 MG/DL (ref 1.6–2.6)
MCH RBC QN AUTO: 29.7 PG (ref 27–31)
MCHC RBC AUTO-ENTMCNC: 32.1 G/DL (ref 32–36)
MCV RBC AUTO: 93 FL (ref 82–98)
MONOCYTES # BLD AUTO: 0.4 K/UL (ref 0.3–1)
MONOCYTES NFR BLD: 4.8 % (ref 4–15)
NEUTROPHILS # BLD AUTO: 6.7 K/UL (ref 1.8–7.7)
NEUTROPHILS NFR BLD: 74.7 % (ref 38–73)
PHOSPHATE SERPL-MCNC: 5 MG/DL (ref 2.7–4.5)
PLATELET # BLD AUTO: 314 K/UL (ref 150–350)
PMV BLD AUTO: 9.5 FL (ref 9.2–12.9)
POCT GLUCOSE: 129 MG/DL (ref 70–110)
POCT GLUCOSE: 131 MG/DL (ref 70–110)
POCT GLUCOSE: 175 MG/DL (ref 70–110)
POCT GLUCOSE: 81 MG/DL (ref 70–110)
POTASSIUM SERPL-SCNC: 4.3 MMOL/L (ref 3.5–5.1)
RBC # BLD AUTO: 2.59 M/UL (ref 4–5.4)
SODIUM SERPL-SCNC: 139 MMOL/L (ref 136–145)
WBC # BLD AUTO: 8.98 K/UL (ref 3.9–12.7)

## 2019-07-19 PROCEDURE — 97530 THERAPEUTIC ACTIVITIES: CPT

## 2019-07-19 PROCEDURE — 97110 THERAPEUTIC EXERCISES: CPT

## 2019-07-19 PROCEDURE — 94640 AIRWAY INHALATION TREATMENT: CPT

## 2019-07-19 PROCEDURE — 83735 ASSAY OF MAGNESIUM: CPT

## 2019-07-19 PROCEDURE — 21400001 HC TELEMETRY ROOM

## 2019-07-19 PROCEDURE — 94761 N-INVAS EAR/PLS OXIMETRY MLT: CPT

## 2019-07-19 PROCEDURE — 25000003 PHARM REV CODE 250: Performed by: PHYSICIAN ASSISTANT

## 2019-07-19 PROCEDURE — 25000242 PHARM REV CODE 250 ALT 637 W/ HCPCS: Performed by: INTERNAL MEDICINE

## 2019-07-19 PROCEDURE — 85018 HEMOGLOBIN: CPT

## 2019-07-19 PROCEDURE — 25000003 PHARM REV CODE 250: Performed by: INTERNAL MEDICINE

## 2019-07-19 PROCEDURE — 85014 HEMATOCRIT: CPT

## 2019-07-19 PROCEDURE — 80069 RENAL FUNCTION PANEL: CPT

## 2019-07-19 PROCEDURE — 63600175 PHARM REV CODE 636 W HCPCS: Performed by: HOSPITALIST

## 2019-07-19 PROCEDURE — 85025 COMPLETE CBC W/AUTO DIFF WBC: CPT

## 2019-07-19 PROCEDURE — 36415 COLL VENOUS BLD VENIPUNCTURE: CPT

## 2019-07-19 PROCEDURE — 25000003 PHARM REV CODE 250: Performed by: NURSE PRACTITIONER

## 2019-07-19 RX ORDER — HYDROCORTISONE ACETATE 25 MG/1
25 SUPPOSITORY RECTAL 2 TIMES DAILY
Status: COMPLETED | OUTPATIENT
Start: 2019-07-19 | End: 2019-07-21

## 2019-07-19 RX ADMIN — OXYCODONE HYDROCHLORIDE AND ACETAMINOPHEN 1 TABLET: 5; 325 TABLET ORAL at 08:07

## 2019-07-19 RX ADMIN — SENNOSIDES AND DOCUSATE SODIUM 1 TABLET: 8.6; 5 TABLET ORAL at 08:07

## 2019-07-19 RX ADMIN — FAMOTIDINE 20 MG: 20 TABLET ORAL at 09:07

## 2019-07-19 RX ADMIN — CARVEDILOL 3.12 MG: 3.12 TABLET, FILM COATED ORAL at 08:07

## 2019-07-19 RX ADMIN — CARVEDILOL 3.12 MG: 3.12 TABLET, FILM COATED ORAL at 09:07

## 2019-07-19 RX ADMIN — INSULIN DETEMIR 5 UNITS: 100 INJECTION, SOLUTION SUBCUTANEOUS at 08:07

## 2019-07-19 RX ADMIN — NIFEDIPINE 60 MG: 30 TABLET, FILM COATED, EXTENDED RELEASE ORAL at 09:07

## 2019-07-19 RX ADMIN — COLCHICINE 0.6 MG: 0.6 TABLET, FILM COATED ORAL at 09:07

## 2019-07-19 RX ADMIN — HYDROCORTISONE ACETATE 25 MG: 25 SUPPOSITORY RECTAL at 08:07

## 2019-07-19 RX ADMIN — IPRATROPIUM BROMIDE AND ALBUTEROL SULFATE 3 ML: .5; 3 SOLUTION RESPIRATORY (INHALATION) at 07:07

## 2019-07-19 RX ADMIN — CEFTRIAXONE 1 G: 1 INJECTION, SOLUTION INTRAVENOUS at 06:07

## 2019-07-19 RX ADMIN — CALCITRIOL CAPSULES 0.25 MCG 0.25 MCG: 0.25 CAPSULE ORAL at 09:07

## 2019-07-19 RX ADMIN — ACETAMINOPHEN 650 MG: 325 TABLET, FILM COATED ORAL at 02:07

## 2019-07-19 NOTE — NURSING
Dr. Chinchilla notified that pt was having pain at a 8 (0-10) and tylenol was the only thing ordered for the pt. He verbally ordered oxycodone - acetaminophen 5-325 (percocet) tablet q6hr PRN for pain.

## 2019-07-19 NOTE — PROGRESS NOTES
[facility]  Progress Note    Patient Name: Joyce Riley  MRN: [id number]  Patient Class: IP- Inpatient   Admission Date: 7/17/2019  Length of Stay: 2 days  Attending Physician: Shahab Chinchilla MD  Primary Care Provider: Kalpana Staton MD        Subjective:     Principal Problem:Acute renal failure superimposed on stage 5 chronic kidney disease, not on chronic dialysis      HPI:   85 y.o F with a hx of Anemia, Anticoagulant long-term use with eliquis,P A-fib,, CKD stage V, DM, GERD, GI bleed duo to rectal hemorrhoids, HTN and Stroke presents to the ED via EMS from [facility] for emergent evaluation of an abnormal lab. The pt was sent to the ED by her nephrologist .for further evaluation of decreased kidney function. The pt states she was able to urinate a small amount this AM, but has not voided since despite consuming an increased amount of water today. She was last seen in this ED yesterday for rectal bleeding duo to hemorrhoid,and renal insufficiency,ecieved IVF and as returned back to NH,in the past has luisana recommended Hosice and she is DNR,. She denies fever, chills, diaphoresis, nausea, emesis, diarrhea, abdominal pain, chest pain, SOB, dysuria and rash. No prior tx.her CKD 5 in recent few months is worsening,CRT today is 3.9,she has bene started on IVF and her nephrology is consulted.    Overview/Hospital Course:  85 y.o F with a hx of Anemia, Anticoagulant long-term use with eliquis,P A-fib,, CKD stage V, DM, GERD, GI bleed duo to rectal hemorrhoids, HTN and Stroke presents to the ED via EMS from [facility] for emergent evaluation of an abnormal lab. The pt was sent to the ED by her nephrologist .for further evaluation of decreased kidney function.   She was started on IV fluids with some improvement in renal function. She in the past was not a candidate for dialysis and the recommendation was for hospice.  Nephrology and palliative  care were consulted. Nephrology noted that patient IS a dialysis candidate if needed in the future. CRT went from 4.8 to 2.3.  The patient was to be discharged to  SNF on 7/19, but had a bloody BM and discharge held. GI was consulted. PT/OT continued.       Interval History: Bleeding with BM.     Review of Systems   Constitutional: Negative for activity change.   HENT: Negative for congestion.    Respiratory: Negative for chest tightness and shortness of breath.    Cardiovascular: Negative for chest pain.   Gastrointestinal: Positive for blood in stool. Negative for abdominal pain.   Genitourinary: Negative for difficulty urinating.     Objective:     Vital Signs (Most Recent):  Temp: 97.8 °F (36.6 °C) (07/19/19 0755)  Pulse: 76 (07/19/19 0755)  Resp: 18 (07/19/19 0755)  BP: (!) 168/52 (07/19/19 0919)  SpO2: 100 % (07/19/19 0755) Vital Signs (24h Range):  Temp:  [97.1 °F (36.2 °C)-98 °F (36.7 °C)] 97.8 °F (36.6 °C)  Pulse:  [65-83] 76  Resp:  [17-18] 18  SpO2:  [96 %-100 %] 100 %  BP: (163-189)/(52-78) 168/52     Weight: 74.2 kg (163 lb 9.3 oz)  Body mass index is 26.4 kg/m².    Intake/Output Summary (Last 24 hours) at 7/19/2019 1023  Last data filed at 7/19/2019 0820  Gross per 24 hour   Intake 600 ml   Output --   Net 600 ml      Physical Exam   Constitutional: She is oriented to person, place, and time. She appears well-developed and well-nourished.   HENT:   Head: Atraumatic.   Neurological: She is alert and oriented to person, place, and time.   Psychiatric: She has a normal mood and affect. Her behavior is normal.   Nursing note and vitals reviewed.      Significant Labs:   BMP:   Recent Labs   Lab 07/19/19  0637   GLU 84      K 4.3      CO2 21*   BUN 94*   CREATININE 2.8*   CALCIUM 9.1   MG 1.8     CBC:   Recent Labs   Lab 07/17/19  1511 07/18/19  0547 07/19/19  0637   WBC 8.72 7.22  7.22 8.98   HGB 8.5* 8.3*  8.3* 7.7*   HCT 27.6* 25.8*  25.8* 24.0*    290  290 314       Significant  Imaging:       Assessment/Plan:      * Acute renal failure superimposed on stage 5 chronic kidney disease, not on chronic dialysis  Worsening,Will  continue with IVF,  Consult her nephrology,in the past reccommended hospice,consult palliative care.    Improved renal function some with fluids. Evidently not dialysis candidate. Nephrology and palliative care consulted. Continue IV fluids for now   CRT much improved from 4.8 to 2.3.  D/C fluids.     Acute blood loss anemia  From likely lower GI bleed.  GI consulted. Following H/H.         Urinary tract infection without hematuria  Will continue with IV Abx,folow cultures.  E-coli. Not sure if colonization or infection. No sepsis.         Essential hypertension  Resume home medications and added prn clonidine.      Paroxysmal atrial fibrillation  On BB,hold OAC duo to recent rectal hemorhoid at this time.      Anemia of renal disease  Stable,will monitor.      Type 2 diabetes mellitus, controlled, with renal complications  On SSI.      Debility- TO RB SNF likely on Monday per PT/OT recs.     VTE Risk Mitigation (From admission, onward)        Ordered     IP VTE HIGH RISK PATIENT  Once      07/17/19 2023     Place SANTOS hose  Until discontinued      07/17/19 2023     Place sequential compression device  Until discontinued      07/17/19 2023                Shahab Oscar MD  Department of Hospital Medicine   [facility]

## 2019-07-19 NOTE — PLAN OF CARE
Problem: Occupational Therapy Goal  Goal: Occupational Therapy Goal  Goals to be met by: 08/01/19     Patient will increase functional independence with ADLs by performing:    Feeding with [place].  UE Dressing with Set-up Assistance.  LE Dressing with Set-up Assistance.  Grooming while seated with Set-up Assistance.  Sitting at edge of bed x15 minutes with Modified [place].  Rolling to Bilateral with Modified [place].   Supine to sit with Modified [place].  Upper extremity exercise program x15 reps per handout, with independence.     Outcome: Ongoing (interventions implemented as appropriate)  Patient tolerated sitting EOB ~15 mins for ADL's and UE therex today. Patient will benefit from continued OT to address functional deficits.

## 2019-07-19 NOTE — PT/OT/SLP EVAL
Physical Therapy Evaluation    Patient Name:  Joyce Riley   MRN:  [id number]    Recommendations:     Discharge Recommendations:  nursing facility, skilled(- [facility]. )   Discharge Equipment Recommendations: none   Barriers to discharge: None    Assessment:     Joyce Riley is a 85 y.o. female admitted with a medical diagnosis of Acute renal failure superimposed on stage 5 chronic kidney disease, not on chronic dialysis.  She presents with the following impairments/functional limitations:  weakness, impaired endurance, impaired functional mobilty, gait instability, impaired balance, decreased coordination, decreased safety awareness, decreased lower extremity function, pain.    Rehab Prognosis: Good; patient would benefit from acute skilled PT services to address these deficits and reach maximum level of function.    Recent Surgery: * No surgery found *      Plan:     During this hospitalization, patient to be seen 5 x/week to address the identified rehab impairments via therapeutic activities, therapeutic exercises, gait training and progress toward the following goals:    · Plan of Care Expires:  07/31/19    Subjective     Chief Complaint: my feet drop... Both of them.   Patient/Family Comments/goals: to return to PLOF  Pain/Comfort:  · Pain Rating 1: 8/10  · Location - Side 1: Bilateral  · Location 1: neck  · Pain Addressed 1: Reposition, Cessation of Activity  · Pain Rating Post-Intervention 1: 8/10  · Pain Rating 2: 8/10  · Location - Side 2: Left  · Location 2: knee  · Pain Addressed 2: Cessation of Activity, Reposition    Patients cultural, spiritual, Yazidism conflicts given the current situation:      Living Environment:  Pt lives with daughter in a [facility] with 6 SPENCER, but daughter reports that post SNF treatment at [facility], pt with transition into Long Term Care at [facility].   Prior to admission, patients level of function was Mod I.  Equipment  used at home: wheelchair.  DME owned (not currently used): none.  Upon discharge, patient will have assistance from Self and Facility Staff.    Objective:     Communicated with Nurse prior to session.  Patient found supine with bed alarm, peripheral IV, telemetry  upon PT entry to room.    General Precautions: Standard, fall, diabetic   Orthopedic Precautions:Full weight bearing   Braces: N/A     Exams:  · Cognitive Exam:  Patient is oriented to Person, Place and Situation  · Gross Motor Coordination:  WFL except for B foot drop  · Postural Exam:  Patient presented with the following abnormalities:    · -       Rounded shoulders  · -       Forward head  · -       Abnormal trunk flexion  · Skin Integrity/Edema:      · -       Skin integrity: Wound sacral   · -       Edema: None noted .  · RLE ROM: WFL except R foot drop  · RLE Strength: Deficits: Quad = 4/5  · LLE ROM: WFL except L Foot Drop  · LLE Strength: Deficits: Quad = 4-/5    Functional Mobility:  · Bed Mobility:     · Rolling Left:  contact guard assistance  · Rolling Right: contact guard assistance  · Supine to Sit: minimum assistance  · Sit to Supine: minimum assistance  · Transfers:     · Sit to Stand:  maximal assistance with hand-held assist    AM-PAC 6 CLICK MOBILITY  Total Score:14     Patient left supine with all lines intact, call button in reach and daughter  present.    GOALS:   Multidisciplinary Problems     Physical Therapy Goals        Problem: Physical Therapy Goal    Goal Priority Disciplines Outcome Goal Variances Interventions   Physical Therapy Goal     PT, PT/OT Ongoing (interventions implemented as appropriate)     Description:  Goals to be met by: 2019    Patient will increase functional independence with mobility by performin. Supine to sit with Modified [place]  2. Sit to supine with Modified [place]  3. Sit to stand transfer with Modified [place]  2. Bed to chair transfer = TBD when appropriate.   5.  Sitting at edge of bed x25 minutes with Modified [place]  6. Lower extremity exercise program x25 reps per handout, with supervision    Recommend: SNF @ [facility] at time of discharge.                         History:     Past Medical History:   Diagnosis Date    Anemia     Anticoagulant long-term use     Arthritis     Atrial fibrillation     Cataract     CHF (congestive heart failure)     CKD (chronic kidney disease), stage V     Diabetes mellitus     Type 2    GERD (gastroesophageal reflux disease)     GI bleed 2018    Gout     Hypertension     Obese     Requires assistance with all daily activities     Stroke     Wheelchair dependent        Past Surgical History:   Procedure Laterality Date     SECTION      Patient unsure, believe she had 3-4    CHOLECYSTECTOMY      EYE SURGERY      HYSTERECTOMY      JOINT REPLACEMENT Right     knee    TOTAL KNEE ARTHROPLASTY Right        Time Tracking:     PT Received On: 19  PT Start Time: 5     PT Stop Time: 1930  PT Total Time (min): 45 min     Billable Minutes: Evaluation 45 min      Chris Meneses, PT  2019

## 2019-07-19 NOTE — PT/OT/SLP PROGRESS
Occupational Therapy   Treatment    Name: Joyce Riley  MRN: [id number]  Admitting Diagnosis:  Acute renal failure superimposed on stage 5 chronic kidney disease, not on chronic dialysis       Recommendations:     Discharge Recommendations: nursing facility, skilled  Discharge Equipment Recommendations:  none  Barriers to discharge:       Assessment:     Joyce Riley is a 85 y.o. female with a medical diagnosis of Acute renal failure superimposed on stage 5 chronic kidney disease, not on chronic dialysis.  She presents with the following performance deficits affecting function: weakness, impaired endurance, impaired self care skills, impaired functional mobilty, gait instability, impaired balance, decreased coordination, decreased lower extremity function, decreased upper extremity function, decreased safety awareness, decreased ROM, pain, impaired coordination. Patient tolerated ~15 mins of EOB activity today.    Rehab Prognosis:  Good; patient would benefit from acute skilled OT services to address these deficits and reach maximum level of function.       Plan:     Patient to be seen 3 x/week to address the above listed problems via self-care/home management, therapeutic activities, therapeutic exercises  · Plan of Care Expires: 08/01/19  · Plan of Care Reviewed with: patient    Subjective   Patient agreeable to therapy.   Pain/Comfort:  · Location - Side 1: Bilateral  · Location 1: foot  · Pain Addressed 1: Nurse notified    Objective:     Communicated with: Nurse Garcia prior to session.  Patient found HOB elevated with telemetry, peripheral IV, bed alarm upon OT entry to room.    General Precautions: Standard, fall, diabetic   Orthopedic Precautions:N/A   Braces: N/A     Occupational Performance:     Bed Mobility:    · Patient completed Scooting with stand by assistance  · Patient completed Supine to Sit with contact guard assistance, HOB elevated, with bedside rail  · Patient completed Sit to  Supine with contact guard assistance     Activities of Daily Living:  · Upper Body Dressing: contact guard assistance to doff/don jacket seated EOB    [facility] 6 Click ADL: 19    Treatment & Education:  Patient performed bed mobility and ADL's as above.  Patient tolerated sitting EOB ~15 mins with supervision for ADL's and UE therex.   Patient educated on BUE therex HEP with light resistance theraband via verbal instruction, demonstration, and handout. Patient able to perform x 10 reps B shoulder flex, B shoulder horizontal abd, B shoulder abd, B elbow flex, and B elbow ext between rest. Patient required verbal/tactile cues to achieve full ROM with therex.   Patient also educated on BUE AROM therex in all available planes x 10 reps. Patient provided with HEP handout.     Patient left HOB elevated with all lines intact, call button in reach, bed alarm on and nurse notifiedEducation:      GOALS:   Multidisciplinary Problems     Occupational Therapy Goals        Problem: Occupational Therapy Goal    Goal Priority Disciplines Outcome Interventions   Occupational Therapy Goal     OT, PT/OT Ongoing (interventions implemented as appropriate)    Description:  Goals to be met by: 08/01/19     Patient will increase functional independence with ADLs by performing:    Feeding with [place].  UE Dressing with Set-up Assistance.  LE Dressing with Set-up Assistance.  Grooming while seated with Set-up Assistance.  Sitting at edge of bed x15 minutes with Modified [place].  Rolling to Bilateral with Modified [place].   Supine to sit with Modified [place].  Upper extremity exercise program x15 reps per handout, with independence.                      Time Tracking:     OT Date of Treatment: 07/19/19  OT Start Time: 1626  OT Stop Time: 1649  OT Total Time (min): 23 min    Billable Minutes:Therapeutic Activity 8  Therapeutic Exercise 15    NAVYA Fallon  7/19/2019

## 2019-07-19 NOTE — PLAN OF CARE
Problem: Physical Therapy Goal  Goal: Physical Therapy Goal  Goals to be met by: 2019    Patient will increase functional independence with mobility by performin. Supine to sit with Modified [place]  2. Sit to supine with Modified [place]  3. Sit to stand transfer with Modified [place]  2. Bed to chair transfer = TBD when appropriate.   5. Sitting at edge of bed x25 minutes with Modified [place]  6. Lower extremity exercise program x25 reps per handout, with supervision    Recommend: SNF @ [facility] at time of discharge.        Outcome: Ongoing (interventions implemented as appropriate)  Pt will benefit from further skilled therapy in order to get back to PLOF.

## 2019-07-19 NOTE — PT/OT/SLP PROGRESS
Physical Therapy Treatment    Patient Name:  Joyce Riley   MRN:  [id number]    Recommendations:     Discharge Recommendations:  nursing facility, skilled([facility]. )   Discharge Equipment Recommendations: none   Barriers to discharge: None    Assessment:     Joyce Riley is a 85 y.o. female admitted with a medical diagnosis of Acute renal failure superimposed on stage 5 chronic kidney disease, not on chronic dialysis.  She presents with the following impairments/functional limitations:  weakness, impaired endurance, impaired self care skills, impaired functional mobilty, gait instability, impaired balance, decreased upper extremity function, decreased lower extremity function, decreased safety awareness, decreased ROM, impaired fine motor, impaired coordination, impaired muscle length     Rehab Prognosis: Fair; patient would benefit from acute skilled PT services to address these deficits and reach maximum level of function.    Recent Surgery: * No surgery found *      Plan:     During this hospitalization, patient to be seen 5 x/week to address the identified rehab impairments via gait training, therapeutic activities, therapeutic exercises and progress toward the following goals:    · Plan of Care Expires:  07/31/19    Subjective     Chief Complaint: tired   Patient/Family Comments/goals: to get stronger and mobile   Pain/Comfort:  · Pain Rating 1: 0/10  · Pain Rating Post-Intervention 1: 0/10      Objective:     Communicated with nurse Garcia prior to session.  Patient found HOB elevated with peripheral IV, telemetry, bed alarm upon PT entry to room.     General Precautions: Standard, fall, diabetic   Orthopedic Precautions:Full weight bearing   Braces: N/A     Functional Mobility:  · Bed Mobility:     · Rolling Left:  stand by assistance HOB elevated with bedside rail  · Scooting: stand by assistance  · Supine to Sit: minimum assistance HOB elevated with bedside rail  · Sit to  Supine: contact guard assistance      AM-PAC 6 CLICK MOBILITY  Turning over in bed (including adjusting bedclothes, sheets and blankets)?: 3  Sitting down on and standing up from a chair with arms (e.g., wheelchair, bedside commode, etc.): 2  Moving from lying on back to sitting on the side of the bed?: 3  Moving to and from a bed to a chair (including a wheelchair)?: 2  Need to walk in hospital room?: 1  Climbing 3-5 steps with a railing?: 1  Basic Mobility Total Score: 12       Therapeutic Activities and Exercises:   Pt perform seated BLE AP 2x10(AAROM), marching 3x10, LAQ 3x10, HS 3x10, GS 2x10, and pillow squeezes 2x10. Pt perform supine BLE AP 2x10(AAROM). Pt education on HEP. Pt understood verbal instruction. Pt requires V/C for safety and proper technique.  Pt tolerate sitting balance edge of bed for ~ 17 mins supervision      Patient left HOB elevated with all lines intact, call button in reach and bed alarm on..    GOALS:   Multidisciplinary Problems     Physical Therapy Goals        Problem: Physical Therapy Goal    Goal Priority Disciplines Outcome Goal Variances Interventions   Physical Therapy Goal     PT, PT/OT Ongoing (interventions implemented as appropriate)     Description:  Goals to be met by: 2019    Patient will increase functional independence with mobility by performin. Supine to sit with Modified [place]  2. Sit to supine with Modified [place]  3. Sit to stand transfer with Modified [place]  2. Bed to chair transfer = TBD when appropriate.   5. Sitting at edge of bed x25 minutes with Modified [place]  6. Lower extremity exercise program x25 reps per handout, with supervision    Recommend: SNF @ [facility] at time of discharge.                         Time Tracking:     PT Received On: 19  PT Start Time: 1425     PT Stop Time: 1450  PT Total Time (min): 25 min     Billable Minutes: Therapeutic Activity 10 mins and Therapeutic Exercise 15 mins      Treatment Type: Treatment  PT/PTA: PTA     PTA Visit Number: 1     Cade Reji, SPTA   I certify that I was present in the room directing the student in service delivery and guiding them using my skilled judgment. As the co-signing therapist I have reviewed the students documentation and am responsible for the treatment, assessment, and plan. ]  Angi Alonso, PTA     07/19/2019

## 2019-07-19 NOTE — CONSULTS
.[facility]  Gastroenterology  Consult Note    Patient Name: Joyce Riley  MRN: [id number]  Admission Date: 2019  Hospital Length of Stay: 2 days  Code Status: DNR   Primary Care Physician: Kalpana Staton MD  Principal Problem:Acute renal failure superimposed on stage 5 chronic kidney disease, not on chronic dialysis    Consults  Subjective:     Chief complaint: Hematochezia.    HPI: The patient is an 85 year old female with a history of HTN, DM, CHF, CKD, paroxsymal atrial fibrillation and CVA presenting with acute on chronic renal failure, a UTI and hematochezia.  Patient was admitted to [facility] at the request of her nephrologist due to decline in her renal function.  She had two episodes of moderate, painless hematochezia overnight.  No obvious precipitants.  The nurse and patient describe the blood as bright red in color.  No black, tarry stools.  She reports seeing blood in her stool in the past, attributed to hemorrhoids.  She denies any abdominal pain, nausea or vomiting.  She does not take NSAIDs.  She takes a stool softener daily to help move her bowels.  Her most recent colonoscopy was in 2019 and revealed severe pandiverticulosis, internal hemorrhoids a benign 10 mm polyp in the ascending colon.    Past medical history:  Hypertension.  Diabetes mellitus, type 2.  Congestive heart failure.  Chronic kidney disease.  Paroxysmal atrial fibrillation.  History of CVA.    Past surgical history:  Cholecystectomy.  Hysterectomy.  .  Eye surgery.  Right knee replacement.  Right knee arthroplasty.    Social history:  Tobacco use: denies.  Alcohol use: denies.  Illicit drug use: denies.    Family history:  No family history of liver or colon cancer.    Medications:  Medications Prior to Admission   Medication Sig Dispense Refill Last Dose    albuterol-ipratropium (DUO-NEB) 2.5 mg-0.5 mg/3 mL nebulizer solution Take 3 mLs by nebulization every 4 (four) hours as needed for  Wheezing. Rescue   7/17/2019 at Unknown time    apixaban (ELIQUIS) 2.5 mg Tab Take 2.5 mg by mouth 2 (two) times daily.   7/17/2019    calcitRIOL (ROCALTROL) 0.25 MCG Cap Take 1 capsule (0.25 mcg total) by mouth every other day.   7/17/2019    carvedilol (COREG) 3.125 MG tablet Take 1 tablet (3.125 mg total) by mouth 2 (two) times daily. 60 tablet 11 7/17/2019    colchicine (COLCRYS) 0.6 mg tablet Take 0.6 mg by mouth once daily.   7/17/2019    ergocalciferol (ERGOCALCIFEROL) 50,000 unit Cap Take 1 capsule (50,000 Units total) by mouth every 7 days.   Past Week    famotidine (PEPCID) 20 MG tablet Take 1 tablet (20 mg total) by mouth once daily.   7/17/2019    furosemide (LASIX) 40 MG tablet Take 1 tablet (40 mg total) by mouth once daily. 30 tablet 11 7/17/2019    insulin aspart U-100 (NOVOLOG) 100 unit/mL InPn pen Inject 0-5 Units into the skin as needed (Hyperglycemia).  0 7/16/2019    insulin degludec (TRESIBA FLEXTOUCH U-100) 100 unit/mL (3 mL) InPn Inject 5 Units into the skin every evening.   7/16/2019    metOLazone (ZAROXOLYN) 2.5 MG tablet Take 1 tablet (2.5 mg total) by mouth once daily. 30 tablet 11 7/17/2019    NIFEdipine (PROCARDIA-XL) 60 MG (OSM) 24 hr tablet Take 1 tablet (60 mg total) by mouth once daily. 30 tablet 11 7/17/2019    acetaminophen (TYLENOL) 325 MG tablet Take 2 tablets (650 mg total) by mouth every 4 (four) hours as needed.  0 7/15/2019    cephALEXin (KEFLEX) 500 MG capsule Take 1 capsule (500 mg total) by mouth every 6 (six) hours. for 10 days 40 capsule 0 Unknown    polyethylene glycol (GLYCOLAX) 17 gram PwPk Take 17 g by mouth 2 (two) times daily as needed.  0 Taking    senna-docusate 8.6-50 mg (PERICOLACE) 8.6-50 mg per tablet Take 1 tablet by mouth 2 (two) times daily.   Unknown       Allergies:  NSAIDs.    Review of systems:  CONSTITUTIONAL: Negative for fever, chills, weakness, weight loss, weight gain.  HEENT: Negative for blurred vision, hearing loss, nasal  congestion, dry mouth, sore throat.  CARDIOVASCULAR: Negative for chest pain or palpitations.  RESPIRATORY: Negative for SOB or cough.  GASTROINTESTINAL: See HPI  GENITOURINARY: Negative for dysuria or hematuria.  MUSCULOSKELETAL: Negative for osteoarthritis or muscle pain.  SKIN: Negative for rashes/lesions.  NEUROLOGIC: Negative for headaches, numbness/tingling.  ENDOCRINE: Positive for diabetes. No thyroid abnormalities.  HEMATOLOGIC: Negative for anemia or blood dyscrasias.    Objective:     Vital Signs (Most Recent):  Temp: 97.6 °F (36.4 °C) (07/19/19 1131)  Pulse: 76 (07/19/19 1131)  Resp: 18 (07/19/19 1131)  BP: 138/62 (07/19/19 1146)  SpO2: 98 % (07/19/19 1131) Vital Signs (24h Range):  Temp:  [97.5 °F (36.4 °C)-98 °F (36.7 °C)] 97.6 °F (36.4 °C)  Pulse:  [72-83] 76  Resp:  [17-18] 18  SpO2:  [96 %-100 %] 98 %  BP: (138-214)/(52-87) 138/62     Physical examination:  General: Elderly AAF in no acute distress.  HENT: NCAT, atraumatic, hearing grossly intact, no visible or palpable thyroid mass  Eyes: PERRL, EOMI, anicteric sclera  Cardiovascular: Regular rate and rhythm. No peripheral edema.   Lungs: Non-labored respirations. Breath sounds equal.   Abdomen: soft, NTND, normoactive BS  Extremities: No C/C, 2+ dorsalis pedis pulses bilaterally  Neuro: AA&O x 3, no asterixes or tremors  Psych: Appropriate mood and affect. No SI.  Skin: No jaundice, rashes or lesions  Musculoskeletal: 5/5 strength bilaterally    CBC:   Recent Labs   Lab 07/17/19  1511 07/18/19  0547 07/19/19  0637 07/19/19  1100   WBC 8.72 7.22  7.22 8.98  --    HGB 8.5* 8.3*  8.3* 7.7* 7.6*   HCT 27.6* 25.8*  25.8* 24.0* 23.9*    290  290 314  --        Assessment:   85 year old female with a history of HTN, DM, CHF, CKD, paroxsymal atrial fibrillation and CVA presenting with acute on chronic renal failure, a UTI and hematochezia.  Suspect LGIB, likely limited diverticular bleed vs. due to internal hemorrhoids.  H/H has dropped  overnight but remains hemodynamically stable, suggesting against active UGIB.  Last colon exam in 2/2019 as detailed in HPI.    Plan:   1.  Observation from our standpoint at this juncture (i.e., monitor CBC and for further bleeding).  2.  If further significant bleeding ensues and/or further drop in H/H, would consider bleeding scan as next step to assess for diverticular bleed. Unfortunately, unable to obtain CTA given renal disease.  3.  Will empirically treat internal hemorrhoids with hydrocortisone suppositories in the meantime.      Thank you for your consult.     Barbara Mcmanus PA-C  Gastroenterology  [facility]

## 2019-07-19 NOTE — NURSING
Patient bedside report has been completed and given to SUKUMAR Clinton. Chart check has been completed. NAD noted. Pt's diaper was changed 3 times by my knowledge. No BM. Family was updated on plan of care.

## 2019-07-19 NOTE — ASSESSMENT & PLAN NOTE
Worsening,Will  continue with IVF,  Consult her nephrology,in the past reccommended hospice,consult palliative care.    Improved renal function some with fluids. Evidently not dialysis candidate. Nephrology and palliative care consulted. Continue IV fluids for now   CRT much improved from 4.8 to 2.3.  D/C fluids.

## 2019-07-19 NOTE — NURSING
I was updated by Dr. Mcmanus (GI). We will monitor pt for more bleeding. Page Dr. Mcmanus if bleeding continues. Pt can continue eating for today and NPO after midnight incase bleeding continues and a bleeding test needs to be done tomorrow 7/20/19. No further orders at this time. Hydrocortisone suppository may be ordered to help with bleeding if caused by hemorrhoids.

## 2019-07-19 NOTE — PLAN OF CARE
"TN met with pt and pt's family at bedside. TN explained her role in Care Management. Pt voiced understanding. TN inquired about HELP AT HOME. Pt stated that she will have daughterAntonia, at home to help for support. TN voiced understanding. TN inquired about responsibilities when it comes to  MANAGING HER HEALTH at home and what it entails. Pt inquired about details. TN informed pt of RESPONSIBILITIES of:    1. Follow up appointments  2. Getting Prescriptions filled  3. Taking medications as prescribed.     Pt voiced understanding.  TN explained "My Health Packet" blue folder and the pink and green tabs that are on the folder as well. Pt voiced understanding.     Pt's pharmacy: [facility]    Pt's preference for appointments: no preference       07/18/19 1230   Discharge Assessment   Confirmed/corrected address and phone number on facesheet? Yes   Assessment information obtained from? Patient;Caregiver  (Pt's oldest son, Mr. Avina)   Communicated expected length of stay with patient/caregiver no   Prior to hospitilization cognitive status: Alert/Oriented  (Does have memory issues per pt's son, Fabrice)   Prior to hospitalization functional status: Needs Assistance;Assistive Equipment   Current cognitive status: Alert/Oriented  (oriented to name, missed birthdate by 2 days.)   Current Functional Status: Assistive Equipment;Needs Assistance   Facility Arrived From:   ([facility])   Lives With facility resident   Able to Return to Prior Arrangements yes   Is patient able to care for self after discharge? No   Who are your caregiver(s) and their phone number(s)?   (DaughterAntonia, [phone]; [facility])   Patient's perception of discharge disposition admitted as an inpatient   Readmission Within the Last 30 Days no previous admission in last 30 days   Patient currently being followed by outpatient case management? No   Patient currently receives any other outside agency services? No   Equipment " Currently Used at Home wheelchair   Do you have any problems affording any of your prescribed medications? No   Is the patient taking medications as prescribed? yes   Does the patient have transportation home? Yes   Transportation Anticipated agency   Does the patient receive services at the [facility]? No   Discharge Plan A Skilled Nursing Facility   DME Needed Upon Discharge  none   Patient/Family in Agreement with Plan yes

## 2019-07-19 NOTE — PLAN OF CARE
Problem: Physical Therapy Goal  Goal: Physical Therapy Goal  Goals to be met by: 2019    Patient will increase functional independence with mobility by performin. Supine to sit with Modified [place]  2. Sit to supine with Modified [place]  3. Sit to stand transfer with Modified [place]  2. Bed to chair transfer = TBD when appropriate.   5. Sitting at edge of bed x25 minutes with Modified [place]  6. Lower extremity exercise program x25 reps per handout, with supervision    Recommend: SNF @ [facility] at time of discharge.       Outcome: Ongoing (interventions implemented as appropriate)  Chris Meneses, PT  2019

## 2019-07-19 NOTE — NURSING
Bedside shift report received from SUKUMAR Carranza (DeDe). Communication board has been updated. NAD noted. Will continue to monitor. Pt is awake and lying in bed. Pt is talkative this AM and updated on plan of care. Pt had 3 BM last night. First one was orange in color followed by two more that were large and bright red according to Tere. Q6hr H&H ordered x3 next occurrence at 1200. GI consult added as well.

## 2019-07-19 NOTE — NURSING
Pt has had three movements. The first was soft stool. The second and third were large amounts of blood, no stool.

## 2019-07-19 NOTE — SUBJECTIVE & OBJECTIVE
Interval History: Bleeding with BM.     Review of Systems   Constitutional: Negative for activity change.   HENT: Negative for congestion.    Respiratory: Negative for chest tightness and shortness of breath.    Cardiovascular: Negative for chest pain.   Gastrointestinal: Positive for blood in stool. Negative for abdominal pain.   Genitourinary: Negative for difficulty urinating.     Objective:     Vital Signs (Most Recent):  Temp: 97.8 °F (36.6 °C) (07/19/19 0755)  Pulse: 76 (07/19/19 0755)  Resp: 18 (07/19/19 0755)  BP: (!) 168/52 (07/19/19 0919)  SpO2: 100 % (07/19/19 0755) Vital Signs (24h Range):  Temp:  [97.1 °F (36.2 °C)-98 °F (36.7 °C)] 97.8 °F (36.6 °C)  Pulse:  [65-83] 76  Resp:  [17-18] 18  SpO2:  [96 %-100 %] 100 %  BP: (163-189)/(52-78) 168/52     Weight: 74.2 kg (163 lb 9.3 oz)  Body mass index is 26.4 kg/m².    Intake/Output Summary (Last 24 hours) at 7/19/2019 1023  Last data filed at 7/19/2019 0820  Gross per 24 hour   Intake 600 ml   Output --   Net 600 ml      Physical Exam   Constitutional: She is oriented to person, place, and time. She appears well-developed and well-nourished.   HENT:   Head: Atraumatic.   Neurological: She is alert and oriented to person, place, and time.   Psychiatric: She has a normal mood and affect. Her behavior is normal.   Nursing note and vitals reviewed.      Significant Labs:   BMP:   Recent Labs   Lab 07/19/19  0637   GLU 84      K 4.3      CO2 21*   BUN 94*   CREATININE 2.8*   CALCIUM 9.1   MG 1.8     CBC:   Recent Labs   Lab 07/17/19  1511 07/18/19  0547 07/19/19  0637   WBC 8.72 7.22  7.22 8.98   HGB 8.5* 8.3*  8.3* 7.7*   HCT 27.6* 25.8*  25.8* 24.0*    290  290 314       Significant Imaging:

## 2019-07-20 PROBLEM — R53.81 DEBILITY: Status: ACTIVE | Noted: 2019-07-20

## 2019-07-20 LAB
ALBUMIN SERPL BCP-MCNC: 2.8 G/DL (ref 3.5–5.2)
ANION GAP SERPL CALC-SCNC: 9 MMOL/L (ref 8–16)
BACTERIA UR CULT: NO GROWTH
BASOPHILS # BLD AUTO: 0.04 K/UL (ref 0–0.2)
BASOPHILS NFR BLD: 0.5 % (ref 0–1.9)
BUN SERPL-MCNC: 89 MG/DL (ref 8–23)
CALCIUM SERPL-MCNC: 9.5 MG/DL (ref 8.7–10.5)
CHLORIDE SERPL-SCNC: 107 MMOL/L (ref 95–110)
CO2 SERPL-SCNC: 23 MMOL/L (ref 23–29)
CREAT SERPL-MCNC: 2.7 MG/DL (ref 0.5–1.4)
DIFFERENTIAL METHOD: ABNORMAL
EOSINOPHIL # BLD AUTO: 0.3 K/UL (ref 0–0.5)
EOSINOPHIL NFR BLD: 3.9 % (ref 0–8)
ERYTHROCYTE [DISTWIDTH] IN BLOOD BY AUTOMATED COUNT: 16.9 % (ref 11.5–14.5)
EST. GFR  (AFRICAN AMERICAN): 18 ML/MIN/1.73 M^2
EST. GFR  (NON AFRICAN AMERICAN): 15 ML/MIN/1.73 M^2
GLUCOSE SERPL-MCNC: 73 MG/DL (ref 70–110)
HCT VFR BLD AUTO: 25 % (ref 37–48.5)
HGB BLD-MCNC: 7.9 G/DL (ref 12–16)
LYMPHOCYTES # BLD AUTO: 1.6 K/UL (ref 1–4.8)
LYMPHOCYTES NFR BLD: 21.6 % (ref 18–48)
MAGNESIUM SERPL-MCNC: 1.7 MG/DL (ref 1.6–2.6)
MCH RBC QN AUTO: 29.5 PG (ref 27–31)
MCHC RBC AUTO-ENTMCNC: 31.6 G/DL (ref 32–36)
MCV RBC AUTO: 93 FL (ref 82–98)
MONOCYTES # BLD AUTO: 0.3 K/UL (ref 0.3–1)
MONOCYTES NFR BLD: 4.5 % (ref 4–15)
NEUTROPHILS # BLD AUTO: 5.2 K/UL (ref 1.8–7.7)
NEUTROPHILS NFR BLD: 69.9 % (ref 38–73)
PHOSPHATE SERPL-MCNC: 4.6 MG/DL (ref 2.7–4.5)
PLATELET # BLD AUTO: 315 K/UL (ref 150–350)
PMV BLD AUTO: 9.8 FL (ref 9.2–12.9)
POCT GLUCOSE: 149 MG/DL (ref 70–110)
POCT GLUCOSE: 169 MG/DL (ref 70–110)
POCT GLUCOSE: 172 MG/DL (ref 70–110)
POCT GLUCOSE: 72 MG/DL (ref 70–110)
POTASSIUM SERPL-SCNC: 4.5 MMOL/L (ref 3.5–5.1)
RBC # BLD AUTO: 2.68 M/UL (ref 4–5.4)
SODIUM SERPL-SCNC: 139 MMOL/L (ref 136–145)
WBC # BLD AUTO: 7.5 K/UL (ref 3.9–12.7)

## 2019-07-20 PROCEDURE — 83735 ASSAY OF MAGNESIUM: CPT

## 2019-07-20 PROCEDURE — 25000003 PHARM REV CODE 250: Performed by: NURSE PRACTITIONER

## 2019-07-20 PROCEDURE — 85025 COMPLETE CBC W/AUTO DIFF WBC: CPT

## 2019-07-20 PROCEDURE — 63600175 PHARM REV CODE 636 W HCPCS: Performed by: HOSPITALIST

## 2019-07-20 PROCEDURE — 63600175 PHARM REV CODE 636 W HCPCS: Performed by: INTERNAL MEDICINE

## 2019-07-20 PROCEDURE — 21400001 HC TELEMETRY ROOM

## 2019-07-20 PROCEDURE — 25000003 PHARM REV CODE 250: Performed by: HOSPITALIST

## 2019-07-20 PROCEDURE — 25000003 PHARM REV CODE 250: Performed by: PHYSICIAN ASSISTANT

## 2019-07-20 PROCEDURE — 25000003 PHARM REV CODE 250: Performed by: INTERNAL MEDICINE

## 2019-07-20 PROCEDURE — 80069 RENAL FUNCTION PANEL: CPT

## 2019-07-20 PROCEDURE — 36415 COLL VENOUS BLD VENIPUNCTURE: CPT

## 2019-07-20 RX ADMIN — COLCHICINE 0.6 MG: 0.6 TABLET, FILM COATED ORAL at 08:07

## 2019-07-20 RX ADMIN — CARVEDILOL 3.12 MG: 3.12 TABLET, FILM COATED ORAL at 08:07

## 2019-07-20 RX ADMIN — INSULIN DETEMIR 5 UNITS: 100 INJECTION, SOLUTION SUBCUTANEOUS at 09:07

## 2019-07-20 RX ADMIN — CEFTRIAXONE 1 G: 1 INJECTION, SOLUTION INTRAVENOUS at 06:07

## 2019-07-20 RX ADMIN — NIFEDIPINE 60 MG: 30 TABLET, FILM COATED, EXTENDED RELEASE ORAL at 08:07

## 2019-07-20 RX ADMIN — SENNOSIDES AND DOCUSATE SODIUM 1 TABLET: 8.6; 5 TABLET ORAL at 08:07

## 2019-07-20 RX ADMIN — SENNOSIDES AND DOCUSATE SODIUM 1 TABLET: 8.6; 5 TABLET ORAL at 09:07

## 2019-07-20 RX ADMIN — OXYCODONE HYDROCHLORIDE AND ACETAMINOPHEN 1 TABLET: 5; 325 TABLET ORAL at 07:07

## 2019-07-20 RX ADMIN — HYDROCORTISONE ACETATE 25 MG: 25 SUPPOSITORY RECTAL at 09:07

## 2019-07-20 RX ADMIN — CLONIDINE HYDROCHLORIDE 0.1 MG: 0.1 TABLET ORAL at 05:07

## 2019-07-20 RX ADMIN — CARVEDILOL 3.12 MG: 3.12 TABLET, FILM COATED ORAL at 09:07

## 2019-07-20 RX ADMIN — FAMOTIDINE 20 MG: 20 TABLET ORAL at 08:07

## 2019-07-20 RX ADMIN — EPOETIN ALFA-EPBX 20000 UNITS: 10000 INJECTION, SOLUTION INTRAVENOUS; SUBCUTANEOUS at 07:07

## 2019-07-20 NOTE — ASSESSMENT & PLAN NOTE
From likely lower GI bleed.  GI consulted. Following H/H.   Likely from hemorrhoids  CBC pending

## 2019-07-20 NOTE — PLAN OF CARE
Problem: Adult Inpatient Plan of Care  Goal: Optimal Comfort and Wellbeing    Intervention: Provide Person-Centered Care     07/19/19 2031   Support Dyspnea Relief   Trust Relationship/Rapport care explained;questions answered;questions encouraged;reassurance provided;thoughts/feelings acknowledged         Problem: Diabetes Comorbidity  Goal: Blood Glucose Level Within Desired Range    Intervention: Maintain Glycemic Control     07/19/19 2031   Monitor and Manage Ketoacidosis   Glycemic Management blood glucose monitoring

## 2019-07-20 NOTE — NURSING
"Note that patient awake, alert and fully oriented; Asking,"Im I going home?" Denies pain; Denies HA;   0758 BP readin/94 on left arm;    Obtained adult naya cuff:  Right arm: 139/65, 72, 18, 99%RA;  Right arm manual: 142/74;  Left arm: 173/70, 71;  Right le/82;  Left le/55;    Scheduled meds given;  Suppository given;  Will continue to f/u;   "

## 2019-07-20 NOTE — NURSING
Patient bedside report has been completed and given to KETAN Leiva. Chart check has been completed. NAD noted. Pt has been pleasant throughout shift. Pt did not have any BM throughout shift. H&H has been increasing throughout the day.

## 2019-07-20 NOTE — ASSESSMENT & PLAN NOTE
Worsening,Will  continue with IVF,  Consult her nephrology,in the past reccommended hospice,consult palliative care.    Improved renal function some with fluids. Evidently not dialysis candidate. Nephrology and palliative care consulted. Continue IV fluids for now   CRT much improved from 4.8 to 2.3.  D/C fluids.  Stable.

## 2019-07-20 NOTE — SUBJECTIVE & OBJECTIVE
Interval History: IVF stopped yesterday. Cr back to baseline. Planned for discharge today however patient developed hematochezia overnight. Otherwise doing well. No SOB or edema. Normal UOP.     Review of patient's allergies indicates:   Allergen Reactions    Indomethacin     Nsaids (non-steroidal anti-inflammatory drug)      Current Facility-Administered Medications   Medication Frequency    acetaminophen tablet 650 mg Q4H PRN    albuterol-ipratropium 2.5 mg-0.5 mg/3 mL nebulizer solution 3 mL Q4H PRN    calcitRIOL capsule 0.25 mcg Every other day    carvedilol tablet 3.125 mg BID    cefTRIAXone (ROCEPHIN) 1 g in dextrose 5 % 50 mL IVPB Q24H    cloNIDine tablet 0.1 mg Q4H PRN    colchicine tablet 0.6 mg Daily    dextrose 50% injection 12.5 g PRN    dextrose 50% injection 25 g PRN    famotidine tablet 20 mg Daily    glucagon (human recombinant) injection 1 mg PRN    glucose chewable tablet 16 g PRN    glucose chewable tablet 24 g PRN    hydrocortisone suppository 25 mg BID    insulin aspart U-100 pen 0-5 Units QID (AC + HS) PRN    insulin detemir U-100 pen 5 Units QHS    NIFEdipine 24 hr tablet 60 mg Daily    oxyCODONE-acetaminophen 5-325 mg per tablet 1 tablet Q6H PRN    polyethylene glycol packet 17 g BID PRN    senna-docusate 8.6-50 mg per tablet 1 tablet BID    sodium chloride 0.9% flush 10 mL PRN       Objective:     Vital Signs (Most Recent):  Temp: 98.3 °F (36.8 °C) (07/19/19 1612)  Pulse: 79 (07/19/19 1612)  Resp: 18 (07/19/19 1612)  BP: (!) 173/79 (07/19/19 1612)  SpO2: 99 % (07/19/19 1612)  O2 Device (Oxygen Therapy): room air (07/19/19 0920) Vital Signs (24h Range):  Temp:  [97.5 °F (36.4 °C)-98.3 °F (36.8 °C)] 98.3 °F (36.8 °C)  Pulse:  [72-83] 79  Resp:  [17-18] 18  SpO2:  [96 %-100 %] 99 %  BP: (138-214)/(52-87) 173/79     Weight: 74.2 kg (163 lb 9.3 oz) (07/19/19 7029)  Body mass index is 26.4 kg/m².  Body surface area is 1.86 meters squared.    I/O last 3 completed shifts:  In:  1200 [P.O.:1200]  Out: -     Physical Exam   Constitutional: She appears well-developed and well-nourished. No distress.   HENT:   Head: Normocephalic and atraumatic.   Mouth/Throat: Oropharynx is clear and moist and mucous membranes are normal.   Eyes: Conjunctivae and EOM are normal.   Cardiovascular: Normal rate and regular rhythm.   Pulmonary/Chest: Effort normal. She has decreased breath sounds. She has no rales.   Abdominal: Soft. She exhibits no distension.   Musculoskeletal: She exhibits no edema or deformity.   Skin: Skin is warm and dry. She is not diaphoretic.   Psychiatric: She has a normal mood and affect. Her behavior is normal.       Significant Labs:  CBC:   Recent Labs   Lab 07/19/19  0637  07/19/19  1744   WBC 8.98  --   --    RBC 2.59*  --   --    HGB 7.7*   < > 8.1*   HCT 24.0*   < > 25.1*     --   --    MCV 93  --   --    MCH 29.7  --   --    MCHC 32.1  --   --     < > = values in this interval not displayed.     CMP:   Recent Labs   Lab 07/18/19  0547 07/19/19  0637   *  131* 84   CALCIUM 8.8  8.8 9.1   ALBUMIN 2.8* 2.8*   PROT 6.9  --      140 139   K 3.8  3.8 4.3   CO2 23  23 21*     107 110   *  106* 94*   CREATININE 3.3*  3.3* 2.8*   ALKPHOS 108  --    ALT 10  --    AST 11  --    BILITOT 0.3  --      All labs within the past 24 hours have been reviewed.     Significant Imaging:  Labs: Reviewed

## 2019-07-20 NOTE — SUBJECTIVE & OBJECTIVE
Interval History: No new issues. No further bleeding.    Review of Systems   Constitutional: Negative for activity change.   HENT: Negative for congestion.    Respiratory: Negative for chest tightness and shortness of breath.    Cardiovascular: Negative for chest pain.   Gastrointestinal: Negative for abdominal pain and blood in stool.   Genitourinary: Negative for difficulty urinating.     Objective:     Vital Signs (Most Recent):  Temp: 97.8 °F (36.6 °C) (07/20/19 0845)  Pulse: 72 (07/20/19 0845)  Resp: 20 (07/20/19 0845)  BP: (!) 119/55 (07/20/19 0856)  SpO2: 99 % (07/20/19 0845) Vital Signs (24h Range):  Temp:  [97.5 °F (36.4 °C)-98.3 °F (36.8 °C)] 97.8 °F (36.6 °C)  Pulse:  [70-84] 72  Resp:  [17-20] 20  SpO2:  [95 %-99 %] 99 %  BP: (119-214)/(55-94) 119/55     Weight: 74.2 kg (163 lb 9.3 oz)  Body mass index is 26.4 kg/m².    Intake/Output Summary (Last 24 hours) at 7/20/2019 1109  Last data filed at 7/19/2019 2200  Gross per 24 hour   Intake 600 ml   Output --   Net 600 ml      Physical Exam   Constitutional: She is oriented to person, place, and time. She appears well-developed and well-nourished.   HENT:   Head: Atraumatic.   Neurological: She is alert and oriented to person, place, and time.   Psychiatric: She has a normal mood and affect. Her behavior is normal.   Nursing note and vitals reviewed.      Significant Labs:   BMP:   Recent Labs   Lab 07/20/19  0604   GLU 73      K 4.5      CO2 23   BUN 89*   CREATININE 2.7*   CALCIUM 9.5   MG 1.7     CBC:   Recent Labs   Lab 07/19/19  0637 07/19/19  1100 07/19/19  1744   WBC 8.98  --   --    HGB 7.7* 7.6* 8.1*   HCT 24.0* 23.9* 25.1*     --   --        Significant Imaging:

## 2019-07-20 NOTE — PROGRESS NOTES
[facility]  Nephrology  Progress Note    Patient Name: Joyce Riley  MRN: [id number]  Admission Date: 7/17/2019  Hospital Length of Stay: 2 days  Attending Provider: Shahab Chinchilla MD   Primary Care Physician: Kalpana Staton MD  Principal Problem:Acute renal failure superimposed on stage 5 chronic kidney disease, not on chronic dialysis    Subjective:     HPI: Ms. Riley is an 86 yo AAF with afib, CHF, CKD stage 4, HTN, gout, h/o CVA, and dementia who was admitted yesterday with ROXANNE for which nephrology is consulted. She has CKD stage 4 with baseline Cr 2.3-3.2 followed outpatient by Dr. Baca; last visit 6/12/19. She has a recent h/o ROXANNE resulting in need for dialysis; however dialysis treatments were complicated by her dementia and pulling at lines. Fortunately her renal function recovered and she was discharged home without need for further dialysis. Her volume status is managed outpatient with lasix and metolazone. She presented to the ED on 7/16 with GIB. She received IVF and was discharged same day with outpatient follow-up. She called nephrology clinic yesterday with complaint of no UOP since hospital discharge. She was encouraged to present to the hospital. Her Cr was 4.3 on 7/16 and was 3.9 on day of arrival. She was started on IVF as well as abx for UTI. Her Cr has improved to 3.3 this AM. She reports to be doing okay this afternoon and is feeling improved. She reports UOP has improved. No SOB or edema.     Interval History: IVF stopped yesterday. Cr back to baseline. Planned for discharge today however patient developed hematochezia overnight. Otherwise doing well. No SOB or edema. Normal UOP.     Review of patient's allergies indicates:   Allergen Reactions    Indomethacin     Nsaids (non-steroidal anti-inflammatory drug)      Current Facility-Administered Medications   Medication Frequency    acetaminophen tablet 650 mg Q4H PRN    albuterol-ipratropium 2.5 mg-0.5  mg/3 mL nebulizer solution 3 mL Q4H PRN    calcitRIOL capsule 0.25 mcg Every other day    carvedilol tablet 3.125 mg BID    cefTRIAXone (ROCEPHIN) 1 g in dextrose 5 % 50 mL IVPB Q24H    cloNIDine tablet 0.1 mg Q4H PRN    colchicine tablet 0.6 mg Daily    dextrose 50% injection 12.5 g PRN    dextrose 50% injection 25 g PRN    famotidine tablet 20 mg Daily    glucagon (human recombinant) injection 1 mg PRN    glucose chewable tablet 16 g PRN    glucose chewable tablet 24 g PRN    hydrocortisone suppository 25 mg BID    insulin aspart U-100 pen 0-5 Units QID (AC + HS) PRN    insulin detemir U-100 pen 5 Units QHS    NIFEdipine 24 hr tablet 60 mg Daily    oxyCODONE-acetaminophen 5-325 mg per tablet 1 tablet Q6H PRN    polyethylene glycol packet 17 g BID PRN    senna-docusate 8.6-50 mg per tablet 1 tablet BID    sodium chloride 0.9% flush 10 mL PRN       Objective:     Vital Signs (Most Recent):  Temp: 98.3 °F (36.8 °C) (07/19/19 1612)  Pulse: 79 (07/19/19 1612)  Resp: 18 (07/19/19 1612)  BP: (!) 173/79 (07/19/19 1612)  SpO2: 99 % (07/19/19 1612)  O2 Device (Oxygen Therapy): room air (07/19/19 0920) Vital Signs (24h Range):  Temp:  [97.5 °F (36.4 °C)-98.3 °F (36.8 °C)] 98.3 °F (36.8 °C)  Pulse:  [72-83] 79  Resp:  [17-18] 18  SpO2:  [96 %-100 %] 99 %  BP: (138-214)/(52-87) 173/79     Weight: 74.2 kg (163 lb 9.3 oz) (07/19/19 0417)  Body mass index is 26.4 kg/m².  Body surface area is 1.86 meters squared.    I/O last 3 completed shifts:  In: 1200 [P.O.:1200]  Out: -     Physical Exam   Constitutional: She appears well-developed and well-nourished. No distress.   HENT:   Head: Normocephalic and atraumatic.   Mouth/Throat: Oropharynx is clear and moist and mucous membranes are normal.   Eyes: Conjunctivae and EOM are normal.   Cardiovascular: Normal rate and regular rhythm.   Pulmonary/Chest: Effort normal. She has decreased breath sounds. She has no rales.   Abdominal: Soft. She exhibits no distension.    Musculoskeletal: She exhibits no edema or deformity.   Skin: Skin is warm and dry. She is not diaphoretic.   Psychiatric: She has a normal mood and affect. Her behavior is normal.       Significant Labs:  CBC:   Recent Labs   Lab 07/19/19  0637  07/19/19  1744   WBC 8.98  --   --    RBC 2.59*  --   --    HGB 7.7*   < > 8.1*   HCT 24.0*   < > 25.1*     --   --    MCV 93  --   --    MCH 29.7  --   --    MCHC 32.1  --   --     < > = values in this interval not displayed.     CMP:   Recent Labs   Lab 07/18/19  0547 07/19/19  0637   *  131* 84   CALCIUM 8.8  8.8 9.1   ALBUMIN 2.8* 2.8*   PROT 6.9  --      140 139   K 3.8  3.8 4.3   CO2 23  23 21*     107 110   *  106* 94*   CREATININE 3.3*  3.3* 2.8*   ALKPHOS 108  --    ALT 10  --    AST 11  --    BILITOT 0.3  --      All labs within the past 24 hours have been reviewed.     Significant Imaging:  Labs: Reviewed    Assessment/Plan:     ROXANNE on CKD stage 4  - baseline Cr 2.3-3.2; followed outpatient by Dr. Baca  - h/o dialysis-dependent ATN; subsequently improved and no longer needs dialysis.  - Cr 4.3 on arrival; likely pre-renal in etiology  - improving daily; Cr 2.8 today. Back to baseline  - continue to hold IVF  - continue holding diuretics today in light of possible GIB; will likely restart soon outpatient  - renally dose medications  - avoid nephrotoxic agents  - daily labs; strict I/Os    Blood loss anemia  - recent h/o GIB; now with hematochezia  - GI following  - continue to trend    Secondary HPTH  - CCa and phos acceptable  - continue calcitriol  - renal diet    Metabolic acidosis  - mild worsening; will restart diuretics soon  - continue to trend  - daily labs    Thank you for your consult. I will follow-up with patient. Please contact us if you have any additional questions.    Ina Orr MD  Nephrology  [facility], [facility]  Office: [phone]  [facility]  Date of service:  07/19/2019

## 2019-07-20 NOTE — PLAN OF CARE
Problem: Fall Injury Risk  Goal: Absence of Fall and Fall-Related Injury    Intervention: Identify and Manage Contributors to Fall Injury Risk     07/20/19 1815   Manage Acute Allergic Reaction   Medication Review/Management medications reviewed;high risk medications identified   Identify and Manage Contributors to Fall Injury Risk   Self-Care Promotion BADL personal objects within reach;meal setup provided

## 2019-07-20 NOTE — PROGRESS NOTES
Chief Complaint / Reason for Consult:  Hematochezia    Patient reports no further bleeding.    ROS:  No CP, SOB, F/C    Patient Vitals for the past 24 hrs:   BP Temp Temp src Pulse Resp SpO2   07/20/19 0758 (!) 212/94 97.6 °F (36.4 °C) Oral 70 18 95 %   07/20/19 0433 (!) 198/91 98 °F (36.7 °C) Oral 73 18 98 %   07/20/19 0021 (!) 186/84 97.5 °F (36.4 °C) Oral 81 17 97 %   07/19/19 2030 (!) 182/81 98 °F (36.7 °C) Oral 84 20 99 %   07/19/19 1612 (!) 173/79 98.3 °F (36.8 °C) Oral 79 18 99 %   07/19/19 1146 138/62 -- -- -- -- --   07/19/19 1131 (!) 214/87 97.6 °F (36.4 °C) Oral 76 18 98 %   07/19/19 0919 (!) 168/52 -- -- -- -- --       Physical Exam:  Gen - Well developed, well nourished, no apparent distress  HEENT - Anicteric  CV - S1, S2, no murmurs/rubs  Lungs - CTA-B, normal excursion  Abd - Soft, NT, ND, normal BS's, no HSM.  Ext - No c/c/e  Neuro - No asterixis    Labs:  Recent Labs   Lab 07/19/19  1744   HGB 8.1*   HCT 25.1*     Recent Labs   Lab 07/20/19  0604   CALCIUM 9.5      K 4.5   CO2 23      BUN 89*   CREATININE 2.7*       Imaging:  No new imaging for this admit to review  Reviewed colonoscopy from February 2019    Assessment:  This patient is a 85 y.o. female with:   1.  Hematochezia-appears to have clinically ceased.  Most consistent with mild diverticular versus internal hemorrhoidal bleeding.  Last colonoscopy in February 2019.  2.  Anemia-chronic + acute post hemorrhagic.  H&H stable  3.  History of HTN, DM, CHF, CKD, atrial fibrillation, CVA......    Recommendations:  1.  Monitor for symptoms of bleeding  2.  Supportive care of internal hemorrhoids with suppositories p.r.n.  3.  If any significant bleeding occurs may need angiography/embolization.  4.  Otherwise further GI evaluation can be done in the outpatient setting.

## 2019-07-20 NOTE — SUBJECTIVE & OBJECTIVE
Interval History: UOP 9x yesterday. No events overnight. Doing well today; enjoying dinner. No SOB or swelling. No further GI bleeding. Tells me of plans to discharge on Monday.     Review of patient's allergies indicates:   Allergen Reactions    Indomethacin     Nsaids (non-steroidal anti-inflammatory drug)      Current Facility-Administered Medications   Medication Frequency    acetaminophen tablet 650 mg Q4H PRN    albuterol-ipratropium 2.5 mg-0.5 mg/3 mL nebulizer solution 3 mL Q4H PRN    calcitRIOL capsule 0.25 mcg Every other day    carvedilol tablet 3.125 mg BID    cefTRIAXone (ROCEPHIN) 1 g in dextrose 5 % 50 mL IVPB Q24H    cloNIDine tablet 0.1 mg Q4H PRN    colchicine tablet 0.6 mg Daily    dextrose 50% injection 12.5 g PRN    dextrose 50% injection 25 g PRN    famotidine tablet 20 mg Daily    glucagon (human recombinant) injection 1 mg PRN    glucose chewable tablet 16 g PRN    glucose chewable tablet 24 g PRN    hydrocortisone suppository 25 mg BID    insulin aspart U-100 pen 0-5 Units QID (AC + HS) PRN    insulin detemir U-100 pen 5 Units QHS    NIFEdipine 24 hr tablet 60 mg Daily    oxyCODONE-acetaminophen 5-325 mg per tablet 1 tablet Q6H PRN    polyethylene glycol packet 17 g BID PRN    senna-docusate 8.6-50 mg per tablet 1 tablet BID    sodium chloride 0.9% flush 10 mL PRN       Objective:     Vital Signs (Most Recent):  Temp: 98 °F (36.7 °C) (07/20/19 1654)  Pulse: 74 (07/20/19 1654)  Resp: 18 (07/20/19 1654)  BP: (!) 155/68 (07/20/19 1654)  SpO2: 97 % (07/20/19 1654)  O2 Device (Oxygen Therapy): room air (07/19/19 2031) Vital Signs (24h Range):  Temp:  [97.5 °F (36.4 °C)-98 °F (36.7 °C)] 98 °F (36.7 °C)  Pulse:  [70-84] 74  Resp:  [17-20] 18  SpO2:  [95 %-99 %] 97 %  BP: (119-212)/(55-94) 155/68     Weight: 74.2 kg (163 lb 9.3 oz) (07/19/19 8327)  Body mass index is 26.4 kg/m².  Body surface area is 1.86 meters squared.    I/O last 3 completed shifts:  In: 840  [P.O.:840]  Out: -     Physical Exam   Constitutional: She appears well-developed and well-nourished. No distress.   HENT:   Head: Normocephalic and atraumatic.   Mouth/Throat: Oropharynx is clear and moist and mucous membranes are normal.   Eyes: Conjunctivae and EOM are normal.   Cardiovascular: Normal rate and regular rhythm.   Pulmonary/Chest: Effort normal. She has no wheezes. She has no rales.   Abdominal: Soft. She exhibits no distension.   Musculoskeletal: She exhibits no edema or deformity.   Skin: Skin is warm and dry. She is not diaphoretic.   Psychiatric: She has a normal mood and affect. Her behavior is normal.       Significant Labs:  CBC:   Recent Labs   Lab 07/20/19  0625   WBC 7.50   RBC 2.68*   HGB 7.9*   HCT 25.0*      MCV 93   MCH 29.5   MCHC 31.6*     CMP:   Recent Labs   Lab 07/18/19  0547  07/20/19  0604   *  131*   < > 73   CALCIUM 8.8  8.8   < > 9.5   ALBUMIN 2.8*   < > 2.8*   PROT 6.9  --   --      140   < > 139   K 3.8  3.8   < > 4.5   CO2 23  23   < > 23     107   < > 107   *  106*   < > 89*   CREATININE 3.3*  3.3*   < > 2.7*   ALKPHOS 108  --   --    ALT 10  --   --    AST 11  --   --    BILITOT 0.3  --   --     < > = values in this interval not displayed.     All labs within the past 24 hours have been reviewed.     Significant Imaging:  Labs: Reviewed

## 2019-07-20 NOTE — PROGRESS NOTES
[facility]  Nephrology  Progress Note    Patient Name: Joyce Riley  MRN: [id number]  Admission Date: 7/17/2019  Hospital Length of Stay: 3 days  Attending Provider: Shahab Chinchilla MD   Primary Care Physician: Kalpana Staton MD  Principal Problem:Acute renal failure superimposed on stage 5 chronic kidney disease, not on chronic dialysis    Subjective:     HPI: Ms. Riley is an 86 yo AAF with afib, CHF, CKD stage 4, HTN, gout, h/o CVA, and dementia who was admitted yesterday with ROXANNE for which nephrology is consulted. She has CKD stage 4 with baseline Cr 2.3-3.2 followed outpatient by Dr. Baca; last visit 6/12/19. She has a recent h/o ROXANNE resulting in need for dialysis; however dialysis treatments were complicated by her dementia and pulling at lines. Fortunately her renal function recovered and she was discharged home without need for further dialysis. Her volume status is managed outpatient with lasix and metolazone. She presented to the ED on 7/16 with GIB. She received IVF and was discharged same day with outpatient follow-up. She called nephrology clinic yesterday with complaint of no UOP since hospital discharge. She was encouraged to present to the hospital. Her Cr was 4.3 on 7/16 and was 3.9 on day of arrival. She was started on IVF as well as abx for UTI. Her Cr has improved to 3.3 this AM. She reports to be doing okay this afternoon and is feeling improved. She reports UOP has improved. No SOB or edema.     Interval History: UOP 9x yesterday. No events overnight. Doing well today; enjoying dinner. No SOB or swelling. No further GI bleeding. Tells me of plans to discharge on Monday.     Review of patient's allergies indicates:   Allergen Reactions    Indomethacin     Nsaids (non-steroidal anti-inflammatory drug)      Current Facility-Administered Medications   Medication Frequency    acetaminophen tablet 650 mg Q4H PRN    albuterol-ipratropium 2.5 mg-0.5 mg/3 mL  nebulizer solution 3 mL Q4H PRN    calcitRIOL capsule 0.25 mcg Every other day    carvedilol tablet 3.125 mg BID    cefTRIAXone (ROCEPHIN) 1 g in dextrose 5 % 50 mL IVPB Q24H    cloNIDine tablet 0.1 mg Q4H PRN    colchicine tablet 0.6 mg Daily    dextrose 50% injection 12.5 g PRN    dextrose 50% injection 25 g PRN    famotidine tablet 20 mg Daily    glucagon (human recombinant) injection 1 mg PRN    glucose chewable tablet 16 g PRN    glucose chewable tablet 24 g PRN    hydrocortisone suppository 25 mg BID    insulin aspart U-100 pen 0-5 Units QID (AC + HS) PRN    insulin detemir U-100 pen 5 Units QHS    NIFEdipine 24 hr tablet 60 mg Daily    oxyCODONE-acetaminophen 5-325 mg per tablet 1 tablet Q6H PRN    polyethylene glycol packet 17 g BID PRN    senna-docusate 8.6-50 mg per tablet 1 tablet BID    sodium chloride 0.9% flush 10 mL PRN       Objective:     Vital Signs (Most Recent):  Temp: 98 °F (36.7 °C) (07/20/19 1654)  Pulse: 74 (07/20/19 1654)  Resp: 18 (07/20/19 1654)  BP: (!) 155/68 (07/20/19 1654)  SpO2: 97 % (07/20/19 1654)  O2 Device (Oxygen Therapy): room air (07/19/19 2031) Vital Signs (24h Range):  Temp:  [97.5 °F (36.4 °C)-98 °F (36.7 °C)] 98 °F (36.7 °C)  Pulse:  [70-84] 74  Resp:  [17-20] 18  SpO2:  [95 %-99 %] 97 %  BP: (119-212)/(55-94) 155/68     Weight: 74.2 kg (163 lb 9.3 oz) (07/19/19 0417)  Body mass index is 26.4 kg/m².  Body surface area is 1.86 meters squared.    I/O last 3 completed shifts:  In: 840 [P.O.:840]  Out: -     Physical Exam   Constitutional: She appears well-developed and well-nourished. No distress.   HENT:   Head: Normocephalic and atraumatic.   Mouth/Throat: Oropharynx is clear and moist and mucous membranes are normal.   Eyes: Conjunctivae and EOM are normal.   Cardiovascular: Normal rate and regular rhythm.   Pulmonary/Chest: Effort normal. She has no wheezes. She has no rales.   Abdominal: Soft. She exhibits no distension.   Musculoskeletal: She exhibits  no edema or deformity.   Skin: Skin is warm and dry. She is not diaphoretic.   Psychiatric: She has a normal mood and affect. Her behavior is normal.       Significant Labs:  CBC:   Recent Labs   Lab 07/20/19  0625   WBC 7.50   RBC 2.68*   HGB 7.9*   HCT 25.0*      MCV 93   MCH 29.5   MCHC 31.6*     CMP:   Recent Labs   Lab 07/18/19  0547  07/20/19  0604   *  131*   < > 73   CALCIUM 8.8  8.8   < > 9.5   ALBUMIN 2.8*   < > 2.8*   PROT 6.9  --   --      140   < > 139   K 3.8  3.8   < > 4.5   CO2 23  23   < > 23     107   < > 107   *  106*   < > 89*   CREATININE 3.3*  3.3*   < > 2.7*   ALKPHOS 108  --   --    ALT 10  --   --    AST 11  --   --    BILITOT 0.3  --   --     < > = values in this interval not displayed.     All labs within the past 24 hours have been reviewed.     Significant Imaging:  Labs: Reviewed    Assessment/Plan:     ROXANNE on CKD stage 4  - baseline Cr 2.3-3.2; followed outpatient by Dr. Baca  - h/o dialysis-dependent ATN; subsequently improved and no longer needs dialysis.  - Cr 4.3 on arrival; likely pre-renal in etiology. Now improving daily; Cr 2.7 today. Back to baseline.  - continue holding diuretics today; will likely restart home lasix in AM pending labs  - renally dose medications   - avoid nephrotoxic agents  - daily labs; strict I/Os     Blood loss anemia + anemia of CKD  - recent h/o GIB; now with hematochezia  - GI following  - will check iron stores with AM labs  - will order AHSAN 20k units SC x1 today  - continue to trend     Secondary HPTH  - CCa and phos acceptable  - continue calcitriol  - renal diet     Metabolic acidosis  - stable today; will restart diuretics soon  - continue to trend  - daily labs     Thank you for your consult. I will follow-up with patient. Please contact us if you have any additional questions.     Ina Orr MD  Nephrology  [facility], [facility]  Office: [phone]  [facility]  Date of service: 07/20/2019

## 2019-07-21 LAB
ALBUMIN SERPL BCP-MCNC: 2.8 G/DL (ref 3.5–5.2)
ANION GAP SERPL CALC-SCNC: 10 MMOL/L (ref 8–16)
BASOPHILS # BLD AUTO: 0.05 K/UL (ref 0–0.2)
BASOPHILS NFR BLD: 0.7 % (ref 0–1.9)
BUN SERPL-MCNC: 88 MG/DL (ref 8–23)
CALCIUM SERPL-MCNC: 9.4 MG/DL (ref 8.7–10.5)
CHLORIDE SERPL-SCNC: 108 MMOL/L (ref 95–110)
CO2 SERPL-SCNC: 20 MMOL/L (ref 23–29)
CREAT SERPL-MCNC: 2.7 MG/DL (ref 0.5–1.4)
DIFFERENTIAL METHOD: ABNORMAL
EOSINOPHIL # BLD AUTO: 0.3 K/UL (ref 0–0.5)
EOSINOPHIL NFR BLD: 4.2 % (ref 0–8)
ERYTHROCYTE [DISTWIDTH] IN BLOOD BY AUTOMATED COUNT: 16.7 % (ref 11.5–14.5)
EST. GFR  (AFRICAN AMERICAN): 18 ML/MIN/1.73 M^2
EST. GFR  (NON AFRICAN AMERICAN): 15 ML/MIN/1.73 M^2
GLUCOSE SERPL-MCNC: 68 MG/DL (ref 70–110)
HCT VFR BLD AUTO: 24.6 % (ref 37–48.5)
HGB BLD-MCNC: 7.7 G/DL (ref 12–16)
IRON SERPL-MCNC: 44 UG/DL (ref 30–160)
LYMPHOCYTES # BLD AUTO: 1.8 K/UL (ref 1–4.8)
LYMPHOCYTES NFR BLD: 23.8 % (ref 18–48)
MAGNESIUM SERPL-MCNC: 1.6 MG/DL (ref 1.6–2.6)
MCH RBC QN AUTO: 29.2 PG (ref 27–31)
MCHC RBC AUTO-ENTMCNC: 31.3 G/DL (ref 32–36)
MCV RBC AUTO: 93 FL (ref 82–98)
MONOCYTES # BLD AUTO: 0.3 K/UL (ref 0.3–1)
MONOCYTES NFR BLD: 4.5 % (ref 4–15)
NEUTROPHILS # BLD AUTO: 4.9 K/UL (ref 1.8–7.7)
NEUTROPHILS NFR BLD: 67.1 % (ref 38–73)
PHOSPHATE SERPL-MCNC: 4.9 MG/DL (ref 2.7–4.5)
PLATELET # BLD AUTO: 284 K/UL (ref 150–350)
PMV BLD AUTO: 9.3 FL (ref 9.2–12.9)
POCT GLUCOSE: 114 MG/DL (ref 70–110)
POCT GLUCOSE: 114 MG/DL (ref 70–110)
POCT GLUCOSE: 126 MG/DL (ref 70–110)
POCT GLUCOSE: 81 MG/DL (ref 70–110)
POTASSIUM SERPL-SCNC: 4.5 MMOL/L (ref 3.5–5.1)
RBC # BLD AUTO: 2.64 M/UL (ref 4–5.4)
SATURATED IRON: 18 % (ref 20–50)
SODIUM SERPL-SCNC: 138 MMOL/L (ref 136–145)
TOTAL IRON BINDING CAPACITY: 246 UG/DL (ref 250–450)
TRANSFERRIN SERPL-MCNC: 166 MG/DL (ref 200–375)
WBC # BLD AUTO: 7.34 K/UL (ref 3.9–12.7)

## 2019-07-21 PROCEDURE — 21400001 HC TELEMETRY ROOM

## 2019-07-21 PROCEDURE — 85025 COMPLETE CBC W/AUTO DIFF WBC: CPT

## 2019-07-21 PROCEDURE — 83735 ASSAY OF MAGNESIUM: CPT

## 2019-07-21 PROCEDURE — 80069 RENAL FUNCTION PANEL: CPT

## 2019-07-21 PROCEDURE — 82728 ASSAY OF FERRITIN: CPT

## 2019-07-21 PROCEDURE — 83540 ASSAY OF IRON: CPT

## 2019-07-21 PROCEDURE — 25000003 PHARM REV CODE 250: Performed by: NURSE PRACTITIONER

## 2019-07-21 PROCEDURE — 94761 N-INVAS EAR/PLS OXIMETRY MLT: CPT

## 2019-07-21 PROCEDURE — 36415 COLL VENOUS BLD VENIPUNCTURE: CPT

## 2019-07-21 PROCEDURE — 25000003 PHARM REV CODE 250: Performed by: INTERNAL MEDICINE

## 2019-07-21 PROCEDURE — 25000003 PHARM REV CODE 250: Performed by: PHYSICIAN ASSISTANT

## 2019-07-21 RX ORDER — FUROSEMIDE 40 MG/1
40 TABLET ORAL DAILY
Status: DISCONTINUED | OUTPATIENT
Start: 2019-07-22 | End: 2019-07-22 | Stop reason: HOSPADM

## 2019-07-21 RX ORDER — LACTULOSE 10 G/15ML
30 SOLUTION ORAL ONCE
Status: COMPLETED | OUTPATIENT
Start: 2019-07-21 | End: 2019-07-21

## 2019-07-21 RX ADMIN — COLCHICINE 0.6 MG: 0.6 TABLET, FILM COATED ORAL at 09:07

## 2019-07-21 RX ADMIN — SENNOSIDES AND DOCUSATE SODIUM 1 TABLET: 8.6; 5 TABLET ORAL at 09:07

## 2019-07-21 RX ADMIN — OXYCODONE HYDROCHLORIDE AND ACETAMINOPHEN 1 TABLET: 5; 325 TABLET ORAL at 09:07

## 2019-07-21 RX ADMIN — CARVEDILOL 3.12 MG: 3.12 TABLET, FILM COATED ORAL at 09:07

## 2019-07-21 RX ADMIN — CALCITRIOL CAPSULES 0.25 MCG 0.25 MCG: 0.25 CAPSULE ORAL at 09:07

## 2019-07-21 RX ADMIN — NIFEDIPINE 60 MG: 30 TABLET, FILM COATED, EXTENDED RELEASE ORAL at 09:07

## 2019-07-21 RX ADMIN — INSULIN DETEMIR 5 UNITS: 100 INJECTION, SOLUTION SUBCUTANEOUS at 09:07

## 2019-07-21 RX ADMIN — HYDROCORTISONE ACETATE 25 MG: 25 SUPPOSITORY RECTAL at 09:07

## 2019-07-21 RX ADMIN — LACTULOSE 30 G: 20 SOLUTION ORAL at 12:07

## 2019-07-21 RX ADMIN — FAMOTIDINE 20 MG: 20 TABLET ORAL at 09:07

## 2019-07-21 NOTE — SUBJECTIVE & OBJECTIVE
Interval History: received AHSAN yesterday. No events overnight. No further bleeding. Doing okay this afternoon. Visited with family earlier today. Didn't eat much of lunch. No SOB. Reports good UOP.     Review of patient's allergies indicates:   Allergen Reactions    Indomethacin     Nsaids (non-steroidal anti-inflammatory drug)      Current Facility-Administered Medications   Medication Frequency    acetaminophen tablet 650 mg Q4H PRN    albuterol-ipratropium 2.5 mg-0.5 mg/3 mL nebulizer solution 3 mL Q4H PRN    calcitRIOL capsule 0.25 mcg Every other day    carvedilol tablet 3.125 mg BID    cloNIDine tablet 0.1 mg Q4H PRN    colchicine tablet 0.6 mg Daily    dextrose 50% injection 12.5 g PRN    dextrose 50% injection 25 g PRN    famotidine tablet 20 mg Daily    [START ON 7/22/2019] furosemide tablet 40 mg Daily    glucagon (human recombinant) injection 1 mg PRN    glucose chewable tablet 16 g PRN    glucose chewable tablet 24 g PRN    hydrocortisone suppository 25 mg BID    insulin aspart U-100 pen 0-5 Units QID (AC + HS) PRN    insulin detemir U-100 pen 5 Units QHS    NIFEdipine 24 hr tablet 60 mg Daily    oxyCODONE-acetaminophen 5-325 mg per tablet 1 tablet Q6H PRN    polyethylene glycol packet 17 g BID PRN    senna-docusate 8.6-50 mg per tablet 1 tablet BID    sodium chloride 0.9% flush 10 mL PRN       Objective:     Vital Signs (Most Recent):  Temp: 97.8 °F (36.6 °C) (07/21/19 1530)  Pulse: 76 (07/21/19 1530)  Resp: 16 (07/21/19 1530)  BP: (!) 163/73 (07/21/19 1530)  SpO2: 95 % (07/21/19 1530)  O2 Device (Oxygen Therapy): room air (07/21/19 0838) Vital Signs (24h Range):  Temp:  [97.5 °F (36.4 °C)-98.1 °F (36.7 °C)] 97.8 °F (36.6 °C)  Pulse:  [73-78] 76  Resp:  [16-18] 16  SpO2:  [95 %-98 %] 95 %  BP: (134-165)/(63-73) 163/73     Weight: 74.3 kg (163 lb 12.8 oz) (07/21/19 0443)  Body mass index is 26.44 kg/m².  Body surface area is 1.86 meters squared.    I/O last 3 completed shifts:  In:  480 [P.O.:480]  Out: 4 [Urine:4]    Physical Exam   Constitutional: She appears well-developed and well-nourished. No distress.   HENT:   Head: Normocephalic and atraumatic.   Mouth/Throat: Oropharynx is clear and moist and mucous membranes are normal.   Eyes: Conjunctivae and EOM are normal.   Cardiovascular: Normal rate and regular rhythm.   Pulmonary/Chest: Effort normal. No respiratory distress. She has no wheezes.   Abdominal: Soft. She exhibits no distension.   Musculoskeletal: She exhibits no edema or deformity.   Skin: Skin is warm and dry. She is not diaphoretic.   Psychiatric: She has a normal mood and affect. Her behavior is normal.       Significant Labs:  CBC:   Recent Labs   Lab 07/21/19  0508   WBC 7.34   RBC 2.64*   HGB 7.7*   HCT 24.6*      MCV 93   MCH 29.2   MCHC 31.3*     CMP:   Recent Labs   Lab 07/18/19  0547  07/21/19  0508   *  131*   < > 68*   CALCIUM 8.8  8.8   < > 9.4   ALBUMIN 2.8*   < > 2.8*   PROT 6.9  --   --      140   < > 138   K 3.8  3.8   < > 4.5   CO2 23  23   < > 20*     107   < > 108   *  106*   < > 88*   CREATININE 3.3*  3.3*   < > 2.7*   ALKPHOS 108  --   --    ALT 10  --   --    AST 11  --   --    BILITOT 0.3  --   --     < > = values in this interval not displayed.     All labs within the past 24 hours have been reviewed.     Significant Imaging:  Labs: Reviewed

## 2019-07-21 NOTE — PROGRESS NOTES
[facility]  Progress Note    Patient Name: Joyce Riley  MRN: [id number]  Patient Class: IP- Inpatient   Admission Date: 7/17/2019  Length of Stay: 4 days  Attending Physician: Shahab Chinchilla MD  Primary Care Provider: Kalpana Staton MD        Subjective:     Principal Problem:Acute renal failure superimposed on stage 5 chronic kidney disease, not on chronic dialysis      HPI:   85 y.o F with a hx of Anemia, Anticoagulant long-term use with eliquis,P A-fib,, CKD stage V, DM, GERD, GI bleed duo to rectal hemorrhoids, HTN and Stroke presents to the ED via EMS [facility] for emergent evaluation of an abnormal lab. The pt was sent to the ED by her nephrologist .for further evaluation of decreased kidney function. The pt states she was able to urinate a small amount this AM, but has not voided since despite consuming an increased amount of water today. She was last seen in this ED yesterday for rectal bleeding duo to hemorrhoid,and renal insufficiency,ecieved IVF and as returned back to NH,in the past has luisana recommended Hosice and she is DNR,. She denies fever, chills, diaphoresis, nausea, emesis, diarrhea, abdominal pain, chest pain, SOB, dysuria and rash. No prior tx.her CKD 5 in recent few months is worsening,CRT today is 3.9,she has bene started on IVF and her nephrology is consulted.    Overview/Hospital Course:  85 y.o F with a hx of Anemia, Anticoagulant long-term use with eliquis,P A-fib,, CKD stage V, DM, GERD, GI bleed duo to rectal hemorrhoids, HTN and Stroke presents to the ED via EMS from [facility] for emergent evaluation of an abnormal lab. The pt was sent to the ED by her nephrologist .for further evaluation of decreased kidney function.   She was started on IV fluids with some improvement in renal function. She in the past was not a candidate for dialysis and the recommendation was for hospice.  Nephrology and palliative  care were consulted. Nephrology noted that patient IS a dialysis candidate if needed in the future. CRT went from 4.8 to 2.3.  The patient was to be discharged to  SNF on 7/19, but had a bloody BM and discharge held. GI was consulted and decided to monitor only. H/H remained stable.   PT/OT continued to follow.     Interval History: No new issues      Review of Systems   Constitutional: Negative for activity change.   HENT: Negative for congestion.    Respiratory: Negative for chest tightness and shortness of breath.    Cardiovascular: Negative for chest pain.   Gastrointestinal: Negative for abdominal pain and blood in stool.   Genitourinary: Negative for difficulty urinating.     Objective:     Vital Signs (Most Recent):  Temp: 97.9 °F (36.6 °C) (07/21/19 0745)  Pulse: 78 (07/21/19 0745)  Resp: 18 (07/21/19 0745)  BP: (!) 165/71 (07/21/19 0745)  SpO2: 98 % (07/21/19 0838) Vital Signs (24h Range):  Temp:  [97.5 °F (36.4 °C)-98.1 °F (36.7 °C)] 97.9 °F (36.6 °C)  Pulse:  [71-78] 78  Resp:  [16-18] 18  SpO2:  [96 %-99 %] 98 %  BP: (134-165)/(63-71) 165/71     Weight: 74.3 kg (163 lb 12.8 oz)  Body mass index is 26.44 kg/m².    Intake/Output Summary (Last 24 hours) at 7/21/2019 1053  Last data filed at 7/21/2019 0443  Gross per 24 hour   Intake 360 ml   Output 4 ml   Net 356 ml      Physical Exam   Constitutional: She is oriented to person, place, and time. She appears well-developed and well-nourished.   HENT:   Head: Atraumatic.   Neurological: She is alert and oriented to person, place, and time.   Psychiatric: She has a normal mood and affect. Her behavior is normal.   Nursing note and vitals reviewed.      Significant Labs:   BMP:   Recent Labs   Lab 07/21/19  0508   GLU 68*      K 4.5      CO2 20*   BUN 88*   CREATININE 2.7*   CALCIUM 9.4   MG 1.6     CBC:   Recent Labs   Lab 07/19/19  1744 07/20/19  0625 07/21/19  0508   WBC  --  7.50 7.34   HGB 8.1* 7.9* 7.7*   HCT 25.1* 25.0* 24.6*   PLT  --  315  284       Significant Imaging:      Assessment/Plan:      * Acute renal failure superimposed on stage 5 chronic kidney disease, not on chronic dialysis  Worsening,Will  continue with IVF,  Consult her nephrology,in the past reccommended hospice,consult palliative care.    Improved renal function some with fluids. Evidently not dialysis candidate. Nephrology and palliative care consulted. Continue IV fluids for now   CRT much improved from 4.8 to 2.3.  D/C fluids.  Stable.     Acute blood loss anemia  From likely lower GI bleed.  GI consulted. Following H/H.   Likely from hemorrhoids  CBC pending   H/H stable. GI signed off         Debility  PT/OT rec: SNF.        Urinary tract infection without hematuria  Will continue with IV Abx,folow cultures.  E-coli. Not sure if colonization or infection. No sepsis.  Treated. Will d/c Abx.          Essential hypertension  Resume home medications and added prn clonidine.      Paroxysmal atrial fibrillation  On BB,hold OAC duo to recent rectal hemorhoid at this time.      Anemia of renal disease  Stable,will monitor.      Type 2 diabetes mellitus, controlled, with renal complications  On SSI.        VTE Risk Mitigation (From admission, onward)        Ordered     IP VTE HIGH RISK PATIENT  Once      07/17/19 2023     Place SANTOS hose  Until discontinued      07/17/19 2023     Place sequential compression device  Until discontinued      07/17/19 2023          Likely to [facility] SNF on 7/22.       Shahab Oscar MD  Department of Hospital Medicine   [facility]

## 2019-07-21 NOTE — SUBJECTIVE & OBJECTIVE
Interval History: No new issues      Review of Systems   Constitutional: Negative for activity change.   HENT: Negative for congestion.    Respiratory: Negative for chest tightness and shortness of breath.    Cardiovascular: Negative for chest pain.   Gastrointestinal: Negative for abdominal pain and blood in stool.   Genitourinary: Negative for difficulty urinating.     Objective:     Vital Signs (Most Recent):  Temp: 97.9 °F (36.6 °C) (07/21/19 0745)  Pulse: 78 (07/21/19 0745)  Resp: 18 (07/21/19 0745)  BP: (!) 165/71 (07/21/19 0745)  SpO2: 98 % (07/21/19 0838) Vital Signs (24h Range):  Temp:  [97.5 °F (36.4 °C)-98.1 °F (36.7 °C)] 97.9 °F (36.6 °C)  Pulse:  [71-78] 78  Resp:  [16-18] 18  SpO2:  [96 %-99 %] 98 %  BP: (134-165)/(63-71) 165/71     Weight: 74.3 kg (163 lb 12.8 oz)  Body mass index is 26.44 kg/m².    Intake/Output Summary (Last 24 hours) at 7/21/2019 1053  Last data filed at 7/21/2019 0443  Gross per 24 hour   Intake 360 ml   Output 4 ml   Net 356 ml      Physical Exam   Constitutional: She is oriented to person, place, and time. She appears well-developed and well-nourished.   HENT:   Head: Atraumatic.   Neurological: She is alert and oriented to person, place, and time.   Psychiatric: She has a normal mood and affect. Her behavior is normal.   Nursing note and vitals reviewed.      Significant Labs:   BMP:   Recent Labs   Lab 07/21/19  0508   GLU 68*      K 4.5      CO2 20*   BUN 88*   CREATININE 2.7*   CALCIUM 9.4   MG 1.6     CBC:   Recent Labs   Lab 07/19/19  1744 07/20/19  0625 07/21/19  0508   WBC  --  7.50 7.34   HGB 8.1* 7.9* 7.7*   HCT 25.1* 25.0* 24.6*   PLT  --  315 284       Significant Imaging:

## 2019-07-21 NOTE — PROGRESS NOTES
[facility]  Nephrology  Progress Note    Patient Name: Joyce Riley  MRN: [id number]  Admission Date: 7/17/2019  Hospital Length of Stay: 4 days  Attending Provider: Shahab Chinchilla MD   Primary Care Physician: Kalpana Staton MD  Principal Problem:Acute renal failure superimposed on stage 5 chronic kidney disease, not on chronic dialysis    Subjective:     HPI: Ms. Riley is an 84 yo AAF with afib, CHF, CKD stage 4, HTN, gout, h/o CVA, and dementia who was admitted yesterday with ROXANNE for which nephrology is consulted. She has CKD stage 4 with baseline Cr 2.3-3.2 followed outpatient by Dr. Baca; last visit 6/12/19. She has a recent h/o ROXANNE resulting in need for dialysis; however dialysis treatments were complicated by her dementia and pulling at lines. Fortunately her renal function recovered and she was discharged home without need for further dialysis. Her volume status is managed outpatient with lasix and metolazone. She presented to the ED on 7/16 with GIB. She received IVF and was discharged same day with outpatient follow-up. She called nephrology clinic yesterday with complaint of no UOP since hospital discharge. She was encouraged to present to the hospital. Her Cr was 4.3 on 7/16 and was 3.9 on day of arrival. She was started on IVF as well as abx for UTI. Her Cr has improved to 3.3 this AM. She reports to be doing okay this afternoon and is feeling improved. She reports UOP has improved. No SOB or edema.     Interval History: received AHSAN yesterday. No events overnight. No further bleeding. Doing okay this afternoon. Visited with family earlier today. Didn't eat much of lunch. No SOB. Reports good UOP.     Review of patient's allergies indicates:   Allergen Reactions    Indomethacin     Nsaids (non-steroidal anti-inflammatory drug)      Current Facility-Administered Medications   Medication Frequency    acetaminophen tablet 650 mg Q4H PRN    albuterol-ipratropium 2.5  mg-0.5 mg/3 mL nebulizer solution 3 mL Q4H PRN    calcitRIOL capsule 0.25 mcg Every other day    carvedilol tablet 3.125 mg BID    cloNIDine tablet 0.1 mg Q4H PRN    colchicine tablet 0.6 mg Daily    dextrose 50% injection 12.5 g PRN    dextrose 50% injection 25 g PRN    famotidine tablet 20 mg Daily    [START ON 7/22/2019] furosemide tablet 40 mg Daily    glucagon (human recombinant) injection 1 mg PRN    glucose chewable tablet 16 g PRN    glucose chewable tablet 24 g PRN    hydrocortisone suppository 25 mg BID    insulin aspart U-100 pen 0-5 Units QID (AC + HS) PRN    insulin detemir U-100 pen 5 Units QHS    NIFEdipine 24 hr tablet 60 mg Daily    oxyCODONE-acetaminophen 5-325 mg per tablet 1 tablet Q6H PRN    polyethylene glycol packet 17 g BID PRN    senna-docusate 8.6-50 mg per tablet 1 tablet BID    sodium chloride 0.9% flush 10 mL PRN       Objective:     Vital Signs (Most Recent):  Temp: 97.8 °F (36.6 °C) (07/21/19 1530)  Pulse: 76 (07/21/19 1530)  Resp: 16 (07/21/19 1530)  BP: (!) 163/73 (07/21/19 1530)  SpO2: 95 % (07/21/19 1530)  O2 Device (Oxygen Therapy): room air (07/21/19 0838) Vital Signs (24h Range):  Temp:  [97.5 °F (36.4 °C)-98.1 °F (36.7 °C)] 97.8 °F (36.6 °C)  Pulse:  [73-78] 76  Resp:  [16-18] 16  SpO2:  [95 %-98 %] 95 %  BP: (134-165)/(63-73) 163/73     Weight: 74.3 kg (163 lb 12.8 oz) (07/21/19 0443)  Body mass index is 26.44 kg/m².  Body surface area is 1.86 meters squared.    I/O last 3 completed shifts:  In: 480 [P.O.:480]  Out: 4 [Urine:4]    Physical Exam   Constitutional: She appears well-developed and well-nourished. No distress.   HENT:   Head: Normocephalic and atraumatic.   Mouth/Throat: Oropharynx is clear and moist and mucous membranes are normal.   Eyes: Conjunctivae and EOM are normal.   Cardiovascular: Normal rate and regular rhythm.   Pulmonary/Chest: Effort normal. No respiratory distress. She has no wheezes.   Abdominal: Soft. She exhibits no distension.    Musculoskeletal: She exhibits no edema or deformity.   Skin: Skin is warm and dry. She is not diaphoretic.   Psychiatric: She has a normal mood and affect. Her behavior is normal.       Significant Labs:  CBC:   Recent Labs   Lab 07/21/19  0508   WBC 7.34   RBC 2.64*   HGB 7.7*   HCT 24.6*      MCV 93   MCH 29.2   MCHC 31.3*     CMP:   Recent Labs   Lab 07/18/19  0547  07/21/19  0508   *  131*   < > 68*   CALCIUM 8.8  8.8   < > 9.4   ALBUMIN 2.8*   < > 2.8*   PROT 6.9  --   --      140   < > 138   K 3.8  3.8   < > 4.5   CO2 23  23   < > 20*     107   < > 108   *  106*   < > 88*   CREATININE 3.3*  3.3*   < > 2.7*   ALKPHOS 108  --   --    ALT 10  --   --    AST 11  --   --    BILITOT 0.3  --   --     < > = values in this interval not displayed.     All labs within the past 24 hours have been reviewed.     Significant Imaging:  Labs: Reviewed    Assessment/Plan:     ROXANNE on CKD stage 4  - baseline Cr 2.3-3.2; followed outpatient by Dr. Baca  - h/o dialysis-dependent ATN; subsequently improved and no longer needs dialysis.  - Cr 4.3 on arrival; likely pre-renal in etiology. Received IVF.   - improved. Cr stable at 2.7; back to baseline  - will restart home lasix in AM. Continue holding metolazone upon discharge; will reassess volume status outpatient in clinic  - renally dose medications   - avoid nephrotoxic agents  - daily labs; strict I/Os     Blood loss anemia + anemia of CKD  - hgb below goal; though stable  - recent h/o GIB; now with hematochezia. GI following.  - ordered AHSAN 20k units SC x1 on 7/20  - low Tsat; ferritin pending. If ferritin low, will arrange for IV iron today.   - will continue to monitor anemia outpatient     Secondary HPTH  - CCa and phos acceptable  - continue calcitriol  - renal diet     Metabolic acidosis  - will restart home diuretics in AM  - continue to trend     Thank you for your consult. I will follow-up with patient. Please contact us if  you have any additional questions.     Ina Orr MD  Nephrology  [facility], [facility]  Office: [phone]  [facility]  Date of service: 07/21/2019

## 2019-07-21 NOTE — ASSESSMENT & PLAN NOTE
From likely lower GI bleed.  GI consulted. Following H/H.   Likely from hemorrhoids  CBC pending   H/H stable. GI signed off

## 2019-07-21 NOTE — ASSESSMENT & PLAN NOTE
Will continue with IV Abx,folow cultures.  E-coli. Not sure if colonization or infection. No sepsis.  Treated. Will d/c Abx.

## 2019-07-21 NOTE — PROGRESS NOTES
Chief Complaint / Reason for Consult:  Hematochezia    Patient reports no further bloody BMs    ROS:  No CP, SOB, F/C    Patient Vitals for the past 24 hrs:   BP Temp Temp src Pulse Resp SpO2 Weight   07/21/19 0745 (!) 165/71 97.9 °F (36.6 °C) -- 78 18 97 % --   07/21/19 0443 -- -- -- -- -- -- 74.3 kg (163 lb 12.8 oz)   07/21/19 0434 134/63 98.1 °F (36.7 °C) Oral 78 18 96 % --   07/21/19 0106 (!) 159/69 97.5 °F (36.4 °C) Oral 73 18 98 % --   07/20/19 1920 (!) 148/65 97.8 °F (36.6 °C) Oral 75 16 96 % --   07/20/19 1654 (!) 155/68 98 °F (36.7 °C) Oral 74 18 97 % --   07/20/19 1139 (!) 152/67 97.5 °F (36.4 °C) -- 71 18 99 % --   07/20/19 0856 (!) 119/55 -- -- -- -- -- --   07/20/19 0854 126/72 -- -- -- -- -- --   07/20/19 0853 (!) 173/70 -- -- -- -- -- --       Physical Exam:  Gen - Well developed, well nourished, elderly, no apparent distress  HEENT - Anicteric  CV - S1, S2, no murmurs/rubs  Lungs - CTA-B, normal excursion  Abd - Soft, NT, ND, normal BS's, no HSM.  Ext - No c/c/e  Neuro - No asterixis    Labs:  Recent Labs   Lab 07/21/19  0508   WBC 7.34   RBC 2.64*   HGB 7.7*   HCT 24.6*      MCV 93   MCH 29.2   MCHC 31.3*     Recent Labs   Lab 07/21/19  0508   CALCIUM 9.4      K 4.5   CO2 20*      BUN 88*   CREATININE 2.7*       Imaging:  No abdominal imaging during this admit  Reviewed colonoscopy from February 2019    Assessment:  This patient is a 85 y.o. female with:   1.  Hematochezia-appears to have clinically ceased.  Most consistent with mild diverticular versus internal hemorrhoidal bleeding.  Last colonoscopy in February 2019.  2.  Anemia-chronic + acute post hemorrhagic.   3.  History of HTN, DM, CHF, CKD, atrial fibrillation, CVA......    Recommendations:  1.  Monitor for symptoms of bleeding  2.  Supportive care of internal hemorrhoids with suppositories p.r.n. Bowel regimen.  3.  If any significant bleeding occurs may need angiography/embolization.  4.  Otherwise further GI  evaluation can be done in the outpatient setting.  Will sign off for now, please re-consult as needed, thanks.

## 2019-07-22 VITALS
DIASTOLIC BLOOD PRESSURE: 72 MMHG | WEIGHT: 165.56 LBS | TEMPERATURE: 98 F | HEART RATE: 66 BPM | SYSTOLIC BLOOD PRESSURE: 170 MMHG | OXYGEN SATURATION: 97 % | HEIGHT: 66 IN | BODY MASS INDEX: 26.61 KG/M2 | RESPIRATION RATE: 18 BRPM

## 2019-07-22 PROBLEM — N17.9 ACUTE RENAL FAILURE SUPERIMPOSED ON STAGE 5 CHRONIC KIDNEY DISEASE, NOT ON CHRONIC DIALYSIS: Status: RESOLVED | Noted: 2019-07-17 | Resolved: 2019-07-22

## 2019-07-22 PROBLEM — N39.0 URINARY TRACT INFECTION WITHOUT HEMATURIA: Status: RESOLVED | Noted: 2019-07-17 | Resolved: 2019-07-22

## 2019-07-22 PROBLEM — N18.5 ACUTE RENAL FAILURE SUPERIMPOSED ON STAGE 5 CHRONIC KIDNEY DISEASE, NOT ON CHRONIC DIALYSIS: Status: RESOLVED | Noted: 2019-07-17 | Resolved: 2019-07-22

## 2019-07-22 PROBLEM — D62 ACUTE BLOOD LOSS ANEMIA: Status: RESOLVED | Noted: 2019-07-19 | Resolved: 2019-07-22

## 2019-07-22 LAB
ALBUMIN SERPL BCP-MCNC: 2.8 G/DL (ref 3.5–5.2)
ANION GAP SERPL CALC-SCNC: 9 MMOL/L (ref 8–16)
BASOPHILS # BLD AUTO: 0.06 K/UL (ref 0–0.2)
BASOPHILS NFR BLD: 0.7 % (ref 0–1.9)
BUN SERPL-MCNC: 83 MG/DL (ref 8–23)
CALCIUM SERPL-MCNC: 9.7 MG/DL (ref 8.7–10.5)
CHLORIDE SERPL-SCNC: 109 MMOL/L (ref 95–110)
CO2 SERPL-SCNC: 20 MMOL/L (ref 23–29)
CREAT SERPL-MCNC: 2.8 MG/DL (ref 0.5–1.4)
DIFFERENTIAL METHOD: ABNORMAL
EOSINOPHIL # BLD AUTO: 0.3 K/UL (ref 0–0.5)
EOSINOPHIL NFR BLD: 3.7 % (ref 0–8)
ERYTHROCYTE [DISTWIDTH] IN BLOOD BY AUTOMATED COUNT: 17 % (ref 11.5–14.5)
EST. GFR  (AFRICAN AMERICAN): 17 ML/MIN/1.73 M^2
EST. GFR  (NON AFRICAN AMERICAN): 15 ML/MIN/1.73 M^2
FERRITIN SERPL-MCNC: 139 NG/ML (ref 20–300)
GLUCOSE SERPL-MCNC: 74 MG/DL (ref 70–110)
HCT VFR BLD AUTO: 23.7 % (ref 37–48.5)
HGB BLD-MCNC: 7.7 G/DL (ref 12–16)
LYMPHOCYTES # BLD AUTO: 1.8 K/UL (ref 1–4.8)
LYMPHOCYTES NFR BLD: 22.2 % (ref 18–48)
MCH RBC QN AUTO: 29.4 PG (ref 27–31)
MCHC RBC AUTO-ENTMCNC: 32.5 G/DL (ref 32–36)
MCV RBC AUTO: 91 FL (ref 82–98)
MONOCYTES # BLD AUTO: 0.4 K/UL (ref 0.3–1)
MONOCYTES NFR BLD: 5.2 % (ref 4–15)
NEUTROPHILS # BLD AUTO: 5.5 K/UL (ref 1.8–7.7)
NEUTROPHILS NFR BLD: 68.6 % (ref 38–73)
PHOSPHATE SERPL-MCNC: 4.7 MG/DL (ref 2.7–4.5)
PLATELET # BLD AUTO: 365 K/UL (ref 150–350)
PMV BLD AUTO: 10.1 FL (ref 9.2–12.9)
POCT GLUCOSE: 124 MG/DL (ref 70–110)
POCT GLUCOSE: 66 MG/DL (ref 70–110)
POTASSIUM SERPL-SCNC: 4.5 MMOL/L (ref 3.5–5.1)
RBC # BLD AUTO: 2.62 M/UL (ref 4–5.4)
SODIUM SERPL-SCNC: 138 MMOL/L (ref 136–145)
WBC # BLD AUTO: 8.12 K/UL (ref 3.9–12.7)

## 2019-07-22 PROCEDURE — 25000003 PHARM REV CODE 250: Performed by: HOSPITALIST

## 2019-07-22 PROCEDURE — 25000003 PHARM REV CODE 250: Performed by: INTERNAL MEDICINE

## 2019-07-22 PROCEDURE — 97110 THERAPEUTIC EXERCISES: CPT

## 2019-07-22 PROCEDURE — 25000003 PHARM REV CODE 250: Performed by: NURSE PRACTITIONER

## 2019-07-22 PROCEDURE — 97535 SELF CARE MNGMENT TRAINING: CPT

## 2019-07-22 PROCEDURE — 97530 THERAPEUTIC ACTIVITIES: CPT

## 2019-07-22 PROCEDURE — 36415 COLL VENOUS BLD VENIPUNCTURE: CPT

## 2019-07-22 PROCEDURE — 85025 COMPLETE CBC W/AUTO DIFF WBC: CPT

## 2019-07-22 PROCEDURE — 80069 RENAL FUNCTION PANEL: CPT

## 2019-07-22 RX ADMIN — CLONIDINE HYDROCHLORIDE 0.1 MG: 0.1 TABLET ORAL at 04:07

## 2019-07-22 RX ADMIN — NIFEDIPINE 60 MG: 30 TABLET, FILM COATED, EXTENDED RELEASE ORAL at 09:07

## 2019-07-22 RX ADMIN — SENNOSIDES AND DOCUSATE SODIUM 1 TABLET: 8.6; 5 TABLET ORAL at 09:07

## 2019-07-22 RX ADMIN — OXYCODONE HYDROCHLORIDE AND ACETAMINOPHEN 1 TABLET: 5; 325 TABLET ORAL at 04:07

## 2019-07-22 RX ADMIN — CARVEDILOL 3.12 MG: 3.12 TABLET, FILM COATED ORAL at 09:07

## 2019-07-22 RX ADMIN — FAMOTIDINE 20 MG: 20 TABLET ORAL at 09:07

## 2019-07-22 RX ADMIN — FUROSEMIDE 40 MG: 40 TABLET ORAL at 09:07

## 2019-07-22 RX ADMIN — COLCHICINE 0.6 MG: 0.6 TABLET, FILM COATED ORAL at 09:07

## 2019-07-22 NOTE — PROGRESS NOTES
Patient given PRN medication through the shift, for complaints of pain in neck and feet.  Oncoming nurse received shift change report re: pain levels.

## 2019-07-22 NOTE — SUBJECTIVE & OBJECTIVE
Interval History: No new issues.     Review of Systems   Constitutional: Negative for activity change.   HENT: Negative for congestion.    Respiratory: Negative for chest tightness and shortness of breath.    Cardiovascular: Negative for chest pain.   Gastrointestinal: Negative for abdominal pain and blood in stool.   Genitourinary: Negative for difficulty urinating.     Objective:     Vital Signs (Most Recent):  Temp: 97.8 °F (36.6 °C) (07/22/19 0427)  Pulse: 87 (07/22/19 0427)  Resp: 18 (07/22/19 0427)  BP: (!) 154/70 (07/22/19 0427)  SpO2: 97 % (07/22/19 0427) Vital Signs (24h Range):  Temp:  [97.6 °F (36.4 °C)-98 °F (36.7 °C)] 97.8 °F (36.6 °C)  Pulse:  [74-87] 87  Resp:  [16-18] 18  SpO2:  [95 %-98 %] 97 %  BP: (142-168)/(66-75) 154/70     Weight: 75.1 kg (165 lb 9.1 oz)  Body mass index is 26.72 kg/m².  No intake or output data in the 24 hours ending 07/22/19 0654   Physical Exam   Constitutional: She is oriented to person, place, and time. She appears well-developed and well-nourished.   HENT:   Head: Atraumatic.   Neurological: She is alert and oriented to person, place, and time.   Psychiatric: She has a normal mood and affect. Her behavior is normal.   Nursing note and vitals reviewed.      Significant Labs:   BMP:   Recent Labs   Lab 07/21/19  0508   GLU 68*      K 4.5      CO2 20*   BUN 88*   CREATININE 2.7*   CALCIUM 9.4   MG 1.6     CBC:   Recent Labs   Lab 07/21/19  0508   WBC 7.34   HGB 7.7*   HCT 24.6*          Significant Imaging:

## 2019-07-22 NOTE — PLAN OF CARE
Problem: Physical Therapy Goal  Goal: Physical Therapy Goal  Goals to be met by: 2019    Patient will increase functional independence with mobility by performin. Supine to sit with Modified [place]  2. Sit to supine with Modified [place]  3. Sit to stand transfer with Modified [place]  2. Bed to chair transfer = TBD when appropriate.   5. Sitting at edge of bed x25 minutes with Modified [place]  6. Lower extremity exercise program x25 reps per handout, with supervision    Recommend: SNF @ [facility] at time of discharge.        Outcome: Ongoing (interventions implemented as appropriate)  Pt will benefit from further skilled therapy in order to get back to PLOF.

## 2019-07-22 NOTE — NURSING
Report called to [facility]. Report accepted by Amada [facility]. Transportation scheduled to arrive at 3 PM. SL removed tele removed., Family aware of patient's discharge to NH

## 2019-07-22 NOTE — DISCHARGE SUMMARY
[facility]  Discharge Summary      Patient Name: Joyce Riley  MRN: [id number]  Admission Date: 7/17/2019  Hospital Length of Stay: 5 days  Discharge Date and Time:  07/22/2019 9:45 AM  Attending Physician: Shahab Chinchilla MD   Discharging Provider: Shahab Chinchilla MD  Primary Care Provider: Kalpana Staton MD      HPI:    85 y.o F with a hx of Anemia, Anticoagulant long-term use with eliquis,P A-fib,, CKD stage V, DM, GERD, GI bleed duo to rectal hemorrhoids, HTN and Stroke presents to the ED via EMS from [facility] for emergent evaluation of an abnormal lab. The pt was sent to the ED by her nephrologist .for further evaluation of decreased kidney function. The pt states she was able to urinate a small amount this AM, but has not voided since despite consuming an increased amount of water today. She was last seen in this ED yesterday for rectal bleeding duo to hemorrhoid,and renal insufficiency,ecieved IVF and as returned back to NH,in the past has luisana recommended Hosice and she is DNR,. She denies fever, chills, diaphoresis, nausea, emesis, diarrhea, abdominal pain, chest pain, SOB, dysuria and rash. No prior tx.her CKD 5 in recent few months is worsening,CRT today is 3.9,she has bene started on IVF and her nephrology is consulted.    * No surgery found *      Hospital Course:   85 y.o F with a hx of Anemia, Anticoagulant long-term use with eliquis,P A-fib,, CKD stage V, DM, GERD, GI bleed duo to rectal hemorrhoids, HTN and Stroke presents to the ED via EMS from [facility] for emergent evaluation of an abnormal lab. The pt was sent to the ED by her nephrologist .for further evaluation of decreased kidney function.   She was started on IV fluids with some improvement in renal function. She in the past was not a candidate for dialysis and the recommendation was for hospice.  Nephrology and palliative care were consulted. Nephrology noted  that patient IS a dialysis candidate if needed in the future. CRT went from 4.8 to 2.3.  The patient was to be discharged to  SNF on 7/19, but had a bloody BM and discharge held. GI was consulted and decided to monitor only. H/H remained stable.   PT/OT continued to follow. The patient will be discharged to  SNF today. Activity as tolerated. Diet- low NA, ADA 1800 sruthi diet. Follow up with PCP in one week. BMP in 5 days      Consults:   Consults (From admission, onward)        Status Ordering Provider     Inpatient consult to Gastroenterology  Once     Provider:  Michael Burger MD    Completed YOSELIN MCKENZIE     Inpatient consult to Nephrology  Once     Provider:  Samanta Baca MD    Completed DIAMOND JONES     Inpatient consult to Palliative Care  Once     Provider:  Bonita Coreas NP    Completed IAN HODGES     Inpatient consult to Social Work  Once     Provider:  (Not yet assigned)    Acknowledged MARIANA CASTELAN     IP consult to case management  Once     Provider:  (Not yet assigned)    Acknowledged MARIANA CASTELAN          No new Assessment & Plan notes have been filed under this hospital service since the last note was generated.  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    Specialty:  General Practice  Contact information:  [address] [place] [address]  [address] [id number]  [phone]                 Patient Instructions:   No discharge procedures on file.    Significant Diagnostic Studies:    Pending Diagnostic Studies:     None         Medications:  Reconciled Home Medications:      Medication List      CONTINUE taking these medications    acetaminophen 325 MG tablet  Commonly known as:  TYLENOL  Take 2 tablets (650 mg total) by mouth every 4 (four) hours as needed.     albuterol-ipratropium 2.5 mg-0.5 mg/3 mL nebulizer solution  Commonly known as:  DUO-NEB  Take 3 mLs by nebulization every 4 (four) hours as needed for Wheezing. Rescue     calcitRIOL 0.25 MCG Cap  Commonly known as:  ROCALTROL  Take 1 capsule (0.25 mcg total) by mouth every other day.     carvedilol 3.125 MG tablet  Commonly known as:  COREG  Take 1 tablet (3.125 mg total) by mouth 2 (two) times daily.     colchicine 0.6 mg tablet  Commonly known as:  COLCRYS  Take 0.6 mg by mouth once daily.     ELIQUIS 2.5 mg Tab  Generic drug:  apixaban  Take 2.5 mg by mouth 2 (two) times daily.     ergocalciferol 50,000 unit Cap  Commonly known as:  ERGOCALCIFEROL  Take 1 capsule (50,000 Units total) by mouth every 7 days.     famotidine 20 MG tablet  Commonly known as:  PEPCID  Take 1 tablet (20 mg total) by mouth once daily.     insulin degludec 100 unit/mL (3 mL) Inpn  Commonly known as:  TRESIBA FLEXTOUCH U-100  Inject 5 Units into the skin every evening.     metOLazone 2.5 MG tablet  Commonly known as:  ZAROXOLYN  Take 1 tablet (2.5 mg total) by mouth once daily.     NIFEdipine 60 MG (OSM) 24 hr tablet  Commonly known as:  PROCARDIA-XL  Take 1 tablet  (60 mg total) by mouth once daily.     polyethylene glycol 17 gram Pwpk  Commonly known as:  GLYCOLAX  Take 17 g by mouth 2 (two) times daily as needed.     senna-docusate 8.6-50 mg 8.6-50 mg per tablet  Commonly known as:  PERICOLACE  Take 1 tablet by mouth 2 (two) times daily.        STOP taking these medications    cephALEXin 500 MG capsule  Commonly known as:  KEFLEX     furosemide 40 MG tablet  Commonly known as:  LASIX     insulin aspart U-100 100 unit/mL (3 mL) Inpn pen  Commonly known as:  NovoLOG            Indwelling Lines/Drains at time of discharge:   Lines/Drains/Airways          None          Time spent on the discharge of patient: > 30  minutes  Patient was seen and examined on the date of discharge and determined to be suitable for discharge.         Shahab Oscar MD  Department of Hospital Medicine  [facility]

## 2019-07-22 NOTE — PROGRESS NOTES
Pt is alert. Oriented. No new complaints.   VS stable  Lungs clear  Abdomen soft. No edema  BUN 83  Creat 2,8 mg  eGFR 17 ML   Hgb 7.7 mg  If discharged today needs to be followed closely.   Needs GI w/u as OP  Continue AHSAN, IRON SUPPLEMENTS

## 2019-07-22 NOTE — PLAN OF CARE
07/22/19 1533   Final Note   Assessment Type Final Discharge Note   Anticipated Discharge Disposition SNF   What phone number can be called within the next 1-3 days to see how you are doing after discharge?   (Listed in chart)   Hospital Follow Up  Appt(s) scheduled? Yes   Discharge plans and expectations educations in teach back method with documentation complete? Yes   [facility] Referral Info   Post Acute Recommendation SNF / Sub-Acute Rehab

## 2019-07-22 NOTE — PROGRESS NOTES
[facility]  Progress Note    Patient Name: Joyce Riley  MRN: [id number]  Patient Class: IP- Inpatient   Admission Date: 7/17/2019  Length of Stay: 5 days  Attending Physician: Shahab Chinchilla MD  Primary Care Provider: Kalpana Staton MD        Subjective:     Principal Problem:Acute renal failure superimposed on stage 5 chronic kidney disease, not on chronic dialysis      HPI:   85 y.o F with a hx of Anemia, Anticoagulant long-term use with eliquis,P A-fib,, CKD stage V, DM, GERD, GI bleed duo to rectal hemorrhoids, HTN and Stroke presents to the ED via EMS from [facility] for emergent evaluation of an abnormal lab. The pt was sent to the ED by her nephrologist .for further evaluation of decreased kidney function. The pt states she was able to urinate a small amount this AM, but has not voided since despite consuming an increased amount of water today. She was last seen in this ED yesterday for rectal bleeding duo to hemorrhoid,and renal insufficiency,ecieved IVF and as returned back to NH,in the past has luisana recommended Hosice and she is DNR,. She denies fever, chills, diaphoresis, nausea, emesis, diarrhea, abdominal pain, chest pain, SOB, dysuria and rash. No prior tx.her CKD 5 in recent few months is worsening,CRT today is 3.9,she has bene started on IVF and her nephrology is consulted.    Overview/Hospital Course:  85 y.o F with a hx of Anemia, Anticoagulant long-term use with eliquis,P A-fib,, CKD stage V, DM, GERD, GI bleed duo to rectal hemorrhoids, HTN and Stroke presents to the ED via EMS from [facility] for emergent evaluation of an abnormal lab. The pt was sent to the ED by her nephrologist .for further evaluation of decreased kidney function.   She was started on IV fluids with some improvement in renal function. She in the past was not a candidate for dialysis and the recommendation was for hospice.  Nephrology and palliative  care were consulted. Nephrology noted that patient IS a dialysis candidate if needed in the future. CRT went from 4.8 to 2.3.  The patient was to be discharged to  SNF on 7/19, but had a bloody BM and discharge held. GI was consulted and decided to monitor only. H/H remained stable.   PT/OT continued to follow.     Interval History: No new issues.     Review of Systems   Constitutional: Negative for activity change.   HENT: Negative for congestion.    Respiratory: Negative for chest tightness and shortness of breath.    Cardiovascular: Negative for chest pain.   Gastrointestinal: Negative for abdominal pain and blood in stool.   Genitourinary: Negative for difficulty urinating.     Objective:     Vital Signs (Most Recent):  Temp: 97.8 °F (36.6 °C) (07/22/19 0427)  Pulse: 87 (07/22/19 0427)  Resp: 18 (07/22/19 0427)  BP: (!) 154/70 (07/22/19 0427)  SpO2: 97 % (07/22/19 0427) Vital Signs (24h Range):  Temp:  [97.6 °F (36.4 °C)-98 °F (36.7 °C)] 97.8 °F (36.6 °C)  Pulse:  [74-87] 87  Resp:  [16-18] 18  SpO2:  [95 %-98 %] 97 %  BP: (142-168)/(66-75) 154/70     Weight: 75.1 kg (165 lb 9.1 oz)  Body mass index is 26.72 kg/m².  No intake or output data in the 24 hours ending 07/22/19 0654   Physical Exam   Constitutional: She is oriented to person, place, and time. She appears well-developed and well-nourished.   HENT:   Head: Atraumatic.   Neurological: She is alert and oriented to person, place, and time.   Psychiatric: She has a normal mood and affect. Her behavior is normal.   Nursing note and vitals reviewed.      Significant Labs:   BMP:   Recent Labs   Lab 07/21/19  0508   GLU 68*      K 4.5      CO2 20*   BUN 88*   CREATININE 2.7*   CALCIUM 9.4   MG 1.6     CBC:   Recent Labs   Lab 07/21/19  0508   WBC 7.34   HGB 7.7*   HCT 24.6*          Significant Imaging:       Assessment/Plan:      * Acute renal failure superimposed on stage 5 chronic kidney disease, not on chronic dialysis  Worsening,Will   continue with IVF,  Consult her nephrology,in the past reccommended hospice,consult palliative care.    Improved renal function some with fluids. Evidently not dialysis candidate. Nephrology and palliative care consulted. Continue IV fluids for now   CRT much improved from 4.8 to 2.3.  D/C fluids.  Stable.     Acute blood loss anemia  From likely lower GI bleed.  GI consulted. Following H/H.   Likely from hemorrhoids  CBC pending   H/H stable. GI signed off         Debility  PT/OT rec: SNF.        Urinary tract infection without hematuria  Will continue with IV Abx,folow cultures.  E-coli. Not sure if colonization or infection. No sepsis.  Treated. Will d/c Abx.          Essential hypertension  Resume home medications and added prn clonidine.      Paroxysmal atrial fibrillation  On BB,hold OAC duo to recent rectal hemorhoid at this time.      Anemia of renal disease  Stable,will monitor.      Type 2 diabetes mellitus, controlled, with renal complications  On SSI.        VTE Risk Mitigation (From admission, onward)        Ordered     IP VTE HIGH RISK PATIENT  Once      07/17/19 2023     Place SANTOS hose  Until discontinued      07/17/19 2023     Place sequential compression device  Until discontinued      07/17/19 2023        Will d/c to SNF today if arranged through .         Shahab Oscar MD  Department of Hospital Medicine   [facility]

## 2019-07-22 NOTE — PLAN OF CARE
[facility]     Department of Hospital Medicine     [address] [place] [address]     [phone] after hours  [phone] fax       NURSING HOME ORDERS    07/22/2019    Admit to Nursing Home:  [facility]    Skilled Bed                                                Diagnoses: weakness/renal failure     Active Hospital Problems    Diagnosis  POA    Debility [R53.81]  Yes    Palliative care encounter [Z51.5]  Not Applicable    Essential hypertension [I10]  Yes    Paroxysmal atrial fibrillation [I48.0]  Yes     Chronic    Anemia of renal disease [D63.1]  Yes     Chronic    Type 2 diabetes mellitus, controlled, 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]  Yes     Chronic      Resolved Hospital Problems    Diagnosis Date Resolved POA    *Acute renal failure superimposed on stage 5 chronic kidney disease, not on chronic dialysis [N17.9, N18.5] 07/22/2019 Yes     Priority: 1 - High    Acute blood loss anemia [D62] 07/22/2019 No     Priority: 2     Urinary tract infection without hematuria [N39.0] 07/22/2019 Yes       Patient is homebound due to:  Acute renal failure superimposed on stage 5 chronic kidney disease, not on chronic dialysis    Allergies:  Review of patient's allergies indicates:   Allergen Reactions    Indomethacin     Nsaids (non-steroidal anti-inflammatory drug)        Vitals:         Routine    Diet:   Low NA, ADA 2000 sruthi diet.     Acitivities:      - Up in a chair each morning as tolerated         LABS:  Per facility protocol  BMP in 5 days       Nursing Precautions:     - Aspiration precautions:             -        - Fall precautions per nursing home protocol  Decubitus precautions:        -  for positioning   - Pressure reducing foam mattress   - Turn patient every two hours. Use wedge pillows to anchor patient    CONSULTS:    Physical Therapy to evaluate and treat     Occupational Therapy to evaluate and  treat                           DIABETES CARE:    Check blood sugar:      Fingerstick blood sugar AC and HS   Fingerstick blood sugar every 6 hours if unable to eat      Report CBG < 60 or > 400 to physician.                                          Insulin Sliding Scale          Glucose  Novolog Insulin Subcutaneous        0 - 60   Orange juice or glucose tablet, hold insulin      No insulin   201-250  2 units   251-300  4 units   301-350  6 units   351-400  8 units   >400   10 units then call physician      Medications: Discontinue all previous medication orders, if any. See new list below.     Joyce Riley   Home Medication Instructions WILLA:[id number]    Printed on:07/22/19 8731   Medication Information                      acetaminophen (TYLENOL) 325 MG tablet  Take 2 tablets (650 mg total) by mouth every 4 (four) hours as needed.             albuterol-ipratropium (DUO-NEB) 2.5 mg-0.5 mg/3 mL nebulizer solution  Take 3 mLs by nebulization every 4 (four) hours as needed for Wheezing. Rescue             apixaban (ELIQUIS) 2.5 mg Tab  Take 2.5 mg by mouth 2 (two) times daily.             calcitRIOL (ROCALTROL) 0.25 MCG Cap  Take 1 capsule (0.25 mcg total) by mouth every other day.             carvedilol (COREG) 3.125 MG tablet  Take 1 tablet (3.125 mg total) by mouth 2 (two) times daily.             colchicine (COLCRYS) 0.6 mg tablet  Take 0.6 mg by mouth once daily.             ergocalciferol (ERGOCALCIFEROL) 50,000 unit Cap  Take 1 capsule (50,000 Units total) by mouth every 7 days.             famotidine (PEPCID) 20 MG tablet  Take 1 tablet (20 mg total) by mouth once daily.             insulin degludec (TRESIBA FLEXTOUCH U-100) 100 unit/mL (3 mL) InPn  Inject 5 Units into the skin every evening.             metOLazone (ZAROXOLYN) 2.5 MG tablet  Take 1 tablet (2.5 mg total) by mouth once daily.             NIFEdipine (PROCARDIA-XL) 60 MG (OSM) 24 hr tablet  Take 1 tablet (60 mg total) by mouth  once daily.             polyethylene glycol (GLYCOLAX) 17 gram PwPk  Take 17 g by mouth 2 (two) times daily as needed.             senna-docusate 8.6-50 mg (PERICOLACE) 8.6-50 mg per tablet  Take 1 tablet by mouth 2 (two) times daily.                       _________________________________  Shahab Oscar MD  07/22/2019

## 2019-07-22 NOTE — PROGRESS NOTES
[address] contacted [place] at [phone]; left message for Mariluz or Maribeth regarding pt is discharged today and returning as SNF.. Clinicals are uploaded in [facility].

## 2019-07-22 NOTE — PT/OT/SLP PROGRESS
Physical Therapy Treatment    Patient Name:  Joyce Riley   MRN:  [id number]    Recommendations:     Discharge Recommendations:  nursing facility, skilled([facility]. )   Discharge Equipment Recommendations: none   Barriers to discharge: None    Assessment:     Joyce Riley is a 85 y.o. female admitted with a medical diagnosis of Acute renal failure superimposed on stage 5 chronic kidney disease, not on chronic dialysis.  She presents with the following impairments/functional limitations:  weakness, impaired endurance, impaired self care skills, impaired functional mobilty, gait instability, decreased coordination, impaired balance, decreased upper extremity function, decreased lower extremity function, decreased safety awareness, pain, decreased ROM, edema, orthopedic precautions .    Rehab Prognosis: Fair; patient would benefit from acute skilled PT services to address these deficits and reach maximum level of function.    Recent Surgery: * No surgery found *      Plan:     During this hospitalization, patient to be seen 5 x/week to address the identified rehab impairments via gait training, therapeutic activities, therapeutic exercises and progress toward the following goals:    · Plan of Care Expires:  07/31/19    Subjective     Chief Complaint: weakness  Patient/Family Comments/goals: pt agreeable to treatment   Pain/Comfort:  · Pain Rating 1: 0/10  · Pain Rating Post-Intervention 1: 0/10      Objective:     Communicated with nurse Soriano prior to session.  Patient found HOB elevated with bed alarm, telemetry upon PT entry to room.     General Precautions: Standard, fall, diabetic   Orthopedic Precautions:N/A   Braces: N/A     Functional Mobility:  · Bed Mobility:     · Rolling Left:  stand by assistance  · Scooting: contact guard assistance  · Supine to Sit: contact guard assistance  · Sit to Supine: contact guard assistance  · Transfers:     · Sit to Stand:  X 2 trials from bed  maximal assistance with rolling walker. V/T cues for safety technique and walker management.   · Balance: poor in static standing.       AM-PAC 6 CLICK MOBILITY  Turning over in bed (including adjusting bedclothes, sheets and blankets)?: 4  Sitting down on and standing up from a chair with arms (e.g., wheelchair, bedside commode, etc.): 2  Moving from lying on back to sitting on the side of the bed?: 3  Moving to and from a bed to a chair (including a wheelchair)?: 2  Need to walk in hospital room?: 1  Climbing 3-5 steps with a railing?: 1  Basic Mobility Total Score: 13       Therapeutic Activities and Exercises:    Pt performed seated BLE x 10 reps : AP, LAQ and supine BLE x 10 reps : SAQ , HS . Encouraged pt to perform BLE ex's per handout throughout the day. Pt verbalize understanding .    Patient left HOB elevated with all lines intact, call button in reach, bed alarm on and nurse notified..    GOALS:   Multidisciplinary Problems     Physical Therapy Goals        Problem: Physical Therapy Goal    Goal Priority Disciplines Outcome Goal Variances Interventions   Physical Therapy Goal     PT, PT/OT Ongoing (interventions implemented as appropriate)     Description:  Goals to be met by: 2019    Patient will increase functional independence with mobility by performin. Supine to sit with Modified [place]  2. Sit to supine with Modified [place]  3. Sit to stand transfer with Modified [place]  2. Bed to chair transfer = TBD when appropriate.   5. Sitting at edge of bed x25 minutes with Modified [place]  6. Lower extremity exercise program x25 reps per handout, with supervision    Recommend: SNF @ [facility] at time of discharge.                         Time Tracking:     PT Received On: 19  PT Start Time: 1046     PT Stop Time: 1102  PT Total Time (min): 16 min     Billable Minutes: Therapeutic Activity 16    Treatment Type: Treatment  PT/PTA: PTA     PTA Visit Number:  2     Angi Alonso, PTA  07/22/2019

## 2019-07-22 NOTE — PLAN OF CARE
Problem: Diabetes Comorbidity  Goal: Blood Glucose Level Within Desired Range    Intervention: Maintain Glycemic Control     07/21/19 2020   Monitor and Manage Ketoacidosis   Glycemic Management blood glucose monitoring

## 2019-07-22 NOTE — PT/OT/SLP PROGRESS
Occupational Therapy   Treatment    Name: Joyce Riley  MRN: [id number]  Admitting Diagnosis:  Acute renal failure superimposed on stage 5 chronic kidney disease, not on chronic dialysis       Recommendations:     Discharge Recommendations: nursing facility, skilled  Discharge Equipment Recommendations:  none  Barriers to discharge:  None    Assessment:     Joyce Riley is a 85 y.o. female with a medical diagnosis of Acute renal failure superimposed on stage 5 chronic kidney disease, not on chronic dialysis.  She presents with motivation to participate with therapy sitting EOB. Performance deficits affecting function are weakness, impaired self care skills, impaired balance, decreased safety awareness, decreased ROM, impaired endurance, impaired functional mobilty, gait instability, decreased lower extremity function, impaired cardiopulmonary response to activity.     Rehab Prognosis:  Good; patient would benefit from acute skilled OT services to address these deficits and reach maximum level of function.       Plan:     Patient to be seen 3 x/week to address the above listed problems via self-care/home management, therapeutic activities, therapeutic exercises  · Plan of Care Expires: 08/01/19  · Plan of Care Reviewed with: patient    Subjective     Chief complaint: none reported   Patient's goals/comments: wants to go back to SNF    Pain/Comfort:  · Pain Rating 1: 0/10    Objective:     Communicated with: nurseChristie, prior to session.  Patient found HOB elevated with bed alarm, peripheral IV, telemetry upon OT entry to room.    General Precautions: Standard, fall, diabetic   Orthopedic Precautions:N/A   Braces: N/A     Occupational Performance:     Bed Mobility:    · Patient completed Rolling/Turning to Left with  stand by assistance  · Patient completed Scooting/Bridging with stand by assistance with side rail  · Patient completed Supine to Sit with stand by assistance, with side rail and HOB  elevated  · Patient completed Sit to Supine with stand by assistance and with side rail     Functional Mobility/Transfers:  · Functional Mobility: NT     Activities of Daily Living:  · Grooming: set-up to brush teeth sitting EOB; min A to help clean up spill of mouthwash     · Lower Body Dressing: supervision with socks while in bed      [facility] 6 Click ADL: 19    Treatment & Education:  Pt re-educated on OT role/POC.   Importance of EOB activity with staff assistance.  Safety during functional t/f using side rail   In unsupported sit at EOB, pt completed 1x15 shoulder flexion/extension with ab/dduction; 1x10 of shoulder horizontal ab/dduction at 90* and shoulder flexion/extension; 1x5 elbow extension, elbow flexion, and external rotation; pt required rest breaks in between sets and reduced reps towards the end of session d/t fatigue   Pt demo'd some SOB with exercises, requiring prompting for pursed lip breathing   White board updated   Multiple self-care tasks/functional mobility completed- assistance level noted above   All questions/concerns answered within OT scope of practice       Patient left HOB elevated with all lines intact, call button in reach and bed alarm onEducation:      GOALS:   Multidisciplinary Problems     Occupational Therapy Goals        Problem: Occupational Therapy Goal    Goal Priority Disciplines Outcome Interventions   Occupational Therapy Goal     OT, PT/OT Ongoing (interventions implemented as appropriate)    Description:  Goals to be met by: 08/01/19     Patient will increase functional independence with ADLs by performing:    Feeding with [place].  UE Dressing with Set-up Assistance.  LE Dressing with Set-up Assistance.  Grooming while seated with Set-up Assistance.  Sitting at edge of bed x15 minutes with Modified [place].  Rolling to Bilateral with Modified [place].   Supine to sit with Modified [place].  Upper extremity exercise program x15 reps per handout, with  independence.                      Time Tracking:     OT Date of Treatment: 07/22/19  OT Start Time: 1120  OT Stop Time: 1144  OT Total Time (min): 24 min    Billable Minutes:Self Care/Home Management 10 min  Therapeutic Exercise 14 min  Total Time 24 min    Laurita Valencia OT  7/22/2019

## 2019-07-23 NOTE — PT/OT/SLP DISCHARGE
Occupational Therapy Discharge Summary    Joyce Riley  MRN: [id number]   Principal Problem: Acute renal failure superimposed on stage 5 chronic kidney disease, not on chronic dialysis      Patient Discharged from acute Occupational Therapy on 07/22/19.  Please refer to prior OT notes for functional status.    Assessment:      Patient appropriate for care in another setting.    Objective:     GOALS:   Multidisciplinary Problems     Occupational Therapy Goals        Problem: Occupational Therapy Goal    Goal Priority Disciplines Outcome Interventions   Occupational Therapy Goal     OT, PT/OT Ongoing (interventions implemented as appropriate)    Description:  Goals to be met by: 08/01/19     Patient will increase functional independence with ADLs by performing:    Feeding with [place].  UE Dressing with Set-up Assistance.  LE Dressing with Set-up Assistance.  Grooming while seated with Set-up Assistance.  Sitting at edge of bed x15 minutes with Modified [place].  Rolling to Bilateral with Modified [place].   Supine to sit with Modified [place].  Upper extremity exercise program x15 reps per handout, with independence.                      Reasons for Discontinuation of Therapy Services  Transfer to alternate level of care.      Plan:     Patient Discharged to: Skilled Nursing Facility    Laurita Valencia OT  7/23/2019

## 2019-08-15 PROBLEM — E79.0 HYPERURICEMIA: Status: ACTIVE | Noted: 2019-08-15

## 2019-08-15 PROBLEM — E55.9 VITAMIN D DEFICIENCY: Status: ACTIVE | Noted: 2019-08-15

## 2019-10-15 NOTE — PROGRESS NOTES
[facility] Medicine  Progress Note    Patient Name: Joyce Riley  MRN: [id number]  Patient Class: IP- Inpatient   Admission Date: 7/17/2019  Length of Stay: 3 days  Attending Physician: Shahab Chinchilla MD  Primary Care Provider: Kalpana Staton MD        Subjective:     Principal Problem:Acute renal failure superimposed on stage 5 chronic kidney disease, not on chronic dialysis      HPI:   85 y.o F with a hx of Anemia, Anticoagulant long-term use with eliquis,P A-fib,, CKD stage V, DM, GERD, GI bleed duo to rectal hemorrhoids, HTN and Stroke presents to the ED via EMS from [facility] for emergent evaluation of an abnormal lab. The pt was sent to the ED by her nephrologist .for further evaluation of decreased kidney function. The pt states she was able to urinate a small amount this AM, but has not voided since despite consuming an increased amount of water today. She was last seen in this ED yesterday for rectal bleeding duo to hemorrhoid,and renal insufficiency,ecieved IVF and as returned back to NH,in the past has luisana recommended Hosice and she is DNR,. She denies fever, chills, diaphoresis, nausea, emesis, diarrhea, abdominal pain, chest pain, SOB, dysuria and rash. No prior tx.her CKD 5 in recent few months is worsening,CRT today is 3.9,she has bene started on IVF and her nephrology is consulted.    Overview/Hospital Course:  85 y.o F with a hx of Anemia, Anticoagulant long-term use with eliquis,P A-fib,, CKD stage V, DM, GERD, GI bleed duo to rectal hemorrhoids, HTN and Stroke presents to the ED via EMS from [facility] for emergent evaluation of an abnormal lab. The pt was sent to the ED by her nephrologist .for further evaluation of decreased kidney function.   She was started on IV fluids with some improvement in renal function. She in the past was not a candidate for dialysis and the recommendation was for hospice.  Nephrology and palliative  What Type Of Note Output Would You Prefer (Optional)?: Standard Output Hpi Title: Evaluation of Skin Lesions care were consulted. Nephrology noted that patient IS a dialysis candidate if needed in the future. CRT went from 4.8 to 2.3.  The patient was to be discharged to  SNF on 7/19, but had a bloody BM and discharge held. GI was consulted and decided to monitor only. H/H remained stable.   PT/OT continued to follow.     Interval History: No new issues. No further bleeding.    Review of Systems   Constitutional: Negative for activity change.   HENT: Negative for congestion.    Respiratory: Negative for chest tightness and shortness of breath.    Cardiovascular: Negative for chest pain.   Gastrointestinal: Negative for abdominal pain and blood in stool.   Genitourinary: Negative for difficulty urinating.     Objective:     Vital Signs (Most Recent):  Temp: 97.8 °F (36.6 °C) (07/20/19 0845)  Pulse: 72 (07/20/19 0845)  Resp: 20 (07/20/19 0845)  BP: (!) 119/55 (07/20/19 0856)  SpO2: 99 % (07/20/19 0845) Vital Signs (24h Range):  Temp:  [97.5 °F (36.4 °C)-98.3 °F (36.8 °C)] 97.8 °F (36.6 °C)  Pulse:  [70-84] 72  Resp:  [17-20] 20  SpO2:  [95 %-99 %] 99 %  BP: (119-214)/(55-94) 119/55     Weight: 74.2 kg (163 lb 9.3 oz)  Body mass index is 26.4 kg/m².    Intake/Output Summary (Last 24 hours) at 7/20/2019 1109  Last data filed at 7/19/2019 2200  Gross per 24 hour   Intake 600 ml   Output --   Net 600 ml      Physical Exam   Constitutional: She is oriented to person, place, and time. She appears well-developed and well-nourished.   HENT:   Head: Atraumatic.   Neurological: She is alert and oriented to person, place, and time.   Psychiatric: She has a normal mood and affect. Her behavior is normal.   Nursing note and vitals reviewed.      Significant Labs:   BMP:   Recent Labs   Lab 07/20/19  0604   GLU 73      K 4.5      CO2 23   BUN 89*   CREATININE 2.7*   CALCIUM 9.5   MG 1.7     CBC:   Recent Labs   Lab 07/19/19  0637 07/19/19  1100 07/19/19  1744   WBC 8.98  --   --    HGB 7.7* 7.6* 8.1*   HCT 24.0* 23.9* 25.1*      --   --        Significant Imaging:       Assessment/Plan:      * Acute renal failure superimposed on stage 5 chronic kidney disease, not on chronic dialysis  Worsening,Will  continue with IVF,  Consult her nephrology,in the past reccommended hospice,consult palliative care.    Improved renal function some with fluids. Evidently not dialysis candidate. Nephrology and palliative care consulted. Continue IV fluids for now   CRT much improved from 4.8 to 2.3.  D/C fluids.  Stable.     Acute blood loss anemia  From likely lower GI bleed.  GI consulted. Following H/H.   Likely from hemorrhoids  CBC pending         Debility  PT/OT rec: SNF.        Urinary tract infection without hematuria  Will continue with IV Abx,folow cultures.  E-coli. Not sure if colonization or infection. No sepsis.         Essential hypertension  Resume home medications and added prn clonidine.      Paroxysmal atrial fibrillation  On BB,hold OAC duo to recent rectal hemorhoid at this time.      Anemia of renal disease  Stable,will monitor.      Type 2 diabetes mellitus, controlled, with renal complications  On SSI.        VTE Risk Mitigation (From admission, onward)        Ordered     IP VTE HIGH RISK PATIENT  Once      07/17/19 2023     Place SANTOS hose  Until discontinued      07/17/19 2023     Place sequential compression device  Until discontinued      07/17/19 2023                Shahab Oscar MD  Department of Hospital Medicine   [facility]   How Severe Are Your Spot(S)?: mild Have Your Spot(S) Been Treated In The Past?: has not been treated

## 2020-01-07 RX ORDER — SODIUM CHLORIDE 9 MG/ML
INJECTION, SOLUTION INTRAVENOUS CONTINUOUS
Status: CANCELLED | OUTPATIENT
Start: 2020-01-09

## 2020-01-07 RX ORDER — DIPHENHYDRAMINE HYDROCHLORIDE 50 MG/ML
50 INJECTION INTRAMUSCULAR; INTRAVENOUS ONCE AS NEEDED
Status: CANCELLED | OUTPATIENT
Start: 2020-01-09

## 2020-01-07 RX ORDER — METHYLPREDNISOLONE SOD SUCC 125 MG
125 VIAL (EA) INJECTION ONCE AS NEEDED
Status: CANCELLED | OUTPATIENT
Start: 2020-01-09

## 2020-01-07 RX ORDER — EPINEPHRINE 0.3 MG/.3ML
0.3 INJECTION SUBCUTANEOUS ONCE AS NEEDED
Status: CANCELLED | OUTPATIENT
Start: 2020-01-09

## 2020-01-20 ENCOUNTER — INFUSION (OUTPATIENT)
Dept: INFUSION THERAPY | Facility: HOSPITAL | Age: 85
End: 2020-01-20
Attending: INTERNAL MEDICINE
Payer: MEDICARE

## 2020-01-20 VITALS
DIASTOLIC BLOOD PRESSURE: 68 MMHG | TEMPERATURE: 98 F | RESPIRATION RATE: 17 BRPM | HEART RATE: 70 BPM | SYSTOLIC BLOOD PRESSURE: 160 MMHG | OXYGEN SATURATION: 99 %

## 2020-01-20 DIAGNOSIS — N18.9 ANEMIA OF RENAL DISEASE: Primary | ICD-10-CM

## 2020-01-20 DIAGNOSIS — D63.1 ANEMIA OF RENAL DISEASE: Primary | ICD-10-CM

## 2020-01-20 PROCEDURE — 63600175 PHARM REV CODE 636 W HCPCS: Mod: JG | Performed by: INTERNAL MEDICINE

## 2020-01-20 PROCEDURE — 96365 THER/PROPH/DIAG IV INF INIT: CPT

## 2020-01-20 RX ORDER — SODIUM CHLORIDE 9 MG/ML
INJECTION, SOLUTION INTRAVENOUS CONTINUOUS
Status: CANCELLED | OUTPATIENT
Start: 2020-01-27

## 2020-01-20 RX ORDER — METHYLPREDNISOLONE SOD SUCC 125 MG
125 VIAL (EA) INJECTION ONCE AS NEEDED
Status: CANCELLED | OUTPATIENT
Start: 2020-01-27

## 2020-01-20 RX ORDER — DIPHENHYDRAMINE HYDROCHLORIDE 50 MG/ML
50 INJECTION INTRAMUSCULAR; INTRAVENOUS ONCE AS NEEDED
Status: CANCELLED | OUTPATIENT
Start: 2020-01-27

## 2020-01-20 RX ORDER — EPINEPHRINE 0.3 MG/.3ML
0.3 INJECTION SUBCUTANEOUS ONCE AS NEEDED
Status: CANCELLED | OUTPATIENT
Start: 2020-01-27

## 2020-01-20 RX ADMIN — FERRIC CARBOXYMALTOSE INJECTION 750 MG: 50 INJECTION, SOLUTION INTRAVENOUS at 01:01

## 2020-01-20 NOTE — PLAN OF CARE
Patient arrived to unit for Injectafer infusion. Plan of care reviewed, patient agreeable to plan. Patient tolerated infusion well. VSS. Discharge instructions reviewed, patient instructed to return 1/27/20. Patient escorted off unit via w/c accompanied by her son and caregiver. Patient in NAD at time of discharge.

## 2020-01-27 ENCOUNTER — INFUSION (OUTPATIENT)
Dept: INFUSION THERAPY | Facility: HOSPITAL | Age: 85
End: 2020-01-27
Attending: INTERNAL MEDICINE
Payer: MEDICARE

## 2020-01-27 VITALS
OXYGEN SATURATION: 99 % | TEMPERATURE: 97 F | SYSTOLIC BLOOD PRESSURE: 162 MMHG | HEART RATE: 75 BPM | RESPIRATION RATE: 16 BRPM | DIASTOLIC BLOOD PRESSURE: 70 MMHG

## 2020-01-27 DIAGNOSIS — N18.9 ANEMIA OF RENAL DISEASE: Primary | ICD-10-CM

## 2020-01-27 DIAGNOSIS — I12.9 RENAL HYPERTENSION: Chronic | ICD-10-CM

## 2020-01-27 DIAGNOSIS — D63.1 ANEMIA OF RENAL DISEASE: Primary | ICD-10-CM

## 2020-01-27 PROCEDURE — 25000003 PHARM REV CODE 250: Performed by: INTERNAL MEDICINE

## 2020-01-27 PROCEDURE — 63600175 PHARM REV CODE 636 W HCPCS: Mod: JG | Performed by: INTERNAL MEDICINE

## 2020-01-27 PROCEDURE — 96365 THER/PROPH/DIAG IV INF INIT: CPT

## 2020-01-27 RX ORDER — SODIUM CHLORIDE 9 MG/ML
INJECTION, SOLUTION INTRAVENOUS CONTINUOUS
Status: CANCELLED | OUTPATIENT
Start: 2020-02-03

## 2020-01-27 RX ORDER — METHYLPREDNISOLONE SOD SUCC 125 MG
125 VIAL (EA) INJECTION ONCE AS NEEDED
Status: CANCELLED | OUTPATIENT
Start: 2020-02-03

## 2020-01-27 RX ORDER — CLONIDINE HYDROCHLORIDE 0.1 MG/1
0.1 TABLET ORAL
Status: COMPLETED | OUTPATIENT
Start: 2020-01-27 | End: 2020-01-27

## 2020-01-27 RX ORDER — DIPHENHYDRAMINE HYDROCHLORIDE 50 MG/ML
50 INJECTION INTRAMUSCULAR; INTRAVENOUS ONCE AS NEEDED
Status: CANCELLED | OUTPATIENT
Start: 2020-02-03

## 2020-01-27 RX ORDER — EPINEPHRINE 0.3 MG/.3ML
0.3 INJECTION SUBCUTANEOUS ONCE AS NEEDED
Status: CANCELLED | OUTPATIENT
Start: 2020-02-03

## 2020-01-27 RX ADMIN — CLONIDINE HYDROCHLORIDE 0.1 MG: 0.1 TABLET ORAL at 03:01

## 2020-01-27 RX ADMIN — FERRIC CARBOXYMALTOSE INJECTION 750 MG: 50 INJECTION, SOLUTION INTRAVENOUS at 02:01

## 2020-01-27 NOTE — PLAN OF CARE
"Pt presented for Injectafer infusion 2/2. Pt hypertensive upon arrival. Reviewed pt's paperwork, provided by [facility]. Pt received BP medication this morning; however, no vital signs were listed. Spoke with pt's nurse at [facility]; pt was hypertensive this morning, also. Infusion Nurse inquired as to what pt's recent BP's have been. [facility] nurse reported that her BPs have been consistently elevated for the past week or so. [facility] nurse said that she was placing the pt on the "MD review list" for BP management and medication review. RN nurse also said that pt was due for another dose of Coreg when she returned. Pt reported fatigue and feeling cold all the time. Tolerated Injectafer. Dr. Baca notified of BP and that pt was due to receive another dose of BP medication when she returned to the nursing home. Dr. Baca ordered to give pt 0.1 mg PO Clonidine x1. Assisted pt to use restroom while in Infusion Center. Almost an hour after clonidine administration, pt's BP decreased some. Provided POC note to RN, indicating that pt had received Clonidine. Accompanied by son and [facility] aide. Distress screening tool completed. Pt escorted into and out of unit via wheelchair. Pt in NAD at time of discharge.   "

## 2020-03-07 ENCOUNTER — HOSPITAL ENCOUNTER (INPATIENT)
Facility: HOSPITAL | Age: 85
LOS: 6 days | Discharge: NURSING, REHAB OR LONG-TERM CARE | DRG: 377 | End: 2020-03-13
Attending: EMERGENCY MEDICINE | Admitting: EMERGENCY MEDICINE
Payer: MEDICARE

## 2020-03-07 DIAGNOSIS — N30.00 ACUTE CYSTITIS WITHOUT HEMATURIA: ICD-10-CM

## 2020-03-07 DIAGNOSIS — D64.9 ANEMIA, UNSPECIFIED TYPE: Primary | ICD-10-CM

## 2020-03-07 DIAGNOSIS — K62.5 RECTAL BLEEDING: ICD-10-CM

## 2020-03-07 LAB
BASOPHILS # BLD AUTO: 0.05 K/UL (ref 0–0.2)
BASOPHILS NFR BLD: 0.7 % (ref 0–1.9)
DIFFERENTIAL METHOD: ABNORMAL
EOSINOPHIL # BLD AUTO: 0.2 K/UL (ref 0–0.5)
EOSINOPHIL NFR BLD: 2.2 % (ref 0–8)
ERYTHROCYTE [DISTWIDTH] IN BLOOD BY AUTOMATED COUNT: 15.3 % (ref 11.5–14.5)
HCT VFR BLD AUTO: 24.8 % (ref 37–48.5)
HGB BLD-MCNC: 7.9 G/DL (ref 12–16)
IMM GRANULOCYTES # BLD AUTO: 0.05 K/UL (ref 0–0.04)
IMM GRANULOCYTES NFR BLD AUTO: 0.7 % (ref 0–0.5)
LYMPHOCYTES # BLD AUTO: 1.4 K/UL (ref 1–4.8)
LYMPHOCYTES NFR BLD: 20.2 % (ref 18–48)
MCH RBC QN AUTO: 30.9 PG (ref 27–31)
MCHC RBC AUTO-ENTMCNC: 31.9 G/DL (ref 32–36)
MCV RBC AUTO: 97 FL (ref 82–98)
MONOCYTES # BLD AUTO: 0.4 K/UL (ref 0.3–1)
MONOCYTES NFR BLD: 5.4 % (ref 4–15)
NEUTROPHILS # BLD AUTO: 4.9 K/UL (ref 1.8–7.7)
NEUTROPHILS NFR BLD: 70.8 % (ref 38–73)
NRBC BLD-RTO: 0 /100 WBC
PLATELET # BLD AUTO: 245 K/UL (ref 150–350)
PMV BLD AUTO: 10 FL (ref 9.2–12.9)
RBC # BLD AUTO: 2.56 M/UL (ref 4–5.4)
WBC # BLD AUTO: 6.87 K/UL (ref 3.9–12.7)

## 2020-03-07 PROCEDURE — 93005 ELECTROCARDIOGRAM TRACING: CPT | Performed by: INTERNAL MEDICINE

## 2020-03-07 PROCEDURE — 96374 THER/PROPH/DIAG INJ IV PUSH: CPT

## 2020-03-07 PROCEDURE — 99285 EMERGENCY DEPT VISIT HI MDM: CPT | Mod: 25

## 2020-03-07 PROCEDURE — 12000002 HC ACUTE/MED SURGE SEMI-PRIVATE ROOM

## 2020-03-07 PROCEDURE — 93010 EKG 12-LEAD: ICD-10-PCS | Mod: ,,, | Performed by: INTERNAL MEDICINE

## 2020-03-07 PROCEDURE — 85025 COMPLETE CBC W/AUTO DIFF WBC: CPT

## 2020-03-07 PROCEDURE — 86920 COMPATIBILITY TEST SPIN: CPT

## 2020-03-07 PROCEDURE — 86901 BLOOD TYPING SEROLOGIC RH(D): CPT

## 2020-03-07 PROCEDURE — 83690 ASSAY OF LIPASE: CPT

## 2020-03-07 PROCEDURE — 83735 ASSAY OF MAGNESIUM: CPT

## 2020-03-07 PROCEDURE — 80053 COMPREHEN METABOLIC PANEL: CPT

## 2020-03-07 PROCEDURE — 85730 THROMBOPLASTIN TIME PARTIAL: CPT

## 2020-03-07 PROCEDURE — 93005 ELECTROCARDIOGRAM TRACING: CPT

## 2020-03-07 PROCEDURE — 93010 ELECTROCARDIOGRAM REPORT: CPT | Mod: ,,, | Performed by: INTERNAL MEDICINE

## 2020-03-07 PROCEDURE — 96375 TX/PRO/DX INJ NEW DRUG ADDON: CPT

## 2020-03-07 PROCEDURE — 85610 PROTHROMBIN TIME: CPT

## 2020-03-07 RX ORDER — PANTOPRAZOLE SODIUM 40 MG/10ML
80 INJECTION, POWDER, LYOPHILIZED, FOR SOLUTION INTRAVENOUS
Status: COMPLETED | OUTPATIENT
Start: 2020-03-07 | End: 2020-03-08

## 2020-03-08 PROBLEM — K62.5 RECTAL BLEEDING: Status: ACTIVE | Noted: 2020-03-08

## 2020-03-08 PROBLEM — N18.4 CKD (CHRONIC KIDNEY DISEASE), STAGE IV: Status: ACTIVE | Noted: 2020-03-08

## 2020-03-08 LAB
ABO + RH BLD: NORMAL
ALBUMIN SERPL BCP-MCNC: 2.9 G/DL (ref 3.5–5.2)
ALP SERPL-CCNC: 145 U/L (ref 55–135)
ALT SERPL W/O P-5'-P-CCNC: 14 U/L (ref 10–44)
ANION GAP SERPL CALC-SCNC: 9 MMOL/L (ref 8–16)
APTT BLDCRRT: 30.4 SEC (ref 21–32)
AST SERPL-CCNC: 16 U/L (ref 10–40)
BACTERIA #/AREA URNS HPF: ABNORMAL /HPF
BASOPHILS # BLD AUTO: 0.03 K/UL (ref 0–0.2)
BASOPHILS # BLD AUTO: 0.04 K/UL (ref 0–0.2)
BASOPHILS # BLD AUTO: 0.05 K/UL (ref 0–0.2)
BASOPHILS NFR BLD: 0.2 % (ref 0–1.9)
BASOPHILS NFR BLD: 0.3 % (ref 0–1.9)
BASOPHILS NFR BLD: 0.4 % (ref 0–1.9)
BILIRUB SERPL-MCNC: 0.2 MG/DL (ref 0.1–1)
BILIRUB UR QL STRIP: NEGATIVE
BLD GP AB SCN CELLS X3 SERPL QL: NORMAL
BLD PROD TYP BPU: NORMAL
BLD PROD TYP BPU: NORMAL
BLOOD UNIT EXPIRATION DATE: NORMAL
BLOOD UNIT EXPIRATION DATE: NORMAL
BLOOD UNIT TYPE CODE: 5100
BLOOD UNIT TYPE CODE: 5100
BLOOD UNIT TYPE: NORMAL
BLOOD UNIT TYPE: NORMAL
BUN SERPL-MCNC: 60 MG/DL (ref 8–23)
CALCIUM SERPL-MCNC: 8.5 MG/DL (ref 8.7–10.5)
CHLORIDE SERPL-SCNC: 105 MMOL/L (ref 95–110)
CLARITY UR: ABNORMAL
CO2 SERPL-SCNC: 22 MMOL/L (ref 23–29)
CODING SYSTEM: NORMAL
CODING SYSTEM: NORMAL
COLOR UR: YELLOW
CREAT SERPL-MCNC: 3.5 MG/DL (ref 0.5–1.4)
DIFFERENTIAL METHOD: ABNORMAL
DISPENSE STATUS: NORMAL
DISPENSE STATUS: NORMAL
EOSINOPHIL # BLD AUTO: 0 K/UL (ref 0–0.5)
EOSINOPHIL # BLD AUTO: 0 K/UL (ref 0–0.5)
EOSINOPHIL # BLD AUTO: 0.1 K/UL (ref 0–0.5)
EOSINOPHIL NFR BLD: 0.1 % (ref 0–8)
EOSINOPHIL NFR BLD: 0.1 % (ref 0–8)
EOSINOPHIL NFR BLD: 0.4 % (ref 0–8)
ERYTHROCYTE [DISTWIDTH] IN BLOOD BY AUTOMATED COUNT: 15.4 % (ref 11.5–14.5)
ERYTHROCYTE [DISTWIDTH] IN BLOOD BY AUTOMATED COUNT: 15.6 % (ref 11.5–14.5)
ERYTHROCYTE [DISTWIDTH] IN BLOOD BY AUTOMATED COUNT: 17.6 % (ref 11.5–14.5)
EST. GFR  (AFRICAN AMERICAN): 13 ML/MIN/1.73 M^2
EST. GFR  (NON AFRICAN AMERICAN): 11 ML/MIN/1.73 M^2
ESTIMATED AVG GLUCOSE: 100 MG/DL (ref 68–131)
GLUCOSE SERPL-MCNC: 174 MG/DL (ref 70–110)
GLUCOSE UR QL STRIP: ABNORMAL
HBA1C MFR BLD HPLC: 5.1 % (ref 4–5.6)
HCT VFR BLD AUTO: 23.1 % (ref 37–48.5)
HCT VFR BLD AUTO: 23.7 % (ref 37–48.5)
HCT VFR BLD AUTO: 26.6 % (ref 37–48.5)
HGB BLD-MCNC: 7.1 G/DL (ref 12–16)
HGB BLD-MCNC: 7.2 G/DL (ref 12–16)
HGB BLD-MCNC: 8.9 G/DL (ref 12–16)
HGB UR QL STRIP: ABNORMAL
HYALINE CASTS #/AREA URNS LPF: 0 /LPF
IMM GRANULOCYTES # BLD AUTO: 0.12 K/UL (ref 0–0.04)
IMM GRANULOCYTES # BLD AUTO: 0.19 K/UL (ref 0–0.04)
IMM GRANULOCYTES # BLD AUTO: 0.2 K/UL (ref 0–0.04)
IMM GRANULOCYTES NFR BLD AUTO: 0.9 % (ref 0–0.5)
IMM GRANULOCYTES NFR BLD AUTO: 1.7 % (ref 0–0.5)
IMM GRANULOCYTES NFR BLD AUTO: 1.7 % (ref 0–0.5)
INR PPP: 1 (ref 0.8–1.2)
KETONES UR QL STRIP: NEGATIVE
LEUKOCYTE ESTERASE UR QL STRIP: ABNORMAL
LIPASE SERPL-CCNC: 52 U/L (ref 4–60)
LYMPHOCYTES # BLD AUTO: 1 K/UL (ref 1–4.8)
LYMPHOCYTES # BLD AUTO: 1.5 K/UL (ref 1–4.8)
LYMPHOCYTES # BLD AUTO: 1.7 K/UL (ref 1–4.8)
LYMPHOCYTES NFR BLD: 12 % (ref 18–48)
LYMPHOCYTES NFR BLD: 13.5 % (ref 18–48)
LYMPHOCYTES NFR BLD: 8.5 % (ref 18–48)
MAGNESIUM SERPL-MCNC: 2.1 MG/DL (ref 1.6–2.6)
MCH RBC QN AUTO: 30.4 PG (ref 27–31)
MCH RBC QN AUTO: 30.7 PG (ref 27–31)
MCH RBC QN AUTO: 31.2 PG (ref 27–31)
MCHC RBC AUTO-ENTMCNC: 30.4 G/DL (ref 32–36)
MCHC RBC AUTO-ENTMCNC: 30.7 G/DL (ref 32–36)
MCHC RBC AUTO-ENTMCNC: 33.5 G/DL (ref 32–36)
MCV RBC AUTO: 100 FL (ref 82–98)
MCV RBC AUTO: 103 FL (ref 82–98)
MCV RBC AUTO: 91 FL (ref 82–98)
MICROSCOPIC COMMENT: ABNORMAL
MONOCYTES # BLD AUTO: 0.3 K/UL (ref 0.3–1)
MONOCYTES # BLD AUTO: 0.3 K/UL (ref 0.3–1)
MONOCYTES # BLD AUTO: 0.9 K/UL (ref 0.3–1)
MONOCYTES NFR BLD: 2.4 % (ref 4–15)
MONOCYTES NFR BLD: 2.8 % (ref 4–15)
MONOCYTES NFR BLD: 6.3 % (ref 4–15)
NEUTROPHILS # BLD AUTO: 10.2 K/UL (ref 1.8–7.7)
NEUTROPHILS # BLD AUTO: 11.3 K/UL (ref 1.8–7.7)
NEUTROPHILS # BLD AUTO: 9.1 K/UL (ref 1.8–7.7)
NEUTROPHILS NFR BLD: 80.5 % (ref 38–73)
NEUTROPHILS NFR BLD: 81.2 % (ref 38–73)
NEUTROPHILS NFR BLD: 87 % (ref 38–73)
NITRITE UR QL STRIP: NEGATIVE
NRBC BLD-RTO: 0 /100 WBC
PH UR STRIP: 6 [PH] (ref 5–8)
PLATELET # BLD AUTO: 232 K/UL (ref 150–350)
PLATELET # BLD AUTO: 246 K/UL (ref 150–350)
PLATELET # BLD AUTO: 288 K/UL (ref 150–350)
PMV BLD AUTO: 10 FL (ref 9.2–12.9)
PMV BLD AUTO: 10.4 FL (ref 9.2–12.9)
PMV BLD AUTO: 9.9 FL (ref 9.2–12.9)
POCT GLUCOSE: 157 MG/DL (ref 70–110)
POCT GLUCOSE: 163 MG/DL (ref 70–110)
POTASSIUM SERPL-SCNC: 4 MMOL/L (ref 3.5–5.1)
PROT SERPL-MCNC: 6.3 G/DL (ref 6–8.4)
PROT UR QL STRIP: ABNORMAL
PROTHROMBIN TIME: 11.4 SEC (ref 9–12.5)
RBC # BLD AUTO: 2.31 M/UL (ref 4–5.4)
RBC # BLD AUTO: 2.31 M/UL (ref 4–5.4)
RBC # BLD AUTO: 2.93 M/UL (ref 4–5.4)
RBC #/AREA URNS HPF: 0 /HPF (ref 0–4)
SODIUM SERPL-SCNC: 136 MMOL/L (ref 136–145)
SP GR UR STRIP: 1.01 (ref 1–1.03)
TRANS ERYTHROCYTES VOL PATIENT: NORMAL ML
TRANS ERYTHROCYTES VOL PATIENT: NORMAL ML
URN SPEC COLLECT METH UR: ABNORMAL
UROBILINOGEN UR STRIP-ACNC: NEGATIVE EU/DL
WBC # BLD AUTO: 11.25 K/UL (ref 3.9–12.7)
WBC # BLD AUTO: 11.71 K/UL (ref 3.9–12.7)
WBC # BLD AUTO: 13.96 K/UL (ref 3.9–12.7)
WBC #/AREA URNS HPF: >100 /HPF (ref 0–5)

## 2020-03-08 PROCEDURE — 94761 N-INVAS EAR/PLS OXIMETRY MLT: CPT

## 2020-03-08 PROCEDURE — 27000221 HC OXYGEN, UP TO 24 HOURS

## 2020-03-08 PROCEDURE — 81000 URINALYSIS NONAUTO W/SCOPE: CPT

## 2020-03-08 PROCEDURE — 87086 URINE CULTURE/COLONY COUNT: CPT

## 2020-03-08 PROCEDURE — 63600175 PHARM REV CODE 636 W HCPCS: Performed by: FAMILY MEDICINE

## 2020-03-08 PROCEDURE — 36430 TRANSFUSION BLD/BLD COMPNT: CPT

## 2020-03-08 PROCEDURE — C9113 INJ PANTOPRAZOLE SODIUM, VIA: HCPCS | Performed by: EMERGENCY MEDICINE

## 2020-03-08 PROCEDURE — 83036 HEMOGLOBIN GLYCOSYLATED A1C: CPT

## 2020-03-08 PROCEDURE — 99900035 HC TECH TIME PER 15 MIN (STAT)

## 2020-03-08 PROCEDURE — 85025 COMPLETE CBC W/AUTO DIFF WBC: CPT | Mod: 91

## 2020-03-08 PROCEDURE — 87186 SC STD MICRODIL/AGAR DIL: CPT

## 2020-03-08 PROCEDURE — 20000000 HC ICU ROOM

## 2020-03-08 PROCEDURE — 87077 CULTURE AEROBIC IDENTIFY: CPT

## 2020-03-08 PROCEDURE — 63600175 PHARM REV CODE 636 W HCPCS: Performed by: EMERGENCY MEDICINE

## 2020-03-08 PROCEDURE — 36415 COLL VENOUS BLD VENIPUNCTURE: CPT

## 2020-03-08 PROCEDURE — 25000003 PHARM REV CODE 250: Performed by: HOSPITALIST

## 2020-03-08 PROCEDURE — S0030 INJECTION, METRONIDAZOLE: HCPCS | Performed by: HOSPITALIST

## 2020-03-08 PROCEDURE — 87088 URINE BACTERIA CULTURE: CPT

## 2020-03-08 PROCEDURE — P9021 RED BLOOD CELLS UNIT: HCPCS

## 2020-03-08 PROCEDURE — C9113 INJ PANTOPRAZOLE SODIUM, VIA: HCPCS | Performed by: FAMILY MEDICINE

## 2020-03-08 PROCEDURE — 63600175 PHARM REV CODE 636 W HCPCS: Performed by: HOSPITALIST

## 2020-03-08 RX ORDER — ONDANSETRON 2 MG/ML
8 INJECTION INTRAMUSCULAR; INTRAVENOUS
Status: COMPLETED | OUTPATIENT
Start: 2020-03-08 | End: 2020-03-08

## 2020-03-08 RX ORDER — SODIUM CHLORIDE 0.9 % (FLUSH) 0.9 %
10 SYRINGE (ML) INJECTION
Status: DISCONTINUED | OUTPATIENT
Start: 2020-03-08 | End: 2020-03-13 | Stop reason: HOSPADM

## 2020-03-08 RX ORDER — IPRATROPIUM BROMIDE AND ALBUTEROL SULFATE 2.5; .5 MG/3ML; MG/3ML
3 SOLUTION RESPIRATORY (INHALATION) EVERY 4 HOURS PRN
Status: DISCONTINUED | OUTPATIENT
Start: 2020-03-08 | End: 2020-03-13 | Stop reason: HOSPADM

## 2020-03-08 RX ORDER — ACETAMINOPHEN 325 MG/1
650 TABLET ORAL EVERY 4 HOURS PRN
Status: DISCONTINUED | OUTPATIENT
Start: 2020-03-08 | End: 2020-03-13 | Stop reason: HOSPADM

## 2020-03-08 RX ORDER — INSULIN ASPART 100 [IU]/ML
0-5 INJECTION, SOLUTION INTRAVENOUS; SUBCUTANEOUS EVERY 6 HOURS PRN
Status: DISCONTINUED | OUTPATIENT
Start: 2020-03-08 | End: 2020-03-13 | Stop reason: HOSPADM

## 2020-03-08 RX ORDER — SODIUM CHLORIDE 9 MG/ML
INJECTION, SOLUTION INTRAVENOUS CONTINUOUS
Status: DISCONTINUED | OUTPATIENT
Start: 2020-03-08 | End: 2020-03-11

## 2020-03-08 RX ORDER — PANTOPRAZOLE SODIUM 40 MG/10ML
40 INJECTION, POWDER, LYOPHILIZED, FOR SOLUTION INTRAVENOUS DAILY
Status: DISCONTINUED | OUTPATIENT
Start: 2020-03-08 | End: 2020-03-10

## 2020-03-08 RX ORDER — FERROUS SULFATE 325(65) MG
325 TABLET, DELAYED RELEASE (ENTERIC COATED) ORAL 2 TIMES DAILY
Status: DISCONTINUED | OUTPATIENT
Start: 2020-03-08 | End: 2020-03-13 | Stop reason: HOSPADM

## 2020-03-08 RX ORDER — CIPROFLOXACIN 2 MG/ML
400 INJECTION, SOLUTION INTRAVENOUS
Status: DISCONTINUED | OUTPATIENT
Start: 2020-03-08 | End: 2020-03-10

## 2020-03-08 RX ORDER — METRONIDAZOLE 500 MG/100ML
500 INJECTION, SOLUTION INTRAVENOUS
Status: DISCONTINUED | OUTPATIENT
Start: 2020-03-08 | End: 2020-03-13 | Stop reason: HOSPADM

## 2020-03-08 RX ORDER — DEXTROSE MONOHYDRATE AND SODIUM CHLORIDE 5; .45 G/100ML; G/100ML
INJECTION, SOLUTION INTRAVENOUS CONTINUOUS
Status: DISCONTINUED | OUTPATIENT
Start: 2020-03-08 | End: 2020-03-08

## 2020-03-08 RX ORDER — SODIUM CHLORIDE 9 MG/ML
INJECTION, SOLUTION INTRAVENOUS CONTINUOUS
Status: DISCONTINUED | OUTPATIENT
Start: 2020-03-08 | End: 2020-03-08

## 2020-03-08 RX ORDER — ONDANSETRON 2 MG/ML
8 INJECTION INTRAMUSCULAR; INTRAVENOUS EVERY 6 HOURS PRN
Status: DISCONTINUED | OUTPATIENT
Start: 2020-03-08 | End: 2020-03-13 | Stop reason: HOSPADM

## 2020-03-08 RX ORDER — DICYCLOMINE HYDROCHLORIDE 10 MG/ML
20 INJECTION INTRAMUSCULAR
Status: COMPLETED | OUTPATIENT
Start: 2020-03-08 | End: 2020-03-08

## 2020-03-08 RX ORDER — SODIUM BICARBONATE 325 MG/1
650 TABLET ORAL 2 TIMES DAILY
Status: DISCONTINUED | OUTPATIENT
Start: 2020-03-08 | End: 2020-03-12

## 2020-03-08 RX ORDER — HYDRALAZINE HYDROCHLORIDE 20 MG/ML
10 INJECTION INTRAMUSCULAR; INTRAVENOUS EVERY 8 HOURS PRN
Status: DISCONTINUED | OUTPATIENT
Start: 2020-03-08 | End: 2020-03-13 | Stop reason: HOSPADM

## 2020-03-08 RX ORDER — GLUCAGON 1 MG
1 KIT INJECTION
Status: DISCONTINUED | OUTPATIENT
Start: 2020-03-08 | End: 2020-03-13 | Stop reason: HOSPADM

## 2020-03-08 RX ORDER — HYDROCODONE BITARTRATE AND ACETAMINOPHEN 500; 5 MG/1; MG/1
TABLET ORAL
Status: DISCONTINUED | OUTPATIENT
Start: 2020-03-08 | End: 2020-03-10 | Stop reason: SDUPTHER

## 2020-03-08 RX ORDER — SODIUM CHLORIDE, SODIUM LACTATE, POTASSIUM CHLORIDE, CALCIUM CHLORIDE 600; 310; 30; 20 MG/100ML; MG/100ML; MG/100ML; MG/100ML
1000 INJECTION, SOLUTION INTRAVENOUS
Status: COMPLETED | OUTPATIENT
Start: 2020-03-08 | End: 2020-03-08

## 2020-03-08 RX ORDER — CARVEDILOL 3.12 MG/1
3.12 TABLET ORAL 2 TIMES DAILY
Status: DISCONTINUED | OUTPATIENT
Start: 2020-03-08 | End: 2020-03-12

## 2020-03-08 RX ORDER — POLYETHYLENE GLYCOL 3350 17 G/17G
17 POWDER, FOR SOLUTION ORAL 2 TIMES DAILY PRN
Status: DISCONTINUED | OUTPATIENT
Start: 2020-03-08 | End: 2020-03-13 | Stop reason: HOSPADM

## 2020-03-08 RX ADMIN — FERROUS SULFATE TAB EC 325 MG (65 MG FE EQUIVALENT) 325 MG: 325 (65 FE) TABLET DELAYED RESPONSE at 08:03

## 2020-03-08 RX ADMIN — HYDRALAZINE HYDROCHLORIDE 10 MG: 20 INJECTION INTRAMUSCULAR; INTRAVENOUS at 12:03

## 2020-03-08 RX ADMIN — METRONIDAZOLE 500 MG: 500 INJECTION, SOLUTION INTRAVENOUS at 10:03

## 2020-03-08 RX ADMIN — METRONIDAZOLE 500 MG: 500 INJECTION, SOLUTION INTRAVENOUS at 05:03

## 2020-03-08 RX ADMIN — SODIUM CHLORIDE: 0.9 INJECTION, SOLUTION INTRAVENOUS at 05:03

## 2020-03-08 RX ADMIN — CARVEDILOL 3.12 MG: 3.12 TABLET, FILM COATED ORAL at 08:03

## 2020-03-08 RX ADMIN — CIPROFLOXACIN 400 MG: 2 INJECTION, SOLUTION INTRAVENOUS at 11:03

## 2020-03-08 RX ADMIN — SODIUM BICARBONATE 650 MG: 325 TABLET ORAL at 08:03

## 2020-03-08 RX ADMIN — ONDANSETRON 8 MG: 2 INJECTION INTRAMUSCULAR; INTRAVENOUS at 04:03

## 2020-03-08 RX ADMIN — CARVEDILOL 3.12 MG: 3.12 TABLET, FILM COATED ORAL at 10:03

## 2020-03-08 RX ADMIN — SODIUM BICARBONATE 650 MG: 325 TABLET ORAL at 10:03

## 2020-03-08 RX ADMIN — CIPROFLOXACIN 400 MG: 2 INJECTION, SOLUTION INTRAVENOUS at 10:03

## 2020-03-08 RX ADMIN — SODIUM CHLORIDE, SODIUM LACTATE, POTASSIUM CHLORIDE, AND CALCIUM CHLORIDE 1000 ML: .6; .31; .03; .02 INJECTION, SOLUTION INTRAVENOUS at 03:03

## 2020-03-08 RX ADMIN — DICYCLOMINE HYDROCHLORIDE 20 MG: 20 INJECTION, SOLUTION INTRAMUSCULAR at 03:03

## 2020-03-08 RX ADMIN — SODIUM CHLORIDE: 0.9 INJECTION, SOLUTION INTRAVENOUS at 10:03

## 2020-03-08 RX ADMIN — FERROUS SULFATE TAB EC 325 MG (65 MG FE EQUIVALENT) 325 MG: 325 (65 FE) TABLET DELAYED RESPONSE at 10:03

## 2020-03-08 RX ADMIN — ONDANSETRON HYDROCHLORIDE 8 MG: 2 SOLUTION INTRAMUSCULAR; INTRAVENOUS at 10:03

## 2020-03-08 RX ADMIN — PANTOPRAZOLE SODIUM 40 MG: 40 INJECTION, POWDER, FOR SOLUTION INTRAVENOUS at 08:03

## 2020-03-08 RX ADMIN — PANTOPRAZOLE SODIUM 8 MG/HR: 40 INJECTION, POWDER, LYOPHILIZED, FOR SOLUTION INTRAVENOUS at 12:03

## 2020-03-08 RX ADMIN — PANTOPRAZOLE SODIUM 80 MG: 40 INJECTION, POWDER, LYOPHILIZED, FOR SOLUTION INTRAVENOUS at 12:03

## 2020-03-08 NOTE — ED PROVIDER NOTES
Encounter Date: 3/7/2020    SCRIBE #1 NOTE: I, Keri Singh, am scribing for, and in the presence of,  Virgilio Rhodes MD. I have scribed the following portions of the note - Other sections scribed: HPI, ROS, PE.       History     Chief Complaint   Patient presents with    Rectal Bleeding     from [facility], staff report bright red blood x 1 hour, EMS report large amount noted on scene      CC: Rectal Bleeding    Patient is an 86 y.o female with a PMHx of GI bleed, GERD, Anemia, and DM who presents to the ED, per EMS, complaining of rectal bleeding that began 1 hr PTA. Patient is a resident at [facility]. Per EMS personnel, staff noted a large amount of bright, red blood. Patient reports of associated abdominal pain since onset. She reports of Hx of similar symptoms. She states she received a colonoscopy at that time. Patient denies any trauma, falls, or trips. She denies fever and chills. Patient takes ASA.     The history is provided by the patient. No  was used.     Review of patient's allergies indicates:   Allergen Reactions    Indomethacin     Nsaids (non-steroidal anti-inflammatory drug)      Past Medical History:   Diagnosis Date    Anemia     Anticoagulant long-term use     Arthritis     Atrial fibrillation     Cataract     CHF (congestive heart failure)     CKD (chronic kidney disease), stage V     Diabetes mellitus     Type 2    GERD (gastroesophageal reflux disease)     GI bleed 2018    Gout     Hypertension     Obese     Requires assistance with all daily activities     Stroke     Wheelchair dependent      Past Surgical History:   Procedure Laterality Date     SECTION      Patient unsure, believe she had 3-4    CHOLECYSTECTOMY      EYE SURGERY      HYSTERECTOMY      JOINT REPLACEMENT Right     knee    TOTAL KNEE ARTHROPLASTY Right      Family History   Problem Relation Age of Onset    Cancer Mother          age 98    Diabetes  Mother     Hypertension Mother     Cancer Brother      Social History     Tobacco Use    Smoking status: Never Smoker    Smokeless tobacco: Never Used   Substance Use Topics    Alcohol use: No    Drug use: No     Review of Systems   Constitutional: Negative for chills and fever.   HENT: Negative for congestion.    Respiratory: Negative for cough and shortness of breath.    Cardiovascular: Negative for chest pain.   Gastrointestinal: Positive for abdominal pain and anal bleeding. Negative for diarrhea, nausea and vomiting.   Genitourinary: Negative for difficulty urinating and dysuria.   Musculoskeletal: Negative for back pain.   Skin: Negative for rash and wound.   Neurological: Negative for headaches.   Psychiatric/Behavioral: Negative for confusion.       Physical Exam     Initial Vitals [03/07/20 2306]   BP Pulse Resp Temp SpO2   (!) 162/70 76 20 97.6 °F (36.4 °C) 99 %      MAP       --         Physical Exam    Nursing note and vitals reviewed.  Constitutional: She appears well-developed and well-nourished. She is not diaphoretic. No distress.   HENT:   Head: Normocephalic and atraumatic.   Mouth/Throat: Oropharynx is clear and moist.   Eyes: Conjunctivae and EOM are normal. Pupils are equal, round, and reactive to light. Right eye exhibits no discharge. Left eye exhibits no discharge.   Neck: Normal range of motion. Neck supple. No thyromegaly present.   Cardiovascular: Normal rate, regular rhythm and normal heart sounds. Exam reveals no gallop and no friction rub.    No murmur heard.  Pulmonary/Chest: Breath sounds normal. No stridor. No respiratory distress. She has no wheezes. She has no rhonchi. She has no rales. She exhibits no tenderness.   Abdominal: Soft. She exhibits no distension and no mass. There is no tenderness. There is no rebound and no guarding.   Genitourinary:   Genitourinary Comments: Female chaperone present during rectal exam. Copious, bright red bleeding from rectum. Ulcerated and  macerated rectum with large blood clots.   Musculoskeletal: Normal range of motion. She exhibits no edema or tenderness.   Neurological: She is alert and oriented to person, place, and time.   Skin: Skin is warm and dry. No rash noted. No erythema.   Psychiatric: She has a normal mood and affect. Her behavior is normal. Judgment and thought content normal.         ED Course   Procedures  Labs Reviewed   CBC W/ AUTO DIFFERENTIAL - Abnormal; Notable for the following components:       Result Value    RBC 2.56 (*)     Hemoglobin 7.9 (*)     Hematocrit 24.8 (*)     Mean Corpuscular Hemoglobin Conc 31.9 (*)     RDW 15.3 (*)     Immature Granulocytes 0.7 (*)     Immature Grans (Abs) 0.05 (*)     All other components within normal limits   COMPREHENSIVE METABOLIC PANEL - Abnormal; Notable for the following components:    CO2 22 (*)     Glucose 174 (*)     BUN, Bld 60 (*)     Creatinine 3.5 (*)     Calcium 8.5 (*)     Albumin 2.9 (*)     Alkaline Phosphatase 145 (*)     eGFR if  13 (*)     eGFR if non  11 (*)     All other components within normal limits   APTT   LIPASE   MAGNESIUM   PROTIME-INR   URINALYSIS, REFLEX TO URINE CULTURE   TYPE & SCREEN     EKG Readings: (Independently Interpreted)   EKG done on March 7, 2020 showing normal sinus rhythm first-degree AV block left axis deviation with a rate 82.  No ST elevation major T-wave abnormality.  LVH.  QTC is 448.  Compared to previous and similar       Imaging Results          X-Ray Abdomen Flat And Erect (Final result)  Result time 03/08/20 01:18:26    Final result by Refugio Smith MD (03/08/20 01:18:26)                 Impression:      Nonobstructive bowel gas pattern.      Electronically signed by: Refugio Smith MD  Date:    03/08/2020  Time:    01:18             Narrative:    EXAMINATION:  XR ABDOMEN FLAT AND ERECT    CLINICAL HISTORY:  Hemorrhage of anus and rectum    TECHNIQUE:  Flat and erect frontal views of the abdomen were  "performed.    COMPARISON:  10/06/2017.    FINDINGS:  Cardiac wires overlie the chest and abdomen.  Bowel gas pattern is nonobstructive.  No evidence of pneumoperitoneum.  No large volume fecal burden.  No pathologic calcifications.  There are mild to moderate degenerative changes involving the spine and hips.                               X-Ray Chest AP Portable (Final result)  Result time 03/08/20 01:16:40    Final result by Refugio Smith MD (03/08/20 01:16:40)                 Impression:      Coarsened perihilar lung markings and platelike subsegmental atelectasis in the left midlung zone.  Overall improved aeration when compared with the prior study and no acute process is identified.      Electronically signed by: Refugio Smith MD  Date:    03/08/2020  Time:    01:16             Narrative:    EXAMINATION:  XR CHEST AP PORTABLE    CLINICAL HISTORY:  Provided history is "rectal bleeding;  ".    TECHNIQUE:  One view of the chest.    COMPARISON:  04/10/2019.    FINDINGS:  Cardiac wires overlie the chest.  Lung volumes are relatively low.  Exaggerated thoracic kyphosis when compared with the prior study.  Cardiomediastinal silhouette is at the upper limits of normal in size but stable.  Atherosclerotic calcifications overlie the aortic arch.  Coarsened perihilar lung markings could be related to minimal edema versus subsegmental atelectasis.  Linear platelike subsegmental atelectasis in the left midlung zone.  Overall improved bilateral aeration when compared with the prior study.  No confluent area of consolidation.  No sizable pleural effusion.  No pneumothorax.                                 Medical Decision Making:   Initial Assessment:   86-year-old female presenting today secondary to lower GI bleeding.  Patient is having copious amounts of bright red blood per rectum.  Not pulsatile.  Rectum is ulcerated.  No obvious hemorrhoid.  After patient was cleaned there was not seem to be active bleeding.  " Patient was placed on a PPI drip and bolus albeit I think this is more lower GI bleeding.  However this is a brisk upper GI bleed the PPI drip will help.  Will get x-ray of the chest and abdomen.  Type and screen and PT INR and CBC and chemistry.  Patient has a history of chronic kidney disease.  No chest pain or shortness of breath.  Abdominal exam shows no major tenderness.    Labs show a hemoglobin of drop from 9.2-7.9.  I reviewed her chart and she is on Eliquis.  This is being held.  Chronic kidney disease with a creatinine 3.5.  INR is 1.  X-rays do not show anything acute.    Patient had another bloody bowel movement.  After being clean no further bleeding at that time.  Brown stool.    I spoke to Dr. Mueller and discussed admission.  After discussion we think it is in the patient's best interest to be admitted the ICU for more aggressive monitoring.  Patient is agreement with plan.    Please put in 35 minutes of critical care due to patient having a high risk of hemotological failure.   Separate from teaching and exclusive of procedure and ekg time  Includes:  Time at bedside  Time reviewing test results  Time discussing case with staff  Time documenting the medical record  Time spent with family members  Time spent with consults  Management    Independently Interpreted Test(s):   I have ordered and independently interpreted EKG Reading(s) - see prior notes  Clinical Tests:   Lab Tests: Ordered and Reviewed  Radiological Study: Ordered and Reviewed  Medical Tests: Ordered and Reviewed  ED Management:  0130: Discussed patient with Dr. Mueller, hospitalist. Will admit to ICU.             Scribe Attestation:   Scribe #1: I performed the above scribed service and the documentation accurately describes the services I performed. I attest to the accuracy of the note.                          Clinical Impression:       ICD-10-CM ICD-9-CM   1. Anemia, unspecified type D64.9 285.9   2. Rectal bleeding K62.5 569.3              ED Disposition Condition    Admit              I, Virgilio Rhodes, personally performed the services described in this documentation. All medical record entries made by the scribe were at my direction and in my presence.  I have reviewed the chart and agree that the record reflects my personal performance and is accurate and complete.               Virgilio Rhodes MD  03/08/20 0137

## 2020-03-08 NOTE — SUBJECTIVE & OBJECTIVE
Past Medical History:   Diagnosis Date    Anemia     Anticoagulant long-term use     Arthritis     Atrial fibrillation     Cataract     CHF (congestive heart failure)     CKD (chronic kidney disease), stage V     Diabetes mellitus     Type 2    GERD (gastroesophageal reflux disease)     GI bleed 2018    Gout     Hypertension     Obese     Requires assistance with all daily activities     Stroke     Wheelchair dependent        Past Surgical History:   Procedure Laterality Date     SECTION      Patient unsure, believe she had 3-4    CHOLECYSTECTOMY      EYE SURGERY      HYSTERECTOMY      JOINT REPLACEMENT Right     knee    TOTAL KNEE ARTHROPLASTY Right        Review of patient's allergies indicates:   Allergen Reactions    Indomethacin     Nsaids (non-steroidal anti-inflammatory drug)        No current facility-administered medications on file prior to encounter.      Current Outpatient Medications on File Prior to Encounter   Medication Sig    carvedilol (COREG) 3.125 MG tablet Take 1 tablet (3.125 mg total) by mouth 2 (two) times daily.    ferrous sulfate (FEOSOL) 325 mg (65 mg iron) Tab tablet Take 325 mg by mouth 2 (two) times daily.    NIFEdipine (PROCARDIA-XL) 60 MG (OSM) 24 hr tablet Take 1 tablet (60 mg total) by mouth once daily.    acetaminophen (TYLENOL) 325 MG tablet Take 2 tablets (650 mg total) by mouth every 4 (four) hours as needed.    albuterol-ipratropium (DUO-NEB) 2.5 mg-0.5 mg/3 mL nebulizer solution Take 3 mLs by nebulization every 4 (four) hours as needed for Wheezing. Rescue    apixaban (ELIQUIS) 2.5 mg Tab Take 2.5 mg by mouth 2 (two) times daily.    B complex-vitamin C-folic acid (NEPHRO-MICHAEL) 0.8 mg Tab Take by mouth once daily.    calcitRIOL (ROCALTROL) 0.25 MCG Cap Take 1 capsule (0.25 mcg total) by mouth every other day.    colchicine (COLCRYS) 0.6 mg tablet Take 0.6 mg by mouth once daily.    colestipoL (COLESTID) 1 gram Tab      ergocalciferol (ERGOCALCIFEROL) 50,000 unit Cap Take 1 capsule (50,000 Units total) by mouth every 7 days.    famotidine (PEPCID) 20 MG tablet Take 1 tablet (20 mg total) by mouth once daily.    insulin degludec (TRESIBA FLEXTOUCH U-100) 100 unit/mL (3 mL) InPn Inject 5 Units into the skin every evening.    magnesium hydroxide 400 mg (170 mg magnesium) Chew Take 400 mg by mouth 2 (two) times daily.    metOLazone (ZAROXOLYN) 2.5 MG tablet Take 1 tablet (2.5 mg total) by mouth once daily.    polyethylene glycol (GLYCOLAX) 17 gram PwPk Take 17 g by mouth 2 (two) times daily as needed.    senna-docusate 8.6-50 mg (PERICOLACE) 8.6-50 mg per tablet Take 1 tablet by mouth 2 (two) times daily.    sodium bicarbonate 650 MG tablet Take 650 mg by mouth 2 (two) times daily.     Family History     Problem Relation (Age of Onset)    Cancer Mother, Brother    Diabetes Mother    Hypertension Mother        Tobacco Use    Smoking status: Never Smoker    Smokeless tobacco: Never Used   Substance and Sexual Activity    Alcohol use: No    Drug use: No    Sexual activity: Never     Review of Systems   Constitutional: Negative for diaphoresis and fever.   HENT: Negative for facial swelling and nosebleeds.    Eyes: Negative for pain and visual disturbance.   Respiratory: Negative for cough and chest tightness.    Cardiovascular: Negative for chest pain and palpitations.   Gastrointestinal: Negative for diarrhea and nausea.   Endocrine: Negative for cold intolerance and heat intolerance.   Genitourinary: Negative for hematuria and urgency.   Musculoskeletal: Negative for arthralgias and back pain.   Skin: Negative for pallor and rash.   Neurological: Negative for syncope and speech difficulty.   Hematological: Negative for adenopathy. Does not bruise/bleed easily.   Psychiatric/Behavioral: Negative for confusion and hallucinations.     Objective:     Vital Signs (Most Recent):  Temp: 97.6 °F (36.4 °C) (03/07/20 2306)  Pulse: 87  (03/08/20 0454)  Resp: 20 (03/08/20 0416)  BP: (!) 130/93 (03/08/20 0454)  SpO2: 98 % (03/08/20 0454) Vital Signs (24h Range):  Temp:  [97.6 °F (36.4 °C)] 97.6 °F (36.4 °C)  Pulse:  [74-87] 87  Resp:  [20-21] 20  SpO2:  [98 %-100 %] 98 %  BP: (130-167)/(67-93) 130/93     Weight: 80.7 kg (178 lb)  Body mass index is 30.55 kg/m².    Physical Exam   Constitutional: No distress.   HENT:   Head: Normocephalic and atraumatic.   Eyes: Pupils are equal, round, and reactive to light. EOM are normal.   Neck: Normal range of motion. No tracheal deviation present.   Cardiovascular: Normal rate and regular rhythm.   Pulmonary/Chest: Effort normal and breath sounds normal.   Abdominal: Soft. Bowel sounds are normal.   Musculoskeletal: Normal range of motion. She exhibits no deformity.   Neurological: She is alert. She exhibits normal muscle tone.   Skin: Skin is warm. Capillary refill takes 2 to 3 seconds. She is not diaphoretic. No pallor.   Psychiatric: She has a normal mood and affect. Her behavior is normal.   Vitals reviewed.       Significant Labs: All pertinent labs within the past 24 hours have been reviewed.    Significant Imaging: I have reviewed and interpreted all pertinent imaging results/findings within the past 24 hours.

## 2020-03-08 NOTE — NURSING
Bladder scanned patient and sent Dr. Orosco a message. Changed pt brief and will recheck again in a  little while.

## 2020-03-08 NOTE — ASSESSMENT & PLAN NOTE
-chronic, monitor.    -pt unable to confirm if on any chronic blood thinners, appears to be confused per RN

## 2020-03-08 NOTE — ASSESSMENT & PLAN NOTE
-GI consulted  -trend H/H  -unclear if takes eliquis at nursing home for afib (listed on MAR)  -monitor in ICU

## 2020-03-08 NOTE — NURSING
03/08/20 0530   Vital Signs   Pulse 67   Resp (!) 34   SpO2 100 %   BP (!) 103/52   MAP (mmHg) 75     0530Pt  with blank stare, HR noted to be in the 40s. Pt.responded with verbal stimulation and light touch. HR increased to 70s. Pt slightly diaphoretic with c/o of abdominal pain. eICU doctor on screen in room. STAT CBC ordered. V/S as above. Will continue to monitor.    0600 Dr. Mueller at patient's bedside and notified of events as above.

## 2020-03-08 NOTE — CARE UPDATE
Patient seen and examined.  Agree with Dr. Mueller's treatment and plan.  Lower GI bleed, probably secondary to acute diverticulitis.  Drop in H/H.  Discussed with patient's daughter and will transfuse 2 units of blood.  Start Cipro and Flagyl for diverticulitis.  Bowel rest.  GI consulted.  UTI on ABx's.  Await final cultures and sensitivities.  CKD with Creat near baseline.

## 2020-03-08 NOTE — PROGRESS NOTES
e-ICU admit note            Reason for ICU admission:    LGI bleed symptomatic      HPI:    85 yo NH female resident c/o rectal bleeding that began 1 hr PTA associated with  abdominal pain.  Patient takes ASA.       Just before I camera'd in RN notified me that pt had near syncope with bradycardia in 40s    By the time I saw the pt she was awake with HR 60s and /50    Hb at midnight was 7.9    Morning  Hb pending    I went ahead and ordered 2 pRBC because of the above event before viewing the am Hb         PHx:    GI bleed  GERD   Anemia   DM     Home Meds:    Fe  Apixaban  Nifedipine  Duoneb  Famotidine  Degludec 5 U in pm  Metolazone  Bicarb tabs 650 mg bid        Significant labs:    Hb 7.9 ( at midnight)  UA mod bacteria, > 100 WBC,     Significant images:    Abd film :    Nonobstructive bowel gas pattern.    CXR:    Coarsened perihilar lung markings and platelike subsegmental atelectasis in the left midlung zone.  Overall improved aeration when compared with the prior study and no acute process is identified.    In ER:    PPI infusion      I viewed the pt via camera at 5:40 am :    125/53, 80 sinus, 100%, RR 28    NAD                        Assessment/Plan    Lower GI bleed  Symptomatic anemia  Monitor Hb  Transfuse accordingly  GI to see  Hold Apixaban  PPI infusion    Brief bradycardia with near syncope  May have been vasovagal  Monitor

## 2020-03-08 NOTE — NURSING
2U PRBC ordered. Dr. Magana informed pt has LaPOST that shows DNR states with comfort measures. Pt confused, unable to consent to blood at this time.  MD states will re-assess after CBC results.

## 2020-03-08 NOTE — H&P
[facility] Hospital Medicine  History & Physical    Patient Name: Joyce Riley  MRN: [id number]  Admission Date: 3/7/2020  Attending Physician: Rodrigo Orosco MD   Primary Care Provider: Kalpana Staton MD         Patient information was obtained from ER records.     Subjective:     Principal Problem:<principal problem not specified>    Chief Complaint:   Chief Complaint   Patient presents with    Rectal Bleeding     from [facility], staff report bright red blood x 1 hour, EMS report large amount noted on scene         HPI: Pt is a 85 yo F with a h/o GI bleed, CHF, A-fib, DM2, CKD, HTN, CVA, GERD, Anemia who presents to the ER for rectal bleeding that started at the nursing home prior to arrival.  Staff at the nursing home noted a large amount of BRBPR and EMS was called.  Of note she has a h/o GI bleed in the past per chart review. Pt denied any fevers, chills, diarrhea, nausea, vomiting, vision changes, dizziness, syncope, or chest pains. Pt was admitted to the ICU for monitoring and GI was consulted from the ER.      Past Medical History:   Diagnosis Date    Anemia     Anticoagulant long-term use     Arthritis     Atrial fibrillation     Cataract     CHF (congestive heart failure)     CKD (chronic kidney disease), stage V     Diabetes mellitus     Type 2    GERD (gastroesophageal reflux disease)     GI bleed 2018    Gout     Hypertension     Obese     Requires assistance with all daily activities     Stroke     Wheelchair dependent        Past Surgical History:   Procedure Laterality Date     SECTION      Patient unsure, believe she had 3-4    CHOLECYSTECTOMY      EYE SURGERY      HYSTERECTOMY      JOINT REPLACEMENT Right     knee    TOTAL KNEE ARTHROPLASTY Right        Review of patient's allergies indicates:   Allergen Reactions    Indomethacin     Nsaids (non-steroidal anti-inflammatory drug)        No current facility-administered  medications on file prior to encounter.      Current Outpatient Medications on File Prior to Encounter   Medication Sig    carvedilol (COREG) 3.125 MG tablet Take 1 tablet (3.125 mg total) by mouth 2 (two) times daily.    ferrous sulfate (FEOSOL) 325 mg (65 mg iron) Tab tablet Take 325 mg by mouth 2 (two) times daily.    NIFEdipine (PROCARDIA-XL) 60 MG (OSM) 24 hr tablet Take 1 tablet (60 mg total) by mouth once daily.    acetaminophen (TYLENOL) 325 MG tablet Take 2 tablets (650 mg total) by mouth every 4 (four) hours as needed.    albuterol-ipratropium (DUO-NEB) 2.5 mg-0.5 mg/3 mL nebulizer solution Take 3 mLs by nebulization every 4 (four) hours as needed for Wheezing. Rescue    apixaban (ELIQUIS) 2.5 mg Tab Take 2.5 mg by mouth 2 (two) times daily.    B complex-vitamin C-folic acid (NEPHRO-MICHAEL) 0.8 mg Tab Take by mouth once daily.    calcitRIOL (ROCALTROL) 0.25 MCG Cap Take 1 capsule (0.25 mcg total) by mouth every other day.    colchicine (COLCRYS) 0.6 mg tablet Take 0.6 mg by mouth once daily.    colestipoL (COLESTID) 1 gram Tab     ergocalciferol (ERGOCALCIFEROL) 50,000 unit Cap Take 1 capsule (50,000 Units total) by mouth every 7 days.    famotidine (PEPCID) 20 MG tablet Take 1 tablet (20 mg total) by mouth once daily.    insulin degludec (TRESIBA FLEXTOUCH U-100) 100 unit/mL (3 mL) InPn Inject 5 Units into the skin every evening.    magnesium hydroxide 400 mg (170 mg magnesium) Chew Take 400 mg by mouth 2 (two) times daily.    metOLazone (ZAROXOLYN) 2.5 MG tablet Take 1 tablet (2.5 mg total) by mouth once daily.    polyethylene glycol (GLYCOLAX) 17 gram PwPk Take 17 g by mouth 2 (two) times daily as needed.    senna-docusate 8.6-50 mg (PERICOLACE) 8.6-50 mg per tablet Take 1 tablet by mouth 2 (two) times daily.    sodium bicarbonate 650 MG tablet Take 650 mg by mouth 2 (two) times daily.     Family History     Problem Relation (Age of Onset)    Cancer Mother, Brother    Diabetes Mother     Hypertension Mother        Tobacco Use    Smoking status: Never Smoker    Smokeless tobacco: Never Used   Substance and Sexual Activity    Alcohol use: No    Drug use: No    Sexual activity: Never     Review of Systems   Constitutional: Negative for diaphoresis and fever.   HENT: Negative for facial swelling and nosebleeds.    Eyes: Negative for pain and visual disturbance.   Respiratory: Negative for cough and chest tightness.    Cardiovascular: Negative for chest pain and palpitations.   Gastrointestinal: Negative for diarrhea and nausea.   Endocrine: Negative for cold intolerance and heat intolerance.   Genitourinary: Negative for hematuria and urgency.   Musculoskeletal: Negative for arthralgias and back pain.   Skin: Negative for pallor and rash.   Neurological: Negative for syncope and speech difficulty.   Hematological: Negative for adenopathy. Does not bruise/bleed easily.   Psychiatric/Behavioral: Negative for confusion and hallucinations.     Objective:     Vital Signs (Most Recent):  Temp: 97.6 °F (36.4 °C) (03/07/20 2306)  Pulse: 87 (03/08/20 0454)  Resp: 20 (03/08/20 0416)  BP: (!) 130/93 (03/08/20 0454)  SpO2: 98 % (03/08/20 0454) Vital Signs (24h Range):  Temp:  [97.6 °F (36.4 °C)] 97.6 °F (36.4 °C)  Pulse:  [74-87] 87  Resp:  [20-21] 20  SpO2:  [98 %-100 %] 98 %  BP: (130-167)/(67-93) 130/93     Weight: 80.7 kg (178 lb)  Body mass index is 30.55 kg/m².    Physical Exam   Constitutional: No distress.   HENT:   Head: Normocephalic and atraumatic.   Eyes: Pupils are equal, round, and reactive to light. EOM are normal.   Neck: Normal range of motion. No tracheal deviation present.   Cardiovascular: Normal rate and regular rhythm.   Pulmonary/Chest: Effort normal and breath sounds normal.   Abdominal: Soft. Bowel sounds are normal.   Musculoskeletal: Normal range of motion. She exhibits no deformity.   Neurological: She is alert. She exhibits normal muscle tone.   Skin: Skin is warm. Capillary refill  takes 2 to 3 seconds. She is not diaphoretic. No pallor.   Psychiatric: She has a normal mood and affect. Her behavior is normal.   Vitals reviewed.       Significant Labs: All pertinent labs within the past 24 hours have been reviewed.    Significant Imaging: I have reviewed and interpreted all pertinent imaging results/findings within the past 24 hours.    Assessment/Plan:     Rectal bleeding  -GI consulted  -trend H/H  -unclear if takes eliquis at nursing home for afib (listed on MAR)  -monitor in ICU        CKD (chronic kidney disease), stage IV  -chronic, monitor  -nephro consult      Essential hypertension  -PRN IV hydralazine while NPO      Paroxysmal atrial fibrillation  -chronic, monitor.    -pt unable to confirm if on any chronic blood thinners, appears to be confused per RN      Chronic diastolic heart failure  -chronic, monitor.  Caution with IVF/blood products      Type 2 diabetes mellitus, controlled, with renal complications  -accuchecks, SSI        VTE Risk Mitigation (From admission, onward)         Ordered     IP VTE HIGH RISK PATIENT  Once      03/08/20 0522     Place sequential compression device  Until discontinued      03/08/20 0522     Place SANTOS hose  Until discontinued      03/08/20 0522              Critical care time spent on the evaluation and treatment of severe organ dysfunction, review of pertinent labs and imaging studies, discussions with consulting providers and discussions with patient/family: 60 minutes.     Scooter Mueller MD  Department of Hospital Medicine   [facility]

## 2020-03-08 NOTE — PLAN OF CARE
Patient admitted to ICU with GI Bleed. Pt AAOx2, Pt. disoriented to time. No BRB noted since arrival to ICU. Pt had 5 large BRB in ER per ER nurse Sharee Rankin and GI consulted. Safety measures maintained.

## 2020-03-08 NOTE — ED TRIAGE NOTES
Pt here via EMS from [facility] with reports of bright red bleeding from rectum. Pt has copious amounts of bright red blood with large clots. Pt reports lower abdominal cramping.

## 2020-03-08 NOTE — CARE UPDATE
[facility]  ICU Multidisciplinary Bedside Rounds   SUMMARY     Date: 3/8/2020    Prehospitalization: Nursing Home  Admit Date / LOS : 3/7/2020/ 0 days    Diagnosis: <principal problem not specified>    Consults:        Active: GI and Nephro       Needed: N/A     Code Status: DNR   Advanced Directive: <no information>    LDA: PIV       Central Lines/Site/Justification:Patient Does Not Have Central Line       Urinary Cath/Order/Justification:Patient Does Not Have Urinary Catheter    Vasopressors/Infusions:    sodium chloride 0.9% 60 mL/hr at 03/08/20 0533          GOALS: Volume/ Hemodynamic: N/A                     RASS: N/A    CAM ICU: N/A  Pain Management: none       Pain Controlled: no     Rhythm: NSR    Respiratory Device: Nasal Cannula                  Most Recent SBT/ SAT: N/A      VTE Prophylaxis: Mechanical  Mobility: Bedrest  Stress Ulcer Prophylaxis: No    Dietary: NPO  Tolerance: yes  /  Advancement: no    Isolation: No active isolations    Restraints: No    Significant Dates:  Post Op Date: N/A  Rescue Date: N/A  Imaging/ Diagnostics: N/A    Noteworthy Labs:  AM labs pending    CBC/Anemia Labs: Coags:    Recent Labs   Lab 03/07/20  2345   WBC 6.87   HGB 7.9*   HCT 24.8*      MCV 97   RDW 15.3*    Recent Labs   Lab 03/07/20  2345   INR 1.0   APTT 30.4        Chemistries:   Recent Labs   Lab 03/07/20  2345      K 4.0      CO2 22*   BUN 60*   CREATININE 3.5*   CALCIUM 8.5*   PROT 6.3   BILITOT 0.2   ALKPHOS 145*   ALT 14   AST 16   MG 2.1        Cardiac Enzymes: Ejection Fractions:    No results for input(s): CPK, CPKMB, MB, TROPONINI in the last 72 hours. No results found for: EF     POCT Glucose: HbA1c:    No results for input(s): POCTGLUCOSE in the last 168 hours. Hemoglobin A1C   Date Value Ref Range Status   07/17/2019 6.1 (H) 4.0 - 5.6 % Final     Comment:     ADA Screening Guidelines:  5.7-6.4%  Consistent with prediabetes  >or=6.5%  Consistent with diabetes  High  levels of fetal hemoglobin interfere with the HbA1C  assay. Heterozygous hemoglobin variants (HbS, HgC, etc)do  not significantly interfere with this assay.   However, presence of multiple variants may affect accuracy.          Needs from Care Team: Blood consent, if PHR agrees. Pain medication. Antiemetics.     ICU LOS 1h  Level of Care: OK to Transfer

## 2020-03-08 NOTE — CONSULTS
.[facility]  Gastroenterology  Consult Note    Patient Name: Joyce Riley  MRN: [id number]  Admission Date: 3/7/2020  Hospital Length of Stay: 0 days  Code Status: DNR   Primary Care Physician: Kalpana Staton MD  Principal Problem:<principal problem not specified>    Consults  Subjective:     Chief complaint: GIB    HPI: Acute onset GIB yesterday with large amount of BRBPR, since resolved with last BM this AM, CT showing diverticulitis as likely cause aggravated by antithrombotic agent, associated anemia.stable since admit    Past medical history:  Past Medical History:   Diagnosis Date    Anemia     Anticoagulant long-term use     Arthritis     Atrial fibrillation     Cataract     CHF (congestive heart failure)     CKD (chronic kidney disease), stage V     Diabetes mellitus     Type 2    GERD (gastroesophageal reflux disease)     GI bleed 2018    Gout     Hypertension     Obese     Requires assistance with all daily activities     Stroke 2014    Wheelchair dependent        Past surgical history:  Past Surgical History:   Procedure Laterality Date     SECTION      Patient unsure, believe she had 3-4    CHOLECYSTECTOMY      EYE SURGERY      HYSTERECTOMY      JOINT REPLACEMENT Right     knee    TOTAL KNEE ARTHROPLASTY Right        Social history:  Social History     Socioeconomic History    Marital status:      Spouse name: Not on file    Number of children: 6    Years of education: Not on file    Highest education level: Not on file   Occupational History    Occupation:      Comment: Retired   Social Needs    Financial resource strain: Not on file    Food insecurity:     Worry: Not on file     Inability: Not on file    Transportation needs:     Medical: Not on file     Non-medical: Not on file   Tobacco Use    Smoking status: Never Smoker    Smokeless tobacco: Never Used   Substance and Sexual Activity    Alcohol use: No     Drug use: No    Sexual activity: Never   Lifestyle    Physical activity:     Days per week: Not on file     Minutes per session: Not on file    Stress: Not on file   Relationships    Social connections:     Talks on phone: Not on file     Gets together: Not on file     Attends Restorationism service: Not on file     Active member of club or organization: Not on file     Attends meetings of clubs or organizations: Not on file     Relationship status: Not on file   Other Topics Concern    Not on file   Social History Narrative    Not on file       Family history:  Family History   Problem Relation Age of Onset    Cancer Mother          age 98    Diabetes Mother     Hypertension Mother     Cancer Brother        Medications:  Medications Prior to Admission   Medication Sig Dispense Refill Last Dose    acetaminophen (TYLENOL) 325 MG tablet Take 2 tablets (650 mg total) by mouth every 4 (four) hours as needed. (Patient taking differently: Take 650 mg by mouth 2 (two) times daily. )  0 3/7/2020 at 1738    apixaban (ELIQUIS) 2.5 mg Tab Take 2.5 mg by mouth 2 (two) times daily.   3/7/2020 at 1738    carvedilol (COREG) 3.125 MG tablet Take 1 tablet (3.125 mg total) by mouth 2 (two) times daily. (Patient taking differently: Take 6.25 mg by mouth 2 (two) times daily. ) 60 tablet 11 3/7/2020 at 1738    ferrous sulfate (FEOSOL) 325 mg (65 mg iron) Tab tablet Take 325 mg by mouth 2 (two) times daily.   3/7/2020 at 1738    Lactobacillus acidophilus (ACIDOPHILUS ORAL) Take 1 tablet by mouth 2 (two) times daily.   3/7/2020 at 1738    metOLazone (ZAROXOLYN) 2.5 MG tablet Take 1 tablet (2.5 mg total) by mouth once daily. 30 tablet 11 3/7/2020 at Unknown time    NIFEdipine (PROCARDIA-XL) 60 MG (OSM) 24 hr tablet Take 1 tablet (60 mg total) by mouth once daily. (Patient taking differently: Take 30 mg by mouth once daily. HOLD FOR SBP <110) 30 tablet 11 3/7/2020 at 1052    albuterol-ipratropium (DUO-NEB) 2.5 mg-0.5  mg/3 mL nebulizer solution Take 3 mLs by nebulization every 4 (four) hours as needed for Wheezing. Rescue   Taking    B complex-vitamin C-folic acid (NEPHRO-MICHAEL) 0.8 mg Tab Take by mouth once daily.   Taking    calcitRIOL (ROCALTROL) 0.25 MCG Cap Take 1 capsule (0.25 mcg total) by mouth every other day.   Taking    colchicine (COLCRYS) 0.6 mg tablet Take 0.6 mg by mouth once daily.   Taking    colestipoL (COLESTID) 1 gram Tab    Taking    ergocalciferol (ERGOCALCIFEROL) 50,000 unit Cap Take 1 capsule (50,000 Units total) by mouth every 7 days.   Taking    famotidine (PEPCID) 20 MG tablet Take 1 tablet (20 mg total) by mouth once daily.   Taking    insulin degludec (TRESIBA FLEXTOUCH U-100) 100 unit/mL (3 mL) InPn Inject 5 Units into the skin every evening.   Taking    magnesium hydroxide 400 mg (170 mg magnesium) Chew Take 400 mg by mouth 2 (two) times daily.   Taking    polyethylene glycol (GLYCOLAX) 17 gram PwPk Take 17 g by mouth 2 (two) times daily as needed.  0 Taking    senna-docusate 8.6-50 mg (PERICOLACE) 8.6-50 mg per tablet Take 1 tablet by mouth 2 (two) times daily.   Taking    sodium bicarbonate 650 MG tablet Take 650 mg by mouth 2 (two) times daily.   Taking       Allergies:  Review of patient's allergies indicates:   Allergen Reactions    Indomethacin     Nsaids (non-steroidal anti-inflammatory drug)        Review of systems:  CONSTITUTIONAL: Negative for fever, chills, weakness, weight loss, weight gain.  HEENT: Negative for blurred vision, hearing loss, nasal congestion, dry mouth, sore throat.  CARDIOVASCULAR: Negative for chest pain or palpitations.  RESPIRATORY: Negative for SOB or cough.  GASTROINTESTINAL: See HPI  GENITOURINARY: Negative for dysuria or hematuria.  MUSCULOSKELETAL: +chronic neck/back pain  SKIN: Negative for rashes/lesions.  NEUROLOGIC: Negative for headaches, numbness/tingling.  ENDOCRINE: Negative for diabetes or thyroid abnormalities.  HEMATOLOGIC: Negative for  anemia or blood dyscrasias.  Aside from above positives, complete 10 point review of systems negative.    Objective:     Vital Signs (Most Recent):  Temp: 97.4 °F (36.3 °C) (03/08/20 1115)  Pulse: 96 (03/08/20 1230)  Resp: (!) 25 (03/08/20 1230)  BP: (!) 149/65 (03/08/20 1215)  SpO2: 100 % (03/08/20 1230) Vital Signs (24h Range):  Temp:  [97.4 °F (36.3 °C)-98.6 °F (37 °C)] 97.4 °F (36.3 °C)  Pulse:  [67-96] 96  Resp:  [18-34] 25  SpO2:  [84 %-100 %] 100 %  BP: (103-171)/(52-93) 149/65     Physical examination:  General: well developed, well nourished, no apparent distress  HENT: NCAT, hearing grossly intact, no palpable or visible thyroid mass  Eyes: PERRL, EOMI, anicteric sclera  LYMH: No cervical, supraclavicular or axillary lymphadenopathy   Cardiovascular: Regular rate and rhythm. No murmurs appreciated.  Lungs: Non-labored respirations. Breath sounds equal.   Abdomen: soft, NTND, normoactive BS  Extremities: No C/C/E, 2+ dorsalis pedis pulses bilaterally  Neuro: AA&O x 3, no asterixes or tremors  Psych: Appropriate mood and affect. No SI.  Skin: No jaundice, rashes or lesions  Musculoskeletal: 5/5 strength bilaterally    Labs:  CBC:   Recent Labs   Lab 03/07/20  2345 03/08/20  0559 03/08/20  0954   WBC 6.87 11.25 11.71   HGB 7.9* 7.1* 7.2*   HCT 24.8* 23.1* 23.7*    288 246       Imaging:  CT: I have reviewed all results within the past 24 hours and my personal findings are:  Findings are most compatible with mild acute uncomplicated sigmoid diverticulitis.      Assessment:   Mild diverticulitis   GIB  Anemia acute blood loss  Afib...    Plan:   Clear liquids  Monitor CBC  Hold off endoscopic intervention for now        Thank you for your consult.     Catrachito Cifuentes MD  Gastroenterology  [facility]

## 2020-03-08 NOTE — HPI
Pt is a 85 yo F with a h/o GI bleed, CHF, A-fib, DM2, CKD, HTN, CVA, GERD, Anemia who presents to the ER for rectal bleeding that started at the nursing home prior to arrival.  Staff at the nursing home noted a large amount of BRBPR and EMS was called.  Of note she has a h/o GI bleed in the past per chart review. Pt denied any fevers, chills, diarrhea, nausea, vomiting, vision changes, dizziness, syncope, or chest pains. Pt was admitted to the ICU for monitoring and GI was consulted from the ER.

## 2020-03-09 PROBLEM — D64.9 ANEMIA: Status: ACTIVE | Noted: 2020-03-09

## 2020-03-09 LAB
ALBUMIN SERPL BCP-MCNC: 2.9 G/DL (ref 3.5–5.2)
ALP SERPL-CCNC: 105 U/L (ref 55–135)
ALT SERPL W/O P-5'-P-CCNC: 13 U/L (ref 10–44)
ANION GAP SERPL CALC-SCNC: 13 MMOL/L (ref 8–16)
AST SERPL-CCNC: 15 U/L (ref 10–40)
BASOPHILS # BLD AUTO: 0.04 K/UL (ref 0–0.2)
BASOPHILS NFR BLD: 0.3 % (ref 0–1.9)
BILIRUB SERPL-MCNC: 0.5 MG/DL (ref 0.1–1)
BUN SERPL-MCNC: 76 MG/DL (ref 8–23)
CALCIUM SERPL-MCNC: 8.5 MG/DL (ref 8.7–10.5)
CHLORIDE SERPL-SCNC: 108 MMOL/L (ref 95–110)
CO2 SERPL-SCNC: 19 MMOL/L (ref 23–29)
CREAT SERPL-MCNC: 4.8 MG/DL (ref 0.5–1.4)
DIFFERENTIAL METHOD: ABNORMAL
EOSINOPHIL # BLD AUTO: 0 K/UL (ref 0–0.5)
EOSINOPHIL NFR BLD: 0.3 % (ref 0–8)
ERYTHROCYTE [DISTWIDTH] IN BLOOD BY AUTOMATED COUNT: 18.4 % (ref 11.5–14.5)
EST. GFR  (AFRICAN AMERICAN): 9 ML/MIN/1.73 M^2
EST. GFR  (NON AFRICAN AMERICAN): 8 ML/MIN/1.73 M^2
GLUCOSE SERPL-MCNC: 105 MG/DL (ref 70–110)
HCT VFR BLD AUTO: 27 % (ref 37–48.5)
HGB BLD-MCNC: 8.6 G/DL (ref 12–16)
IMM GRANULOCYTES # BLD AUTO: 0.13 K/UL (ref 0–0.04)
IMM GRANULOCYTES NFR BLD AUTO: 1.1 % (ref 0–0.5)
LYMPHOCYTES # BLD AUTO: 2.2 K/UL (ref 1–4.8)
LYMPHOCYTES NFR BLD: 18.6 % (ref 18–48)
MCH RBC QN AUTO: 30 PG (ref 27–31)
MCHC RBC AUTO-ENTMCNC: 31.9 G/DL (ref 32–36)
MCV RBC AUTO: 94 FL (ref 82–98)
MONOCYTES # BLD AUTO: 0.8 K/UL (ref 0.3–1)
MONOCYTES NFR BLD: 6.6 % (ref 4–15)
NEUTROPHILS # BLD AUTO: 8.6 K/UL (ref 1.8–7.7)
NEUTROPHILS NFR BLD: 73.1 % (ref 38–73)
NRBC BLD-RTO: 0 /100 WBC
PLATELET # BLD AUTO: 212 K/UL (ref 150–350)
PMV BLD AUTO: 10.3 FL (ref 9.2–12.9)
POCT GLUCOSE: 104 MG/DL (ref 70–110)
POCT GLUCOSE: 112 MG/DL (ref 70–110)
POCT GLUCOSE: 114 MG/DL (ref 70–110)
POCT GLUCOSE: 273 MG/DL (ref 70–110)
POTASSIUM SERPL-SCNC: 4.5 MMOL/L (ref 3.5–5.1)
PROT SERPL-MCNC: 5.8 G/DL (ref 6–8.4)
RBC # BLD AUTO: 2.87 M/UL (ref 4–5.4)
SODIUM SERPL-SCNC: 140 MMOL/L (ref 136–145)
WBC # BLD AUTO: 11.8 K/UL (ref 3.9–12.7)

## 2020-03-09 PROCEDURE — 85025 COMPLETE CBC W/AUTO DIFF WBC: CPT

## 2020-03-09 PROCEDURE — 20000000 HC ICU ROOM

## 2020-03-09 PROCEDURE — C9113 INJ PANTOPRAZOLE SODIUM, VIA: HCPCS | Performed by: FAMILY MEDICINE

## 2020-03-09 PROCEDURE — 36415 COLL VENOUS BLD VENIPUNCTURE: CPT

## 2020-03-09 PROCEDURE — 80053 COMPREHEN METABOLIC PANEL: CPT

## 2020-03-09 PROCEDURE — S0030 INJECTION, METRONIDAZOLE: HCPCS | Performed by: HOSPITALIST

## 2020-03-09 PROCEDURE — 63600175 PHARM REV CODE 636 W HCPCS: Performed by: FAMILY MEDICINE

## 2020-03-09 PROCEDURE — 63600175 PHARM REV CODE 636 W HCPCS: Performed by: HOSPITALIST

## 2020-03-09 PROCEDURE — 25000003 PHARM REV CODE 250: Performed by: HOSPITALIST

## 2020-03-09 PROCEDURE — 63600175 PHARM REV CODE 636 W HCPCS: Performed by: EMERGENCY MEDICINE

## 2020-03-09 RX ORDER — NIFEDIPINE 30 MG/1
30 TABLET, EXTENDED RELEASE ORAL DAILY
Status: DISCONTINUED | OUTPATIENT
Start: 2020-03-09 | End: 2020-03-12

## 2020-03-09 RX ORDER — LIDOCAINE 50 MG/G
1 PATCH TOPICAL
Status: DISCONTINUED | OUTPATIENT
Start: 2020-03-09 | End: 2020-03-13 | Stop reason: HOSPADM

## 2020-03-09 RX ADMIN — METRONIDAZOLE 500 MG: 500 INJECTION, SOLUTION INTRAVENOUS at 03:03

## 2020-03-09 RX ADMIN — ACETAMINOPHEN 650 MG: 325 TABLET ORAL at 09:03

## 2020-03-09 RX ADMIN — PANTOPRAZOLE SODIUM 40 MG: 40 INJECTION, POWDER, FOR SOLUTION INTRAVENOUS at 08:03

## 2020-03-09 RX ADMIN — FERROUS SULFATE TAB EC 325 MG (65 MG FE EQUIVALENT) 325 MG: 325 (65 FE) TABLET DELAYED RESPONSE at 08:03

## 2020-03-09 RX ADMIN — INSULIN ASPART 3 UNITS: 100 INJECTION, SOLUTION INTRAVENOUS; SUBCUTANEOUS at 11:03

## 2020-03-09 RX ADMIN — SODIUM BICARBONATE 650 MG: 325 TABLET ORAL at 08:03

## 2020-03-09 RX ADMIN — NIFEDIPINE 30 MG: 30 TABLET, FILM COATED, EXTENDED RELEASE ORAL at 04:03

## 2020-03-09 RX ADMIN — CARVEDILOL 3.12 MG: 3.12 TABLET, FILM COATED ORAL at 08:03

## 2020-03-09 RX ADMIN — CIPROFLOXACIN 400 MG: 2 INJECTION, SOLUTION INTRAVENOUS at 10:03

## 2020-03-09 RX ADMIN — LIDOCAINE 1 PATCH: 50 PATCH TOPICAL at 12:03

## 2020-03-09 RX ADMIN — CIPROFLOXACIN 400 MG: 2 INJECTION, SOLUTION INTRAVENOUS at 11:03

## 2020-03-09 RX ADMIN — METRONIDAZOLE 500 MG: 500 INJECTION, SOLUTION INTRAVENOUS at 07:03

## 2020-03-09 RX ADMIN — METRONIDAZOLE 500 MG: 500 INJECTION, SOLUTION INTRAVENOUS at 09:03

## 2020-03-09 NOTE — PLAN OF CARE
Problem: Adult Inpatient Plan of Care  Goal: Plan of Care Review  Outcome: Ongoing, Progressing  Goal: Patient-Specific Goal (Individualization)  Outcome: Ongoing, Progressing  Goal: Absence of Hospital-Acquired Illness or Injury  Outcome: Ongoing, Progressing  Goal: Optimal Comfort and Wellbeing  Outcome: Ongoing, Progressing  Goal: Readiness for Transition of Care  Outcome: Ongoing, Progressing  Goal: Rounds/Family Conference  Outcome: Ongoing, Progressing     Problem: Fall Injury Risk  Goal: Absence of Fall and Fall-Related Injury  Outcome: Ongoing, Progressing     Problem: Diabetes Comorbidity  Goal: Blood Glucose Level Within Desired Range  Outcome: Ongoing, Progressing     Problem: Skin Injury Risk Increased  Goal: Skin Health and Integrity  Outcome: Ongoing, Progressing

## 2020-03-09 NOTE — ASSESSMENT & PLAN NOTE
Initially presented with Creat at baseline.  Now with ARF.  Similar issue in 7/2019 with increase in Creat after GI bleed.  Will gently hydrate.  Avoid nephrotoxic medications.  Avoid hypotension.  No mclaughlin in place, but patient has been changed with urine in diaper.  No need for dialysis at this time, but will consult Nephrology.

## 2020-03-09 NOTE — PLAN OF CARE
[facility] supervisor met with pt. And her dtr, Flower Bennett ([phone]), to discuss pt's Help at Home, Preferences, and Maintaining Your Health.  SW supervisor reviewed the [facility] Packet, and discussed pt's return to the [facility] when medically stable.  Pt. Is AAOx3, and very talkative. Pt's mail reportedly goes to pt's dtr's home: [facility] ([phone]). Pt. is a alf patient there, and hpt's dtr. stated she and her siblings, grd. children visit pt. regularly. Pt. Has 6 living children. Pt's physical and medication needs are provided by staff at [facility]. 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 Pt. Has no needs currently.      03/09/20 1058   Discharge Assessment   Assessment Type Discharge Planning Assessment   Confirmed/corrected address and phone number on facesheet? Yes   Assessment information obtained from? Patient;Caregiver   Prior to hospitilization cognitive status: Alert/Oriented   Prior to hospitalization functional status: Completely Dependent   Current cognitive status: Alert/Oriented   Current Functional Status: Completely Dependent   Facility Arrived From: [facility] - halfway    Lives With other (see comments)  (halfway NH)   Able to Return to Prior Arrangements yes   Is patient able to care for self after discharge? No   Who are your caregiver(s) and their phone number(s)? NH staff - [place]    Patient's perception of discharge disposition nursing home   Readmission Within the Last 30 Days no previous admission in last 30 days   Patient currently being followed by outpatient case management? No   Patient currently receives any other outside agency services? No   Equipment Currently Used at Home hospital bed;wheelchair   Part D Coverage [employer]   Do you have any problems affording any of your prescribed medications? No   Is the patient taking medications as prescribed? yes   Does the patient have transportation home? Yes    Transportation Anticipated agency   Does the patient receive services at the [facility]? No   Discharge Plan A Return to nursing home   Discharge Plan B Return to Nursing Home   DME Needed Upon Discharge  none   Patient/Family in Agreement with Plan yes

## 2020-03-09 NOTE — PROGRESS NOTES
[facility] Hospital Medicine  Progress Note    Patient Name: Joyce Riley  MRN: [id number]  Patient Class: IP- Inpatient   Admission Date: 3/7/2020  Length of Stay: 1 days  Attending Physician: Rodrigo Orosco MD  Primary Care Provider: Kalpana Staton MD        Subjective:     Principal Problem:Rectal bleeding        HPI:  Pt is a 87 yo F with a h/o GI bleed, CHF, A-fib, DM2, CKD, HTN, CVA, GERD, Anemia who presents to the ER for rectal bleeding that started at the nursing home prior to arrival.  Staff at the nursing home noted a large amount of BRBPR and EMS was called.  Of note she has a h/o GI bleed in the past per chart review. Pt denied any fevers, chills, diarrhea, nausea, vomiting, vision changes, dizziness, syncope, or chest pains. Pt was admitted to the ICU for monitoring and GI was consulted from the ER.      Overview/Hospital Course:  85 y/o female presents from NH with rectal bleeding.  Abdominal CT showing acute sigmoid diverticulitis.  Probable diverticular bleed and GI consulted.  Patient started on Cipro and Flagyl.  Anemia of CKD with anemia of acute blood loss.  Transfused 2 units of blood with adequate correction of H/H.  No further bleeding.  CKD stage 4 with increase in Creat.    Interval History: Complaining of neck pain.  No further bleeding.    Review of Systems   HENT: Negative for ear discharge and ear pain.    Eyes: Negative for discharge and itching.   Endocrine: Negative for cold intolerance and heat intolerance.   Neurological: Negative for seizures and syncope.     Objective:     Vital Signs (Most Recent):  Temp: 98.1 °F (36.7 °C) (03/09/20 1100)  Pulse: 79 (03/09/20 1100)  Resp: (!) 24 (03/09/20 1100)  BP: 136/65 (03/09/20 1100)  SpO2: 100 % (03/09/20 1100) Vital Signs (24h Range):  Temp:  [97.5 °F (36.4 °C)-98.7 °F (37.1 °C)] 98.1 °F (36.7 °C)  Pulse:  [72-96] 79  Resp:  [15-32] 24  SpO2:  [96 %-100 %] 100 %  BP: (121-155)/(55-70) 136/65     Weight:  79.5 kg (175 lb 4.3 oz)  Body mass index is 30.08 kg/m².    Intake/Output Summary (Last 24 hours) at 3/9/2020 1341  Last data filed at 3/9/2020 1100  Gross per 24 hour   Intake 2365 ml   Output --   Net 2365 ml      Physical Exam   Constitutional: No distress.   HENT:   Head: Normocephalic and atraumatic.   Eyes: Pupils are equal, round, and reactive to light. EOM are normal.   Neck: Normal range of motion. No tracheal deviation present.   Cardiovascular: Normal rate and regular rhythm.   Pulmonary/Chest: Effort normal and breath sounds normal.   Abdominal: Soft. Bowel sounds are normal.   Musculoskeletal: Normal range of motion. She exhibits no deformity.   Neurological: She is alert. She exhibits normal muscle tone.   Skin: Skin is warm. Capillary refill takes 2 to 3 seconds. She is not diaphoretic. No pallor.   Psychiatric: She has a normal mood and affect. Her behavior is normal.   Vitals reviewed.      Significant Labs:   BMP:   Recent Labs   Lab 03/07/20  2345 03/09/20  0353   * 105    140   K 4.0 4.5    108   CO2 22* 19*   BUN 60* 76*   CREATININE 3.5* 4.8*   CALCIUM 8.5* 8.5*   MG 2.1  --      CBC:   Recent Labs   Lab 03/08/20  0954 03/08/20  1914 03/09/20  0354   WBC 11.71 13.96* 11.80   HGB 7.2* 8.9* 8.6*   HCT 23.7* 26.6* 27.0*    232 212       Significant Imaging: I have reviewed all pertinent imaging results/findings within the past 24 hours.      Assessment/Plan:      * Rectal bleeding  Probable diverticular bleed as abdominal CT showing acute sigmoid diverticulitis.  Acute Sigmoid Diverticulitis.  Anemia of CKD with anemia of acute blood loss.  Appreciate GI input.  Started on Cipro and Flagyl.  Transfused 2 units of blood with adequate correction of H/H.  Continue to monitor for now.  No further bleeding.  Holding eliqius.          CKD (chronic kidney disease), stage IV  Initially presented with Creat at baseline.  Now with ARF.  Similar issue in 7/2019 with increase in  Creat after GI bleed.  Will gently hydrate.  Avoid nephrotoxic medications.  Avoid hypotension.  No mclaughlin in place, but patient has been changed with urine in diaper.  No need for dialysis at this time, but will consult Nephrology.      Debility  Functional Quadriplegia      Essential hypertension  Continue Coreg and Nifedipine with parameters.      Paroxysmal atrial fibrillation  -chronic, monitor.    Holding anticoagulation.      Chronic diastolic heart failure  No evidence of acute exacerbation.      Type 2 diabetes mellitus, controlled, with renal complications  -accuchecks, SSI  Diabetic diet.        VTE Risk Mitigation (From admission, onward)         Ordered     IP VTE HIGH RISK PATIENT  Once      03/08/20 0522     Place sequential compression device  Until discontinued      03/08/20 0522     Place SANTOS hose  Until discontinued      03/08/20 0522                Critical care time spent on the evaluation and treatment of severe organ dysfunction, review of pertinent labs and imaging studies, discussions with consulting providers and discussions with patient/family: 40 minutes.      Rodrigo Orosco MD  Department of Hospital Medicine   [facility]

## 2020-03-09 NOTE — PROVIDER TRANSFER
Transfer Note    87 y/o female presents from NH with rectal bleeding.  Abdominal CT showing acute sigmoid diverticulitis.  Probable diverticular bleed and GI consulted.  Patient started on Cipro and Flagyl.  Anemia of CKD with anemia of acute blood loss.  Transfused 2 units of blood with adequate correction of H/H.  No further bleeding.  CKD stage 4 with increase in Creat.  Patient follows with Dr. Baca.  Nephrology consult.  Does not have mclaughlin, but noted to have urine in diaper.  No need for dialysis at this time.  Unsure if patient would want dialysis if needed.  Also with UTI already on ABx's.  Ok to transfer out of ICU.  Monitor for bleeding and H/H.  Follow Creat and Nephrology recommendations.

## 2020-03-09 NOTE — HOSPITAL COURSE
87 y/o female presents from NH with rectal bleeding.  Abdominal CT showing acute sigmoid diverticulitis.  Probable diverticular bleed and GI consulted.  Patient started on Cipro and Flagyl.  Anemia of CKD with anemia of acute blood loss.  Transfused 2 units of blood with adequate correction of H/H.  No further bleeding.  CKD stage 4 with increase in Creat.    3/10- patient required 1 unit blood transfusion, tele boarder - await cervical spine images    3/12- pt had large melanotic stool, drop in h/h.  Monitor for need for further transfusion. Increase Bp med- uncontrolled HTN.   3/13- pt h/h stable, no further bleeding, explained that neck pain will be chronic. Dc back to NH.  Follow up GI as scheduled.

## 2020-03-09 NOTE — ASSESSMENT & PLAN NOTE
Probable diverticular bleed as abdominal CT showing acute sigmoid diverticulitis.  Acute Sigmoid Diverticulitis.  Anemia of CKD with anemia of acute blood loss.  Appreciate GI input.  Started on Cipro and Flagyl.  Transfused 2 units of blood with adequate correction of H/H.  Continue to monitor for now.  No further bleeding.  Holding eliqius.

## 2020-03-09 NOTE — PROGRESS NOTES
"Gastroenterology Progress Note    Reason for Consult: GI bleed, diverticulitis    Subjective:  No issues.  She denies abdominal pain.  Rectal bleeding has stopped.  No N/V.  She tolerated clear liquids without issues.    ROS:  Gen: no F/C, + neck pain  CV: no CP/palpitations  Resp: no SOB/wheezing    Physical Exam  BP (!) 152/67 (BP Location: Left arm, Patient Position: Lying)   Pulse 77   Temp 98.1 °F (36.7 °C) (Oral)   Resp (!) 25   Ht 5' 4" (1.626 m)   Wt 79.5 kg (175 lb 4.3 oz)   SpO2 99%   Breastfeeding? No   BMI 30.08 kg/m²   General: Awake, alert female in no apparent distress  HEENT: NC/AT, PERRL, EOMI, MMM  CV: RRR, without murmur, gallop, or rubs  Pulm: CTAB, no wheezing, breathing unlabored  GI: soft, NT/ND, +BS  MSK: no edema and normal ROM  Neuro: A&Ox3, moves all extremities equally, sensation grossly intact  Skin: no rashes or lesions  Psych: normal mood and affect    Medications/Infusions:  Current Facility-Administered Medications   Medication Dose Route Frequency Provider Last Rate Last Dose    0.9%  NaCl infusion (for blood administration)   Intravenous Q24H PRN Mingo Pearl MD        0.9%  NaCl infusion   Intravenous Continuous Rodrigo Orosco MD 75 mL/hr at 03/09/20 0600      acetaminophen tablet 650 mg  650 mg Oral Q4H PRN Rodrigo Orosco MD        albuterol-ipratropium 2.5 mg-0.5 mg/3 mL nebulizer solution 3 mL  3 mL Nebulization Q4H PRN Virgilio Rhodes MD        carvediloL tablet 3.125 mg  3.125 mg Oral BID Rodrigo Orosco MD   3.125 mg at 03/09/20 0831    ciprofloxacin (CIPRO)400mg/200ml D5W IVPB 400 mg  400 mg Intravenous Q12H Rodrigo Orosco  mL/hr at 03/08/20 2303 400 mg at 03/08/20 2303    dextrose 50% injection 12.5 g  12.5 g Intravenous PRN Virgilio Rhodes MD        ferrous sulfate EC tablet 325 mg  325 mg Oral BID Rodrigo Orosco MD   325 mg at 03/09/20 0831    glucagon (human recombinant) injection 1 mg  1 mg Intramuscular PRN Virgilio SIBLEY " MD Carmelo        hydrALAZINE injection 10 mg  10 mg Intravenous Q8H PRN Scooter Mueller MD   10 mg at 03/08/20 1206    insulin aspart U-100 pen 0-5 Units  0-5 Units Subcutaneous Q6H PRN Virgilio Rhodes MD        metronidazole IVPB 500 mg  500 mg Intravenous Q8H Rodrigo Orosco  mL/hr at 03/09/20 0300 500 mg at 03/09/20 0300    ondansetron injection 8 mg  8 mg Intravenous Q6H PRN Rodrigo Orosco MD   8 mg at 03/08/20 1008    pantoprazole injection 40 mg  40 mg Intravenous Daily Scooter Mueller MD   40 mg at 03/09/20 0831    polyethylene glycol packet 17 g  17 g Oral BID PRN Rodrigo Orosco MD        sodium bicarbonate tablet 650 mg  650 mg Oral BID Rodrigo Orosco MD   650 mg at 03/09/20 0831    sodium chloride 0.9% flush 10 mL  10 mL Intravenous PRN Virgilio Rhodes MD           Intake and Output:    Intake/Output Summary (Last 24 hours) at 3/9/2020 0840  Last data filed at 3/9/2020 0600  Gross per 24 hour   Intake 1990 ml   Output --   Net 1990 ml       Labs:  Lab Results   Component Value Date/Time    WBC 11.80 03/09/2020 03:54 AM    WBC 7.3 02/03/2020    HGB 8.6 (L) 03/09/2020 03:54 AM    HGB 9.2 (L) 02/03/2020    HCT 27.0 (L) 03/09/2020 03:54 AM     03/09/2020 03:54 AM    MCV 94 03/09/2020 03:54 AM     03/09/2020 03:53 AM     02/03/2020    K 4.5 03/09/2020 03:53 AM    K 4.4 02/03/2020     03/09/2020 03:53 AM     02/03/2020    CO2 19 (L) 03/09/2020 03:53 AM    CO2 25 02/03/2020    BUN 76 (H) 03/09/2020 03:53 AM    BUN 52 (H) 02/03/2020    CREATININE 4.8 (H) 03/09/2020 03:53 AM    CREATININE 3.11 (H) 02/03/2020     03/09/2020 03:53 AM    CALCIUM 8.5 (L) 03/09/2020 03:53 AM    CALCIUM 9.1 02/03/2020    MG 2.1 03/07/2020 11:45 PM    PHOS 3.5 02/03/2020    INR 1.0 03/07/2020 11:45 PM    APTT 30.4 03/07/2020 11:45 PM   ]  Lab Results   Component Value Date/Time    PROT 5.8 (L) 03/09/2020 03:53 AM    ALBUMIN 2.9 (L) 03/09/2020 03:53 AM    ALBUMIN 3.4 02/03/2020     BILITOT 0.5 03/09/2020 03:53 AM    AST 15 03/09/2020 03:53 AM    AST 27 02/03/2020    ALT 13 03/09/2020 03:53 AM    ALT 31 02/03/2020    ALKPHOS 105 03/09/2020 03:53 AM   ]    Imaging and Procedures:  Labs and imaging results were reviewed.  CT A/P 3/8/20:  Findings are most compatible with mild acute uncomplicated sigmoid diverticulitis.  Mild urinary bladder wall thickening with slight adjacent perivesicular stranding.  Please correlate for signs and symptoms of cystitis as appropriate.  Other incidental findings as above. See full report in Epic.    Assessment:  Joyce Riley is a 86 y.o. female with a history of HTN, DM2, afib, chronic diastolic CHF, prior CVA, CKD4, GERD, and anemia who was admitted with hematochezia.  CT w/ acute diverticulitis.      Plan:  - monitor H/H, transfuse if drops below 7  - complete 7-10 days of cipro/flagyl for acute diverticulitis  - advance diet as tolerated  - if no further bleeding, could resume anticoagulation tomorrow    Viki Washington MD

## 2020-03-09 NOTE — SUBJECTIVE & OBJECTIVE
Interval History: Complaining of neck pain.  No further bleeding.    Review of Systems   HENT: Negative for ear discharge and ear pain.    Eyes: Negative for discharge and itching.   Endocrine: Negative for cold intolerance and heat intolerance.   Neurological: Negative for seizures and syncope.     Objective:     Vital Signs (Most Recent):  Temp: 98.1 °F (36.7 °C) (03/09/20 1100)  Pulse: 79 (03/09/20 1100)  Resp: (!) 24 (03/09/20 1100)  BP: 136/65 (03/09/20 1100)  SpO2: 100 % (03/09/20 1100) Vital Signs (24h Range):  Temp:  [97.5 °F (36.4 °C)-98.7 °F (37.1 °C)] 98.1 °F (36.7 °C)  Pulse:  [72-96] 79  Resp:  [15-32] 24  SpO2:  [96 %-100 %] 100 %  BP: (121-155)/(55-70) 136/65     Weight: 79.5 kg (175 lb 4.3 oz)  Body mass index is 30.08 kg/m².    Intake/Output Summary (Last 24 hours) at 3/9/2020 1341  Last data filed at 3/9/2020 1100  Gross per 24 hour   Intake 2365 ml   Output --   Net 2365 ml      Physical Exam   Constitutional: No distress.   HENT:   Head: Normocephalic and atraumatic.   Eyes: Pupils are equal, round, and reactive to light. EOM are normal.   Neck: Normal range of motion. No tracheal deviation present.   Cardiovascular: Normal rate and regular rhythm.   Pulmonary/Chest: Effort normal and breath sounds normal.   Abdominal: Soft. Bowel sounds are normal.   Musculoskeletal: Normal range of motion. She exhibits no deformity.   Neurological: She is alert. She exhibits normal muscle tone.   Skin: Skin is warm. Capillary refill takes 2 to 3 seconds. She is not diaphoretic. No pallor.   Psychiatric: She has a normal mood and affect. Her behavior is normal.   Vitals reviewed.      Significant Labs:   BMP:   Recent Labs   Lab 03/07/20  2345 03/09/20  0353   * 105    140   K 4.0 4.5    108   CO2 22* 19*   BUN 60* 76*   CREATININE 3.5* 4.8*   CALCIUM 8.5* 8.5*   MG 2.1  --      CBC:   Recent Labs   Lab 03/08/20  0954 03/08/20  1914 03/09/20  0354   WBC 11.71 13.96* 11.80   HGB 7.2* 8.9* 8.6*    HCT 23.7* 26.6* 27.0*    232 212       Significant Imaging: I have reviewed all pertinent imaging results/findings within the past 24 hours.

## 2020-03-10 LAB
ALBUMIN SERPL BCP-MCNC: 2.6 G/DL (ref 3.5–5.2)
ALP SERPL-CCNC: 111 U/L (ref 55–135)
ALT SERPL W/O P-5'-P-CCNC: 12 U/L (ref 10–44)
ANION GAP SERPL CALC-SCNC: 10 MMOL/L (ref 8–16)
AST SERPL-CCNC: 15 U/L (ref 10–40)
BACTERIA UR CULT: ABNORMAL
BASOPHILS # BLD AUTO: 0.02 K/UL (ref 0–0.2)
BASOPHILS NFR BLD: 0.2 % (ref 0–1.9)
BILIRUB SERPL-MCNC: 0.3 MG/DL (ref 0.1–1)
BLD PROD TYP BPU: NORMAL
BLOOD UNIT EXPIRATION DATE: NORMAL
BLOOD UNIT TYPE CODE: 5100
BLOOD UNIT TYPE: NORMAL
BUN SERPL-MCNC: 77 MG/DL (ref 8–23)
CALCIUM SERPL-MCNC: 7.7 MG/DL (ref 8.7–10.5)
CHLORIDE SERPL-SCNC: 108 MMOL/L (ref 95–110)
CO2 SERPL-SCNC: 20 MMOL/L (ref 23–29)
CODING SYSTEM: NORMAL
CREAT SERPL-MCNC: 4.7 MG/DL (ref 0.5–1.4)
DIFFERENTIAL METHOD: ABNORMAL
DISPENSE STATUS: NORMAL
EOSINOPHIL # BLD AUTO: 0.1 K/UL (ref 0–0.5)
EOSINOPHIL NFR BLD: 1.1 % (ref 0–8)
ERYTHROCYTE [DISTWIDTH] IN BLOOD BY AUTOMATED COUNT: 17.7 % (ref 11.5–14.5)
EST. GFR  (AFRICAN AMERICAN): 9 ML/MIN/1.73 M^2
EST. GFR  (NON AFRICAN AMERICAN): 8 ML/MIN/1.73 M^2
GLUCOSE SERPL-MCNC: 102 MG/DL (ref 70–110)
HCT VFR BLD AUTO: 22.2 % (ref 37–48.5)
HCT VFR BLD AUTO: 25.9 % (ref 37–48.5)
HGB BLD-MCNC: 6.9 G/DL (ref 12–16)
HGB BLD-MCNC: 8.4 G/DL (ref 12–16)
IMM GRANULOCYTES # BLD AUTO: 0.11 K/UL (ref 0–0.04)
IMM GRANULOCYTES NFR BLD AUTO: 1.1 % (ref 0–0.5)
LYMPHOCYTES # BLD AUTO: 1.7 K/UL (ref 1–4.8)
LYMPHOCYTES NFR BLD: 16.7 % (ref 18–48)
MCH RBC QN AUTO: 29.9 PG (ref 27–31)
MCHC RBC AUTO-ENTMCNC: 31.1 G/DL (ref 32–36)
MCV RBC AUTO: 96 FL (ref 82–98)
MONOCYTES # BLD AUTO: 0.6 K/UL (ref 0.3–1)
MONOCYTES NFR BLD: 6 % (ref 4–15)
NEUTROPHILS # BLD AUTO: 7.8 K/UL (ref 1.8–7.7)
NEUTROPHILS NFR BLD: 74.9 % (ref 38–73)
NRBC BLD-RTO: 0 /100 WBC
PHOSPHATE SERPL-MCNC: 4.6 MG/DL (ref 2.7–4.5)
PLATELET # BLD AUTO: 165 K/UL (ref 150–350)
PMV BLD AUTO: 9.9 FL (ref 9.2–12.9)
POCT GLUCOSE: 90 MG/DL (ref 70–110)
POCT GLUCOSE: 96 MG/DL (ref 70–110)
POTASSIUM SERPL-SCNC: 4.4 MMOL/L (ref 3.5–5.1)
PROT SERPL-MCNC: 5.2 G/DL (ref 6–8.4)
RBC # BLD AUTO: 2.31 M/UL (ref 4–5.4)
SODIUM SERPL-SCNC: 138 MMOL/L (ref 136–145)
TRANS ERYTHROCYTES VOL PATIENT: NORMAL ML
WBC # BLD AUTO: 10.45 K/UL (ref 3.9–12.7)

## 2020-03-10 PROCEDURE — 25000003 PHARM REV CODE 250: Performed by: HOSPITALIST

## 2020-03-10 PROCEDURE — 80053 COMPREHEN METABOLIC PANEL: CPT

## 2020-03-10 PROCEDURE — S0030 INJECTION, METRONIDAZOLE: HCPCS | Performed by: HOSPITALIST

## 2020-03-10 PROCEDURE — 84100 ASSAY OF PHOSPHORUS: CPT

## 2020-03-10 PROCEDURE — 85018 HEMOGLOBIN: CPT

## 2020-03-10 PROCEDURE — 63600175 PHARM REV CODE 636 W HCPCS: Performed by: HOSPITALIST

## 2020-03-10 PROCEDURE — P9021 RED BLOOD CELLS UNIT: HCPCS

## 2020-03-10 PROCEDURE — C9113 INJ PANTOPRAZOLE SODIUM, VIA: HCPCS | Performed by: FAMILY MEDICINE

## 2020-03-10 PROCEDURE — 85014 HEMATOCRIT: CPT

## 2020-03-10 PROCEDURE — 63600175 PHARM REV CODE 636 W HCPCS: Performed by: FAMILY MEDICINE

## 2020-03-10 PROCEDURE — 94761 N-INVAS EAR/PLS OXIMETRY MLT: CPT

## 2020-03-10 PROCEDURE — 21400001 HC TELEMETRY ROOM

## 2020-03-10 PROCEDURE — 36415 COLL VENOUS BLD VENIPUNCTURE: CPT

## 2020-03-10 PROCEDURE — 36430 TRANSFUSION BLD/BLD COMPNT: CPT

## 2020-03-10 PROCEDURE — 85025 COMPLETE CBC W/AUTO DIFF WBC: CPT

## 2020-03-10 PROCEDURE — 99900035 HC TECH TIME PER 15 MIN (STAT)

## 2020-03-10 RX ORDER — PANTOPRAZOLE SODIUM 40 MG/1
40 TABLET, DELAYED RELEASE ORAL DAILY
Status: DISCONTINUED | OUTPATIENT
Start: 2020-03-11 | End: 2020-03-13 | Stop reason: HOSPADM

## 2020-03-10 RX ORDER — CIPROFLOXACIN 2 MG/ML
400 INJECTION, SOLUTION INTRAVENOUS
Status: DISCONTINUED | OUTPATIENT
Start: 2020-03-11 | End: 2020-03-10

## 2020-03-10 RX ORDER — HYDROCODONE BITARTRATE AND ACETAMINOPHEN 500; 5 MG/1; MG/1
TABLET ORAL
Status: DISCONTINUED | OUTPATIENT
Start: 2020-03-10 | End: 2020-03-13 | Stop reason: HOSPADM

## 2020-03-10 RX ORDER — METHOCARBAMOL 500 MG/1
500 TABLET, FILM COATED ORAL 2 TIMES DAILY PRN
Status: DISCONTINUED | OUTPATIENT
Start: 2020-03-10 | End: 2020-03-13 | Stop reason: HOSPADM

## 2020-03-10 RX ADMIN — CEFTRIAXONE 1 G: 1 INJECTION, SOLUTION INTRAVENOUS at 08:03

## 2020-03-10 RX ADMIN — ACETAMINOPHEN 650 MG: 325 TABLET ORAL at 08:03

## 2020-03-10 RX ADMIN — PANTOPRAZOLE SODIUM 40 MG: 40 INJECTION, POWDER, FOR SOLUTION INTRAVENOUS at 08:03

## 2020-03-10 RX ADMIN — NIFEDIPINE 30 MG: 30 TABLET, FILM COATED, EXTENDED RELEASE ORAL at 08:03

## 2020-03-10 RX ADMIN — CARVEDILOL 3.12 MG: 3.12 TABLET, FILM COATED ORAL at 08:03

## 2020-03-10 RX ADMIN — LIDOCAINE 1 PATCH: 50 PATCH TOPICAL at 12:03

## 2020-03-10 RX ADMIN — METRONIDAZOLE 500 MG: 500 INJECTION, SOLUTION INTRAVENOUS at 03:03

## 2020-03-10 RX ADMIN — SODIUM BICARBONATE 650 MG: 325 TABLET ORAL at 08:03

## 2020-03-10 RX ADMIN — METHOCARBAMOL TABLETS 500 MG: 500 TABLET, COATED ORAL at 08:03

## 2020-03-10 RX ADMIN — FERROUS SULFATE TAB EC 325 MG (65 MG FE EQUIVALENT) 325 MG: 325 (65 FE) TABLET DELAYED RESPONSE at 08:03

## 2020-03-10 RX ADMIN — METRONIDAZOLE 500 MG: 500 INJECTION, SOLUTION INTRAVENOUS at 06:03

## 2020-03-10 RX ADMIN — MENTHOL, METHYL SALICYLATE: 10; 15 CREAM TOPICAL at 08:03

## 2020-03-10 RX ADMIN — CIPROFLOXACIN 400 MG: 2 INJECTION, SOLUTION INTRAVENOUS at 10:03

## 2020-03-10 RX ADMIN — METRONIDAZOLE 500 MG: 500 INJECTION, SOLUTION INTRAVENOUS at 10:03

## 2020-03-10 RX ADMIN — HYDRALAZINE HYDROCHLORIDE 10 MG: 20 INJECTION INTRAMUSCULAR; INTRAVENOUS at 01:03

## 2020-03-10 NOTE — CONSULTS
"[facility] [place]  Nephrology  Consult Note    Patient Name: Joyce Riley  MRN: [id number]  Admission Date: 3/7/2020  Hospital Length of Stay: 2 days  Attending Provider: Shey Pate MD   Primary Care Physician: Kalpana Staton MD  Principal Problem:Rectal bleeding    Inpatient consult to Nephrology  Consult performed by: PRESTON Santana  Consult ordered by: Rodrigo Orosco MD        Subjective:     HPI: Ms. Riley is a 86 y.o AAF with hx afib, CHF, DMII, GERD, HTN, gout, CVA, and CKD V who presented to [facility] ED 3/7 from [facility] for rectal bleeding. CT showed likely acute diverticulitis. Started on PPI gtt, NS 75/hr, and cipro flagyl -transferred to ICU for aggressive monitoring. Hgb 7.9 OA, dropped to 7.1 and received 2 U pRBC 3/8 with appropriate rise in hgb. Cr OA 3.5. Nephrology consulted for ROXANNE on CKD. Chart reviewed and medical records obtained. Ms Riley follows with Dr. Baca outpatient for CKD; Baseline Cr 2.3 -3.2 Last at baseline 1/2020. It appears she had similar ROXANNE after GI bleed 7/2019 peaking at 4.8. Had prior HD trial at [facility] but pulled at HD cath calling into question her HD candidacy as this poses potential life threatening bleeding risk. ICU RN states pt has not purposefully pulled at any IVs during this admit. Cr robert 3/9 and peaked yesterday at 4.8. RN reports good UOP, pt incontinent making Is/Os difficult. She is on NS 75 ml/hr. Tolerating diet advancements, reports she had grits for breakfast this morning. Denies any SOB, n/v, abdominal pain, metallic taste, confusion, poor appetite. Pt AAO x3 on my exam. Cr stable this AM, GFR 9.  Briefly spoke with patient regarding possibility of HD initiation during this admission, states she "will think about it."      Past Medical History:   Diagnosis Date    Anemia     Anticoagulant long-term use     Arthritis     Atrial fibrillation     Cataract     CHF (congestive heart failure)     CKD (chronic " kidney disease), stage V     Diabetes mellitus     Type 2    GERD (gastroesophageal reflux disease)     GI bleed 2018    Gout     Hypertension     Obese     Requires assistance with all daily activities     Stroke     Wheelchair dependent        Past Surgical History:   Procedure Laterality Date     SECTION      Patient unsure, believe she had 3-4    CHOLECYSTECTOMY      EYE SURGERY      HYSTERECTOMY      JOINT REPLACEMENT Right     knee    TOTAL KNEE ARTHROPLASTY Right        Review of patient's allergies indicates:   Allergen Reactions    Indomethacin     Nsaids (non-steroidal anti-inflammatory drug)      Current Facility-Administered Medications   Medication Frequency    0.9%  NaCl infusion (for blood administration) Q24H PRN    0.9%  NaCl infusion Continuous    acetaminophen tablet 650 mg Q4H PRN    albuterol-ipratropium 2.5 mg-0.5 mg/3 mL nebulizer solution 3 mL Q4H PRN    carvediloL tablet 3.125 mg BID    ciprofloxacin (CIPRO)400mg/200ml D5W IVPB 400 mg Q12H    dextrose 50% injection 12.5 g PRN    ferrous sulfate EC tablet 325 mg BID    glucagon (human recombinant) injection 1 mg PRN    hydrALAZINE injection 10 mg Q8H PRN    insulin aspart U-100 pen 0-5 Units Q6H PRN    lidocaine 5 % patch 1 patch Q24H    metronidazole IVPB 500 mg Q8H    NIFEdipine 24 hr tablet 30 mg Daily    ondansetron injection 8 mg Q6H PRN    pantoprazole injection 40 mg Daily    polyethylene glycol packet 17 g BID PRN    sodium bicarbonate tablet 650 mg BID    sodium chloride 0.9% flush 10 mL PRN     Family History     Problem Relation (Age of Onset)    Cancer Mother, Brother    Diabetes Mother    Hypertension Mother        Tobacco Use    Smoking status: Never Smoker    Smokeless tobacco: Never Used   Substance and Sexual Activity    Alcohol use: No    Drug use: No    Sexual activity: Never     Review of Systems   Constitutional: Negative for appetite change and fatigue.    Respiratory: Negative for shortness of breath.    Gastrointestinal: Positive for blood in stool (resolved). Negative for abdominal pain, nausea and vomiting.   Genitourinary: Negative for decreased urine volume and difficulty urinating.   Musculoskeletal: Positive for neck pain (reports recent neck surgery).   Neurological: Negative for dizziness, weakness and light-headedness.   Psychiatric/Behavioral: Negative for confusion.     Objective:     Vital Signs (Most Recent):  Temp: 98.1 °F (36.7 °C) (03/10/20 0640)  Pulse: 79 (03/10/20 0640)  Resp: (!) 21 (03/10/20 0640)  BP: (!) 152/67 (03/10/20 0640)  SpO2: 99 % (03/10/20 1017)  O2 Device (Oxygen Therapy): room air (03/10/20 1017) Vital Signs (24h Range):  Temp:  [97.7 °F (36.5 °C)-98.1 °F (36.7 °C)] 98.1 °F (36.7 °C)  Pulse:  [72-83] 79  Resp:  [16-36] 21  SpO2:  [97 %-100 %] 99 %  BP: (136-173)/(63-88) 152/67     Weight: 79.5 kg (175 lb 4.3 oz) (03/08/20 0522)  Body mass index is 30.08 kg/m².  Body surface area is 1.89 meters squared.    I/O last 3 completed shifts:  In: 2850 [I.V.:2250; IV Piggyback:600]  Out: -       Physical Exam   Constitutional: She is oriented to person, place, and time. She appears well-developed and well-nourished. No distress.   HENT:   Head: Normocephalic and atraumatic.   Mouth/Throat: Mucous membranes are normal.   Neck: Neck supple.   Cardiovascular: Normal rate and regular rhythm.   Murmur heard.  Pulmonary/Chest: Effort normal. She has no wheezes. She has no rales.   Abdominal: Soft. Bowel sounds are normal. She exhibits no distension. There is no tenderness. There is no guarding.   Musculoskeletal: She exhibits no edema.   Lymphadenopathy:     She has no cervical adenopathy.        Right: No supraclavicular adenopathy present.        Left: No supraclavicular adenopathy present.   Neurological: She is alert and oriented to person, place, and time.   Skin: Skin is warm and dry. No rash noted. She is not diaphoretic.   Psychiatric:  She has a normal mood and affect. Her behavior is normal. Thought content normal.         Significant Labs:  CBC:   Recent Labs   Lab 03/10/20  0316   WBC 10.45   RBC 2.31*   HGB 6.9*   HCT 22.2*      MCV 96   MCH 29.9   MCHC 31.1*     CMP:   Recent Labs   Lab 03/10/20  0316      CALCIUM 7.7*   ALBUMIN 2.6*   PROT 5.2*      K 4.4   CO2 20*      BUN 77*   CREATININE 4.7*   ALKPHOS 111   ALT 12   AST 15   BILITOT 0.3     Recent Labs   Lab 03/08/20  0055   COLORU Yellow   SPECGRAV 1.010   PHUR 6.0   PROTEINUA 2+*   BACTERIA Moderate*   NITRITE Negative   LEUKOCYTESUR 3+*   UROBILINOGEN Negative   HYALINECASTS 0     All labs within the past 24 hours have been reviewed.    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  - Baseline Cr 2.3-3.2, GFR 15-21; recent ROXANNE 2/2 GI bleed, peak Cr 4.3 on 7/16/18  - h/o HD trial at [facility] with recommendation for palliative care 2/2 patient pulling at lines   - cognitively improving; no reports of IV pulling or agitated behavior during this admit  - Cr 3.5 on arrival, worsened and peaked at 4.8 yesterday  - Etiology ischemic ATN 2/2 acute GIB / TRUNG  - Cr stable at 4.7 today though with repeat drop in Hgb requiring 1U pRBC this morning  - UOP remains stable though incontinence complicating Is/Os  - BUN rising but no symptoms or signs of uremia; lytes OK  - No need for emergent HD today but will likely need soon; GFR 9  - spoke with pt about potentially initiating HD, or at least placing access during this admit- pt wants to think about it; Dr Baca to re-address soon  - renally dose meds for current GFR; avoid nephrotoxic meds including NSAIDs  - caution with IVF in ROXANNE on CKD V patient with HF- appears euvolemic currently and oxygenating well on RA  - strict Is/Os daily labs    Blood loss anemia/ AOCKD  - GIB requiring 2U pRBC 3/8, 1U PRBC this AM for Hgb 6.9  - recently completed outpatient  IV iron load  - Will start AHSAN given declining renal function   - transfuse Hgb< 7  - continue to monitor     Metabolic Acidosis  - in setting ROXANNE 2/2 GIB + CKD V  - Bicarb stable at 20 today  - continue PO Bicarb 650mg BID, titrate PRN  - monitor BMP     Proteinuria  - 2+ protein on admit UA, c/w UA 1/2020  - consider repeat UPrCr  - hx hyperkalemia & poor renal reserves, avoid ACE/ARB for now       Thank you for the consult. We will continue to follow with patient.   SOMMER SantanaC  Nephrology  [facility]

## 2020-03-10 NOTE — PROGRESS NOTES
Pharmacist Renal Dose Adjustment Note    Joyce Riley is a 86 y.o. female being treated with the medication Ciprofloxacin  Patient Data:    Vital Signs (Most Recent):  Temp: 98.1 °F (36.7 °C) (03/10/20 0640)  Pulse: 79 (03/10/20 0640)  Resp: (!) 21 (03/10/20 0640)  BP: (!) 152/67 (03/10/20 0640)  SpO2: 99 % (03/10/20 1017)   Vital Signs (72h Range):  Temp:  [97.4 °F (36.3 °C)-98.7 °F (37.1 °C)]   Pulse:  [67-96]   Resp:  [15-36]   BP: (103-173)/(52-93)   SpO2:  [84 %-100 %]      Recent Labs   Lab 03/07/20  2345 03/09/20  0353 03/10/20  0316   CREATININE 3.5* 4.8* 4.7*     Serum creatinine: 4.7 mg/dL (H) 03/10/20 0316  Estimated creatinine clearance: 8.8 mL/min (A)    Medication:Cipro 400mg IVPB q12hr will be changed to medication:Cipro 400mg IVPB q24hrs    Pharmacist's Name: Sofi Stockton  Pharmacist's Extension: [phone]

## 2020-03-10 NOTE — PROGRESS NOTES
[facility]  Gastroenterology  Progress Note    Patient Name: Joyce Riley  MRN: [id number]  Admission Date: 3/7/2020  Hospital Length of Stay: 2 days  Code Status: DNR   Attending Provider: Shey Pate MD  Primary Care Physician: Kalpana Staton MD  Principal Problem: Rectal bleeding    Subjective:     CC= Rectal bleeding, diverticulitis     Interval History:  Patient denies any further rectal bleeding.  She crystal abdominal pain, nausea and vomiting.  Tolerating regular diet.  H&H lower this morning and she was transfused PRBCs.      Review of systems:  General: Negative for fevers, chills.  Cardiovascular:  Negative for chest pain, shortness of breath     Objective:     Vital Signs (Most Recent):  Temp: 98.2 °F (36.8 °C) (03/10/20 1115)  Pulse: 74 (03/10/20 1200)  Resp: (!) 25 (03/10/20 1200)  BP: 129/61 (03/10/20 1100)  SpO2: 100 % (03/10/20 1200) Vital Signs (24h Range):  Temp:  [97.7 °F (36.5 °C)-98.7 °F (37.1 °C)] 98.2 °F (36.8 °C)  Pulse:  [69-83] 74  Resp:  [16-36] 25  SpO2:  [97 %-100 %] 100 %  BP: (129-173)/(61-94) 129/61     Physical examination:  GEN: Elderly AAF in no apparent distress   HENT: Normocephalic, anicteric sclera   Cardiovascular: Regular rate and rhythm. No murmurs appreciated.   Chest: Non-labored respirations. Breath sounds equal   Abdomen: Soft, NTND, normoactive BS  Psych: Appropriate mood and affect.   Extermities: No C/C/E. 2+ dorsalis pedis pulses bilaterally    Recent Labs   Lab 03/08/20  1914 03/09/20  0354 03/10/20  0316   WBC 13.96* 11.80 10.45   HGB 8.9* 8.6* 6.9*   HCT 26.6* 27.0* 22.2*    212 165     Recent Labs   Lab 03/10/20  0316      CALCIUM 7.7*   ALBUMIN 2.6*   PROT 5.2*      K 4.4   CO2 20*      BUN 77*   CREATININE 4.7*   ALKPHOS 111   ALT 12   AST 15   BILITOT 0.3     Imaging:  CT abdomen/pelvis without contrast (3/8/20):  Impression:  Findings are most compatible with mild acute uncomplicated sigmoid  diverticulitis.  Mild urinary bladder wall thickening with slight adjacent perivesicular stranding.  Please correlate for signs and symptoms of cystitis as appropriate.  Other incidental findings as above. See full report in Epic.      Assessment:   86 year old female with a history of HTN, DM, atrial fibrillation, chronic diastolic CHF, prior CVA, CKD4, GERD and anemia who was admitted with hematochezia.  CT w/ acute diverticulitis.  Her most recent colonoscopy was in 2/2019 and showed 10mm polyp in ascending colon, severe diverticulosis and internal hemorrhoids.  Transfused 1 unit PRBCs today.     Plan:   1.  Continue to monitor H&H, transfuse if drops below 7  2.  Complete 7-10 days of cipro/flagyl for acute diverticulitis  3.  OK to resume anticoagulation from GI standpoint.  4.  Follow-up in GI clinic with Dr. Sterling (last seen on 2/27/20).       Swapna Rasmussen PA-C  Gastroenterology  [facility]

## 2020-03-10 NOTE — HPI
"Ms. Riley is a 86 y.o AAF with hx afib, CHF, DMII, GERD, HTN, gout, CVA, and CKD V who presented to [facility] ED 3/7 from [facility] for rectal bleeding. CT showed likely acute diverticulitis. Started on PPI gtt, NS 75/hr, and cipro flagyl -transferred to ICU for aggressive monitoring. Hgb 7.9 OA, dropped to 7.1 and received 2 U pRBC 3/8 with appropriate rise in hgb. Cr OA 3.5. Nephrology consulted for ROXANNE on CKD. Chart reviewed and medical records obtained. Ms Riley follows with Dr. Baca outpatient for CKD; Baseline Cr 2.3 -3.2 Last at baseline 1/2020. It appears she had similar ROXANNE after GI bleed 7/2019 peaking at 4.8. Had prior HD trial at [facility] but pulled at HD cath calling into question her HD candidacy as this poses potential life threatening bleeding risk. ICU RN states pt has not purposefully pulled at any IVs during this admit. Cr robert 3/9 and peaked yesterday at 4.8. RN reports good UOP, pt incontinent making Is/Os difficult. She is on NS 75 ml/hr. Tolerating diet advancements, reports she had grits for breakfast this morning. Denies any SOB, n/v, abdominal pain, metallic taste, confusion, poor appetite. Pt AAO x3 on my exam. Cr stable this AM, GFR 9.  Briefly spoke with patient regarding possibility of HD initiation during this admission, states she "will think about it."    "

## 2020-03-10 NOTE — PLAN OF CARE
Problem: Adult Inpatient Plan of Care  Goal: Plan of Care Review  Outcome: Ongoing, Progressing  Patient remains in ICU waiting for Telemetry bed, one unit PRBC given and hemoglobin/hematocrit  Up to 8.4/25.9, no bleeding noted throughout shift, good urine out put, plan of care reviewed with pt and daughter, verbalized understanding, pt remains free of injury throughout shift, no acute distress noted at present time, will continue to monitor.

## 2020-03-10 NOTE — SUBJECTIVE & OBJECTIVE
Past Medical History:   Diagnosis Date    Anemia     Anticoagulant long-term use     Arthritis     Atrial fibrillation     Cataract     CHF (congestive heart failure)     CKD (chronic kidney disease), stage V     Diabetes mellitus     Type 2    GERD (gastroesophageal reflux disease)     GI bleed 2018    Gout     Hypertension     Obese     Requires assistance with all daily activities     Stroke     Wheelchair dependent        Past Surgical History:   Procedure Laterality Date     SECTION      Patient unsure, believe she had 3-4    CHOLECYSTECTOMY      EYE SURGERY      HYSTERECTOMY      JOINT REPLACEMENT Right     knee    TOTAL KNEE ARTHROPLASTY Right        Review of patient's allergies indicates:   Allergen Reactions    Indomethacin     Nsaids (non-steroidal anti-inflammatory drug)      Current Facility-Administered Medications   Medication Frequency    0.9%  NaCl infusion (for blood administration) Q24H PRN    0.9%  NaCl infusion Continuous    acetaminophen tablet 650 mg Q4H PRN    albuterol-ipratropium 2.5 mg-0.5 mg/3 mL nebulizer solution 3 mL Q4H PRN    carvediloL tablet 3.125 mg BID    ciprofloxacin (CIPRO)400mg/200ml D5W IVPB 400 mg Q12H    dextrose 50% injection 12.5 g PRN    ferrous sulfate EC tablet 325 mg BID    glucagon (human recombinant) injection 1 mg PRN    hydrALAZINE injection 10 mg Q8H PRN    insulin aspart U-100 pen 0-5 Units Q6H PRN    lidocaine 5 % patch 1 patch Q24H    metronidazole IVPB 500 mg Q8H    NIFEdipine 24 hr tablet 30 mg Daily    ondansetron injection 8 mg Q6H PRN    pantoprazole injection 40 mg Daily    polyethylene glycol packet 17 g BID PRN    sodium bicarbonate tablet 650 mg BID    sodium chloride 0.9% flush 10 mL PRN     Family History     Problem Relation (Age of Onset)    Cancer Mother, Brother    Diabetes Mother    Hypertension Mother        Tobacco Use    Smoking status: Never Smoker    Smokeless tobacco: Never Used    Substance and Sexual Activity    Alcohol use: No    Drug use: No    Sexual activity: Never     Review of Systems   Constitutional: Negative for appetite change and fatigue.   Respiratory: Negative for shortness of breath.    Gastrointestinal: Positive for blood in stool (resolved). Negative for abdominal pain, nausea and vomiting.   Genitourinary: Negative for decreased urine volume and difficulty urinating.   Musculoskeletal: Positive for neck pain (reports recent neck surgery).   Neurological: Negative for dizziness, weakness and light-headedness.   Psychiatric/Behavioral: Negative for confusion.     Objective:     Vital Signs (Most Recent):  Temp: 98.1 °F (36.7 °C) (03/10/20 0640)  Pulse: 79 (03/10/20 0640)  Resp: (!) 21 (03/10/20 0640)  BP: (!) 152/67 (03/10/20 0640)  SpO2: 99 % (03/10/20 1017)  O2 Device (Oxygen Therapy): room air (03/10/20 1017) Vital Signs (24h Range):  Temp:  [97.7 °F (36.5 °C)-98.1 °F (36.7 °C)] 98.1 °F (36.7 °C)  Pulse:  [72-83] 79  Resp:  [16-36] 21  SpO2:  [97 %-100 %] 99 %  BP: (136-173)/(63-88) 152/67     Weight: 79.5 kg (175 lb 4.3 oz) (03/08/20 0522)  Body mass index is 30.08 kg/m².  Body surface area is 1.89 meters squared.    I/O last 3 completed shifts:  In: 2850 [I.V.:2250; IV Piggyback:600]  Out: -     Physical Exam    Significant Labs:  CBC:   Recent Labs   Lab 03/10/20  0316   WBC 10.45   RBC 2.31*   HGB 6.9*   HCT 22.2*      MCV 96   MCH 29.9   MCHC 31.1*     CMP:   Recent Labs   Lab 03/10/20  0316      CALCIUM 7.7*   ALBUMIN 2.6*   PROT 5.2*      K 4.4   CO2 20*      BUN 77*   CREATININE 4.7*   ALKPHOS 111   ALT 12   AST 15   BILITOT 0.3     Recent Labs   Lab 03/08/20  0055   COLORU Yellow   SPECGRAV 1.010   PHUR 6.0   PROTEINUA 2+*   BACTERIA Moderate*   NITRITE Negative   LEUKOCYTESUR 3+*   UROBILINOGEN Negative   HYALINECASTS 0     All labs within the past 24 hours have been reviewed.    Significant Imaging:  Labs: Reviewed  X-Ray:  Reviewed  CT: Reviewed

## 2020-03-10 NOTE — CARE UPDATE
[facility]  ICU Multidisciplinary Bedside Rounds   SUMMARY     Date: 3/10/2020    Prehospitalization: Nursing Home  Admit Date / LOS : 3/7/2020/ 2 days    Diagnosis: Rectal bleeding    Consults:        Active: GI       Needed: N/A     Code Status: DNR   Advanced Directive: <no information>    LDA: PIV       Central Lines/Site/Justification:Patient Does Not Have Central Line       Urinary Cath/Order/Justification:Patient Does Not Have Urinary Catheter    Vasopressors/Infusions:    sodium chloride 0.9% 75 mL/hr at 03/10/20 0400          GOALS: Volume/ Hemodynamic: N/A                     RASS: 0  alert and calm    CAM ICU: Negative  Pain Management: PO       Pain Controlled: yes     Rhythm: NSR    Respiratory Device: Room Air            VTE Prophylaxis: Mechanical  Mobility: Bedrest  Stress Ulcer Prophylaxis: Yes    Dietary: PO  Tolerance: yes  /  Advancement: @ goal    Isolation: No active isolations    Restraints: No    Significant Dates:  Post Op Date: N/A  Rescue Date: N/A  Imaging/ Diagnostics: N/A    Noteworthy Labs:  H&H 6.9/22. 1 unit PRBC ordered.    CBC/Anemia Labs: Coags:    Recent Labs   Lab 03/09/20  0354 03/10/20  0316   WBC 11.80 10.45   HGB 8.6* 6.9*   HCT 27.0* 22.2*    165   MCV 94 96   RDW 18.4* 17.7*    Recent Labs   Lab 03/07/20  2345   INR 1.0   APTT 30.4        Chemistries:   Recent Labs   Lab 03/07/20  2345 03/09/20  0353 03/10/20  0316    140 138   K 4.0 4.5 4.4    108 108   CO2 22* 19* 20*   BUN 60* 76* 77*   CREATININE 3.5* 4.8* 4.7*   CALCIUM 8.5* 8.5* 7.7*   PROT 6.3 5.8* 5.2*   BILITOT 0.2 0.5 0.3   ALKPHOS 145* 105 111   ALT 14 13 12   AST 16 15 15   MG 2.1  --   --         Cardiac Enzymes: Ejection Fractions:    No results for input(s): CPK, CPKMB, MB, TROPONINI in the last 72 hours. No results found for: EF     POCT Glucose: HbA1c:    Recent Labs   Lab 03/09/20  1137 03/09/20  1826 03/09/20  2245   POCTGLUCOSE 273* 112* 114*    Hemoglobin A1C    Date Value Ref Range Status   03/08/2020 5.1 4.0 - 5.6 % Final     Comment:     ADA Screening Guidelines:  5.7-6.4%  Consistent with prediabetes  >or=6.5%  Consistent with diabetes  High levels of fetal hemoglobin interfere with the HbA1C  assay. Heterozygous hemoglobin variants (HbS, HgC, etc)do  not significantly interfere with this assay.   However, presence of multiple variants may affect accuracy.          Needs from Care Team: none     ICU LOS 2d  Level of Care: OK to Transfer

## 2020-03-11 LAB
ALBUMIN SERPL BCP-MCNC: 2.8 G/DL (ref 3.5–5.2)
ALP SERPL-CCNC: 102 U/L (ref 55–135)
ALT SERPL W/O P-5'-P-CCNC: 13 U/L (ref 10–44)
ANION GAP SERPL CALC-SCNC: 10 MMOL/L (ref 8–16)
AST SERPL-CCNC: 14 U/L (ref 10–40)
BASOPHILS # BLD AUTO: 0.04 K/UL (ref 0–0.2)
BASOPHILS NFR BLD: 0.4 % (ref 0–1.9)
BILIRUB SERPL-MCNC: 0.3 MG/DL (ref 0.1–1)
BUN SERPL-MCNC: 70 MG/DL (ref 8–23)
CALCIUM SERPL-MCNC: 8 MG/DL (ref 8.7–10.5)
CHLORIDE SERPL-SCNC: 112 MMOL/L (ref 95–110)
CO2 SERPL-SCNC: 19 MMOL/L (ref 23–29)
CREAT SERPL-MCNC: 4.2 MG/DL (ref 0.5–1.4)
DIFFERENTIAL METHOD: ABNORMAL
EOSINOPHIL # BLD AUTO: 0.2 K/UL (ref 0–0.5)
EOSINOPHIL NFR BLD: 1.7 % (ref 0–8)
ERYTHROCYTE [DISTWIDTH] IN BLOOD BY AUTOMATED COUNT: 16.6 % (ref 11.5–14.5)
EST. GFR  (AFRICAN AMERICAN): 10 ML/MIN/1.73 M^2
EST. GFR  (NON AFRICAN AMERICAN): 9 ML/MIN/1.73 M^2
GLUCOSE SERPL-MCNC: 115 MG/DL (ref 70–110)
HCT VFR BLD AUTO: 25.8 % (ref 37–48.5)
HGB BLD-MCNC: 8.5 G/DL (ref 12–16)
IMM GRANULOCYTES # BLD AUTO: 0.12 K/UL (ref 0–0.04)
IMM GRANULOCYTES NFR BLD AUTO: 1.3 % (ref 0–0.5)
LYMPHOCYTES # BLD AUTO: 1.6 K/UL (ref 1–4.8)
LYMPHOCYTES NFR BLD: 17.4 % (ref 18–48)
MCH RBC QN AUTO: 30.1 PG (ref 27–31)
MCHC RBC AUTO-ENTMCNC: 32.9 G/DL (ref 32–36)
MCV RBC AUTO: 92 FL (ref 82–98)
MONOCYTES # BLD AUTO: 0.7 K/UL (ref 0.3–1)
MONOCYTES NFR BLD: 7 % (ref 4–15)
NEUTROPHILS # BLD AUTO: 6.8 K/UL (ref 1.8–7.7)
NEUTROPHILS NFR BLD: 72.2 % (ref 38–73)
NRBC BLD-RTO: 0 /100 WBC
PLATELET # BLD AUTO: 203 K/UL (ref 150–350)
PMV BLD AUTO: 10.7 FL (ref 9.2–12.9)
POCT GLUCOSE: 105 MG/DL (ref 70–110)
POCT GLUCOSE: 132 MG/DL (ref 70–110)
POCT GLUCOSE: 141 MG/DL (ref 70–110)
POCT GLUCOSE: 142 MG/DL (ref 70–110)
POCT GLUCOSE: 194 MG/DL (ref 70–110)
POCT GLUCOSE: 93 MG/DL (ref 70–110)
POTASSIUM SERPL-SCNC: 4.1 MMOL/L (ref 3.5–5.1)
PROT SERPL-MCNC: 5.5 G/DL (ref 6–8.4)
RBC # BLD AUTO: 2.82 M/UL (ref 4–5.4)
SODIUM SERPL-SCNC: 141 MMOL/L (ref 136–145)
WBC # BLD AUTO: 9.38 K/UL (ref 3.9–12.7)

## 2020-03-11 PROCEDURE — 25000003 PHARM REV CODE 250: Performed by: HOSPITALIST

## 2020-03-11 PROCEDURE — 63600175 PHARM REV CODE 636 W HCPCS: Performed by: HOSPITALIST

## 2020-03-11 PROCEDURE — S0030 INJECTION, METRONIDAZOLE: HCPCS | Performed by: HOSPITALIST

## 2020-03-11 PROCEDURE — 80053 COMPREHEN METABOLIC PANEL: CPT

## 2020-03-11 PROCEDURE — 63600175 PHARM REV CODE 636 W HCPCS: Performed by: INTERNAL MEDICINE

## 2020-03-11 PROCEDURE — 85025 COMPLETE CBC W/AUTO DIFF WBC: CPT

## 2020-03-11 PROCEDURE — 21400001 HC TELEMETRY ROOM

## 2020-03-11 RX ADMIN — LIDOCAINE 1 PATCH: 50 PATCH TOPICAL at 02:03

## 2020-03-11 RX ADMIN — PANTOPRAZOLE SODIUM 40 MG: 40 TABLET, DELAYED RELEASE ORAL at 08:03

## 2020-03-11 RX ADMIN — ONDANSETRON HYDROCHLORIDE 8 MG: 2 SOLUTION INTRAMUSCULAR; INTRAVENOUS at 05:03

## 2020-03-11 RX ADMIN — MENTHOL, METHYL SALICYLATE: 10; 15 CREAM TOPICAL at 02:03

## 2020-03-11 RX ADMIN — FERROUS SULFATE TAB EC 325 MG (65 MG FE EQUIVALENT) 325 MG: 325 (65 FE) TABLET DELAYED RESPONSE at 08:03

## 2020-03-11 RX ADMIN — CEFTRIAXONE 1 G: 1 INJECTION, SOLUTION INTRAVENOUS at 09:03

## 2020-03-11 RX ADMIN — METRONIDAZOLE 500 MG: 500 INJECTION, SOLUTION INTRAVENOUS at 11:03

## 2020-03-11 RX ADMIN — MENTHOL, METHYL SALICYLATE: 10; 15 CREAM TOPICAL at 09:03

## 2020-03-11 RX ADMIN — MENTHOL, METHYL SALICYLATE: 10; 15 CREAM TOPICAL at 05:03

## 2020-03-11 RX ADMIN — NIFEDIPINE 30 MG: 30 TABLET, FILM COATED, EXTENDED RELEASE ORAL at 08:03

## 2020-03-11 RX ADMIN — FERROUS SULFATE TAB EC 325 MG (65 MG FE EQUIVALENT) 325 MG: 325 (65 FE) TABLET DELAYED RESPONSE at 09:03

## 2020-03-11 RX ADMIN — CARVEDILOL 3.12 MG: 3.12 TABLET, FILM COATED ORAL at 09:03

## 2020-03-11 RX ADMIN — SODIUM BICARBONATE 650 MG: 325 TABLET ORAL at 08:03

## 2020-03-11 RX ADMIN — METRONIDAZOLE 500 MG: 500 INJECTION, SOLUTION INTRAVENOUS at 02:03

## 2020-03-11 RX ADMIN — ACETAMINOPHEN 650 MG: 325 TABLET ORAL at 09:03

## 2020-03-11 RX ADMIN — MENTHOL, METHYL SALICYLATE: 10; 15 CREAM TOPICAL at 08:03

## 2020-03-11 RX ADMIN — EPOETIN ALFA-EPBX 10000 UNITS: 10000 INJECTION, SOLUTION INTRAVENOUS; SUBCUTANEOUS at 11:03

## 2020-03-11 RX ADMIN — METRONIDAZOLE 500 MG: 500 INJECTION, SOLUTION INTRAVENOUS at 06:03

## 2020-03-11 RX ADMIN — CARVEDILOL 3.12 MG: 3.12 TABLET, FILM COATED ORAL at 08:03

## 2020-03-11 RX ADMIN — HYDRALAZINE HYDROCHLORIDE 10 MG: 20 INJECTION INTRAMUSCULAR; INTRAVENOUS at 05:03

## 2020-03-11 RX ADMIN — SODIUM BICARBONATE 650 MG: 325 TABLET ORAL at 09:03

## 2020-03-11 RX ADMIN — ACETAMINOPHEN 650 MG: 325 TABLET ORAL at 08:03

## 2020-03-11 NOTE — SUBJECTIVE & OBJECTIVE
Interval History: pt c/o neck pain   Review of Systems   HENT: Negative for ear discharge and ear pain.    Eyes: Negative for discharge and itching.   Endocrine: Negative for cold intolerance and heat intolerance.   Neurological: Negative for seizures and syncope.     Objective:     Vital Signs (Most Recent):  Temp: 98.3 °F (36.8 °C) (03/10/20 1902)  Pulse: 82 (03/10/20 1902)  Resp: (!) 29 (03/10/20 1902)  BP: (!) 155/62 (03/10/20 1902)  SpO2: 100 % (03/10/20 1902) Vital Signs (24h Range):  Temp:  [97.7 °F (36.5 °C)-98.7 °F (37.1 °C)] 98.3 °F (36.8 °C)  Pulse:  [69-90] 82  Resp:  [16-29] 29  SpO2:  [97 %-100 %] 100 %  BP: (119-186)/(52-94) 155/62     Weight: 79.5 kg (175 lb 4.3 oz)  Body mass index is 30.08 kg/m².    Intake/Output Summary (Last 24 hours) at 3/10/2020 1910  Last data filed at 3/10/2020 1830  Gross per 24 hour   Intake 3313 ml   Output --   Net 3313 ml      Physical Exam   Constitutional: She is oriented to person, place, and time. She appears well-developed and well-nourished. No distress.   HENT:   Head: Normocephalic and atraumatic.   Mouth/Throat: Mucous membranes are normal.   Eyes: Pupils are equal, round, and reactive to light. Conjunctivae and EOM are normal.   Neck: Normal range of motion. No tracheal deviation present.   Cardiovascular: Normal rate and regular rhythm.   Pulmonary/Chest: Effort normal and breath sounds normal. She has no wheezes. She has no rales.   Abdominal: Soft. Bowel sounds are normal. She exhibits no distension. There is no tenderness.   Musculoskeletal: Normal range of motion. She exhibits edema (mild pitting peripheral edema). She exhibits no deformity.   Neurological: She is alert and oriented to person, place, and time. She exhibits normal muscle tone.   Skin: Skin is warm and dry. Capillary refill takes 2 to 3 seconds. She is not diaphoretic. No pallor.   Psychiatric: She has a normal mood and affect. Her behavior is normal. Thought content normal.   Vitals  reviewed.      Significant Labs:   BMP:   Recent Labs   Lab 03/10/20  0316         K 4.4      CO2 20*   BUN 77*   CREATININE 4.7*   CALCIUM 7.7*     CBC:   Recent Labs   Lab 03/08/20  1914 03/09/20  0354 03/10/20  0316 03/10/20  1119   WBC 13.96* 11.80 10.45  --    HGB 8.9* 8.6* 6.9* 8.4*   HCT 26.6* 27.0* 22.2* 25.9*    212 165  --        Significant Imaging: I have reviewed and interpreted all pertinent imaging results/findings within the past 24 hours.

## 2020-03-11 NOTE — ASSESSMENT & PLAN NOTE
Transfuse PRN  Acute blood loss     3/12-Melena present, drop in h/h- monitor for need transfusion

## 2020-03-11 NOTE — SUBJECTIVE & OBJECTIVE
Interval History: No overnight events. UOP 8x/24 hrs. Stepped down to tele overnight. S/p 1 U pRBC yesterday with appropriate rise in Hgb. Weekly subq AHSAN started yesterday. Pt reports some mild nausea/ epigastric discomfort (resolved at time of exam), and still c/o neck pain. Noted imaging ordered. Remains on NS 75 ml/h2. GI cleared for resuming anticoagulation. No other issues.     Review of patient's allergies indicates:   Allergen Reactions    Indomethacin     Nsaids (non-steroidal anti-inflammatory drug)      Current Facility-Administered Medications   Medication Frequency    0.9%  NaCl infusion (for blood administration) Q24H PRN    0.9%  NaCl infusion Continuous    acetaminophen tablet 650 mg Q4H PRN    albuterol-ipratropium 2.5 mg-0.5 mg/3 mL nebulizer solution 3 mL Q4H PRN    carvediloL tablet 3.125 mg BID    cefTRIAXone (ROCEPHIN) 1 g in dextrose 5 % 50 mL IVPB Q24H    dextrose 50% injection 12.5 g PRN    epoetin diana-epbx injection 10,000 Units Q7 Days    ferrous sulfate EC tablet 325 mg BID    glucagon (human recombinant) injection 1 mg PRN    hydrALAZINE injection 10 mg Q8H PRN    insulin aspart U-100 pen 0-5 Units Q6H PRN    lidocaine 5 % patch 1 patch Q24H    methocarbamoL tablet 500 mg BID PRN    methyl salicylate-menthol 15-10% cream QID    metronidazole IVPB 500 mg Q8H    NIFEdipine 24 hr tablet 30 mg Daily    ondansetron injection 8 mg Q6H PRN    pantoprazole EC tablet 40 mg Daily    polyethylene glycol packet 17 g BID PRN    sodium bicarbonate tablet 650 mg BID    sodium chloride 0.9% flush 10 mL PRN       Objective:     Vital Signs (Most Recent):  Temp: 99 °F (37.2 °C) (03/11/20 0750)  Pulse: 91 (03/11/20 0750)  Resp: 18 (03/11/20 0750)  BP: (!) 162/69 (03/11/20 0750)  SpO2: 95 % (03/11/20 0750)  O2 Device (Oxygen Therapy): room air (03/11/20 0745) Vital Signs (24h Range):  Temp:  [98.2 °F (36.8 °C)-99 °F (37.2 °C)] 99 °F (37.2 °C)  Pulse:  [69-91] 91  Resp:  [16-42]  18  SpO2:  [95 %-100 %] 95 %  BP: (119-206)/(52-86) 162/69     Weight: 86.4 kg (190 lb 7.6 oz) (03/10/20 6829)  Body mass index is 32.7 kg/m².  Body surface area is 1.98 meters squared.    I/O last 3 completed shifts:  In: 4354.3 [P.O.:720; I.V.:2431.3; Blood:353; IV Piggyback:850]  Out: -     Physical Exam   Constitutional: She appears well-developed and well-nourished. No distress.   HENT:   Head: Normocephalic and atraumatic.   Mouth/Throat: Mucous membranes are normal.   Eyes: Conjunctivae and EOM are normal.   Cardiovascular: Normal rate and regular rhythm.   Pulmonary/Chest: Effort normal and breath sounds normal. She has no wheezes. She has no rales.   Abdominal: Soft. Bowel sounds are normal. She exhibits no distension. There is no tenderness.   Musculoskeletal: She exhibits edema (trace pitting pedal edema).   Skin: She is not diaphoretic.       Significant Labs:  CBC:   Recent Labs   Lab 03/11/20  0442   WBC 9.38   RBC 2.82*   HGB 8.5*   HCT 25.8*      MCV 92   MCH 30.1   MCHC 32.9     CMP:   Recent Labs   Lab 03/11/20  0442   *   CALCIUM 8.0*   ALBUMIN 2.8*   PROT 5.5*      K 4.1   CO2 19*   *   BUN 70*   CREATININE 4.2*   ALKPHOS 102   ALT 13   AST 14   BILITOT 0.3     All labs within the past 24 hours have been reviewed.     Significant Imaging:  Labs: Reviewed

## 2020-03-11 NOTE — PROGRESS NOTES
"[facility]  Nephrology  Progress Note    Patient Name: Joyce Riley  MRN: [id number]  Admission Date: 3/7/2020  Hospital Length of Stay: 3 days  Attending Provider: Shey Pate MD   Primary Care Physician: Kalpana Staton MD  Principal Problem:Rectal bleeding    Subjective:     HPI: Ms. Riley is a 86 y.o AAF with hx afib, CHF, DMII, GERD, HTN, gout, CVA, and CKD V who presented to [facility] ED 3/7 from [facility] for rectal bleeding. CT showed likely acute diverticulitis. Started on PPI gtt, NS 75/hr, and cipro flagyl -transferred to ICU for aggressive monitoring. Hgb 7.9 OA, dropped to 7.1 and received 2 U pRBC 3/8 with appropriate rise in hgb. Cr OA 3.5. Nephrology consulted for ROXANNE on CKD. Chart reviewed and medical records obtained. Ms Riley follows with Dr. Baca outpatient for CKD; Baseline Cr 2.3 -3.2 Last at baseline 1/2020. It appears she had similar ROXANNE after GI bleed 7/2019 peaking at 4.8. Had prior HD trial at [facility] but pulled at HD cath calling into question her HD candidacy as this poses potential life threatening bleeding risk. ICU RN states pt has not purposefully pulled at any IVs during this admit. Cr robert 3/9 and peaked yesterday at 4.8. RN reports good UOP, pt incontinent making Is/Os difficult. She is on NS 75 ml/hr. Tolerating diet advancements, reports she had grits for breakfast this morning. Denies any SOB, n/v, abdominal pain, metallic taste, confusion, poor appetite. Pt AAO x3 on my exam. Cr stable this AM, GFR 9.  Briefly spoke with patient regarding possibility of HD initiation during this admission, states she "will think about it."      Interval History: No overnight events. UOP 8x/24 hrs. Stepped down to tele overnight. S/p 1 U pRBC yesterday with appropriate rise in Hgb. Weekly subq AHSAN started yesterday. Pt reports some mild nausea/ epigastric discomfort (resolved at time of exam), and still c/o neck pain. Noted imaging ordered. Remains on NS 75 " ml/h2. GI cleared for resuming anticoagulation. No other issues.     Review of patient's allergies indicates:   Allergen Reactions    Indomethacin     Nsaids (non-steroidal anti-inflammatory drug)      Current Facility-Administered Medications   Medication Frequency    0.9%  NaCl infusion (for blood administration) Q24H PRN    0.9%  NaCl infusion Continuous    acetaminophen tablet 650 mg Q4H PRN    albuterol-ipratropium 2.5 mg-0.5 mg/3 mL nebulizer solution 3 mL Q4H PRN    carvediloL tablet 3.125 mg BID    cefTRIAXone (ROCEPHIN) 1 g in dextrose 5 % 50 mL IVPB Q24H    dextrose 50% injection 12.5 g PRN    epoetin diana-epbx injection 10,000 Units Q7 Days    ferrous sulfate EC tablet 325 mg BID    glucagon (human recombinant) injection 1 mg PRN    hydrALAZINE injection 10 mg Q8H PRN    insulin aspart U-100 pen 0-5 Units Q6H PRN    lidocaine 5 % patch 1 patch Q24H    methocarbamoL tablet 500 mg BID PRN    methyl salicylate-menthol 15-10% cream QID    metronidazole IVPB 500 mg Q8H    NIFEdipine 24 hr tablet 30 mg Daily    ondansetron injection 8 mg Q6H PRN    pantoprazole EC tablet 40 mg Daily    polyethylene glycol packet 17 g BID PRN    sodium bicarbonate tablet 650 mg BID    sodium chloride 0.9% flush 10 mL PRN       Objective:     Vital Signs (Most Recent):  Temp: 99 °F (37.2 °C) (03/11/20 0750)  Pulse: 91 (03/11/20 0750)  Resp: 18 (03/11/20 0750)  BP: (!) 162/69 (03/11/20 0750)  SpO2: 95 % (03/11/20 0750)  O2 Device (Oxygen Therapy): room air (03/11/20 0745) Vital Signs (24h Range):  Temp:  [98.2 °F (36.8 °C)-99 °F (37.2 °C)] 99 °F (37.2 °C)  Pulse:  [69-91] 91  Resp:  [16-42] 18  SpO2:  [95 %-100 %] 95 %  BP: (119-206)/(52-86) 162/69     Weight: 86.4 kg (190 lb 7.6 oz) (03/10/20 2359)  Body mass index is 32.7 kg/m².  Body surface area is 1.98 meters squared.    I/O last 3 completed shifts:  In: 4354.3 [P.O.:720; I.V.:2431.3; Blood:353; IV Piggyback:850]  Out: -     Physical Exam    Constitutional: She appears well-developed and well-nourished. No distress.   HENT:   Head: Normocephalic and atraumatic.   Mouth/Throat: Mucous membranes are normal.   Eyes: Conjunctivae and EOM are normal.   Cardiovascular: Normal rate and regular rhythm.   Pulmonary/Chest: Effort normal and breath sounds normal. She has no wheezes. She has no rales.   Abdominal: Soft. Bowel sounds are normal. She exhibits no distension. There is no tenderness.   Musculoskeletal: She exhibits edema (trace pitting pedal edema).   Skin: She is not diaphoretic.       Significant Labs:  CBC:   Recent Labs   Lab 03/11/20  0442   WBC 9.38   RBC 2.82*   HGB 8.5*   HCT 25.8*      MCV 92   MCH 30.1   MCHC 32.9     CMP:   Recent Labs   Lab 03/11/20  0442   *   CALCIUM 8.0*   ALBUMIN 2.8*   PROT 5.5*      K 4.1   CO2 19*   *   BUN 70*   CREATININE 4.2*   ALKPHOS 102   ALT 13   AST 14   BILITOT 0.3     All labs within the past 24 hours have been reviewed.     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 BUN trending down. UOP stable  - No need for emergent RRT at this time; defer HD planning for now  - renally dose meds for current GFR; avoid nephrotoxic meds including NSAIDs  - caution with IVF in ROXANNE on CKD V patient with HF  - strict Is/Os daily labs     Blood loss anemia/ AOCKD  - GIB requiring 2U pRBC 3/8, 1U PRBC this AM for Hgb 6.9  - recently completed outpatient IV iron load  - continue weekly sub q AHSAN  - transfuse Hgb<  7  - continue to monitor     Metabolic Acidosis  - in setting ROXANNE 2/2 GIB + CKD V  - Bicarb down today 19; Cl up; also hypertensive  - will d/c NS at this time  - continue PO Bicarb 650mg BID, titrate PRN  - monitor BMP     Proteinuria  - 2+ protein on admit UA, c/w UA 1/2020  - consider repeat UPrCr  - hx hyperkalemia & poor renal reserves, avoid ACE/ARB for now        Thank you for the consult. We will continue to follow with patient.     SOMMER SantanaC  Nephrology  [facility]

## 2020-03-11 NOTE — PLAN OF CARE
03/11/20 1209   Discharge Reassessment   Assessment Type Discharge Planning Reassessment   Patient will return to [facility] at time of DC.  Per MD at MDT's this am patient will DC today.  Referral sent to [facility] via [facility].  Awaiting orders for discharge.    1317:  Patient accepted by [facility] via [facility].  Awaiting transfer orders.

## 2020-03-11 NOTE — PHYSICIAN QUERY
PT Name: Joyce Riley  MR #: [id number]  Physician Query Form - CKD Clarification     CDS/: Tina Bullock RN, CDS               Contact information: [email or web address]                                                                                                                        [phone]  This form is a permanent document in the medical record.     Query Date: March 11, 2020    By submitting this query, we are merely seeking further clarification of documentation. Please utilize your independent clinical judgment when addressing the question(s) below.    The Medical record contains the following:     Indicators   Supporting Clinical Findings   Location in Medical Record   x CKD or Chronic Kidney (Renal) Failure / Disease Nephrology consulted for ROXANNE on CKD. Chart reviewed and medical records obtained. Ms Riley follows with Dr. Baca outpatient for CKD; Baseline Cr 2.3 -3.2 Last at baseline 1/2020. It appears she had similar ROXANNE after GI bleed 7/2019 peaking at 4.8.    Assessment/Plan:  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   Nephrology PN 3/11          Nephrology PN 3/11   x BUN/Creatinine                      GFR BUN: 60 --> 76 --> 77 --> 70   Cr:  3.5 --> 4.8 --> 4.7 --> 4.2   GFR: 11 --> 8 --> 8 --> 9    GFR: 15 --> 15 --> 15 --> 15      Labs 3/7- 3/11  Labs 3/7- 3/11  Labs 3/7- 3/11    Labs 7/19/2019- 7/22/2019     Dehydration      Nausea / Vomiting     x Dialysis / CRRT h/o HD trial at [facility] with recommendation for palliative care 2/2 patient pulling at lines...No need for emergent RRT at this time; defer HD planning for now Nephrology PN 3/11    Medication      Treatment     x Other Chronic Conditions hx afib, CHF, DMII, GERD, HTN, gout, CVA   Nephrology PN 3/11   x Other Baseline Cr 2.3 -3.2       CKD V      CKD stage 4 with increase in Creat.     Nephrology PN 3/11    Nephrology PN 3/11    Hosp Med transfer Note 3/9     Provider, please clarify the stage of CKD.    [   ] Chronic Kidney  Disease (CKD) (please specify stage* below)      [employer] Definitions     Stage Description eGFR (mL/min)   [x   ]    IV Severely reduced kidney function 15-29   [   ]  V Kidney failure, requiring transplant or dialysis <15         [   ] Other (please specify): ____________    [   ] Clinically Undetermined          Please document in your progress notes daily for the duration of treatment until resolved and include in your discharge summary.

## 2020-03-12 LAB
ALBUMIN SERPL BCP-MCNC: 2.7 G/DL (ref 3.5–5.2)
ALP SERPL-CCNC: 99 U/L (ref 55–135)
ALT SERPL W/O P-5'-P-CCNC: 9 U/L (ref 10–44)
ANION GAP SERPL CALC-SCNC: 9 MMOL/L (ref 8–16)
AST SERPL-CCNC: 14 U/L (ref 10–40)
BASOPHILS # BLD AUTO: 0.03 K/UL (ref 0–0.2)
BASOPHILS NFR BLD: 0.4 % (ref 0–1.9)
BILIRUB SERPL-MCNC: 0.2 MG/DL (ref 0.1–1)
BUN SERPL-MCNC: 61 MG/DL (ref 8–23)
CALCIUM SERPL-MCNC: 8 MG/DL (ref 8.7–10.5)
CHLORIDE SERPL-SCNC: 113 MMOL/L (ref 95–110)
CO2 SERPL-SCNC: 17 MMOL/L (ref 23–29)
CREAT SERPL-MCNC: 3.7 MG/DL (ref 0.5–1.4)
DIFFERENTIAL METHOD: ABNORMAL
EOSINOPHIL # BLD AUTO: 0.1 K/UL (ref 0–0.5)
EOSINOPHIL NFR BLD: 1.8 % (ref 0–8)
ERYTHROCYTE [DISTWIDTH] IN BLOOD BY AUTOMATED COUNT: 16.9 % (ref 11.5–14.5)
EST. GFR  (AFRICAN AMERICAN): 12 ML/MIN/1.73 M^2
EST. GFR  (NON AFRICAN AMERICAN): 11 ML/MIN/1.73 M^2
GLUCOSE SERPL-MCNC: 112 MG/DL (ref 70–110)
HCT VFR BLD AUTO: 23.9 % (ref 37–48.5)
HGB BLD-MCNC: 7.8 G/DL (ref 12–16)
IMM GRANULOCYTES # BLD AUTO: 0.11 K/UL (ref 0–0.04)
IMM GRANULOCYTES NFR BLD AUTO: 1.4 % (ref 0–0.5)
LYMPHOCYTES # BLD AUTO: 1.4 K/UL (ref 1–4.8)
LYMPHOCYTES NFR BLD: 17.7 % (ref 18–48)
MAGNESIUM SERPL-MCNC: 1.7 MG/DL (ref 1.6–2.6)
MCH RBC QN AUTO: 30.6 PG (ref 27–31)
MCHC RBC AUTO-ENTMCNC: 32.6 G/DL (ref 32–36)
MCV RBC AUTO: 94 FL (ref 82–98)
MONOCYTES # BLD AUTO: 0.6 K/UL (ref 0.3–1)
MONOCYTES NFR BLD: 7.8 % (ref 4–15)
NEUTROPHILS # BLD AUTO: 5.7 K/UL (ref 1.8–7.7)
NEUTROPHILS NFR BLD: 70.9 % (ref 38–73)
NRBC BLD-RTO: 0 /100 WBC
PLATELET # BLD AUTO: 221 K/UL (ref 150–350)
PMV BLD AUTO: 10.7 FL (ref 9.2–12.9)
POCT GLUCOSE: 105 MG/DL (ref 70–110)
POCT GLUCOSE: 105 MG/DL (ref 70–110)
POCT GLUCOSE: 141 MG/DL (ref 70–110)
POCT GLUCOSE: 156 MG/DL (ref 70–110)
POTASSIUM SERPL-SCNC: 4.1 MMOL/L (ref 3.5–5.1)
PROT SERPL-MCNC: 5.3 G/DL (ref 6–8.4)
RBC # BLD AUTO: 2.55 M/UL (ref 4–5.4)
SODIUM SERPL-SCNC: 139 MMOL/L (ref 136–145)
WBC # BLD AUTO: 7.97 K/UL (ref 3.9–12.7)

## 2020-03-12 PROCEDURE — 63600175 PHARM REV CODE 636 W HCPCS: Performed by: HOSPITALIST

## 2020-03-12 PROCEDURE — 80053 COMPREHEN METABOLIC PANEL: CPT

## 2020-03-12 PROCEDURE — 85025 COMPLETE CBC W/AUTO DIFF WBC: CPT

## 2020-03-12 PROCEDURE — 25000003 PHARM REV CODE 250: Performed by: INTERNAL MEDICINE

## 2020-03-12 PROCEDURE — 25000003 PHARM REV CODE 250: Performed by: HOSPITALIST

## 2020-03-12 PROCEDURE — 99900035 HC TECH TIME PER 15 MIN (STAT)

## 2020-03-12 PROCEDURE — 83735 ASSAY OF MAGNESIUM: CPT

## 2020-03-12 PROCEDURE — S0030 INJECTION, METRONIDAZOLE: HCPCS | Performed by: HOSPITALIST

## 2020-03-12 PROCEDURE — 21400001 HC TELEMETRY ROOM

## 2020-03-12 PROCEDURE — 25000003 PHARM REV CODE 250: Performed by: PHYSICIAN ASSISTANT

## 2020-03-12 RX ORDER — CARVEDILOL 12.5 MG/1
12.5 TABLET ORAL 2 TIMES DAILY
Status: DISCONTINUED | OUTPATIENT
Start: 2020-03-12 | End: 2020-03-13 | Stop reason: HOSPADM

## 2020-03-12 RX ORDER — CARVEDILOL 6.25 MG/1
6.25 TABLET ORAL 2 TIMES DAILY
Status: DISCONTINUED | OUTPATIENT
Start: 2020-03-12 | End: 2020-03-12

## 2020-03-12 RX ORDER — NIFEDIPINE 30 MG/1
60 TABLET, EXTENDED RELEASE ORAL DAILY
Status: DISCONTINUED | OUTPATIENT
Start: 2020-03-13 | End: 2020-03-13 | Stop reason: HOSPADM

## 2020-03-12 RX ORDER — SODIUM BICARBONATE 325 MG/1
1300 TABLET ORAL 2 TIMES DAILY
Status: DISCONTINUED | OUTPATIENT
Start: 2020-03-12 | End: 2020-03-13 | Stop reason: HOSPADM

## 2020-03-12 RX ORDER — FUROSEMIDE 40 MG/1
40 TABLET ORAL DAILY
Status: DISCONTINUED | OUTPATIENT
Start: 2020-03-12 | End: 2020-03-13 | Stop reason: HOSPADM

## 2020-03-12 RX ADMIN — HYDRALAZINE HYDROCHLORIDE 10 MG: 20 INJECTION INTRAMUSCULAR; INTRAVENOUS at 04:03

## 2020-03-12 RX ADMIN — ACETAMINOPHEN 650 MG: 325 TABLET ORAL at 09:03

## 2020-03-12 RX ADMIN — MENTHOL, METHYL SALICYLATE: 10; 15 CREAM TOPICAL at 09:03

## 2020-03-12 RX ADMIN — FUROSEMIDE 40 MG: 40 TABLET ORAL at 06:03

## 2020-03-12 RX ADMIN — MENTHOL, METHYL SALICYLATE: 10; 15 CREAM TOPICAL at 05:03

## 2020-03-12 RX ADMIN — CEFTRIAXONE 1 G: 1 INJECTION, SOLUTION INTRAVENOUS at 08:03

## 2020-03-12 RX ADMIN — FERROUS SULFATE TAB EC 325 MG (65 MG FE EQUIVALENT) 325 MG: 325 (65 FE) TABLET DELAYED RESPONSE at 09:03

## 2020-03-12 RX ADMIN — LIDOCAINE 1 PATCH: 50 PATCH TOPICAL at 01:03

## 2020-03-12 RX ADMIN — SODIUM BICARBONATE 650 MG: 325 TABLET ORAL at 09:03

## 2020-03-12 RX ADMIN — NIFEDIPINE 30 MG: 30 TABLET, FILM COATED, EXTENDED RELEASE ORAL at 09:03

## 2020-03-12 RX ADMIN — METRONIDAZOLE 500 MG: 500 INJECTION, SOLUTION INTRAVENOUS at 01:03

## 2020-03-12 RX ADMIN — MENTHOL, METHYL SALICYLATE: 10; 15 CREAM TOPICAL at 01:03

## 2020-03-12 RX ADMIN — METHOCARBAMOL TABLETS 500 MG: 500 TABLET, COATED ORAL at 01:03

## 2020-03-12 RX ADMIN — SODIUM BICARBONATE 1300 MG: 325 TABLET ORAL at 01:03

## 2020-03-12 RX ADMIN — CARVEDILOL 12.5 MG: 12.5 TABLET, FILM COATED ORAL at 09:03

## 2020-03-12 RX ADMIN — CARVEDILOL 3.12 MG: 3.12 TABLET, FILM COATED ORAL at 09:03

## 2020-03-12 RX ADMIN — METRONIDAZOLE 500 MG: 500 INJECTION, SOLUTION INTRAVENOUS at 09:03

## 2020-03-12 RX ADMIN — METRONIDAZOLE 500 MG: 500 INJECTION, SOLUTION INTRAVENOUS at 05:03

## 2020-03-12 RX ADMIN — PANTOPRAZOLE SODIUM 40 MG: 40 TABLET, DELAYED RELEASE ORAL at 09:03

## 2020-03-12 RX ADMIN — SODIUM BICARBONATE 1300 MG: 325 TABLET ORAL at 09:03

## 2020-03-12 NOTE — SUBJECTIVE & OBJECTIVE
Interval History: Pt had bloody stool x1 this AM. UOP 2x/ 24 hrs PM shift recorded only. D/c IVF yesterday 2/2 hyperchloremia, mild pedal edema, HTN. Pt feeling well today, reports her neck is feeling better. Noted C-spine X ray. Pt denies SOB, n/v. No other issues.     Review of patient's allergies indicates:   Allergen Reactions    Indomethacin     Nsaids (non-steroidal anti-inflammatory drug)      Current Facility-Administered Medications   Medication Frequency    0.9%  NaCl infusion (for blood administration) Q24H PRN    acetaminophen tablet 650 mg Q4H PRN    albuterol-ipratropium 2.5 mg-0.5 mg/3 mL nebulizer solution 3 mL Q4H PRN    carvediloL tablet 3.125 mg BID    cefTRIAXone (ROCEPHIN) 1 g in dextrose 5 % 50 mL IVPB Q24H    dextrose 50% injection 12.5 g PRN    epoetin diana-epbx injection 10,000 Units Q7 Days    ferrous sulfate EC tablet 325 mg BID    glucagon (human recombinant) injection 1 mg PRN    hydrALAZINE injection 10 mg Q8H PRN    insulin aspart U-100 pen 0-5 Units Q6H PRN    lidocaine 5 % patch 1 patch Q24H    methocarbamoL tablet 500 mg BID PRN    methyl salicylate-menthol 15-10% cream QID    metronidazole IVPB 500 mg Q8H    NIFEdipine 24 hr tablet 30 mg Daily    ondansetron injection 8 mg Q6H PRN    pantoprazole EC tablet 40 mg Daily    polyethylene glycol packet 17 g BID PRN    sodium bicarbonate tablet 1,300 mg BID    sodium chloride 0.9% flush 10 mL PRN       Objective:     Vital Signs (Most Recent):  Temp: 98 °F (36.7 °C) (03/12/20 1101)  Pulse: 83 (03/12/20 1101)  Resp: 18 (03/12/20 1101)  BP: (!) 151/67 (03/12/20 1101)  SpO2: 99 % (03/12/20 1101)  O2 Device (Oxygen Therapy): room air (03/11/20 0745) Vital Signs (24h Range):  Temp:  [98 °F (36.7 °C)-98.7 °F (37.1 °C)] 98 °F (36.7 °C)  Pulse:  [] 83  Resp:  [16-20] 18  SpO2:  [96 %-99 %] 99 %  BP: (151-190)/(60-77) 151/67     Weight: 86.4 kg (190 lb 7.6 oz) (03/10/20 6799)  Body mass index is 32.7 kg/m².  Body  surface area is 1.98 meters squared.    I/O last 3 completed shifts:  In: 1403.8 [P.O.:400; I.V.:853.8; IV Piggyback:150]  Out: -     Physical Exam   Constitutional: She is oriented to person, place, and time. She appears well-developed and well-nourished. No distress.   HENT:   Head: Normocephalic and atraumatic.   Mouth/Throat: Mucous membranes are normal.   Eyes: Conjunctivae and EOM are normal.   Cardiovascular: Normal rate and regular rhythm.   Pulmonary/Chest: Effort normal and breath sounds normal. She has no wheezes. She has no rales.   Abdominal: Soft. Bowel sounds are normal. She exhibits no distension. There is no tenderness.   Musculoskeletal: She exhibits edema (mild pitting peripheral edema).   Neurological: She is alert and oriented to person, place, and time.   Skin: Skin is warm and dry. She is not diaphoretic.   Psychiatric: She has a normal mood and affect. Her behavior is normal. Thought content normal.       Significant Labs:  CBC:   Recent Labs   Lab 03/12/20  0424   WBC 7.97   RBC 2.55*   HGB 7.8*   HCT 23.9*      MCV 94   MCH 30.6   MCHC 32.6     CMP:   Recent Labs   Lab 03/12/20  0424   *   CALCIUM 8.0*   ALBUMIN 2.7*   PROT 5.3*      K 4.1   CO2 17*   *   BUN 61*   CREATININE 3.7*   ALKPHOS 99   ALT 9*   AST 14   BILITOT 0.2     All labs within the past 24 hours have been reviewed.     Significant Imaging:  Labs: Reviewed  X-Ray: Reviewed

## 2020-03-12 NOTE — PROGRESS NOTES
"[facility]  Nephrology  Progress Note    Patient Name: Joyce Riley  MRN: [id number]  Admission Date: 3/7/2020  Hospital Length of Stay: 4 days  Attending Provider: Shey Pate MD   Primary Care Physician: Kalpana Staton MD  Principal Problem:Rectal bleeding    Subjective:     HPI: Ms. Riley is a 86 y.o AAF with hx afib, CHF, DMII, GERD, HTN, gout, CVA, and CKD V who presented to [facility] ED 3/7 from [facility] for rectal bleeding. CT showed likely acute diverticulitis. Started on PPI gtt, NS 75/hr, and cipro flagyl -transferred to ICU for aggressive monitoring. Hgb 7.9 OA, dropped to 7.1 and received 2 U pRBC 3/8 with appropriate rise in hgb. Cr OA 3.5. Nephrology consulted for ROXANNE on CKD. Chart reviewed and medical records obtained. Ms Riley follows with Dr. Baca outpatient for CKD; Baseline Cr 2.3 -3.2 Last at baseline 1/2020. It appears she had similar ROXANNE after GI bleed 7/2019 peaking at 4.8. Had prior HD trial at [facility] but pulled at HD cath calling into question her HD candidacy as this poses potential life threatening bleeding risk. ICU RN states pt has not purposefully pulled at any IVs during this admit. Cr robert 3/9 and peaked yesterday at 4.8. RN reports good UOP, pt incontinent making Is/Os difficult. She is on NS 75 ml/hr. Tolerating diet advancements, reports she had grits for breakfast this morning. Denies any SOB, n/v, abdominal pain, metallic taste, confusion, poor appetite. Pt AAO x3 on my exam. Cr stable this AM, GFR 9.  Briefly spoke with patient regarding possibility of HD initiation during this admission, states she "will think about it."      Interval History: Pt had bloody stool x1 this AM. UOP 2x/ 24 hrs PM shift recorded only. D/c IVF yesterday 2/2 hyperchloremia, mild pedal edema, HTN. Pt feeling well today, reports her neck is feeling better. Noted C-spine X ray. Pt denies SOB, n/v. No other issues.     Review of patient's allergies indicates: "   Allergen Reactions    Indomethacin     Nsaids (non-steroidal anti-inflammatory drug)      Current Facility-Administered Medications   Medication Frequency    0.9%  NaCl infusion (for blood administration) Q24H PRN    acetaminophen tablet 650 mg Q4H PRN    albuterol-ipratropium 2.5 mg-0.5 mg/3 mL nebulizer solution 3 mL Q4H PRN    carvediloL tablet 3.125 mg BID    cefTRIAXone (ROCEPHIN) 1 g in dextrose 5 % 50 mL IVPB Q24H    dextrose 50% injection 12.5 g PRN    epoetin diana-epbx injection 10,000 Units Q7 Days    ferrous sulfate EC tablet 325 mg BID    glucagon (human recombinant) injection 1 mg PRN    hydrALAZINE injection 10 mg Q8H PRN    insulin aspart U-100 pen 0-5 Units Q6H PRN    lidocaine 5 % patch 1 patch Q24H    methocarbamoL tablet 500 mg BID PRN    methyl salicylate-menthol 15-10% cream QID    metronidazole IVPB 500 mg Q8H    NIFEdipine 24 hr tablet 30 mg Daily    ondansetron injection 8 mg Q6H PRN    pantoprazole EC tablet 40 mg Daily    polyethylene glycol packet 17 g BID PRN    sodium bicarbonate tablet 1,300 mg BID    sodium chloride 0.9% flush 10 mL PRN       Objective:     Vital Signs (Most Recent):  Temp: 98 °F (36.7 °C) (03/12/20 1101)  Pulse: 83 (03/12/20 1101)  Resp: 18 (03/12/20 1101)  BP: (!) 151/67 (03/12/20 1101)  SpO2: 99 % (03/12/20 1101)  O2 Device (Oxygen Therapy): room air (03/11/20 0745) Vital Signs (24h Range):  Temp:  [98 °F (36.7 °C)-98.7 °F (37.1 °C)] 98 °F (36.7 °C)  Pulse:  [] 83  Resp:  [16-20] 18  SpO2:  [96 %-99 %] 99 %  BP: (151-190)/(60-77) 151/67     Weight: 86.4 kg (190 lb 7.6 oz) (03/10/20 2829)  Body mass index is 32.7 kg/m².  Body surface area is 1.98 meters squared.    I/O last 3 completed shifts:  In: 1403.8 [P.O.:400; I.V.:853.8; IV Piggyback:150]  Out: -     Physical Exam   Constitutional: She is oriented to person, place, and time. She appears well-developed and well-nourished. No distress.   HENT:   Head: Normocephalic and atraumatic.    Mouth/Throat: Mucous membranes are normal.   Eyes: Conjunctivae and EOM are normal.   Cardiovascular: Normal rate and regular rhythm.   Pulmonary/Chest: Effort normal and breath sounds normal. She has no wheezes. She has no rales.   Abdominal: Soft. Bowel sounds are normal. She exhibits no distension. There is no tenderness.   Musculoskeletal: She exhibits edema (mild pitting peripheral edema).   Neurological: She is alert and oriented to person, place, and time.   Skin: Skin is warm and dry. She is not diaphoretic.   Psychiatric: She has a normal mood and affect. Her behavior is normal. Thought content normal.       Significant Labs:  CBC:   Recent Labs   Lab 03/12/20 0424   WBC 7.97   RBC 2.55*   HGB 7.8*   HCT 23.9*      MCV 94   MCH 30.6   MCHC 32.6     CMP:   Recent Labs   Lab 03/12/20 0424   *   CALCIUM 8.0*   ALBUMIN 2.7*   PROT 5.3*      K 4.1   CO2 17*   *   BUN 61*   CREATININE 3.7*   ALKPHOS 99   ALT 9*   AST 14   BILITOT 0.2     All labs within the past 24 hours have been reviewed.     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 BUN trending down. UOP stable  - No need for emergent RRT at this time; defer HD planning for now  - renally dose meds for current GFR; avoid nephrotoxic meds including NSAIDs  - caution with IVF in ROXANNE on CKD V patient with HF  - strict Is/Os daily labs     Blood loss anemia/ AOCKD  - GIB requiring 2U pRBC 3/8, 1U PRBC 3/10  - reportedly with  bloody BM again, drop in Hgb 8.5-->7.8  - recently completed outpatient IV iron load  - continue weekly sub q AHSAN  - transfuse Hgb< 7  - continue to monitor     Metabolic Acidosis  - in setting ROXANNE 2/2 GIB + CKD V  - Bicarb 17 today   - increase Na Bicarb to 1300 PO BID  - continue PO Bicarb 650mg BID, titrate PRN  - monitor BMP     Proteinuria  - 2+ protein on admit UA, c/w UA 1/2020  - consider repeat UPrCr  - hx hyperkalemia & poor renal reserves, avoid ACE/ARB for now     Hypertension   - NS d/c yesterday, BP range improved but still elevated  - C-spine with incidental notice of congestion, mild peripheral edema   - will re start home lasix regimen  - consider increasing coreg  - continue to monitor    Thank you for the consult. We will continue to follow with patient.      SOMMER SantanaC  Nephrology  [facility]

## 2020-03-12 NOTE — NURSING
Bedside report given to SUKUMAR Garcia.  RAFAT. Safety precautions maintained. Call light in reach.

## 2020-03-12 NOTE — NURSING
End of shift bedside report given to  SUKUMAR Gomez. Patient in no apparent distress.     12 hour chart check complete.

## 2020-03-12 NOTE — NURSING
Patient with large dark red clotty stool passed in evening on 3/11/20, again on the night of 3/11/20. MD notified of both incidents this AM.       Patient with another stool, dark red, moderate amount.

## 2020-03-12 NOTE — PLAN OF CARE
03/12/20 1050   Medicare Message   Important Message from Medicare regarding Discharge Appeal Rights Given to patient/caregiver;Explained to patient/caregiver;Signed/date by patient/caregiver

## 2020-03-12 NOTE — NURSING
Bedside report received. Pt resting comfortably. NADN. Safety precautions maintained. Call light in reach.

## 2020-03-13 VITALS
OXYGEN SATURATION: 98 % | WEIGHT: 190.5 LBS | RESPIRATION RATE: 16 BRPM | SYSTOLIC BLOOD PRESSURE: 157 MMHG | HEIGHT: 64 IN | HEART RATE: 84 BPM | DIASTOLIC BLOOD PRESSURE: 79 MMHG | BODY MASS INDEX: 32.52 KG/M2 | TEMPERATURE: 98 F

## 2020-03-13 LAB
ALBUMIN SERPL BCP-MCNC: 2.8 G/DL (ref 3.5–5.2)
ALP SERPL-CCNC: 105 U/L (ref 55–135)
ALT SERPL W/O P-5'-P-CCNC: 11 U/L (ref 10–44)
ANION GAP SERPL CALC-SCNC: 9 MMOL/L (ref 8–16)
AST SERPL-CCNC: 18 U/L (ref 10–40)
BASOPHILS # BLD AUTO: 0.05 K/UL (ref 0–0.2)
BASOPHILS NFR BLD: 0.7 % (ref 0–1.9)
BILIRUB SERPL-MCNC: 0.2 MG/DL (ref 0.1–1)
BUN SERPL-MCNC: 55 MG/DL (ref 8–23)
CALCIUM SERPL-MCNC: 8.1 MG/DL (ref 8.7–10.5)
CHLORIDE SERPL-SCNC: 111 MMOL/L (ref 95–110)
CO2 SERPL-SCNC: 20 MMOL/L (ref 23–29)
CREAT SERPL-MCNC: 3.8 MG/DL (ref 0.5–1.4)
DIFFERENTIAL METHOD: ABNORMAL
EOSINOPHIL # BLD AUTO: 0.2 K/UL (ref 0–0.5)
EOSINOPHIL NFR BLD: 2.5 % (ref 0–8)
ERYTHROCYTE [DISTWIDTH] IN BLOOD BY AUTOMATED COUNT: 16.6 % (ref 11.5–14.5)
EST. GFR  (AFRICAN AMERICAN): 12 ML/MIN/1.73 M^2
EST. GFR  (NON AFRICAN AMERICAN): 10 ML/MIN/1.73 M^2
GLUCOSE SERPL-MCNC: 92 MG/DL (ref 70–110)
HCT VFR BLD AUTO: 25.9 % (ref 37–48.5)
HGB BLD-MCNC: 8.2 G/DL (ref 12–16)
IMM GRANULOCYTES # BLD AUTO: 0.13 K/UL (ref 0–0.04)
IMM GRANULOCYTES NFR BLD AUTO: 1.8 % (ref 0–0.5)
LYMPHOCYTES # BLD AUTO: 1.5 K/UL (ref 1–4.8)
LYMPHOCYTES NFR BLD: 21 % (ref 18–48)
MCH RBC QN AUTO: 30.3 PG (ref 27–31)
MCHC RBC AUTO-ENTMCNC: 31.7 G/DL (ref 32–36)
MCV RBC AUTO: 96 FL (ref 82–98)
MONOCYTES # BLD AUTO: 0.6 K/UL (ref 0.3–1)
MONOCYTES NFR BLD: 8.3 % (ref 4–15)
NEUTROPHILS # BLD AUTO: 4.8 K/UL (ref 1.8–7.7)
NEUTROPHILS NFR BLD: 65.7 % (ref 38–73)
NRBC BLD-RTO: 0 /100 WBC
PHOSPHATE SERPL-MCNC: 4.2 MG/DL (ref 2.7–4.5)
PLATELET # BLD AUTO: 209 K/UL (ref 150–350)
PMV BLD AUTO: 10.5 FL (ref 9.2–12.9)
POCT GLUCOSE: 114 MG/DL (ref 70–110)
POCT GLUCOSE: 127 MG/DL (ref 70–110)
POCT GLUCOSE: 184 MG/DL (ref 70–110)
POTASSIUM SERPL-SCNC: 4.2 MMOL/L (ref 3.5–5.1)
PROT SERPL-MCNC: 5.6 G/DL (ref 6–8.4)
RBC # BLD AUTO: 2.71 M/UL (ref 4–5.4)
SODIUM SERPL-SCNC: 140 MMOL/L (ref 136–145)
WBC # BLD AUTO: 7.34 K/UL (ref 3.9–12.7)

## 2020-03-13 PROCEDURE — 25000003 PHARM REV CODE 250: Performed by: PHYSICIAN ASSISTANT

## 2020-03-13 PROCEDURE — 80053 COMPREHEN METABOLIC PANEL: CPT

## 2020-03-13 PROCEDURE — 84100 ASSAY OF PHOSPHORUS: CPT

## 2020-03-13 PROCEDURE — 85025 COMPLETE CBC W/AUTO DIFF WBC: CPT

## 2020-03-13 PROCEDURE — 25000003 PHARM REV CODE 250: Performed by: HOSPITALIST

## 2020-03-13 PROCEDURE — 25000003 PHARM REV CODE 250: Performed by: INTERNAL MEDICINE

## 2020-03-13 PROCEDURE — S0030 INJECTION, METRONIDAZOLE: HCPCS | Performed by: HOSPITALIST

## 2020-03-13 RX ORDER — FUROSEMIDE 40 MG/1
40 TABLET ORAL DAILY
Qty: 30 TABLET | Refills: 11 | Status: ON HOLD | OUTPATIENT
Start: 2020-03-14 | End: 2020-08-04 | Stop reason: HOSPADM

## 2020-03-13 RX ORDER — LIDOCAINE 50 MG/G
1 PATCH TOPICAL DAILY
Qty: 30 PATCH | Refills: 3 | Status: SHIPPED | OUTPATIENT
Start: 2020-03-13 | End: 2020-04-12

## 2020-03-13 RX ORDER — METHOCARBAMOL 500 MG/1
500 TABLET, FILM COATED ORAL 2 TIMES DAILY PRN
Qty: 30 TABLET | Refills: 0 | Status: SHIPPED | OUTPATIENT
Start: 2020-03-13 | End: 2020-03-23

## 2020-03-13 RX ORDER — METRONIDAZOLE 500 MG/1
500 TABLET ORAL 3 TIMES DAILY
Qty: 15 TABLET | Refills: 0 | Status: SHIPPED | OUTPATIENT
Start: 2020-03-13 | End: 2020-03-18

## 2020-03-13 RX ORDER — SODIUM BICARBONATE 650 MG/1
1300 TABLET ORAL 2 TIMES DAILY
Qty: 120 TABLET | Refills: 11 | Status: ON HOLD | COMMUNITY
Start: 2020-03-13 | End: 2020-08-04 | Stop reason: HOSPADM

## 2020-03-13 RX ORDER — DOXYCYCLINE 100 MG/1
100 CAPSULE ORAL EVERY 12 HOURS
Qty: 10 CAPSULE | Refills: 0 | Status: SHIPPED | OUTPATIENT
Start: 2020-03-13 | End: 2020-03-18

## 2020-03-13 RX ORDER — CARVEDILOL 12.5 MG/1
12.5 TABLET ORAL 2 TIMES DAILY
Qty: 60 TABLET | Refills: 11 | Status: ON HOLD | OUTPATIENT
Start: 2020-03-13 | End: 2021-03-29 | Stop reason: HOSPADM

## 2020-03-13 RX ADMIN — MENTHOL, METHYL SALICYLATE: 10; 15 CREAM TOPICAL at 05:03

## 2020-03-13 RX ADMIN — PANTOPRAZOLE SODIUM 40 MG: 40 TABLET, DELAYED RELEASE ORAL at 08:03

## 2020-03-13 RX ADMIN — FERROUS SULFATE TAB EC 325 MG (65 MG FE EQUIVALENT) 325 MG: 325 (65 FE) TABLET DELAYED RESPONSE at 08:03

## 2020-03-13 RX ADMIN — SODIUM BICARBONATE 1300 MG: 325 TABLET ORAL at 08:03

## 2020-03-13 RX ADMIN — LIDOCAINE 1 PATCH: 50 PATCH TOPICAL at 01:03

## 2020-03-13 RX ADMIN — CARVEDILOL 12.5 MG: 12.5 TABLET, FILM COATED ORAL at 08:03

## 2020-03-13 RX ADMIN — METRONIDAZOLE 500 MG: 500 INJECTION, SOLUTION INTRAVENOUS at 02:03

## 2020-03-13 RX ADMIN — MENTHOL, METHYL SALICYLATE: 10; 15 CREAM TOPICAL at 10:03

## 2020-03-13 RX ADMIN — METRONIDAZOLE 500 MG: 500 INJECTION, SOLUTION INTRAVENOUS at 05:03

## 2020-03-13 RX ADMIN — FUROSEMIDE 40 MG: 40 TABLET ORAL at 08:03

## 2020-03-13 RX ADMIN — METRONIDAZOLE 500 MG: 500 INJECTION, SOLUTION INTRAVENOUS at 10:03

## 2020-03-13 RX ADMIN — NIFEDIPINE 60 MG: 30 TABLET, FILM COATED, EXTENDED RELEASE ORAL at 08:03

## 2020-03-13 RX ADMIN — METHOCARBAMOL TABLETS 500 MG: 500 TABLET, COATED ORAL at 10:03

## 2020-03-13 NOTE — PLAN OF CARE
[facility]     Department of Hospital Medicine     [address] [place] [address]     [phone] after hours  [phone] fax       NURSING HOME ORDERS    03/13/2020    Admit to Nursing Home:  Regular Bed      Diagnoses:  Active Hospital Problems    Diagnosis  POA    *Rectal bleeding [K62.5]  Yes    Anemia [D64.9]  Yes    CKD (chronic kidney disease), stage IV [N18.4]  Yes    Debility [R53.81]  Yes    Essential hypertension [I10]  Yes    UTI (urinary tract infection) [N39.0]  Yes    Chronic diastolic heart failure [I50.32]  Yes     Chronic    Paroxysmal atrial fibrillation [I48.0]  Yes     Chronic    Type 2 diabetes mellitus, controlled, 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       Patient is homebound due to:  Rectal bleeding    Allergies:  Review of patient's allergies indicates:   Allergen Reactions    Indomethacin     Nsaids (non-steroidal anti-inflammatory drug)        Vitals:       Once weekly      Diet: renal, cardiac, diabetic 1800   Supplement:  1 can every three times a day with meals                         Type:    Glucerna       Acitivities:  Per facility     LABS:  Per facility protocol    Nursing Precautions:     - Aspiration precautions:                     -  Upright 90 degrees befor during and after meals                      - Fall precautions per nursing home protocol     - Decubitus precautions:        -  for positioning   - Pressure reducing foam mattress   - Turn patient every two hours. Use wedge pillows to anchor patient    CONSULTS:     Nutrition to evaluate and recommend diet        MISCELLANEOUS CARE:     Routine Skin for Bedridden Patients:  Apply moisture barrier cream to all    skin folds and wet areas in perineal area daily and after baths and                           all bowel movements.      DIABETES CARE:       Check blood sugar:         Fingerstick blood sugar AC and HS   Fingerstick blood sugar every 6 hours if unable to eat      Report CBG < 60 or > 400 to physician.                                          Insulin Sliding Scale          Glucose  Novolog Insulin Subcutaneous        0 - 60   Orange juice or glucose tablet, hold insulin      No insulin   201-250  2 units   251-300  4 units   301-350  6 units   351-400  8 units   >400   10 units then call physician      Medications: Discontinue all previous medication orders, if any. See new list below.   Joyce Riley   Home Medication Instructions IWLLA:[id number]    Printed on:03/13/20 6149   Medication Information                      acetaminophen (TYLENOL) 325 MG tablet  Take 2 tablets (650 mg total) by mouth every 4 (four) hours as needed.             albuterol-ipratropium (DUO-NEB) 2.5 mg-0.5 mg/3 mL nebulizer solution  Take 3 mLs by nebulization every 4 (four) hours as needed for Wheezing. Rescue             B complex-vitamin C-folic acid (NEPHRO-MICHAEL) 0.8 mg Tab  Take by mouth once daily.             calcitRIOL (ROCALTROL) 0.25 MCG Cap  Take 1 capsule (0.25 mcg total) by mouth every other day.             carvediloL (COREG) 12.5 MG tablet  Take 1 tablet (12.5 mg total) by mouth 2 (two) times daily.             doxycycline (VIBRAMYCIN) 100 MG Cap  Take 1 capsule (100 mg total) by mouth every 12 (twelve) hours. for 5 days, stop 3/18/20             epoetin diana-epbx (RETACRIT) 10,000 unit/mL imjection  Inject 1 mL (10,000 Units total) into the skin every 7 days.             ergocalciferol (ERGOCALCIFEROL) 50,000 unit Cap  Take 1 capsule (50,000 Units total) by mouth every 7 days.             ferrous sulfate (FEOSOL) 325 mg (65 mg iron) Tab tablet  Take 325 mg by mouth 2 (two) times daily.             furosemide (LASIX) 40 MG tablet  Take 1 tablet (40 mg total) by mouth once daily.             insulin degludec (TRESIBA FLEXTOUCH U-100) 100 unit/mL (3 mL) InPn  Inject 5  Units into the skin every evening.             lidocaine (LIDODERM) 5 %  Place 1 patch onto the skin once daily. Remove & Discard patch within 12 hours or as directed by MD             methocarbamoL (ROBAXIN) 500 MG Tab  Take 1 tablet (500 mg total) by mouth 2 (two) times daily as needed.             methyl salicylate-menthol 15-10% 15-10 % Crea  Apply topically 4 (four) times daily.             metroNIDAZOLE (FLAGYL) 500 MG tablet  Take 1 tablet (500 mg total) by mouth 3 (three) times daily. for 5 days, stop 3/18/20             NIFEdipine (PROCARDIA-XL) 60 MG (OSM) 24 hr tablet  Take 1 tablet (60 mg total) by mouth once daily.             polyethylene glycol (GLYCOLAX) 17 gram PwPk  Take 17 g by mouth 2 (two) times daily as needed.             senna-docusate 8.6-50 mg (PERICOLACE) 8.6-50 mg per tablet  Take 1 tablet by mouth 2 (two) times daily.             sodium bicarbonate 650 MG tablet  Take 2 tablets (1,300 mg total) by mouth 2 (two) times daily.                       _________________________________  Shey Pate MD  03/13/2020

## 2020-03-13 NOTE — PLAN OF CARE
03/13/20 1550   Post-Acute Status   Post-Acute Authorization Placement   Post-Acute Placement Status Referrals Sent   Referral sent via [facility] to [facility]

## 2020-03-13 NOTE — NURSING
End of shift bedside report given to SUKUMAR Noe. Patient in no apparent distress.     12 hour chart check complete.

## 2020-03-13 NOTE — PLAN OF CARE
03/13/20 1741   Post-Acute Status   Post-Acute Authorization Placement   Post-Acute Placement Status Pending Payor Review   Patient accepted in right care but no call report information.   TN called [facility] [phone] and spoke with Radha who stated that the orders were not received.  TN faxed to Radha at [phone]. [phone]:  TN called [facility] to speak to Radha who was unable to answer the phone.  Detailed message left requesting call back.

## 2020-03-13 NOTE — PROGRESS NOTES
[facility]  Nephrology  Progress Note    Patient Name: Joyce Riley  MRN: [id number]  Admission Date: 3/7/2020  Hospital Length of Stay: 5 days  Attending Provider: Shey Pate MD   Primary Care Physician: Kalpana Staton MD  Principal Problem:Rectal bleeding  Date of service 3/13/2020  Consults Reason for consult: ROXANNE  Subjective:     Interval History: She is feeling better, denies further bleeding.  Appetite improving.  No nausea, vomiting.  Sleepy but was awake for most of the night.  No SOB.    Review of patient's allergies indicates:   Allergen Reactions    Indomethacin     Nsaids (non-steroidal anti-inflammatory drug)      Current Facility-Administered Medications   Medication Frequency    0.9%  NaCl infusion (for blood administration) Q24H PRN    acetaminophen tablet 650 mg Q4H PRN    albuterol-ipratropium 2.5 mg-0.5 mg/3 mL nebulizer solution 3 mL Q4H PRN    carvediloL tablet 12.5 mg BID    cefTRIAXone (ROCEPHIN) 1 g in dextrose 5 % 50 mL IVPB Q24H    dextrose 50% injection 12.5 g PRN    epoetin diana-epbx injection 10,000 Units Q7 Days    ferrous sulfate EC tablet 325 mg BID    furosemide tablet 40 mg Daily    glucagon (human recombinant) injection 1 mg PRN    hydrALAZINE injection 10 mg Q8H PRN    insulin aspart U-100 pen 0-5 Units Q6H PRN    lidocaine 5 % patch 1 patch Q24H    methocarbamoL tablet 500 mg BID PRN    methyl salicylate-menthol 15-10% cream QID    metronidazole IVPB 500 mg Q8H    NIFEdipine 24 hr tablet 60 mg Daily    ondansetron injection 8 mg Q6H PRN    pantoprazole EC tablet 40 mg Daily    polyethylene glycol packet 17 g BID PRN    sodium bicarbonate tablet 1,300 mg BID    sodium chloride 0.9% flush 10 mL PRN       Objective:     Vital Signs (Most Recent):  Temp: 98.2 °F (36.8 °C) (03/13/20 0746)  Pulse: 83 (03/13/20 0746)  Resp: 16 (03/13/20 0746)  BP: (!) 176/79 (03/13/20 0746)  SpO2: 97 % (03/13/20 0746)  O2 Device (Oxygen  Therapy): room air (03/11/20 0745) Vital Signs (24h Range):  Temp:  [97.9 °F (36.6 °C)-98.4 °F (36.9 °C)] 98.2 °F (36.8 °C)  Pulse:  [81-88] 83  Resp:  [16-18] 16  SpO2:  [97 %-98 %] 97 %  BP: (151-176)/(65-79) 176/79     Weight: 86.4 kg (190 lb 7.6 oz) (03/13/20 0318)  Body mass index is 32.7 kg/m².  Body surface area is 1.98 meters squared.    I/O last 3 completed shifts:  In: 950 [P.O.:950]  Out: -     Physical Exam   Constitutional: She is oriented to person, place, and time. She appears well-developed and well-nourished. No distress.   HENT:   Head: Normocephalic and atraumatic.   Eyes: EOM are normal.   Cardiovascular: Normal rate and regular rhythm. Exam reveals no friction rub.   No murmur heard.  Pulmonary/Chest: Effort normal and breath sounds normal. No respiratory distress. She has no wheezes. She has no rales.   Abdominal: Soft. She exhibits no distension. There is no tenderness.   Musculoskeletal: She exhibits edema (trace dependent). She exhibits no tenderness.   Neurological: She is alert and oriented to person, place, and time.   No Asterixis   Skin: Skin is warm and dry. No rash noted. She is not diaphoretic. No erythema.   Psychiatric: She has a normal mood and affect. Her behavior is normal. Thought content normal.       Significant Labs:  CBC:   Recent Labs   Lab 03/13/20  0445   WBC 7.34   RBC 2.71*   HGB 8.2*   HCT 25.9*      MCV 96   MCH 30.3   MCHC 31.7*     CMP:   Recent Labs   Lab 03/13/20  0445   GLU 92   CALCIUM 8.1*   ALBUMIN 2.8*   PROT 5.6*      K 4.2   CO2 20*   *   BUN 55*   CREATININE 3.8*   ALKPHOS 105   ALT 11   AST 18   BILITOT 0.2     All labs within the past 24 hours have been reviewed.    Significant Imaging:  X-Ray: Reviewed    Assessment/Plan:     Active Diagnoses:    Diagnosis Date Noted POA    PRINCIPAL PROBLEM:  Rectal bleeding [K62.5] 03/08/2020 Yes    Anemia [D64.9] 03/09/2020 Yes    CKD (chronic kidney disease), stage IV [N18.4] 03/08/2020 Yes     Debility [R53.81] 07/20/2019 Yes    Essential hypertension [I10] 07/17/2019 Yes    UTI (urinary tract infection) [N39.0] 07/17/2019 Yes    Chronic diastolic heart failure [I50.32] 04/10/2019 Yes     Chronic    Paroxysmal atrial fibrillation [I48.0] 04/10/2019 Yes     Chronic    Type 2 diabetes mellitus, controlled, 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] 03/31/2019 Yes     Chronic      Problems Resolved During this Admission:       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  - Baseline Cr 2.3-3.2, GFR 15-21; recent ROXANNE 2/2 GI bleed, peak Cr 4.3 on 7/16/18  - h/o HD trial at [facility] with recommendation for palliative care 2/2 patient pulling at lines   - cognitively improving  - Etiology ischemic ATN 2/2 acute GIB / TRUNG  - Cr and BUN trending down, UOP acceptable  - No need for emergent RRT at this time; continue supportive care  - renally dose meds for current GFR; avoid nephrotoxic meds including NSAIDs  - caution with IVF in ROXANNE on CKD V patient with HF  - strict Is/Os daily labs     Blood loss anemia/ AOCKD  - s/p transfusion, GIB  - recently completed outpatient IV iron load  - Hgb trending up, continue weekly sub q AHSAN  - transfuse Hgb< 7  - continue to monitor CBC     Metabolic Acidosis  - in setting ROXANNE 2/2 GIB + CKD V  - Bicarb trending up  - continue Na Bicarb 1300 PO BID, titrate PRN  - monitor BMP     Proteinuria  - 2+ protein on admit UA, c/w UA 1/2020  - consider repeat UPrCr  - hx hyperkalemia & poor renal reserves, avoid ACE/ARB for now     Hypertension   - NS d/c yesterday, BP range improved but still elevated  - C-spine with incidental notice of congestion, mild peripheral edema   - continue home lasix regimen, nifedipine and coreg.  Can up-titrate nifedipine and coreg as needed if persistently uncontrolled.  Will use caution in the setting of GIB   - continue to monitor    Thank you for allowing me to participate in the care of your patient.  Please call with any questions.    Date of service: 3/13/2020  Samanta  Phuong HUANG   Nephrology  [facility]  Office [phone]   [facility]

## 2020-03-13 NOTE — PROGRESS NOTES
[facility]  Progress Note    Patient Name: Joyce Riley  MRN: [id number]  Patient Class: IP- Inpatient   Admission Date: 3/7/2020  Length of Stay: 5 days  Attending Physician: Shey Pate MD  Primary Care Provider: Kalpana Staton MD        Subjective:     Principal Problem:Rectal bleeding        HPI:  Pt is a 87 yo F with a h/o GI bleed, CHF, A-fib, DM2, CKD, HTN, CVA, GERD, Anemia who presents to the ER for rectal bleeding that started at the nursing home prior to arrival.  Staff at the nursing home noted a large amount of BRBPR and EMS was called.  Of note she has a h/o GI bleed in the past per chart review. Pt denied any fevers, chills, diarrhea, nausea, vomiting, vision changes, dizziness, syncope, or chest pains. Pt was admitted to the ICU for monitoring and GI was consulted from the ER.      Overview/Hospital Course:  87 y/o female presents from NH with rectal bleeding.  Abdominal CT showing acute sigmoid diverticulitis.  Probable diverticular bleed and GI consulted.  Patient started on Cipro and Flagyl.  Anemia of CKD with anemia of acute blood loss.  Transfused 2 units of blood with adequate correction of H/H.  No further bleeding.  CKD stage 4 with increase in Creat.    3/10- patient required 1 unit blood transfusion, tele boarder - await cervical spine images    3/12- pt had large melanotic stool, drop in h/h.  Monitor for need for further transfusion. Increase Bp med- uncontrolled HTN.     Interval History: pt c/o neck pain   Review of Systems   HENT: Negative for ear discharge and ear pain.    Eyes: Negative for discharge and itching.   Endocrine: Negative for cold intolerance and heat intolerance.   Neurological: Negative for seizures and syncope.     Objective:     Vital Signs (Most Recent):  Temp: 98.3 °F (36.8 °C) (03/10/20 1902)  Pulse: 82 (03/10/20 1902)  Resp: (!) 29 (03/10/20 1902)  BP: (!) 155/62 (03/10/20 1902)  SpO2: 100 % (03/10/20 1902)  Vital Signs (24h Range):  Temp:  [97.7 °F (36.5 °C)-98.7 °F (37.1 °C)] 98.3 °F (36.8 °C)  Pulse:  [69-90] 82  Resp:  [16-29] 29  SpO2:  [97 %-100 %] 100 %  BP: (119-186)/(52-94) 155/62     Weight: 79.5 kg (175 lb 4.3 oz)  Body mass index is 30.08 kg/m².    Intake/Output Summary (Last 24 hours) at 3/10/2020 1910  Last data filed at 3/10/2020 1830  Gross per 24 hour   Intake 3313 ml   Output --   Net 3313 ml      Physical Exam   Constitutional: She is oriented to person, place, and time. She appears well-developed and well-nourished. No distress.   HENT:   Head: Normocephalic and atraumatic.   Mouth/Throat: Mucous membranes are normal.   Eyes: Pupils are equal, round, and reactive to light. Conjunctivae and EOM are normal.   Neck: Normal range of motion. No tracheal deviation present.   Cardiovascular: Normal rate and regular rhythm.   Pulmonary/Chest: Effort normal and breath sounds normal. She has no wheezes. She has no rales.   Abdominal: Soft. Bowel sounds are normal. She exhibits no distension. There is no tenderness.   Musculoskeletal: Normal range of motion. She exhibits edema (mild pitting peripheral edema). She exhibits no deformity.   Neurological: She is alert and oriented to person, place, and time. She exhibits normal muscle tone.   Skin: Skin is warm and dry. Capillary refill takes 2 to 3 seconds. She is not diaphoretic. No pallor.   Psychiatric: She has a normal mood and affect. Her behavior is normal. Thought content normal.   Vitals reviewed.      Significant Labs:   BMP:   Recent Labs   Lab 03/10/20  0316         K 4.4      CO2 20*   BUN 77*   CREATININE 4.7*   CALCIUM 7.7*     CBC:   Recent Labs   Lab 03/08/20 1914 03/09/20  0354 03/10/20  0316 03/10/20  1119   WBC 13.96* 11.80 10.45  --    HGB 8.9* 8.6* 6.9* 8.4*   HCT 26.6* 27.0* 22.2* 25.9*    212 165  --        Significant Imaging: I have reviewed and interpreted all pertinent imaging results/findings within the past  24 hours.      Assessment/Plan:      * Rectal bleeding  Probable diverticular bleed as abdominal CT showing acute sigmoid diverticulitis.  Acute Sigmoid Diverticulitis.  Anemia of CKD with anemia of acute blood loss.  Appreciate GI input.  Started on Cipro and Flagyl.  Transfused 2 units of blood with adequate correction of H/H.  Continue to monitor for now.  No further bleeding.  Holding eliqius.          Anemia  Transfuse PRN  Acute blood loss     3/12-Melena present, drop in h/h- monitor for need transfusion       CKD (chronic kidney disease), stage IV  Initially presented with Creat at baseline.  Now with ARF.  Similar issue in 7/2019 with increase in Creat after GI bleed.  Will gently hydrate.  Avoid nephrotoxic medications.  Avoid hypotension.  No mclaughlin in place, but patient has been changed with urine in diaper.  No need for dialysis at this time, but will consult Nephrology.      Debility  Functional Quadriplegia      UTI (urinary tract infection)  E.coli- resist to cipro- change to IV rocephin      Essential hypertension  Continue Coreg and Nifedipine with parameters.      Paroxysmal atrial fibrillation  -chronic, monitor.    Holding anticoagulation.      Chronic diastolic heart failure  No evidence of acute exacerbation.      Type 2 diabetes mellitus, controlled, with renal complications  -accuchecks, SSI  Diabetic diet.        VTE Risk Mitigation (From admission, onward)         Ordered     IP VTE HIGH RISK PATIENT  Once      03/08/20 0522     Place sequential compression device  Until discontinued      03/08/20 0522     Place SANTOS hose  Until discontinued      03/08/20 0522                      Shey Pate MD  Department of Hospital Medicine   [facility]

## 2020-03-13 NOTE — PLAN OF CARE
Uneventful last pm; no bm's noted.  Vital signs stable; NSR with BBB on Telemetry.  Repositioned for comfort.  Plan of care reviewed with pt; verbalized understanding.

## 2020-03-13 NOTE — PLAN OF CARE
03/13/20 1818   Final Note   Assessment Type Final Discharge Note   Anticipated Discharge Disposition Int Care Fac   Discharge plans and expectations educations in teach back method with documentation complete? Yes   Right Care Referral Info   Post Acute Recommendation Other   Referral Type NH   Facility Name [facility]   Post-Acute Status   Post-Acute Authorization Placement   Post-Acute Placement Status Set-up Complete   Discharge Delays (!) Patient Transportation

## 2020-03-13 NOTE — PROGRESS NOTES
ADT 30 order placed for Van Transportation.  Requested  time:  1930  If transportation does not arrive at ETA time nurse will be instructed to follow protocol for transportation below:   How can I get in touch directly with dispatch, if needed?                 Non-emergent dispatch: [phone]      +++NURSING:  If Van does not arrive at requested time please call the above Non Emergent Dispatcher.  If issue not resolved please escalate to your charge nurse for further instructions.

## 2020-03-14 ENCOUNTER — HOSPITAL ENCOUNTER (EMERGENCY)
Facility: HOSPITAL | Age: 85
Discharge: HOME | End: 2020-03-15
Attending: EMERGENCY MEDICINE
Payer: MEDICARE

## 2020-03-14 DIAGNOSIS — K92.1 HEMATOCHEZIA: Primary | ICD-10-CM

## 2020-03-14 DIAGNOSIS — K57.92 DIVERTICULITIS: ICD-10-CM

## 2020-03-14 LAB
ALBUMIN SERPL BCP-MCNC: 3.1 G/DL (ref 3.5–5.2)
ALP SERPL-CCNC: 107 U/L (ref 55–135)
ALT SERPL W/O P-5'-P-CCNC: 16 U/L (ref 10–44)
ANION GAP SERPL CALC-SCNC: 9 MMOL/L (ref 8–16)
AST SERPL-CCNC: 22 U/L (ref 10–40)
BASOPHILS # BLD AUTO: 0.08 K/UL (ref 0–0.2)
BASOPHILS NFR BLD: 0.8 % (ref 0–1.9)
BILIRUB SERPL-MCNC: 0.2 MG/DL (ref 0.1–1)
BUN SERPL-MCNC: 50 MG/DL (ref 8–23)
CALCIUM SERPL-MCNC: 8.8 MG/DL (ref 8.7–10.5)
CHLORIDE SERPL-SCNC: 111 MMOL/L (ref 95–110)
CO2 SERPL-SCNC: 22 MMOL/L (ref 23–29)
CREAT SERPL-MCNC: 3.4 MG/DL (ref 0.5–1.4)
DIFFERENTIAL METHOD: ABNORMAL
EOSINOPHIL # BLD AUTO: 0.1 K/UL (ref 0–0.5)
EOSINOPHIL NFR BLD: 1.2 % (ref 0–8)
ERYTHROCYTE [DISTWIDTH] IN BLOOD BY AUTOMATED COUNT: 17.2 % (ref 11.5–14.5)
EST. GFR  (AFRICAN AMERICAN): 13 ML/MIN/1.73 M^2
EST. GFR  (NON AFRICAN AMERICAN): 12 ML/MIN/1.73 M^2
GLUCOSE SERPL-MCNC: 99 MG/DL (ref 70–110)
HCT VFR BLD AUTO: 27.4 % (ref 37–48.5)
HGB BLD-MCNC: 8.6 G/DL (ref 12–16)
IMM GRANULOCYTES # BLD AUTO: 0.15 K/UL (ref 0–0.04)
IMM GRANULOCYTES NFR BLD AUTO: 1.4 % (ref 0–0.5)
LYMPHOCYTES # BLD AUTO: 1.7 K/UL (ref 1–4.8)
LYMPHOCYTES NFR BLD: 16.3 % (ref 18–48)
MCH RBC QN AUTO: 30.5 PG (ref 27–31)
MCHC RBC AUTO-ENTMCNC: 31.4 G/DL (ref 32–36)
MCV RBC AUTO: 97 FL (ref 82–98)
MONOCYTES # BLD AUTO: 0.6 K/UL (ref 0.3–1)
MONOCYTES NFR BLD: 6.1 % (ref 4–15)
NEUTROPHILS # BLD AUTO: 7.7 K/UL (ref 1.8–7.7)
NEUTROPHILS NFR BLD: 74.2 % (ref 38–73)
NRBC BLD-RTO: 0 /100 WBC
PLATELET # BLD AUTO: 251 K/UL (ref 150–350)
PMV BLD AUTO: 10 FL (ref 9.2–12.9)
POTASSIUM SERPL-SCNC: 3.9 MMOL/L (ref 3.5–5.1)
PROT SERPL-MCNC: 6.3 G/DL (ref 6–8.4)
RBC # BLD AUTO: 2.82 M/UL (ref 4–5.4)
SODIUM SERPL-SCNC: 142 MMOL/L (ref 136–145)
WBC # BLD AUTO: 10.41 K/UL (ref 3.9–12.7)

## 2020-03-14 PROCEDURE — 85730 THROMBOPLASTIN TIME PARTIAL: CPT

## 2020-03-14 PROCEDURE — 85610 PROTHROMBIN TIME: CPT

## 2020-03-14 PROCEDURE — 85025 COMPLETE CBC W/AUTO DIFF WBC: CPT

## 2020-03-14 PROCEDURE — 99283 EMERGENCY DEPT VISIT LOW MDM: CPT

## 2020-03-14 PROCEDURE — 80053 COMPREHEN METABOLIC PANEL: CPT

## 2020-03-14 NOTE — NURSING
Bedside shift report received from [place]. Communication board has been updated. NAD noted. Will continue to monitor.

## 2020-03-14 NOTE — NURSING
Pt is leaving via wheelchair van. No O2 therapy. Pt was changed and given 2100 medications before leaving.

## 2020-03-15 ENCOUNTER — HOSPITAL ENCOUNTER (OUTPATIENT)
Facility: HOSPITAL | Age: 85
Discharge: NURSING, REHAB OR LONG-TERM CARE | End: 2020-03-16
Attending: EMERGENCY MEDICINE | Admitting: HOSPITALIST
Payer: MEDICARE

## 2020-03-15 VITALS
DIASTOLIC BLOOD PRESSURE: 86 MMHG | BODY MASS INDEX: 30.39 KG/M2 | RESPIRATION RATE: 19 BRPM | TEMPERATURE: 98 F | SYSTOLIC BLOOD PRESSURE: 144 MMHG | WEIGHT: 178 LBS | HEIGHT: 64 IN | OXYGEN SATURATION: 99 % | HEART RATE: 80 BPM

## 2020-03-15 DIAGNOSIS — I48.0 PAROXYSMAL ATRIAL FIBRILLATION: Chronic | ICD-10-CM

## 2020-03-15 DIAGNOSIS — N18.4 CKD (CHRONIC KIDNEY DISEASE), STAGE IV: ICD-10-CM

## 2020-03-15 DIAGNOSIS — I27.23 PULMONARY HYPERTENSION DUE TO LUNG DISEASE: Chronic | ICD-10-CM

## 2020-03-15 DIAGNOSIS — K92.2 GASTROINTESTINAL HEMORRHAGE, UNSPECIFIED GASTROINTESTINAL HEMORRHAGE TYPE: Primary | ICD-10-CM

## 2020-03-15 DIAGNOSIS — E66.9 OBESITY, CLASS II, BMI 35-39.9: Chronic | ICD-10-CM

## 2020-03-15 DIAGNOSIS — I10 ESSENTIAL HYPERTENSION: ICD-10-CM

## 2020-03-15 DIAGNOSIS — I50.32 CHRONIC DIASTOLIC HEART FAILURE: Chronic | ICD-10-CM

## 2020-03-15 DIAGNOSIS — I12.9 TYPE 2 DIABETES MELLITUS WITH STAGE 4 CHRONIC KIDNEY DISEASE AND HYPERTENSION: ICD-10-CM

## 2020-03-15 DIAGNOSIS — E11.22 TYPE 2 DIABETES MELLITUS WITH STAGE 4 CHRONIC KIDNEY DISEASE AND HYPERTENSION: ICD-10-CM

## 2020-03-15 DIAGNOSIS — D64.9 ANEMIA, UNSPECIFIED TYPE: ICD-10-CM

## 2020-03-15 DIAGNOSIS — Z79.01 CHRONIC ANTICOAGULATION: Chronic | ICD-10-CM

## 2020-03-15 DIAGNOSIS — K62.5 RECTAL BLEEDING: ICD-10-CM

## 2020-03-15 DIAGNOSIS — N18.4 TYPE 2 DIABETES MELLITUS WITH STAGE 4 CHRONIC KIDNEY DISEASE AND HYPERTENSION: ICD-10-CM

## 2020-03-15 LAB
APTT BLDCRRT: 28.4 SEC (ref 21–32)
BACTERIA #/AREA URNS HPF: NORMAL /HPF
BASOPHILS # BLD AUTO: 0.08 K/UL (ref 0–0.2)
BASOPHILS NFR BLD: 0.9 % (ref 0–1.9)
BILIRUB UR QL STRIP: NEGATIVE
CLARITY UR: CLEAR
COLOR UR: YELLOW
DIFFERENTIAL METHOD: ABNORMAL
EOSINOPHIL # BLD AUTO: 0.1 K/UL (ref 0–0.5)
EOSINOPHIL NFR BLD: 1.3 % (ref 0–8)
ERYTHROCYTE [DISTWIDTH] IN BLOOD BY AUTOMATED COUNT: 17.6 % (ref 11.5–14.5)
GLUCOSE SERPL-MCNC: 89 MG/DL (ref 70–110)
GLUCOSE UR QL STRIP: NEGATIVE
HCT VFR BLD AUTO: 26.6 % (ref 37–48.5)
HGB BLD-MCNC: 8.3 G/DL (ref 12–16)
HGB UR QL STRIP: ABNORMAL
HYALINE CASTS #/AREA URNS LPF: 1 /LPF
IMM GRANULOCYTES # BLD AUTO: 0.11 K/UL (ref 0–0.04)
IMM GRANULOCYTES NFR BLD AUTO: 1.3 % (ref 0–0.5)
INR PPP: 1 (ref 0.8–1.2)
KETONES UR QL STRIP: NEGATIVE
LEUKOCYTE ESTERASE UR QL STRIP: NEGATIVE
LYMPHOCYTES # BLD AUTO: 1.3 K/UL (ref 1–4.8)
LYMPHOCYTES NFR BLD: 15.1 % (ref 18–48)
MCH RBC QN AUTO: 30.7 PG (ref 27–31)
MCHC RBC AUTO-ENTMCNC: 31.2 G/DL (ref 32–36)
MCV RBC AUTO: 99 FL (ref 82–98)
MICROSCOPIC COMMENT: NORMAL
MONOCYTES # BLD AUTO: 0.5 K/UL (ref 0.3–1)
MONOCYTES NFR BLD: 5.9 % (ref 4–15)
NEUTROPHILS # BLD AUTO: 6.5 K/UL (ref 1.8–7.7)
NEUTROPHILS NFR BLD: 75.5 % (ref 38–73)
NITRITE UR QL STRIP: NEGATIVE
NRBC BLD-RTO: 0 /100 WBC
PH UR STRIP: 6 [PH] (ref 5–8)
PLATELET # BLD AUTO: 240 K/UL (ref 150–350)
PMV BLD AUTO: 9.6 FL (ref 9.2–12.9)
POCT GLUCOSE: 74 MG/DL (ref 70–110)
POCT GLUCOSE: 86 MG/DL (ref 70–110)
PROT UR QL STRIP: ABNORMAL
PROTHROMBIN TIME: 11.4 SEC (ref 9–12.5)
RBC # BLD AUTO: 2.7 M/UL (ref 4–5.4)
RBC #/AREA URNS HPF: 2 /HPF (ref 0–4)
SP GR UR STRIP: 1.01 (ref 1–1.03)
SQUAMOUS #/AREA URNS HPF: 2 /HPF
URN SPEC COLLECT METH UR: ABNORMAL
UROBILINOGEN UR STRIP-ACNC: NEGATIVE EU/DL
WBC # BLD AUTO: 8.62 K/UL (ref 3.9–12.7)
WBC #/AREA URNS HPF: 2 /HPF (ref 0–5)

## 2020-03-15 PROCEDURE — 36410 VNPNXR 3YR/> PHY/QHP DX/THER: CPT

## 2020-03-15 PROCEDURE — 85025 COMPLETE CBC W/AUTO DIFF WBC: CPT

## 2020-03-15 PROCEDURE — 25000003 PHARM REV CODE 250: Performed by: NURSE PRACTITIONER

## 2020-03-15 PROCEDURE — 99285 EMERGENCY DEPT VISIT HI MDM: CPT

## 2020-03-15 PROCEDURE — 82962 GLUCOSE BLOOD TEST: CPT

## 2020-03-15 PROCEDURE — G0378 HOSPITAL OBSERVATION PER HR: HCPCS

## 2020-03-15 PROCEDURE — 81000 URINALYSIS NONAUTO W/SCOPE: CPT

## 2020-03-15 PROCEDURE — 25000003 PHARM REV CODE 250: Performed by: HOSPITALIST

## 2020-03-15 PROCEDURE — 25000003 PHARM REV CODE 250: Performed by: EMERGENCY MEDICINE

## 2020-03-15 RX ORDER — METRONIDAZOLE 500 MG/1
500 TABLET ORAL 3 TIMES DAILY
Status: DISCONTINUED | OUTPATIENT
Start: 2020-03-15 | End: 2020-03-16 | Stop reason: HOSPADM

## 2020-03-15 RX ORDER — CIPROFLOXACIN 500 MG/1
500 TABLET ORAL DAILY
Status: DISCONTINUED | OUTPATIENT
Start: 2020-03-15 | End: 2020-03-16 | Stop reason: HOSPADM

## 2020-03-15 RX ORDER — SODIUM CHLORIDE 0.9 % (FLUSH) 0.9 %
10 SYRINGE (ML) INJECTION
Status: DISCONTINUED | OUTPATIENT
Start: 2020-03-15 | End: 2020-03-16 | Stop reason: HOSPADM

## 2020-03-15 RX ORDER — SODIUM BICARBONATE 325 MG/1
1300 TABLET ORAL 2 TIMES DAILY
Status: DISCONTINUED | OUTPATIENT
Start: 2020-03-15 | End: 2020-03-16 | Stop reason: HOSPADM

## 2020-03-15 RX ORDER — CARVEDILOL 12.5 MG/1
12.5 TABLET ORAL 2 TIMES DAILY
Status: DISCONTINUED | OUTPATIENT
Start: 2020-03-15 | End: 2020-03-15

## 2020-03-15 RX ORDER — ONDANSETRON 2 MG/ML
4 INJECTION INTRAMUSCULAR; INTRAVENOUS EVERY 6 HOURS PRN
Status: DISCONTINUED | OUTPATIENT
Start: 2020-03-15 | End: 2020-03-16 | Stop reason: HOSPADM

## 2020-03-15 RX ORDER — NIFEDIPINE 30 MG/1
60 TABLET, EXTENDED RELEASE ORAL DAILY
Status: DISCONTINUED | OUTPATIENT
Start: 2020-03-15 | End: 2020-03-16 | Stop reason: HOSPADM

## 2020-03-15 RX ORDER — CARVEDILOL 6.25 MG/1
12.5 TABLET ORAL 2 TIMES DAILY
Status: DISCONTINUED | OUTPATIENT
Start: 2020-03-15 | End: 2020-03-16 | Stop reason: HOSPADM

## 2020-03-15 RX ORDER — IPRATROPIUM BROMIDE AND ALBUTEROL SULFATE 2.5; .5 MG/3ML; MG/3ML
3 SOLUTION RESPIRATORY (INHALATION) EVERY 4 HOURS PRN
Status: DISCONTINUED | OUTPATIENT
Start: 2020-03-15 | End: 2020-03-16 | Stop reason: HOSPADM

## 2020-03-15 RX ORDER — TALC
6 POWDER (GRAM) TOPICAL NIGHTLY PRN
Status: DISCONTINUED | OUTPATIENT
Start: 2020-03-15 | End: 2020-03-16 | Stop reason: HOSPADM

## 2020-03-15 RX ORDER — ACETAMINOPHEN 325 MG/1
650 TABLET ORAL EVERY 6 HOURS PRN
Status: DISCONTINUED | OUTPATIENT
Start: 2020-03-15 | End: 2020-03-16 | Stop reason: HOSPADM

## 2020-03-15 RX ORDER — LIDOCAINE 50 MG/G
1 PATCH TOPICAL
Status: DISCONTINUED | OUTPATIENT
Start: 2020-03-15 | End: 2020-03-16 | Stop reason: HOSPADM

## 2020-03-15 RX ORDER — FUROSEMIDE 40 MG/1
40 TABLET ORAL DAILY
Status: DISCONTINUED | OUTPATIENT
Start: 2020-03-16 | End: 2020-03-16 | Stop reason: HOSPADM

## 2020-03-15 RX ADMIN — CIPROFLOXACIN 500 MG: 500 TABLET, FILM COATED ORAL at 04:03

## 2020-03-15 RX ADMIN — NIFEDIPINE 60 MG: 30 TABLET, FILM COATED, EXTENDED RELEASE ORAL at 04:03

## 2020-03-15 RX ADMIN — METRONIDAZOLE 500 MG: 500 TABLET ORAL at 04:03

## 2020-03-15 RX ADMIN — METRONIDAZOLE 500 MG: 500 TABLET ORAL at 10:03

## 2020-03-15 RX ADMIN — LIDOCAINE 1 PATCH: 50 PATCH TOPICAL at 07:03

## 2020-03-15 RX ADMIN — CARVEDILOL 12.5 MG: 6.25 TABLET, FILM COATED ORAL at 10:03

## 2020-03-15 RX ADMIN — SODIUM BICARBONATE 1300 MG: 325 TABLET ORAL at 10:03

## 2020-03-15 RX ADMIN — CARVEDILOL 12.5 MG: 6.25 TABLET, FILM COATED ORAL at 04:03

## 2020-03-15 NOTE — ED NOTES
Unsuccessful attempt at Ultrasound guided IV placement. Notified House supervisor for assistance.

## 2020-03-15 NOTE — ASSESSMENT & PLAN NOTE
Renal function at patient baseline and slightly more improved since last month. Will follow labs, renally dose medications, avoid nephrotoxic agent

## 2020-03-15 NOTE — DISCHARGE SUMMARY
[facility]  Discharge Summary      Patient Name: Joyce Riley  MRN: [id number]  Admission Date: 3/7/2020  Hospital Length of Stay: 5 days  Discharge Date and Time: 3/13/2020  Attending Physician: No att. providers found   Discharging Provider: Shey Pate MD  Primary Care Provider: Kalpana Staton MD      HPI:   Pt is a 87 yo F with a h/o GI bleed, CHF, A-fib, DM2, CKD, HTN, CVA, GERD, Anemia who presents to the ER for rectal bleeding that started at the nursing home prior to arrival.  Staff at the nursing home noted a large amount of BRBPR and EMS was called.  Of note she has a h/o GI bleed in the past per chart review. Pt denied any fevers, chills, diarrhea, nausea, vomiting, vision changes, dizziness, syncope, or chest pains. Pt was admitted to the ICU for monitoring and GI was consulted from the ER.      * No surgery found *      Hospital Course:   87 y/o female presents from NH with rectal bleeding.  Abdominal CT showing acute sigmoid diverticulitis.  Probable diverticular bleed and GI consulted.  Patient started on Cipro and Flagyl.  Anemia of CKD with anemia of acute blood loss.  Transfused 2 units of blood with adequate correction of H/H.  No further bleeding.  CKD stage 4 with increase in Creat.    3/10- patient required 1 unit blood transfusion, tele boarder - await cervical spine images    3/12- pt had large melanotic stool, drop in h/h.  Monitor for need for further transfusion. Increase Bp med- uncontrolled HTN.   3/13- pt h/h stable, no further bleeding, explained that neck pain will be chronic. Dc back to NH.  Follow up GI as scheduled.      Consults:   Consults (From admission, onward)        Status Ordering Provider     Gastroenterology  Once     Provider:  Catrachito Cifuentes MD    Completed MATTHEW CORDERO     Inpatient consult to Nephrology  Once     Provider:  Samanta Baca MD    Completed JENNIE SINGH          No new Assessment & Plan  notes have been filed under this hospital service since the last note was generated.  Service: Hospital Medicine    Final Active Diagnoses:    Diagnosis Date Noted POA    PRINCIPAL PROBLEM:  Rectal bleeding [K62.5] 03/08/2020 Yes    Anemia [D64.9] 03/09/2020 Yes    CKD (chronic kidney disease), stage IV [N18.4] 03/08/2020 Yes    Debility [R53.81] 07/20/2019 Yes    Essential hypertension [I10] 07/17/2019 Yes    UTI (urinary tract infection) [N39.0] 07/17/2019 Yes    Chronic diastolic heart failure [I50.32] 04/10/2019 Yes     Chronic    Paroxysmal atrial fibrillation [I48.0] 04/10/2019 Yes     Chronic    Type 2 diabetes mellitus, controlled, 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] 03/31/2019 Yes     Chronic      Problems Resolved During this Admission:       Discharged Condition: good    Disposition: Long Term Care    Follow Up:  Follow-up Information     [facility]    Specialties:  Nursing Home Agency, SNF Agency, LTAC  Contact information:  [address] [place], [place] [id number] [phone]                 Patient Instructions:      Diet diabetic     Diet renal     Diet Cardiac     Activity as tolerated           Pending Diagnostic Studies:     None         Medications:  Reconciled Home Medications:      Medication List      START taking these medications    doxycycline 100 MG Cap  Commonly known as:  VIBRAMYCIN  Take 1 capsule (100 mg total) by mouth every 12 (twelve) hours. for 5 days     epoetin diana-epbx 10,000 unit/mL imjection  Commonly known as:  RETACRIT  Inject 1 mL (10,000 Units total) into the skin every 7 days.  Start taking on:  March 18, 2020     furosemide 40 MG tablet  Commonly known as:  LASIX  Take 1 tablet (40 mg total) by mouth once daily.     lidocaine 5 %  Commonly known as:  LIDODERM  Place 1 patch onto the skin once daily. Remove & Discard patch within 12 hours or as directed by MD     methocarbamoL 500 MG Tab  Commonly known as:  ROBAXIN  Take 1 tablet (500 mg  total) by mouth 2 (two) times daily as needed.     methyl salicylate-menthol 15-10% 15-10 % Crea  Apply topically 4 (four) times daily.     metroNIDAZOLE 500 MG tablet  Commonly known as:  FLAGYL  Take 1 tablet (500 mg total) by mouth 3 (three) times daily. for 5 days        CHANGE how you take these medications    acetaminophen 325 MG tablet  Commonly known as:  TYLENOL  Take 2 tablets (650 mg total) by mouth every 4 (four) hours as needed.  What changed:  when to take this     carvediloL 12.5 MG tablet  Commonly known as:  COREG  Take 1 tablet (12.5 mg total) by mouth 2 (two) times daily.  What changed:    · medication strength  · how much to take     NIFEdipine 60 MG (OSM) 24 hr tablet  Commonly known as:  PROCARDIA-XL  Take 1 tablet (60 mg total) by mouth once daily.  What changed:    · how much to take  · additional instructions     sodium bicarbonate 650 MG tablet  Take 2 tablets (1,300 mg total) by mouth 2 (two) times daily.  What changed:  how much to take        CONTINUE taking these medications    albuterol-ipratropium 2.5 mg-0.5 mg/3 mL nebulizer solution  Commonly known as:  DUO-NEB  Take 3 mLs by nebulization every 4 (four) hours as needed for Wheezing. Rescue     calcitRIOL 0.25 MCG Cap  Commonly known as:  ROCALTROL  Take 1 capsule (0.25 mcg total) by mouth every other day.     ergocalciferol 50,000 unit Cap  Commonly known as:  ERGOCALCIFEROL  Take 1 capsule (50,000 Units total) by mouth every 7 days.     ferrous sulfate 325 mg (65 mg iron) Tab tablet  Commonly known as:  FEOSOL  Take 325 mg by mouth 2 (two) times daily.     insulin degludec 100 unit/mL (3 mL) Inpn  Commonly known as:  TRESIBA FLEXTOUCH U-100  Inject 5 Units into the skin every evening.     NEPHRO-MICHAEL 0.8 mg Tab  Generic drug:  B complex-vitamin C-folic acid  Take by mouth once daily.     polyethylene glycol 17 gram Pwpk  Commonly known as:  GLYCOLAX  Take 17 g by mouth 2 (two) times daily as needed.     senna-docusate 8.6-50 mg  8.6-50 mg per tablet  Commonly known as:  PERICOLACE  Take 1 tablet by mouth 2 (two) times daily.        STOP taking these medications    ACIDOPHILUS ORAL     colchicine 0.6 mg tablet  Commonly known as:  COLCRYS     colestipoL 1 gram Tab  Commonly known as:  COLESTID     ELIQUIS 2.5 mg Tab  Generic drug:  apixaban     famotidine 20 MG tablet  Commonly known as:  PEPCID     magnesium hydroxide 400 mg (170 mg magnesium) Chew     metOLazone 2.5 MG tablet  Commonly known as:  ZAROXOLYN            Indwelling Lines/Drains at time of discharge:   Lines/Drains/Airways     None                 Time spent on the discharge of patient: 40 minutes  Patient was seen and examined on the date of discharge and determined to be suitable for discharge.         Shey Pate MD  Department of Hospital Medicine  [facility]

## 2020-03-15 NOTE — SUBJECTIVE & OBJECTIVE
Past Medical History:   Diagnosis Date    Anemia     Anticoagulant long-term use     Arthritis     Atrial fibrillation     Cataract     CHF (congestive heart failure)     CKD (chronic kidney disease), stage V     Diabetes mellitus     Type 2    GERD (gastroesophageal reflux disease)     GI bleed 2018    Gout     Hypertension     Obese     Requires assistance with all daily activities     Stroke     Wheelchair dependent        Past Surgical History:   Procedure Laterality Date     SECTION      Patient unsure, believe she had 3-4    CHOLECYSTECTOMY      EYE SURGERY      HYSTERECTOMY      JOINT REPLACEMENT Right     knee    TOTAL KNEE ARTHROPLASTY Right        Review of patient's allergies indicates:   Allergen Reactions    Indomethacin     Nsaids (non-steroidal anti-inflammatory drug)        No current facility-administered medications on file prior to encounter.      Current Outpatient Medications on File Prior to Encounter   Medication Sig    B complex-vitamin C-folic acid (NEPHRO-MICHAEL) 0.8 mg Tab Take by mouth once daily.    carvediloL (COREG) 12.5 MG tablet Take 1 tablet (12.5 mg total) by mouth 2 (two) times daily.    doxycycline (VIBRAMYCIN) 100 MG Cap Take 1 capsule (100 mg total) by mouth every 12 (twelve) hours. for 5 days    ferrous sulfate (FEOSOL) 325 mg (65 mg iron) Tab tablet Take 325 mg by mouth 2 (two) times daily.    furosemide (LASIX) 40 MG tablet Take 1 tablet (40 mg total) by mouth once daily.    insulin degludec (TRESIBA FLEXTOUCH U-100) 100 unit/mL (3 mL) InPn Inject 5 Units into the skin every evening.    metroNIDAZOLE (FLAGYL) 500 MG tablet Take 1 tablet (500 mg total) by mouth 3 (three) times daily. for 5 days    NIFEdipine (PROCARDIA-XL) 60 MG (OSM) 24 hr tablet Take 1 tablet (60 mg total) by mouth once daily. (Patient taking differently: Take 30 mg by mouth once daily. HOLD FOR SBP <110)    sodium bicarbonate 650 MG tablet Take 2 tablets (1,300  mg total) by mouth 2 (two) times daily.    acetaminophen (TYLENOL) 325 MG tablet Take 2 tablets (650 mg total) by mouth every 4 (four) hours as needed. (Patient taking differently: Take 650 mg by mouth 2 (two) times daily. )    albuterol-ipratropium (DUO-NEB) 2.5 mg-0.5 mg/3 mL nebulizer solution Take 3 mLs by nebulization every 4 (four) hours as needed for Wheezing. Rescue    calcitRIOL (ROCALTROL) 0.25 MCG Cap Take 1 capsule (0.25 mcg total) by mouth every other day.    [START ON 3/18/2020] epoetin diana-epbx (RETACRIT) 10,000 unit/mL imjection Inject 1 mL (10,000 Units total) into the skin every 7 days.    ergocalciferol (ERGOCALCIFEROL) 50,000 unit Cap Take 1 capsule (50,000 Units total) by mouth every 7 days.    lidocaine (LIDODERM) 5 % Place 1 patch onto the skin once daily. Remove & Discard patch within 12 hours or as directed by MD    methocarbamoL (ROBAXIN) 500 MG Tab Take 1 tablet (500 mg total) by mouth 2 (two) times daily as needed.    methyl salicylate-menthol 15-10% 15-10 % Crea Apply topically 4 (four) times daily.    polyethylene glycol (GLYCOLAX) 17 gram PwPk Take 17 g by mouth 2 (two) times daily as needed.    senna-docusate 8.6-50 mg (PERICOLACE) 8.6-50 mg per tablet Take 1 tablet by mouth 2 (two) times daily.     Family History     Problem Relation (Age of Onset)    Cancer Mother, Brother    Diabetes Mother    Hypertension Mother        Tobacco Use    Smoking status: Never Smoker    Smokeless tobacco: Never Used   Substance and Sexual Activity    Alcohol use: No    Drug use: No    Sexual activity: Never     Review of Systems   Constitutional: Negative for appetite change, chills, diaphoresis and fever.   HENT: Negative for congestion, hearing loss, sore throat, tinnitus and trouble swallowing.    Eyes: Negative for photophobia, discharge, itching and visual disturbance.   Respiratory: Negative for apnea, cough, wheezing and stridor.    Cardiovascular: Negative for chest pain,  palpitations and leg swelling.   Gastrointestinal: Negative for abdominal distention, abdominal pain, blood in stool, constipation, diarrhea and nausea.   Endocrine: Negative for polydipsia, polyphagia and polyuria.   Genitourinary: Positive for flank pain. Negative for difficulty urinating, dysuria and frequency.   Musculoskeletal: Negative for arthralgias, joint swelling and neck stiffness.   Skin: Negative for color change, rash and wound.   Neurological: Negative for dizziness, tremors, seizures, light-headedness, numbness and headaches.   Hematological: Negative for adenopathy.   Psychiatric/Behavioral: Negative for hallucinations and self-injury.     Objective:     Vital Signs (Most Recent):  Temp: 98.4 °F (36.9 °C) (03/15/20 1138)  Pulse: 82 (03/15/20 1215)  Resp: (!) 21 (03/15/20 1215)  BP: (!) 170/74 (03/15/20 1218)  SpO2: 100 % (03/15/20 1218) Vital Signs (24h Range):  Temp:  [98 °F (36.7 °C)-98.9 °F (37.2 °C)] 98.4 °F (36.9 °C)  Pulse:  [75-87] 82  Resp:  [12-51] 21  SpO2:  [99 %-100 %] 100 %  BP: (121-199)/(56-96) 170/74     Weight: 80.7 kg (178 lb)  Body mass index is 30.55 kg/m².    Physical Exam   Constitutional: She appears well-developed and well-nourished. She is cooperative.   HENT:   Head: Normocephalic and atraumatic.   Eyes: Conjunctivae and lids are normal.   Neck: Full passive range of motion without pain. Neck supple. No JVD present. No edema present. No thyroid mass present.   Cardiovascular: Normal rate, S1 normal, S2 normal and intact distal pulses.   No murmur heard.  Pulmonary/Chest: Effort normal.   Abdominal: Soft. Bowel sounds are normal. She exhibits no distension and no abdominal bruit. There is no splenomegaly or hepatomegaly. There is no tenderness. There is no CVA tenderness.   Genitourinary:   Genitourinary Comments: Indwelling urinary catheter   Lymphadenopathy:     She has no cervical adenopathy.     She has no axillary adenopathy.   Neurological: She is alert. She has normal  reflexes. She displays no tremor. She displays no seizure activity.   Skin: Skin is warm, dry and intact.   Psychiatric: She has a normal mood and affect. Her speech is normal. Thought content normal. Cognition and memory are normal.           Significant Labs: All pertinent labs within the past 24 hours have been reviewed.    Significant Imaging: I have reviewed all pertinent imaging results/findings within the past 24 hours.

## 2020-03-15 NOTE — ED TRIAGE NOTES
Patient passed blood in stool again at nursing home so NH staff called ED & was advised to send her back if it is large amount and she might be loosing too large an amount of blood. Patient A&OX4 in no distress. Placed on cardiac monitor.

## 2020-03-15 NOTE — DISCHARGE INSTRUCTIONS
You were seen in the emergency department for blood in your stool.  Your labs and exam are reassuring.  It is very important that you stop taking your blood thinners or anticoagulation.  Please follow-up with your primary care provider and gastroenterologist as soon as possible.  Please return for any new or worsening bleeding, dizziness, shortness of breath, fainting, or you become concerned in any other way.

## 2020-03-15 NOTE — ASSESSMENT & PLAN NOTE
As of now patient's blood counts appear stable although she reports bloody stools.  According to her chart review she gets weekly subcu AHSAN.  Continue monitoring H&H and continue the patient on current antibiotics

## 2020-03-15 NOTE — ASSESSMENT & PLAN NOTE
She has chronic CKD IV-V---her renal functions appear better than usual but is chronic.  Continue iron daily

## 2020-03-15 NOTE — ASSESSMENT & PLAN NOTE
Restart patient's home meds her blood pressure is poorly controlled continue Coreg 12.5 and Procardia XL 60 mg plus her home dose of Lasix  Monitor closely and adjust as warranted

## 2020-03-15 NOTE — ASSESSMENT & PLAN NOTE
On home insulin Loren she by 5 units every evening ---her serum blood glucose level was within normal limits at the time of presentation; double digits--- while hospitalized will use POCT glucose checks to monitor, hypoglycemic protocol and correction scale - HgA1c = 5.1 03/2020

## 2020-03-15 NOTE — ED PROVIDER NOTES
"Encounter Date: 3/15/2020    SCRIBE #1 NOTE: I, Selina Bills, am scribing for, and in the presence of,  Carol Hadley MD. I have scribed the following portions of the note - Other sections scribed: HPI, ROS, PE.       History     Chief Complaint   Patient presents with    GI Bleeding     Patient seen tonight for bleeding from rectum. Patient returned for increased bleeding.     Time of initial assessment: 8:22 AM    CC: Rectal Bleeding    HPI:  This is a 86 y.o. Female, with a PMHx of Anemia, Hypertension, Afib, Diabetes Mellitus, CDK, CHF, who presents to the Emergency Department with a cc of recurring rectal bleeding since last night. Pt reportedly bled through her pad onto the bedding. She emphasizes that there is no stool present when she bleeds. Pt repeatedly states, "I'm so sleepy." She denies any fever, chest pain, shortness of breath, lightheadedness, abdominal pain, vomiting, dysuria, hematuria, problems with urine, or any current pain. Pt was admitted with a diverticulitis bleed (via CT scan) and placed on antibiotics 5 days ago, she received 3 units of pRBC at that time. She was seen here last night by Dr. Jones for rectal bleeding. Her hemoglobin had improved from 8.1 to 8.6. Pt was started on Vibramycin and Flagyl yesterday. After consulting GI, pt was allowed for discharge. However, last night, the nursing home ([facility]) staff reports large amounts of rectal bleeding continued. No medication PTA. She denies being on blood thinners (previously on eliquis, not currently receiving per nursing home documentation). No alcohol consumption, tobacco use, or any recreational drug use. Pt is unable to ambulate at baseline, uses wheelchair. DNR per recent [facility] documentation and discussed with patient at bedside who agrees to DNR.     The history is provided by the patient and the nursing home. No  was used.     Review of patient's allergies indicates:   Allergen " Reactions    Indomethacin     Nsaids (non-steroidal anti-inflammatory drug)      Past Medical History:   Diagnosis Date    Anemia     Anticoagulant long-term use     Arthritis     Atrial fibrillation     Cataract     CHF (congestive heart failure)     CKD (chronic kidney disease), stage V     Diabetes mellitus     Type 2    GERD (gastroesophageal reflux disease)     GI bleed 2018    Gout     Hypertension     Obese     Requires assistance with all daily activities     Stroke 2014    Wheelchair dependent      Past Surgical History:   Procedure Laterality Date     SECTION      Patient unsure, believe she had 3-4    CHOLECYSTECTOMY      EYE SURGERY      HYSTERECTOMY      JOINT REPLACEMENT Right     knee    TOTAL KNEE ARTHROPLASTY Right      Family History   Problem Relation Age of Onset    Cancer Mother          age 98    Diabetes Mother     Hypertension Mother     Cancer Brother      Social History     Tobacco Use    Smoking status: Never Smoker    Smokeless tobacco: Never Used   Substance Use Topics    Alcohol use: No    Drug use: No     Review of Systems   Constitutional: Negative for chills, diaphoresis and fever.   HENT: Negative for ear pain and sore throat.    Eyes: Negative for photophobia and visual disturbance.   Respiratory: Negative for cough and shortness of breath.    Cardiovascular: Negative for chest pain and leg swelling.   Gastrointestinal: Positive for anal bleeding and blood in stool. Negative for abdominal pain, constipation, diarrhea, nausea and vomiting.   Genitourinary: Negative for dysuria, flank pain, frequency, hematuria and urgency.        (-) urinary symptoms   Musculoskeletal: Negative for neck pain and neck stiffness.        (-) arm/ leg pain   Skin: Negative for rash and wound.   Neurological: Negative for weakness, light-headedness, numbness and headaches.   Psychiatric/Behavioral: Negative for confusion, dysphoric mood and suicidal ideas.    All other systems reviewed and are negative.      Physical Exam     Initial Vitals [03/15/20 0617]   BP Pulse Resp Temp SpO2   121/77 86 16 98.3 °F (36.8 °C) 100 %      MAP       --         Physical Exam    Nursing note and vitals reviewed.  Constitutional: She appears well-developed and well-nourished. She is not diaphoretic. No distress.   Patient sleeping at bedside, wakes up to voice.    HENT:   Head: Normocephalic and atraumatic.   Mouth/Throat: Oropharynx is clear and moist. No oropharyngeal exudate.   Eyes: Conjunctivae and EOM are normal. Pupils are equal, round, and reactive to light. Right eye exhibits no discharge. Left eye exhibits no discharge.   Subconjunctival pallor.   Neck: Normal range of motion. Neck supple. No JVD present.   Cardiovascular: Normal rate, regular rhythm, normal heart sounds and intact distal pulses. Exam reveals no gallop and no friction rub.    No murmur heard.  Pulmonary/Chest: Breath sounds normal. No stridor. No respiratory distress. She has no wheezes. She has no rhonchi. She has no rales.   Abdominal: Soft. Bowel sounds are normal. She exhibits no distension. There is no tenderness. There is no rigidity, no rebound, no guarding and no CVA tenderness.   Genitourinary:   Genitourinary Comments: +blood in diaper, external hemorrhoids present. +BRBPR on rectal exam, FOBT+    Musculoskeletal: Normal range of motion. She exhibits no edema or tenderness.   Lymphadenopathy:     She has no cervical adenopathy.   Neurological: She is alert and oriented to person, place, and time. She has normal strength. No cranial nerve deficit or sensory deficit.   Generalized weakness.   Skin: Skin is warm and dry. Capillary refill takes 2 to 3 seconds. There is pallor.   No sacral decub noted   Psychiatric: She has a normal mood and affect. Thought content normal.         ED Course   Procedures  Labs Reviewed   CBC W/ AUTO DIFFERENTIAL - Abnormal; Notable for the following components:        Result Value    RBC 2.70 (*)     Hemoglobin 8.3 (*)     Hematocrit 26.6 (*)     Mean Corpuscular Volume 99 (*)     Mean Corpuscular Hemoglobin Conc 31.2 (*)     RDW 17.6 (*)     Immature Granulocytes 1.3 (*)     Immature Grans (Abs) 0.11 (*)     Gran% 75.5 (*)     Lymph% 15.1 (*)     All other components within normal limits   URINALYSIS, REFLEX TO URINE CULTURE - Abnormal; Notable for the following components:    Protein, UA 2+ (*)     Occult Blood UA 2+ (*)     All other components within normal limits    Narrative:     Preferred Collection Type->Urine, Clean Catch   URINALYSIS MICROSCOPIC    Narrative:     Preferred Collection Type->Urine, Clean Catch   POCT GLUCOSE   POCT GLUCOSE MONITORING CONTINUOUS          Imaging Results    None          Medical Decision Making:   History:   Old Medical Records: I decided to obtain old medical records.  Initial Assessment:   This is a 86 y.o. Female, with a PMHx of Hypertension, Afib, Diabetes Mellitus, CDK, CHF, who presents to the Emergency Department with a cc of recurring rectal bleeding since last night.   Clinical Tests:   Lab Tests: Ordered and Reviewed  ED Management:  09:29 Spoke with Dr. James from GI     MDM  86-year-old female with past medical history of recent diverticular bleed status post 3 units packed red blood cells, previously on Eliquis presents with continued rectal bleeding.  She was seen last night in the ED with rectal bleeding her hemoglobin remains stable and she was discharged after discussing with GI as they were attempting to keep patient out of the hospital.  However patient did have significant bleeding at home per nursing home and blood through her diaper, sheets and bedding.  She reports generalized weakness.  Differential diagnosis includes was not limited to diverticular bleed, acute blood loss anemia, bleeding hemorrhoids, AVM polyps, malignancy.  Reviewed labs from yesterday patient with CKD no other acute concerning findings.   Hemoglobin yesterday was 8.6 this morning is 8.3.  Discussed with Dr. James with GI and given that nursing home does not feel comfortable caring for the patient with continued GI bleeding it with recent blood transfusion recommended monitoring of hemoglobin and transfusing as needed.  Given her diverticulitis he has not have a plan to scope patient at this time.  Discussed with patient she is in agreement with staying have discussed with Yamila coles with Hospital Medicine and we will place her in observation for monitoring of her hemoglobin and GI bleeding.          Scribe Attestation:   Scribe #1: I performed the above scribed service and the documentation accurately describes the services I performed. I attest to the accuracy of the note.                          Clinical Impression:     1. Gastrointestinal hemorrhage, unspecified gastrointestinal hemorrhage type    2. Anemia, unspecified type              ED Disposition Condition    Observation              Scribe attestation: I, Carol Hadley, personally performed the services described in this documentation. All medical record entries made by the scribe were at my direction and in my presence.  I have reviewed the chart and agree that the record reflects my personal performance and is accurate and complete.             Carol Hadley MD  03/15/20 4426

## 2020-03-15 NOTE — H&P
[facility] Hospital Medicine  History & Physical    Patient Name: Joyce Riley  MRN: [id number]  Admission Date: 3/15/2020  Attending Physician: Corbin Hastings MD   Primary Care Provider: Kalpana Staton MD         Patient information was obtained from patient and ER records.     Subjective:     Principal Problem:<principal problem not specified>    Chief Complaint:   Chief Complaint   Patient presents with    GI Bleeding     Patient seen tonight for bleeding from rectum. Patient returned for increased bleeding.        HPI:   Joyce Riley is a 86 y.o. patient with PMHx as listed below that is extensive but significant to this visit is afib, CKD V, DMII, GI bleed, HTN, stroke.  Important to mention is that the patient was recently discharged after being worked up for anemia she was evaluated by GI at that time she has known diverticulitis anoscope was not indicated or recommended at that time due to suspected relationship between her bleeding diverticulitis.  She was placed on antibiotics and her H&H monitored and was stable.  Patient was discharged to home and re-presented to the ED on 03/14/2020 her hemoglobin was noted to have improved from 8.1-8.6 she was discharged to home at that time started on Vibramycin and Flagyl.  After consulting with GI was allowed to discharge from ED.    Patient return to the ED hours later 3/14/20 stating that she saw another episode of rectal bleeding.  Her H&H had not changed very much however GI concerned that she continues to re-presented and the patient was asked to be ops for monitoring.  She denies any history of recent abdominal trauma, tobacco use, recreational drugs.  She is not currently on any oral anticoagulation.    Her H&H has remained steady over the last 3 results it does not appear at this time that there is an active bleed.  Will monitor on for another H&H level, if remains stable will discharge to home to follow-up with  GI.  Results for ROBERT RAMIREZ (MRN [id number]) as of 3/15/2020 12:41   Ref. Range 3/11/2020 04:42 3/12/2020 04:24 3/13/2020 04:45 3/14/2020 23:19 3/15/2020 08:40   Hemoglobin Latest Ref Range: 12.0 - 16.0 g/dL 8.5 (L) 7.8 (L) 8.2 (L) 8.6 (L) 8.3 (L)   Hematocrit Latest Ref Range: 37.0 - 48.5 % 25.8 (L) 23.9 (L) 25.9 (L) 27.4 (L) 26.6 (L)             Past Medical History:   Diagnosis Date    Anemia     Anticoagulant long-term use     Arthritis     Atrial fibrillation     Cataract     CHF (congestive heart failure)     CKD (chronic kidney disease), stage V     Diabetes mellitus     Type 2    GERD (gastroesophageal reflux disease)     GI bleed 2018    Gout     Hypertension     Obese     Requires assistance with all daily activities     Stroke     Wheelchair dependent        Past Surgical History:   Procedure Laterality Date     SECTION      Patient unsure, believe she had 3-4    CHOLECYSTECTOMY      EYE SURGERY      HYSTERECTOMY      JOINT REPLACEMENT Right     knee    TOTAL KNEE ARTHROPLASTY Right        Review of patient's allergies indicates:   Allergen Reactions    Indomethacin     Nsaids (non-steroidal anti-inflammatory drug)        No current facility-administered medications on file prior to encounter.      Current Outpatient Medications on File Prior to Encounter   Medication Sig    B complex-vitamin C-folic acid (NEPHRO-MICHAEL) 0.8 mg Tab Take by mouth once daily.    carvediloL (COREG) 12.5 MG tablet Take 1 tablet (12.5 mg total) by mouth 2 (two) times daily.    doxycycline (VIBRAMYCIN) 100 MG Cap Take 1 capsule (100 mg total) by mouth every 12 (twelve) hours. for 5 days    ferrous sulfate (FEOSOL) 325 mg (65 mg iron) Tab tablet Take 325 mg by mouth 2 (two) times daily.    furosemide (LASIX) 40 MG tablet Take 1 tablet (40 mg total) by mouth once daily.    insulin degludec (TRESIBA FLEXTOUCH U-100) 100 unit/mL (3 mL) InPn Inject 5 Units into the skin every  evening.    metroNIDAZOLE (FLAGYL) 500 MG tablet Take 1 tablet (500 mg total) by mouth 3 (three) times daily. for 5 days    NIFEdipine (PROCARDIA-XL) 60 MG (OSM) 24 hr tablet Take 1 tablet (60 mg total) by mouth once daily. (Patient taking differently: Take 30 mg by mouth once daily. HOLD FOR SBP <110)    sodium bicarbonate 650 MG tablet Take 2 tablets (1,300 mg total) by mouth 2 (two) times daily.    acetaminophen (TYLENOL) 325 MG tablet Take 2 tablets (650 mg total) by mouth every 4 (four) hours as needed. (Patient taking differently: Take 650 mg by mouth 2 (two) times daily. )    albuterol-ipratropium (DUO-NEB) 2.5 mg-0.5 mg/3 mL nebulizer solution Take 3 mLs by nebulization every 4 (four) hours as needed for Wheezing. Rescue    calcitRIOL (ROCALTROL) 0.25 MCG Cap Take 1 capsule (0.25 mcg total) by mouth every other day.    [START ON 3/18/2020] epoetin diana-epbx (RETACRIT) 10,000 unit/mL imjection Inject 1 mL (10,000 Units total) into the skin every 7 days.    ergocalciferol (ERGOCALCIFEROL) 50,000 unit Cap Take 1 capsule (50,000 Units total) by mouth every 7 days.    lidocaine (LIDODERM) 5 % Place 1 patch onto the skin once daily. Remove & Discard patch within 12 hours or as directed by MD    methocarbamoL (ROBAXIN) 500 MG Tab Take 1 tablet (500 mg total) by mouth 2 (two) times daily as needed.    methyl salicylate-menthol 15-10% 15-10 % Crea Apply topically 4 (four) times daily.    polyethylene glycol (GLYCOLAX) 17 gram PwPk Take 17 g by mouth 2 (two) times daily as needed.    senna-docusate 8.6-50 mg (PERICOLACE) 8.6-50 mg per tablet Take 1 tablet by mouth 2 (two) times daily.     Family History     Problem Relation (Age of Onset)    Cancer Mother, Brother    Diabetes Mother    Hypertension Mother        Tobacco Use    Smoking status: Never Smoker    Smokeless tobacco: Never Used   Substance and Sexual Activity    Alcohol use: No    Drug use: No    Sexual activity: Never     Review of Systems    Constitutional: Negative for appetite change, chills, diaphoresis and fever.   HENT: Negative for congestion, hearing loss, sore throat, tinnitus and trouble swallowing.    Eyes: Negative for photophobia, discharge, itching and visual disturbance.   Respiratory: Negative for apnea, cough, wheezing and stridor.    Cardiovascular: Negative for chest pain, palpitations and leg swelling.   Gastrointestinal: Negative for abdominal distention, abdominal pain, blood in stool, constipation, diarrhea and nausea.   Endocrine: Negative for polydipsia, polyphagia and polyuria.   Genitourinary: Positive for flank pain. Negative for difficulty urinating, dysuria and frequency.   Musculoskeletal: Negative for arthralgias, joint swelling and neck stiffness; WC-->Bedbound  Skin: Negative for color change, rash and wound.   Neurological: Negative for dizziness, tremors, seizures, light-headedness, numbness and headaches.   Hematological: Negative for adenopathy.   Psychiatric/Behavioral: Negative for hallucinations and self-injury.     Objective:     Vital Signs (Most Recent):  Temp: 98.4 °F (36.9 °C) (03/15/20 1138)  Pulse: 82 (03/15/20 1215)  Resp: (!) 21 (03/15/20 1215)  BP: (!) 170/74 (03/15/20 1218)  SpO2: 100 % (03/15/20 1218) Vital Signs (24h Range):  Temp:  [98 °F (36.7 °C)-98.9 °F (37.2 °C)] 98.4 °F (36.9 °C)  Pulse:  [75-87] 82  Resp:  [12-51] 21  SpO2:  [99 %-100 %] 100 %  BP: (121-199)/(56-96) 170/74     Weight: 80.7 kg (178 lb)  Body mass index is 30.55 kg/m².    Physical Exam   Constitutional: She appears well-developed and well-nourished. She is cooperative.   HENT:   Head: Normocephalic and atraumatic.   Eyes: Conjunctivae and lids are normal.   Neck: Full passive range of motion without pain. Neck supple. No JVD present. No edema present. No thyroid mass present.   Cardiovascular: Normal rate, S1 normal, S2 normal and intact distal pulses.   No murmur heard.  Pulmonary/Chest: Effort normal.   Abdominal: Soft. Bowel  sounds are normal. She exhibits no distension and no abdominal bruit. There is no splenomegaly or hepatomegaly. There is no tenderness. There is no CVA tenderness.   Genitourinary:   Genitourinary Comments: Indwelling urinary catheter   Lymphadenopathy:     She has no cervical adenopathy.     She has no axillary adenopathy.   Neurological: She is alert. She has normal reflexes. She displays no tremor. She displays no seizure activity.   Skin: Skin is warm, dry and intact.   Psychiatric: She has a normal mood and affect. Her speech is normal. Thought content normal. Cognition and memory are normal.           Significant Labs: All pertinent labs within the past 24 hours have been reviewed.    Significant Imaging: I have reviewed all pertinent imaging results/findings within the past 24 hours.    Assessment/Plan:     Gastrointestinal hemorrhage  As of now patient's blood counts appear stable although she reports bloody stools.  According to her chart review she gets weekly subcu AHSAN.  Continue monitoring H&H and continue the patient on current antibiotics      Anemia of renal disease  She has chronic CKD IV-V---her renal functions appear better than usual but is chronic.  Continue iron daily      CKD stage 5  Renal function at patient baseline and slightly more improved since last month. Will follow labs, renally dose medications, avoid nephrotoxic agent      Type 2 diabetes mellitus, controlled, with renal complications  On home insulin Loren she by 5 units every evening ---her serum blood glucose level was within normal limits at the time of presentation; double digits--- while hospitalized will use POCT glucose checks to monitor, hypoglycemic protocol and correction scale - HgA1c = 5.1 03/2020      Renal hypertension  Restart patient's home meds her blood pressure is poorly controlled continue Coreg 12.5 and Procardia XL 60 mg plus her home dose of Lasix  Monitor closely and adjust as warranted        VTE Risk  Mitigation (From admission, onward)         Ordered     Place sequential compression device  Until discontinued      03/15/20 1252     Place SANTOS hose  Until discontinued      03/15/20 1252                 RICHARD Patel, FNP-C  Hospitalist - Department of Hospital Medicine  [address] [place] [address]  Office [phone]; Pager [phone]

## 2020-03-15 NOTE — HPI
Joyce Riley is a 86 y.o. patient with PMHx as listed below that is extensive but significant to this visit is afib, CKD V, DMII, GI bleed, HTN, stroke.  Important to mention is that the patient was recently discharged after being worked up for anemia she was evaluated by GI at that time. She has known diverticulitis and a scope was not indicated or recommended at that time due to suspected relationship between her bleeding diverticulitis.  She was placed on antibiotics and her H&H monitored and was stable.  Patient was discharged to home and re-presented to the ED on 03/14/2020 her hemoglobin was noted to have improved from 8.1-8.6 she was discharged to home at that time started on Vibramycin and Flagyl.  After consulting with GI was allowed to discharge from ED.    Patient return to the ED hours later 3/14/20 stating that she saw another episode of rectal bleeding.  Her H&H had not changed very much however GI concerned that she continues to re-presented and the patient was asked to be ops for monitoring.  She denies any history of recent abdominal trauma, tobacco use, recreational drugs.  She is not currently on any oral anticoagulation.    Her H&H has remained steady over the last 3 results it does not appear at this time that there is an active bleed.  Will monitor on for another H&H level, if remains stable will discharge to home to follow-up with GI.  Results for JOYCE RILEY (MRN [id number]) as of 3/15/2020 12:41   Ref. Range 3/11/2020 04:42 3/12/2020 04:24 3/13/2020 04:45 3/14/2020 23:19 3/15/2020 08:40   Hemoglobin Latest Ref Range: 12.0 - 16.0 g/dL 8.5 (L) 7.8 (L) 8.2 (L) 8.6 (L) 8.3 (L)   Hematocrit Latest Ref Range: 37.0 - 48.5 % 25.8 (L) 23.9 (L) 25.9 (L) 27.4 (L) 26.6 (L)

## 2020-03-15 NOTE — ED PROVIDER NOTES
Encounter Date: 3/14/2020    SCRIBE #1 NOTE: I, Flower Meza, am scribing for, and in the presence of,  Syd Jones. I have scribed the following portions of the note - Other sections scribed: HPI, ROS, PE.       History     Chief Complaint   Patient presents with    GI Bleeding     Patient reports being discharge on yesterday for a GI bleed after receiving 3 units of blood on yesterday. Patient reports bright red blood in stool.      CC: GI Bleeding    HPI:  This is an 87 y/o female with a PMHx of Anemia, Afib, CHF, Diabetes Mellitus, and CKD. She presents to the ED with a cc of a GI Bleed that began yesterday. The patient was recently discharged from this hospital after being admitted for a similar complaint. Patient denies any dizziness, shortness of breath, abdominal pain or any other pain. The patient is unaware if she takes her blood thinnerss. She went to the restroom and found blood in her stool that was similar to her previous episodes. The patient has an allergy to Indomethacin and Nsaids.     The history is provided by the patient.     Review of patient's allergies indicates:   Allergen Reactions    Indomethacin     Nsaids (non-steroidal anti-inflammatory drug)      Past Medical History:   Diagnosis Date    Anemia     Anticoagulant long-term use     Arthritis     Atrial fibrillation     Cataract     CHF (congestive heart failure)     CKD (chronic kidney disease), stage V     Diabetes mellitus     Type 2    GERD (gastroesophageal reflux disease)     GI bleed 2018    Gout     Hypertension     Obese     Requires assistance with all daily activities     Stroke 2014    Wheelchair dependent      Past Surgical History:   Procedure Laterality Date     SECTION      Patient unsure, believe she had 3-4    CHOLECYSTECTOMY      EYE SURGERY      HYSTERECTOMY      JOINT REPLACEMENT Right     knee    TOTAL KNEE ARTHROPLASTY Right      Family History   Problem Relation Age of  Onset    Cancer Mother          age 98    Diabetes Mother     Hypertension Mother     Cancer Brother      Social History     Tobacco Use    Smoking status: Never Smoker    Smokeless tobacco: Never Used   Substance Use Topics    Alcohol use: No    Drug use: No     Review of Systems   Constitutional: Negative for chills and fever.   HENT: Negative for congestion.    Respiratory: Negative for cough and shortness of breath.    Cardiovascular: Negative for chest pain.   Gastrointestinal: Positive for blood in stool. Negative for abdominal pain, nausea and vomiting.   Genitourinary: Negative for dysuria.   Musculoskeletal: Negative for back pain.   Skin: Negative for rash and wound.   Neurological: Negative for dizziness, syncope and light-headedness.   Psychiatric/Behavioral: Negative for confusion.   All other systems reviewed and are negative.      Physical Exam     Initial Vitals [20]   BP Pulse Resp Temp SpO2   (!) 180/74 84 16 98.9 °F (37.2 °C) 99 %      MAP       --         Physical Exam    Nursing note and vitals reviewed.  Constitutional: She appears well-developed and well-nourished. She is not diaphoretic. No distress.   HENT:   Head: Normocephalic and atraumatic.   Nose: Nose normal.   Eyes: EOM are normal. Pupils are equal, round, and reactive to light. No scleral icterus.   Neck: Normal range of motion. Neck supple.   Cardiovascular: Normal rate, regular rhythm, normal heart sounds and intact distal pulses. Exam reveals no gallop and no friction rub.    No murmur heard.  Pulmonary/Chest: Breath sounds normal. No stridor. No respiratory distress. She has no wheezes. She has no rhonchi. She has no rales.   Abdominal: Soft. Normal appearance and bowel sounds are normal. She exhibits no distension. There is no tenderness. There is no rebound and no guarding.   Genitourinary:   Genitourinary Comments: Mildly bloody stool on rectal exam, no severe gross bleed   Musculoskeletal: Normal range  of motion. She exhibits no edema or tenderness.   Neurological: She is alert and oriented to person, place, and time. No cranial nerve deficit.   Skin: Skin is warm and dry. No rash noted.   Psychiatric: She has a normal mood and affect. Her behavior is normal.         ED Course   Procedures  Labs Reviewed   COMPREHENSIVE METABOLIC PANEL - Abnormal; Notable for the following components:       Result Value    Chloride 111 (*)     CO2 22 (*)     BUN, Bld 50 (*)     Creatinine 3.4 (*)     Albumin 3.1 (*)     eGFR if  13 (*)     eGFR if non  12 (*)     All other components within normal limits   CBC W/ AUTO DIFFERENTIAL - Abnormal; Notable for the following components:    RBC 2.82 (*)     Hemoglobin 8.6 (*)     Hematocrit 27.4 (*)     Mean Corpuscular Hemoglobin Conc 31.4 (*)     RDW 17.2 (*)     Immature Granulocytes 1.4 (*)     Immature Grans (Abs) 0.15 (*)     Gran% 74.2 (*)     Lymph% 16.3 (*)     All other components within normal limits   PROTIME-INR   APTT          Imaging Results    None          Medical Decision Making:   Initial Assessment:   86-year-old female recently discharged with diverticular bleed.  Completed course of antibiotics.  Denies significant pain.  Noted to have painless bloody stool today.  Recently discharged for diverticulitis with lower GI bleed.  Complete course of antibiotics.  Patient unsure of her blood thinners have been restarted, they had previously been held.  Denies fevers chills abdominal pain.  Vitals normal, mild hypertension.  Hemoglobin has improved since discharge yesterday up to 8.6.  Rectal exam reveals bloody stool though no severe gross bleeding.  No leukocytosis, tachycardia, fever concerning for new acute infection.  Discussed case with Dr. Watt of Gastroenterology.  Given stable hemoglobin, no severe gross bleeding, normal vitals, and otherwise reassuring exam, believe the patient is appropriate for discharge home with close GI  follow-up.  This is especially true given the concern for recent given recent COVID19 outbreak in the increased risk of being exposed to it in the hospital.  Discussed same with the patient who was comfortable with discharge home.            Scribe Attestation:   Scribe #1: I performed the above scribed service and the documentation accurately describes the services I performed. I attest to the accuracy of the note.                          Clinical Impression:       ICD-10-CM ICD-9-CM   1. Hematochezia K92.1 578.1   2. Diverticulitis K57.92 562.11         Disposition:   Disposition: Discharged  Condition: Stable    I, Syd Jones, personally performed the services described in this documentation. All medical record entries made by the scribe were at my direction and in my presence.  I have reviewed the chart and agree that the record reflects my personal performance and is accurate and complete      ED Disposition Condition    Discharge Stable        ED Prescriptions     None        Follow-up Information     Follow up With Specialties Details Why Contact Info    Kalpana Staton MD General Practice Schedule an appointment as soon as possible for a visit   [address] [place] [address]  [address]  [facility] [address]  [phone]      [facility] Emergency Medicine  As needed, If symptoms worsen [address]  [facility] [address] [phone]                                     Syd Jones MD  03/15/20 0800

## 2020-03-16 VITALS
DIASTOLIC BLOOD PRESSURE: 63 MMHG | OXYGEN SATURATION: 99 % | WEIGHT: 178 LBS | RESPIRATION RATE: 18 BRPM | SYSTOLIC BLOOD PRESSURE: 139 MMHG | TEMPERATURE: 98 F | HEIGHT: 64 IN | HEART RATE: 80 BPM | BODY MASS INDEX: 30.39 KG/M2

## 2020-03-16 LAB
BASOPHILS # BLD AUTO: 0.05 K/UL (ref 0–0.2)
BASOPHILS # BLD AUTO: 0.08 K/UL (ref 0–0.2)
BASOPHILS NFR BLD: 0.6 % (ref 0–1.9)
BASOPHILS NFR BLD: 0.9 % (ref 0–1.9)
DIFFERENTIAL METHOD: ABNORMAL
DIFFERENTIAL METHOD: ABNORMAL
EOSINOPHIL # BLD AUTO: 0.1 K/UL (ref 0–0.5)
EOSINOPHIL # BLD AUTO: 0.1 K/UL (ref 0–0.5)
EOSINOPHIL NFR BLD: 1.1 % (ref 0–8)
EOSINOPHIL NFR BLD: 1.2 % (ref 0–8)
ERYTHROCYTE [DISTWIDTH] IN BLOOD BY AUTOMATED COUNT: 17.6 % (ref 11.5–14.5)
ERYTHROCYTE [DISTWIDTH] IN BLOOD BY AUTOMATED COUNT: 17.8 % (ref 11.5–14.5)
HCT VFR BLD AUTO: 22.7 % (ref 37–48.5)
HCT VFR BLD AUTO: 22.7 % (ref 37–48.5)
HCT VFR BLD AUTO: 24.1 % (ref 37–48.5)
HCT VFR BLD AUTO: 24.1 % (ref 37–48.5)
HGB BLD-MCNC: 7.1 G/DL (ref 12–16)
HGB BLD-MCNC: 7.1 G/DL (ref 12–16)
HGB BLD-MCNC: 7.6 G/DL (ref 12–16)
HGB BLD-MCNC: 7.6 G/DL (ref 12–16)
IMM GRANULOCYTES # BLD AUTO: 0.05 K/UL (ref 0–0.04)
IMM GRANULOCYTES # BLD AUTO: 0.07 K/UL (ref 0–0.04)
IMM GRANULOCYTES NFR BLD AUTO: 0.6 % (ref 0–0.5)
IMM GRANULOCYTES NFR BLD AUTO: 0.8 % (ref 0–0.5)
LYMPHOCYTES # BLD AUTO: 1.4 K/UL (ref 1–4.8)
LYMPHOCYTES # BLD AUTO: 1.5 K/UL (ref 1–4.8)
LYMPHOCYTES NFR BLD: 17.2 % (ref 18–48)
LYMPHOCYTES NFR BLD: 17.6 % (ref 18–48)
MCH RBC QN AUTO: 30.6 PG (ref 27–31)
MCH RBC QN AUTO: 31 PG (ref 27–31)
MCHC RBC AUTO-ENTMCNC: 31.3 G/DL (ref 32–36)
MCHC RBC AUTO-ENTMCNC: 31.5 G/DL (ref 32–36)
MCV RBC AUTO: 98 FL (ref 82–98)
MCV RBC AUTO: 98 FL (ref 82–98)
MONOCYTES # BLD AUTO: 0.4 K/UL (ref 0.3–1)
MONOCYTES # BLD AUTO: 0.5 K/UL (ref 0.3–1)
MONOCYTES NFR BLD: 5.5 % (ref 4–15)
MONOCYTES NFR BLD: 6 % (ref 4–15)
NEUTROPHILS # BLD AUTO: 6 K/UL (ref 1.8–7.7)
NEUTROPHILS # BLD AUTO: 6.2 K/UL (ref 1.8–7.7)
NEUTROPHILS NFR BLD: 73.5 % (ref 38–73)
NEUTROPHILS NFR BLD: 75 % (ref 38–73)
NRBC BLD-RTO: 0 /100 WBC
NRBC BLD-RTO: 0 /100 WBC
PLATELET # BLD AUTO: 232 K/UL (ref 150–350)
PLATELET # BLD AUTO: 241 K/UL (ref 150–350)
PMV BLD AUTO: 9.6 FL (ref 9.2–12.9)
PMV BLD AUTO: 9.8 FL (ref 9.2–12.9)
RBC # BLD AUTO: 2.32 M/UL (ref 4–5.4)
RBC # BLD AUTO: 2.45 M/UL (ref 4–5.4)
WBC # BLD AUTO: 7.95 K/UL (ref 3.9–12.7)
WBC # BLD AUTO: 8.47 K/UL (ref 3.9–12.7)

## 2020-03-16 PROCEDURE — 36415 COLL VENOUS BLD VENIPUNCTURE: CPT

## 2020-03-16 PROCEDURE — G0378 HOSPITAL OBSERVATION PER HR: HCPCS

## 2020-03-16 PROCEDURE — 25000003 PHARM REV CODE 250: Performed by: HOSPITALIST

## 2020-03-16 PROCEDURE — 25000003 PHARM REV CODE 250: Performed by: NURSE PRACTITIONER

## 2020-03-16 PROCEDURE — 25000003 PHARM REV CODE 250: Performed by: EMERGENCY MEDICINE

## 2020-03-16 PROCEDURE — 85025 COMPLETE CBC W/AUTO DIFF WBC: CPT

## 2020-03-16 RX ORDER — HYDROCORTISONE ACETATE 25 MG/1
25 SUPPOSITORY RECTAL 2 TIMES DAILY
Status: DISCONTINUED | OUTPATIENT
Start: 2020-03-16 | End: 2020-03-16 | Stop reason: HOSPADM

## 2020-03-16 RX ADMIN — FUROSEMIDE 40 MG: 40 TABLET ORAL at 08:03

## 2020-03-16 RX ADMIN — CARVEDILOL 12.5 MG: 6.25 TABLET, FILM COATED ORAL at 08:03

## 2020-03-16 RX ADMIN — SODIUM BICARBONATE 1300 MG: 325 TABLET ORAL at 08:03

## 2020-03-16 RX ADMIN — METRONIDAZOLE 500 MG: 500 TABLET ORAL at 08:03

## 2020-03-16 RX ADMIN — ACETAMINOPHEN 650 MG: 325 TABLET ORAL at 08:03

## 2020-03-16 RX ADMIN — NIFEDIPINE 60 MG: 30 TABLET, FILM COATED, EXTENDED RELEASE ORAL at 08:03

## 2020-03-16 RX ADMIN — CIPROFLOXACIN 500 MG: 500 TABLET, FILM COATED ORAL at 08:03

## 2020-03-16 NOTE — PROGRESS NOTES
Orders uploaded to [facility] and sent to [facility]. TN indicated that the plan is for the pt to d/c to back to facility on today and that she is in OBSERVATION status and requested call report as soon as possible. TN to follow in [facility] for response.      1036- call placed to [facility] remained on hold for 17+ mins at that time  returns to line to inform TN that the admissions department is in a meeting and that they would have to call back once out of meeting.  TN advised that pt ready for d/c and that transportation will be scheduled for 12:20 . TN name and contact information provided and will follow for call back

## 2020-03-16 NOTE — PLAN OF CARE
[facility] supervisor met with pt. to discuss Help at Home, Preferences, and Maintaining Your Health.  [facility] supervisor discussed the [employer] Packet w/pt.  Pt. was AAOx3, dependent care at NH, and she stated she is WC bound. Pt's needs are met by NH staff at [facility].  Pt's medications are met by NH.  Pt. To be transferred to [facility] when medically stable.       03/16/20 0913   Discharge Assessment   Assessment Type Discharge Planning Assessment   Confirmed/corrected address and phone number on facesheet? Yes   Assessment information obtained from? Patient   Communicated expected length of stay with patient/caregiver yes   Prior to hospitilization cognitive status: Alert/Oriented   Prior to hospitalization functional status: Partially Dependent;Wheelchair Bound  (WC bound at NH)   Current cognitive status: Alert/Oriented   Current Functional Status: Wheelchair Bound;Partially Dependent   Facility Arrived From: [facility]   Lives With other (see comments)  (Facility - [facility])   Able to Return to Prior Arrangements yes   Is patient able to care for self after discharge? No  (NH patient)   Who are your caregiver(s) and their phone number(s)? NH staff - Leobardo Knight ([phone]), pt. has family   Patient's perception of discharge disposition nursing home   Readmission Within the Last 30 Days no previous admission in last 30 days   Patient currently being followed by outpatient case management? No   Patient currently receives any other outside agency services? No   Equipment Currently Used at Home wheelchair;hospital bed   Part D Coverage [employer]   Do you have any problems affording any of your prescribed medications? No   Is the patient taking medications as prescribed? yes   Does the patient have transportation home? Yes   Transportation Anticipated other (see comments)  (EMS)   Does the patient receive services at the [facility]? No   Discharge Plan A Return to nursing home   Discharge Plan B Return to  Nursing Home   DME Needed Upon Discharge  none   Patient/Family in Agreement with Plan yes

## 2020-03-16 NOTE — PLAN OF CARE
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       Patient is homebound due to:  Gastrointestinal hemorrhage    Allergies:  Review of patient's allergies indicates:   Allergen Reactions    Indomethacin     Nsaids (non-steroidal anti-inflammatory drug)        Vitals:       Routine, once monthly       Diet: ADA/Cardiac    Acitivities:      - Per facility protocol as tolerated    LABS:  Per facility protocol    Nursing Precautions:     - Fall precautions per nursing home protocol     CONSULTS:       MISCELLANEOUS CARE:    Routine Skin for Bedridden Patients:  Apply moisture barrier cream to all    skin folds and wet areas in perineal area daily and after baths and                           all bowel movements.    DIABETES CARE:        Check blood sugar:      Fingerstick blood sugar a.m and p.m.        Report CBG < 60 or > 400 to physician.                                          Insulin Sliding Scale          Glucose  Novolog Insulin Subcutaneous        0 - 60   Orange juice or glucose tablet, hold insulin      No insulin   201-250  2 units   251-300  4 units   301-350  6 units   351-400  8 units   >400   10 units then call physician      Medications: Discontinue all previous medication orders, if any. See new list below.      Joyce Riley   Home Medication Instructions  [facility]:[id number]    Printed on:03/16/20 0976   Medication Information                      acetaminophen (TYLENOL) 325 MG tablet  Take 2 tablets (650 mg total) by mouth every 4 (four) hours as needed.             albuterol-ipratropium (DUO-NEB) 2.5 mg-0.5 mg/3 mL nebulizer solution  Take 3 mLs by nebulization every 4 (four) hours as needed for Wheezing. Rescue             B complex-vitamin C-folic acid (NEPHRO-MICHAEL) 0.8 mg Tab  Take by mouth once daily.             calcitRIOL (ROCALTROL) 0.25 MCG Cap  Take 1 capsule (0.25 mcg total) by mouth every other day.             carvediloL (COREG) 12.5 MG tablet  Take 1 tablet (12.5 mg total) by mouth 2 (two) times daily.             doxycycline (VIBRAMYCIN) 100 MG Cap  Take 1 capsule (100 mg total) by mouth every 12 (twelve) hours. for 5 days             epoetin diana-epbx (RETACRIT) 10,000 unit/mL imjection  Inject 1 mL (10,000 Units total) into the skin every 7 days.             ergocalciferol (ERGOCALCIFEROL) 50,000 unit Cap  Take 1 capsule (50,000 Units total) by mouth every 7 days.             ferrous sulfate (FEOSOL) 325 mg (65 mg iron) Tab tablet  Take 325 mg by mouth 2 (two) times daily.             furosemide (LASIX) 40 MG tablet  Take 1 tablet (40 mg total) by mouth once daily.             insulin degludec (TRESIBA FLEXTOUCH U-100) 100 unit/mL (3 mL) InPn  Inject 5 Units into the skin every evening.             lidocaine (LIDODERM) 5 %  Place 1 patch onto the skin once daily. Remove & Discard patch within 12 hours or as directed by MD             methocarbamoL (ROBAXIN) 500 MG Tab  Take 1 tablet (500 mg total) by mouth 2 (two) times daily as needed.             methyl salicylate-menthol 15-10% 15-10 % Crea  Apply topically 4 (four) times daily.             metroNIDAZOLE (FLAGYL) 500 MG tablet  Take 1 tablet (500 mg total) by mouth 3 (three) times daily. for 5 days             NIFEdipine (PROCARDIA-XL) 60 MG (OSM) 24 hr tablet  Take 1 tablet (60 mg total) by mouth  once daily.             polyethylene glycol (GLYCOLAX) 17 gram PwPk  Take 17 g by mouth 2 (two) times daily as needed.             senna-docusate 8.6-50 mg (PERICOLACE) 8.6-50 mg per tablet  Take 1 tablet by mouth 2 (two) times daily.             sodium bicarbonate 650 MG tablet  Take 2 tablets (1,300 mg total) by mouth 2 (two) times daily.                       _________________________________  Drake Riddle Jr, NP  03/16/2020

## 2020-03-16 NOTE — PLAN OF CARE
Problem: Fall Injury Risk  Goal: Absence of Fall and Fall-Related Injury  Outcome: Ongoing, Progressing     Problem: Adult Inpatient Plan of Care  Goal: Plan of Care Review  Outcome: Ongoing, Progressing  Goal: Patient-Specific Goal (Individualization)  Outcome: Ongoing, Progressing  Goal: Absence of Hospital-Acquired Illness or Injury  Outcome: Ongoing, Progressing  Goal: Optimal Comfort and Wellbeing  Outcome: Ongoing, Progressing  Goal: Readiness for Transition of Care  Outcome: Ongoing, Progressing  Goal: Rounds/Family Conference  Outcome: Ongoing, Progressing     Problem: Diabetes Comorbidity  Goal: Blood Glucose Level Within Desired Range  Outcome: Ongoing, Progressing     Problem: Infection  Goal: Infection Symptom Resolution  Outcome: Ongoing, Progressing     Problem: Skin Injury Risk Increased  Goal: Skin Health and Integrity  Outcome: Ongoing, Progressing

## 2020-03-16 NOTE — DISCHARGE SUMMARY
[facility] Hospital Medicine  Discharge Summary      Patient Name: Joyce Riley  MRN: [id number]  Admission Date: 3/15/2020  Hospital Length of Stay: 0 days  Discharge Date and Time:  03/16/2020 10:35 AM  Attending Physician: Corbin Hastings MD   Discharging Provider: Drake Riddle Jr, NP  Primary Care Provider: Kalpana Staton MD      HPI:   Joyce Riley is a 86 y.o. patient with PMHx as listed below that is extensive but significant to this visit is afib, CKD V, DMII, GI bleed, HTN, stroke.  Important to mention is that the patient was recently discharged after being worked up for anemia she was evaluated by GI at that time. She has known diverticulitis and a scope was not indicated or recommended at that time due to suspected relationship between her bleeding diverticulitis.  She was placed on antibiotics and her H&H monitored and was stable.  Patient was discharged to home and re-presented to the ED on 03/14/2020 her hemoglobin was noted to have improved from 8.1-8.6 she was discharged to home at that time started on Vibramycin and Flagyl.  After consulting with GI was allowed to discharge from ED.    Patient return to the ED hours later 3/14/20 stating that she saw another episode of rectal bleeding.  Her H&H had not changed very much however GI concerned that she continues to re-presented and the patient was asked to be ops for monitoring.  She denies any history of recent abdominal trauma, tobacco use, recreational drugs.  She is not currently on any oral anticoagulation.    Her H&H has remained steady over the last 3 results it does not appear at this time that there is an active bleed.  Will monitor on for another H&H level, if remains stable will discharge to home to follow-up with GI.  Results for JOYCE RILEY (MRN [id number]) as of 3/15/2020 12:41   Ref. Range 3/11/2020 04:42 3/12/2020 04:24 3/13/2020 04:45 3/14/2020 23:19 3/15/2020 08:40   Hemoglobin  Latest Ref Range: 12.0 - 16.0 g/dL 8.5 (L) 7.8 (L) 8.2 (L) 8.6 (L) 8.3 (L)   Hematocrit Latest Ref Range: 37.0 - 48.5 % 25.8 (L) 23.9 (L) 25.9 (L) 27.4 (L) 26.6 (L)             * No surgery found *      Hospital Course:   Ms. Riley was placed in observation for further evaluation and treatment after presenting with some bright red blood with stool at NH.  H/H stable, no active bleeding observed while hospitalized.  Otherwise in her usual state of health.  All findings and plan discussed with patient, all questions answered, she verbalizes understanding and agreement.  Discharged back to nursing home in stable condition.     Consults:   Consults (From admission, onward)        Status Ordering Provider     Inpatient consult to Gastroenterology  Once     Provider:  Guillermo Washington Jr., MD    Acknowledged BILL FERRELL     Inpatient consult to PICC team ([facility])  Once     Provider:  (Not yet assigned)    MOHINDER Mcclain          No new Assessment & Plan notes have been filed under this hospital service since the last note was generated.  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    Specialty:  General Practice  Contact information:  [address] [place] [address]  [id number]  [place] [address] [phone]                 Patient Instructions:      Diet Cardiac     Diet diabetic     Notify your health care provider if you experience any of the  following:  temperature >100.4     Notify your health care provider if you experience any of the following:  persistent nausea and vomiting or diarrhea     Notify your health care provider if you experience any of the following:  severe uncontrolled pain     Notify your health care provider if you experience any of the following:  difficulty breathing or increased cough     Notify your health care provider if you experience any of the following:  persistent dizziness, light-headedness, or visual disturbances     Notify your health care provider if you experience any of the following:  increased confusion or weakness     Activity as tolerated       Significant Diagnostic Studies: Labs:   CBC   Recent Labs   Lab 03/15/20  0840 03/16/20  0024 03/16/20  0758   WBC 8.62 7.95 8.47   HGB 8.3* 7.1*  7.1* 7.6*  7.6*   HCT 26.6* 22.7*  22.7* 24.1*  24.1*    232 241       Pending Diagnostic Studies:     Procedure Component Value Units Date/Time    CBC auto differential [[id number]]     Order Status:  Sent Lab Status:  No result     Specimen:  Blood     Hematocrit [[id number]]     Order Status:  Sent Lab Status:  No result     Specimen:  Blood     Hemoglobin [[id number]]     Order Status:  Sent Lab Status:  No result     Specimen:  Blood          Medications:  Reconciled Home Medications:      Medication List      CHANGE how you take these medications    acetaminophen 325 MG tablet  Commonly known as:  TYLENOL  Take 2 tablets (650 mg total) by mouth every 4 (four) hours as needed.  What changed:  when to take this     NIFEdipine 60 MG (OSM) 24 hr tablet  Commonly known as:  PROCARDIA-XL  Take 1 tablet (60 mg total) by mouth once daily.  What changed:    · how much to take  · additional instructions        CONTINUE taking these medications    albuterol-ipratropium 2.5 mg-0.5 mg/3 mL nebulizer solution  Commonly known as:  DUO-NEB  Take 3 mLs by nebulization every 4 (four) hours as needed for Wheezing. Rescue      calcitRIOL 0.25 MCG Cap  Commonly known as:  ROCALTROL  Take 1 capsule (0.25 mcg total) by mouth every other day.     carvediloL 12.5 MG tablet  Commonly known as:  COREG  Take 1 tablet (12.5 mg total) by mouth 2 (two) times daily.     doxycycline 100 MG Cap  Commonly known as:  VIBRAMYCIN  Take 1 capsule (100 mg total) by mouth every 12 (twelve) hours. for 5 days     epoetin diana-epbx 10,000 unit/mL imjection  Commonly known as:  RETACRIT  Inject 1 mL (10,000 Units total) into the skin every 7 days.  Start taking on:  March 18, 2020     ergocalciferol 50,000 unit Cap  Commonly known as:  ERGOCALCIFEROL  Take 1 capsule (50,000 Units total) by mouth every 7 days.     ferrous sulfate 325 mg (65 mg iron) Tab tablet  Commonly known as:  FEOSOL  Take 325 mg by mouth 2 (two) times daily.     furosemide 40 MG tablet  Commonly known as:  LASIX  Take 1 tablet (40 mg total) by mouth once daily.     insulin degludec 100 unit/mL (3 mL) Inpn  Commonly known as:  TRESIBA FLEXTOUCH U-100  Inject 5 Units into the skin every evening.     lidocaine 5 %  Commonly known as:  LIDODERM  Place 1 patch onto the skin once daily. Remove & Discard patch within 12 hours or as directed by MD     methocarbamoL 500 MG Tab  Commonly known as:  ROBAXIN  Take 1 tablet (500 mg total) by mouth 2 (two) times daily as needed.     methyl salicylate-menthol 15-10% 15-10 % Crea  Apply topically 4 (four) times daily.     metroNIDAZOLE 500 MG tablet  Commonly known as:  FLAGYL  Take 1 tablet (500 mg total) by mouth 3 (three) times daily. for 5 days     NEPHRO-MICHAEL 0.8 mg Tab  Generic drug:  B complex-vitamin C-folic acid  Take by mouth once daily.     polyethylene glycol 17 gram Pwpk  Commonly known as:  GLYCOLAX  Take 17 g by mouth 2 (two) times daily as needed.     senna-docusate 8.6-50 mg 8.6-50 mg per tablet  Commonly known as:  PERICOLACE  Take 1 tablet by mouth 2 (two) times daily.     sodium bicarbonate 650 MG tablet  Take 2 tablets (1,300 mg total)  by mouth 2 (two) times daily.            Indwelling Lines/Drains at time of discharge:   Lines/Drains/Airways     None                 Time spent on the discharge of patient: 35 minutes  Patient was seen and examined on the date of discharge and determined to be suitable for discharge.         Drake Riddle Jr, NP  Department of Hospital Medicine  [facility]

## 2020-03-16 NOTE — PLAN OF CARE
03/16/20 1202   Final Note   Assessment Type Final Discharge Note   Anticipated Discharge Disposition [facility]   Hospital Follow Up  Appt(s) scheduled? No   Discharge plans and expectations educations in teach back method with documentation complete? Yes   Right Care Referral Info   Post Acute Recommendation Other   Referral Type [facility]   Facility Name [facility]   Post-Acute Status   Post-Acute Authorization Placement   Post-Acute Placement Status Set-up Complete   pts nurse Nikki provided with call report information and transportation packet

## 2020-03-16 NOTE — CONSULTS
Gastroenterology Consult    3/16/2020 9:29 AM    Patient Name: Joyce Riley  MRN: [id number]  Admission Date: 3/15/2020  Hospital Length of Stay: 0 days  Code Status: DNR   Primary Care Physician: Kalpana Staton MD  Principal Problem:Gastrointestinal hemorrhage  Consulting Physician: Inpatient consult to Gastroenterology  Consult performed by: Viki Washington MD  Consult ordered by: Carol Hadley MD      Reason for consultation: GI bleed    HPI:  Joyce Riley is a 86 y.o. female with a history of HTN, DM2, CKD5, afib on anticoagulation, obesity, prior CVA and arthritis who presented with rectal bleeding.  She was seen on 3/14 with rectal bleeding, but was hemodynamically stable with H/H at baseline.  However, she was sent back to the ER due to ongoing rectal bleeding and then admitted for observation.  She says she had a soft bowel movement this morning and the hospital staff told her there was no blood.  She denies abdominal pain, N/V, constipation or diarrhea.  She denies fevers, chills or sick contacts.     Past Medical History:   Diagnosis Date    Anemia     Anticoagulant long-term use     Arthritis     Atrial fibrillation     Cataract     CHF (congestive heart failure)     CKD (chronic kidney disease), stage V     Diabetes mellitus     Type 2    GERD (gastroesophageal reflux disease)     GI bleed 2018    Gout     Hypertension     Obese     Requires assistance with all daily activities     Stroke     Wheelchair dependent        Past Surgical History:   Procedure Laterality Date     SECTION      Patient unsure, believe she had 3-4    CHOLECYSTECTOMY      EYE SURGERY      HYSTERECTOMY      JOINT REPLACEMENT Right     knee    TOTAL KNEE ARTHROPLASTY Right        Social History     Tobacco Use    Smoking status: Never Smoker    Smokeless tobacco: Never Used   Substance Use Topics    Alcohol use: No    Drug use: No        Family History   Problem Relation  Age of Onset    Cancer Mother          age 98    Diabetes Mother     Hypertension Mother     Cancer Brother          Review of patient's allergies indicates:   Allergen Reactions    Indomethacin     Nsaids (non-steroidal anti-inflammatory drug)          Current Facility-Administered Medications:     acetaminophen tablet 650 mg, 650 mg, Oral, Q6H PRN, Drake Riddle Jr., NP, 650 mg at 20 0855    albuterol-ipratropium 2.5 mg-0.5 mg/3 mL nebulizer solution 3 mL, 3 mL, Nebulization, Q4H PRN, Carol Hadley MD    carvediloL tablet 12.5 mg, 12.5 mg, Oral, BID, Noy Champagne, FNP, 12.5 mg at 20 0848    ciprofloxacin HCl tablet 500 mg, 500 mg, Oral, Daily, Carol Hadley MD, 500 mg at 20 0848    furosemide tablet 40 mg, 40 mg, Oral, Daily, Carol Hadley MD, 40 mg at 20 0848    lidocaine 5 % patch 1 patch, 1 patch, Transdermal, Q24H, Drake Riddle Jr., NP, 1 patch at 03/15/20 1920    melatonin tablet 6 mg, 6 mg, Oral, Nightly PRN, Drake Riddle Jr., PEDRO    metroNIDAZOLE tablet 500 mg, 500 mg, Oral, TID, Carol Hadley MD, 500 mg at 20 0848    NIFEdipine 24 hr tablet 60 mg, 60 mg, Oral, Daily, MAURICE PatelP, 60 mg at 20 0848    ondansetron injection 4 mg, 4 mg, Intravenous, Q6H PRN, Drake Riddle Jr., NP    sodium bicarbonate tablet 1,300 mg, 1,300 mg, Oral, BID, Carol Hadley MD, 1,300 mg at 20 0845    sodium chloride 0.9% flush 10 mL, 10 mL, Intravenous, PRN, Carol Hadley MD    Review of Systems   Constitutional: Negative for chills, fever and weight loss.   HENT: Negative.    Eyes: Negative.    Respiratory: Negative.    Cardiovascular: Negative.    Gastrointestinal: Positive for blood in stool. Negative for abdominal pain, constipation, diarrhea, heartburn, melena, nausea and vomiting.   Genitourinary: Negative.    Musculoskeletal: Positive for neck pain.   Skin: Negative.    Neurological: Positive for headaches.   Endo/Heme/Allergies:  "Negative.        BP (!) 151/67 (BP Location: Right arm, Patient Position: Lying)   Pulse 75   Temp 98.4 °F (36.9 °C) (Oral)   Resp 18   Ht 5' 4" (1.626 m)   Wt 80.7 kg (178 lb)   SpO2 98%   Breastfeeding? No   BMI 30.55 kg/m²   Physical Exam   Constitutional: She is oriented to person, place, and time. She appears well-developed and well-nourished. No distress.   HENT:   Head: Normocephalic and atraumatic.   Mouth/Throat: Oropharynx is clear and moist.   Eyes: Pupils are equal, round, and reactive to light. EOM are normal. No scleral icterus.   Cardiovascular: Normal rate and regular rhythm.   No murmur heard.  Pulmonary/Chest: Effort normal and breath sounds normal. She has no wheezes.   Abdominal: Soft. Bowel sounds are normal. She exhibits no distension. There is no tenderness. There is no rebound and no guarding.   Musculoskeletal: Normal range of motion. She exhibits no edema.   Neurological: She is alert and oriented to person, place, and time. No sensory deficit.   Skin: Skin is warm and dry. No rash noted.   Vitals reviewed.      Labs:  Lab Results   Component Value Date/Time    WBC 8.47 03/16/2020 07:58 AM    WBC 7.3 02/03/2020    HGB 7.6 (L) 03/16/2020 07:58 AM    HGB 7.6 (L) 03/16/2020 07:58 AM    HGB 9.2 (L) 02/03/2020    HCT 24.1 (L) 03/16/2020 07:58 AM    HCT 24.1 (L) 03/16/2020 07:58 AM     03/16/2020 07:58 AM    MCV 98 03/16/2020 07:58 AM     03/14/2020 11:19 PM     02/03/2020    K 3.9 03/14/2020 11:19 PM    K 4.4 02/03/2020     (H) 03/14/2020 11:19 PM     02/03/2020    CO2 22 (L) 03/14/2020 11:19 PM    CO2 25 02/03/2020    BUN 50 (H) 03/14/2020 11:19 PM    BUN 52 (H) 02/03/2020    CREATININE 3.4 (H) 03/14/2020 11:19 PM    CREATININE 3.11 (H) 02/03/2020    GLU 99 03/14/2020 11:19 PM    CALCIUM 8.8 03/14/2020 11:19 PM    CALCIUM 9.1 02/03/2020    MG 1.7 03/12/2020 04:24 AM    PHOS 4.2 03/13/2020 04:45 AM    INR 1.0 03/14/2020 11:19 PM    APTT 28.4 03/14/2020 " 11:19 PM   ]  Lab Results   Component Value Date/Time    PROT 6.3 03/14/2020 11:19 PM    ALBUMIN 3.1 (L) 03/14/2020 11:19 PM    ALBUMIN 3.4 02/03/2020    BILITOT 0.2 03/14/2020 11:19 PM    AST 22 03/14/2020 11:19 PM    AST 27 02/03/2020    ALT 16 03/14/2020 11:19 PM    ALT 31 02/03/2020    ALKPHOS 107 03/14/2020 11:19 PM   ]    Imaging and Procedures:  I personally reviewed the imaging/procedures below.  CT A/P 3/8/20:  Findings are most compatible with mild acute uncomplicated sigmoid diverticulitis.  Mild urinary bladder wall thickening with slight adjacent perivesicular stranding.  Please correlate for signs and symptoms of cystitis as appropriate.  Other incidental findings as above.    Assessment:  Joyce Riley is a 86 y.o. female with a history of HTN, DM2, CKD5, afib on anticoagulation, obesity, prior CVA and arthritis who presented with rectal bleeding.   Suspect hemorrhoids vs. Diverticular bleeding.      Plan:  - serial H/H  - diet as tolerated  - no plans for endoscopic intervention - per patient, no further bleeding  - hydrocortisone suppositories BID     Viki Washington MD

## 2020-03-16 NOTE — NURSING
Pt discharged per MD order. Patient is discharging with [facility] wheelchair van.  Midline removed. Catheter tip intact. No distress noted. Report given to Nurse Chau at [facility].  VSS. Afebrile. No acute complaints of pain, N/V, diarrhea, or SOB. RPt left with belongings to via wheelchair per [facility] transporter. Family has been informed.

## 2020-03-16 NOTE — HOSPITAL COURSE
Ms. Riley was placed in observation for further evaluation and treatment after presenting with some bright red blood with stool at NH.  H/H stable, no active bleeding observed while hospitalized.  Otherwise in her usual state of health.  All findings and plan discussed with patient, all questions answered, she verbalizes understanding and agreement.  Discharged back to nursing home in stable condition.

## 2020-06-22 ENCOUNTER — LAB VISIT (OUTPATIENT)
Dept: LAB | Facility: OTHER | Age: 85
End: 2020-06-22
Payer: MEDICARE

## 2020-06-22 DIAGNOSIS — Z20.822 SUSPECTED COVID-19 VIRUS INFECTION: ICD-10-CM

## 2020-06-22 PROCEDURE — U0003 INFECTIOUS AGENT DETECTION BY NUCLEIC ACID (DNA OR RNA); SEVERE ACUTE RESPIRATORY SYNDROME CORONAVIRUS 2 (SARS-COV-2) (CORONAVIRUS DISEASE [COVID-19]), AMPLIFIED PROBE TECHNIQUE, MAKING USE OF HIGH THROUGHPUT TECHNOLOGIES AS DESCRIBED BY CMS-2020-01-R: HCPCS

## 2020-06-25 LAB — SARS-COV-2 RNA RESP QL NAA+PROBE: NOT DETECTED

## 2020-06-29 ENCOUNTER — LAB VISIT (OUTPATIENT)
Dept: LAB | Facility: OTHER | Age: 85
End: 2020-06-29
Payer: MEDICARE

## 2020-06-29 DIAGNOSIS — Z20.822 SUSPECTED COVID-19 VIRUS INFECTION: ICD-10-CM

## 2020-06-29 PROCEDURE — U0003 INFECTIOUS AGENT DETECTION BY NUCLEIC ACID (DNA OR RNA); SEVERE ACUTE RESPIRATORY SYNDROME CORONAVIRUS 2 (SARS-COV-2) (CORONAVIRUS DISEASE [COVID-19]), AMPLIFIED PROBE TECHNIQUE, MAKING USE OF HIGH THROUGHPUT TECHNOLOGIES AS DESCRIBED BY CMS-2020-01-R: HCPCS

## 2020-07-02 LAB — SARS-COV-2 RNA RESP QL NAA+PROBE: NEGATIVE

## 2020-07-06 ENCOUNTER — LAB VISIT (OUTPATIENT)
Dept: LAB | Facility: OTHER | Age: 85
End: 2020-07-06
Payer: MEDICARE

## 2020-07-06 DIAGNOSIS — Z20.822 SUSPECTED COVID-19 VIRUS INFECTION: ICD-10-CM

## 2020-07-06 PROCEDURE — U0003 INFECTIOUS AGENT DETECTION BY NUCLEIC ACID (DNA OR RNA); SEVERE ACUTE RESPIRATORY SYNDROME CORONAVIRUS 2 (SARS-COV-2) (CORONAVIRUS DISEASE [COVID-19]), AMPLIFIED PROBE TECHNIQUE, MAKING USE OF HIGH THROUGHPUT TECHNOLOGIES AS DESCRIBED BY CMS-2020-01-R: HCPCS

## 2020-07-07 LAB — SARS-COV-2 RNA RESP QL NAA+PROBE: NEGATIVE

## 2020-07-28 ENCOUNTER — HOSPITAL ENCOUNTER (INPATIENT)
Facility: HOSPITAL | Age: 85
LOS: 7 days | Discharge: OTHER INSTITUTION | DRG: 378 | End: 2020-08-04
Attending: EMERGENCY MEDICINE | Admitting: INTERNAL MEDICINE
Payer: MEDICARE

## 2020-07-28 DIAGNOSIS — Z51.5 PALLIATIVE CARE ENCOUNTER: ICD-10-CM

## 2020-07-28 DIAGNOSIS — Z71.89 GOALS OF CARE, COUNSELING/DISCUSSION: ICD-10-CM

## 2020-07-28 DIAGNOSIS — I50.32 CHRONIC DIASTOLIC HEART FAILURE: Chronic | ICD-10-CM

## 2020-07-28 DIAGNOSIS — N18.5 CKD (CHRONIC KIDNEY DISEASE), SYMPTOM MANAGEMENT ONLY, STAGE 5: Chronic | ICD-10-CM

## 2020-07-28 DIAGNOSIS — M54.2 NECK PAIN, CHRONIC: ICD-10-CM

## 2020-07-28 DIAGNOSIS — Z01.810 PREOP CARDIOVASCULAR EXAM: ICD-10-CM

## 2020-07-28 DIAGNOSIS — I48.91 A-FIB: ICD-10-CM

## 2020-07-28 DIAGNOSIS — Z71.89 ADVANCE CARE PLANNING: ICD-10-CM

## 2020-07-28 DIAGNOSIS — A49.8 CLOSTRIDIUM DIFFICILE INFECTION: Primary | ICD-10-CM

## 2020-07-28 DIAGNOSIS — G89.29 NECK PAIN, CHRONIC: ICD-10-CM

## 2020-07-28 DIAGNOSIS — K92.1 HEMATOCHEZIA: ICD-10-CM

## 2020-07-28 DIAGNOSIS — N18.9 CHRONIC RENAL IMPAIRMENT, UNSPECIFIED CKD STAGE: ICD-10-CM

## 2020-07-28 DIAGNOSIS — K92.2 GASTROINTESTINAL HEMORRHAGE, UNSPECIFIED GASTROINTESTINAL HEMORRHAGE TYPE: ICD-10-CM

## 2020-07-28 PROBLEM — Z86.73 HISTORY OF STROKE: Chronic | Status: ACTIVE | Noted: 2020-07-28

## 2020-07-28 PROBLEM — Z86.73 HISTORY OF STROKE: Status: ACTIVE | Noted: 2020-07-28

## 2020-07-28 PROBLEM — E78.5 HYPERLIPIDEMIA: Chronic | Status: RESOLVED | Noted: 2019-04-10 | Resolved: 2020-07-28

## 2020-07-28 LAB
ABO + RH BLD: NORMAL
ALBUMIN SERPL BCP-MCNC: 2.5 G/DL (ref 3.5–5.2)
ALP SERPL-CCNC: 84 U/L (ref 55–135)
ALT SERPL W/O P-5'-P-CCNC: 11 U/L (ref 10–44)
ANION GAP SERPL CALC-SCNC: 9 MMOL/L (ref 8–16)
APTT BLDCRRT: 26.8 SEC (ref 21–32)
AST SERPL-CCNC: 14 U/L (ref 10–40)
BASOPHILS # BLD AUTO: 0.08 K/UL (ref 0–0.2)
BASOPHILS NFR BLD: 0.5 % (ref 0–1.9)
BILIRUB SERPL-MCNC: 0.2 MG/DL (ref 0.1–1)
BLD GP AB SCN CELLS X3 SERPL QL: NORMAL
BLD PROD TYP BPU: NORMAL
BLOOD UNIT EXPIRATION DATE: NORMAL
BLOOD UNIT TYPE CODE: 5100
BLOOD UNIT TYPE: NORMAL
BUN SERPL-MCNC: 53 MG/DL (ref 8–23)
CALCIUM SERPL-MCNC: 9.3 MG/DL (ref 8.7–10.5)
CHLORIDE SERPL-SCNC: 107 MMOL/L (ref 95–110)
CO2 SERPL-SCNC: 25 MMOL/L (ref 23–29)
CODING SYSTEM: NORMAL
CREAT SERPL-MCNC: 4 MG/DL (ref 0.5–1.4)
DIFFERENTIAL METHOD: ABNORMAL
DISPENSE STATUS: NORMAL
EOSINOPHIL # BLD AUTO: 0.2 K/UL (ref 0–0.5)
EOSINOPHIL NFR BLD: 1 % (ref 0–8)
ERYTHROCYTE [DISTWIDTH] IN BLOOD BY AUTOMATED COUNT: 15.8 % (ref 11.5–14.5)
EST. GFR  (AFRICAN AMERICAN): 11 ML/MIN/1.73 M^2
EST. GFR  (NON AFRICAN AMERICAN): 10 ML/MIN/1.73 M^2
FERRITIN SERPL-MCNC: 729 NG/ML (ref 20–300)
GLUCOSE SERPL-MCNC: 190 MG/DL (ref 70–110)
GLUCOSE SERPL-MCNC: 260 MG/DL (ref 70–110)
HCT VFR BLD AUTO: 25.7 % (ref 37–48.5)
HGB BLD-MCNC: 7.8 G/DL (ref 12–16)
IMM GRANULOCYTES # BLD AUTO: 0.19 K/UL (ref 0–0.04)
IMM GRANULOCYTES NFR BLD AUTO: 1.1 % (ref 0–0.5)
INR PPP: 1 (ref 0.8–1.2)
IRON SERPL-MCNC: 31 UG/DL (ref 30–160)
LACTATE SERPL-SCNC: 1.1 MMOL/L (ref 0.5–2.2)
LACTATE SERPL-SCNC: 1.4 MMOL/L (ref 0.5–2.2)
LDH SERPL L TO P-CCNC: 243 U/L (ref 110–260)
LYMPHOCYTES # BLD AUTO: 1.7 K/UL (ref 1–4.8)
LYMPHOCYTES NFR BLD: 10.4 % (ref 18–48)
MAGNESIUM SERPL-MCNC: 1.8 MG/DL (ref 1.6–2.6)
MCH RBC QN AUTO: 29.4 PG (ref 27–31)
MCHC RBC AUTO-ENTMCNC: 30.4 G/DL (ref 32–36)
MCV RBC AUTO: 97 FL (ref 82–98)
MONOCYTES # BLD AUTO: 0.7 K/UL (ref 0.3–1)
MONOCYTES NFR BLD: 4 % (ref 4–15)
NEUTROPHILS # BLD AUTO: 13.7 K/UL (ref 1.8–7.7)
NEUTROPHILS NFR BLD: 83 % (ref 38–73)
NRBC BLD-RTO: 0 /100 WBC
PHOSPHATE SERPL-MCNC: 5 MG/DL (ref 2.7–4.5)
PLATELET # BLD AUTO: 372 K/UL (ref 150–350)
PMV BLD AUTO: 9.1 FL (ref 9.2–12.9)
POTASSIUM SERPL-SCNC: 4.4 MMOL/L (ref 3.5–5.1)
PROT SERPL-MCNC: 6.5 G/DL (ref 6–8.4)
PROTHROMBIN TIME: 11 SEC (ref 9–12.5)
RBC # BLD AUTO: 2.65 M/UL (ref 4–5.4)
SARS-COV-2 RDRP RESP QL NAA+PROBE: NEGATIVE
SATURATED IRON: 18 % (ref 20–50)
SODIUM SERPL-SCNC: 141 MMOL/L (ref 136–145)
TOTAL IRON BINDING CAPACITY: 175 UG/DL (ref 250–450)
TRANS ERYTHROCYTES VOL PATIENT: NORMAL ML
TRANSFERRIN SERPL-MCNC: 118 MG/DL (ref 200–375)
WBC # BLD AUTO: 16.56 K/UL (ref 3.9–12.7)

## 2020-07-28 PROCEDURE — 85025 COMPLETE CBC W/AUTO DIFF WBC: CPT

## 2020-07-28 PROCEDURE — 83540 ASSAY OF IRON: CPT

## 2020-07-28 PROCEDURE — 96375 TX/PRO/DX INJ NEW DRUG ADDON: CPT

## 2020-07-28 PROCEDURE — 93010 ELECTROCARDIOGRAM REPORT: CPT | Mod: ,,, | Performed by: INTERNAL MEDICINE

## 2020-07-28 PROCEDURE — 87427 SHIGA-LIKE TOXIN AG IA: CPT

## 2020-07-28 PROCEDURE — 85610 PROTHROMBIN TIME: CPT

## 2020-07-28 PROCEDURE — 36430 TRANSFUSION BLD/BLD COMPNT: CPT

## 2020-07-28 PROCEDURE — 96374 THER/PROPH/DIAG INJ IV PUSH: CPT

## 2020-07-28 PROCEDURE — 80053 COMPREHEN METABOLIC PANEL: CPT

## 2020-07-28 PROCEDURE — 83735 ASSAY OF MAGNESIUM: CPT

## 2020-07-28 PROCEDURE — 93005 ELECTROCARDIOGRAM TRACING: CPT

## 2020-07-28 PROCEDURE — 86901 BLOOD TYPING SEROLOGIC RH(D): CPT

## 2020-07-28 PROCEDURE — P9021 RED BLOOD CELLS UNIT: HCPCS

## 2020-07-28 PROCEDURE — U0002 COVID-19 LAB TEST NON-CDC: HCPCS

## 2020-07-28 PROCEDURE — 63600175 PHARM REV CODE 636 W HCPCS: Performed by: EMERGENCY MEDICINE

## 2020-07-28 PROCEDURE — 82306 VITAMIN D 25 HYDROXY: CPT

## 2020-07-28 PROCEDURE — 87493 C DIFF AMPLIFIED PROBE: CPT

## 2020-07-28 PROCEDURE — 83010 ASSAY OF HAPTOGLOBIN QUANT: CPT

## 2020-07-28 PROCEDURE — 84100 ASSAY OF PHOSPHORUS: CPT

## 2020-07-28 PROCEDURE — 87046 STOOL CULTR AEROBIC BACT EA: CPT

## 2020-07-28 PROCEDURE — C9113 INJ PANTOPRAZOLE SODIUM, VIA: HCPCS | Performed by: EMERGENCY MEDICINE

## 2020-07-28 PROCEDURE — 83605 ASSAY OF LACTIC ACID: CPT

## 2020-07-28 PROCEDURE — 85730 THROMBOPLASTIN TIME PARTIAL: CPT

## 2020-07-28 PROCEDURE — 87045 FECES CULTURE AEROBIC BACT: CPT

## 2020-07-28 PROCEDURE — 87040 BLOOD CULTURE FOR BACTERIA: CPT | Mod: 59

## 2020-07-28 PROCEDURE — 86920 COMPATIBILITY TEST SPIN: CPT

## 2020-07-28 PROCEDURE — 82728 ASSAY OF FERRITIN: CPT

## 2020-07-28 PROCEDURE — 87449 NOS EACH ORGANISM AG IA: CPT

## 2020-07-28 PROCEDURE — 93010 EKG 12-LEAD: ICD-10-PCS | Mod: ,,, | Performed by: INTERNAL MEDICINE

## 2020-07-28 PROCEDURE — 25000003 PHARM REV CODE 250: Performed by: EMERGENCY MEDICINE

## 2020-07-28 PROCEDURE — 87324 CLOSTRIDIUM AG IA: CPT

## 2020-07-28 PROCEDURE — 83615 LACTATE (LD) (LDH) ENZYME: CPT

## 2020-07-28 PROCEDURE — 99291 CRITICAL CARE FIRST HOUR: CPT | Mod: 25

## 2020-07-28 PROCEDURE — 11000001 HC ACUTE MED/SURG PRIVATE ROOM

## 2020-07-28 PROCEDURE — 96361 HYDRATE IV INFUSION ADD-ON: CPT

## 2020-07-28 RX ORDER — PROCHLORPERAZINE EDISYLATE 5 MG/ML
5 INJECTION INTRAMUSCULAR; INTRAVENOUS EVERY 6 HOURS PRN
Status: DISCONTINUED | OUTPATIENT
Start: 2020-07-28 | End: 2020-08-04 | Stop reason: HOSPADM

## 2020-07-28 RX ORDER — ONDANSETRON 2 MG/ML
8 INJECTION INTRAMUSCULAR; INTRAVENOUS EVERY 8 HOURS PRN
Status: DISCONTINUED | OUTPATIENT
Start: 2020-07-28 | End: 2020-08-04 | Stop reason: HOSPADM

## 2020-07-28 RX ORDER — SODIUM CHLORIDE 9 MG/ML
1000 INJECTION, SOLUTION INTRAVENOUS
Status: COMPLETED | OUTPATIENT
Start: 2020-07-28 | End: 2020-07-29

## 2020-07-28 RX ORDER — SODIUM BICARBONATE 325 MG/1
1300 TABLET ORAL 2 TIMES DAILY
Status: DISCONTINUED | OUTPATIENT
Start: 2020-07-28 | End: 2020-08-03

## 2020-07-28 RX ORDER — CALCITRIOL 0.25 UG/1
0.25 CAPSULE ORAL EVERY OTHER DAY
Status: DISCONTINUED | OUTPATIENT
Start: 2020-07-29 | End: 2020-08-04 | Stop reason: HOSPADM

## 2020-07-28 RX ORDER — PANTOPRAZOLE SODIUM 40 MG/10ML
80 INJECTION, POWDER, LYOPHILIZED, FOR SOLUTION INTRAVENOUS
Status: COMPLETED | OUTPATIENT
Start: 2020-07-28 | End: 2020-07-28

## 2020-07-28 RX ORDER — HYDROCODONE BITARTRATE AND ACETAMINOPHEN 500; 5 MG/1; MG/1
TABLET ORAL
Status: DISCONTINUED | OUTPATIENT
Start: 2020-07-28 | End: 2020-08-04 | Stop reason: HOSPADM

## 2020-07-28 RX ORDER — IBUPROFEN 200 MG
16 TABLET ORAL
Status: DISCONTINUED | OUTPATIENT
Start: 2020-07-28 | End: 2020-08-02

## 2020-07-28 RX ORDER — FUROSEMIDE 40 MG/1
40 TABLET ORAL DAILY
Status: DISCONTINUED | OUTPATIENT
Start: 2020-07-29 | End: 2020-07-31

## 2020-07-28 RX ORDER — TALC
9 POWDER (GRAM) TOPICAL NIGHTLY PRN
Status: DISCONTINUED | OUTPATIENT
Start: 2020-07-28 | End: 2020-08-04 | Stop reason: HOSPADM

## 2020-07-28 RX ORDER — ACETAMINOPHEN 500 MG
500 TABLET ORAL EVERY 6 HOURS PRN
Status: DISCONTINUED | OUTPATIENT
Start: 2020-07-28 | End: 2020-08-04 | Stop reason: HOSPADM

## 2020-07-28 RX ORDER — PROCHLORPERAZINE EDISYLATE 5 MG/ML
10 INJECTION INTRAMUSCULAR; INTRAVENOUS ONCE
Status: COMPLETED | OUTPATIENT
Start: 2020-07-28 | End: 2020-07-28

## 2020-07-28 RX ORDER — ONDANSETRON 2 MG/ML
8 INJECTION INTRAMUSCULAR; INTRAVENOUS
Status: COMPLETED | OUTPATIENT
Start: 2020-07-28 | End: 2020-07-28

## 2020-07-28 RX ORDER — NIFEDIPINE 30 MG/1
30 TABLET, EXTENDED RELEASE ORAL DAILY
Status: DISCONTINUED | OUTPATIENT
Start: 2020-07-29 | End: 2020-08-03

## 2020-07-28 RX ORDER — INSULIN ASPART 100 [IU]/ML
2 INJECTION, SOLUTION INTRAVENOUS; SUBCUTANEOUS
Status: DISCONTINUED | OUTPATIENT
Start: 2020-07-29 | End: 2020-08-02

## 2020-07-28 RX ORDER — CLONIDINE HYDROCHLORIDE 0.1 MG/1
0.1 TABLET ORAL 3 TIMES DAILY PRN
Status: DISCONTINUED | OUTPATIENT
Start: 2020-07-28 | End: 2020-08-04 | Stop reason: HOSPADM

## 2020-07-28 RX ORDER — IBUPROFEN 200 MG
24 TABLET ORAL
Status: DISCONTINUED | OUTPATIENT
Start: 2020-07-28 | End: 2020-08-02

## 2020-07-28 RX ORDER — ERGOCALCIFEROL 1.25 MG/1
50000 CAPSULE ORAL
Status: DISCONTINUED | OUTPATIENT
Start: 2020-07-29 | End: 2020-08-04 | Stop reason: HOSPADM

## 2020-07-28 RX ORDER — GLUCAGON 1 MG
1 KIT INJECTION
Status: DISCONTINUED | OUTPATIENT
Start: 2020-07-28 | End: 2020-08-02

## 2020-07-28 RX ORDER — INSULIN ASPART 100 [IU]/ML
0-5 INJECTION, SOLUTION INTRAVENOUS; SUBCUTANEOUS
Status: DISCONTINUED | OUTPATIENT
Start: 2020-07-28 | End: 2020-08-02

## 2020-07-28 RX ORDER — SODIUM CHLORIDE 0.9 % (FLUSH) 0.9 %
10 SYRINGE (ML) INJECTION
Status: CANCELLED | OUTPATIENT
Start: 2020-07-28

## 2020-07-28 RX ORDER — CARVEDILOL 12.5 MG/1
12.5 TABLET ORAL 2 TIMES DAILY
Status: DISCONTINUED | OUTPATIENT
Start: 2020-07-28 | End: 2020-08-04 | Stop reason: HOSPADM

## 2020-07-28 RX ADMIN — PANTOPRAZOLE SODIUM 80 MG: 40 INJECTION, POWDER, FOR SOLUTION INTRAVENOUS at 07:07

## 2020-07-28 RX ADMIN — DEXTROSE 8 MG/HR: 50 INJECTION, SOLUTION INTRAVENOUS at 07:07

## 2020-07-28 RX ADMIN — ONDANSETRON 8 MG: 2 INJECTION INTRAMUSCULAR; INTRAVENOUS at 08:07

## 2020-07-28 RX ADMIN — PIPERACILLIN SODIUM, TAZOBACTAM SODIUM 4.5 G: 4; .5 INJECTION, POWDER, LYOPHILIZED, FOR SOLUTION INTRAVENOUS at 09:07

## 2020-07-28 RX ADMIN — ONDANSETRON 8 MG: 2 INJECTION INTRAMUSCULAR; INTRAVENOUS at 07:07

## 2020-07-28 RX ADMIN — SODIUM CHLORIDE, SODIUM LACTATE, POTASSIUM CHLORIDE, AND CALCIUM CHLORIDE 1000 ML: .6; .31; .03; .02 INJECTION, SOLUTION INTRAVENOUS at 09:07

## 2020-07-28 RX ADMIN — PROCHLORPERAZINE EDISYLATE 10 MG: 5 INJECTION INTRAMUSCULAR; INTRAVENOUS at 08:07

## 2020-07-28 RX ADMIN — SODIUM CHLORIDE 1000 ML: 0.9 INJECTION, SOLUTION INTRAVENOUS at 07:07

## 2020-07-28 NOTE — ED PROVIDER NOTES
Encounter Date: 2020       History     Chief Complaint   Patient presents with    Rectal Bleeding     pt comes from [facility] due to rectal bleeding dark blood.  Pt also c/o abd pain    Abdominal Pain     86 y.o. female Past Medical History:  No date: Anemia  No date: Anticoagulant long-term use  No date: Arthritis  No date: Atrial fibrillation  No date: Cataract  No date: CHF (congestive heart failure)  No date: CKD (chronic kidney disease), stage V  No date: Diabetes mellitus      Comment:  Type 2  No date: GERD (gastroesophageal reflux disease)  2018: GI bleed  No date: Gout  No date: Hypertension  No date: Obese  No date: Requires assistance with all daily activities  2014: Stroke  No date: Wheelchair dependent       extensive pmh but significant to this visit is CHF, afib, CKD V, DMII, GI bleed, HTN, stroke, not currently on anticoagulation, no trauma/falls,  sent in by [facility] for numerous dark bloody foul smelling loose stool coupled with abd cramping x 1 day. Pt notes>5 episodes. Denies f/c, n/v, dysuria. Notes generalized abd pain starting this morning.        Review of patient's allergies indicates:   Allergen Reactions    Indomethacin     Nsaids (non-steroidal anti-inflammatory drug)      Past Medical History:   Diagnosis Date    Anemia     Anticoagulant long-term use     Arthritis     Atrial fibrillation     Cataract     CHF (congestive heart failure)     CKD (chronic kidney disease), stage V     Diabetes mellitus     Type 2    GERD (gastroesophageal reflux disease)     GI bleed 2018    Gout     Hypertension     Obese     Requires assistance with all daily activities     Stroke 2014    Wheelchair dependent      Past Surgical History:   Procedure Laterality Date     SECTION      Patient unsure, believe she had 3-4    CHOLECYSTECTOMY      EYE SURGERY      HYSTERECTOMY      JOINT REPLACEMENT Right     knee    TOTAL KNEE ARTHROPLASTY Right       Family History   Problem Relation Age of Onset    Cancer Mother          age 98    Diabetes Mother     Hypertension Mother     Cancer Brother      Social History     Tobacco Use    Smoking status: Never Smoker    Smokeless tobacco: Never Used   Substance Use Topics    Alcohol use: No    Drug use: No     Review of Systems   Constitutional: Negative for fever.   HENT: Negative for sore throat.    Respiratory: Negative for shortness of breath.    Cardiovascular: Negative for chest pain.   Gastrointestinal: Negative for nausea.   Genitourinary: Negative for dysuria.   Musculoskeletal: Negative for back pain.   Skin: Negative for rash.   Neurological: Negative for weakness.   Hematological: Does not bruise/bleed easily.   All other systems reviewed and are negative.      Physical Exam     Initial Vitals [20 1820]   BP Pulse Resp Temp SpO2   (!) 150/88 100 18 97.9 °F (36.6 °C) 98 %      MAP       --         Physical Exam    Nursing note and vitals reviewed.  Constitutional: She appears well-developed and well-nourished.   HENT:   Head: Normocephalic and atraumatic.   Eyes: Conjunctivae and EOM are normal. Pupils are equal, round, and reactive to light.   Neck: Normal range of motion.   Cardiovascular: Normal rate and regular rhythm.   Pulmonary/Chest: Breath sounds normal. No respiratory distress.   Abdominal: Soft. She exhibits no distension. There is no abdominal tenderness. There is no rebound.   Musculoskeletal: Normal range of motion.   Neurological: She is alert. No cranial nerve deficit. GCS score is 15. GCS eye subscore is 4. GCS verbal subscore is 5. GCS motor subscore is 6.   Skin: Skin is warm and dry.   Psychiatric: She has a normal mood and affect. Thought content normal.     diaper filled with dark bloody diarrhea    ED Course   Procedures  Labs Reviewed   CULTURE, STOOL   CLOSTRIDIUM DIFFICILE   CBC W/ AUTO DIFFERENTIAL   COMPREHENSIVE METABOLIC PANEL   PROTIME-INR   APTT   LACTIC  ACID, PLASMA   URINALYSIS, REFLEX TO URINE CULTURE   PHOSPHORUS   MAGNESIUM   VITAMIN D   SARS-COV-2 RNA AMPLIFICATION, QUAL   IRON AND TIBC   FERRITIN   LACTATE DEHYDROGENASE   HAPTOGLOBIN   TYPE & SCREEN   POCT GLUCOSE MONITORING CONTINUOUS          Imaging Results    None                       Attending Attestation:         Attending Critical Care:   Critical Care Times:   Direct Patient Care (initial evaluation, reassessments, and time considering the case)................................................................30 minutes.   Additional History from reviewing old medical records or taking additional history from the family, EMS, PCP, etc.......................10 minutes.   Ordering, Reviewing, and Interpreting Diagnostic Studies...............................................................................................................10 minutes.   Documentation..................................................................................................................................................................................10 minutes.   Consultation with other Physicians. .................................................................................................................................................10 minutes.   ==============================================================  · Total Critical Care Time - exclusive of procedural time: 70 minutes.  ==============================================================         pt has prior documented diverticulitis, in past has not been felt to be candidate for scope as GI bleeding had resolved and H and H was stable. Pt presents with multiple episodes of dark bloody bm      diveritulitis vs ischemic gut vs other etiology. Screening labs, broad spectrum abx, protonix drip, type/screen and consult gi.          Antonia Horton (pts daughter) [phone]      I have consulted Dr. Salazar from GI who is aware of pt and asks that we call  immediately if there is any change in status, otherwise will plan to scope in am.    Will start transfusion and admit.    Clinical Impression:       ICD-10-CM ICD-9-CM   1. Gastrointestinal hemorrhage, unspecified gastrointestinal hemorrhage type  K92.2 578.9   2. A-fib  I48.91 427.31   3. Preop cardiovascular exam  Z01.810 V72.81   4. Chronic renal impairment, unspecified CKD stage  N18.9 585.9                                Rayray Lai MD  07/28/20 2000       Rayray Lai MD  07/28/20 2012

## 2020-07-29 PROBLEM — D72.829 LEUKOCYTOSIS: Status: ACTIVE | Noted: 2020-07-29

## 2020-07-29 LAB
25(OH)D3+25(OH)D2 SERPL-MCNC: 27 NG/ML (ref 30–96)
ALBUMIN SERPL BCP-MCNC: 2.5 G/DL (ref 3.5–5.2)
ALP SERPL-CCNC: 73 U/L (ref 55–135)
ALT SERPL W/O P-5'-P-CCNC: 7 U/L (ref 10–44)
ANION GAP SERPL CALC-SCNC: 10 MMOL/L (ref 8–16)
ANISOCYTOSIS BLD QL SMEAR: SLIGHT
AST SERPL-CCNC: 17 U/L (ref 10–40)
BASOPHILS NFR BLD: 0 % (ref 0–1.9)
BILIRUB SERPL-MCNC: 0.4 MG/DL (ref 0.1–1)
BUN SERPL-MCNC: 57 MG/DL (ref 8–23)
C DIFF GDH STL QL: POSITIVE
C DIFF TOX A+B STL QL IA: NEGATIVE
CALCIUM SERPL-MCNC: 8.9 MG/DL (ref 8.7–10.5)
CHLORIDE SERPL-SCNC: 108 MMOL/L (ref 95–110)
CO2 SERPL-SCNC: 23 MMOL/L (ref 23–29)
CREAT SERPL-MCNC: 4.2 MG/DL (ref 0.5–1.4)
DIFFERENTIAL METHOD: ABNORMAL
E COLI SXT1 STL QL IA: NEGATIVE
E COLI SXT2 STL QL IA: NEGATIVE
EOSINOPHIL NFR BLD: 0 % (ref 0–8)
ERYTHROCYTE [DISTWIDTH] IN BLOOD BY AUTOMATED COUNT: 16.2 % (ref 11.5–14.5)
EST. GFR  (AFRICAN AMERICAN): 10 ML/MIN/1.73 M^2
EST. GFR  (NON AFRICAN AMERICAN): 9 ML/MIN/1.73 M^2
ESTIMATED AVG GLUCOSE: 97 MG/DL (ref 68–131)
GLUCOSE SERPL-MCNC: 128 MG/DL (ref 70–110)
HAPTOGLOB SERPL-MCNC: 242 MG/DL (ref 30–250)
HBA1C MFR BLD HPLC: 5 % (ref 4–5.6)
HCT VFR BLD AUTO: 28.6 % (ref 37–48.5)
HGB BLD-MCNC: 8.8 G/DL (ref 12–16)
HYPOCHROMIA BLD QL SMEAR: ABNORMAL
IMM GRANULOCYTES # BLD AUTO: ABNORMAL K/UL (ref 0–0.04)
IMM GRANULOCYTES NFR BLD AUTO: ABNORMAL % (ref 0–0.5)
LYMPHOCYTES NFR BLD: 7 % (ref 18–48)
MAGNESIUM SERPL-MCNC: 1.8 MG/DL (ref 1.6–2.6)
MCH RBC QN AUTO: 29.9 PG (ref 27–31)
MCHC RBC AUTO-ENTMCNC: 30.8 G/DL (ref 32–36)
MCV RBC AUTO: 97 FL (ref 82–98)
MONOCYTES NFR BLD: 2 % (ref 4–15)
MYELOCYTES NFR BLD MANUAL: 1 %
NEUTROPHILS NFR BLD: 88 % (ref 38–73)
NEUTS BAND NFR BLD MANUAL: 2 %
NRBC BLD-RTO: 0 /100 WBC
PHOSPHATE SERPL-MCNC: 5.6 MG/DL (ref 2.7–4.5)
PLATELET # BLD AUTO: 307 K/UL (ref 150–350)
PMV BLD AUTO: 9.5 FL (ref 9.2–12.9)
POCT GLUCOSE: 146 MG/DL (ref 70–110)
POCT GLUCOSE: 149 MG/DL (ref 70–110)
POCT GLUCOSE: 161 MG/DL (ref 70–110)
POCT GLUCOSE: 164 MG/DL (ref 70–110)
POCT GLUCOSE: 183 MG/DL (ref 70–110)
POCT GLUCOSE: 260 MG/DL (ref 70–110)
POLYCHROMASIA BLD QL SMEAR: ABNORMAL
POTASSIUM SERPL-SCNC: 5 MMOL/L (ref 3.5–5.1)
PROT SERPL-MCNC: 6.2 G/DL (ref 6–8.4)
RBC # BLD AUTO: 2.94 M/UL (ref 4–5.4)
SCHISTOCYTES BLD QL SMEAR: ABNORMAL
SODIUM SERPL-SCNC: 141 MMOL/L (ref 136–145)
WBC # BLD AUTO: 23.97 K/UL (ref 3.9–12.7)

## 2020-07-29 PROCEDURE — C9399 UNCLASSIFIED DRUGS OR BIOLOG: HCPCS | Performed by: INTERNAL MEDICINE

## 2020-07-29 PROCEDURE — 85027 COMPLETE CBC AUTOMATED: CPT

## 2020-07-29 PROCEDURE — 25000003 PHARM REV CODE 250: Performed by: INTERNAL MEDICINE

## 2020-07-29 PROCEDURE — 94761 N-INVAS EAR/PLS OXIMETRY MLT: CPT

## 2020-07-29 PROCEDURE — 84100 ASSAY OF PHOSPHORUS: CPT

## 2020-07-29 PROCEDURE — 11000001 HC ACUTE MED/SURG PRIVATE ROOM

## 2020-07-29 PROCEDURE — 63600175 PHARM REV CODE 636 W HCPCS: Performed by: INTERNAL MEDICINE

## 2020-07-29 PROCEDURE — 83036 HEMOGLOBIN GLYCOSYLATED A1C: CPT

## 2020-07-29 PROCEDURE — 83735 ASSAY OF MAGNESIUM: CPT

## 2020-07-29 PROCEDURE — 63600175 PHARM REV CODE 636 W HCPCS: Performed by: EMERGENCY MEDICINE

## 2020-07-29 PROCEDURE — C9113 INJ PANTOPRAZOLE SODIUM, VIA: HCPCS | Performed by: EMERGENCY MEDICINE

## 2020-07-29 PROCEDURE — 25000003 PHARM REV CODE 250: Performed by: EMERGENCY MEDICINE

## 2020-07-29 PROCEDURE — 85007 BL SMEAR W/DIFF WBC COUNT: CPT

## 2020-07-29 PROCEDURE — 36415 COLL VENOUS BLD VENIPUNCTURE: CPT

## 2020-07-29 PROCEDURE — 80053 COMPREHEN METABOLIC PANEL: CPT

## 2020-07-29 PROCEDURE — 25000003 PHARM REV CODE 250: Performed by: HOSPITALIST

## 2020-07-29 RX ORDER — LIDOCAINE 50 MG/G
1 PATCH TOPICAL
Status: DISCONTINUED | OUTPATIENT
Start: 2020-07-29 | End: 2020-08-04 | Stop reason: HOSPADM

## 2020-07-29 RX ORDER — PANTOPRAZOLE SODIUM 40 MG/1
40 TABLET, DELAYED RELEASE ORAL DAILY
Status: DISCONTINUED | OUTPATIENT
Start: 2020-07-29 | End: 2020-07-31

## 2020-07-29 RX ORDER — METRONIDAZOLE 500 MG/1
500 TABLET ORAL EVERY 8 HOURS
Status: DISCONTINUED | OUTPATIENT
Start: 2020-07-29 | End: 2020-07-31

## 2020-07-29 RX ADMIN — SODIUM BICARBONATE TAB 325 MG 1300 MG: 325 TAB at 08:07

## 2020-07-29 RX ADMIN — INSULIN DETEMIR 5 UNITS: 100 INJECTION, SOLUTION SUBCUTANEOUS at 09:07

## 2020-07-29 RX ADMIN — ERGOCALCIFEROL 50000 UNITS: 1.25 CAPSULE ORAL at 08:07

## 2020-07-29 RX ADMIN — DEXTROSE 8 MG/HR: 50 INJECTION, SOLUTION INTRAVENOUS at 11:07

## 2020-07-29 RX ADMIN — SODIUM BICARBONATE TAB 325 MG 1300 MG: 325 TAB at 09:07

## 2020-07-29 RX ADMIN — INSULIN ASPART 2 UNITS: 100 INJECTION, SOLUTION INTRAVENOUS; SUBCUTANEOUS at 05:07

## 2020-07-29 RX ADMIN — CARVEDILOL 12.5 MG: 12.5 TABLET, FILM COATED ORAL at 12:07

## 2020-07-29 RX ADMIN — FUROSEMIDE 40 MG: 40 TABLET ORAL at 08:07

## 2020-07-29 RX ADMIN — DEXTROSE 8 MG/HR: 50 INJECTION, SOLUTION INTRAVENOUS at 12:07

## 2020-07-29 RX ADMIN — METRONIDAZOLE 500 MG: 500 TABLET ORAL at 09:07

## 2020-07-29 RX ADMIN — LIDOCAINE 1 PATCH: 50 PATCH TOPICAL at 03:07

## 2020-07-29 RX ADMIN — Medication 9 MG: at 12:07

## 2020-07-29 RX ADMIN — INSULIN ASPART 2 UNITS: 100 INJECTION, SOLUTION INTRAVENOUS; SUBCUTANEOUS at 12:07

## 2020-07-29 RX ADMIN — INSULIN DETEMIR 5 UNITS: 100 INJECTION, SOLUTION SUBCUTANEOUS at 12:07

## 2020-07-29 RX ADMIN — METRONIDAZOLE 500 MG: 500 TABLET ORAL at 01:07

## 2020-07-29 RX ADMIN — CALCITRIOL CAPSULES 0.25 MCG 0.25 MCG: 0.25 CAPSULE ORAL at 08:07

## 2020-07-29 RX ADMIN — SODIUM BICARBONATE TAB 325 MG 1300 MG: 325 TAB at 12:07

## 2020-07-29 RX ADMIN — DEXTROSE 8 MG/HR: 50 INJECTION, SOLUTION INTRAVENOUS at 05:07

## 2020-07-29 RX ADMIN — NIFEDIPINE 30 MG: 30 TABLET, FILM COATED, EXTENDED RELEASE ORAL at 08:07

## 2020-07-29 RX ADMIN — CARVEDILOL 12.5 MG: 12.5 TABLET, FILM COATED ORAL at 09:07

## 2020-07-29 RX ADMIN — PANTOPRAZOLE SODIUM 40 MG: 40 TABLET, DELAYED RELEASE ORAL at 01:07

## 2020-07-29 RX ADMIN — SODIUM CHLORIDE 1000 ML: 0.9 INJECTION, SOLUTION INTRAVENOUS at 12:07

## 2020-07-29 RX ADMIN — CARVEDILOL 12.5 MG: 12.5 TABLET, FILM COATED ORAL at 08:07

## 2020-07-29 NOTE — HPI
Mrs. Joyce Riley is a 86 y.o. female [facility] resident known to me with renal hypertension, type 2 diabetes mellitus (HbA1c 5.1% Mar 2020), chronic diastolic heart failure (LVEF 80% Apr 2019), CKD stage 5, paroxysmal atrial fibrillation ([id number] score 5) not on chronic anticoagulation, anemia of renal disease, and history of stroke who presents to [facility] ED with complaints of hematochezia today.  She says that she has occasional rectal bleeding for the past few months as well as chronic lower abdominal cramping worse on eating.  Today she noted blood in her stool which he described as dark red.  She denies any nausea, vomiting, hematemesis, coffee-ground emesis, melena, nor any hematuria.  She has been generally weak and fatigued but that has not been any worse than usual.  She denies any chest pains, shortness of breath, lightheadedness, dizziness, falls, nor any loss of consciousness.

## 2020-07-29 NOTE — ASSESSMENT & PLAN NOTE
Her renal function appears to be are baseline.  She was previously attempted on hemodialysis in Apr 2019 but was intolerant of it.  Her nephrologist had recommended hospice instead.  Will avoid nephrotoxins continue to monitor her urine output.

## 2020-07-29 NOTE — ED NOTES
Received report from Mey PATINO. Pt had large dark red with clot bowel movement. Pt changed. Pt has vomited x 1. MD made aware. Pt is awaiting lab results and  More blood work. Daughter aware pt in ED. Side rails up x2. Call bell in reach.

## 2020-07-29 NOTE — H&P
[facility] Medicine  History & Physical    Patient Name: Joyce Riley  MRN: [id number]  Admission Date: 7/28/2020  Attending Physician: Rodrigo Orosco MD   Primary Care Provider: Kalpana Staton MD         Patient information was obtained from patient.     Subjective:     Principal Problem:Hematochezia    Chief Complaint:  Rectal bleeding today.    HPI: Mrs. Joyce Riley is a 86 y.o. female [facility] resident known to me with renal hypertension, type 2 diabetes mellitus (HbA1c 5.1% Mar 2020), chronic diastolic heart failure (LVEF 80% Apr 2019), CKD stage 5, paroxysmal atrial fibrillation ([id number] score 5) not on chronic anticoagulation, anemia of renal disease, and history of stroke who presents to [facility] ED with complaints of hematochezia today.  She says that she has occasional rectal bleeding for the past few months as well as chronic lower abdominal cramping worse on eating.  Today she noted blood in her stool which he described as dark red.  She denies any nausea, vomiting, hematemesis, coffee-ground emesis, melena, nor any hematuria.  She has been generally weak and fatigued but that has not been any worse than usual.  She denies any chest pains, shortness of breath, lightheadedness, dizziness, falls, nor any loss of consciousness.    Chart Review:  Previous Hospitalizations  Date Hospital Diagnosis   Mar 15, 2020 [facility] Hematochezia thought to be diverticular   Mar 7, 2020 [facility] Hematochezia s/p pRBC transfusion 3 units   Jul 2019 [facility] Acute renal failure, hematochezia   Apr 10, 2019 [facility] Acute renal failure   Apr 1, 2019 [facility] End-stage renal disease--initiate hemodialysis     Outpatient Follow-Up  Date of Visit Physician Service   Jul 2020 Lashanda Waters NP Nephrology   Jul 2012 Jeanne Sexton MD Primary Care     Past Medical History:   Diagnosis Date    Anemia     Anticoagulant long-term use     Arthritis     Atrial fibrillation      Cataract     CHF (congestive heart failure)     CKD (chronic kidney disease), stage V     Diabetes mellitus     Type 2    GERD (gastroesophageal reflux disease)     GI bleed 2018    Gout     Hypertension     Obese     Requires assistance with all daily activities     Stroke     Wheelchair dependent        Past Surgical History:   Procedure Laterality Date     SECTION      Patient unsure, believe she had 3-4    CHOLECYSTECTOMY      EYE SURGERY      HYSTERECTOMY      JOINT REPLACEMENT Right     knee    TOTAL KNEE ARTHROPLASTY Right        Review of patient's allergies indicates:   Allergen Reactions    Indomethacin     Nsaids (non-steroidal anti-inflammatory drug)        No current facility-administered medications on file prior to encounter.      Current Outpatient Medications on File Prior to Encounter   Medication Sig    albuterol-ipratropium (DUO-NEB) 2.5 mg-0.5 mg/3 mL nebulizer solution Take 3 mLs by nebulization every 4 (four) hours as needed for Wheezing. Rescue    calcitRIOL (ROCALTROL) 0.25 MCG Cap Take 1 capsule (0.25 mcg total) by mouth every other day.    carvediloL (COREG) 12.5 MG tablet Take 1 tablet (12.5 mg total) by mouth 2 (two) times daily.    furosemide (LASIX) 40 MG tablet Take 1 tablet (40 mg total) by mouth once daily.    insulin degludec (TRESIBA FLEXTOUCH U-100) 100 unit/mL (3 mL) InPn Inject 5 Units into the skin every evening.    magnesium oxide 500 mg Tab Take 500 mg by mouth once daily.    polyethylene glycol (GLYCOLAX) 17 gram PwPk Take 17 g by mouth 2 (two) times daily as needed.    acetaminophen (TYLENOL) 325 MG tablet Take 2 tablets (650 mg total) by mouth every 4 (four) hours as needed. (Patient taking differently: Take 650 mg by mouth 2 (two) times daily. )    B complex-vitamin C-folic acid (NEPHRO-MICHAEL) 0.8 mg Tab Take by mouth once daily.    epoetin diana (EPOGEN) 10,000 unit/mL injection Inject 1 mL (10,000 Units total) into the skin  every 7 days.    ergocalciferol (ERGOCALCIFEROL) 50,000 unit Cap Take 1 capsule (50,000 Units total) by mouth every 7 days.    ferrous sulfate (FEOSOL) 325 mg (65 mg iron) Tab tablet Take 325 mg by mouth 2 (two) times daily.    methyl salicylate-menthol 15-10% 15-10 % Crea Apply topically 4 (four) times daily.    NIFEdipine (PROCARDIA-XL) 60 MG (OSM) 24 hr tablet Take 1 tablet (60 mg total) by mouth once daily. (Patient taking differently: Take 30 mg by mouth once daily. HOLD FOR SBP <110)    senna-docusate 8.6-50 mg (PERICOLACE) 8.6-50 mg per tablet Take 1 tablet by mouth 2 (two) times daily.    sodium bicarbonate 650 MG tablet Take 2 tablets (1,300 mg total) by mouth 2 (two) times daily.     Family History     Problem Relation (Age of Onset)    Cancer Mother, Brother    Diabetes Mother    Hypertension Mother        Tobacco Use    Smoking status: Never Smoker    Smokeless tobacco: Never Used   Substance and Sexual Activity    Alcohol use: No    Drug use: No    Sexual activity: Never     Review of Systems   Constitutional: Positive for fatigue. Negative for activity change, appetite change, chills, diaphoresis, fever and unexpected weight change.   HENT: Negative.    Eyes: Negative.    Respiratory: Negative for cough, chest tightness, shortness of breath and wheezing.    Cardiovascular: Negative for chest pain, palpitations and leg swelling.   Gastrointestinal: Positive for abdominal pain and blood in stool. Negative for abdominal distention, anal bleeding, constipation, diarrhea, nausea and vomiting.   Genitourinary: Negative for dysuria and hematuria.   Musculoskeletal: Positive for neck pain.   Neurological: Positive for weakness. Negative for dizziness, seizures, syncope and light-headedness.   Psychiatric/Behavioral: Negative.      Objective:     Vital Signs (Most Recent):  Temp: 97.8 °F (36.6 °C) (07/28/20 2157)  Pulse: 81 (07/28/20 2222)  Resp: 17 (07/28/20 2222)  BP: (!) 177/74 (07/28/20  2204)  SpO2: 96 % (07/28/20 2222) Vital Signs (24h Range):  Temp:  [97.8 °F (36.6 °C)-97.9 °F (36.6 °C)] 97.8 °F (36.6 °C)  Pulse:  [] 81  Resp:  [16-20] 17  SpO2:  [95 %-99 %] 96 %  BP: (132-185)/(64-88) 177/74     Weight: 68 kg (150 lb)  Body mass index is 24.21 kg/m².    Physical Exam  Vitals signs and nursing note reviewed.   Constitutional:       General: She is not in acute distress.     Appearance: Normal appearance. She is normal weight. She is not ill-appearing, toxic-appearing or diaphoretic.   HENT:      Head: Normocephalic and atraumatic.      Right Ear: External ear normal.      Left Ear: External ear normal.      Nose: Nose normal.   Eyes:      General:         Right eye: No discharge.         Left eye: No discharge.   Cardiovascular:      Rate and Rhythm: Normal rate and regular rhythm.      Pulses: Normal pulses.      Heart sounds: No murmur. No friction rub. Gallop present.    Pulmonary:      Effort: Pulmonary effort is normal. No respiratory distress.      Breath sounds: Normal breath sounds. No stridor. No wheezing, rhonchi or rales.   Abdominal:      General: Abdomen is flat. There is no distension.      Palpations: Abdomen is soft.      Tenderness: There is no abdominal tenderness. There is no guarding or rebound.   Skin:     General: Skin is warm and dry.      Coloration: Skin is not jaundiced.   Neurological:      Mental Status: She is alert and oriented to person, place, and time.   Psychiatric:         Mood and Affect: Mood normal.         Behavior: Behavior normal.         Thought Content: Thought content normal.         Judgment: Judgment normal.             Significant Labs: All pertinent labs within the past 24 hours have been reviewed.    Significant Imaging: I have reviewed and interpreted all pertinent imaging results/findings within the past 24 hours.    Assessment/Plan:     * Hematochezia  Patient had a couple similar episodes earlier this year and was found to have  diverticulitis.  Her current episode also appears to be diverticular in origin.  Her hemoglobin is stable at 7.8 g/dL compared to 7.6 g/dL four months ago.  There may be a slight lag in her response to her acute bleeding.  PRBC transfusion was ordered from the ED.  Gastroenterology, Dr. Zulma Salazar, was consulted from the ED and will see the patient in the morning.    Renal hypertension  Patient's blood pressure is better-controlled; will continue home regimen of carvedilol, furosemide, and nifedipine, and provide as-needed clonidine.    Type 2 diabetes mellitus, controlled, with renal complications  Well-controlled on a home regimen of basal insulin therapy; will provide basal-prandial insulin along with insulin sliding scale.    Chronic diastolic heart failure  Stable without evidence of acute heart failure; will continue to monitor.    CKD stage 5  Her renal function appears to be are baseline.  She was previously attempted on hemodialysis in Apr 2019 but was intolerant of it.  Her nephrologist had recommended hospice instead.  Will avoid nephrotoxins continue to monitor her urine output.    Paroxysmal atrial fibrillation  Stable and currently in sinus rhythm.  She is no longer on apixaban due to frequent rectal bleeding.  Will continue to monitor.    Anemia of renal disease  As addressed above.    History of stroke  Stable; there are no acute issues.    VTE Risk Mitigation (From admission, onward)         Ordered     IP VTE HIGH RISK PATIENT  Once      07/28/20 2323     Place sequential compression device  Until discontinued      07/28/20 2323     Place SANTOS hose  Until discontinued      07/28/20 2323                   The patient will be placed in inpatient status.          Leonard Gonzalez M.D.  Staff Nocturnist  Department of Hospital Medicine  [facility]  Pager: [phone]          N.B.: Portions of this note was dictated using M*Modal Fluency Direct--there may be voice  recognition errors occasionally missed on review.

## 2020-07-29 NOTE — HOSPITAL COURSE
85 y/o female from NH admitted with hematochezia.  Anemia of CKD, but Hemoglobin lower than baseline at 7.8.  Transfused one unit of blood with adequate correction of H/H.  GI consulted.  Recent hx of diverticulitis, but no evidence of this time on CT.  Noted leukocytosis on labs.  Daughter stating patient has been having on and off diarrhea for past month.  Cdiff Ag positive, but toxin negative.  Started on oral Flagyl pending PCR which returned positive and started on oral Vanc. Has required 3U RBC at this time to maintain Hb > 7 over a few days. Renal failure worsening and not HD candidate due to previous intolerance and now electing to not pursue this. Nephrology consulted. Patient's H/H remained stable. PT/OT consulted and recommended returning to NH.  Ortho consulted for worsening neck pain and CT Neck was obtained.

## 2020-07-29 NOTE — PLAN OF CARE
Per daughter, pt was living at [facility]; initial clinicals sent via  and to determine if patient was skilled nursing vs SNF and if patient is able to return; pending response       07/29/20 1325   Post-Acute Status   Post-Acute Authorization Placement   Post-Acute Placement Status Referrals Sent   Discharge Delays None known at this time   Discharge Plan   Discharge Plan A Return to nursing home   1523:  [place] received message through [facility] from [place] stating that patient was under skilled nursing care and is clear to return

## 2020-07-29 NOTE — ED NOTES
Pt has removed both IV access when repositioning in bed. Multiple attempts made for new access. MD made aware. Will attempt with ultrasound.

## 2020-07-29 NOTE — ASSESSMENT & PLAN NOTE
Patient had a couple similar episodes earlier this year and was found to have diverticulitis.   No evidence of diverticulitis on CT.  Anemia of CKD with hemoglobin lower than baseline.  Anemia of CKD with anemia of acute blood loss secondary to hematochezia.  Probable diverticular bleed.  Transfused one unit of blood with adequate correction of H/H.  Appreciate GI input.  Conservative management at this time.  Continue to monitor H/H post transfusion.  No evidence of active bleeding at this time.

## 2020-07-29 NOTE — PROGRESS NOTES
[facility] Medicine  Progress Note    Patient Name: Joyce Riley  MRN: [id number]  Patient Class: IP- Inpatient   Admission Date: 7/28/2020  Length of Stay: 1 days  Attending Physician: Rodrigo Orosco MD  Primary Care Provider: Kalpana tSaton MD        Subjective:     Principal Problem:Hematochezia        HPI:  Mrs. Joyce Riley is a 86 y.o. female [facility] resident known to me with renal hypertension, type 2 diabetes mellitus (HbA1c 5.1% Mar 2020), chronic diastolic heart failure (LVEF 80% Apr 2019), CKD stage 5, paroxysmal atrial fibrillation ([id number] score 5) not on chronic anticoagulation, anemia of renal disease, and history of stroke who presents to [facility] ED with complaints of hematochezia today.  She says that she has occasional rectal bleeding for the past few months as well as chronic lower abdominal cramping worse on eating.  Today she noted blood in her stool which he described as dark red.  She denies any nausea, vomiting, hematemesis, coffee-ground emesis, melena, nor any hematuria.  She has been generally weak and fatigued but that has not been any worse than usual.  She denies any chest pains, shortness of breath, lightheadedness, dizziness, falls, nor any loss of consciousness.    Overview/Hospital Course:  87 y/o female from NH admitted with hematochezia.  Anemia of CKD, but Hemoglobin lower than baseline at 7.8.  Transfused one unit of blood with adequate correction of H/H.  GI consulted.  Recent hx of diverticulitis, but no evidence of this time on CT.  Noted leukocytosis on labs.  Daughter stating patient has been having on and off diarrhea for past month.  Cdiff Ag positive, but toxin negative.  Started on oral Flagyl pending PCR.    Interval History: Complaining of neck pain.    Review of Systems   HENT: Negative for ear discharge and ear pain.    Eyes: Negative for discharge and itching.   Endocrine: Negative for cold intolerance  and heat intolerance.   Neurological: Negative for seizures and syncope.     Objective:     Vital Signs (Most Recent):  Temp: 98.7 °F (37.1 °C) (07/29/20 1110)  Pulse: 84 (07/29/20 1110)  Resp: 16 (07/29/20 1110)  BP: 132/63 (07/29/20 1110)  SpO2: 99 % (07/29/20 1110) Vital Signs (24h Range):  Temp:  [97.8 °F (36.6 °C)-99.1 °F (37.3 °C)] 98.7 °F (37.1 °C)  Pulse:  [] 84  Resp:  [16-20] 16  SpO2:  [94 %-99 %] 99 %  BP: (132-198)/(63-88) 132/63     Weight: 67.8 kg (149 lb 8.5 oz)  Body mass index is 24.13 kg/m².    Intake/Output Summary (Last 24 hours) at 7/29/2020 1311  Last data filed at 7/29/2020 1240  Gross per 24 hour   Intake 1995.42 ml   Output --   Net 1995.42 ml      Physical Exam  Vitals signs and nursing note reviewed.   Constitutional:       General: She is not in acute distress.     Appearance: Normal appearance. She is normal weight. She is not ill-appearing, toxic-appearing or diaphoretic.   HENT:      Head: Normocephalic and atraumatic.      Right Ear: External ear normal.      Left Ear: External ear normal.      Nose: Nose normal.   Eyes:      General:         Right eye: No discharge.         Left eye: No discharge.   Cardiovascular:      Rate and Rhythm: Normal rate and regular rhythm.      Pulses: Normal pulses.      Heart sounds: No murmur. No friction rub.   Pulmonary:      Effort: Pulmonary effort is normal. No respiratory distress.      Breath sounds: Normal breath sounds. No stridor. No wheezing, rhonchi or rales.   Abdominal:      General: Abdomen is flat. There is no distension.      Palpations: Abdomen is soft.      Tenderness: There is no abdominal tenderness. There is no guarding or rebound.   Skin:     General: Skin is warm and dry.      Coloration: Skin is not jaundiced.   Neurological:      Mental Status: She is alert and oriented to person, place, and time.   Psychiatric:         Mood and Affect: Mood normal.         Behavior: Behavior normal.         Thought Content: Thought  content normal.         Judgment: Judgment normal.         Significant Labs:   BMP:   Recent Labs   Lab 07/29/20  0437   *      K 5.0      CO2 23   BUN 57*   CREATININE 4.2*   CALCIUM 8.9   MG 1.8     CBC:   Recent Labs   Lab 07/28/20  1835 07/29/20  0437   WBC 16.56* 23.97*   HGB 7.8* 8.8*   HCT 25.7* 28.6*   * 307       Significant Imaging: I have reviewed all pertinent imaging results/findings within the past 24 hours.      Assessment/Plan:      * Hematochezia  Patient had a couple similar episodes earlier this year and was found to have diverticulitis.   No evidence of diverticulitis on CT.  Anemia of CKD with hemoglobin lower than baseline.  Anemia of CKD with anemia of acute blood loss secondary to hematochezia.  Probable diverticular bleed.  Transfused one unit of blood with adequate correction of H/H.  Appreciate GI input.  Conservative management at this time.  Continue to monitor H/H post transfusion.  No evidence of active bleeding at this time.    Leukocytosis  Afebrile.  Daughter stating patient has been having on and off diarrhea for past month.  Cdiff Ag positive, but toxin negative.  Possible Cdiff colitis and will start empirically on oral Flagyl while awaiting PCR.      History of stroke  Stable; there are no acute issues.    Paroxysmal atrial fibrillation  Stable and currently in sinus rhythm.  She is no longer on apixaban due to frequent rectal bleeding.  Will continue to monitor.    Chronic diastolic heart failure  Stable without evidence of acute heart failure; will continue to monitor.    Renal hypertension  Patient's blood pressure is better-controlled; will continue home regimen of carvedilol, furosemide, and nifedipine, and provide as-needed clonidine.    Anemia of renal disease  As addressed above.    CKD stage 5  Her renal function appears to be are baseline.  She was previously attempted on hemodialysis in Apr 2019 but was intolerant of it.  Her nephrologist had  recommended hospice instead.  Will avoid nephrotoxins continue to monitor her urine output.    Type 2 diabetes mellitus, controlled, with renal complications  Well-controlled on a home regimen of basal insulin therapy; will provide basal-prandial insulin along with insulin sliding scale.      VTE Risk Mitigation (From admission, onward)         Ordered     IP VTE HIGH RISK PATIENT  Once      07/28/20 2323     Place sequential compression device  Until discontinued      07/28/20 2323     Place SANTOS hose  Until discontinued      07/28/20 2323                      Rodrigo Orosco MD  Department of Hospital Medicine   [facility]

## 2020-07-29 NOTE — PLAN OF CARE
Problem: Adult Inpatient Plan of Care  Goal: Plan of Care Review  Outcome: Ongoing, Progressing     Problem: Adult Inpatient Plan of Care  Goal: Patient-Specific Goal (Individualization)  Outcome: Ongoing, Progressing     Problem: Adult Inpatient Plan of Care  Goal: Absence of Hospital-Acquired Illness or Injury  Outcome: Ongoing, Progressing     Problem: Fall Injury Risk  Goal: Absence of Fall and Fall-Related Injury  Outcome: Ongoing, Progressing     Problem: Diabetes Comorbidity  Goal: Blood Glucose Level Within Desired Range  Outcome: Ongoing, Progressing

## 2020-07-29 NOTE — ED NOTES
At 15 minutes into blood transfusion, pt noted to be hypertensive. Will reassess BP and monitor closely, MD made aware. Lungs are clear, skin is warm and dry. No reaction is noted at this time.

## 2020-07-29 NOTE — ASSESSMENT & PLAN NOTE
Patient had a couple similar episodes earlier this year and was found to have diverticulitis.  Her current episode also appears to be diverticular in origin.  Her hemoglobin is stable at 7.8 g/dL compared to 7.6 g/dL four months ago.  There may be a slight lag in her response to her acute bleeding.  PRBC transfusion was ordered from the ED.  Gastroenterology, Dr. Zulma Salazar, was consulted from the ED and will see the patient in the morning.

## 2020-07-29 NOTE — ASSESSMENT & PLAN NOTE
Afebrile.  Daughter stating patient has been having on and off diarrhea for past month.  Cdiff Ag positive, but toxin negative.  Possible Cdiff colitis and will start empirically on oral Flagyl while awaiting PCR.

## 2020-07-29 NOTE — NURSING
Admitted to 403 from ED via stretcher. AAOx4 -  PRBCs infusing to Left arm without difficulty. Skin intact. No complaints voiced.

## 2020-07-29 NOTE — ASSESSMENT & PLAN NOTE
Stable and currently in sinus rhythm.  She is no longer on apixaban due to frequent rectal bleeding.  Will continue to monitor.

## 2020-07-29 NOTE — ED NOTES
Blood consent signed by pt. Pt's daughter also verbalizes consent for blood transfusion over the phone.

## 2020-07-29 NOTE — ASSESSMENT & PLAN NOTE
Patient's blood pressure is better-controlled; will continue home regimen of carvedilol, furosemide, and nifedipine, and provide as-needed clonidine.

## 2020-07-29 NOTE — SUBJECTIVE & OBJECTIVE
Interval History: Complaining of neck pain.    Review of Systems   HENT: Negative for ear discharge and ear pain.    Eyes: Negative for discharge and itching.   Endocrine: Negative for cold intolerance and heat intolerance.   Neurological: Negative for seizures and syncope.     Objective:     Vital Signs (Most Recent):  Temp: 98.7 °F (37.1 °C) (07/29/20 1110)  Pulse: 84 (07/29/20 1110)  Resp: 16 (07/29/20 1110)  BP: 132/63 (07/29/20 1110)  SpO2: 99 % (07/29/20 1110) Vital Signs (24h Range):  Temp:  [97.8 °F (36.6 °C)-99.1 °F (37.3 °C)] 98.7 °F (37.1 °C)  Pulse:  [] 84  Resp:  [16-20] 16  SpO2:  [94 %-99 %] 99 %  BP: (132-198)/(63-88) 132/63     Weight: 67.8 kg (149 lb 8.5 oz)  Body mass index is 24.13 kg/m².    Intake/Output Summary (Last 24 hours) at 7/29/2020 1311  Last data filed at 7/29/2020 1240  Gross per 24 hour   Intake 1995.42 ml   Output --   Net 1995.42 ml      Physical Exam  Vitals signs and nursing note reviewed.   Constitutional:       General: She is not in acute distress.     Appearance: Normal appearance. She is normal weight. She is not ill-appearing, toxic-appearing or diaphoretic.   HENT:      Head: Normocephalic and atraumatic.      Right Ear: External ear normal.      Left Ear: External ear normal.      Nose: Nose normal.   Eyes:      General:         Right eye: No discharge.         Left eye: No discharge.   Cardiovascular:      Rate and Rhythm: Normal rate and regular rhythm.      Pulses: Normal pulses.      Heart sounds: No murmur. No friction rub.   Pulmonary:      Effort: Pulmonary effort is normal. No respiratory distress.      Breath sounds: Normal breath sounds. No stridor. No wheezing, rhonchi or rales.   Abdominal:      General: Abdomen is flat. There is no distension.      Palpations: Abdomen is soft.      Tenderness: There is no abdominal tenderness. There is no guarding or rebound.   Skin:     General: Skin is warm and dry.      Coloration: Skin is not jaundiced.    Neurological:      Mental Status: She is alert and oriented to person, place, and time.   Psychiatric:         Mood and Affect: Mood normal.         Behavior: Behavior normal.         Thought Content: Thought content normal.         Judgment: Judgment normal.         Significant Labs:   BMP:   Recent Labs   Lab 07/29/20 0437   *      K 5.0      CO2 23   BUN 57*   CREATININE 4.2*   CALCIUM 8.9   MG 1.8     CBC:   Recent Labs   Lab 07/28/20  1835 07/29/20 0437   WBC 16.56* 23.97*   HGB 7.8* 8.8*   HCT 25.7* 28.6*   * 307       Significant Imaging: I have reviewed all pertinent imaging results/findings within the past 24 hours.

## 2020-07-29 NOTE — PLAN OF CARE
Discharge planning and home needs addressed with pt's daughter, Antonia Fu ([phone]) via phone; daughter stated that patient is a resident of [facility] and plans for her to return     TN Role Explained.  Patient identified by using 2 identifiers:  Name and date of birth    Daughter stated that patient did require assistance with care PTA; stated that no one has been assigned her HPOA but she is considered the decision maker for her medical needs     TN sent initial clinicals to [facility] to inquire is she is able to return; pending response.       TN name and means of contact given to patient and encouraged to call for any discharge needs or questions        07/29/20 1329   Discharge Assessment   Assessment Type Discharge Planning Assessment   Confirmed/corrected address and phone number on facesheet? Yes   Assessment information obtained from? Caregiver;Medical Record   Expected Length of Stay (days) 3   Communicated expected length of stay with patient/caregiver yes   Prior to hospitilization cognitive status: Alert/Oriented   Prior to hospitalization functional status: Needs Assistance   Current cognitive status: Alert/Oriented   Current Functional Status: Needs Assistance   Facility Arrived From: [facility]   Lives With facility resident   Able to Return to Prior Arrangements other (see comments)  (message sent to [facility] via  to determine; daughter is in agreement to return)   Is patient able to care for self after discharge? No   Who are your caregiver(s) and their phone number(s)? Antonia Fu (Daughter)  [phone]   Patient's perception of discharge disposition snf care facility   Readmission Within the Last 30 Days no previous admission in last 30 days   Patient currently being followed by outpatient case management? No   Patient currently receives any other outside agency services? No   Equipment Currently Used at Home wheelchair   Do you have any problems affording any of your prescribed  medications? No   Is the patient taking medications as prescribed? yes   Does the patient have transportation home? Yes   Transportation Anticipated agency   Does the patient receive services at [facility]? No   Discharge Plan A Return to nursing home   DME Needed Upon Discharge    (TBD)   Patient/Family in Agreement with Plan yes

## 2020-07-29 NOTE — ED TRIAGE NOTES
Pt present to ED via EMS from nursing home with c/o GI bleeding. Pt reports pain 5/10 to abd. She reports feeling nauseous at this time. Pt has vomited x 1 since ED arrival. Pt also noted to have GI bleeding form nursing staff per dayshift RN. Pt arrives to ED with dark red blood and clots noted to diaper. Pt aaox4 at this time. Pt reports pain in abd is cramping in nature and intermittently severe. No acute distress is noted at this time. Will continue to be monitored.

## 2020-07-29 NOTE — NURSING
No distress at this time, IV access lost this morning, protonix running. Patient resting comfortably.

## 2020-07-29 NOTE — SUBJECTIVE & OBJECTIVE
Past Medical History:   Diagnosis Date    Anemia     Anticoagulant long-term use     Arthritis     Atrial fibrillation     Cataract     CHF (congestive heart failure)     CKD (chronic kidney disease), stage V     Diabetes mellitus     Type 2    GERD (gastroesophageal reflux disease)     GI bleed 2018    Gout     Hypertension     Obese     Requires assistance with all daily activities     Stroke     Wheelchair dependent        Past Surgical History:   Procedure Laterality Date     SECTION      Patient unsure, believe she had 3-4    CHOLECYSTECTOMY      EYE SURGERY      HYSTERECTOMY      JOINT REPLACEMENT Right     knee    TOTAL KNEE ARTHROPLASTY Right        Review of patient's allergies indicates:   Allergen Reactions    Indomethacin     Nsaids (non-steroidal anti-inflammatory drug)        No current facility-administered medications on file prior to encounter.      Current Outpatient Medications on File Prior to Encounter   Medication Sig    albuterol-ipratropium (DUO-NEB) 2.5 mg-0.5 mg/3 mL nebulizer solution Take 3 mLs by nebulization every 4 (four) hours as needed for Wheezing. Rescue    calcitRIOL (ROCALTROL) 0.25 MCG Cap Take 1 capsule (0.25 mcg total) by mouth every other day.    carvediloL (COREG) 12.5 MG tablet Take 1 tablet (12.5 mg total) by mouth 2 (two) times daily.    furosemide (LASIX) 40 MG tablet Take 1 tablet (40 mg total) by mouth once daily.    insulin degludec (TRESIBA FLEXTOUCH U-100) 100 unit/mL (3 mL) InPn Inject 5 Units into the skin every evening.    magnesium oxide 500 mg Tab Take 500 mg by mouth once daily.    polyethylene glycol (GLYCOLAX) 17 gram PwPk Take 17 g by mouth 2 (two) times daily as needed.    acetaminophen (TYLENOL) 325 MG tablet Take 2 tablets (650 mg total) by mouth every 4 (four) hours as needed. (Patient taking differently: Take 650 mg by mouth 2 (two) times daily. )    B complex-vitamin C-folic acid (NEPHRO-MICHAEL) 0.8 mg Tab  Take by mouth once daily.    epoetin diana (EPOGEN) 10,000 unit/mL injection Inject 1 mL (10,000 Units total) into the skin every 7 days.    ergocalciferol (ERGOCALCIFEROL) 50,000 unit Cap Take 1 capsule (50,000 Units total) by mouth every 7 days.    ferrous sulfate (FEOSOL) 325 mg (65 mg iron) Tab tablet Take 325 mg by mouth 2 (two) times daily.    methyl salicylate-menthol 15-10% 15-10 % Crea Apply topically 4 (four) times daily.    NIFEdipine (PROCARDIA-XL) 60 MG (OSM) 24 hr tablet Take 1 tablet (60 mg total) by mouth once daily. (Patient taking differently: Take 30 mg by mouth once daily. HOLD FOR SBP <110)    senna-docusate 8.6-50 mg (PERICOLACE) 8.6-50 mg per tablet Take 1 tablet by mouth 2 (two) times daily.    sodium bicarbonate 650 MG tablet Take 2 tablets (1,300 mg total) by mouth 2 (two) times daily.     Family History     Problem Relation (Age of Onset)    Cancer Mother, Brother    Diabetes Mother    Hypertension Mother        Tobacco Use    Smoking status: Never Smoker    Smokeless tobacco: Never Used   Substance and Sexual Activity    Alcohol use: No    Drug use: No    Sexual activity: Never     Review of Systems   Constitutional: Positive for fatigue. Negative for activity change, appetite change, chills, diaphoresis, fever and unexpected weight change.   HENT: Negative.    Eyes: Negative.    Respiratory: Negative for cough, chest tightness, shortness of breath and wheezing.    Cardiovascular: Negative for chest pain, palpitations and leg swelling.   Gastrointestinal: Positive for abdominal pain and blood in stool. Negative for abdominal distention, anal bleeding, constipation, diarrhea, nausea and vomiting.   Genitourinary: Negative for dysuria and hematuria.   Musculoskeletal: Positive for neck pain.   Neurological: Positive for weakness. Negative for dizziness, seizures, syncope and light-headedness.   Psychiatric/Behavioral: Negative.      Objective:     Vital Signs (Most  Recent):  Temp: 97.8 °F (36.6 °C) (07/28/20 2157)  Pulse: 81 (07/28/20 2222)  Resp: 17 (07/28/20 2222)  BP: (!) 177/74 (07/28/20 2204)  SpO2: 96 % (07/28/20 2222) Vital Signs (24h Range):  Temp:  [97.8 °F (36.6 °C)-97.9 °F (36.6 °C)] 97.8 °F (36.6 °C)  Pulse:  [] 81  Resp:  [16-20] 17  SpO2:  [95 %-99 %] 96 %  BP: (132-185)/(64-88) 177/74     Weight: 68 kg (150 lb)  Body mass index is 24.21 kg/m².    Physical Exam  Vitals signs and nursing note reviewed.   Constitutional:       General: She is not in acute distress.     Appearance: Normal appearance. She is normal weight. She is not ill-appearing, toxic-appearing or diaphoretic.   HENT:      Head: Normocephalic and atraumatic.      Right Ear: External ear normal.      Left Ear: External ear normal.      Nose: Nose normal.   Eyes:      General:         Right eye: No discharge.         Left eye: No discharge.   Cardiovascular:      Rate and Rhythm: Normal rate and regular rhythm.      Pulses: Normal pulses.      Heart sounds: No murmur. No friction rub. Gallop present.    Pulmonary:      Effort: Pulmonary effort is normal. No respiratory distress.      Breath sounds: Normal breath sounds. No stridor. No wheezing, rhonchi or rales.   Abdominal:      General: Abdomen is flat. There is no distension.      Palpations: Abdomen is soft.      Tenderness: There is no abdominal tenderness. There is no guarding or rebound.   Skin:     General: Skin is warm and dry.      Coloration: Skin is not jaundiced.   Neurological:      Mental Status: She is alert and oriented to person, place, and time.   Psychiatric:         Mood and Affect: Mood normal.         Behavior: Behavior normal.         Thought Content: Thought content normal.         Judgment: Judgment normal.             Significant Labs: All pertinent labs within the past 24 hours have been reviewed.    Significant Imaging: I have reviewed and interpreted all pertinent imaging results/findings within the past 24  hours.

## 2020-07-29 NOTE — CONSULTS
"Chief Complaint:  "I bled."    HPI:  The patient is an 86 year old woman with a history of HTN, CHF, atrial fibrillation, DM, chronic renal insufficiency, CVA, GERD, and gout presenting for hematochezia.  She states she has had intermittent hematochezia for a few months.  This is associated with right lower quadrant pain.  The patient has not had weight loss, nausea, emesis, diarrhea, or constipation.  She does not use NSAIDs or and she is not on anticoagulants despite having atrial fibrillation.  A colonoscopy on 19 showed a good prep to the cecum, internal hemorrhoids, severe pandiverticulosis, and a 10 mm ascending colon polyp.    Past Medical History:   Diagnosis Date    Anemia     Anticoagulant long-term use     Arthritis     Atrial fibrillation     Cataract     CHF (congestive heart failure)     CKD (chronic kidney disease), stage V     Diabetes mellitus     Type 2    GERD (gastroesophageal reflux disease)     GI bleed 2018    Gout     Hypertension     Obese     Requires assistance with all daily activities     Stroke     Wheelchair dependent      Past Surgical History:   Procedure Laterality Date     SECTION      Patient unsure, believe she had 3-4    CHOLECYSTECTOMY      EYE SURGERY      HYSTERECTOMY      JOINT REPLACEMENT Right     knee    TOTAL KNEE ARTHROPLASTY Right      Family History   Problem Relation Age of Onset    Cancer Mother          age 98    Diabetes Mother     Hypertension Mother     Cancer Brother      Social History     Socioeconomic History    Marital status:      Spouse name: Not on file    Number of children: 6    Years of education: Not on file    Highest education level: Not on file   Occupational History    Occupation:      Comment: Retired   Social Needs    Financial resource strain: Not on file    Food insecurity     Worry: Not on file     Inability: Not on file    Transportation needs     Medical: Not " "on file     Non-medical: Not on file   Tobacco Use    Smoking status: Never Smoker    Smokeless tobacco: Never Used   Substance and Sexual Activity    Alcohol use: No    Drug use: No    Sexual activity: Never   Lifestyle    Physical activity     Days per week: Not on file     Minutes per session: Not on file    Stress: Not on file   Relationships    Social connections     Talks on phone: Not on file     Gets together: Not on file     Attends [employer] service: Not on file     Active member of club or organization: Not on file     Attends meetings of clubs or organizations: Not on file     Relationship status: Not on file   Other Topics Concern    Not on file   Social History Narrative    Not on file      calcitRIOL  0.25 mcg Oral Every other day    carvediloL  12.5 mg Oral BID    ergocalciferol  50,000 Units Oral Q7 Days    furosemide  40 mg Oral Daily    insulin aspart U-100  2 Units Subcutaneous TIDWM    insulin detemir U-100  5 Units Subcutaneous QHS    NIFEdipine  30 mg Oral Daily    piperacillin-tazobactam (ZOSYN) IVPB  4.5 g Intravenous Q12H    sodium bicarbonate  1,300 mg Oral BID     Review of patient's allergies indicates:   Allergen Reactions    Indomethacin     Nsaids (non-steroidal anti-inflammatory drug)      ROS:  No chest pain or dyspnea.  No dysuria.  No heartburn or dysphagia.  Otherwise as stated above.  Ten other systems negative.    Vitals:    07/29/20 0008 07/29/20 0519 07/29/20 0724 07/29/20 0903   BP: (!) 148/65 132/81 (!) 141/64    BP Location:  Right leg Left arm    Patient Position:  Lying Lying    Pulse: 92 86 87    Resp: 20 18 16    Temp: 98.6 °F (37 °C) 99.1 °F (37.3 °C) 98.1 °F (36.7 °C)    TempSrc: Oral Oral Oral    SpO2: 97% 96% (!) 94% (!) 94%   Weight: 67.8 kg (149 lb 8.5 oz)      Height: 5' 6" (1.676 m)        P.E.:  GEN: A x O x 3, NAD  SKIN: No jaundice  HEENT: EOMI, PERRL, anicteric sclera  CV: RRR, no M/R/G  Chest: CTA B  Abdomen: soft, NTND, normoactive " BS  Ext: No C/C/E.  2+ dorsalis pedis pulses B  Neuro: No asterixes or tremors.  CN II-XII intact  Musculoskeletal: 5/5 strength bilaterally    Labs:  Recent Results (from the past 336 hour(s))   CBC with Auto Differential    Collection Time: 07/29/20  4:37 AM   Result Value Ref Range    WBC 23.97 (H) 3.90 - 12.70 K/uL    Hemoglobin 8.8 (L) 12.0 - 16.0 g/dL    Hematocrit 28.6 (L) 37.0 - 48.5 %    Platelets 307 150 - 350 K/uL   CBC auto differential    Collection Time: 07/28/20  6:35 PM   Result Value Ref Range    WBC 16.56 (H) 3.90 - 12.70 K/uL    Hemoglobin 7.8 (L) 12.0 - 16.0 g/dL    Hematocrit 25.7 (L) 37.0 - 48.5 %    Platelets 372 (H) 150 - 350 K/uL   CBC auto differential    Collection Time: 07/16/20 12:00 AM   Result Value Ref Range    WBC 8.5 4.5 - 11.0    Hgb 9.6 (L) 11.2 - 16.0     CMP  Sodium   Date Value Ref Range Status   07/29/2020 141 136 - 145 mmol/L Final   07/16/2020 140 135 - 148 Final   07/16/2020 140 135 - 148 Final     Potassium   Date Value Ref Range Status   07/29/2020 5.0 3.5 - 5.1 mmol/L Final   07/16/2020 4.4 3.5 - 5.5 Final   07/16/2020 4.4 3.5 - 5.5 Final     Chloride   Date Value Ref Range Status   07/29/2020 108 95 - 110 mmol/L Final   07/16/2020 103 96 - 109 Final   07/16/2020 103 96 - 109 Final     CO2   Date Value Ref Range Status   07/29/2020 23 23 - 29 mmol/L Final   07/16/2020 23 20 - 32 Final   07/16/2020 23 20 - 32 Final     Glucose   Date Value Ref Range Status   07/29/2020 128 (H) 70 - 110 mg/dL Final     BUN, Bld   Date Value Ref Range Status   07/29/2020 57 (H) 8 - 23 mg/dL Final   07/16/2020 49 (H) 5 - 26 Final   07/16/2020 49 (H) 5 - 26 Final     Creatinine   Date Value Ref Range Status   07/29/2020 4.2 (H) 0.5 - 1.4 mg/dL Final   07/16/2020 3.47 (H) 0.50 - 1.50 Final   07/16/2020 3.47 (H) 0.5 - 1.50 Final     Calcium   Date Value Ref Range Status   07/29/2020 8.9 8.7 - 10.5 mg/dL Final   07/16/2020 9.5 8.5 - 10.6 Final   07/16/2020 9.5 8.5 - 10.6 Final     Total Protein    Date Value Ref Range Status   07/29/2020 6.2 6.0 - 8.4 g/dL Final     Albumin   Date Value Ref Range Status   07/29/2020 2.5 (L) 3.5 - 5.2 g/dL Final   07/16/2020 3.5 3.2 - 5.6 Final   07/16/2020 3.5 3.2 - 5.6 Final     Total Bilirubin   Date Value Ref Range Status   07/29/2020 0.4 0.1 - 1.0 mg/dL Final     Comment:     For infants and newborns, interpretation of results should be based  on gestational age, weight and in agreement with clinical  observations.  Premature Infant recommended reference ranges:  Up to 24 hours.............<8.0 mg/dL  Up to 48 hours............<12.0 mg/dL  3-5 days..................<15.0 mg/dL  6-29 days.................<15.0 mg/dL       Alkaline Phosphatase   Date Value Ref Range Status   07/29/2020 73 55 - 135 U/L Final     AST   Date Value Ref Range Status   07/29/2020 17 10 - 40 U/L Final   07/16/2020 18 0 - 40 Final   07/16/2020 18 0 - 40 Final     ALT   Date Value Ref Range Status   07/29/2020 7 (L) 10 - 44 U/L Final   07/16/2020 13 0 - 40 Final   07/16/2020 13 0 - 40 Final     Anion Gap   Date Value Ref Range Status   07/29/2020 10 8 - 16 mmol/L Final   07/16/2020 14 0 - 25 Final   07/16/2020 14 0 - 25 Final     eGFR if    Date Value Ref Range Status   07/29/2020 10 (A) >60 mL/min/1.73 m^2 Final     eGFR if non    Date Value Ref Range Status   07/29/2020 9 (A) >60 mL/min/1.73 m^2 Final     Comment:     Calculation used to obtain the estimated glomerular filtration  rate (eGFR) is the CKD-EPI equation.          Recent Labs   Lab 07/28/20  1835   INR 1.0   APTT 26.8     CT of Abdomen/Pelvis:    1. No acute intra-abdominal abnormalities identified.   2. Colonic diverticulosis without convincing CT evidence of acute diverticulitis.   3. Postsurgical changes and additional findings as detailed above.     A/P:  The patient is an 86 year old woman with a history of HTN, CHF, atrial fibrillation, DM, chronic renal insufficiency, CVA, GERD, and gout  presenting for hematochezia.  1.  Hematochezia - this is likely a diverticular bleed.  Her H/H is stable.  As she underwent a colonoscopy last year and given her age, the patient does not wish to undergo another colonoscopy.  She can be managed conservatively for now and she can be transfused pRBCs as needed.  The patient will be monitored briefly.    Thank you for this consult.

## 2020-07-30 PROBLEM — M54.2 NECK PAIN, CHRONIC: Status: ACTIVE | Noted: 2020-07-30

## 2020-07-30 PROBLEM — G89.29 NECK PAIN, CHRONIC: Status: ACTIVE | Noted: 2020-07-30

## 2020-07-30 LAB
BASOPHILS # BLD AUTO: 0.05 K/UL (ref 0–0.2)
BASOPHILS NFR BLD: 0.2 % (ref 0–1.9)
BLD PROD TYP BPU: NORMAL
BLOOD UNIT EXPIRATION DATE: NORMAL
BLOOD UNIT TYPE CODE: 5100
BLOOD UNIT TYPE: NORMAL
C DIFF TOX GENS STL QL NAA+PROBE: POSITIVE
CODING SYSTEM: NORMAL
DIFFERENTIAL METHOD: ABNORMAL
DISPENSE STATUS: NORMAL
EOSINOPHIL # BLD AUTO: 0.1 K/UL (ref 0–0.5)
EOSINOPHIL NFR BLD: 0.3 % (ref 0–8)
ERYTHROCYTE [DISTWIDTH] IN BLOOD BY AUTOMATED COUNT: 16.2 % (ref 11.5–14.5)
HCT VFR BLD AUTO: 20.4 % (ref 37–48.5)
HGB BLD-MCNC: 6.7 G/DL (ref 12–16)
IMM GRANULOCYTES # BLD AUTO: 0.39 K/UL (ref 0–0.04)
IMM GRANULOCYTES NFR BLD AUTO: 1.8 % (ref 0–0.5)
LYMPHOCYTES # BLD AUTO: 2.1 K/UL (ref 1–4.8)
LYMPHOCYTES NFR BLD: 9.8 % (ref 18–48)
MCH RBC QN AUTO: 30.7 PG (ref 27–31)
MCHC RBC AUTO-ENTMCNC: 32.8 G/DL (ref 32–36)
MCV RBC AUTO: 94 FL (ref 82–98)
MONOCYTES # BLD AUTO: 1 K/UL (ref 0.3–1)
MONOCYTES NFR BLD: 4.8 % (ref 4–15)
NEUTROPHILS # BLD AUTO: 17.9 K/UL (ref 1.8–7.7)
NEUTROPHILS NFR BLD: 83.1 % (ref 38–73)
NRBC BLD-RTO: 0 /100 WBC
PLATELET # BLD AUTO: ABNORMAL K/UL (ref 150–350)
PMV BLD AUTO: ABNORMAL FL (ref 9.2–12.9)
POCT GLUCOSE: 125 MG/DL (ref 70–110)
POCT GLUCOSE: 139 MG/DL (ref 70–110)
POCT GLUCOSE: 140 MG/DL (ref 70–110)
POCT GLUCOSE: 91 MG/DL (ref 70–110)
RBC # BLD AUTO: 2.18 M/UL (ref 4–5.4)
TRANS ERYTHROCYTES VOL PATIENT: NORMAL ML
WBC # BLD AUTO: 21.53 K/UL (ref 3.9–12.7)

## 2020-07-30 PROCEDURE — 94761 N-INVAS EAR/PLS OXIMETRY MLT: CPT

## 2020-07-30 PROCEDURE — 25000003 PHARM REV CODE 250: Performed by: INTERNAL MEDICINE

## 2020-07-30 PROCEDURE — 36415 COLL VENOUS BLD VENIPUNCTURE: CPT

## 2020-07-30 PROCEDURE — 11000001 HC ACUTE MED/SURG PRIVATE ROOM

## 2020-07-30 PROCEDURE — 25000003 PHARM REV CODE 250: Performed by: HOSPITALIST

## 2020-07-30 PROCEDURE — 85025 COMPLETE CBC W/AUTO DIFF WBC: CPT

## 2020-07-30 PROCEDURE — 36430 TRANSFUSION BLD/BLD COMPNT: CPT

## 2020-07-30 PROCEDURE — P9021 RED BLOOD CELLS UNIT: HCPCS

## 2020-07-30 RX ORDER — HYDROCODONE BITARTRATE AND ACETAMINOPHEN 500; 5 MG/1; MG/1
TABLET ORAL
Status: DISCONTINUED | OUTPATIENT
Start: 2020-07-30 | End: 2020-08-04 | Stop reason: HOSPADM

## 2020-07-30 RX ADMIN — FUROSEMIDE 40 MG: 40 TABLET ORAL at 08:07

## 2020-07-30 RX ADMIN — LIDOCAINE 1 PATCH: 50 PATCH TOPICAL at 03:07

## 2020-07-30 RX ADMIN — PANTOPRAZOLE SODIUM 40 MG: 40 TABLET, DELAYED RELEASE ORAL at 08:07

## 2020-07-30 RX ADMIN — INSULIN ASPART 2 UNITS: 100 INJECTION, SOLUTION INTRAVENOUS; SUBCUTANEOUS at 05:07

## 2020-07-30 RX ADMIN — Medication 9 MG: at 08:07

## 2020-07-30 RX ADMIN — SODIUM BICARBONATE TAB 325 MG 1300 MG: 325 TAB at 08:07

## 2020-07-30 RX ADMIN — CARVEDILOL 12.5 MG: 12.5 TABLET, FILM COATED ORAL at 08:07

## 2020-07-30 RX ADMIN — INSULIN ASPART 2 UNITS: 100 INJECTION, SOLUTION INTRAVENOUS; SUBCUTANEOUS at 12:07

## 2020-07-30 RX ADMIN — ACETAMINOPHEN 500 MG: 325 TABLET ORAL at 08:07

## 2020-07-30 RX ADMIN — INSULIN ASPART 2 UNITS: 100 INJECTION, SOLUTION INTRAVENOUS; SUBCUTANEOUS at 08:07

## 2020-07-30 RX ADMIN — ACETAMINOPHEN 500 MG: 325 TABLET ORAL at 12:07

## 2020-07-30 RX ADMIN — INSULIN DETEMIR 5 UNITS: 100 INJECTION, SOLUTION SUBCUTANEOUS at 08:07

## 2020-07-30 RX ADMIN — NIFEDIPINE 30 MG: 30 TABLET, FILM COATED, EXTENDED RELEASE ORAL at 08:07

## 2020-07-30 RX ADMIN — METRONIDAZOLE 500 MG: 500 TABLET ORAL at 10:07

## 2020-07-30 RX ADMIN — METRONIDAZOLE 500 MG: 500 TABLET ORAL at 03:07

## 2020-07-30 NOTE — ASSESSMENT & PLAN NOTE
Patient had a couple similar episodes earlier this year and was found to have diverticulitis.   No evidence of diverticulitis on CT.  Anemia of CKD with hemoglobin lower than baseline.  Anemia of CKD with anemia of acute blood loss secondary to hematochezia.  Probable diverticular bleed.  Transfused one unit of blood with adequate correction of H/H.  Appreciate GI input.  Conservative management at this time.    Will transfuse 1U today for Hb < 7, monitor

## 2020-07-30 NOTE — PROGRESS NOTES
[facility]  Gastroenterology  Progress Note    Patient Name: Joyce Riley  MRN: [id number]  Admission Date: 7/28/2020  Hospital Length of Stay: 2 days  Code Status: DNR   Attending Provider: Balwinder Restrepo MD  Primary Care Physician: Kalpana Staton MD  Principal Problem: Hematochezia    Subjective:     CC= Rectal bleeding     Interval History: Patient had one moderate-sized bowel movement this morning with dark blood.  No abdominal pain.  Complains of neck pain.     Review of systems:  General: Negative for fevers, chills.  Cardiovascular:  Negative for chest pain, shortness of breath     Objective:     Vital Signs (Most Recent):  Temp: 97.8 °F (36.6 °C) (07/30/20 0724)  Pulse: 73 (07/30/20 0724)  Resp: 18 (07/30/20 0724)  BP: 132/62 (07/30/20 0724)  SpO2: 97 % (07/30/20 0724) Vital Signs (24h Range):  Temp:  [97.8 °F (36.6 °C)-99.2 °F (37.3 °C)] 97.8 °F (36.6 °C)  Pulse:  [73-82] 73  Resp:  [16-20] 18  SpO2:  [93 %-97 %] 97 %  BP: (126-152)/(58-67) 132/62     Physical examination:  GEN: Elderly AAF in no apparent distress   HENT: Normocephalic, anicteric sclera   Cardiovascular: Regular rate and rhythm. No murmurs appreciated.   Chest: Non-labored respirations. Breath sounds equal   Abdomen: Soft, NTND, normoactive BS  Psych: Appropriate mood and affect.   Extermities: No C/C/E. 2+ dorsalis pedis pulses bilaterally    Recent Labs   Lab 07/28/20  1835 07/29/20  0437 07/30/20  0624   WBC 16.56* 23.97* 21.53*   HGB 7.8* 8.8* 6.7*   HCT 25.7* 28.6* 20.4*   * 307 SEE COMMENT     Creatinine= 4.2    C-diff antigen= positive  C-diff toxins= negative     Imaging:  CT abdomen/pelvis without contrast (7/28/2020):  Impression:  1. No acute intra-abdominal abnormalities identified.   2. Colonic diverticulosis without convincing CT evidence of acute diverticulitis.   3. Postsurgical changes and additional findings as detailed above.       Assessment:   86 year old female with a history of HTN,  DM, atrial fibrillation, CHF, CVA, CKD and GERD presenting with rectal bleeding and post-hemorrhagic anemia.  Suspect diverticular in origin.  VSS.  Suspect drop in H&H overnight equilibration and hopefully old blood clearing this morning.  Had colon in 2019 with severe pandiverticulosis and 10mm AC polyp.       Plan:   1.  Agree with plans for blood transfusion.  2.  Continue with observation from GI standpoint.   3.  Will follow with you.       Swapna Rasmussen PA-C  Gastroenterology  [facility]

## 2020-07-30 NOTE — PLAN OF CARE
Important Message from Medicare and discharge appeal process reviewed with patient's daughter, Antonia Fu; Ms Fu was allowed the opportunity to ask questions, after which verbalized understanding; TN informed that a will be emailed to [email or web address] per her request     07/30/20 9174   Medicare Message   Important Message from Medicare regarding Discharge Appeal Rights Given to patient/caregiver;Explained to patient/caregiver;Signed/date by patient/caregiver   Date IMM was signed 07/30/20   Time IMM was signed [id number]

## 2020-07-30 NOTE — PLAN OF CARE
Patient is care home resident of [facility]; DC expected in 1-2 days; updated clinicals sent via [employer] to inform of +C.diff status and need for isolation once returned       07/30/20 1522   Post-Acute Status   Post-Acute Authorization Placement  (SNF)   Post-Acute Placement Status Additional Clinical Requested   Discharge Plan   Discharge Plan A Return to nursing home

## 2020-07-30 NOTE — SUBJECTIVE & OBJECTIVE
Interval History: complains of worsening chronic neck pain of 3-5 months, having diarrhea and bloody stool today. No abd pain, no CP or SOB    Review of Systems   Constitutional: Negative for fever.   Respiratory: Negative for shortness of breath.    Cardiovascular: Negative for chest pain.   Gastrointestinal: Positive for blood in stool and diarrhea. Negative for abdominal pain and anal bleeding.     Objective:     Vital Signs (Most Recent):  Temp: 98.5 °F (36.9 °C) (07/30/20 1123)  Pulse: 78 (07/30/20 1123)  Resp: 18 (07/30/20 1123)  BP: (!) 109/55 (07/30/20 1123)  SpO2: 98 % (07/30/20 1123) Vital Signs (24h Range):  Temp:  [97.8 °F (36.6 °C)-99.2 °F (37.3 °C)] 98.5 °F (36.9 °C)  Pulse:  [73-82] 78  Resp:  [16-20] 18  SpO2:  [93 %-98 %] 98 %  BP: (109-152)/(55-67) 109/55     Weight: 67.8 kg (149 lb 8.5 oz)  Body mass index is 24.13 kg/m².    Intake/Output Summary (Last 24 hours) at 7/30/2020 1333  Last data filed at 7/30/2020 1210  Gross per 24 hour   Intake 720 ml   Output --   Net 720 ml      Physical Exam  Vitals signs and nursing note reviewed.   Constitutional:       General: She is not in acute distress.     Appearance: Normal appearance. She is normal weight. She is not ill-appearing, toxic-appearing or diaphoretic.   HENT:      Head: Normocephalic and atraumatic.      Right Ear: External ear normal.      Left Ear: External ear normal.      Nose: Nose normal.   Eyes:      General:         Right eye: No discharge.         Left eye: No discharge.   Cardiovascular:      Rate and Rhythm: Normal rate and regular rhythm.      Pulses: Normal pulses.      Heart sounds: No murmur. No friction rub.   Pulmonary:      Effort: Pulmonary effort is normal. No respiratory distress.      Breath sounds: Normal breath sounds. No stridor. No wheezing, rhonchi or rales.   Abdominal:      General: Abdomen is flat. There is no distension.      Palpations: Abdomen is soft.      Tenderness: There is no abdominal tenderness. There  is no guarding or rebound.   Musculoskeletal:      Left lower leg: Edema present.   Skin:     General: Skin is warm and dry.      Coloration: Skin is not jaundiced.   Neurological:      Mental Status: She is alert and oriented to person, place, and time.   Psychiatric:         Mood and Affect: Mood normal.         Behavior: Behavior normal.         Thought Content: Thought content normal.         Judgment: Judgment normal.         Significant Labs: All pertinent labs within the past 24 hours have been reviewed.    Significant Imaging: I have reviewed and interpreted all pertinent imaging results/findings within the past 24 hours.

## 2020-07-30 NOTE — PHYSICIAN QUERY
PT Name: Joyce Riley  MR #: [id number]     Documentation Clarification      CDS: Sheila CHAN,RN        Contact information:[email or web address]    This form is a permanent document in the medical record.     Query Date: July 30, 2020    By submitting this query, we are merely seeking further clarification of documentation. Please utilize your independent clinical judgment when addressing the question(s) below.    The Medical Record reflects the following:    Supporting Clinical Findings Location in Medical Record    Noted leukocytosis on labs.  Daughter stating patient has been having on and off diarrhea for past month.  Cdiff Ag positive, but toxin negative.  Started on oral Flagyl pending PCR.     Leukocytosis  Afebrile.Daughter stating patient has been having on and off diarrhea for past month.Cdiff Ag positive, but toxin negative.Possible Cdiff colitis and will start empirically on oral Flagyl while awaiting PCR.            7/29/20  Hospital Medicine Progress Notes   C. diff PCR  Positive  C. diff Antigen Positive Abnormal     C difficile Toxins A+B, EIA Negative      7/28/20 C Diff Toxin by PCR    7/28/20 Clostridium difficile EIA                                                                              Doctor, please confirm Clostridium difficile lab result  associated with the above clinical findings.    [  X ] Clostridium difficile PCR  Positive   [   ] Other (please specify): ____________   [  ] Clinically undetermined

## 2020-07-30 NOTE — PLAN OF CARE
Patient complains of chronic neck pain- xray ordered for tomorrow. Tylenol given when available, lidocaine patch in place.   Problem: Pain Chronic (Persistent)  Goal: Acceptable Pain Control and Functional Ability  Outcome: Ongoing, Not Progressing

## 2020-07-30 NOTE — PROGRESS NOTES
[facility] Hospital Medicine  Progress Note    Patient Name: Joyce Riley  MRN: [id number]  Patient Class: IP- Inpatient   Admission Date: 7/28/2020  Length of Stay: 2 days  Attending Physician: Balwinder Restrepo MD  Primary Care Provider: Kalpana Staton MD        Subjective:     Principal Problem:Hematochezia        HPI:  Mrs. Joyce Riley is a 86 y.o. female [facility] resident known to me with renal hypertension, type 2 diabetes mellitus (HbA1c 5.1% Mar 2020), chronic diastolic heart failure (LVEF 80% Apr 2019), CKD stage 5, paroxysmal atrial fibrillation ([id number]-[id number] score 5) not on chronic anticoagulation, anemia of renal disease, and history of stroke who presents to [facility] ED with complaints of hematochezia today.  She says that she has occasional rectal bleeding for the past few months as well as chronic lower abdominal cramping worse on eating.  Today she noted blood in her stool which he described as dark red.  She denies any nausea, vomiting, hematemesis, coffee-ground emesis, melena, nor any hematuria.  She has been generally weak and fatigued but that has not been any worse than usual.  She denies any chest pains, shortness of breath, lightheadedness, dizziness, falls, nor any loss of consciousness.    Overview/Hospital Course:  85 y/o female from NH admitted with hematochezia.  Anemia of CKD, but Hemoglobin lower than baseline at 7.8.  Transfused one unit of blood with adequate correction of H/H.  GI consulted.  Recent hx of diverticulitis, but no evidence of this time on CT.  Noted leukocytosis on labs.  Daughter stating patient has been having on and off diarrhea for past month.  Cdiff Ag positive, but toxin negative.  Started on oral Flagyl pending PCR.    Interval History: complains of worsening chronic neck pain of 3-5 months, having diarrhea and bloody stool today. No abd pain, no CP or SOB    Review of Systems   Constitutional: Negative for fever.    Respiratory: Negative for shortness of breath.    Cardiovascular: Negative for chest pain.   Gastrointestinal: Positive for blood in stool and diarrhea. Negative for abdominal pain and anal bleeding.     Objective:     Vital Signs (Most Recent):  Temp: 98.5 °F (36.9 °C) (07/30/20 1123)  Pulse: 78 (07/30/20 1123)  Resp: 18 (07/30/20 1123)  BP: (!) 109/55 (07/30/20 1123)  SpO2: 98 % (07/30/20 1123) Vital Signs (24h Range):  Temp:  [97.8 °F (36.6 °C)-99.2 °F (37.3 °C)] 98.5 °F (36.9 °C)  Pulse:  [73-82] 78  Resp:  [16-20] 18  SpO2:  [93 %-98 %] 98 %  BP: (109-152)/(55-67) 109/55     Weight: 67.8 kg (149 lb 8.5 oz)  Body mass index is 24.13 kg/m².    Intake/Output Summary (Last 24 hours) at 7/30/2020 1333  Last data filed at 7/30/2020 1210  Gross per 24 hour   Intake 720 ml   Output --   Net 720 ml      Physical Exam  Vitals signs and nursing note reviewed.   Constitutional:       General: She is not in acute distress.     Appearance: Normal appearance. She is normal weight. She is not ill-appearing, toxic-appearing or diaphoretic.   HENT:      Head: Normocephalic and atraumatic.      Right Ear: External ear normal.      Left Ear: External ear normal.      Nose: Nose normal.   Eyes:      General:         Right eye: No discharge.         Left eye: No discharge.   Cardiovascular:      Rate and Rhythm: Normal rate and regular rhythm.      Pulses: Normal pulses.      Heart sounds: No murmur. No friction rub.   Pulmonary:      Effort: Pulmonary effort is normal. No respiratory distress.      Breath sounds: Normal breath sounds. No stridor. No wheezing, rhonchi or rales.   Abdominal:      General: Abdomen is flat. There is no distension.      Palpations: Abdomen is soft.      Tenderness: There is no abdominal tenderness. There is no guarding or rebound.   Musculoskeletal:      Left lower leg: Edema present.   Skin:     General: Skin is warm and dry.      Coloration: Skin is not jaundiced.   Neurological:      Mental Status:  She is alert and oriented to person, place, and time.   Psychiatric:         Mood and Affect: Mood normal.         Behavior: Behavior normal.         Thought Content: Thought content normal.         Judgment: Judgment normal.         Significant Labs: All pertinent labs within the past 24 hours have been reviewed.    Significant Imaging: I have reviewed and interpreted all pertinent imaging results/findings within the past 24 hours.      Assessment/Plan:      * Hematochezia  Patient had a couple similar episodes earlier this year and was found to have diverticulitis.   No evidence of diverticulitis on CT.  Anemia of CKD with hemoglobin lower than baseline.  Anemia of CKD with anemia of acute blood loss secondary to hematochezia.  Probable diverticular bleed.  Transfused one unit of blood with adequate correction of H/H.  Appreciate GI input.  Conservative management at this time.    Will transfuse 1U today for Hb < 7, monitor    Neck pain, chronic  -will get xray to evaluate, I suspect positional       Leukocytosis  Afebrile.  Daughter stating patient has been having on and off diarrhea for past month.  Cdiff Ag positive, but toxin negative.  Possible Cdiff colitis and will start empirically on oral Flagyl while awaiting PCR.      History of stroke  Stable; there are no acute issues.    Paroxysmal atrial fibrillation  Stable and currently in sinus rhythm.  She is no longer on apixaban due to frequent rectal bleeding.  Will continue to monitor.    Chronic diastolic heart failure  Stable without evidence of acute heart failure; will continue to monitor.    Renal hypertension  Patient's blood pressure is better-controlled; will continue home regimen of carvedilol, furosemide, and nifedipine, and provide as-needed clonidine.    Anemia of renal disease  As addressed above.    CKD stage 5  Her renal function appears to be are baseline.  She was previously attempted on hemodialysis in Apr 2019 but was intolerant of it.   Her nephrologist had recommended hospice instead.  Will avoid nephrotoxins continue to monitor her urine output.    Type 2 diabetes mellitus, controlled, with renal complications  Well-controlled on a home regimen of basal insulin therapy; will provide basal-prandial insulin along with insulin sliding scale.      VTE Risk Mitigation (From admission, onward)         Ordered     IP VTE HIGH RISK PATIENT  Once      07/28/20 2323     Place sequential compression device  Until discontinued      07/28/20 2323     Place SANTOS hose  Until discontinued      07/28/20 2323                      Balwinder Restrepo MD  Department of Hospital Medicine   [facility]

## 2020-07-31 PROBLEM — Z51.5 PALLIATIVE CARE ENCOUNTER: Status: ACTIVE | Noted: 2020-07-31

## 2020-07-31 PROBLEM — A49.8 CLOSTRIDIUM DIFFICILE INFECTION: Status: ACTIVE | Noted: 2020-07-31

## 2020-07-31 PROBLEM — Z71.89 GOALS OF CARE, COUNSELING/DISCUSSION: Status: ACTIVE | Noted: 2020-07-31

## 2020-07-31 LAB
ALBUMIN SERPL BCP-MCNC: 2.3 G/DL (ref 3.5–5.2)
ALBUMIN SERPL BCP-MCNC: 2.6 G/DL (ref 3.5–5.2)
ALP SERPL-CCNC: 74 U/L (ref 55–135)
ALT SERPL W/O P-5'-P-CCNC: 8 U/L (ref 10–44)
ANION GAP SERPL CALC-SCNC: 10 MMOL/L (ref 8–16)
ANION GAP SERPL CALC-SCNC: 10 MMOL/L (ref 8–16)
AST SERPL-CCNC: 10 U/L (ref 10–40)
BACTERIA #/AREA URNS HPF: ABNORMAL /HPF
BACTERIA STL CULT: NORMAL
BASOPHILS # BLD AUTO: 0.04 K/UL (ref 0–0.2)
BASOPHILS NFR BLD: 0.3 % (ref 0–1.9)
BILIRUB SERPL-MCNC: 0.4 MG/DL (ref 0.1–1)
BILIRUB UR QL STRIP: NEGATIVE
BLD PROD TYP BPU: NORMAL
BLOOD UNIT EXPIRATION DATE: NORMAL
BLOOD UNIT TYPE CODE: 5100
BLOOD UNIT TYPE: NORMAL
BUN SERPL-MCNC: 68 MG/DL (ref 8–23)
BUN SERPL-MCNC: 68 MG/DL (ref 8–23)
CALCIUM SERPL-MCNC: 8.3 MG/DL (ref 8.7–10.5)
CALCIUM SERPL-MCNC: 8.5 MG/DL (ref 8.7–10.5)
CHLORIDE SERPL-SCNC: 104 MMOL/L (ref 95–110)
CHLORIDE SERPL-SCNC: 106 MMOL/L (ref 95–110)
CK SERPL-CCNC: 14 U/L (ref 20–180)
CLARITY UR: ABNORMAL
CO2 SERPL-SCNC: 23 MMOL/L (ref 23–29)
CO2 SERPL-SCNC: 25 MMOL/L (ref 23–29)
CODING SYSTEM: NORMAL
COLOR UR: YELLOW
CREAT SERPL-MCNC: 5.4 MG/DL (ref 0.5–1.4)
CREAT SERPL-MCNC: 5.5 MG/DL (ref 0.5–1.4)
CREAT UR-MCNC: 37.1 MG/DL (ref 15–325)
CREAT UR-MCNC: 37.1 MG/DL (ref 15–325)
DIFFERENTIAL METHOD: ABNORMAL
DISPENSE STATUS: NORMAL
EOSINOPHIL # BLD AUTO: 0.1 K/UL (ref 0–0.5)
EOSINOPHIL NFR BLD: 0.6 % (ref 0–8)
EOSINOPHIL URNS QL WRIGHT STN: NORMAL
ERYTHROCYTE [DISTWIDTH] IN BLOOD BY AUTOMATED COUNT: 15.8 % (ref 11.5–14.5)
EST. GFR  (AFRICAN AMERICAN): 8 ML/MIN/1.73 M^2
EST. GFR  (AFRICAN AMERICAN): 8 ML/MIN/1.73 M^2
EST. GFR  (NON AFRICAN AMERICAN): 7 ML/MIN/1.73 M^2
EST. GFR  (NON AFRICAN AMERICAN): 7 ML/MIN/1.73 M^2
GLUCOSE SERPL-MCNC: 75 MG/DL (ref 70–110)
GLUCOSE SERPL-MCNC: 93 MG/DL (ref 70–110)
GLUCOSE UR QL STRIP: NEGATIVE
HCT VFR BLD AUTO: 22.5 % (ref 37–48.5)
HGB BLD-MCNC: 7 G/DL (ref 12–16)
HGB UR QL STRIP: ABNORMAL
HYALINE CASTS #/AREA URNS LPF: 0 /LPF
IMM GRANULOCYTES # BLD AUTO: 0.44 K/UL (ref 0–0.04)
IMM GRANULOCYTES NFR BLD AUTO: 2.8 % (ref 0–0.5)
KETONES UR QL STRIP: NEGATIVE
LEUKOCYTE ESTERASE UR QL STRIP: ABNORMAL
LYMPHOCYTES # BLD AUTO: 1.7 K/UL (ref 1–4.8)
LYMPHOCYTES NFR BLD: 10.6 % (ref 18–48)
MAGNESIUM SERPL-MCNC: 1.6 MG/DL (ref 1.6–2.6)
MCH RBC QN AUTO: 30.2 PG (ref 27–31)
MCHC RBC AUTO-ENTMCNC: 31.1 G/DL (ref 32–36)
MCV RBC AUTO: 97 FL (ref 82–98)
MICROSCOPIC COMMENT: ABNORMAL
MONOCYTES # BLD AUTO: 0.8 K/UL (ref 0.3–1)
MONOCYTES NFR BLD: 5.2 % (ref 4–15)
NEUTROPHILS # BLD AUTO: 12.8 K/UL (ref 1.8–7.7)
NEUTROPHILS NFR BLD: 80.5 % (ref 38–73)
NITRITE UR QL STRIP: NEGATIVE
NRBC BLD-RTO: 0 /100 WBC
PH UR STRIP: 5 [PH] (ref 5–8)
PHOSPHATE SERPL-MCNC: 6.3 MG/DL (ref 2.7–4.5)
PLATELET # BLD AUTO: 195 K/UL (ref 150–350)
PMV BLD AUTO: 9.4 FL (ref 9.2–12.9)
POCT GLUCOSE: 141 MG/DL (ref 70–110)
POCT GLUCOSE: 87 MG/DL (ref 70–110)
POCT GLUCOSE: 87 MG/DL (ref 70–110)
POCT GLUCOSE: 98 MG/DL (ref 70–110)
POTASSIUM SERPL-SCNC: 3.9 MMOL/L (ref 3.5–5.1)
POTASSIUM SERPL-SCNC: 4 MMOL/L (ref 3.5–5.1)
PROT SERPL-MCNC: 5.7 G/DL (ref 6–8.4)
PROT UR QL STRIP: ABNORMAL
PROT UR-MCNC: 73 MG/DL
PROT/CREAT UR: 1.97 MG/G{CREAT} (ref 0–0.2)
RBC # BLD AUTO: 2.32 M/UL (ref 4–5.4)
RBC #/AREA URNS HPF: 2 /HPF (ref 0–4)
SODIUM SERPL-SCNC: 139 MMOL/L (ref 136–145)
SODIUM SERPL-SCNC: 139 MMOL/L (ref 136–145)
SODIUM UR-SCNC: 58 MMOL/L (ref 20–250)
SP GR UR STRIP: 1.01 (ref 1–1.03)
SQUAMOUS #/AREA URNS HPF: ABNORMAL /HPF
TRANS ERYTHROCYTES VOL PATIENT: NORMAL ML
URATE SERPL-MCNC: 9.1 MG/DL (ref 2.4–5.7)
URN SPEC COLLECT METH UR: ABNORMAL
UROBILINOGEN UR STRIP-ACNC: NEGATIVE EU/DL
WBC # BLD AUTO: 15.92 K/UL (ref 3.9–12.7)
WBC #/AREA URNS HPF: 1 /HPF (ref 0–5)

## 2020-07-31 PROCEDURE — 97140 MANUAL THERAPY 1/> REGIONS: CPT

## 2020-07-31 PROCEDURE — 83735 ASSAY OF MAGNESIUM: CPT

## 2020-07-31 PROCEDURE — 25000003 PHARM REV CODE 250: Performed by: INTERNAL MEDICINE

## 2020-07-31 PROCEDURE — 84300 ASSAY OF URINE SODIUM: CPT

## 2020-07-31 PROCEDURE — 84540 ASSAY OF URINE/UREA-N: CPT

## 2020-07-31 PROCEDURE — P9021 RED BLOOD CELLS UNIT: HCPCS

## 2020-07-31 PROCEDURE — 84550 ASSAY OF BLOOD/URIC ACID: CPT

## 2020-07-31 PROCEDURE — 99223 PR INITIAL HOSPITAL CARE,LEVL III: ICD-10-PCS | Mod: GC,,, | Performed by: NURSE PRACTITIONER

## 2020-07-31 PROCEDURE — 80053 COMPREHEN METABOLIC PANEL: CPT

## 2020-07-31 PROCEDURE — 97165 OT EVAL LOW COMPLEX 30 MIN: CPT

## 2020-07-31 PROCEDURE — 99223 1ST HOSP IP/OBS HIGH 75: CPT | Mod: GC,,, | Performed by: NURSE PRACTITIONER

## 2020-07-31 PROCEDURE — 87205 SMEAR GRAM STAIN: CPT

## 2020-07-31 PROCEDURE — 11000001 HC ACUTE MED/SURG PRIVATE ROOM

## 2020-07-31 PROCEDURE — 80069 RENAL FUNCTION PANEL: CPT

## 2020-07-31 PROCEDURE — 36415 COLL VENOUS BLD VENIPUNCTURE: CPT

## 2020-07-31 PROCEDURE — 36430 TRANSFUSION BLD/BLD COMPNT: CPT

## 2020-07-31 PROCEDURE — 25000003 PHARM REV CODE 250: Performed by: HOSPITALIST

## 2020-07-31 PROCEDURE — 25000003 PHARM REV CODE 250: Performed by: NURSE PRACTITIONER

## 2020-07-31 PROCEDURE — 82550 ASSAY OF CK (CPK): CPT

## 2020-07-31 PROCEDURE — 81000 URINALYSIS NONAUTO W/SCOPE: CPT

## 2020-07-31 PROCEDURE — 85025 COMPLETE CBC W/AUTO DIFF WBC: CPT

## 2020-07-31 PROCEDURE — 97161 PT EVAL LOW COMPLEX 20 MIN: CPT

## 2020-07-31 PROCEDURE — 94761 N-INVAS EAR/PLS OXIMETRY MLT: CPT

## 2020-07-31 PROCEDURE — 84156 ASSAY OF PROTEIN URINE: CPT

## 2020-07-31 RX ORDER — TRAMADOL HYDROCHLORIDE 50 MG/1
50 TABLET ORAL EVERY 8 HOURS PRN
Status: DISCONTINUED | OUTPATIENT
Start: 2020-07-31 | End: 2020-08-04 | Stop reason: HOSPADM

## 2020-07-31 RX ORDER — METRONIDAZOLE 500 MG/1
500 TABLET ORAL EVERY 8 HOURS
Status: DISCONTINUED | OUTPATIENT
Start: 2020-07-31 | End: 2020-08-04 | Stop reason: HOSPADM

## 2020-07-31 RX ORDER — L. ACIDOPHILUS/L.BULGARICUS 100MM CELL
1 GRANULES IN PACKET (EA) ORAL 2 TIMES DAILY
Status: DISCONTINUED | OUTPATIENT
Start: 2020-07-31 | End: 2020-08-04 | Stop reason: HOSPADM

## 2020-07-31 RX ORDER — FAMOTIDINE 20 MG/1
20 TABLET, FILM COATED ORAL DAILY
Status: DISCONTINUED | OUTPATIENT
Start: 2020-07-31 | End: 2020-08-04

## 2020-07-31 RX ADMIN — ACETAMINOPHEN 500 MG: 325 TABLET ORAL at 05:07

## 2020-07-31 RX ADMIN — INSULIN ASPART 2 UNITS: 100 INJECTION, SOLUTION INTRAVENOUS; SUBCUTANEOUS at 11:07

## 2020-07-31 RX ADMIN — CLONIDINE HYDROCHLORIDE 0.1 MG: 0.1 TABLET ORAL at 05:07

## 2020-07-31 RX ADMIN — NIFEDIPINE 30 MG: 30 TABLET, FILM COATED, EXTENDED RELEASE ORAL at 08:07

## 2020-07-31 RX ADMIN — SODIUM BICARBONATE TAB 325 MG 1300 MG: 325 TAB at 09:07

## 2020-07-31 RX ADMIN — LACTOBACILLUS ACIDOPHILUS / LACTOBACILLUS BULGARICUS 1 EACH: 100 MILLION CFU STRENGTH GRANULES at 09:07

## 2020-07-31 RX ADMIN — MENTHOL, METHYL SALICYLATE: 10; 15 CREAM TOPICAL at 09:07

## 2020-07-31 RX ADMIN — CARVEDILOL 12.5 MG: 12.5 TABLET, FILM COATED ORAL at 09:07

## 2020-07-31 RX ADMIN — SODIUM BICARBONATE TAB 325 MG 1300 MG: 325 TAB at 08:07

## 2020-07-31 RX ADMIN — FUROSEMIDE 40 MG: 40 TABLET ORAL at 08:07

## 2020-07-31 RX ADMIN — INSULIN ASPART 2 UNITS: 100 INJECTION, SOLUTION INTRAVENOUS; SUBCUTANEOUS at 08:07

## 2020-07-31 RX ADMIN — LIDOCAINE 1 PATCH: 50 PATCH TOPICAL at 05:07

## 2020-07-31 RX ADMIN — INSULIN ASPART 2 UNITS: 100 INJECTION, SOLUTION INTRAVENOUS; SUBCUTANEOUS at 04:07

## 2020-07-31 RX ADMIN — ACETAMINOPHEN 500 MG: 325 TABLET ORAL at 09:07

## 2020-07-31 RX ADMIN — FAMOTIDINE 20 MG: 20 TABLET ORAL at 11:07

## 2020-07-31 RX ADMIN — CARVEDILOL 12.5 MG: 12.5 TABLET, FILM COATED ORAL at 08:07

## 2020-07-31 RX ADMIN — Medication 9 MG: at 09:07

## 2020-07-31 RX ADMIN — TRAMADOL HYDROCHLORIDE 50 MG: 50 TABLET, FILM COATED ORAL at 05:07

## 2020-07-31 RX ADMIN — METRONIDAZOLE 500 MG: 500 TABLET ORAL at 09:07

## 2020-07-31 RX ADMIN — INSULIN DETEMIR 5 UNITS: 100 INJECTION, SOLUTION SUBCUTANEOUS at 09:07

## 2020-07-31 RX ADMIN — METRONIDAZOLE 500 MG: 500 TABLET ORAL at 05:07

## 2020-07-31 RX ADMIN — METRONIDAZOLE 500 MG: 500 TABLET ORAL at 01:07

## 2020-07-31 RX ADMIN — MENTHOL, METHYL SALICYLATE: 10; 15 CREAM TOPICAL at 11:07

## 2020-07-31 RX ADMIN — CALCITRIOL CAPSULES 0.25 MCG 0.25 MCG: 0.25 CAPSULE ORAL at 08:07

## 2020-07-31 RX ADMIN — ACETAMINOPHEN 500 MG: 325 TABLET ORAL at 11:07

## 2020-07-31 NOTE — SUBJECTIVE & OBJECTIVE
Interval History: uneventful    Past Medical History:   Diagnosis Date    Anemia     Anticoagulant long-term use     Arthritis     Atrial fibrillation     Cataract     CHF (congestive heart failure)     CKD (chronic kidney disease), stage V     Diabetes mellitus     Type 2    GERD (gastroesophageal reflux disease)     GI bleed 2018    Gout     Hypertension     Obese     Requires assistance with all daily activities     Stroke     Wheelchair dependent        Past Surgical History:   Procedure Laterality Date     SECTION      Patient unsure, believe she had 3-4    CHOLECYSTECTOMY      EYE SURGERY      HYSTERECTOMY      JOINT REPLACEMENT Right     knee    TOTAL KNEE ARTHROPLASTY Right        Review of patient's allergies indicates:   Allergen Reactions    Indomethacin     Nsaids (non-steroidal anti-inflammatory drug)        Medications:  Continuous Infusions:  Scheduled Meds:   calcitRIOL  0.25 mcg Oral Every other day    carvediloL  12.5 mg Oral BID    ergocalciferol  50,000 Units Oral Q7 Days    famotidine  20 mg Oral Daily    furosemide  40 mg Oral Daily    insulin aspart U-100  2 Units Subcutaneous TIDWM    insulin detemir U-100  5 Units Subcutaneous QHS    lidocaine  1 patch Transdermal Q24H    methyl salicylate-menthol 15-10%   Topical (Top) BID    NIFEdipine  30 mg Oral Daily    sodium bicarbonate  1,300 mg Oral BID    vancomycin  125 mg Oral Q6H     PRN Meds:sodium chloride, sodium chloride, acetaminophen, cloNIDine, dextrose 50%, dextrose 50%, glucagon (human recombinant), glucose, glucose, insulin aspart U-100, melatonin, methyl salicylate-menthol 15-10%, ondansetron, prochlorperazine    Family History     Problem Relation (Age of Onset)    Cancer Mother, Brother    Diabetes Mother    Hypertension Mother        Tobacco Use    Smoking status: Never Smoker    Smokeless tobacco: Never Used   Substance and Sexual Activity    Alcohol use: No    Drug use: No     Sexual activity: Never       Review of Systems   Musculoskeletal: Positive for neck pain and neck stiffness.     Objective:     Vital Signs (Most Recent):  Temp: 98.6 °F (37 °C) (07/31/20 0723)  Pulse: 75 (07/31/20 0723)  Resp: 19 (07/31/20 0723)  BP: (!) 160/70 (07/31/20 0723)  SpO2: 100 % (07/31/20 0723) Vital Signs (24h Range):  Temp:  [97.5 °F (36.4 °C)-98.7 °F (37.1 °C)] 98.6 °F (37 °C)  Pulse:  [73-80] 75  Resp:  [17-20] 19  SpO2:  [96 %-100 %] 100 %  BP: (109-160)/(55-70) 160/70     Weight: 67.8 kg (149 lb 8.5 oz)  Body mass index is 24.13 kg/m².    Physical Exam  Vitals signs and nursing note reviewed.   Constitutional:       General: She is not in acute distress.  Neck:      Musculoskeletal: Neck rigidity (neck pain and hurts to turn ) and muscular tenderness (left side of neck) present.   Cardiovascular:      Rate and Rhythm: Normal rate and regular rhythm.   Pulmonary:      Effort: Pulmonary effort is normal.      Breath sounds: Normal breath sounds.   Abdominal:      General: Abdomen is flat. Bowel sounds are normal.      Palpations: Abdomen is soft.   Skin:     General: Skin is warm and dry.   Neurological:      Mental Status: She is alert and oriented to person, place, and time.   Psychiatric:         Mood and Affect: Mood normal.         Advance Care Planning   Review of Symptoms    Symptom Assessment (ESAS 0-10 Scale)  Pain:  10  Dyspnea:  0  Anxiety:  0  Nausea:  0  Depression:  0  Anorexia:  0  Fatigue:  0  Insomnia:  0  Restlessness:  0  Agitation:  0     CAM / Delirium:  Negative  Constipation:  Negative  Diarrhea:  Negative    Comments:  Patient had 2 tylenol earlier for neck pain but states it did not relieve. She states best thing that works for her is the topical cream which she used at nursing home.      Pain Assessment:  Location(s): neck    Neck       Neck Location:  Left    Advanced Directives:  Living Will: No    LaPOST:  Yes (updated LaPOST has DNR/Selective 7/19 and Old one from 4   voided)    Do Not Resuscitate Status:  Yes    Medical Power of : No (Patient has 6 living children. She has no HPOA)      Decision Making:  Patient answered questions    Living Arrangements:  Lives in nursing home     Time-Based Charting:  Yes  Chart Review: 10 minutes  Face to Face: 25 minutes  Symptom Assessment: 10 minutes  Coordination of Care: 8 minutes  Advance Care Plannin minutes  Goals of Care: 18 minutes    Total Time Spent: 72 minutes         Significant Labs: All pertinent labs within the past 24 hours have been reviewed.  CBC:   Recent Labs   Lab 20  0357   WBC 15.92*   HGB 7.0*   HCT 22.5*   MCV 97        BMP:  Recent Labs   Lab 20  0356   GLU 93      K 3.9      CO2 23   BUN 68*   CREATININE 5.5*   CALCIUM 8.3*     LFT:  Lab Results   Component Value Date    AST 10 2020    ALKPHOS 74 2020    BILITOT 0.4 2020     Albumin:   Albumin   Date Value Ref Range Status   2020 2.3 (L) 3.5 - 5.2 g/dL Final   2020 3.5 3.2 - 5.6 Final   2020 3.5 3.2 - 5.6 Final     Protein:   Total Protein   Date Value Ref Range Status   2020 5.7 (L) 6.0 - 8.4 g/dL Final     Lactic acid:   Lab Results   Component Value Date    LACTATE 1.1 2020    LACTATE 1.4 2020       Significant Imaging: I have reviewed all pertinent imaging results/findings within the past 24 hours.   Patient known to me from a previous hospitalization. At that time we had a GO discussion and completed an updated LaPOST. Patients old LaPOST 19 from nursing facility had comfort measures and it was confusing to medcal staff as a LaPOST with comfort measures usually refers to hospice patient.  However, patient is not on hospice and is not comfort only at this time.     Advance Care Planning     [facility]  I engaged the patient in a conversation about advance care planning and we specifically addressed what the goals of care would be moving forward, in light of the  patient's change in clinical status, specifically her poor kidney function. She states she would never want HD and that her niece had HD and had a hard time and  2 months after she started it.    We did not specifically address the patient's likely prognosis, which is undetermined at this time..  We explored the patient's values and preferences for future care. She continues to want to recieve conservative medical management for reversible conditions.  The patient endorses that what is most important right now is to focus on symptom/pain control and her neck pain. Which I did order her requested topical medication that she reports works best for her.      Advance Care Planning     Power of   I initiated the process of advance care planning today and explained the importance of this process to the patient.  I introduced the concept of advance directives to the patient, as well. Patient reports she does not have a HPOA and that she has 6 living children and would want all of them to be considered in making decisions if she were unable to.   Cassie Knight Ivan Jr, Davin Burt and Flower.    She states Antonia Veal daughter is her contact because she use to live with Antonia before she went to nursing facility. I asked her if all of her children would be united in a decision if it comes to her honoring her wishes and she states yes. She does not want to designate one person at this time.    She was also instructed to communicate with her chidlren about their wishes for future healthcare, should she become sick and lose decision-making capacity.      Updated Dr Restrepo

## 2020-07-31 NOTE — PLAN OF CARE
Problem: Occupational Therapy Goal  Goal: Occupational Therapy Goal  Outcome: Adequate for Care Transition     Initial OT eval completed. Pt is not appropriate for acute OT services. Pt is a resident at [facility]- staff uses a dillon lift to transfer pt to the wheelchair and max to total assist with ADLs with exception of feeding. OT rec return to nursing home with 24 hour care. OT to sign off. Thank you.

## 2020-07-31 NOTE — PLAN OF CARE
Problem: Physical Therapy Goal  Goal: Physical Therapy Goal  Outcome: Adequate for Care Transition   Initial PT evaluation performed.  Pt is functioning at prior level which is dependent dillon lift t/f from bed to chair.  Pt ok for D/C back to NH from PT standpoint.  Recommended turning and repositioning by nursing staff to prevent skin breakdown while admitted.  PT to sign off.

## 2020-07-31 NOTE — PROGRESS NOTES
[facility] Hospital Medicine  Progress Note    Patient Name: Joyce Riely  MRN: [id number]  Patient Class: IP- Inpatient   Admission Date: 7/28/2020  Length of Stay: 3 days  Attending Physician: Balwinder Restrepo MD  Primary Care Provider: Kalpana Staton MD        Subjective:     Principal Problem:Hematochezia        HPI:  Mrs. Joyce Riley is a 86 y.o. female [facility] resident known to me with renal hypertension, type 2 diabetes mellitus (HbA1c 5.1% Mar 2020), chronic diastolic heart failure (LVEF 80% Apr 2019), CKD stage 5, paroxysmal atrial fibrillation ([id number] score 5) not on chronic anticoagulation, anemia of renal disease, and history of stroke who presents to [facility] ED with complaints of hematochezia today.  She says that she has occasional rectal bleeding for the past few months as well as chronic lower abdominal cramping worse on eating.  Today she noted blood in her stool which he described as dark red.  She denies any nausea, vomiting, hematemesis, coffee-ground emesis, melena, nor any hematuria.  She has been generally weak and fatigued but that has not been any worse than usual.  She denies any chest pains, shortness of breath, lightheadedness, dizziness, falls, nor any loss of consciousness.    Overview/Hospital Course:  87 y/o female from NH admitted with hematochezia.  Anemia of CKD, but Hemoglobin lower than baseline at 7.8.  Transfused one unit of blood with adequate correction of H/H.  GI consulted.  Recent hx of diverticulitis, but no evidence of this time on CT.  Noted leukocytosis on labs.  Daughter stating patient has been having on and off diarrhea for past month.  Cdiff Ag positive, but toxin negative.  Started on oral Flagyl pending PCR which returned positive and started on oral Vanc. Has required 3U RBC at this time to maintain Hb > 7. Renal failure worsening and not HD candidate due to previous intolerance and now electing to not pursue this.  Nephrology consulted.    Interval History: still with similar neck pain, having some slight rectal bleeding and a few episodes of diarrhea overnight. Spoke extensively with patient's daughter.     Review of Systems   Constitutional: Negative for fever.   Respiratory: Negative for shortness of breath.    Cardiovascular: Negative for chest pain.   Gastrointestinal: Positive for blood in stool and diarrhea. Negative for abdominal pain and anal bleeding.     Objective:     Vital Signs (Most Recent):  Temp: 98.5 °F (36.9 °C) (07/31/20 1152)  Pulse: 70 (07/31/20 1152)  Resp: 20 (07/31/20 1152)  BP: (!) 176/74 (07/31/20 1152)  SpO2: 100 % (07/31/20 1152) Vital Signs (24h Range):  Temp:  [97.5 °F (36.4 °C)-98.7 °F (37.1 °C)] 98.5 °F (36.9 °C)  Pulse:  [69-80] 70  Resp:  [17-20] 20  SpO2:  [96 %-100 %] 100 %  BP: (122-179)/(61-77) 176/74     Weight: 67.8 kg (149 lb 8.5 oz)  Body mass index is 24.13 kg/m².    Intake/Output Summary (Last 24 hours) at 7/31/2020 1155  Last data filed at 7/31/2020 1135  Gross per 24 hour   Intake 1188.67 ml   Output --   Net 1188.67 ml      Physical Exam  Vitals signs and nursing note reviewed.   Constitutional:       General: She is not in acute distress.     Appearance: Normal appearance. She is normal weight. She is not ill-appearing, toxic-appearing or diaphoretic.   HENT:      Head: Normocephalic and atraumatic.      Right Ear: External ear normal.      Left Ear: External ear normal.      Nose: Nose normal.   Eyes:      General:         Right eye: No discharge.         Left eye: No discharge.   Cardiovascular:      Rate and Rhythm: Normal rate and regular rhythm.      Pulses: Normal pulses.      Heart sounds: No murmur. No friction rub.   Pulmonary:      Effort: Pulmonary effort is normal. No respiratory distress.      Breath sounds: Normal breath sounds. No stridor. No wheezing, rhonchi or rales.   Abdominal:      General: Abdomen is flat. There is no distension.      Palpations: Abdomen is  soft.      Tenderness: There is no abdominal tenderness. There is no guarding or rebound.   Musculoskeletal:      Left lower leg: Edema present.   Skin:     General: Skin is warm and dry.      Coloration: Skin is not jaundiced.   Neurological:      Mental Status: She is alert and oriented to person, place, and time.   Psychiatric:         Mood and Affect: Mood normal.         Behavior: Behavior normal.         Thought Content: Thought content normal.         Judgment: Judgment normal.         Significant Labs: All pertinent labs within the past 24 hours have been reviewed.    Significant Imaging: I have reviewed and interpreted all pertinent imaging results/findings within the past 24 hours.      Assessment/Plan:      * Hematochezia  Patient had a couple similar episodes earlier this year and was found to have diverticulitis.   No evidence of diverticulitis on CT.  Anemia of CKD with hemoglobin lower than baseline.  Anemia of CKD with anemia of acute blood loss secondary to hematochezia.  Probable diverticular bleed.  Transfused one unit of blood with adequate correction of H/H.  Appreciate GI input.  Conservative management at this time.    Transfuse another unit today, monitor    Clostridium difficile infection  Confirmed on PCR, continue flagyl, improving WBC and diarrhea slowing down.      Neck pain, chronic  -will get xray to evaluate, I suspect positional       Leukocytosis  Improving, most likely Cdiff      History of stroke  Stable; there are no acute issues.    Acute blood loss anemia  Give another 1U RBC, due to rectal bleeding       Paroxysmal atrial fibrillation  Stable and currently in sinus rhythm.  She is no longer on apixaban due to frequent rectal bleeding.  Will continue to monitor.    Chronic diastolic heart failure  Stable without evidence of acute heart failure; will continue to monitor.    Renal hypertension  Patient's blood pressure is better-controlled; will continue home regimen of  carvedilol, furosemide, and nifedipine, and provide as-needed clonidine.    Anemia of renal disease  As addressed above.    CKD stage 5  Worsening status, previously did not tolerate dialysis and would not like it in the future. Nephrology consulted.  Palliative consult    Type 2 diabetes mellitus, controlled, with renal complications  Well-controlled on a home regimen of basal insulin therapy; will provide basal-prandial insulin along with insulin sliding scale.      VTE Risk Mitigation (From admission, onward)         Ordered     IP VTE HIGH RISK PATIENT  Once      07/28/20 2323     Place sequential compression device  Until discontinued      07/28/20 2323     Place SANTOS hose  Until discontinued      07/28/20 2323                      Balwinder Restrepo MD  Department of Hospital Medicine   [facility]

## 2020-07-31 NOTE — NURSING
Blood transfusion tolerating well.No acute distress noted. No adverse reaction present Sitting in bed watching television

## 2020-07-31 NOTE — PT/OT/SLP EVAL
Occupational Therapy   Evaluation and Discharge Note    Name: Joyce Riley  MRN: [id number]  Admitting Diagnosis:  Hematochezia      Recommendations:     Discharge Recommendations: return to nursing home with 24 hour care   Discharge Equipment Recommendations:  none  Barriers to discharge:  None    Assessment:     Joyce Riley is a 86 y.o. female with a medical diagnosis of Hematochezia. Pt is not appropriate for acute OT services. Pt is a resident at [facility]- staff uses a dillon lift to transfer pt to the wheelchair and max to total assist with ADLs with exception of feeding. OT rec return to nursing home with 24 hour care.     Plan:     During this hospitalization, patient does not require further acute OT services.  Please re-consult if situation changes.    · Plan of Care Reviewed with: patient    Subjective     Chief Complaint: L neck pain   Patient/Family Comments/goals: very cold     Occupational Profile:  Living Environment: Pt is a resident at [facility].   Previous level of function: dillon lift transfer to the wheelchair; staff propels her in the wheelchair to the dining bahena; bed baths, wears diapers, max to total with all ADLs with exception of feeding (set-up)   Roles and Routines: NH activities; pt reports that they get her to the wheelchair daily   Equipment Used at home:  wheelchair, lift device  Assistance upon Discharge: NH staff     Pain/Comfort:  · Pain Rating 1: 10/10(c/o L side neck pain)    Patients cultural, spiritual, Mosque conflicts given the current situation: no    Objective:     Communicated with: nurseNaye,  prior to session.  Patient found HOB elevated with bed alarm, peripheral IV upon OT entry to room.    General Precautions: Standard, fall, special contact   Orthopedic Precautions:N/A   Braces: N/A     Occupational Performance:    Bed Mobility:    · Patient completed Scooting anteriorly to HOB with dependent and 2  persons  · Patient completed long sit in the bed with supervision from HOB elevated to ~45*; pt was able to hold her balance sitting upright in bed with supervision     Functional Mobility/Transfers:  · Functional Mobility: Not appropriate to assess.     Activities of Daily Living:  · Pt was able to doff/don neck pillow independently     Cognitive/Visual Perceptual:  Cognitive/Psychosocial Skills:     -       Oriented to: Person, Place, Time and Situation   -       Follows Commands/attention:Follows most simple commands  -       Communication: clear/fluent  -       Memory: poor immediate recall   -       Safety awareness/insight to disability: impaired   -       Mood/Affect/Coping skills/emotional control: Cooperative and Pleasant    Physical Exam:  Balance:    -       long sit in bed with supervision  Postural examination/scapula alignment:    -       Rounded shoulders  -       Forward head  Skin integrity: Visible skin intact  Edema:  no BUE edema noted   Sensation:    -       Intact  light/touch BUE  Dominant hand:    -       Right  Upper Extremity Range of Motion:     -       R/L Upper Extremity: shoulder flexion AROM ~100*  Upper Extremity Strength:    -       Right Upper Extremity: WFL  -       Left Upper Extremity: WFL   Strength:    -       Right Upper Extremity: WFL  -       Left Upper Extremity: WFL  Gross motor coordination:   impaired    AMPAC 6 Click ADL:  AMPAC Total Score: 11    Treatment & Education:  · Pt educated on OT role/POC.   · Safety during bed mobility   · White board updated   · Self-care tasks/functional mobility completed- assistance level noted above   · All questions/concerns answered within OT scope of practice     Education:    Patient left HOB elevated with B/L LE elevated with all lines intact, call button in reach, bed alarm on and nurse, Naye, notified. Nurse notified of neck pain and heat/ warm wipes that could assist with the pain.     GOALS:   Multidisciplinary Problems      Occupational Therapy Goals        Problem: Occupational Therapy Goal    Goal Priority Disciplines Outcome Interventions   Occupational Therapy Goal     OT, PT/OT Adequate for Care Transition                    History:     Past Medical History:   Diagnosis Date    Anemia     Anticoagulant long-term use     Arthritis     Atrial fibrillation     Cataract     CHF (congestive heart failure)     CKD (chronic kidney disease), stage V     Diabetes mellitus     Type 2    GERD (gastroesophageal reflux disease)     GI bleed 2018    Gout     Hypertension     Obese     Requires assistance with all daily activities     Stroke 2014    Wheelchair dependent        Past Surgical History:   Procedure Laterality Date     SECTION      Patient unsure, believe she had 3-4    CHOLECYSTECTOMY      EYE SURGERY      HYSTERECTOMY      JOINT REPLACEMENT Right     knee    TOTAL KNEE ARTHROPLASTY Right        Time Tracking:     OT Date of Treatment: 20  OT Start Time: 1025  OT Stop Time: 1043  OT Total Time (min): 18 min    Billable Minutes:Evaluation 18 min    Laurita Valencia OT  2020

## 2020-07-31 NOTE — SUBJECTIVE & OBJECTIVE
Interval History: still with similar neck pain, having some slight rectal bleeding and a few episodes of diarrhea overnight. Spoke extensively with patient's daughter.     Review of Systems   Constitutional: Negative for fever.   Respiratory: Negative for shortness of breath.    Cardiovascular: Negative for chest pain.   Gastrointestinal: Positive for blood in stool and diarrhea. Negative for abdominal pain and anal bleeding.     Objective:     Vital Signs (Most Recent):  Temp: 98.5 °F (36.9 °C) (07/31/20 1152)  Pulse: 70 (07/31/20 1152)  Resp: 20 (07/31/20 1152)  BP: (!) 176/74 (07/31/20 1152)  SpO2: 100 % (07/31/20 1152) Vital Signs (24h Range):  Temp:  [97.5 °F (36.4 °C)-98.7 °F (37.1 °C)] 98.5 °F (36.9 °C)  Pulse:  [69-80] 70  Resp:  [17-20] 20  SpO2:  [96 %-100 %] 100 %  BP: (122-179)/(61-77) 176/74     Weight: 67.8 kg (149 lb 8.5 oz)  Body mass index is 24.13 kg/m².    Intake/Output Summary (Last 24 hours) at 7/31/2020 1155  Last data filed at 7/31/2020 1135  Gross per 24 hour   Intake 1188.67 ml   Output --   Net 1188.67 ml      Physical Exam  Vitals signs and nursing note reviewed.   Constitutional:       General: She is not in acute distress.     Appearance: Normal appearance. She is normal weight. She is not ill-appearing, toxic-appearing or diaphoretic.   HENT:      Head: Normocephalic and atraumatic.      Right Ear: External ear normal.      Left Ear: External ear normal.      Nose: Nose normal.   Eyes:      General:         Right eye: No discharge.         Left eye: No discharge.   Cardiovascular:      Rate and Rhythm: Normal rate and regular rhythm.      Pulses: Normal pulses.      Heart sounds: No murmur. No friction rub.   Pulmonary:      Effort: Pulmonary effort is normal. No respiratory distress.      Breath sounds: Normal breath sounds. No stridor. No wheezing, rhonchi or rales.   Abdominal:      General: Abdomen is flat. There is no distension.      Palpations: Abdomen is soft.      Tenderness:  There is no abdominal tenderness. There is no guarding or rebound.   Musculoskeletal:      Left lower leg: Edema present.   Skin:     General: Skin is warm and dry.      Coloration: Skin is not jaundiced.   Neurological:      Mental Status: She is alert and oriented to person, place, and time.   Psychiatric:         Mood and Affect: Mood normal.         Behavior: Behavior normal.         Thought Content: Thought content normal.         Judgment: Judgment normal.         Significant Labs: All pertinent labs within the past 24 hours have been reviewed.    Significant Imaging: I have reviewed and interpreted all pertinent imaging results/findings within the past 24 hours.

## 2020-07-31 NOTE — PROGRESS NOTES
[facility] Hospital Medicine  Progress Note    Patient Name: Joyce Riley  MRN: [id number]  Patient Class: IP- Inpatient   Admission Date: 7/28/2020  Length of Stay: 3 days  Attending Physician: Balwinder Restrepo MD  Primary Care Provider: Kalpana Staton MD        Subjective:     Principal Problem:Hematochezia        HPI:  Mrs. Joyce Riley is a 86 y.o. female [facility] resident known to me with renal hypertension, type 2 diabetes mellitus (HbA1c 5.1% Mar 2020), chronic diastolic heart failure (LVEF 80% Apr 2019), CKD stage 5, paroxysmal atrial fibrillation ([id number] score 5) not on chronic anticoagulation, anemia of renal disease, and history of stroke who presents to [facility] ED with complaints of hematochezia today.  She says that she has occasional rectal bleeding for the past few months as well as chronic lower abdominal cramping worse on eating.  Today she noted blood in her stool which he described as dark red.  She denies any nausea, vomiting, hematemesis, coffee-ground emesis, melena, nor any hematuria.  She has been generally weak and fatigued but that has not been any worse than usual.  She denies any chest pains, shortness of breath, lightheadedness, dizziness, falls, nor any loss of consciousness.    Overview/Hospital Course:  85 y/o female from NH admitted with hematochezia.  Anemia of CKD, but Hemoglobin lower than baseline at 7.8.  Transfused one unit of blood with adequate correction of H/H.  GI consulted.  Recent hx of diverticulitis, but no evidence of this time on CT.  Noted leukocytosis on labs.  Daughter stating patient has been having on and off diarrhea for past month.  Cdiff Ag positive, but toxin negative.  Started on oral Flagyl pending PCR which returned positive and started on oral Vanc. Has required 3U RBC at this time to maintain Hb > 7. Renal failure worsening and not HD candidate due to previous intolerance and now electing to not pursue this.  Nephrology consulted.    Interval History: still with similar neck pain, having some slight rectal bleeding and a few episodes of diarrhea overnight. Spoke extensively with patient's daughter.     Review of Systems   Constitutional: Negative for fever.   Respiratory: Negative for shortness of breath.    Cardiovascular: Negative for chest pain.   Gastrointestinal: Positive for blood in stool and diarrhea. Negative for abdominal pain and anal bleeding.     Objective:     Vital Signs (Most Recent):  Temp: 98.5 °F (36.9 °C) (07/31/20 1152)  Pulse: 70 (07/31/20 1152)  Resp: 20 (07/31/20 1152)  BP: (!) 176/74 (07/31/20 1152)  SpO2: 100 % (07/31/20 1152) Vital Signs (24h Range):  Temp:  [97.5 °F (36.4 °C)-98.7 °F (37.1 °C)] 98.5 °F (36.9 °C)  Pulse:  [69-80] 70  Resp:  [17-20] 20  SpO2:  [96 %-100 %] 100 %  BP: (122-179)/(61-77) 176/74     Weight: 67.8 kg (149 lb 8.5 oz)  Body mass index is 24.13 kg/m².    Intake/Output Summary (Last 24 hours) at 7/31/2020 1155  Last data filed at 7/31/2020 1135  Gross per 24 hour   Intake 1188.67 ml   Output --   Net 1188.67 ml      Physical Exam  Vitals signs and nursing note reviewed.   Constitutional:       General: She is not in acute distress.     Appearance: Normal appearance. She is normal weight. She is not ill-appearing, toxic-appearing or diaphoretic.   HENT:      Head: Normocephalic and atraumatic.      Right Ear: External ear normal.      Left Ear: External ear normal.      Nose: Nose normal.   Eyes:      General:         Right eye: No discharge.         Left eye: No discharge.   Cardiovascular:      Rate and Rhythm: Normal rate and regular rhythm.      Pulses: Normal pulses.      Heart sounds: No murmur. No friction rub.   Pulmonary:      Effort: Pulmonary effort is normal. No respiratory distress.      Breath sounds: Normal breath sounds. No stridor. No wheezing, rhonchi or rales.   Abdominal:      General: Abdomen is flat. There is no distension.      Palpations: Abdomen is  soft.      Tenderness: There is no abdominal tenderness. There is no guarding or rebound.   Musculoskeletal:      Left lower leg: Edema present.   Skin:     General: Skin is warm and dry.      Coloration: Skin is not jaundiced.   Neurological:      Mental Status: She is alert and oriented to person, place, and time.   Psychiatric:         Mood and Affect: Mood normal.         Behavior: Behavior normal.         Thought Content: Thought content normal.         Judgment: Judgment normal.         Significant Labs: All pertinent labs within the past 24 hours have been reviewed.    Significant Imaging: I have reviewed and interpreted all pertinent imaging results/findings within the past 24 hours.      Assessment/Plan:      * Hematochezia  Patient had a couple similar episodes earlier this year and was found to have diverticulitis.   No evidence of diverticulitis on CT.  Anemia of CKD with hemoglobin lower than baseline.  Anemia of CKD with anemia of acute blood loss secondary to hematochezia.  Probable diverticular bleed.  Transfused one unit of blood with adequate correction of H/H.  Appreciate GI input.  Conservative management at this time.    Transfuse another unit today, monitor    Clostridium difficile infection  Confirmed on PCR, will switch to oral Vanc and monitor for resolution.      Neck pain, chronic  -will get xray to evaluate, I suspect positional       Leukocytosis  Improving, most likely Cdiff      History of stroke  Stable; there are no acute issues.    Acute blood loss anemia  Give another 1U RBC, due to rectal bleeding       Paroxysmal atrial fibrillation  Stable and currently in sinus rhythm.  She is no longer on apixaban due to frequent rectal bleeding.  Will continue to monitor.    Chronic diastolic heart failure  Stable without evidence of acute heart failure; will continue to monitor.    Renal hypertension  Patient's blood pressure is better-controlled; will continue home regimen of carvedilol,  furosemide, and nifedipine, and provide as-needed clonidine.    Anemia of renal disease  As addressed above.    CKD stage 5  Worsening status, previously did not tolerate dialysis and would not like it in the future. Nephrology consulted.  Palliative consult    Type 2 diabetes mellitus, controlled, with renal complications  Well-controlled on a home regimen of basal insulin therapy; will provide basal-prandial insulin along with insulin sliding scale.      VTE Risk Mitigation (From admission, onward)         Ordered     IP VTE HIGH RISK PATIENT  Once      07/28/20 2323     Place sequential compression device  Until discontinued      07/28/20 2323     Place SANTOS hose  Until discontinued      07/28/20 2323                      Balwinder Restrepo MD  Department of Hospital Medicine   [facility]

## 2020-07-31 NOTE — CONSULTS
[facility]  Palliative Medicine  Consult Note    Patient Name: Joyce Riley  MRN: [id number]  Admission Date: 7/28/2020  Hospital Length of Stay: 3 days  Code Status: DNR   Attending Provider: Balwinder Restrepo MD  Consulting Provider: Bonita Coreas NP  Primary Care Physician: Kalpana Staton MD  Principal Problem:Hematochezia    Patient information was obtained from patient, past medical records and ER records.      Inpatient consult to Palliative Care  Consult performed by: Bonita Coreas NP  Consult ordered by: Balwinder Restrepo MD  Reason for consult: [facility]        Assessment/Plan:     See Assessment note below    Plan:  -continue current treatment  -symptom management  -Patient has updated LaPOST with DNR/selective and wants conservative medical management for reversible conditions  -patient has 6 living children (no HPOA) and does not want to designate        Thank you for your consult.     Subjective:     HPI:   Principal Problem:Hematochezia           HPI:  Mrs. Joyce Riley is a 86 y.o. female [facility] resident known to me with renal hypertension, type 2 diabetes mellitus (HbA1c 5.1% Mar 2020), chronic diastolic heart failure (LVEF 80% Apr 2019), CKD stage 5, paroxysmal atrial fibrillation ([id number]-LGSa score 5) not on chronic anticoagulation, anemia of renal disease, and history of stroke who presents to [facility] ED with complaints of hematochezia today.  She says that she has occasional rectal bleeding for the past few months as well as chronic lower abdominal cramping worse on eating.  Today she noted blood in her stool which he described as dark red.  She denies any nausea, vomiting, hematemesis, coffee-ground emesis, melena, nor any hematuria.  She has been generally weak and fatigued but that has not been any worse than usual.  She denies any chest pains, shortness of breath, lightheadedness, dizziness, falls, nor any loss of consciousness.     Overview/Hospital  Course:  85 y/o female from NH admitted with hematochezia.  Anemia of CKD, but Hemoglobin lower than baseline at 7.8.  Transfused one unit of blood with adequate correction of H/H.  GI consulted.  Recent hx of diverticulitis, but no evidence of this time on CT.  Noted leukocytosis on labs.  Daughter stating patient has been having on and off diarrhea for past month.  Cdiff Ag positive, but toxin negative.  Started on oral Flagyl pending PCR.     Interval History: complains of worsening chronic neck pain of 3-5 months, having diarrhea and bloody stool today. No abd pain, no CP or SOB    Hospital Course:  No notes on file    Interval History: uneventful    Past Medical History:   Diagnosis Date    Anemia     Anticoagulant long-term use     Arthritis     Atrial fibrillation     Cataract     CHF (congestive heart failure)     CKD (chronic kidney disease), stage V     Diabetes mellitus     Type 2    GERD (gastroesophageal reflux disease)     GI bleed 2018    Gout     Hypertension     Obese     Requires assistance with all daily activities     Stroke     Wheelchair dependent        Past Surgical History:   Procedure Laterality Date     SECTION      Patient unsure, believe she had 3-4    CHOLECYSTECTOMY      EYE SURGERY      HYSTERECTOMY      JOINT REPLACEMENT Right     knee    TOTAL KNEE ARTHROPLASTY Right        Review of patient's allergies indicates:   Allergen Reactions    Indomethacin     Nsaids (non-steroidal anti-inflammatory drug)        Medications:  Continuous Infusions:  Scheduled Meds:   calcitRIOL  0.25 mcg Oral Every other day    carvediloL  12.5 mg Oral BID    ergocalciferol  50,000 Units Oral Q7 Days    famotidine  20 mg Oral Daily    furosemide  40 mg Oral Daily    insulin aspart U-100  2 Units Subcutaneous TIDWM    insulin detemir U-100  5 Units Subcutaneous QHS    lidocaine  1 patch Transdermal Q24H    methyl salicylate-menthol 15-10%   Topical (Top) BID     NIFEdipine  30 mg Oral Daily    sodium bicarbonate  1,300 mg Oral BID    vancomycin  125 mg Oral Q6H     PRN Meds:sodium chloride, sodium chloride, acetaminophen, cloNIDine, dextrose 50%, dextrose 50%, glucagon (human recombinant), glucose, glucose, insulin aspart U-100, melatonin, methyl salicylate-menthol 15-10%, ondansetron, prochlorperazine    Family History     Problem Relation (Age of Onset)    Cancer Mother, Brother    Diabetes Mother    Hypertension Mother        Tobacco Use    Smoking status: Never Smoker    Smokeless tobacco: Never Used   Substance and Sexual Activity    Alcohol use: No    Drug use: No    Sexual activity: Never       Review of Systems   Musculoskeletal: Positive for neck pain and neck stiffness.     Objective:     Vital Signs (Most Recent):  Temp: 98.6 °F (37 °C) (07/31/20 0723)  Pulse: 75 (07/31/20 0723)  Resp: 19 (07/31/20 0723)  BP: (!) 160/70 (07/31/20 0723)  SpO2: 100 % (07/31/20 0723) Vital Signs (24h Range):  Temp:  [97.5 °F (36.4 °C)-98.7 °F (37.1 °C)] 98.6 °F (37 °C)  Pulse:  [73-80] 75  Resp:  [17-20] 19  SpO2:  [96 %-100 %] 100 %  BP: (109-160)/(55-70) 160/70     Weight: 67.8 kg (149 lb 8.5 oz)  Body mass index is 24.13 kg/m².    Physical Exam  Vitals signs and nursing note reviewed.   Constitutional:       General: She is not in acute distress.  Neck:      Musculoskeletal: Neck rigidity (neck pain and hurts to turn ) and muscular tenderness (left side of neck) present.   Cardiovascular:      Rate and Rhythm: Normal rate and regular rhythm.   Pulmonary:      Effort: Pulmonary effort is normal.      Breath sounds: Normal breath sounds.   Abdominal:      General: Abdomen is flat. Bowel sounds are normal.      Palpations: Abdomen is soft.   Skin:     General: Skin is warm and dry.   Neurological:      Mental Status: She is alert and oriented to person, place, and time.   Psychiatric:         Mood and Affect: Mood normal.         Advance Care Planning   Review of  Symptoms    Symptom Assessment (ESAS 0-10 Scale)  Pain:  10  Dyspnea:  0  Anxiety:  0  Nausea:  0  Depression:  0  Anorexia:  0  Fatigue:  0  Insomnia:  0  Restlessness:  0  Agitation:  0     CAM / Delirium:  Negative  Constipation:  Negative  Diarrhea:  Negative    Comments:  Patient had 2 tylenol earlier for neck pain but states it did not relieve. She states best thing that works for her is the topical cream which she used at nursing home.      Pain Assessment:  Location(s): neck    Neck       Neck Location:  Left    Advanced Directives:  Living Will: No    LaPOST:  Yes (updated LaPOST has DNR/Selective  and Old one from  voided)    Do Not Resuscitate Status:  Yes    Medical Power of : No (Patient has 6 living children. She has no HPOA)      Decision Making:  Patient answered questions    Living Arrangements:  Lives in nursing home     Time-Based Charting:  Yes  Chart Review: 10 minutes  Face to Face: 25 minutes  Symptom Assessment: 10 minutes  Coordination of Care: 8 minutes  Advance Care Plannin minutes  Goals of Care: 18 minutes    Total Time Spent: 72 minutes         Significant Labs: All pertinent labs within the past 24 hours have been reviewed.  CBC:   Recent Labs   Lab 20  0357   WBC 15.92*   HGB 7.0*   HCT 22.5*   MCV 97        BMP:  Recent Labs   Lab 20  035   GLU 93      K 3.9      CO2 23   BUN 68*   CREATININE 5.5*   CALCIUM 8.3*     LFT:  Lab Results   Component Value Date    AST 10 2020    ALKPHOS 74 2020    BILITOT 0.4 2020     Albumin:   Albumin   Date Value Ref Range Status   2020 2.3 (L) 3.5 - 5.2 g/dL Final   2020 3.5 3.2 - 5.6 Final   2020 3.5 3.2 - 5.6 Final     Protein:   Total Protein   Date Value Ref Range Status   2020 5.7 (L) 6.0 - 8.4 g/dL Final     Lactic acid:   Lab Results   Component Value Date    LACTATE 1.1 2020    LACTATE 1.4 2020       Significant Imaging: I have reviewed  all pertinent imaging results/findings within the past 24 hours.   Patient known to me from a previous hospitalization. At that time we had a GOC discussion and completed an updated LaPOST. Patients old LaPOST 19 from nursing facility had comfort measures and it was confusing to medcal staff as a LaPOST with comfort measures usually refers to hospice patient.  However, patient is not on hospice and is not comfort only at this time.     Advance Care Planning     [facility]  I engaged the patient in a conversation about advance care planning and we specifically addressed what the goals of care would be moving forward, in light of the patient's change in clinical status, specifically her poor kidney function. She states she would never want HD and that her niece had HD and had a hard time and  2 months after she started it.    We did not specifically address the patient's likely prognosis, which is undetermined at this time..  We explored the patient's values and preferences for future care. She continues to want to recieve conservative medical management for reversible conditions.  The patient endorses that what is most important right now is to focus on symptom/pain control and her neck pain. Which I did order her requested topical medication that she reports works best for her.      Advance Care Planning     Power of   I initiated the process of advance care planning today and explained the importance of this process to the patient.  I introduced the concept of advance directives to the patient, as well. Patient reports she does not have a HPOA and that she has 6 living children and would want all of them to be considered in making decisions if she were unable to.   Cassie Knight Ivan Jr, Davin Burt and Flower.    She states Antonia Veal daughter is her contact because she use to live with Antonia before she went to nursing facility. I asked her if all of her children would be united in a decision  if it comes to her honoring her wishes and she states yes. She does not want to designate one person at this time.    She was also instructed to communicate with her chidlren about their wishes for future healthcare, should she become sick and lose decision-making capacity.      Updated Dr Restrepo      Error in one of children names. Its Ivory not rAun Coreas NP  Palliative Medicine  [facility]

## 2020-07-31 NOTE — PLAN OF CARE
Still c/o neck pain; controlled with lidoderm patch and tylenol.  Neck xray pending for am.  No bloody stools throughout shift.  Repositioned for comfort as tolerated. AM labs pending.

## 2020-07-31 NOTE — HPI
Principal Problem:Hematochezia           HPI:  Mrs. Joyce Riley is a 86 y.o. female [facility] resident known to me with renal hypertension, type 2 diabetes mellitus (HbA1c 5.1% Mar 2020), chronic diastolic heart failure (LVEF 80% Apr 2019), CKD stage 5, paroxysmal atrial fibrillation ([id number]-WYWu score 5) not on chronic anticoagulation, anemia of renal disease, and history of stroke who presents to [facility] ED with complaints of hematochezia today.  She says that she has occasional rectal bleeding for the past few months as well as chronic lower abdominal cramping worse on eating.  Today she noted blood in her stool which he described as dark red.  She denies any nausea, vomiting, hematemesis, coffee-ground emesis, melena, nor any hematuria.  She has been generally weak and fatigued but that has not been any worse than usual.  She denies any chest pains, shortness of breath, lightheadedness, dizziness, falls, nor any loss of consciousness.     Overview/Hospital Course:  87 y/o female from NH admitted with hematochezia.  Anemia of CKD, but Hemoglobin lower than baseline at 7.8.  Transfused one unit of blood with adequate correction of H/H.  GI consulted.  Recent hx of diverticulitis, but no evidence of this time on CT.  Noted leukocytosis on labs.  Daughter stating patient has been having on and off diarrhea for past month.  Cdiff Ag positive, but toxin negative.  Started on oral Flagyl pending PCR.     Interval History: complains of worsening chronic neck pain of 3-5 months, having diarrhea and bloody stool today. No abd pain, no CP or SOB

## 2020-07-31 NOTE — ASSESSMENT & PLAN NOTE
Patient had a couple similar episodes earlier this year and was found to have diverticulitis.   No evidence of diverticulitis on CT.  Anemia of CKD with hemoglobin lower than baseline.  Anemia of CKD with anemia of acute blood loss secondary to hematochezia.  Probable diverticular bleed.  Transfused one unit of blood with adequate correction of H/H.  Appreciate GI input.  Conservative management at this time.    Transfuse another unit today, monitor

## 2020-07-31 NOTE — PROGRESS NOTES
[facility]  Gastroenterology  Progress Note    Patient Name: Joyce Riley  MRN: [id number]  Admission Date: 7/28/2020  Hospital Length of Stay: 3 days  Code Status: DNR   Attending Provider: Balwinder Restrepo MD  Primary Care Physician: Kalpana Staton MD  Principal Problem: Hematochezia    Subjective:     CC= Rectal bleeding     Interval History: No bleeding or diarrhea overnight.  Patient denies abdominal pain.  Tolerating diet.     Review of systems:  General: Negative for fevers, chills.  Cardiovascular:  Negative for chest pain, shortness of breath     Objective:     Vital Signs (Most Recent):  Temp: 98.6 °F (37 °C) (07/31/20 0723)  Pulse: 75 (07/31/20 0723)  Resp: 19 (07/31/20 0723)  BP: (!) 160/70 (07/31/20 0723)  SpO2: 100 % (07/31/20 0723) Vital Signs (24h Range):  Temp:  [97.5 °F (36.4 °C)-98.7 °F (37.1 °C)] 98.6 °F (37 °C)  Pulse:  [73-80] 75  Resp:  [17-20] 19  SpO2:  [96 %-100 %] 100 %  BP: (109-160)/(55-70) 160/70     Physical examination:  GEN: Elderly AAF in no apparent distress   HENT: Normocephalic, anicteric sclera   Cardiovascular: Regular rate and rhythm. No murmurs appreciated.   Chest: Non-labored respirations. Breath sounds equal   Abdomen: Soft, NTND, normoactive BS  Psych: Appropriate mood and affect.   Extermities: No C/C/E. 2+ dorsalis pedis pulses bilaterally    Recent Labs   Lab 07/30/20  0624 07/31/20  0357   WBC 21.53* 15.92*   HGB 6.7* 7.0*   HCT 20.4* 22.5*   PLT SEE COMMENT 195     Creatinine= 4.2    C-diff antigen= positive  C-diff toxins= negative   C-diff PCR= positive     Imaging:  CT abdomen/pelvis without contrast (7/28/2020):  Impression:  1. No acute intra-abdominal abnormalities identified.   2. Colonic diverticulosis without convincing CT evidence of acute diverticulitis.   3. Postsurgical changes and additional findings as detailed above.       Assessment:   86 year old female with a history of HTN, DM, atrial fibrillation, CHF, CVA, CKD and GERD  presenting with rectal bleeding and post-hemorrhagic anemia.  Suspect diverticular in origin.  Appears to have tapered.  Transfused 1 unit PRBCs.  H&H stable but still low.  VSS.  Had colon in 2019 with severe pandiverticulosis and 10mm AC polyp.  C-diff PCR positive.     Plan:   1.  Agree with plans for transfusion 1 additional unit PRBCs.  2.  Continue metronidazole for C-diff treatment.  3.  Will sign off.  Please call with any recurrent bleeding.       Swapna Rasmussen PA-C  Gastroenterology  [facility]

## 2020-07-31 NOTE — PT/OT/SLP EVAL
"Physical Therapy Evaluation and Discharge Note    Patient Name:  Joyce Riley   MRN:  [id number]    Recommendations:     Discharge Recommendations:  nursing facility, basic   Discharge Equipment Recommendations: none   Barriers to discharge: None    Assessment:     Joyce Riley is a 86 y.o. female admitted with a medical diagnosis of Hematochezia. .  At this time, patient is functioning at their prior level of function and does not require further acute PT services.     Recent Surgery: * No surgery found *      Plan:     During this hospitalization, patient does not require further acute PT services.  Please re-consult if situation changes.  Recommend Moist heat to Cervical area    Subjective     Chief Complaint: L sided neck pain  Patient/Family Comments/goals: for somebody to rub "some ointment" on her neck like they do at the nursing home  Pain/Comfort:  Pain Rating 1: (Pt crying out in pain when L levator scap palpated)  Pain Addressed 1: Reposition, Distraction, Cessation of Activity, Nurse notified    Patients cultural, spiritual, Jainism conflicts given the current situation: no    Living Environment:  Pt is a nursing home NH resident  Prior to admission, patients level of function was Depenent with dillon lift for Bed<>W/C t/f's.  Equipment used at home: wheelchair, lift device.  DME owned (not currently used): none.  Upon discharge, patient will have assistance from NH staff.    Objective:     Communicated with nsg prior to session.  Patient found HOB elevated with bed alarm, peripheral IV upon PT entry to room.    General Precautions: Standard, fall, special contact(C-diff)   Orthopedic Precautions:N/A   Braces: N/A     Exams:  · Cognitive Exam:  Patient is oriented to Person and Place  · Gross Motor Coordination:  impairred 2/2 weakness adn deconditioning  · Postural Exam:  Patient presented with the following abnormalities:    · -       Rounded shoulders  · -       Forward " head  · kyphosis  · Slight R cervical SB  · Sensation:    · -       Intact  light/touch L LE  · -       Impaired  light/touch R LE  · Skin Integrity/Edema:      · -       Skin integrity: Visible skin intact  · RLE ROM: PROM WFL's  · RLE Strength: Dflx 0/5  · LLE ROM: PROM WFL's  · LLE Strength: Dflx 2+/5    Functional Mobility:  · Bed Mobility:     · Scooting: dependence and of 2 persons  · SBA from HOB approx 45* into longsit  · Balance: Fair+ longsitting in bed    AM-PAC 6 CLICK MOBILITY  Total Score:10       Therapeutic Activities and Exercises:   Cervical AROM max decreased in all ranges. Palpable muscle spasm in L levator scapulae.  Pt received STM/MFR to same.    AM-PAC 6 CLICK MOBILITY  Total Score:10     Patient left with heels floated and cervical pillow in place with all lines intact, call button in reach, bed alarm on and nsg notified.    GOALS:   Multidisciplinary Problems     Physical Therapy Goals        Problem: Physical Therapy Goal    Goal Priority Disciplines Outcome Goal Variances Interventions   Physical Therapy Goal     PT, PT/OT Adequate for Care Transition                     History:     Past Medical History:   Diagnosis Date    Anemia     Anticoagulant long-term use     Arthritis     Atrial fibrillation     Cataract     CHF (congestive heart failure)     CKD (chronic kidney disease), stage V     Diabetes mellitus     Type 2    GERD (gastroesophageal reflux disease)     GI bleed 2018    Gout     Hypertension     Obese     Requires assistance with all daily activities     Stroke 2014    Wheelchair dependent        Past Surgical History:   Procedure Laterality Date     SECTION      Patient unsure, believe she had 3-4    CHOLECYSTECTOMY      EYE SURGERY      HYSTERECTOMY      JOINT REPLACEMENT Right     knee    TOTAL KNEE ARTHROPLASTY Right        Time Tracking:     PT Received On: 20  PT Start Time: 1024     PT Stop Time: 1042  PT Total Time (min): 18 min      Billable Minutes: Evaluation 10 Manual therapy 8    Anastacia Clemente, PT  07/31/2020

## 2020-07-31 NOTE — CONSULTS
[facility]  Nephrology  Consult Note    Patient Name: Joyce Riley  MRN: [id number]  Admission Date: 7/28/2020  Hospital Length of Stay: 3 days  Attending Provider: Balwinder Restrepo MD   Primary Care Physician: Kalpana Staton MD  Principal Problem:Hematochezia   Date of service 7/31/2020    Inpatient consult to Nephrology  Consult performed by: ADA Flanagan  Consult ordered by: Jaylan Doran MD  Reason for consult: ROXANNE        Subjective:     HPI: Chart and outside records reviewed including prior progress notes, radiologic imaging, and lab data from this admit and summarized as follows:   Case discussed with Dr Baca.    Pt is poor historian, information obtained from chart review and patient interview.     Ms. Riley is a 86yr old AA female w/ PMH of CKD V, dementia, CHF, DM II, HTN, Gout, obesity, Afib, diverticulitis, arthritis, anemia, and hx of stroke, who presents to [facility] from [facility] with chief complaint of dark red hematochezia x 2 days, associated with diarrhea, no alleviating or aggravating factors. She reported occasional rectal bleeding w/ diarrhea for the past few months w/ abdominal cramping that is worse with food. Chronic weakness and fatigue with no worsening. She has Hx of diverticulitis, but CT abd w/o evidence this admit. She was found to be Cdiff + and started on Flagyl + oral vanc. She was significantly anemia w/ Hg 7.8 down to 6.7 for which she has received 3 units PRBC since admit. She also reports chronic neck pain x few months. Nephrology was consulted for ROXANNE. Pt sees Dr. Baca for outpatient Nephrology, she recently had a telemedicine visit 1 week ago and was started on PO Magnesium supplements. Her baseline sCr 2.3-3.2 (GFR 15-21%). She has hx of AKIs w/ hyperkalemia which she required iHD short term. Suspect ROXANNE mulitfactorial 2/2 dehydration/diarrhea + anemia. She has received 3 units PRBC thus far w/ improvement in Hgb. She is  asymptomatic. Chronic confusion 2/2 dementia. No asterixus. Checking UA, Ucx Ueos, Ucr, Yasemin, Uurea, CPK and Uric acid. Straight cath to get clean sample of urine. Recommend purewick device for strict I&O. Daily weights. Labs BID. No urgent indication for HD at this time. Repeat labs this PM. Will hold lasix for now. Can give lasix PRN if resp status worsens. Will consider IVF if poor Po intake. Added renal restrictions to diet. Noted pt is now DNR w/ reports of not wanting dialysis per Palliative care note. If/ when she needs dialysis, will have lengthy discussion with patient and family prior to decision for or against HD. She would be suboptimal dialysis candidate 2/2 underlying dementia.         Past Medical History:   Diagnosis Date    Anemia     Anticoagulant long-term use     Arthritis     Atrial fibrillation     Cataract     CHF (congestive heart failure)     CKD (chronic kidney disease), stage V     Diabetes mellitus     Type 2    GERD (gastroesophageal reflux disease)     GI bleed 2018    Gout     Hypertension     Obese     Requires assistance with all daily activities     Stroke     Wheelchair dependent        Past Surgical History:   Procedure Laterality Date     SECTION      Patient unsure, believe she had 3-4    CHOLECYSTECTOMY      EYE SURGERY      HYSTERECTOMY      JOINT REPLACEMENT Right     knee    TOTAL KNEE ARTHROPLASTY Right        Review of patient's allergies indicates:   Allergen Reactions    Indomethacin     Nsaids (non-steroidal anti-inflammatory drug)      Current Facility-Administered Medications   Medication Frequency    0.9%  NaCl infusion (for blood administration) Q24H PRN    0.9%  NaCl infusion (for blood administration) Q24H PRN    acetaminophen tablet 500 mg Q6H PRN    calcitRIOL capsule 0.25 mcg Every other day    carvediloL tablet 12.5 mg BID    cloNIDine tablet 0.1 mg TID PRN    dextrose 50% injection 12.5 g PRN    dextrose 50%  injection 25 g PRN    ergocalciferol capsule 50,000 Units Q7 Days    famotidine tablet 20 mg Daily    furosemide tablet 40 mg Daily    glucagon (human recombinant) injection 1 mg PRN    glucose chewable tablet 16 g PRN    glucose chewable tablet 24 g PRN    insulin aspart U-100 pen 0-5 Units PRN    insulin aspart U-100 pen 2 Units TIDWM    insulin detemir U-100 pen 5 Units QHS    lactobacillus acidophilus & bulgar 100 million cell packet 1 each BID    lidocaine 5 % patch 1 patch Q24H    melatonin tablet 9 mg Nightly PRN    methyl salicylate-menthol 15-10% cream BID    methyl salicylate-menthol 15-10% cream BID PRN    metroNIDAZOLE tablet 500 mg Q8H    NIFEdipine 24 hr tablet 30 mg Daily    ondansetron injection 8 mg Q8H PRN    prochlorperazine injection Soln 5 mg Q6H PRN    sodium bicarbonate tablet 1,300 mg BID    traMADoL tablet 50 mg Q8H PRN     Family History     Problem Relation (Age of Onset)    Cancer Mother, Brother    Diabetes Mother    Hypertension Mother        Tobacco Use    Smoking status: Never Smoker    Smokeless tobacco: Never Used   Substance and Sexual Activity    Alcohol use: No    Drug use: No    Sexual activity: Never     Review of Systems   Constitutional: Negative for activity change, appetite change and fever.   HENT: Negative.    Eyes: Negative for visual disturbance.   Respiratory: Negative for cough, shortness of breath and wheezing.    Cardiovascular: Positive for leg swelling. Negative for chest pain and palpitations.   Gastrointestinal: Positive for blood in stool and diarrhea. Negative for abdominal distention, abdominal pain, constipation, nausea and vomiting.   Genitourinary: Negative for decreased urine volume, difficulty urinating, dysuria, flank pain, frequency and hematuria.   Skin: Negative for rash and wound.   Neurological: Positive for weakness (chronic ). Negative for tremors and headaches.   Psychiatric/Behavioral: Positive for confusion.      Objective:     Vital Signs (Most Recent):  Temp: 97.7 °F (36.5 °C) (07/31/20 1428)  Pulse: 73 (07/31/20 1428)  Resp: 20 (07/31/20 1428)  BP: (!) 164/72 (07/31/20 1428)  SpO2: 100 % (07/31/20 1428)  O2 Device (Oxygen Therapy): room air (07/31/20 1152) Vital Signs (24h Range):  Temp:  [97.5 °F (36.4 °C)-98.6 °F (37 °C)] 97.7 °F (36.5 °C)  Pulse:  [69-80] 73  Resp:  [17-20] 20  SpO2:  [96 %-100 %] 100 %  BP: (132-179)/(63-77) 164/72     Weight: 67.8 kg (149 lb 8.5 oz) (07/29/20 0008)  Body mass index is 24.13 kg/m².  Body surface area is 1.78 meters squared.    I/O last 3 completed shifts:  In: 1056.7 [P.O.:600; Blood:456.7]  Out: -     Physical Exam  Vitals signs and nursing note reviewed.   Constitutional:       General: She is not in acute distress.     Appearance: She is well-developed.   HENT:      Head: Normocephalic and atraumatic.      Mouth/Throat:      Mouth: Mucous membranes are moist.      Pharynx: Oropharynx is clear.   Eyes:      General: No scleral icterus.        Right eye: No discharge.         Left eye: No discharge.   Cardiovascular:      Rate and Rhythm: Normal rate and regular rhythm.      Heart sounds: Normal heart sounds. No murmur.   Pulmonary:      Effort: Pulmonary effort is normal.      Breath sounds: Normal breath sounds. No wheezing or rales.   Abdominal:      General: There is no distension.      Palpations: Abdomen is soft.   Musculoskeletal:         General: No deformity.      Right lower leg: Edema (+1 edema) present.      Left lower leg: Edema (+1 edema) present.   Skin:     General: Skin is warm and dry.      Findings: No rash.   Neurological:      Mental Status: She is alert.      Comments: Oriented to person and place only  Unsure why she came to hospital   Psychiatric:         Mood and Affect: Mood normal.         Behavior: Behavior normal.         Significant Labs:  ABGs: No results for input(s): PH, PCO2, HCO3, POCSATURATED, BE in the last 168 hours.  CBC:   Recent Labs   Lab  07/31/20 0357   WBC 15.92*   RBC 2.32*   HGB 7.0*   HCT 22.5*      MCV 97   MCH 30.2   MCHC 31.1*     CMP:   Recent Labs   Lab 07/31/20 0356   GLU 93   CALCIUM 8.3*   ALBUMIN 2.3*   PROT 5.7*      K 3.9   CO2 23      BUN 68*   CREATININE 5.5*   ALKPHOS 74   ALT 8*   AST 10   BILITOT 0.4     Microbiology Results (last 7 days)     Procedure Component Value Units Date/Time    Stool culture **CANNOT BE ORDERED STAT** [[id number]] Collected: 07/28/20 1850    Order Status: Completed Specimen: Stool Updated: 07/31/20 0726     Stool Culture No Salmonella,Shigella,Vibrio,Campylobacter,Yersinia isolated.    Blood Culture #1 **CANNOT BE ORDERED STAT** [[id number]] Collected: 07/28/20 1946    Order Status: Completed Specimen: Blood from Peripheral, Hand, Right Updated: 07/30/20 2303     Blood Culture, Routine No Growth to date      No Growth to date      No Growth to date    Blood Culture #2 **CANNOT BE ORDERED STAT** [[id number]] Collected: 07/28/20 1955    Order Status: Completed Specimen: Blood from Peripheral, Antecubital, Left Updated: 07/30/20 2303     Blood Culture, Routine No Growth to date      No Growth to date      No Growth to date    C Diff Toxin by PCR [[id number]]  (Abnormal) Collected: 07/28/20 1850    Order Status: Completed Updated: 07/30/20 1408     C. diff PCR Positive    Clostridium difficile EIA [[id number]]  (Abnormal) Collected: 07/28/20 1850    Order Status: Completed Specimen: Stool Updated: 07/29/20 1221     C. diff Antigen Positive     Comment: results called to Becky Borne 07/29/2020  12:20        C difficile Toxins A+B, EIA Negative     Comment: Testing not recommended for children <24 months old.       E. coli 0157 antigen [[id number]] Collected: 07/28/20 1850    Order Status: Completed Specimen: Stool Updated: 07/29/20 1056     Shiga Toxin 1 E.coli Negative     Shiga Toxin 2 E.coli Negative        PTH: No results for input(s): PTH in the last 168 hours.  Recent Labs   Lab  07/31/20  1453   COLORU Yellow   SPECGRAV 1.010   PHUR 5.0   PROTEINUA 2+*   BACTERIA Moderate*   NITRITE Negative   LEUKOCYTESUR Trace*   UROBILINOGEN Negative   HYALINECASTS 0     All labs within the past 24 hours have been reviewed.    Significant Imaging:  Labs: Reviewed  X-Ray: Reviewed  CT: Reviewed    Assessment/Plan:     Active Diagnoses:    Diagnosis Date Noted POA    PRINCIPAL PROBLEM:  Hematochezia [K92.1] 03/15/2020 Yes    Goals of care, counseling/discussion [Z71.89] 07/31/2020 Not Applicable    Clostridium difficile infection [A49.8] 07/31/2020 Yes    Neck pain, chronic [M54.2, G89.29] 07/30/2020 Yes    Leukocytosis [D72.829] 07/29/2020 Yes    History of stroke [Z86.73] 07/28/2020 Not Applicable     Chronic    Acute blood loss anemia [D62] 07/19/2019 Unknown    Renal hypertension [I12.9] 04/10/2019 Yes     Chronic    Chronic diastolic heart failure [I50.32] 04/10/2019 Yes     Chronic    Paroxysmal atrial fibrillation [I48.0] 04/10/2019 Yes     Chronic    CKD stage 5 [N18.5] 04/01/2019 Yes     Chronic    Anemia of renal disease [N18.9, D63.1] 04/01/2019 Yes     Chronic    Type 2 diabetes mellitus, controlled, 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] 03/31/2019 Yes     Chronic      Problems Resolved During this Admission:       ROXANNE on CKD 4-5  - Baseline Cr 2.3-3.2, GFR 15-21%  - Last ROXANNE w/ peak Cr 8.16 w/ K 5.7 on 4/16/20; sCr 4 on this admit  - suspect multifactorial ROXANNE 2/2 dehydration/diarrhea + anemia (hgb 6.7)/GI bleed  - sCr and BUN trending up since admit  - no urgent indication for HD at this time  - Avoid NSAIDs, renally dose medications, strict I&O, daily weights  - CT abd 7/28 w/o evidence of hydronephrosis, nephrolithiasis, or bladder retention; + bilat renal cysts  - will check UA, Ucx, Ueos, Yasemin, Ucr, Uurea, CPK, and Uric acid  - pt has received 3 units PRBC since admit  - Diarrhea improving, tolerating some PO intake  - Continue to monitor BMP BID - PM labs pending   - **Noted pt  is now DNR w/ reports of not wanting dialysis per Palliative care note. If/ when she needs dialysis, will have lengthy discussion with patient and family prior to decision for or against HD. She would be suboptimal dialysis candidate 2/2 underlying dementia  - will hold lasix - can give PRN if resp status declines  - recommend keeping blood pressure within normal range     HTN  - uncontrolled  -  low Na diet  - continue coreg + nifedipine; will hold further lasix for now  - recommend uptitration nifedipine to 60mg if HTN persistent  - s/p 3 units PRBC since admit  - continue PRN clonidine for SBP>160     AOCKD, DEYVI, blood loss anemia   - Hg not at goal; Hg 7 today, as low as 6.7 during this admit  - 2/2 GI bleeding; s/p 3 units PRBC since admit  - iron studies 7/16/20: Fe 33, ferritin 822.2, TIBC 241; iron studies 7/28 w/ Fe 18%, ferritin 729  - Did not tolerate PO Iron 2/2 constipation  - transfuse if Hgb < 7  - s/p IV iron load outpatient, no IV iron w/ CDIFF infection  - consider EPO if HTN controlled   - monitor CBC     Vit D def, MBD  - Vit D level 18> 30.2 > 27, trending down;  >215, trending down  - currently on ergo protocol  - continue to monitor PTH and vit d  - continue Calcitriol 0.25mcg QOD     Hyperphosphatemia  - phos trending up, Phos 4.2>5.1>>5.6  - added phos restrictions to diet   - monitor with calcitriol  - monitor labs      Hypoalbuminemia  - alb below goal  - renal diet restrictions       CDIFF + on flagyl + PO vanc    Hx of Hyperuricemia - uric acid 7.7 in past, rechecking with next labs; avoid allopurinol    Acidosis - controlled on sodium bicarb 1300mg BID - continue     Proteinuria - UP/C 4.6g in past, avoid AC, caution with ACEi/ARB initiation 2/2 propensity for hyperkalemia & poor renal reserves; pending Up/c     Hx of hypomagnesemia -was recently started on Mg oxide 400mg Po QD - hold Mg for now     Arthritis  Diabetes   DHF  AFib  HLD  Pulmonary HTN  DHF    Thank you for your  consult. I will follow-up with patient. Please contact us if you have any additional questions.    Lashanda Waters, LEA-C  Nephrology  [facility], [facility]  Office [phone]

## 2020-07-31 NOTE — PROGRESS NOTES
Pharmacist Renal Dose Adjustment Note    Joyce Riley is a 86 y.o. female being treated with the medication famotidine    Patient Data:    Vital Signs (Most Recent):  Temp: 98.6 °F (37 °C) (07/31/20 0723)  Pulse: 75 (07/31/20 0723)  Resp: 19 (07/31/20 0723)  BP: (!) 160/70 (07/31/20 0723)  SpO2: 100 % (07/31/20 0723)   Vital Signs (72h Range):  Temp:  [97.5 °F (36.4 °C)-99.2 °F (37.3 °C)]   Pulse:  []   Resp:  [16-20]   BP: (109-198)/(55-88)   SpO2:  [93 %-100 %]      Recent Labs   Lab 07/28/20  1835 07/29/20  0437 07/31/20  0356   CREATININE 4.0* 4.2* 5.5*     Serum creatinine: 5.5 mg/dL (H) 07/31/20 0356  Estimated creatinine clearance: 6.9 mL/min (A)    Medication:famotidine dose: 20 mg frequency q12h will be changed to medication:famotidine dose:20 mg frequency:q24h    Pharmacist's Name: Shamar Lindsay  Pharmacist's Extension: [phone]

## 2020-08-01 LAB
ALBUMIN SERPL BCP-MCNC: 2.5 G/DL (ref 3.5–5.2)
ALP SERPL-CCNC: 96 U/L (ref 55–135)
ALT SERPL W/O P-5'-P-CCNC: 8 U/L (ref 10–44)
ANION GAP SERPL CALC-SCNC: 12 MMOL/L (ref 8–16)
AST SERPL-CCNC: 15 U/L (ref 10–40)
BASOPHILS # BLD AUTO: 0.08 K/UL (ref 0–0.2)
BASOPHILS NFR BLD: 0.6 % (ref 0–1.9)
BILIRUB SERPL-MCNC: 0.3 MG/DL (ref 0.1–1)
BUN SERPL-MCNC: 68 MG/DL (ref 8–23)
CALCIUM SERPL-MCNC: 8.7 MG/DL (ref 8.7–10.5)
CHLORIDE SERPL-SCNC: 103 MMOL/L (ref 95–110)
CO2 SERPL-SCNC: 23 MMOL/L (ref 23–29)
CREAT SERPL-MCNC: 5.4 MG/DL (ref 0.5–1.4)
DIFFERENTIAL METHOD: ABNORMAL
EOSINOPHIL # BLD AUTO: 0.2 K/UL (ref 0–0.5)
EOSINOPHIL NFR BLD: 1.3 % (ref 0–8)
ERYTHROCYTE [DISTWIDTH] IN BLOOD BY AUTOMATED COUNT: 15.2 % (ref 11.5–14.5)
EST. GFR  (AFRICAN AMERICAN): 8 ML/MIN/1.73 M^2
EST. GFR  (NON AFRICAN AMERICAN): 7 ML/MIN/1.73 M^2
GLUCOSE SERPL-MCNC: 56 MG/DL (ref 70–110)
HCT VFR BLD AUTO: 27.4 % (ref 37–48.5)
HGB BLD-MCNC: 8.6 G/DL (ref 12–16)
IMM GRANULOCYTES # BLD AUTO: 0.38 K/UL (ref 0–0.04)
IMM GRANULOCYTES NFR BLD AUTO: 3 % (ref 0–0.5)
LYMPHOCYTES # BLD AUTO: 1.6 K/UL (ref 1–4.8)
LYMPHOCYTES NFR BLD: 12.2 % (ref 18–48)
MCH RBC QN AUTO: 29.8 PG (ref 27–31)
MCHC RBC AUTO-ENTMCNC: 31.4 G/DL (ref 32–36)
MCV RBC AUTO: 95 FL (ref 82–98)
MONOCYTES # BLD AUTO: 0.6 K/UL (ref 0.3–1)
MONOCYTES NFR BLD: 4.3 % (ref 4–15)
NEUTROPHILS # BLD AUTO: 10 K/UL (ref 1.8–7.7)
NEUTROPHILS NFR BLD: 78.6 % (ref 38–73)
NRBC BLD-RTO: 0 /100 WBC
PHOSPHATE SERPL-MCNC: 6.8 MG/DL (ref 2.7–4.5)
PLATELET # BLD AUTO: 208 K/UL (ref 150–350)
PMV BLD AUTO: 10 FL (ref 9.2–12.9)
POCT GLUCOSE: 122 MG/DL (ref 70–110)
POCT GLUCOSE: 77 MG/DL (ref 70–110)
POCT GLUCOSE: 82 MG/DL (ref 70–110)
POCT GLUCOSE: 82 MG/DL (ref 70–110)
POTASSIUM SERPL-SCNC: 4.1 MMOL/L (ref 3.5–5.1)
PROT SERPL-MCNC: 6.2 G/DL (ref 6–8.4)
RBC # BLD AUTO: 2.89 M/UL (ref 4–5.4)
SODIUM SERPL-SCNC: 138 MMOL/L (ref 136–145)
UUN UR-MCNC: 214 MG/DL (ref 140–1050)
WBC # BLD AUTO: 12.69 K/UL (ref 3.9–12.7)

## 2020-08-01 PROCEDURE — 25000003 PHARM REV CODE 250: Performed by: INTERNAL MEDICINE

## 2020-08-01 PROCEDURE — 11000001 HC ACUTE MED/SURG PRIVATE ROOM

## 2020-08-01 PROCEDURE — 63600175 PHARM REV CODE 636 W HCPCS: Performed by: PHYSICIAN ASSISTANT

## 2020-08-01 PROCEDURE — 84100 ASSAY OF PHOSPHORUS: CPT

## 2020-08-01 PROCEDURE — 25000003 PHARM REV CODE 250: Performed by: HOSPITALIST

## 2020-08-01 PROCEDURE — 25000003 PHARM REV CODE 250: Performed by: PHYSICIAN ASSISTANT

## 2020-08-01 PROCEDURE — 36415 COLL VENOUS BLD VENIPUNCTURE: CPT

## 2020-08-01 PROCEDURE — 63600175 PHARM REV CODE 636 W HCPCS: Performed by: INTERNAL MEDICINE

## 2020-08-01 PROCEDURE — 94761 N-INVAS EAR/PLS OXIMETRY MLT: CPT

## 2020-08-01 PROCEDURE — 85025 COMPLETE CBC W/AUTO DIFF WBC: CPT

## 2020-08-01 PROCEDURE — 80053 COMPREHEN METABOLIC PANEL: CPT

## 2020-08-01 PROCEDURE — C9399 UNCLASSIFIED DRUGS OR BIOLOG: HCPCS | Performed by: INTERNAL MEDICINE

## 2020-08-01 RX ORDER — HYDRALAZINE HYDROCHLORIDE 25 MG/1
25 TABLET, FILM COATED ORAL EVERY 8 HOURS
Status: DISCONTINUED | OUTPATIENT
Start: 2020-08-01 | End: 2020-08-01

## 2020-08-01 RX ORDER — SEVELAMER CARBONATE 800 MG/1
800 TABLET, FILM COATED ORAL
Status: DISCONTINUED | OUTPATIENT
Start: 2020-08-01 | End: 2020-08-03

## 2020-08-01 RX ORDER — HYDRALAZINE HYDROCHLORIDE 25 MG/1
25 TABLET, FILM COATED ORAL EVERY 8 HOURS
Status: DISCONTINUED | OUTPATIENT
Start: 2020-08-01 | End: 2020-08-04 | Stop reason: HOSPADM

## 2020-08-01 RX ORDER — OXYCODONE AND ACETAMINOPHEN 5; 325 MG/1; MG/1
1 TABLET ORAL EVERY 4 HOURS PRN
Status: DISCONTINUED | OUTPATIENT
Start: 2020-08-01 | End: 2020-08-04

## 2020-08-01 RX ORDER — MAGNESIUM SULFATE 1 G/100ML
1 INJECTION INTRAVENOUS ONCE
Status: COMPLETED | OUTPATIENT
Start: 2020-08-01 | End: 2020-08-01

## 2020-08-01 RX ADMIN — OXYCODONE HYDROCHLORIDE AND ACETAMINOPHEN 1 TABLET: 5; 325 TABLET ORAL at 04:08

## 2020-08-01 RX ADMIN — HYDRALAZINE HYDROCHLORIDE 25 MG: 25 TABLET, FILM COATED ORAL at 11:08

## 2020-08-01 RX ADMIN — METRONIDAZOLE 500 MG: 500 TABLET ORAL at 09:08

## 2020-08-01 RX ADMIN — OXYCODONE HYDROCHLORIDE AND ACETAMINOPHEN 1 TABLET: 5; 325 TABLET ORAL at 11:08

## 2020-08-01 RX ADMIN — SODIUM BICARBONATE TAB 325 MG 1300 MG: 325 TAB at 09:08

## 2020-08-01 RX ADMIN — LIDOCAINE 1 PATCH: 50 PATCH TOPICAL at 02:08

## 2020-08-01 RX ADMIN — INSULIN ASPART 2 UNITS: 100 INJECTION, SOLUTION INTRAVENOUS; SUBCUTANEOUS at 11:08

## 2020-08-01 RX ADMIN — SODIUM BICARBONATE TAB 325 MG 1300 MG: 325 TAB at 08:08

## 2020-08-01 RX ADMIN — SEVELAMER CARBONATE 800 MG: 800 TABLET, FILM COATED ORAL at 04:08

## 2020-08-01 RX ADMIN — CARVEDILOL 12.5 MG: 12.5 TABLET, FILM COATED ORAL at 09:08

## 2020-08-01 RX ADMIN — INSULIN ASPART 2 UNITS: 100 INJECTION, SOLUTION INTRAVENOUS; SUBCUTANEOUS at 08:08

## 2020-08-01 RX ADMIN — FAMOTIDINE 20 MG: 20 TABLET ORAL at 08:08

## 2020-08-01 RX ADMIN — LACTOBACILLUS ACIDOPHILUS / LACTOBACILLUS BULGARICUS 1 EACH: 100 MILLION CFU STRENGTH GRANULES at 08:08

## 2020-08-01 RX ADMIN — TRAMADOL HYDROCHLORIDE 50 MG: 50 TABLET, FILM COATED ORAL at 05:08

## 2020-08-01 RX ADMIN — INSULIN ASPART 2 UNITS: 100 INJECTION, SOLUTION INTRAVENOUS; SUBCUTANEOUS at 04:08

## 2020-08-01 RX ADMIN — MAGNESIUM SULFATE 1 G: 1 INJECTION INTRAVENOUS at 11:08

## 2020-08-01 RX ADMIN — SEVELAMER CARBONATE 800 MG: 800 TABLET, FILM COATED ORAL at 12:08

## 2020-08-01 RX ADMIN — MENTHOL, METHYL SALICYLATE: 10; 15 CREAM TOPICAL at 09:08

## 2020-08-01 RX ADMIN — HYDRALAZINE HYDROCHLORIDE 25 MG: 25 TABLET, FILM COATED ORAL at 09:08

## 2020-08-01 RX ADMIN — LACTOBACILLUS ACIDOPHILUS / LACTOBACILLUS BULGARICUS 1 EACH: 100 MILLION CFU STRENGTH GRANULES at 09:08

## 2020-08-01 RX ADMIN — ACETAMINOPHEN 500 MG: 325 TABLET ORAL at 09:08

## 2020-08-01 RX ADMIN — Medication 9 MG: at 09:08

## 2020-08-01 RX ADMIN — EPOETIN ALFA-EPBX 20000 UNITS: 10000 INJECTION, SOLUTION INTRAVENOUS; SUBCUTANEOUS at 09:08

## 2020-08-01 RX ADMIN — CARVEDILOL 12.5 MG: 12.5 TABLET, FILM COATED ORAL at 08:08

## 2020-08-01 RX ADMIN — METRONIDAZOLE 500 MG: 500 TABLET ORAL at 02:08

## 2020-08-01 RX ADMIN — NIFEDIPINE 30 MG: 30 TABLET, FILM COATED, EXTENDED RELEASE ORAL at 08:08

## 2020-08-01 RX ADMIN — INSULIN DETEMIR 5 UNITS: 100 INJECTION, SOLUTION SUBCUTANEOUS at 09:08

## 2020-08-01 RX ADMIN — METRONIDAZOLE 500 MG: 500 TABLET ORAL at 05:08

## 2020-08-01 NOTE — PROGRESS NOTES
[facility]  Progress Note    Patient Name: Joyce Riley  MRN: [id number]  Patient Class: IP- Inpatient   Admission Date: 7/28/2020  Length of Stay: 4 days  Attending Physician: Shahab Chinchilla MD  Primary Care Provider: Kalpana Staton MD        Subjective:     Principal Problem:Hematochezia        HPI:  Mrs. Joyce Riley is a 86 y.o. female [facility] resident known to me with renal hypertension, type 2 diabetes mellitus (HbA1c 5.1% Mar 2020), chronic diastolic heart failure (LVEF 80% Apr 2019), CKD stage 5, paroxysmal atrial fibrillation (FEP2DB0-CBTz score 5) not on chronic anticoagulation, anemia of renal disease, and history of stroke who presents to [facility] ED with complaints of hematochezia today.  She says that she has occasional rectal bleeding for the past few months as well as chronic lower abdominal cramping worse on eating.  Today she noted blood in her stool which he described as dark red.  She denies any nausea, vomiting, hematemesis, coffee-ground emesis, melena, nor any hematuria.  She has been generally weak and fatigued but that has not been any worse than usual.  She denies any chest pains, shortness of breath, lightheadedness, dizziness, falls, nor any loss of consciousness.    Overview/Hospital Course:  85 y/o female from NH admitted with hematochezia.  Anemia of CKD, but Hemoglobin lower than baseline at 7.8.  Transfused one unit of blood with adequate correction of H/H.  GI consulted.  Recent hx of diverticulitis, but no evidence of this time on CT.  Noted leukocytosis on labs.  Daughter stating patient has been having on and off diarrhea for past month.  Cdiff Ag positive, but toxin negative.  Started on oral Flagyl pending PCR which returned positive and started on oral Vanc. Has required 3U RBC at this time to maintain Hb > 7. Renal failure worsening and not HD candidate due to previous intolerance and now electing to not  pursue this. Nephrology consulted.    Interval History: just complains of neck pain (not new).    Review of Systems   Constitutional: Negative for activity change.   HENT: Negative for congestion.    Respiratory: Negative for chest tightness.    Cardiovascular: Negative for chest pain.   Gastrointestinal: Negative for abdominal distention.   Genitourinary: Negative for difficulty urinating.   Musculoskeletal: Positive for arthralgias and neck pain.   Psychiatric/Behavioral: Negative for agitation and behavioral problems.     Objective:     Vital Signs (Most Recent):  Temp: 97.2 °F (36.2 °C) (08/01/20 0738)  Pulse: 69 (08/01/20 0738)  Resp: 20 (08/01/20 0738)  BP: (!) 182/74 (08/01/20 0738)  SpO2: 97 % (08/01/20 0738) Vital Signs (24h Range):  Temp:  [97.2 °F (36.2 °C)-98.6 °F (37 °C)] 97.2 °F (36.2 °C)  Pulse:  [68-78] 69  Resp:  [18-20] 20  SpO2:  [97 %-100 %] 97 %  BP: (157-187)/(67-78) 182/74     Weight: 67.8 kg (149 lb 8.5 oz)  Body mass index is 24.13 kg/m².    Intake/Output Summary (Last 24 hours) at 8/1/2020 1019  Last data filed at 8/1/2020 0830  Gross per 24 hour   Intake 1140.33 ml   Output --   Net 1140.33 ml      Physical Exam  Vitals signs and nursing note reviewed.   Constitutional:       General: She is not in acute distress.     Appearance: Normal appearance. She is normal weight. She is not ill-appearing, toxic-appearing or diaphoretic.   HENT:      Head: Normocephalic and atraumatic.   Pulmonary:      Effort: No respiratory distress.   Neurological:      Mental Status: She is alert and oriented to person, place, and time.   Psychiatric:         Mood and Affect: Mood normal.         Behavior: Behavior normal.         Significant Labs:   BMP:   Recent Labs   Lab 07/31/20  1637 08/01/20  0433   GLU 75 56*    138   K 4.0 4.1    103   CO2 25 23   BUN 68* 68*   CREATININE 5.4* 5.4*   CALCIUM 8.5* 8.7   MG 1.6  --      CBC:   Recent Labs   Lab 07/31/20  0357 08/01/20  0433   WBC 15.92* 12.69    HGB 7.0* 8.6*   HCT 22.5* 27.4*    208       Significant Imaging:      Assessment/Plan:      * Hematochezia  Patient had a couple similar episodes earlier this year and was found to have diverticulitis.   No evidence of diverticulitis on CT.  Anemia of CKD with hemoglobin lower than baseline.  Anemia of CKD with anemia of acute blood loss secondary to hematochezia.  Probable diverticular bleed.  Transfused one unit of blood with adequate correction of H/H.  Appreciate GI input.  Conservative management at this time.    Transfuse another unit today, monitor    Acute blood loss anemia  Give another 1U RBC, due to rectal bleeding   H/H now stable.       Clostridium difficile infection  Confirmed on PCR, continue flagyl, improving WBC and diarrhea slowing down.      Neck pain, chronic  -will get xray to evaluate, I suspect positional       Leukocytosis  Improving, most likely Cdiff      History of stroke  Stable; there are no acute issues.    Paroxysmal atrial fibrillation  Stable and currently in sinus rhythm.  She is no longer on apixaban due to frequent rectal bleeding.  Will continue to monitor.    Chronic diastolic heart failure  Stable without evidence of acute heart failure; will continue to monitor.    Renal hypertension  Patient's blood pressure is better-controlled; will continue home regimen of carvedilol, furosemide, and nifedipine, and provide as-needed clonidine.    Anemia of renal disease  As addressed above.    CKD stage 5  Worsening status, previously did not tolerate dialysis and would not like it in the future. Nephrology consulted.  Palliative consult    Does not want HD     Type 2 diabetes mellitus, controlled, with renal complications  Well-controlled on a home regimen of basal insulin therapy; will provide basal-prandial insulin along with insulin sliding scale.    Debility-  PT/OT state recs: to NH.     VTE Risk Mitigation (From admission, onward)         Ordered     IP VTE HIGH RISK  PATIENT  Once      07/28/20 2323     Place sequential compression device  Until discontinued      07/28/20 2323     Place SANTOS hose  Until discontinued      07/28/20 2323                To NH likely on Monday if H/H stable.        Shahab Oscar MD  Department of Hospital Medicine   [facility]

## 2020-08-01 NOTE — PLAN OF CARE
08/01/20 1540   Medicare Message   Important Message from Medicare regarding Discharge Appeal Rights Given to patient/caregiver;Explained to patient/caregiver;Signed/date by patient/caregiver   Date IMM was signed 08/01/20   Time IMM was signed 1143     VIKTOR spoke to caregiver/daughter, Antonia #[phone], she provided SW permission to complete IMM. She also inquired telephone number for [employer] for possible appeal if pt dc too soon. SW will continue to follow patient for resources, education, support and dc planning as appropriate

## 2020-08-01 NOTE — PROGRESS NOTES
Pt resting in bed eating breakfast, tolerated am scheduled meds, neck cream applied, POC discussed and all questions addressed, pt w/call light in reach and nadn

## 2020-08-01 NOTE — PLAN OF CARE
Remains DNR status; neck pain controlled by prn pain med and medicated ointment.  No c/o abdominal pain or bloody stools.  Repositioned for comfort.  Plan of care reviewed with pt; verbalized understanding.

## 2020-08-01 NOTE — PLAN OF CARE
Problem: Adult Inpatient Plan of Care  Goal: Plan of Care Review  Outcome: Ongoing, Progressing  Goal: Patient-Specific Goal (Individualization)  Outcome: Ongoing, Progressing  Goal: Absence of Hospital-Acquired Illness or Injury  Outcome: Ongoing, Progressing  Goal: Optimal Comfort and Wellbeing  Outcome: Ongoing, Progressing  Goal: Readiness for Transition of Care  Outcome: Ongoing, Progressing  Goal: Rounds/Family Conference  Outcome: Ongoing, Progressing     Problem: Fall Injury Risk  Goal: Absence of Fall and Fall-Related Injury  Outcome: Ongoing, Progressing     Problem: Diabetes Comorbidity  Goal: Blood Glucose Level Within Desired Range  Outcome: Ongoing, Progressing     Problem: Infection  Goal: Infection Symptom Resolution  Outcome: Ongoing, Progressing     Problem: Pain Chronic (Persistent)  Goal: Acceptable Pain Control and Functional Ability  Outcome: Ongoing, Progressing     Problem: Coping Ineffective  Goal: Effective Coping  Outcome: Ongoing, Progressing     Problem: Skin Injury Risk Increased  Goal: Skin Health and Integrity  Outcome: Ongoing, Progressing

## 2020-08-01 NOTE — SUBJECTIVE & OBJECTIVE
Interval History: just complains of neck pain (not new).    Review of Systems   Constitutional: Negative for activity change.   HENT: Negative for congestion.    Respiratory: Negative for chest tightness.    Cardiovascular: Negative for chest pain.   Gastrointestinal: Negative for abdominal distention.   Genitourinary: Negative for difficulty urinating.   Musculoskeletal: Positive for arthralgias and neck pain.   Psychiatric/Behavioral: Negative for agitation and behavioral problems.     Objective:     Vital Signs (Most Recent):  Temp: 97.2 °F (36.2 °C) (08/01/20 0738)  Pulse: 69 (08/01/20 0738)  Resp: 20 (08/01/20 0738)  BP: (!) 182/74 (08/01/20 0738)  SpO2: 97 % (08/01/20 0738) Vital Signs (24h Range):  Temp:  [97.2 °F (36.2 °C)-98.6 °F (37 °C)] 97.2 °F (36.2 °C)  Pulse:  [68-78] 69  Resp:  [18-20] 20  SpO2:  [97 %-100 %] 97 %  BP: (157-187)/(67-78) 182/74     Weight: 67.8 kg (149 lb 8.5 oz)  Body mass index is 24.13 kg/m².    Intake/Output Summary (Last 24 hours) at 8/1/2020 1019  Last data filed at 8/1/2020 0830  Gross per 24 hour   Intake 1140.33 ml   Output --   Net 1140.33 ml      Physical Exam  Vitals signs and nursing note reviewed.   Constitutional:       General: She is not in acute distress.     Appearance: Normal appearance. She is normal weight. She is not ill-appearing, toxic-appearing or diaphoretic.   HENT:      Head: Normocephalic and atraumatic.   Pulmonary:      Effort: No respiratory distress.   Neurological:      Mental Status: She is alert and oriented to person, place, and time.   Psychiatric:         Mood and Affect: Mood normal.         Behavior: Behavior normal.         Significant Labs:   BMP:   Recent Labs   Lab 07/31/20  1637 08/01/20  0433   GLU 75 56*    138   K 4.0 4.1    103   CO2 25 23   BUN 68* 68*   CREATININE 5.4* 5.4*   CALCIUM 8.5* 8.7   MG 1.6  --      CBC:   Recent Labs   Lab 07/31/20  0357 08/01/20  0433   WBC 15.92* 12.69   HGB 7.0* 8.6*   HCT 22.5* 27.4*   PLT  195 208       Significant Imaging:

## 2020-08-01 NOTE — ASSESSMENT & PLAN NOTE
Worsening status, previously did not tolerate dialysis and would not like it in the future. Nephrology consulted.  Palliative consult    Does not want HD

## 2020-08-01 NOTE — PROGRESS NOTES
[facility]  Nephrology  Consult Note    Patient Name: Joyce Riley  MRN: [id number]  Admission Date: 7/28/2020  Hospital Length of Stay: 4 days  Attending Provider: Shahab Chinchilla MD   Primary Care Physician: Kalpana Staton MD  Principal Problem:Hematochezia   Date of service 8/1/2020    Consults ROXANNE  Subjective:     Interval History: Yesterday, held lasix for worsening renal function. UOP 3x/ 24 hrs (AM only). Today Cr and BUN are stable. PO intake is okay, pt reports she didn't eat breakfast because the sausage was too hard and its difficult to eat. Reports good fluid intake. Denies further diarrhea. No SOB. D/c Monday if Hgb stable.     Current Facility-Administered Medications   Medication Frequency    0.9%  NaCl infusion (for blood administration) Q24H PRN    0.9%  NaCl infusion (for blood administration) Q24H PRN    acetaminophen tablet 500 mg Q6H PRN    calcitRIOL capsule 0.25 mcg Every other day    carvediloL tablet 12.5 mg BID    cloNIDine tablet 0.1 mg TID PRN    dextrose 50% injection 12.5 g PRN    dextrose 50% injection 25 g PRN    epoetin diana-epbx injection 20,000 Units Q7 Days    ergocalciferol capsule 50,000 Units Q7 Days    famotidine tablet 20 mg Daily    glucagon (human recombinant) injection 1 mg PRN    glucose chewable tablet 16 g PRN    glucose chewable tablet 24 g PRN    insulin aspart U-100 pen 0-5 Units PRN    insulin aspart U-100 pen 2 Units TIDWM    insulin detemir U-100 pen 5 Units QHS    lactobacillus acidophilus & bulgar 100 million cell packet 1 each BID    lidocaine 5 % patch 1 patch Q24H    magnesium sulfate in dextrose IVPB (premix) 1 g Once    melatonin tablet 9 mg Nightly PRN    methyl salicylate-menthol 15-10% cream BID    methyl salicylate-menthol 15-10% cream BID PRN    metroNIDAZOLE tablet 500 mg Q8H    NIFEdipine 24 hr tablet 30 mg Daily    ondansetron injection 8 mg Q8H PRN    prochlorperazine injection Soln 5 mg  Q6H PRN    sodium bicarbonate tablet 1,300 mg BID    traMADoL tablet 50 mg Q8H PRN     Objective:     Vital Signs (Most Recent):  Temp: 97.2 °F (36.2 °C) (08/01/20 0738)  Pulse: 69 (08/01/20 0738)  Resp: 20 (08/01/20 0738)  BP: (!) 182/74 (08/01/20 0738)  SpO2: 97 % (08/01/20 0738)  O2 Device (Oxygen Therapy): room air (07/1935) Vital Signs (24h Range):  Temp:  [97.2 °F (36.2 °C)-98.6 °F (37 °C)] 97.2 °F (36.2 °C)  Pulse:  [68-78] 69  Resp:  [18-20] 20  SpO2:  [97 %-100 %] 97 %  BP: (157-187)/(67-78) 182/74     Weight: 67.8 kg (149 lb 8.5 oz) (07/29/20 0008)  Body mass index is 24.13 kg/m².  Body surface area is 1.78 meters squared.    I/O last 3 completed shifts:  In: 1477 [P.O.:480; Blood:997]  Out: -     Physical Exam  Vitals signs and nursing note reviewed.   Constitutional:       General: She is not in acute distress.     Appearance: She is well-developed.   HENT:      Head: Normocephalic and atraumatic.      Mouth/Throat:      Mouth: Mucous membranes are moist.      Pharynx: Oropharynx is clear.   Eyes:      General: No scleral icterus.        Right eye: No discharge.         Left eye: No discharge.   Cardiovascular:      Rate and Rhythm: Normal rate and regular rhythm.      Heart sounds: Normal heart sounds. No murmur.   Pulmonary:      Effort: Pulmonary effort is normal.      Breath sounds: Normal breath sounds. No wheezing or rales.   Abdominal:      General: There is no distension.      Palpations: Abdomen is soft.   Musculoskeletal:         General: No deformity.      Right lower leg: No edema.      Left lower leg: No edema.   Skin:     General: Skin is warm and dry.      Findings: No rash.   Neurological:      Mental Status: She is alert. Mental status is at baseline.   Psychiatric:         Mood and Affect: Mood normal.         Behavior: Behavior normal.         Significant Labs:  ABGs: No results for input(s): PH, PCO2, HCO3, POCSATURATED, BE in the last 168 hours.  CBC:   Recent Labs   Lab  08/01/20  0433   WBC 12.69   RBC 2.89*   HGB 8.6*   HCT 27.4*      MCV 95   MCH 29.8   MCHC 31.4*     CMP:   Recent Labs   Lab 08/01/20  0433   GLU 56*   CALCIUM 8.7   ALBUMIN 2.5*   PROT 6.2      K 4.1   CO2 23      BUN 68*   CREATININE 5.4*   ALKPHOS 96   ALT 8*   AST 15   BILITOT 0.3       PTH: No results for input(s): PTH in the last 168 hours.  Recent Labs   Lab 07/31/20  1453   COLORU Yellow   SPECGRAV 1.010   PHUR 5.0   PROTEINUA 2+*   BACTERIA Moderate*   NITRITE Negative   LEUKOCYTESUR Trace*   UROBILINOGEN Negative   HYALINECASTS 0     All labs within the past 24 hours have been reviewed.    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  - Baseline Cr 2.3-3.2, GFR 15-21%  - Last ROXANNE w/ peak Cr 8.16 w/ K 5.7 on 4/16/20; sCr 4 on this admit  - suspect multifactorial ROXANNE 2/2 dehydration/diarrhea + anemia (hgb 6.7)/GI bleed  - Today Cr and BUN are stable; UOP adequate, continue to hold lasix/ supportive care  - no urgent indication for HD at this time  - Avoid NSAIDs, renally dose medications, strict I&O, daily weights  - CT abd 7/28 w/o evidence of hydronephrosis, nephrolithiasis, or bladder retention; + bilat renal cysts  - UA with 2+ protein 1+ blood, CPK normal, PrCr ratio 2 g/g, Yasemin unreliable after lasix   - **Noted pt is now DNR w/ reports of not wanting dialysis per Palliative care note. If/ when she needs dialysis, will have lengthy discussion with patient and family prior to decision for or against HD. She would be suboptimal dialysis candidate 2/2 underlying dementia  - will hold lasix - can give PRN if resp status declines  - avoid nephrotoxic meds/ strict Is/Os / daily weights/ normotension     HTN  - uncontrolled  -  low Na diet  - continue coreg + nifedipine; will hold further lasix for now  - recommend uptitration nifedipine to 60mg if HTN persistent  - s/p 3 units PRBC since admit  - continue PRN clonidine for  SBP>160     AOCKD, DEYVI, blood loss anemia   - Hg not at goal; Hg 7 today, as low as 6.7 during this admit  - 2/2 GI bleeding; s/p 3 units PRBC since admit  - AHSAN 20k units weekly   - Did not tolerate PO Iron 2/2 constipation  - transfuse if Hgb < 7  - monitor CBC     Vit D def, MBD  - Vit D level 18> 30.2 > 27, trending down;  >215, trending down  - currently on ergo protocol, contiue  - continue to monitor PTH and vit d  - continue Calcitriol 0.25mcg QOD     Hyperphosphatemia  - phos trending up, Phos 4.2>5.1>>5.6-->6.3-->6.8  - start sevelamer 800 mg PO TID  - renal diet  - monitor with calcitriol  - monitor labs      Hypoalbuminemia  - alb below goal  - Nepro TID  - renal diet restrictions     Hyopmagnesemia  - Mag 1.6 today  - 1 g mg sulfate IV x1    CDIFF + on flagyl + PO vanc    Hx of Hyperuricemia - uric acid 7.7 in past, rechecking with next labs; avoid allopurinol    Acidosis - controlled on sodium bicarb 1300mg BID - continue     Proteinuria - UP/C 4.6g in past, avoid AC, caution with ACEi/ARB initiation 2/2 propensity for hyperkalemia & poor renal reserves; UPrCr 2 g/g, would avoid ACEi/ARB for now given ROXANNE and propensity for hyperkalemia     Hx of hypomagnesemia -was recently started on Mg oxide 400mg Po QD - Mg borderline low yesterday Mg sulfate 1g IV x1 today    Arthritis  Diabetes   DHF  AFib  HLD  Pulmonary HTN  DHF    Thank you for your consult. I will follow-up with patient. Please contact us if you have any additional questions.    DOS 08/01/2020  PRESTON Santana  Nephrology  [facility], [facility]  Office [phone]

## 2020-08-02 LAB
ALBUMIN SERPL BCP-MCNC: 2.4 G/DL (ref 3.5–5.2)
ALP SERPL-CCNC: 81 U/L (ref 55–135)
ALT SERPL W/O P-5'-P-CCNC: <5 U/L (ref 10–44)
ANION GAP SERPL CALC-SCNC: 11 MMOL/L (ref 8–16)
AST SERPL-CCNC: 14 U/L (ref 10–40)
BACTERIA BLD CULT: NORMAL
BACTERIA BLD CULT: NORMAL
BASOPHILS # BLD AUTO: 0.09 K/UL (ref 0–0.2)
BASOPHILS NFR BLD: 0.8 % (ref 0–1.9)
BILIRUB SERPL-MCNC: 0.2 MG/DL (ref 0.1–1)
BUN SERPL-MCNC: 68 MG/DL (ref 8–23)
CALCIUM SERPL-MCNC: 8.6 MG/DL (ref 8.7–10.5)
CHLORIDE SERPL-SCNC: 102 MMOL/L (ref 95–110)
CO2 SERPL-SCNC: 22 MMOL/L (ref 23–29)
CREAT SERPL-MCNC: 5.3 MG/DL (ref 0.5–1.4)
DIFFERENTIAL METHOD: ABNORMAL
EOSINOPHIL # BLD AUTO: 0.2 K/UL (ref 0–0.5)
EOSINOPHIL NFR BLD: 1.3 % (ref 0–8)
ERYTHROCYTE [DISTWIDTH] IN BLOOD BY AUTOMATED COUNT: 15.2 % (ref 11.5–14.5)
EST. GFR  (AFRICAN AMERICAN): 8 ML/MIN/1.73 M^2
EST. GFR  (NON AFRICAN AMERICAN): 7 ML/MIN/1.73 M^2
GLUCOSE SERPL-MCNC: 68 MG/DL (ref 70–110)
HCT VFR BLD AUTO: 27.9 % (ref 37–48.5)
HGB BLD-MCNC: 8.6 G/DL (ref 12–16)
IMM GRANULOCYTES # BLD AUTO: 0.39 K/UL (ref 0–0.04)
IMM GRANULOCYTES NFR BLD AUTO: 3.3 % (ref 0–0.5)
LYMPHOCYTES # BLD AUTO: 1.2 K/UL (ref 1–4.8)
LYMPHOCYTES NFR BLD: 9.7 % (ref 18–48)
MAGNESIUM SERPL-MCNC: 1.9 MG/DL (ref 1.6–2.6)
MCH RBC QN AUTO: 29.4 PG (ref 27–31)
MCHC RBC AUTO-ENTMCNC: 30.8 G/DL (ref 32–36)
MCV RBC AUTO: 95 FL (ref 82–98)
MONOCYTES # BLD AUTO: 0.6 K/UL (ref 0.3–1)
MONOCYTES NFR BLD: 5.2 % (ref 4–15)
NEUTROPHILS # BLD AUTO: 9.5 K/UL (ref 1.8–7.7)
NEUTROPHILS NFR BLD: 79.7 % (ref 38–73)
NRBC BLD-RTO: 0 /100 WBC
PHOSPHATE SERPL-MCNC: 6.1 MG/DL (ref 2.7–4.5)
PLATELET # BLD AUTO: 206 K/UL (ref 150–350)
PMV BLD AUTO: 9.9 FL (ref 9.2–12.9)
POCT GLUCOSE: 117 MG/DL (ref 70–110)
POCT GLUCOSE: 64 MG/DL (ref 70–110)
POCT GLUCOSE: 82 MG/DL (ref 70–110)
POCT GLUCOSE: 97 MG/DL (ref 70–110)
POTASSIUM SERPL-SCNC: 4.5 MMOL/L (ref 3.5–5.1)
PROT SERPL-MCNC: 6 G/DL (ref 6–8.4)
RBC # BLD AUTO: 2.93 M/UL (ref 4–5.4)
SODIUM SERPL-SCNC: 135 MMOL/L (ref 136–145)
WBC # BLD AUTO: 11.95 K/UL (ref 3.9–12.7)

## 2020-08-02 PROCEDURE — 25000003 PHARM REV CODE 250: Performed by: NURSE PRACTITIONER

## 2020-08-02 PROCEDURE — 25000003 PHARM REV CODE 250: Performed by: HOSPITALIST

## 2020-08-02 PROCEDURE — 11000001 HC ACUTE MED/SURG PRIVATE ROOM

## 2020-08-02 PROCEDURE — 80053 COMPREHEN METABOLIC PANEL: CPT

## 2020-08-02 PROCEDURE — 83735 ASSAY OF MAGNESIUM: CPT

## 2020-08-02 PROCEDURE — 25000003 PHARM REV CODE 250: Performed by: INTERNAL MEDICINE

## 2020-08-02 PROCEDURE — 25000003 PHARM REV CODE 250: Performed by: PHYSICIAN ASSISTANT

## 2020-08-02 PROCEDURE — 85025 COMPLETE CBC W/AUTO DIFF WBC: CPT

## 2020-08-02 PROCEDURE — 36415 COLL VENOUS BLD VENIPUNCTURE: CPT

## 2020-08-02 PROCEDURE — 84100 ASSAY OF PHOSPHORUS: CPT

## 2020-08-02 RX ORDER — FUROSEMIDE 40 MG/1
40 TABLET ORAL DAILY
Status: DISCONTINUED | OUTPATIENT
Start: 2020-08-02 | End: 2020-08-04

## 2020-08-02 RX ADMIN — FUROSEMIDE 40 MG: 40 TABLET ORAL at 03:08

## 2020-08-02 RX ADMIN — HYDRALAZINE HYDROCHLORIDE 25 MG: 25 TABLET, FILM COATED ORAL at 03:08

## 2020-08-02 RX ADMIN — NIFEDIPINE 30 MG: 30 TABLET, FILM COATED, EXTENDED RELEASE ORAL at 10:08

## 2020-08-02 RX ADMIN — SODIUM BICARBONATE TAB 325 MG 1300 MG: 325 TAB at 09:08

## 2020-08-02 RX ADMIN — CARVEDILOL 12.5 MG: 12.5 TABLET, FILM COATED ORAL at 10:08

## 2020-08-02 RX ADMIN — HYDRALAZINE HYDROCHLORIDE 25 MG: 25 TABLET, FILM COATED ORAL at 06:08

## 2020-08-02 RX ADMIN — CARVEDILOL 12.5 MG: 12.5 TABLET, FILM COATED ORAL at 09:08

## 2020-08-02 RX ADMIN — MENTHOL, METHYL SALICYLATE: 10; 15 CREAM TOPICAL at 09:08

## 2020-08-02 RX ADMIN — LACTOBACILLUS ACIDOPHILUS / LACTOBACILLUS BULGARICUS 1 EACH: 100 MILLION CFU STRENGTH GRANULES at 09:08

## 2020-08-02 RX ADMIN — SEVELAMER CARBONATE 800 MG: 800 TABLET, FILM COATED ORAL at 10:08

## 2020-08-02 RX ADMIN — SODIUM BICARBONATE TAB 325 MG 1300 MG: 325 TAB at 10:08

## 2020-08-02 RX ADMIN — MENTHOL, METHYL SALICYLATE: 10; 15 CREAM TOPICAL at 10:08

## 2020-08-02 RX ADMIN — TRAMADOL HYDROCHLORIDE 50 MG: 50 TABLET, FILM COATED ORAL at 11:08

## 2020-08-02 RX ADMIN — LIDOCAINE 1 PATCH: 50 PATCH TOPICAL at 03:08

## 2020-08-02 RX ADMIN — LACTOBACILLUS ACIDOPHILUS / LACTOBACILLUS BULGARICUS 1 EACH: 100 MILLION CFU STRENGTH GRANULES at 10:08

## 2020-08-02 RX ADMIN — METRONIDAZOLE 500 MG: 500 TABLET ORAL at 09:08

## 2020-08-02 RX ADMIN — CLONIDINE HYDROCHLORIDE 0.1 MG: 0.1 TABLET ORAL at 11:08

## 2020-08-02 RX ADMIN — METRONIDAZOLE 500 MG: 500 TABLET ORAL at 03:08

## 2020-08-02 RX ADMIN — CALCITRIOL CAPSULES 0.25 MCG 0.25 MCG: 0.25 CAPSULE ORAL at 10:08

## 2020-08-02 RX ADMIN — METRONIDAZOLE 500 MG: 500 TABLET ORAL at 06:08

## 2020-08-02 RX ADMIN — HYDRALAZINE HYDROCHLORIDE 25 MG: 25 TABLET, FILM COATED ORAL at 09:08

## 2020-08-02 RX ADMIN — FAMOTIDINE 20 MG: 20 TABLET ORAL at 10:08

## 2020-08-02 NOTE — PLAN OF CARE
Patient remains stable in no acute distress. Slept intermittently throughout the night, prefers TV on. Neck is tender to touch, pain tolerable with rest, positioning, massage and current medication regimen. Blood glucose well controlled,     Neuro: patient aaox4 w/ additional reorientation w/ reminders to plan of care.   GI/: abdomen non-tender, +BS, no nausea/vomiting, weight gained 17% since last weight 4 days (7/28) using bed scale.   I/O: drank sips of lemonade,  and 1 cup of pudding.  no urine or stool to diaper tonight.     Discussed and reviewed plan of care with patient, has no questions or concerns at this time.     Problem: Adult Inpatient Plan of Care  Goal: Plan of Care Review  Outcome: Ongoing, Progressing  Flowsheets (Taken 8/2/2020 0442)  Plan of Care Reviewed With: patient  Goal: Optimal Comfort and Wellbeing  Outcome: Ongoing, Progressing  Intervention: Provide Person-Centered Care  Flowsheets (Taken 8/2/2020 0442)  Trust Relationship/Rapport:   care explained   choices provided   emotional support provided   empathic listening provided   questions answered   questions encouraged   reassurance provided   thoughts/feelings acknowledged     Problem: Pain Chronic (Persistent)  Goal: Acceptable Pain Control and Functional Ability  Outcome: Ongoing, Progressing  Intervention: Develop Pain Management Plan  Flowsheets (Taken 8/2/2020 0442)  Pain Management Interventions:   care clustered   diversional activity provided   quiet environment facilitated   pillow support provided   position adjusted   premedicated for activity   massage provided  Intervention: Manage Persistent Pain  Flowsheets (Taken 8/2/2020 0442)  Sleep/Rest Enhancement:   awakenings minimized   massage given   noise level reduced   regular sleep/rest pattern promoted  Bowel Elimination Promotion: adequate fluid intake promoted  Complementary Therapy: massage therapy  Medication Review/Management: medications reviewed  Intervention:  Optimize Psychosocial Wellbeing  Flowsheets (Taken 8/2/2020 0442)  Supportive Measures:   active listening utilized   positive reinforcement provided   problem solving facilitated   self-care encouraged   self-responsibility promoted  Diversional Activities: television     Problem: Coping Ineffective  Goal: Effective Coping  Outcome: Ongoing, Progressing  Intervention: Support and Enhance Coping Strategies  Flowsheets (Taken 8/2/2020 0442)  Complementary Therapy: massage therapy  Supportive Measures:   active listening utilized   positive reinforcement provided   problem solving facilitated   self-care encouraged   self-responsibility promoted     Problem: Skin Injury Risk Increased  Goal: Skin Health and Integrity  Outcome: Ongoing, Progressing  Intervention: Optimize Skin Protection  Flowsheets (Taken 8/2/2020 0442)  Pressure Reduction Techniques:   frequent weight shift encouraged   positioned off wounds   pressure points protected  Pressure Reduction Devices:   positioning supports utilized   foam padding utilized   heel offloading device utilized  Skin Protection:   adhesive use limited   hydrocolloids used   incontinence pads utilized  Head of Bed (HOB): HOB at 30-45 degrees  Intervention: Promote and Optimize Oral Intake  Flowsheets (Taken 8/2/2020 0442)  Oral Nutrition Promotion:   physical activity promoted   rest periods promoted

## 2020-08-02 NOTE — PROGRESS NOTES
[facility] Hospital Medicine  Progress Note    Patient Name: Joyce Riley  MRN: [id number]  Patient Class: IP- Inpatient   Admission Date: 7/28/2020  Length of Stay: 5 days  Attending Physician: Shahab Chinchilla MD  Primary Care Provider: Kalpana Staton MD        Subjective:     Principal Problem:Hematochezia        HPI:  Mrs. Joyce Riley is a 86 y.o. female [facility] resident known to me with renal hypertension, type 2 diabetes mellitus (HbA1c 5.1% Mar 2020), chronic diastolic heart failure (LVEF 80% Apr 2019), CKD stage 5, paroxysmal atrial fibrillation ([id number] score 5) not on chronic anticoagulation, anemia of renal disease, and history of stroke who presents to [facility] ED with complaints of hematochezia today.  She says that she has occasional rectal bleeding for the past few months as well as chronic lower abdominal cramping worse on eating.  Today she noted blood in her stool which he described as dark red.  She denies any nausea, vomiting, hematemesis, coffee-ground emesis, melena, nor any hematuria.  She has been generally weak and fatigued but that has not been any worse than usual.  She denies any chest pains, shortness of breath, lightheadedness, dizziness, falls, nor any loss of consciousness.    Overview/Hospital Course:  87 y/o female from NH admitted with hematochezia.  Anemia of CKD, but Hemoglobin lower than baseline at 7.8.  Transfused one unit of blood with adequate correction of H/H.  GI consulted.  Recent hx of diverticulitis, but no evidence of this time on CT.  Noted leukocytosis on labs.  Daughter stating patient has been having on and off diarrhea for past month.  Cdiff Ag positive, but toxin negative.  Started on oral Flagyl pending PCR which returned positive and started on oral Vanc. Has required 3U RBC at this time to maintain Hb > 7. Renal failure worsening and not HD candidate due to previous intolerance and now electing to not  pursue this. Nephrology consulted. Patient's H/H remained stable. PT/OT consulted and recommended returning to NH.      Interval History: No new issues     Review of Systems   Constitutional: Negative for activity change.   HENT: Negative for congestion.    Respiratory: Negative for chest tightness.    Cardiovascular: Negative for chest pain.   Gastrointestinal: Negative for abdominal distention.   Genitourinary: Negative for difficulty urinating.   Musculoskeletal: Positive for arthralgias and neck pain.   Psychiatric/Behavioral: Negative for agitation and behavioral problems.     Objective:     Vital Signs (Most Recent):  Temp: 99.8 °F (37.7 °C) (08/02/20 0735)  Pulse: 79 (08/02/20 0735)  Resp: 17 (08/02/20 0735)  BP: (!) 152/69 (08/02/20 0735)  SpO2: 97 % (08/02/20 0735) Vital Signs (24h Range):  Temp:  [96.3 °F (35.7 °C)-99.8 °F (37.7 °C)] 99.8 °F (37.7 °C)  Pulse:  [64-79] 79  Resp:  [16-20] 17  SpO2:  [95 %-99 %] 97 %  BP: (135-164)/(61-72) 152/69     Weight: 80 kg (176 lb 5.9 oz)(weighed with SCDs and boots)  Body mass index is 28.47 kg/m².    Intake/Output Summary (Last 24 hours) at 8/2/2020 1106  Last data filed at 8/2/2020 0600  Gross per 24 hour   Intake 650 ml   Output --   Net 650 ml      Physical Exam  Vitals signs and nursing note reviewed.   Constitutional:       General: She is not in acute distress.     Appearance: Normal appearance. She is normal weight. She is not ill-appearing, toxic-appearing or diaphoretic.   HENT:      Head: Normocephalic and atraumatic.   Pulmonary:      Effort: No respiratory distress.   Neurological:      Mental Status: She is alert and oriented to person, place, and time.   Psychiatric:         Mood and Affect: Mood normal.         Behavior: Behavior normal.         Significant Labs:   BMP:   Recent Labs   Lab 08/02/20  0440   GLU 68*   *   K 4.5      CO2 22*   BUN 68*   CREATININE 5.3*   CALCIUM 8.6*   MG 1.9     CBC:   Recent Labs   Lab 08/01/20  8453  08/02/20  0440   WBC 12.69 11.95   HGB 8.6* 8.6*   HCT 27.4* 27.9*    206       Significant Imaging:      Assessment/Plan:      * Hematochezia  Patient had a couple similar episodes earlier this year and was found to have diverticulitis.   No evidence of diverticulitis on CT.  Anemia of CKD with hemoglobin lower than baseline.  Anemia of CKD with anemia of acute blood loss secondary to hematochezia.  Probable diverticular bleed.  Transfused one unit of blood with adequate correction of H/H.  Appreciate GI input.  Conservative management at this time.    Transfuse another unit today, monitor    Acute blood loss anemia  Give another 1U RBC, due to rectal bleeding   H/H now stable.       Clostridium difficile infection  Confirmed on PCR, continue flagyl, improving WBC and diarrhea slowing down.      Neck pain, chronic  -will get xray to evaluate, I suspect positional       Leukocytosis  Improving, most likely Cdiff      History of stroke  Stable; there are no acute issues.    Paroxysmal atrial fibrillation  Stable and currently in sinus rhythm.  She is no longer on apixaban due to frequent rectal bleeding.  Will continue to monitor.    Chronic diastolic heart failure  Stable without evidence of acute heart failure; will continue to monitor.    Renal hypertension  Patient's blood pressure is better-controlled; will continue home regimen of carvedilol, furosemide, and nifedipine, and provide as-needed clonidine.    Anemia of renal disease  As addressed above.    CKD stage 5  Worsening status, previously did not tolerate dialysis and would not like it in the future. Nephrology consulted.  Palliative consult    Does not want HD     Type 2 diabetes mellitus, controlled, with renal complications  Well-controlled on a home regimen of basal insulin therapy; will provide basal-prandial insulin along with insulin sliding scale.    Uncontrolled with hypoglycemia. Will d/c evening insulin     Neck pain- consulting  ortho.  VTE Risk Mitigation (From admission, onward)         Ordered     IP VTE HIGH RISK PATIENT  Once      07/28/20 2323     Place sequential compression device  Until discontinued      07/28/20 2323     Place SANTOS hose  Until discontinued      07/28/20 2323                Monitor H/H. Cut back evening insulin. To NH on Monday   [facility]     Shahab Oscar MD  Department of Hospital Medicine   [facility]

## 2020-08-02 NOTE — ASSESSMENT & PLAN NOTE
Well-controlled on a home regimen of basal insulin therapy; will provide basal-prandial insulin along with insulin sliding scale.    Uncontrolled with hypoglycemia. Will d/c evening insulin

## 2020-08-02 NOTE — SUBJECTIVE & OBJECTIVE
Interval History: No new issues     Review of Systems   Constitutional: Negative for activity change.   HENT: Negative for congestion.    Respiratory: Negative for chest tightness.    Cardiovascular: Negative for chest pain.   Gastrointestinal: Negative for abdominal distention.   Genitourinary: Negative for difficulty urinating.   Musculoskeletal: Positive for arthralgias and neck pain.   Psychiatric/Behavioral: Negative for agitation and behavioral problems.     Objective:     Vital Signs (Most Recent):  Temp: 99.8 °F (37.7 °C) (08/02/20 0735)  Pulse: 79 (08/02/20 0735)  Resp: 17 (08/02/20 0735)  BP: (!) 152/69 (08/02/20 0735)  SpO2: 97 % (08/02/20 0735) Vital Signs (24h Range):  Temp:  [96.3 °F (35.7 °C)-99.8 °F (37.7 °C)] 99.8 °F (37.7 °C)  Pulse:  [64-79] 79  Resp:  [16-20] 17  SpO2:  [95 %-99 %] 97 %  BP: (135-164)/(61-72) 152/69     Weight: 80 kg (176 lb 5.9 oz)(weighed with SCDs and boots)  Body mass index is 28.47 kg/m².    Intake/Output Summary (Last 24 hours) at 8/2/2020 1106  Last data filed at 8/2/2020 0600  Gross per 24 hour   Intake 650 ml   Output --   Net 650 ml      Physical Exam  Vitals signs and nursing note reviewed.   Constitutional:       General: She is not in acute distress.     Appearance: Normal appearance. She is normal weight. She is not ill-appearing, toxic-appearing or diaphoretic.   HENT:      Head: Normocephalic and atraumatic.   Pulmonary:      Effort: No respiratory distress.   Neurological:      Mental Status: She is alert and oriented to person, place, and time.   Psychiatric:         Mood and Affect: Mood normal.         Behavior: Behavior normal.         Significant Labs:   BMP:   Recent Labs   Lab 08/02/20  0440   GLU 68*   *   K 4.5      CO2 22*   BUN 68*   CREATININE 5.3*   CALCIUM 8.6*   MG 1.9     CBC:   Recent Labs   Lab 08/01/20  0433 08/02/20  0440   WBC 12.69 11.95   HGB 8.6* 8.6*   HCT 27.4* 27.9*    206       Significant Imaging:

## 2020-08-02 NOTE — PROGRESS NOTES
[facility]  Nephrology  Consult Note    Patient Name: Joyce Riley  MRN: [id number]  Admission Date: 7/28/2020  Hospital Length of Stay: 5 days  Attending Provider: Shahab Chinchilla MD   Primary Care Physician: Kalpana Staton MD  Principal Problem:Hematochezia   Date of service 8/2/2020    Consults ROXANNE  Subjective:     Interval History: Started binders and EPO yesterday. UOP 4x ( AM only)/ 24 hrs. Renal function overall stable. Lasix held. Remains on RA.      Current Facility-Administered Medications   Medication Frequency    0.9%  NaCl infusion (for blood administration) Q24H PRN    0.9%  NaCl infusion (for blood administration) Q24H PRN    acetaminophen tablet 500 mg Q6H PRN    calcitRIOL capsule 0.25 mcg Every other day    carvediloL tablet 12.5 mg BID    cloNIDine tablet 0.1 mg TID PRN    dextrose 50% injection 12.5 g PRN    dextrose 50% injection 25 g PRN    epoetin diana-epbx injection 20,000 Units Q7 Days    ergocalciferol capsule 50,000 Units Q7 Days    famotidine tablet 20 mg Daily    glucagon (human recombinant) injection 1 mg PRN    glucose chewable tablet 16 g PRN    glucose chewable tablet 24 g PRN    hydrALAZINE tablet 25 mg Q8H    insulin aspart U-100 pen 0-5 Units PRN    insulin aspart U-100 pen 2 Units TIDWM    insulin detemir U-100 pen 5 Units QHS    lactobacillus acidophilus & bulgar 100 million cell packet 1 each BID    lidocaine 5 % patch 1 patch Q24H    melatonin tablet 9 mg Nightly PRN    methyl salicylate-menthol 15-10% cream BID    methyl salicylate-menthol 15-10% cream BID PRN    metroNIDAZOLE tablet 500 mg Q8H    NIFEdipine 24 hr tablet 30 mg Daily    ondansetron injection 8 mg Q8H PRN    oxyCODONE-acetaminophen 5-325 mg per tablet 1 tablet Q4H PRN    prochlorperazine injection Soln 5 mg Q6H PRN    sevelamer carbonate tablet 800 mg TID WM    sodium bicarbonate tablet 1,300 mg BID    traMADoL tablet 50 mg Q8H PRN     Objective:      Vital Signs (Most Recent):  Temp: 99.8 °F (37.7 °C) (08/02/20 0735)  Pulse: 79 (08/02/20 0735)  Resp: 17 (08/02/20 0735)  BP: (!) 152/69 (08/02/20 0735)  SpO2: 97 % (08/02/20 0735)  O2 Device (Oxygen Therapy): room air (08/01/20 2100) Vital Signs (24h Range):  Temp:  [96.3 °F (35.7 °C)-99.8 °F (37.7 °C)] 99.8 °F (37.7 °C)  Pulse:  [64-79] 79  Resp:  [16-20] 17  SpO2:  [95 %-99 %] 97 %  BP: (135-164)/(61-72) 152/69     Weight: 80 kg (176 lb 5.9 oz)(weighed with SCDs and boots) (08/02/20 0400)  Body mass index is 28.47 kg/m².  Body surface area is 1.93 meters squared.    I/O last 3 completed shifts:  In: 890 [P.O.:890]  Out: -     Physical Exam  Vitals signs and nursing note reviewed.   Constitutional:       General: She is not in acute distress.     Appearance: She is well-developed.   HENT:      Head: Normocephalic and atraumatic.      Mouth/Throat:      Mouth: Mucous membranes are moist.      Pharynx: Oropharynx is clear.   Eyes:      General: No scleral icterus.        Right eye: No discharge.         Left eye: No discharge.   Cardiovascular:      Rate and Rhythm: Normal rate and regular rhythm.      Heart sounds: Normal heart sounds. No murmur.   Pulmonary:      Effort: Pulmonary effort is normal.      Breath sounds: Rales present. No wheezing.   Abdominal:      General: There is no distension.      Palpations: Abdomen is soft.   Musculoskeletal:         General: No deformity.      Right lower leg: No edema.      Left lower leg: No edema.   Skin:     General: Skin is warm and dry.      Findings: No rash.   Neurological:      Mental Status: She is alert. Mental status is at baseline.   Psychiatric:         Mood and Affect: Mood normal.         Behavior: Behavior normal.         Significant Labs:  ABGs: No results for input(s): PH, PCO2, HCO3, POCSATURATED, BE in the last 168 hours.  CBC:   Recent Labs   Lab 08/02/20  0440   WBC 11.95   RBC 2.93*   HGB 8.6*   HCT 27.9*      MCV 95   MCH 29.4   MCHC 30.8*      CMP:   Recent Labs   Lab 08/02/20  0440   GLU 68*   CALCIUM 8.6*   ALBUMIN 2.4*   PROT 6.0   *   K 4.5   CO2 22*      BUN 68*   CREATININE 5.3*   ALKPHOS 81   ALT <5*   AST 14   BILITOT 0.2       PTH: No results for input(s): PTH in the last 168 hours.  Recent Labs   Lab 07/31/20  1453   COLORU Yellow   SPECGRAV 1.010   PHUR 5.0   PROTEINUA 2+*   BACTERIA Moderate*   NITRITE Negative   LEUKOCYTESUR Trace*   UROBILINOGEN Negative   HYALINECASTS 0     All labs within the past 24 hours have been reviewed.    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 If/ when she needs dialysis, will discuss with patient and family. She would be suboptimal dialysis candidate 2/2 underlying dementia  - avoid nephrotoxic meds/ strict Is/Os / daily weights/ normotension     HTN  - uncontrolled, low Na diet  - continue coreg + nifedipine  - restarting home lasix, crackles on exam today  - recommend uptitration nifedipine to 60mg if HTN persistent  - continue PRN clonidine for SBP>160     AOCKD, DEYVI, blood loss anemia   - Hgb trending up with tranfusions; s/p GIB  - AHSAN 20k units weekly   - Did not tolerate PO Iron 2/2 constipation  - transfuse if Hgb < 7  - monitor CBC    Metabolic Acidosis  - ROXANNE + CKD  - controlled on sodium bicarb 1300mg BID - continue     Vit D def, MBD  - Vit D 27;    - ergo protocol  - continue to monitor PTH and vit d  - continue Calcitriol 0.25mcg QOD     Hyperphosphatemia  - phos remains elevated  -  contine sevelamer 800 mg PO TID  - renal diet  - monitor labs      Hypoalbuminemia  - alb below goal  - Nepro TID  - renal diet restrictions     Hyopmagnesemia  - Mag trending up with replacement    CDIFF + on flagyl + PO vanc    Hx of Hyperuricemia - uric acid 7.7 in past, rechecking with next labs; avoid allopurinol     Proteinuria - UP/C 4.6g in past, avoid AC, caution with ACEi/ARB initiation 2/2 propensity for hyperkalemia & poor renal reserves; UPrCr 2 g/g, would avoid ACEi/ARB for now given ROXANNE and propensity for hyperkalemia       Arthritis  Diabetes   DHF  AFib  HLD  Pulmonary HTN  DHF    Thank you for your consult. I will follow-up with patient. Please contact us if you have any additional questions.    DOS 08/02/2020  PRESTON Santana  Nephrology  [facility]  Office [phone]

## 2020-08-03 LAB
ALBUMIN SERPL BCP-MCNC: 2.2 G/DL (ref 3.5–5.2)
ALP SERPL-CCNC: 62 U/L (ref 55–135)
ALT SERPL W/O P-5'-P-CCNC: 5 U/L (ref 10–44)
ANION GAP SERPL CALC-SCNC: 11 MMOL/L (ref 8–16)
AST SERPL-CCNC: 12 U/L (ref 10–40)
BASOPHILS # BLD AUTO: 0.08 K/UL (ref 0–0.2)
BASOPHILS NFR BLD: 0.6 % (ref 0–1.9)
BILIRUB SERPL-MCNC: 0.2 MG/DL (ref 0.1–1)
BUN SERPL-MCNC: 65 MG/DL (ref 8–23)
CALCIUM SERPL-MCNC: 8.7 MG/DL (ref 8.7–10.5)
CHLORIDE SERPL-SCNC: 102 MMOL/L (ref 95–110)
CO2 SERPL-SCNC: 23 MMOL/L (ref 23–29)
CREAT SERPL-MCNC: 5.2 MG/DL (ref 0.5–1.4)
DIFFERENTIAL METHOD: ABNORMAL
EOSINOPHIL # BLD AUTO: 0.1 K/UL (ref 0–0.5)
EOSINOPHIL NFR BLD: 1 % (ref 0–8)
ERYTHROCYTE [DISTWIDTH] IN BLOOD BY AUTOMATED COUNT: 15.5 % (ref 11.5–14.5)
EST. GFR  (AFRICAN AMERICAN): 8 ML/MIN/1.73 M^2
EST. GFR  (NON AFRICAN AMERICAN): 7 ML/MIN/1.73 M^2
GLUCOSE SERPL-MCNC: 118 MG/DL (ref 70–110)
HCT VFR BLD AUTO: 27.4 % (ref 37–48.5)
HGB BLD-MCNC: 8.8 G/DL (ref 12–16)
IMM GRANULOCYTES # BLD AUTO: 0.33 K/UL (ref 0–0.04)
IMM GRANULOCYTES NFR BLD AUTO: 2.6 % (ref 0–0.5)
LYMPHOCYTES # BLD AUTO: 1.3 K/UL (ref 1–4.8)
LYMPHOCYTES NFR BLD: 10.6 % (ref 18–48)
MAGNESIUM SERPL-MCNC: 1.8 MG/DL (ref 1.6–2.6)
MCH RBC QN AUTO: 29.8 PG (ref 27–31)
MCHC RBC AUTO-ENTMCNC: 32.1 G/DL (ref 32–36)
MCV RBC AUTO: 93 FL (ref 82–98)
MONOCYTES # BLD AUTO: 0.7 K/UL (ref 0.3–1)
MONOCYTES NFR BLD: 5.5 % (ref 4–15)
NEUTROPHILS # BLD AUTO: 10 K/UL (ref 1.8–7.7)
NEUTROPHILS NFR BLD: 79.7 % (ref 38–73)
NRBC BLD-RTO: 0 /100 WBC
PHOSPHATE SERPL-MCNC: 5 MG/DL (ref 2.7–4.5)
PLATELET # BLD AUTO: 247 K/UL (ref 150–350)
PMV BLD AUTO: 10.3 FL (ref 9.2–12.9)
POCT GLUCOSE: 115 MG/DL (ref 70–110)
POTASSIUM SERPL-SCNC: 4.9 MMOL/L (ref 3.5–5.1)
PROT SERPL-MCNC: 5.9 G/DL (ref 6–8.4)
RBC # BLD AUTO: 2.95 M/UL (ref 4–5.4)
SODIUM SERPL-SCNC: 136 MMOL/L (ref 136–145)
WBC # BLD AUTO: 12.51 K/UL (ref 3.9–12.7)

## 2020-08-03 PROCEDURE — 83735 ASSAY OF MAGNESIUM: CPT

## 2020-08-03 PROCEDURE — 80053 COMPREHEN METABOLIC PANEL: CPT

## 2020-08-03 PROCEDURE — 11000001 HC ACUTE MED/SURG PRIVATE ROOM

## 2020-08-03 PROCEDURE — 25000003 PHARM REV CODE 250: Performed by: PHYSICIAN ASSISTANT

## 2020-08-03 PROCEDURE — 25000003 PHARM REV CODE 250: Performed by: INTERNAL MEDICINE

## 2020-08-03 PROCEDURE — 85025 COMPLETE CBC W/AUTO DIFF WBC: CPT

## 2020-08-03 PROCEDURE — 63600175 PHARM REV CODE 636 W HCPCS: Performed by: INTERNAL MEDICINE

## 2020-08-03 PROCEDURE — 84100 ASSAY OF PHOSPHORUS: CPT

## 2020-08-03 PROCEDURE — 36415 COLL VENOUS BLD VENIPUNCTURE: CPT

## 2020-08-03 PROCEDURE — 25000003 PHARM REV CODE 250: Performed by: HOSPITALIST

## 2020-08-03 RX ORDER — DIPHENHYDRAMINE HCL 25 MG
25 CAPSULE ORAL ONCE
Status: COMPLETED | OUTPATIENT
Start: 2020-08-03 | End: 2020-08-03

## 2020-08-03 RX ORDER — DIPHENHYDRAMINE HYDROCHLORIDE 50 MG/ML
25 INJECTION INTRAMUSCULAR; INTRAVENOUS ONCE
Status: DISCONTINUED | OUTPATIENT
Start: 2020-08-03 | End: 2020-08-03

## 2020-08-03 RX ORDER — SODIUM BICARBONATE 325 MG/1
1300 TABLET ORAL 3 TIMES DAILY
Status: DISCONTINUED | OUTPATIENT
Start: 2020-08-03 | End: 2020-08-04 | Stop reason: HOSPADM

## 2020-08-03 RX ORDER — NIFEDIPINE 30 MG/1
60 TABLET, EXTENDED RELEASE ORAL DAILY
Status: DISCONTINUED | OUTPATIENT
Start: 2020-08-04 | End: 2020-08-04

## 2020-08-03 RX ADMIN — OXYCODONE HYDROCHLORIDE AND ACETAMINOPHEN 1 TABLET: 5; 325 TABLET ORAL at 06:08

## 2020-08-03 RX ADMIN — HYDRALAZINE HYDROCHLORIDE 25 MG: 25 TABLET, FILM COATED ORAL at 02:08

## 2020-08-03 RX ADMIN — LACTOBACILLUS ACIDOPHILUS / LACTOBACILLUS BULGARICUS 1 EACH: 100 MILLION CFU STRENGTH GRANULES at 10:08

## 2020-08-03 RX ADMIN — FUROSEMIDE 40 MG: 40 TABLET ORAL at 09:08

## 2020-08-03 RX ADMIN — SEVELAMER CARBONATE 800 MG: 800 TABLET, FILM COATED ORAL at 09:08

## 2020-08-03 RX ADMIN — DIPHENHYDRAMINE HYDROCHLORIDE 25 MG: 25 CAPSULE ORAL at 11:08

## 2020-08-03 RX ADMIN — SODIUM BICARBONATE TAB 325 MG 1300 MG: 325 TAB at 09:08

## 2020-08-03 RX ADMIN — CARVEDILOL 12.5 MG: 12.5 TABLET, FILM COATED ORAL at 09:08

## 2020-08-03 RX ADMIN — NIFEDIPINE 30 MG: 30 TABLET, FILM COATED, EXTENDED RELEASE ORAL at 09:08

## 2020-08-03 RX ADMIN — LIDOCAINE 1 PATCH: 50 PATCH TOPICAL at 02:08

## 2020-08-03 RX ADMIN — OXYCODONE HYDROCHLORIDE AND ACETAMINOPHEN 1 TABLET: 5; 325 TABLET ORAL at 10:08

## 2020-08-03 RX ADMIN — SODIUM BICARBONATE TAB 325 MG 1300 MG: 325 TAB at 02:08

## 2020-08-03 RX ADMIN — MENTHOL, METHYL SALICYLATE: 10; 15 CREAM TOPICAL at 10:08

## 2020-08-03 RX ADMIN — Medication 9 MG: at 09:08

## 2020-08-03 RX ADMIN — TRAMADOL HYDROCHLORIDE 50 MG: 50 TABLET, FILM COATED ORAL at 10:08

## 2020-08-03 RX ADMIN — SODIUM BICARBONATE TAB 325 MG 1300 MG: 325 TAB at 10:08

## 2020-08-03 RX ADMIN — OXYCODONE HYDROCHLORIDE AND ACETAMINOPHEN 1 TABLET: 5; 325 TABLET ORAL at 05:08

## 2020-08-03 RX ADMIN — METRONIDAZOLE 500 MG: 500 TABLET ORAL at 05:08

## 2020-08-03 RX ADMIN — METRONIDAZOLE 500 MG: 500 TABLET ORAL at 02:08

## 2020-08-03 RX ADMIN — LACTOBACILLUS ACIDOPHILUS / LACTOBACILLUS BULGARICUS 1 EACH: 100 MILLION CFU STRENGTH GRANULES at 09:08

## 2020-08-03 RX ADMIN — FAMOTIDINE 20 MG: 20 TABLET ORAL at 09:08

## 2020-08-03 RX ADMIN — METRONIDAZOLE 500 MG: 500 TABLET ORAL at 10:08

## 2020-08-03 RX ADMIN — HYDRALAZINE HYDROCHLORIDE 25 MG: 25 TABLET, FILM COATED ORAL at 05:08

## 2020-08-03 RX ADMIN — HYDRALAZINE HYDROCHLORIDE 25 MG: 25 TABLET, FILM COATED ORAL at 10:08

## 2020-08-03 NOTE — PLAN OF CARE
Patient is awake alert oriented answers appropriately. Medicated for posterior neck pain with po narcotic, lidocaine patch, cream as ordered, and neck pillow in place. Incontinence care, foam dressing on sacrum and offloading boots in place/ Special contact precautions maintained. Bed alarm on, call light in reach, free of falls. Continue with plan of care as ordered.

## 2020-08-03 NOTE — ASSESSMENT & PLAN NOTE
Well-controlled on a home regimen of basal insulin therapy; will provide basal-prandial insulin along with insulin sliding scale.    Uncontrolled with hypoglycemia. Monitor off of insulin

## 2020-08-03 NOTE — NURSING
Report received from Sarah RN. Patient resting comfortably in bed, awake and alert, oriented x4, no acute distress noted. Denies pain or discomfort at this time. Plan of care reviewed with patient. Instructed patient to call for assistance , side rails up x2, bed alarm set, call light in reach, non skid socks in use. Peripheral IV site, no redness or swelling noted.Specail contact isolation maintained.  Patient verbalized understanding of instructions. Will continue to monitor.

## 2020-08-03 NOTE — PROGRESS NOTES
[facility] Hospital Medicine  Progress Note    Patient Name: Joyce Riley  MRN: [id number]  Patient Class: IP- Inpatient   Admission Date: 7/28/2020  Length of Stay: 6 days  Attending Physician: Balwinder Restrepo MD  Primary Care Provider: Kalpana Staton MD        Subjective:     Principal Problem:Hematochezia        HPI:  Mrs. Joyce Riley is a 86 y.o. female [facility] resident known to me with renal hypertension, type 2 diabetes mellitus (HbA1c 5.1% Mar 2020), chronic diastolic heart failure (LVEF 80% Apr 2019), CKD stage 5, paroxysmal atrial fibrillation ([id number] score 5) not on chronic anticoagulation, anemia of renal disease, and history of stroke who presents to [facility] ED with complaints of hematochezia today.  She says that she has occasional rectal bleeding for the past few months as well as chronic lower abdominal cramping worse on eating.  Today she noted blood in her stool which he described as dark red.  She denies any nausea, vomiting, hematemesis, coffee-ground emesis, melena, nor any hematuria.  She has been generally weak and fatigued but that has not been any worse than usual.  She denies any chest pains, shortness of breath, lightheadedness, dizziness, falls, nor any loss of consciousness.    Overview/Hospital Course:  87 y/o female from NH admitted with hematochezia.  Anemia of CKD, but Hemoglobin lower than baseline at 7.8.  Transfused one unit of blood with adequate correction of H/H.  GI consulted.  Recent hx of diverticulitis, but no evidence of this time on CT.  Noted leukocytosis on labs.  Daughter stating patient has been having on and off diarrhea for past month.  Cdiff Ag positive, but toxin negative.  Started on oral Flagyl pending PCR which returned positive and started on oral Vanc. Has required 3U RBC at this time to maintain Hb > 7 over a few days. Renal failure worsening and not HD candidate due to previous intolerance and now electing to  not pursue this. Nephrology consulted. Patient's H/H remained stable. PT/OT consulted and recommended returning to NH.  Ortho consulted for worsening neck pain and CT Neck was obtained.    Interval History: feeling a bit better today, asking to return to her NH. Neck pain is a bit better. No stools recorded over last 24h    Review of Systems   Unable to perform ROS: Dementia     Objective:     Vital Signs (Most Recent):  Temp: 98.3 °F (36.8 °C) (08/03/20 1125)  Pulse: 76 (08/03/20 1125)  Resp: 20 (08/03/20 1125)  BP: (!) 150/67 (08/03/20 1125)  SpO2: 97 % (08/03/20 1125) Vital Signs (24h Range):  Temp:  [98.3 °F (36.8 °C)-98.9 °F (37.2 °C)] 98.3 °F (36.8 °C)  Pulse:  [74-81] 76  Resp:  [18-22] 20  SpO2:  [95 %-97 %] 97 %  BP: (138-164)/(66-71) 150/67     Weight: 80 kg (176 lb 5.9 oz)(weighed with SCDs and boots)  Body mass index is 28.47 kg/m².    Intake/Output Summary (Last 24 hours) at 8/3/2020 1250  Last data filed at 8/3/2020 0812  Gross per 24 hour   Intake 240 ml   Output --   Net 240 ml      Physical Exam  Vitals signs and nursing note reviewed.   Constitutional:       General: She is not in acute distress.     Appearance: Normal appearance. She is normal weight. She is not ill-appearing, toxic-appearing or diaphoretic.   HENT:      Head: Normocephalic and atraumatic.   Pulmonary:      Effort: No respiratory distress.   Neurological:      Mental Status: She is alert and oriented to person, place, and time.   Psychiatric:         Mood and Affect: Mood normal.         Behavior: Behavior normal.         Significant Labs: All pertinent labs within the past 24 hours have been reviewed.    Significant Imaging: I have reviewed and interpreted all pertinent imaging results/findings within the past 24 hours.      Assessment/Plan:      * Hematochezia  Patient had a couple similar episodes earlier this year and was found to have diverticulitis.   No evidence of diverticulitis on CT.  Anemia of CKD with hemoglobin lower  than baseline.  Anemia of CKD with anemia of acute blood loss secondary to hematochezia.  Probable diverticular bleed.  Transfused one unit of blood with adequate correction of H/H.  Appreciate GI input.  Conservative management at this time.    Stable today, monitor, if ok will likely discharge to NH tomorrow    Clostridium difficile infection  Confirmed on PCR, continue flagyl, improving WBC and diarrhea slowing down.  Flagyl day 6/14      Neck pain, chronic  -CT completed, will f/u with ortho       Leukocytosis  Improving, most likely Cdiff      History of stroke  Stable; there are no acute issues.    Acute blood loss anemia  H/H now stable.        Paroxysmal atrial fibrillation  Stable and currently in sinus rhythm.  She is no longer on apixaban due to frequent rectal bleeding.  Will continue to monitor.    Chronic diastolic heart failure  Stable without evidence of acute heart failure; will continue to monitor.    Renal hypertension  Patient's blood pressure is better-controlled; will continue home regimen of carvedilol, furosemide, and nifedipine, and provide as-needed clonidine.    Anemia of renal disease  As addressed above.    CKD stage 5  Worsening status, previously did not tolerate dialysis and would not like it in the future. Nephrology consulted.  Palliative consult    Does not want HD, Cr stable    Type 2 diabetes mellitus, controlled, with renal complications  Well-controlled on a home regimen of basal insulin therapy; will provide basal-prandial insulin along with insulin sliding scale.    Uncontrolled with hypoglycemia. Monitor off of insulin      VTE Risk Mitigation (From admission, onward)         Ordered     IP VTE HIGH RISK PATIENT  Once      07/28/20 2323     Place sequential compression device  Until discontinued      07/28/20 2323     Place SANTOS hose  Until discontinued      07/28/20 2323                H/H stable, Cr stable. Orthopedics eval in process. If ok with Nephrology and H/H stable  tomorrow likely back to NH.      Balwinder Restrepo MD  Department of Hospital Medicine   [facility]

## 2020-08-03 NOTE — PLAN OF CARE
Problem: Adult Inpatient Plan of Care  Goal: Plan of Care Review  Outcome: Ongoing, Progressing  Goal: Patient-Specific Goal (Individualization)  Outcome: Ongoing, Progressing  Goal: Absence of Hospital-Acquired Illness or Injury  Outcome: Ongoing, Progressing  Goal: Optimal Comfort and Wellbeing  Outcome: Ongoing, Progressing  Goal: Readiness for Transition of Care  Outcome: Ongoing, Progressing  Goal: Rounds/Family Conference  Outcome: Ongoing, Progressing     Problem: Fall Injury Risk  Goal: Absence of Fall and Fall-Related Injury  Outcome: Ongoing, Progressing  Intervention: Identify and Manage Contributors to Fall Injury Risk  Flowsheets (Taken 8/3/2020 0452)  Self-Care Promotion:   independence encouraged   BADL personal objects within reach   BADL personal routines maintained  Medication Review/Management:   medications reviewed   high risk medications identified  Intervention: Promote Injury-Free Environment  Flowsheets (Taken 8/3/2020 0452)  Safety Promotion/Fall Prevention:   assistive device/personal item within reach   bed alarm set   Fall Risk reviewed with patient/family   medications reviewed   instructed to call staff for mobility   side rails raised x 2   supervised activity   nonskid shoes/socks when out of bed   high risk medications identified

## 2020-08-03 NOTE — ASSESSMENT & PLAN NOTE
Worsening status, previously did not tolerate dialysis and would not like it in the future. Nephrology consulted.  Palliative consult    Does not want HD, Cr stable

## 2020-08-03 NOTE — CONSULTS
C.C. neck pain    HPI: Joyce Felipe [id number] y.o. complaining of neck pain.  The patient is admitted for multiple other medical comorbidities.  She complains of severe neck pain.  The patient denies any trauma recently.  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  She has cervical spine degenerative changes throughout.  She denies much in the way of any radicular-type symptoms and she denies any weakness in the upper extremities.  She is not in any neck brace at this point.  Again she is admitted for other medical comorbidities and is continuing to complain of neck pain.  The patient suffers from rheumatoid arthritis and does have degenerative changes throughout her body.    ROS:   Pertinent positives:  Neck pain       PMH:   Past Medical History:   Diagnosis Date    Anemia     Anticoagulant long-term use     Arthritis     Atrial fibrillation     Cataract     CHF (congestive heart failure)     CKD (chronic kidney disease), stage V     Diabetes mellitus     Type 2    GERD (gastroesophageal reflux disease)     GI bleed 2018    Gout     Hypertension     Obese     Requires assistance with all daily activities     Stroke     Wheelchair dependent        PSH:   Past Surgical History:   Procedure Laterality Date     SECTION      Patient unsure, believe she had 3-4    CHOLECYSTECTOMY      EYE SURGERY      HYSTERECTOMY      JOINT REPLACEMENT Right     knee    TOTAL KNEE ARTHROPLASTY Right        Social Hx:   Social History     Occupational History    Occupation:      Comment: Retired   Tobacco Use    Smoking status: Never Smoker    Smokeless tobacco: Never Used   Substance and Sexual Activity    Alcohol use: No    Drug use: No    Sexual activity: Never       Medications:    No current facility-administered medications on file prior to encounter.      Current Outpatient Medications on File Prior to  Encounter   Medication Sig Dispense Refill    albuterol-ipratropium (DUO-NEB) 2.5 mg-0.5 mg/3 mL nebulizer solution Take 3 mLs by nebulization every 4 (four) hours as needed for Wheezing. Rescue      calcitRIOL (ROCALTROL) 0.25 MCG Cap Take 1 capsule (0.25 mcg total) by mouth every other day.      carvediloL (COREG) 12.5 MG tablet Take 1 tablet (12.5 mg total) by mouth 2 (two) times daily. 60 tablet 11    furosemide (LASIX) 40 MG tablet Take 1 tablet (40 mg total) by mouth once daily. 30 tablet 11    insulin degludec (TRESIBA FLEXTOUCH U-100) 100 unit/mL (3 mL) InPn Inject 5 Units into the skin every evening.      magnesium oxide 500 mg Tab Take 500 mg by mouth once daily. 90 each 3    polyethylene glycol (GLYCOLAX) 17 gram PwPk Take 17 g by mouth 2 (two) times daily as needed.  0    acetaminophen (TYLENOL) 325 MG tablet Take 2 tablets (650 mg total) by mouth every 4 (four) hours as needed. (Patient taking differently: Take 650 mg by mouth 2 (two) times daily. )  0    B complex-vitamin C-folic acid (NEPHRO-MICHAEL) 0.8 mg Tab Take by mouth once daily.      epoetin diana (EPOGEN) 10,000 unit/mL injection Inject 1 mL (10,000 Units total) into the skin every 7 days. 1 mL 0    ergocalciferol (ERGOCALCIFEROL) 50,000 unit Cap Take 1 capsule (50,000 Units total) by mouth every 7 days.      ferrous sulfate (FEOSOL) 325 mg (65 mg iron) Tab tablet Take 325 mg by mouth 2 (two) times daily.      methyl salicylate-menthol 15-10% 15-10 % Crea Apply topically 4 (four) times daily.  0    NIFEdipine (PROCARDIA-XL) 60 MG (OSM) 24 hr tablet Take 1 tablet (60 mg total) by mouth once daily. (Patient taking differently: Take 30 mg by mouth once daily. HOLD FOR SBP <110) 30 tablet 11    senna-docusate 8.6-50 mg (PERICOLACE) 8.6-50 mg per tablet Take 1 tablet by mouth 2 (two) times daily.      sodium bicarbonate 650 MG tablet Take 2 tablets (1,300 mg total) by mouth 2 (two) times daily. 120 tablet 11         PE:         Vitals:  "   08/03/20 1614   BP: (!) 149/70   Pulse: 88   Resp: 18   Temp: 97 °F (36.1 °C)       Estimated body mass index is 28.47 kg/m² as calculated from the following:    Height as of this encounter: 5' 6" (1.676 m).    Weight as of this encounter: 80 kg (176 lb 5.9 oz).     General frail, elderly, appropriate     Examination the cervical spine    Patient has decreased range of motion of the cervical spine.  Patient has pain with palpation throughout the cervical spine.  Specially proximal at the skull base.  She has pain with rotation as well as flexion extension.  She has negative Spurling's today on exam.  She does not appear to have any weakness in the bilateral upper extremities.    Her strength is intact 5/5 in  strength as well as wrist flexion extension and intrinsics.  As well as biceps, triceps, shoulder abduction    She does not appear to have any gross loss of sensation in the bilateral upper extremities.    Labs:    Lab Results   Component Value Date    WBC 12.51 08/03/2020    HGB 8.8 (L) 08/03/2020    HCT 27.4 (L) 08/03/2020    MCV 93 08/03/2020     08/03/2020           BMP  Lab Results   Component Value Date     08/03/2020    K 4.9 08/03/2020     08/03/2020    CO2 23 08/03/2020    BUN 65 (H) 08/03/2020    CREATININE 5.2 (H) 08/03/2020    CALCIUM 8.7 08/03/2020    ANIONGAP 11 08/03/2020    ESTGFRAFRICA 8 (A) 08/03/2020    EGFRNONAA 7 (A) 08/03/2020       Lab Results   Component Value Date    INR 1.0 07/28/2020    INR 1.0 03/14/2020    INR 1.0 03/07/2020       No results found for: SEDRATE    No results found for: CRP    Radiography:  Film    Interpretation    X-rays of cervical spine show loss of lordosis within the cervical spine as well as degenerative changes throughout the entire cervical spine with decreased disc spaces and decreased joint spaces.  Osteophyte formation.    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    A/P  86 y.o.female with neck pain as well as fragmentation of the odontoid process.  Possible old fracture with a vascular necrosis, rheumatoid arthritis, without gross myelopathy    I would like to involve Dr. Adair with spine surgery to have him weigh in on the appropriate treatment if any for the cervical spine.  I will consult the spine surgery team.  Again the patient does not seem to have any myelopathy or weakness in the upper extremities.  She may benefit from a soft neck brace.      Akil Mcclain MD

## 2020-08-03 NOTE — ASSESSMENT & PLAN NOTE
Patient had a couple similar episodes earlier this year and was found to have diverticulitis.   No evidence of diverticulitis on CT.  Anemia of CKD with hemoglobin lower than baseline.  Anemia of CKD with anemia of acute blood loss secondary to hematochezia.  Probable diverticular bleed.  Transfused one unit of blood with adequate correction of H/H.  Appreciate GI input.  Conservative management at this time.    Stable today, monitor, if ok will likely discharge to NH tomorrow

## 2020-08-03 NOTE — SUBJECTIVE & OBJECTIVE
Interval History: feeling a bit better today, asking to return to her NH. Neck pain is a bit better. No stools recorded over last 24h    Review of Systems   Unable to perform ROS: Dementia     Objective:     Vital Signs (Most Recent):  Temp: 98.3 °F (36.8 °C) (08/03/20 1125)  Pulse: 76 (08/03/20 1125)  Resp: 20 (08/03/20 1125)  BP: (!) 150/67 (08/03/20 1125)  SpO2: 97 % (08/03/20 1125) Vital Signs (24h Range):  Temp:  [98.3 °F (36.8 °C)-98.9 °F (37.2 °C)] 98.3 °F (36.8 °C)  Pulse:  [74-81] 76  Resp:  [18-22] 20  SpO2:  [95 %-97 %] 97 %  BP: (138-164)/(66-71) 150/67     Weight: 80 kg (176 lb 5.9 oz)(weighed with SCDs and boots)  Body mass index is 28.47 kg/m².    Intake/Output Summary (Last 24 hours) at 8/3/2020 1250  Last data filed at 8/3/2020 0812  Gross per 24 hour   Intake 240 ml   Output --   Net 240 ml      Physical Exam  Vitals signs and nursing note reviewed.   Constitutional:       General: She is not in acute distress.     Appearance: Normal appearance. She is normal weight. She is not ill-appearing, toxic-appearing or diaphoretic.   HENT:      Head: Normocephalic and atraumatic.   Pulmonary:      Effort: No respiratory distress.   Neurological:      Mental Status: She is alert and oriented to person, place, and time.   Psychiatric:         Mood and Affect: Mood normal.         Behavior: Behavior normal.         Significant Labs: All pertinent labs within the past 24 hours have been reviewed.    Significant Imaging: I have reviewed and interpreted all pertinent imaging results/findings within the past 24 hours.

## 2020-08-03 NOTE — PROGRESS NOTES
[facility]  Nephrology  Consult Note    Patient Name: Joyce Riley  MRN: [id number]  Admission Date: 7/28/2020  Hospital Length of Stay: 6 days  Attending Provider: Balwinder Restrepo MD   Primary Care Physician: Kalpana Staton MD  Principal Problem:Hematochezia   Date of service 8/3/2020    Consults ROXANNE  Subjective:     Interval History: Yesterday restarted home lasix. UOP 5x/ 24 hrs. Cr continues to slowly downtrend. Hgb stable. Pt reports poor appetite and PO intake today, and also itchiness which she things is due to dry skin. CT cervical spine pending.      Current Facility-Administered Medications   Medication Frequency    0.9%  NaCl infusion (for blood administration) Q24H PRN    0.9%  NaCl infusion (for blood administration) Q24H PRN    acetaminophen tablet 500 mg Q6H PRN    calcitRIOL capsule 0.25 mcg Every other day    carvediloL tablet 12.5 mg BID    cloNIDine tablet 0.1 mg TID PRN    epoetin diana-epbx injection 20,000 Units Q7 Days    ergocalciferol capsule 50,000 Units Q7 Days    famotidine tablet 20 mg Daily    furosemide tablet 40 mg Daily    hydrALAZINE tablet 25 mg Q8H    lactobacillus acidophilus & bulgar 100 million cell packet 1 each BID    lidocaine 5 % patch 1 patch Q24H    melatonin tablet 9 mg Nightly PRN    methyl salicylate-menthol 15-10% cream BID    methyl salicylate-menthol 15-10% cream BID PRN    metroNIDAZOLE tablet 500 mg Q8H    NIFEdipine 24 hr tablet 30 mg Daily    ondansetron injection 8 mg Q8H PRN    oxyCODONE-acetaminophen 5-325 mg per tablet 1 tablet Q4H PRN    prochlorperazine injection Soln 5 mg Q6H PRN    sevelamer carbonate tablet 800 mg TID WM    sodium bicarbonate tablet 1,300 mg TID    traMADoL tablet 50 mg Q8H PRN     Objective:     Vital Signs (Most Recent):  Temp: 98.6 °F (37 °C) (08/03/20 0729)  Pulse: 80 (08/03/20 0729)  Resp: 20 (08/03/20 0729)  BP: (!) 147/69 (08/03/20 0729)  SpO2: 96 % (08/03/20 0729)  O2 Device  (Oxygen Therapy): room air (08/03/20 0400) Vital Signs (24h Range):  Temp:  [98.5 °F (36.9 °C)-98.9 °F (37.2 °C)] 98.6 °F (37 °C)  Pulse:  [74-81] 80  Resp:  [18-20] 20  SpO2:  [95 %-99 %] 96 %  BP: (138-169)/(66-77) 147/69     Weight: 80 kg (176 lb 5.9 oz)(weighed with SCDs and boots) (08/02/20 0400)  Body mass index is 28.47 kg/m².  Body surface area is 1.93 meters squared.    I/O last 3 completed shifts:  In: 530 [P.O.:530]  Out: -     Physical Exam  Vitals signs and nursing note reviewed.   Constitutional:       General: She is not in acute distress.     Appearance: She is well-developed.   HENT:      Head: Normocephalic and atraumatic.      Mouth/Throat:      Mouth: Mucous membranes are moist.      Pharynx: Oropharynx is clear.   Eyes:      General: No scleral icterus.        Right eye: No discharge.         Left eye: No discharge.   Cardiovascular:      Rate and Rhythm: Normal rate and regular rhythm.      Heart sounds: Normal heart sounds. No murmur.   Pulmonary:      Effort: Pulmonary effort is normal.      Breath sounds: Rales present. No wheezing.   Abdominal:      General: There is no distension.      Palpations: Abdomen is soft.   Musculoskeletal:         General: No deformity.      Right lower leg: No edema.      Left lower leg: No edema.   Skin:     General: Skin is warm and dry.      Findings: No rash.   Neurological:      Mental Status: She is alert. Mental status is at baseline.   Psychiatric:         Mood and Affect: Mood normal.         Behavior: Behavior normal.         Significant Labs:  ABGs: No results for input(s): PH, PCO2, HCO3, POCSATURATED, BE in the last 168 hours.  CBC:   Recent Labs   Lab 08/03/20  0352   WBC 12.51   RBC 2.95*   HGB 8.8*   HCT 27.4*      MCV 93   MCH 29.8   MCHC 32.1     CMP:   Recent Labs   Lab 08/03/20  0352   *   CALCIUM 8.7   ALBUMIN 2.2*   PROT 5.9*      K 4.9   CO2 23      BUN 65*   CREATININE 5.2*   ALKPHOS 62   ALT 5*   AST 12    BILITOT 0.2       PTH: No results for input(s): PTH in the last 168 hours.  Recent Labs   Lab 07/31/20  1453   COLORU Yellow   SPECGRAV 1.010   PHUR 5.0   PROTEINUA 2+*   BACTERIA Moderate*   NITRITE Negative   LEUKOCYTESUR Trace*   UROBILINOGEN Negative   HYALINECASTS 0     All labs within the past 24 hours have been reviewed.    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 If/ when she needs dialysis, will discuss with patient and family. She would be suboptimal dialysis candidate 2/2 underlying dementia  - avoid nephrotoxic meds/ strict Is/Os / daily weights/ normotension     HTN  - uncontrolled, low Na diet  - continue coreg + nifedipine + lasix  - recommend uptitration nifedipine to 60mg if HTN persistent  - continue PRN clonidine for SBP>160     AOCKD, DEYVI, blood loss anemia   - Hgb trending up with tranfusions; s/p GIB  - AHSAN 20k units weekly   - Did not tolerate PO Iron 2/2 constipation  - transfuse if Hgb < 7  - monitor CBC    Metabolic Acidosis  - ROXANNE + CKD  - increase Nabicarb to 1300 mg PO TID  - continue to monitor labs     Vit D def, MBD  - Vit D 27;  ; CCa wnl  - ergo protocol  - continue to monitor PTH and vit d  - continue Calcitriol 0.25mcg QOD     Hyperphosphatemia  - phos trending down  - discontinue sevelamer today  - renal diet  - monitor labs      Hypoalbuminemia  - alb below goal  - Nepro TID  - renal diet restrictions     Hyopmagnesemia  - Mag trending  up with replacement    CDIFF + on flagyl + PO vanc    Hx of Hyperuricemia - uric acid 7.7 in past, rechecking with next labs; avoid allopurinol     Proteinuria - UP/C 4.6g in past, avoid AC, caution with ACEi/ARB initiation 2/2 propensity for hyperkalemia & poor renal reserves; UPrCr 2 g/g, would avoid ACEi/ARB for now given ROXANNE and propensity for hyperkalemia       Arthritis  Diabetes   DHF  AFib  HLD  Pulmonary HTN  DHF    Thank you for your consult. I will follow-up with patient. Please contact us if you have any additional questions.    DOS 08/03/2020  PRESTON Santana  Nephrology  [facility]  Office [phone]

## 2020-08-04 VITALS
HEART RATE: 71 BPM | TEMPERATURE: 98 F | WEIGHT: 176.38 LBS | SYSTOLIC BLOOD PRESSURE: 167 MMHG | OXYGEN SATURATION: 100 % | HEIGHT: 66 IN | DIASTOLIC BLOOD PRESSURE: 74 MMHG | BODY MASS INDEX: 28.34 KG/M2 | RESPIRATION RATE: 20 BRPM

## 2020-08-04 LAB
ALBUMIN SERPL BCP-MCNC: 2.3 G/DL (ref 3.5–5.2)
ALP SERPL-CCNC: 69 U/L (ref 55–135)
ALT SERPL W/O P-5'-P-CCNC: 5 U/L (ref 10–44)
ANION GAP SERPL CALC-SCNC: 8 MMOL/L (ref 8–16)
AST SERPL-CCNC: 12 U/L (ref 10–40)
BASOPHILS # BLD AUTO: 0.06 K/UL (ref 0–0.2)
BASOPHILS NFR BLD: 0.5 % (ref 0–1.9)
BILIRUB SERPL-MCNC: 0.2 MG/DL (ref 0.1–1)
BUN SERPL-MCNC: 63 MG/DL (ref 8–23)
CALCIUM SERPL-MCNC: 9.2 MG/DL (ref 8.7–10.5)
CHLORIDE SERPL-SCNC: 102 MMOL/L (ref 95–110)
CO2 SERPL-SCNC: 27 MMOL/L (ref 23–29)
CREAT SERPL-MCNC: 5.3 MG/DL (ref 0.5–1.4)
DIFFERENTIAL METHOD: ABNORMAL
EOSINOPHIL # BLD AUTO: 0.1 K/UL (ref 0–0.5)
EOSINOPHIL NFR BLD: 1 % (ref 0–8)
ERYTHROCYTE [DISTWIDTH] IN BLOOD BY AUTOMATED COUNT: 15.7 % (ref 11.5–14.5)
EST. GFR  (AFRICAN AMERICAN): 8 ML/MIN/1.73 M^2
EST. GFR  (NON AFRICAN AMERICAN): 7 ML/MIN/1.73 M^2
GLUCOSE SERPL-MCNC: 73 MG/DL (ref 70–110)
HCT VFR BLD AUTO: 28.3 % (ref 37–48.5)
HGB BLD-MCNC: 9.1 G/DL (ref 12–16)
IMM GRANULOCYTES # BLD AUTO: 0.32 K/UL (ref 0–0.04)
IMM GRANULOCYTES NFR BLD AUTO: 2.5 % (ref 0–0.5)
LYMPHOCYTES # BLD AUTO: 1.5 K/UL (ref 1–4.8)
LYMPHOCYTES NFR BLD: 11.8 % (ref 18–48)
MAGNESIUM SERPL-MCNC: 1.9 MG/DL (ref 1.6–2.6)
MCH RBC QN AUTO: 30.7 PG (ref 27–31)
MCHC RBC AUTO-ENTMCNC: 32.2 G/DL (ref 32–36)
MCV RBC AUTO: 96 FL (ref 82–98)
MONOCYTES # BLD AUTO: 0.8 K/UL (ref 0.3–1)
MONOCYTES NFR BLD: 6.1 % (ref 4–15)
NEUTROPHILS # BLD AUTO: 10 K/UL (ref 1.8–7.7)
NEUTROPHILS NFR BLD: 78.1 % (ref 38–73)
NRBC BLD-RTO: 0 /100 WBC
PHOSPHATE SERPL-MCNC: 5.1 MG/DL (ref 2.7–4.5)
PLATELET # BLD AUTO: 275 K/UL (ref 150–350)
PMV BLD AUTO: 10.1 FL (ref 9.2–12.9)
POCT GLUCOSE: 101 MG/DL (ref 70–110)
POCT GLUCOSE: 81 MG/DL (ref 70–110)
POTASSIUM SERPL-SCNC: 4.8 MMOL/L (ref 3.5–5.1)
PROT SERPL-MCNC: 6.2 G/DL (ref 6–8.4)
RBC # BLD AUTO: 2.96 M/UL (ref 4–5.4)
SODIUM SERPL-SCNC: 137 MMOL/L (ref 136–145)
WBC # BLD AUTO: 12.84 K/UL (ref 3.9–12.7)

## 2020-08-04 PROCEDURE — 25000003 PHARM REV CODE 250: Performed by: INTERNAL MEDICINE

## 2020-08-04 PROCEDURE — 36415 COLL VENOUS BLD VENIPUNCTURE: CPT

## 2020-08-04 PROCEDURE — 25000003 PHARM REV CODE 250: Performed by: PHYSICIAN ASSISTANT

## 2020-08-04 PROCEDURE — 80053 COMPREHEN METABOLIC PANEL: CPT

## 2020-08-04 PROCEDURE — 85025 COMPLETE CBC W/AUTO DIFF WBC: CPT

## 2020-08-04 PROCEDURE — 99223 PR INITIAL HOSPITAL CARE,LEVL III: ICD-10-PCS | Mod: ,,, | Performed by: NEUROLOGICAL SURGERY

## 2020-08-04 PROCEDURE — 84100 ASSAY OF PHOSPHORUS: CPT

## 2020-08-04 PROCEDURE — 99223 1ST HOSP IP/OBS HIGH 75: CPT | Mod: ,,, | Performed by: NEUROLOGICAL SURGERY

## 2020-08-04 PROCEDURE — 83735 ASSAY OF MAGNESIUM: CPT

## 2020-08-04 RX ORDER — L. ACIDOPHILUS/L.BULGARICUS 100MM CELL
1 GRANULES IN PACKET (EA) ORAL 2 TIMES DAILY
Qty: 60 PACKET | Refills: 1 | COMMUNITY
Start: 2020-08-04 | End: 2021-04-08

## 2020-08-04 RX ORDER — TRAMADOL HYDROCHLORIDE 50 MG/1
50 TABLET ORAL EVERY 8 HOURS PRN
Qty: 150 TABLET | Refills: 0 | Status: ON HOLD | OUTPATIENT
Start: 2020-08-04 | End: 2020-11-06 | Stop reason: SDUPTHER

## 2020-08-04 RX ORDER — FUROSEMIDE 20 MG/1
60 TABLET ORAL DAILY
Qty: 90 TABLET | Refills: 11 | Status: SHIPPED | OUTPATIENT
Start: 2020-08-05 | End: 2021-04-08

## 2020-08-04 RX ORDER — NIFEDIPINE 90 MG/1
90 TABLET, EXTENDED RELEASE ORAL DAILY
Qty: 30 TABLET | Refills: 11 | Status: SHIPPED | OUTPATIENT
Start: 2020-08-05 | End: 2021-08-05

## 2020-08-04 RX ORDER — NIFEDIPINE 30 MG/1
90 TABLET, EXTENDED RELEASE ORAL DAILY
Status: DISCONTINUED | OUTPATIENT
Start: 2020-08-05 | End: 2020-08-04 | Stop reason: HOSPADM

## 2020-08-04 RX ORDER — SODIUM BICARBONATE 650 MG/1
1300 TABLET ORAL 3 TIMES DAILY
Qty: 180 TABLET | Refills: 11 | Status: ON HOLD | COMMUNITY
Start: 2020-08-04 | End: 2021-06-28 | Stop reason: HOSPADM

## 2020-08-04 RX ORDER — METRONIDAZOLE 500 MG/1
500 TABLET ORAL EVERY 8 HOURS
Qty: 24 TABLET | Refills: 0 | Status: SHIPPED | OUTPATIENT
Start: 2020-08-04 | End: 2020-08-12

## 2020-08-04 RX ORDER — HYDRALAZINE HYDROCHLORIDE 25 MG/1
25 TABLET, FILM COATED ORAL EVERY 8 HOURS
Qty: 90 TABLET | Refills: 11 | Status: ON HOLD | OUTPATIENT
Start: 2020-08-04 | End: 2021-03-22 | Stop reason: HOSPADM

## 2020-08-04 RX ADMIN — MENTHOL, METHYL SALICYLATE: 10; 15 CREAM TOPICAL at 08:08

## 2020-08-04 RX ADMIN — CARVEDILOL 12.5 MG: 12.5 TABLET, FILM COATED ORAL at 08:08

## 2020-08-04 RX ADMIN — FAMOTIDINE 20 MG: 20 TABLET ORAL at 08:08

## 2020-08-04 RX ADMIN — SODIUM BICARBONATE TAB 325 MG 1300 MG: 325 TAB at 03:08

## 2020-08-04 RX ADMIN — NIFEDIPINE 60 MG: 30 TABLET, FILM COATED, EXTENDED RELEASE ORAL at 08:08

## 2020-08-04 RX ADMIN — METRONIDAZOLE 500 MG: 500 TABLET ORAL at 03:08

## 2020-08-04 RX ADMIN — FUROSEMIDE 40 MG: 40 TABLET ORAL at 08:08

## 2020-08-04 RX ADMIN — OXYCODONE HYDROCHLORIDE AND ACETAMINOPHEN 1 TABLET: 5; 325 TABLET ORAL at 09:08

## 2020-08-04 RX ADMIN — SODIUM BICARBONATE TAB 325 MG 1300 MG: 325 TAB at 08:08

## 2020-08-04 RX ADMIN — CALCITRIOL CAPSULES 0.25 MCG 0.25 MCG: 0.25 CAPSULE ORAL at 08:08

## 2020-08-04 RX ADMIN — LACTOBACILLUS ACIDOPHILUS / LACTOBACILLUS BULGARICUS 1 EACH: 100 MILLION CFU STRENGTH GRANULES at 08:08

## 2020-08-04 RX ADMIN — HYDRALAZINE HYDROCHLORIDE 25 MG: 25 TABLET, FILM COATED ORAL at 03:08

## 2020-08-04 RX ADMIN — METRONIDAZOLE 500 MG: 500 TABLET ORAL at 07:08

## 2020-08-04 RX ADMIN — HYDRALAZINE HYDROCHLORIDE 25 MG: 25 TABLET, FILM COATED ORAL at 07:08

## 2020-08-04 NOTE — HPI
Joyce Riley is a 86 y.o. female who presented to [facility] from [facility] with complaints of hematochezia. She was found to be C.diff positive on 7/28 and remains on contact precautions. She has extensive medical history including Afib, CHF, CKD-stage 5, rheumatoid arthitis, and DM. She is chronically wheelchair bound. During her admission, she endorsed severe, chronic neck pain. Orthopedics was consulted and CT cervical spine was ordered. Extensive erosion of the odontoid process is visualized on CT. Neurosurgery is consulted for evaluation of neck pain and abnormal CT.     Patient reports 4 month history of severe neck pain. Denies numbness and weakness of upper and lower extremities. She further denies radicular pain.

## 2020-08-04 NOTE — PROGRESS NOTES
Follow-up Information     Anil Adair DO On 8/20/2020.    Specialty: Neurosurgery  Why: Hospital discharge follow up, appointment scheduled August 20th  @0800  Contact information:  [address]  [address]  [facility] [address] [phone]

## 2020-08-04 NOTE — DISCHARGE SUMMARY
[facility] Hospital Medicine  Discharge Summary      Patient Name: Joyce Riley  MRN: [id number]  Admission Date: 7/28/2020  Hospital Length of Stay: 7 days  Discharge Date and Time:  08/04/2020 3:05 PM  Attending Physician: Balwinder Restrepo MD   Discharging Provider: Balwinder Restrepo MD  Primary Care Provider: Kalpana Staton MD      HPI:   Mrs. Joyce Riley is a 86 y.o. female [facility] resident known to me with renal hypertension, type 2 diabetes mellitus (HbA1c 5.1% Mar 2020), chronic diastolic heart failure (LVEF 80% Apr 2019), CKD stage 5, paroxysmal atrial fibrillation ([id number] score 5) not on chronic anticoagulation, anemia of renal disease, and history of stroke who presents to [facility] ED with complaints of hematochezia today.  She says that she has occasional rectal bleeding for the past few months as well as chronic lower abdominal cramping worse on eating.  Today she noted blood in her stool which he described as dark red.  She denies any nausea, vomiting, hematemesis, coffee-ground emesis, melena, nor any hematuria.  She has been generally weak and fatigued but that has not been any worse than usual.  She denies any chest pains, shortness of breath, lightheadedness, dizziness, falls, nor any loss of consciousness.    * No surgery found *      Hospital Course:   85 y/o female from NH admitted with hematochezia.  Anemia of CKD, but Hemoglobin lower than baseline at 7.8.  Transfused one unit of blood with adequate correction of H/H.  GI consulted.  Recent hx of diverticulitis, but no evidence of this time on CT.  Noted leukocytosis on labs.  Daughter stating patient has been having on and off diarrhea for past month.  Cdiff Ag positive, but toxin negative.  Started on oral Flagyl pending PCR which returned positive. Has required 3U RBC at this time to maintain Hb > 7 over a few days. Renal failure worsening and not HD candidate due to previous intolerance and now  electing to not pursue this. Nephrology consulted. Patient's H/H remained stable. PT/OT consulted and recommended returning to NH.  Ortho consulted for worsening neck pain and CT Neck was obtained with old fracture and possible mass which was not demonstrated on MRI. Ortho spine was consulted who recommended soft neck brace and to follow up in 2-4 weeks in his clinic. She was stable and to return to NH with Cdiff contact precautions until two days without diarrhea. To complete a total of 14 days of flagyl for C diff. Referral to nephrology for CKD.    Consults:   Consults (From admission, onward)        Status Ordering Provider     Inpatient consult to Gastroenterology  Once     Provider:  Jose Salazar MD    Completed ZIGGY GOSS     Inpatient consult to Nephrology  Once     Provider:  Samanta Baca MD    Completed JENNIFER BELL     Inpatient consult to Neurosurgery  Once     Provider:  Anil Adair DO    Completed ERLIN MCCLAIN     Inpatient consult to Orthopedic Surgery  Once     Provider:  Erlin Mcclain MD    Completed MARIANA CASTELAN     Inpatient consult to Palliative Care  Once     Provider:  Bonita Coreas NP    Completed PEDRO SHERIDAN     Inpatient consult to Social Work  Once     Provider:  (Not yet assigned)    Completed PEDRO SHERIDAN          No new Assessment & Plan notes have been filed under this hospital service since the last note was generated.  Service: Hospital Medicine    Final Active Diagnoses:    Diagnosis Date Noted POA    PRINCIPAL PROBLEM:  Hematochezia [K92.1] 03/15/2020 Yes    Gastrointestinal hemorrhage [K92.2]  Yes    Goals of care, counseling/discussion [Z71.89] 07/31/2020 Not Applicable    Clostridium difficile infection [A49.8] 07/31/2020 Yes    Neck pain, chronic [M54.2, G89.29] 07/30/2020 Yes    Leukocytosis [D72.829] 07/29/2020 Yes    History of stroke [Z86.73] 07/28/2020 Not Applicable     Chronic    Acute blood  loss anemia [D62] 07/19/2019 Yes    Renal hypertension [I12.9] 04/10/2019 Yes     Chronic    Chronic diastolic heart failure [I50.32] 04/10/2019 Yes     Chronic    Paroxysmal atrial fibrillation [I48.0] 04/10/2019 Yes     Chronic    CKD stage 5 [N18.5] 04/01/2019 Yes     Chronic    Anemia of renal disease [N18.9, D63.1] 04/01/2019 Yes     Chronic    Type 2 diabetes mellitus, controlled, 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] 03/31/2019 Yes     Chronic      Problems Resolved During this Admission:       Discharged Condition: fair    Disposition: Long Term Care    Follow Up:  Follow-up Information     Anil Adair,  On 8/20/2020.    Specialty: Neurosurgery  Why: Hospital discharge follow up, appointment scheduled August 20th  @0800  Contact information:  [address]  [address]  [place] [id number] [phone]                 Patient Instructions:      Diet diabetic     Activity as tolerated       Significant Diagnostic Studies: CT Neck with old fracture of odontoid, concern for possible malignant process.     MRI Cspine spondylosis with no mention of mass or cord compression.    C diff PCR pos    Pending Diagnostic Studies:     None         Medications:  Reconciled Home Medications:      Medication List      START taking these medications    hydrALAZINE 25 MG tablet  Commonly known as: APRESOLINE  Take 1 tablet (25 mg total) by mouth every 8 (eight) hours.     lactobacillus acidophilus & bulgar 100 million cell packet  Commonly known as: LACTINEX  Take 1 packet (1 each total) by mouth 2 (two) times daily.     metroNIDAZOLE 500 MG tablet  Commonly known as: FLAGYL  Take 1 tablet (500 mg total) by mouth every 8 (eight) hours. for 8 days     traMADoL 50 mg tablet  Commonly known as: ULTRAM  Take 1 tablet (50 mg total) by mouth every 8 (eight) hours as needed for Pain.        CHANGE how you take these medications    acetaminophen 325 MG tablet  Commonly known as: TYLENOL  Take 2 tablets (650 mg total) by mouth  every 4 (four) hours as needed.  What changed: when to take this     furosemide 20 MG tablet  Commonly known as: LASIX  Take 3 tablets (60 mg total) by mouth once daily.  Start taking on: August 5, 2020  What changed:   · medication strength  · how much to take     NIFEdipine 90 MG (OSM) 24 hr tablet  Commonly known as: PROCARDIA-XL  Take 1 tablet (90 mg total) by mouth once daily.  Start taking on: August 5, 2020  What changed:   · medication strength  · how much to take     sodium bicarbonate 650 MG tablet  Take 2 tablets (1,300 mg total) by mouth 3 (three) times daily.  What changed: when to take this        CONTINUE taking these medications    albuterol-ipratropium 2.5 mg-0.5 mg/3 mL nebulizer solution  Commonly known as: DUO-NEB  Take 3 mLs by nebulization every 4 (four) hours as needed for Wheezing. Rescue     calcitRIOL 0.25 MCG Cap  Commonly known as: ROCALTROL  Take 1 capsule (0.25 mcg total) by mouth every other day.     carvediloL 12.5 MG tablet  Commonly known as: COREG  Take 1 tablet (12.5 mg total) by mouth 2 (two) times daily.     EPOGEN 10,000 unit/mL injection  Generic drug: epoetin diana  Inject 1 mL (10,000 Units total) into the skin every 7 days.     ergocalciferol 50,000 unit Cap  Commonly known as: ERGOCALCIFEROL  Take 1 capsule (50,000 Units total) by mouth every 7 days.     ferrous sulfate 325 mg (65 mg iron) Tab tablet  Commonly known as: FEOSOL  Take 325 mg by mouth 2 (two) times daily.     insulin degludec 100 unit/mL (3 mL) Inpn  Commonly known as: TRESIBA FLEXTOUCH U-100  Inject 5 Units into the skin every evening.     methyl salicylate-menthol 15-10% 15-10 % Crea  Apply topically 4 (four) times daily.     NEPHRO-MICHAEL 0.8 mg Tab  Generic drug: B complex-vitamin C-folic acid  Take by mouth once daily.     senna-docusate 8.6-50 mg 8.6-50 mg per tablet  Commonly known as: PERICOLACE  Take 1 tablet by mouth 2 (two) times daily.        STOP taking these medications    magnesium oxide 500 mg  Tab     polyethylene glycol 17 gram Pwpk  Commonly known as: GLYCOLAX            Indwelling Lines/Drains at time of discharge:   Lines/Drains/Airways     None                 Time spent on the discharge of patient: 41 minutes  Patient was seen and examined on the date of discharge and determined to be suitable for discharge.         Balwinder Restrepo MD  Department of Hospital Medicine  [facility]

## 2020-08-04 NOTE — SUBJECTIVE & OBJECTIVE
Medications Prior to Admission   Medication Sig Dispense Refill Last Dose    albuterol-ipratropium (DUO-NEB) 2.5 mg-0.5 mg/3 mL nebulizer solution Take 3 mLs by nebulization every 4 (four) hours as needed for Wheezing. Rescue   7/27/2020    calcitRIOL (ROCALTROL) 0.25 MCG Cap Take 1 capsule (0.25 mcg total) by mouth every other day.   7/27/2020    carvediloL (COREG) 12.5 MG tablet Take 1 tablet (12.5 mg total) by mouth 2 (two) times daily. 60 tablet 11 7/27/2020    furosemide (LASIX) 40 MG tablet Take 1 tablet (40 mg total) by mouth once daily. 30 tablet 11 7/27/2020    insulin degludec (TRESIBA FLEXTOUCH U-100) 100 unit/mL (3 mL) InPn Inject 5 Units into the skin every evening.   7/27/2020    magnesium oxide 500 mg Tab Take 500 mg by mouth once daily. 90 each 3 7/27/2020    polyethylene glycol (GLYCOLAX) 17 gram PwPk Take 17 g by mouth 2 (two) times daily as needed.  0 7/27/2020    acetaminophen (TYLENOL) 325 MG tablet Take 2 tablets (650 mg total) by mouth every 4 (four) hours as needed. (Patient taking differently: Take 650 mg by mouth 2 (two) times daily. )  0 Unknown    B complex-vitamin C-folic acid (NEPHRO-MICHAEL) 0.8 mg Tab Take by mouth once daily.   Unknown    epoetin diana (EPOGEN) 10,000 unit/mL injection Inject 1 mL (10,000 Units total) into the skin every 7 days. 1 mL 0 Unknown    ergocalciferol (ERGOCALCIFEROL) 50,000 unit Cap Take 1 capsule (50,000 Units total) by mouth every 7 days.   Unknown    ferrous sulfate (FEOSOL) 325 mg (65 mg iron) Tab tablet Take 325 mg by mouth 2 (two) times daily.       methyl salicylate-menthol 15-10% 15-10 % Crea Apply topically 4 (four) times daily.  0 Unknown    NIFEdipine (PROCARDIA-XL) 60 MG (OSM) 24 hr tablet Take 1 tablet (60 mg total) by mouth once daily. (Patient taking differently: Take 30 mg by mouth once daily. HOLD FOR SBP <110) 30 tablet 11     senna-docusate 8.6-50 mg (PERICOLACE) 8.6-50 mg per tablet Take 1 tablet by mouth 2 (two) times daily.    Unknown    sodium bicarbonate 650 MG tablet Take 2 tablets (1,300 mg total) by mouth 2 (two) times daily. 120 tablet 11 Unknown       Review of patient's allergies indicates:   Allergen Reactions    Indomethacin     Nsaids (non-steroidal anti-inflammatory drug)        Past Medical History:   Diagnosis Date    Anemia     Anticoagulant long-term use     Arthritis     Atrial fibrillation     Cataract     CHF (congestive heart failure)     CKD (chronic kidney disease), stage V     Diabetes mellitus     Type 2    GERD (gastroesophageal reflux disease)     GI bleed 2018    Gout     Hypertension     Obese     Requires assistance with all daily activities     Stroke     Wheelchair dependent      Past Surgical History:   Procedure Laterality Date     SECTION      Patient unsure, believe she had 3-4    CHOLECYSTECTOMY      EYE SURGERY      HYSTERECTOMY      JOINT REPLACEMENT Right     knee    TOTAL KNEE ARTHROPLASTY Right      Family History     Problem Relation (Age of Onset)    Cancer Mother, Brother    Diabetes Mother    Hypertension Mother        Tobacco Use    Smoking status: Never Smoker    Smokeless tobacco: Never Used   Substance and Sexual Activity    Alcohol use: No    Drug use: No    Sexual activity: Never     Review of Systems   +neck pain  +toe pain from ingrown toenails  +diarrhea  Denies numbness, weakness, changes in gait (chronically wheelchair bound)  Family history of cancer. No personal history of cancer per chart review     Objective:     Weight: 80 kg (176 lb 5.9 oz)(weighed with SCDs and boots)  Body mass index is 28.47 kg/m².  Vital Signs (Most Recent):  Temp: 98.3 °F (36.8 °C) (20)  Pulse: 80 (20)  Resp: 18 (20)  BP: (!) 174/75 (20)  SpO2: 100 % (20) Vital Signs (24h Range):  Temp:  [97 °F (36.1 °C)-98.6 °F (37 °C)] 98.3 °F (36.8 °C)  Pulse:  [76-88] 80  Resp:  [17-22] 18  SpO2:  [93 %-100 %] 100  %  BP: (149-175)/(67-75) 174/75       Neurosurgery Physical Exam   General: well developed, well nourished, no distress  Neurologic: Alert and oriented to person, place, and location. Unable to specify year. Thought content appropriate.  GCS: Motor: 6/Verbal: 5/Eyes: 4 GCS Total: 15  Cranial nerves: II-XII grossly intact  Neck: supple, without obvious masses or lesions  Skin: grossly intact in all 4 extremities without obvious rashes or lesions    Cervical ROM - full  Lumbar ROM - full  Motor Strength: No focal numbness or weakness  Strength  Deltoids Triceps Biceps Hand    Upper: R 5/5 5/5 5/5 5/5    L 5/5 5/5 5/5 5/5     Iliopsoas Tibialis  anterior Gastro- cnemius EHL   Lower: R 5/5 2/5 4/5 2/5    L 5/5 2/5 4/5 2/5   Unable to fully assess distal lower extremity strength due to padding over lower legs for prevention of pressure sores  Sensory: intact to light touch B/L UE and LE  Salvador's - Negative              Ankle Clonus - Negative           Midline bony tenderness: present in posterior neck        Significant Labs:  Recent Labs   Lab 08/03/20  0352 08/04/20  0513   * 73    137   K 4.9 4.8    102   CO2 23 27   BUN 65* 63*   CREATININE 5.2* 5.3*   CALCIUM 8.7 9.2   MG 1.8 1.9     Recent Labs   Lab 08/03/20  0352 08/04/20  0513   WBC 12.51 12.84*   HGB 8.8* 9.1*   HCT 27.4* 28.3*    275     No results for input(s): LABPT, INR, APTT in the last 48 hours.  Microbiology Results (last 7 days)     Procedure Component Value Units Date/Time    Blood Culture #1 **CANNOT BE ORDERED STAT** [[id number]] Collected: 07/28/20 1946    Order Status: Completed Specimen: Blood from Peripheral, Hand, Right Updated: 08/02/20 2303     Blood Culture, Routine No growth after 5 days.    Blood Culture #2 **CANNOT BE ORDERED STAT** [[id number]] Collected: 07/28/20 1955    Order Status: Completed Specimen: Blood from Peripheral, Antecubital, Left Updated: 08/02/20 1452     Blood Culture, Routine No growth  after 5 days.    Stool culture **CANNOT BE ORDERED STAT** [[id number]] Collected: 07/28/20 1850    Order Status: Completed Specimen: Stool Updated: 07/31/20 0726     Stool Culture No Salmonella,Shigella,Vibrio,Campylobacter,Yersinia isolated.    C Diff Toxin by PCR [[id number]]  (Abnormal) Collected: 07/28/20 1850    Order Status: Completed Updated: 07/30/20 1408     C. diff PCR Positive    Clostridium difficile EIA [[id number]]  (Abnormal) Collected: 07/28/20 1850    Order Status: Completed Specimen: Stool Updated: 07/29/20 1221     C. diff Antigen Positive     Comment: results called to Becky Borne 07/29/2020  12:20        C difficile Toxins A+B, EIA Negative     Comment: Testing not recommended for children <24 months old.       E. coli 0157 antigen [[id number]] Collected: 07/28/20 1850    Order Status: Completed Specimen: Stool Updated: 07/29/20 1056     Shiga Toxin 1 E.coli Negative     Shiga Toxin 2 E.coli Negative            Significant Diagnostics:  I have personally reviewed imaging and agree with the findings.     CT cervical spine 8/3/2020  1. Extensive erosive changes of the odontoid process associated with soft tissue swelling.  It is felt that these changes are likely those from rheumatoid disease involving the craniocervical junction.  Remote possibility of metastatic disease.  2. Severe arthritic changes of the left C1-C2 articulation either from rheumatoid disease or may be related to osteoarthritis.  3. Multilevel severe disc degeneration throughout the rest of the intervertebral disc spaces associated with marginal anterior and posterior osteophytes and bilateral foraminal stenosis.  There is also facet arthropathy at multiple intervertebral disc spaces.

## 2020-08-04 NOTE — PLAN OF CARE
[facility]     Department of Hospital Medicine     [address] [place] [address]     [phone] after hours  [phone] fax       NURSING HOME ORDERS    08/04/2020    Admit to Nursing Home:  Regular Bed      Diagnoses:  Active Hospital Problems    Diagnosis  POA    *Hematochezia [K92.1]  Yes    Gastrointestinal hemorrhage [K92.2]  Yes    Goals of care, counseling/discussion [Z71.89]  Not Applicable    Clostridium difficile infection [A49.8]  Yes    Neck pain, chronic [M54.2, G89.29]  Yes    Leukocytosis [D72.829]  Yes    History of stroke [Z86.73]  Not Applicable     Chronic    Acute blood loss anemia [D62]  Yes    Renal hypertension [I12.9]  Yes     Chronic    Chronic diastolic heart failure [I50.32]  Yes     Chronic    Paroxysmal atrial fibrillation [I48.0]  Yes     Chronic    CKD stage 5 [N18.5]  Yes     Chronic    Anemia of renal disease [N18.9, D63.1]  Yes     Chronic    Type 2 diabetes mellitus, controlled, 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       Patient is homebound due to:  Hematochezia    Allergies:  Review of patient's allergies indicates:   Allergen Reactions    Indomethacin     Nsaids (non-steroidal anti-inflammatory drug)        Vitals: Once weekly    Diet: renal diet   Supplement:  1 can every three times a day with meals                         Type: Nepro     Acitivities:     - Up in a chair each morning as tolerated   - Ambulate with assistance to bathroom   - Scheduled walks once each shift (every 8 hours)   - May ambulate independently   - May use walker, cane, or self-propelled wheelchair    Nursing Precautions:    - Aspiration precautions:             - Total assistance with meals            -  Upright 90 degrees befor during and after meals             -  Suction at bedside          - Fall precautions per nursing home protocol   - Seizure  precaution per nursing home protocol   - Decubitus precautions:        -  for positioning   - Pressure reducing foam mattress   - Turn patient every two hours. Use wedge pillows to anchor patient  - contact/C diff precautions: can be discontinued after 2 days of no diarrhea    CONSULTS:     Physical Therapy to evaluate and treat     Occupational Therapy to evaluate and treat     Speech Therapy  to evaluate and treat     Nutrition to evaluate and recommend diet           DIABETES CARE:      Check blood sugar:      Fingerstick blood sugar a.m and p.m.      Report CBG < 60 or > 400 to physician.                                          Insulin Sliding Scale          Glucose  Novolog Insulin Subcutaneous        0 - 60   Orange juice or glucose tablet, hold insulin      No insulin   201-250  2 units   251-300  4 units   301-350  6 units   351-400  8 units   >400   10 units then call physician      Medications: Discontinue all previous medication orders, if any. See new list below.   Joyce Riley   Home Medication Instructions WILLA:[id number]    Printed on:08/04/20 1237   Medication Information                      acetaminophen (TYLENOL) 325 MG tablet  Take 2 tablets (650 mg total) by mouth every 4 (four) hours as needed.             albuterol-ipratropium (DUO-NEB) 2.5 mg-0.5 mg/3 mL nebulizer solution  Take 3 mLs by nebulization every 4 (four) hours as needed for Wheezing. Rescue             B complex-vitamin C-folic acid (NEPHRO-MICHAEL) 0.8 mg Tab  Take by mouth once daily.             calcitRIOL (ROCALTROL) 0.25 MCG Cap  Take 1 capsule (0.25 mcg total) by mouth every other day.             carvediloL (COREG) 12.5 MG tablet  Take 1 tablet (12.5 mg total) by mouth 2 (two) times daily.             epoetin diana (EPOGEN) 10,000 unit/mL injection  Inject 1 mL (10,000 Units total) into the skin every 7 days.             ergocalciferol (ERGOCALCIFEROL) 50,000 unit Cap  Take 1 capsule (50,000 Units total)  by mouth every 7 days.             ferrous sulfate (FEOSOL) 325 mg (65 mg iron) Tab tablet  Take 325 mg by mouth 2 (two) times daily.             furosemide (LASIX) 20 MG tablet  Take 3 tablets (60 mg total) by mouth once daily.             hydrALAZINE (APRESOLINE) 25 MG tablet  Take 1 tablet (25 mg total) by mouth every 8 (eight) hours.             insulin degludec (TRESIBA FLEXTOUCH U-100) 100 unit/mL (3 mL) InPn  Inject 5 Units into the skin every evening.             lactobacillus acidophilus & bulgar (LACTINEX) 100 million cell packet  Take 1 packet (1 each total) by mouth 2 (two) times daily.             methyl salicylate-menthol 15-10% 15-10 % Crea  Apply topically 4 (four) times daily.             metroNIDAZOLE (FLAGYL) 500 MG tablet  Take 1 tablet (500 mg total) by mouth every 8 (eight) hours. for 8 days             NIFEdipine (PROCARDIA-XL) 90 MG (OSM) 24 hr tablet  Take 1 tablet (90 mg total) by mouth once daily.             senna-docusate 8.6-50 mg (PERICOLACE) 8.6-50 mg per tablet  Take 1 tablet by mouth 2 (two) times daily.             sodium bicarbonate 650 MG tablet  Take 2 tablets (1,300 mg total) by mouth 3 (three) times daily.             traMADoL (ULTRAM) 50 mg tablet  Take 1 tablet (50 mg total) by mouth every 8 (eight) hours as needed for Pain.                       _________________________________  Balwinder Restrepo MD  08/04/2020

## 2020-08-04 NOTE — CONSULTS
[facility]  Neurosurgery  Consult Note    Inpatient consult to Neurosurgery  Consult performed by: Lorna Stewart PA-C  Consult ordered by: Akil Mcclain MD        Subjective:     Chief Complaint: neck pain    History of Present Illness: Joyce Riley is a 86 y.o. female who presented to [facility] from [facility] with complaints of hematochezia. She was found to be C.diff positive on 7/28 and remains on contact precautions. She has extensive medical history including Afib, CHF, CKD-stage 5, rheumatoid arthitis, and DM. She is chronically wheelchair bound. During her admission, she endorsed severe, chronic neck pain. Orthopedics was consulted and CT cervical spine was ordered. Extensive erosion of the odontoid process is visualized on CT. Neurosurgery is consulted for evaluation of neck pain and abnormal CT.     Patient reports 4 month history of severe neck pain. Denies numbness and weakness of upper and lower extremities. She further denies radicular pain.     Medications Prior to Admission   Medication Sig Dispense Refill Last Dose    albuterol-ipratropium (DUO-NEB) 2.5 mg-0.5 mg/3 mL nebulizer solution Take 3 mLs by nebulization every 4 (four) hours as needed for Wheezing. Rescue   7/27/2020    calcitRIOL (ROCALTROL) 0.25 MCG Cap Take 1 capsule (0.25 mcg total) by mouth every other day.   7/27/2020    carvediloL (COREG) 12.5 MG tablet Take 1 tablet (12.5 mg total) by mouth 2 (two) times daily. 60 tablet 11 7/27/2020    furosemide (LASIX) 40 MG tablet Take 1 tablet (40 mg total) by mouth once daily. 30 tablet 11 7/27/2020    insulin degludec (TRESIBA FLEXTOUCH U-100) 100 unit/mL (3 mL) InPn Inject 5 Units into the skin every evening.   7/27/2020    magnesium oxide 500 mg Tab Take 500 mg by mouth once daily. 90 each 3 7/27/2020    polyethylene glycol (GLYCOLAX) 17 gram PwPk Take 17 g by mouth 2 (two) times daily as needed.  0 7/27/2020     acetaminophen (TYLENOL) 325 MG tablet Take 2 tablets (650 mg total) by mouth every 4 (four) hours as needed. (Patient taking differently: Take 650 mg by mouth 2 (two) times daily. )  0 Unknown    B complex-vitamin C-folic acid (NEPHRO-MICHAEL) 0.8 mg Tab Take by mouth once daily.   Unknown    epoetin diana (EPOGEN) 10,000 unit/mL injection Inject 1 mL (10,000 Units total) into the skin every 7 days. 1 mL 0 Unknown    ergocalciferol (ERGOCALCIFEROL) 50,000 unit Cap Take 1 capsule (50,000 Units total) by mouth every 7 days.   Unknown    ferrous sulfate (FEOSOL) 325 mg (65 mg iron) Tab tablet Take 325 mg by mouth 2 (two) times daily.       methyl salicylate-menthol 15-10% 15-10 % Crea Apply topically 4 (four) times daily.  0 Unknown    NIFEdipine (PROCARDIA-XL) 60 MG (OSM) 24 hr tablet Take 1 tablet (60 mg total) by mouth once daily. (Patient taking differently: Take 30 mg by mouth once daily. HOLD FOR SBP <110) 30 tablet 11     senna-docusate 8.6-50 mg (PERICOLACE) 8.6-50 mg per tablet Take 1 tablet by mouth 2 (two) times daily.   Unknown    sodium bicarbonate 650 MG tablet Take 2 tablets (1,300 mg total) by mouth 2 (two) times daily. 120 tablet 11 Unknown       Review of patient's allergies indicates:   Allergen Reactions    Indomethacin     Nsaids (non-steroidal anti-inflammatory drug)        Past Medical History:   Diagnosis Date    Anemia     Anticoagulant long-term use     Arthritis     Atrial fibrillation     Cataract     CHF (congestive heart failure)     CKD (chronic kidney disease), stage V     Diabetes mellitus     Type 2    GERD (gastroesophageal reflux disease)     GI bleed 2018    Gout     Hypertension     Obese     Requires assistance with all daily activities     Stroke     Wheelchair dependent      Past Surgical History:   Procedure Laterality Date     SECTION      Patient unsure, believe she had 3-4    CHOLECYSTECTOMY      EYE SURGERY      HYSTERECTOMY       JOINT REPLACEMENT Right     knee    TOTAL KNEE ARTHROPLASTY Right      Family History     Problem Relation (Age of Onset)    Cancer Mother, Brother    Diabetes Mother    Hypertension Mother        Tobacco Use    Smoking status: Never Smoker    Smokeless tobacco: Never Used   Substance and Sexual Activity    Alcohol use: No    Drug use: No    Sexual activity: Never     Review of Systems   +neck pain  +toe pain from ingrown toenails  +diarrhea  Denies numbness, weakness, changes in gait (chronically wheelchair bound)  Family history of cancer. No personal history of cancer per chart review     Objective:     Weight: 80 kg (176 lb 5.9 oz)(weighed with SCDs and boots)  Body mass index is 28.47 kg/m².  Vital Signs (Most Recent):  Temp: 98.3 °F (36.8 °C) (08/04/20 0717)  Pulse: 80 (08/04/20 0717)  Resp: 18 (08/04/20 0912)  BP: (!) 174/75 (08/04/20 0717)  SpO2: 100 % (08/04/20 0717) Vital Signs (24h Range):  Temp:  [97 °F (36.1 °C)-98.6 °F (37 °C)] 98.3 °F (36.8 °C)  Pulse:  [76-88] 80  Resp:  [17-22] 18  SpO2:  [93 %-100 %] 100 %  BP: (149-175)/(67-75) 174/75       Neurosurgery Physical Exam   General: well developed, well nourished, no distress  Neurologic: Alert and oriented to person, place, and location. Unable to specify year. Thought content appropriate.  GCS: Motor: 6/Verbal: 5/Eyes: 4 GCS Total: 15  Cranial nerves: II-XII grossly intact  Neck: supple, without obvious masses or lesions  Skin: grossly intact in all 4 extremities without obvious rashes or lesions    Cervical ROM - full  Lumbar ROM - full  Motor Strength: No focal numbness or weakness  Strength  Deltoids Triceps Biceps Hand    Upper: R 5/5 5/5 5/5 5/5    L 5/5 5/5 5/5 5/5     Iliopsoas Tibialis  anterior Gastro- cnemius EHL   Lower: R 5/5 2/5 4/5 2/5    L 5/5 2/5 4/5 2/5   Unable to fully assess distal lower extremity strength due to padding over lower legs for prevention of pressure sores  Sensory: intact to light touch B/L UE and  LE  Salvador's - Negative              Ankle Clonus - Negative           Midline bony tenderness: present in posterior neck        Significant Labs:  Recent Labs   Lab 08/03/20  0352 08/04/20  0513   * 73    137   K 4.9 4.8    102   CO2 23 27   BUN 65* 63*   CREATININE 5.2* 5.3*   CALCIUM 8.7 9.2   MG 1.8 1.9     Recent Labs   Lab 08/03/20  0352 08/04/20  0513   WBC 12.51 12.84*   HGB 8.8* 9.1*   HCT 27.4* 28.3*    275     No results for input(s): LABPT, INR, APTT in the last 48 hours.  Microbiology Results (last 7 days)     Procedure Component Value Units Date/Time    Blood Culture #1 **CANNOT BE ORDERED STAT** [[id number]] Collected: 07/28/20 1946    Order Status: Completed Specimen: Blood from Peripheral, Hand, Right Updated: 08/02/20 2303     Blood Culture, Routine No growth after 5 days.    Blood Culture #2 **CANNOT BE ORDERED STAT** [[id number]] Collected: 07/28/20 1955    Order Status: Completed Specimen: Blood from Peripheral, Antecubital, Left Updated: 08/02/20 2303     Blood Culture, Routine No growth after 5 days.    Stool culture **CANNOT BE ORDERED STAT** [[id number]] Collected: 07/28/20 1850    Order Status: Completed Specimen: Stool Updated: 07/31/20 0726     Stool Culture No Salmonella,Shigella,Vibrio,Campylobacter,Yersinia isolated.    C Diff Toxin by PCR [[id number]]  (Abnormal) Collected: 07/28/20 1850    Order Status: Completed Updated: 07/30/20 1408     C. diff PCR Positive    Clostridium difficile EIA [[id number]]  (Abnormal) Collected: 07/28/20 1850    Order Status: Completed Specimen: Stool Updated: 07/29/20 1221     C. diff Antigen Positive     Comment: results called to Becky Borne 07/29/2020  12:20        C difficile Toxins A+B, EIA Negative     Comment: Testing not recommended for children <24 months old.       E. coli 0157 antigen [[id number]] Collected: 07/28/20 1850    Order Status: Completed Specimen: Stool Updated: 07/29/20 1056     Shiga Toxin 1 E.coli Negative      Shiga Toxin 2 E.coli Negative            Significant Diagnostics:  I have personally reviewed imaging and agree with the findings.     CT cervical spine 8/3/2020  1. Extensive erosive changes of the odontoid process associated with soft tissue swelling.  It is felt that these changes are likely those from rheumatoid disease involving the craniocervical junction.  Remote possibility of metastatic disease.  2. Severe arthritic changes of the left C1-C2 articulation either from rheumatoid disease or may be related to osteoarthritis.  3. Multilevel severe disc degeneration throughout the rest of the intervertebral disc spaces associated with marginal anterior and posterior osteophytes and bilateral foraminal stenosis.  There is also facet arthropathy at multiple intervertebral disc spaces.    Assessment/Plan:     Neck pain, chronic  Joyce Riley is a 86 y.o. female admitted to [facility] for GI bleed, found to have C. Diff. She endorses severe neck pain during her admission, prompting cervical CT, which showed significant erosion of the odontoid process. She is grossly neurologically intact on exam and denies radicular pain, numbness, or weakness. Chronically wheelchair bound with muscle wasting of the BLE.     -CT is concerning for possible tumor erosion vs severe degenerative changes related to rheumatoid arthritis. Recommend MRI cervical spine w/w/o contrast to further investigate.   -Also recommend cervical xray with flexion and extension views to assess for stability    Case discussed with Dr. Adair.           Thank you for your consult.    Lorna Stewart PA-C  Neurosurgery  [facility]

## 2020-08-04 NOTE — NURSING
Mariano from [employer] [facility],  Transported pt off the floor to [place] in w/c, pt in no s/s of distress.

## 2020-08-04 NOTE — PROGRESS NOTES
[facility]  Nephrology  Consult Note    Patient Name: Joyce Riley  MRN: [id number]  Admission Date: 7/28/2020  Hospital Length of Stay: 7 days  Attending Provider: Balwinder Restrepo MD   Primary Care Physician: Kalpana Staton MD  Principal Problem:Hematochezia   Date of service 8/4/2020    Consults ROXANNE  Subjective:     Interval History: Increased bicarb yesterday. Renal function remains stable, GFR of 8 unchanged for past 4-5 days. Remains on RA. Planning for MRI today to further evaluate C spine.      Current Facility-Administered Medications   Medication Frequency    0.9%  NaCl infusion (for blood administration) Q24H PRN    0.9%  NaCl infusion (for blood administration) Q24H PRN    acetaminophen tablet 500 mg Q6H PRN    calcitRIOL capsule 0.25 mcg Every other day    carvediloL tablet 12.5 mg BID    cloNIDine tablet 0.1 mg TID PRN    epoetin diana-epbx injection 20,000 Units Q7 Days    ergocalciferol capsule 50,000 Units Q7 Days    [START ON 8/5/2020] furosemide tablet 60 mg Daily    hydrALAZINE tablet 25 mg Q8H    lactobacillus acidophilus & bulgar 100 million cell packet 1 each BID    lidocaine 5 % patch 1 patch Q24H    melatonin tablet 9 mg Nightly PRN    methyl salicylate-menthol 15-10% cream BID    methyl salicylate-menthol 15-10% cream BID PRN    metroNIDAZOLE tablet 500 mg Q8H    [START ON 8/5/2020] NIFEdipine 24 hr tablet 90 mg Daily    ondansetron injection 8 mg Q8H PRN    prochlorperazine injection Soln 5 mg Q6H PRN    sodium bicarbonate tablet 1,300 mg TID    traMADoL tablet 50 mg Q8H PRN     Objective:     Vital Signs (Most Recent):  Temp: 98.3 °F (36.8 °C) (08/04/20 0717)  Pulse: 80 (08/04/20 0717)  Resp: 18 (08/04/20 0912)  BP: (!) 174/75 (08/04/20 0717)  SpO2: 100 % (08/04/20 0717)  O2 Device (Oxygen Therapy): room air (08/04/20 0839) Vital Signs (24h Range):  Temp:  [97 °F (36.1 °C)-98.6 °F (37 °C)] 98.3 °F (36.8 °C)  Pulse:  [76-88] 80  Resp:  [17-20]  18  SpO2:  [93 %-100 %] 100 %  BP: (149-175)/(67-75) 174/75     Weight: 80 kg (176 lb 5.9 oz)(weighed with SCDs and boots) (08/02/20 0400)  Body mass index is 28.47 kg/m².  Body surface area is 1.93 meters squared.    I/O last 3 completed shifts:  In: 120 [P.O.:120]  Out: -     Physical Exam  Vitals signs and nursing note reviewed.   Constitutional:       General: She is not in acute distress.     Appearance: She is well-developed.   HENT:      Head: Normocephalic and atraumatic.      Mouth/Throat:      Mouth: Mucous membranes are moist.      Pharynx: Oropharynx is clear.   Eyes:      General: No scleral icterus.        Right eye: No discharge.         Left eye: No discharge.   Cardiovascular:      Rate and Rhythm: Normal rate and regular rhythm.      Heart sounds: Normal heart sounds. No murmur.   Pulmonary:      Effort: Pulmonary effort is normal.      Breath sounds: Rales present. No wheezing.   Abdominal:      General: There is no distension.      Palpations: Abdomen is soft.   Musculoskeletal:         General: No deformity.      Right lower leg: No edema.      Left lower leg: No edema.   Skin:     General: Skin is warm and dry.      Findings: No rash.   Neurological:      Mental Status: She is alert. Mental status is at baseline.   Psychiatric:         Mood and Affect: Mood normal.         Behavior: Behavior normal.         Significant Labs:  ABGs: No results for input(s): PH, PCO2, HCO3, POCSATURATED, BE in the last 168 hours.  CBC:   Recent Labs   Lab 08/04/20  0513   WBC 12.84*   RBC 2.96*   HGB 9.1*   HCT 28.3*      MCV 96   MCH 30.7   MCHC 32.2     CMP:   Recent Labs   Lab 08/04/20  0513   GLU 73   CALCIUM 9.2   ALBUMIN 2.3*   PROT 6.2      K 4.8   CO2 27      BUN 63*   CREATININE 5.3*   ALKPHOS 69   ALT 5*   AST 12   BILITOT 0.2       PTH: No results for input(s): PTH in the last 168 hours.  Recent Labs   Lab 07/31/20  1453   COLORU Yellow   SPECGRAV 1.010   PHUR 5.0   PROTEINUA 2+*    BACTERIA Moderate*   NITRITE Negative   LEUKOCYTESUR Trace*   UROBILINOGEN Negative   HYALINECASTS 0     All labs within the past 24 hours have been reviewed.    Significant Imaging:  CT: Reviewed    Assessment/Plan:     ROXANNE on CKD 4-5  - Baseline Cr 2.3-3.2, GFR 15-21%  - Last ROXANNE w/ peak Cr 8.16 w/ K 5.7 on 4/16/20; sCr 4 on this admit  - suspect multifactorial ROXANNE 2/2 dehydration/diarrhea + anemia (hgb 6.7)/GI bleed  - Renal function overall stable GFR remains 8 ; increase lasix to 60 mg PO QD  - Will need close nephrology follow up upon discharge and outpatient labs    - Avoid NSAIDs, renally dose medications, strict I&O, daily weights  - Pt is now DNR w/ reports of not wanting dialysis per Palliative care note. If/ when she needs dialysis, will discuss with patient and family. She would be suboptimal dialysis candidate 2/2 underlying dementia  - avoid nephrotoxic meds/ strict Is/Os / daily weights/ normotension    Hypervolemia   - CXR with a bit more congestion today compated to 7/28  - increase lasix to 60 mg PO QD - continue on discharge  - low Na diet   - good UOP     HTN  - uncontrolled, low Na diet  - increasing lasix to 60 mg PO QD  - continue coreg + nifedipine + lasix  - recommend uptitration nifedipine to 60mg if HTN persistent  - continue PRN clonidine for SBP>160     AOCKD, DEYVI, blood loss anemia   - Hgb trending up with tranfusions; s/p GIB  - AHSAN 20k units weekly   - Did not tolerate PO Iron 2/2 constipation  - transfuse if Hgb < 7  - monitor CBC    Metabolic Acidosis  - RXOANNE + CKD  - continue Nabicarb 1300 mg PO TID, adjust PRN  - continue to monitor labs     Vit D def, MBD  - Vit D 27;  ; CCa wnl  - ergo protocol  - continue to monitor PTH and vit d  - continue Calcitriol 0.25mcg QOD     Hyperphosphatemia  - phos trending down  - continue holding sevelamer  - renal diet  - monitor labs      Hypoalbuminemia  - alb below goal  - Nepro TID  - renal diet restrictions     Hyopmagnesemia  - Mag  trending up with replacement    CDIFF + on flagyl + PO vanc    Hx of Hyperuricemia - uric acid 7.7 in past, rechecking with next labs; avoid allopurinol     Proteinuria - UP/C 4.6g in past, avoid AC, caution with ACEi/ARB initiation 2/2 propensity for hyperkalemia & poor renal reserves; UPrCr 2 g/g, would avoid ACEi/ARB for now given ROXANNE and propensity for hyperkalemia       Arthritis  Diabetes   DHF  AFib  HLD  Pulmonary HTN  DHF    Case discussed with Dr. Restrepo    Thank you for your consult. I will follow-up with patient. Please contact us if you have any additional questions.    DOS 08/04/2020  PRESTON Santana  Nephrology  [facility]  Office [phone]

## 2020-08-04 NOTE — PLAN OF CARE
Problem: Adult Inpatient Plan of Care  Goal: Plan of Care Review  Outcome: Ongoing, Progressing     Ct scan conducted, pt tolerated, TEDS in use.  Pt free from falls and injury throughout shift. Pt Alert. Continued medications as ordered. Complaints of pain 6/10  controlled with medications. Pt c/o itchy skin prn given, pt tolerated. Pt in no distress. Will continue to monitor.

## 2020-08-04 NOTE — NURSING
Ethan Card   [phone], report given to Ms. Maye. Pt will be in room 110A.     Pt free from falls and injury throughout shift. Pt AAOx3. Continued medications as ordered. Complaints of pain 6/10 controlled with medications. Pt in no distress. R hand IV removed.

## 2020-08-04 NOTE — PLAN OF CARE
ADT 30 order placed for [employer].      Requested  time: 1530      If transportation does not arrive at ETA time nurse will be instructed to follow protocol for transportation below:   How can I get in touch directly with dispatch, if needed?                 · Non-emergent (Van): [phone]        ++NURSING:  If Van does not arrive at requested time please call the above Non Emergent Dispatcher.  If issue not resolved please escalate to your charge nurse for further instructions.      Patient to be transferred to [facility]  Will be admitted to Room # 110A  Nurse to call report to 100 pod nurse @ [phone]  Transportation time scheduled 1530    Call report information forwarded to nurse Lino

## 2020-08-04 NOTE — PLAN OF CARE
Patient ordered to transfer to [facility]; TN confirmed that all orders and clinicals have been received by facility; follow up appointment list forwarded to facility via [facility]; call report information forwarded to nurse Muñiz; transportation has been arranged for 1530; Nurse Muñiz informed that all discharge planning needs have been met and patient can discharge from Case Management standpoint      TN contacted daughter to inform of transfer and time; verbalized agreement       08/04/20 0326   Final Note   Assessment Type Final Discharge Note   Anticipated Discharge Disposition residential Nu   What phone number can be called within the next 1-3 days to see how you are doing after discharge? [phone]   Hospital Follow Up  Appt(s) scheduled? Yes   Discharge plans and expectations educations in teach back method with documentation complete? Yes   [facility] Referral Info   Referral Type residential Nursing Home   Facility Name [facility] [place], [place]   Post-Acute Status   Post-Acute Authorization Placement  (residential NH  - [facility])   Post-Acute Placement Status Set-up Complete   Discharge Delays None known at this time

## 2020-08-04 NOTE — ASSESSMENT & PLAN NOTE
Joyce Riley is a 86 y.o. female admitted to [facility] for GI bleed, found to have C. Diff. She endorses severe neck pain during her admission, prompting cervical CT, which showed significant erosion of the odontoid process. She is grossly neurologically intact on exam and denies radicular pain, numbness, or weakness. Chronically wheelchair bound with muscle wasting of the BLE.     -CT is concerning for possible tumor erosion vs severe degenerative changes related to rheumatoid arthritis. Recommend MRI cervical spine w/w/o contrast to further investigate.   -Also recommend cervical xray with flexion and extension views to assess for stability    Case discussed with Dr. Adair.

## 2020-08-04 NOTE — CONSULTS
SW referral received to arrange follow up vist with Dr Adair in 2-4 weeks; appointment as follows:     Follow-up Information     Anil Adair DO On 8/20/2020.    Specialty: Neurosurgery  Why: Hospital discharge follow up, appointment scheduled August 20th  @0800  Contact information:  [address]  [address]  [place] [address]  [phone]                 Appointment list forwarded to NH facility      for

## 2020-08-05 ENCOUNTER — PATIENT OUTREACH (OUTPATIENT)
Dept: HOME HEALTH SERVICES | Facility: HOSPITAL | Age: 85
End: 2020-08-05

## 2020-08-20 ENCOUNTER — OFFICE VISIT (OUTPATIENT)
Dept: NEUROSURGERY | Facility: CLINIC | Age: 85
End: 2020-08-20
Payer: MEDICARE

## 2020-08-20 VITALS
HEART RATE: 98 BPM | BODY MASS INDEX: 30.27 KG/M2 | HEIGHT: 64 IN | TEMPERATURE: 98 F | SYSTOLIC BLOOD PRESSURE: 160 MMHG | DIASTOLIC BLOOD PRESSURE: 72 MMHG

## 2020-08-20 DIAGNOSIS — M47.812 CERVICAL SPONDYLOSIS: Primary | ICD-10-CM

## 2020-08-20 DIAGNOSIS — G82.20 BILATERAL PARAPARESIS: ICD-10-CM

## 2020-08-20 DIAGNOSIS — M06.38 RHEUMATOID PANNUS OF CERVICAL SPINE: ICD-10-CM

## 2020-08-20 DIAGNOSIS — R93.0 ABNORMAL FINDINGS ON DIAGNOSTIC IMAGING OF SKULL AND HEAD, NOT ELSEWHERE CLASSIFIED: ICD-10-CM

## 2020-08-20 DIAGNOSIS — G95.9 MYELOPATHY: ICD-10-CM

## 2020-08-20 PROCEDURE — 99215 PR OFFICE/OUTPT VISIT, EST, LEVL V, 40-54 MIN: ICD-10-PCS | Mod: S$GLB,,, | Performed by: NEUROLOGICAL SURGERY

## 2020-08-20 PROCEDURE — 1125F AMNT PAIN NOTED PAIN PRSNT: CPT | Mod: S$GLB,,, | Performed by: NEUROLOGICAL SURGERY

## 2020-08-20 PROCEDURE — 1101F PR PT FALLS ASSESS DOC 0-1 FALLS W/OUT INJ PAST YR: ICD-10-PCS | Mod: CPTII,S$GLB,, | Performed by: NEUROLOGICAL SURGERY

## 2020-08-20 PROCEDURE — 1159F MED LIST DOCD IN RCRD: CPT | Mod: S$GLB,,, | Performed by: NEUROLOGICAL SURGERY

## 2020-08-20 PROCEDURE — 99999 PR PBB SHADOW E&M-EST. PATIENT-LVL V: ICD-10-PCS | Mod: PBBFAC,,, | Performed by: NEUROLOGICAL SURGERY

## 2020-08-20 PROCEDURE — 99215 OFFICE O/P EST HI 40 MIN: CPT | Mod: S$GLB,,, | Performed by: NEUROLOGICAL SURGERY

## 2020-08-20 PROCEDURE — 1101F PT FALLS ASSESS-DOCD LE1/YR: CPT | Mod: CPTII,S$GLB,, | Performed by: NEUROLOGICAL SURGERY

## 2020-08-20 PROCEDURE — 1125F PR PAIN SEVERITY QUANTIFIED, PAIN PRESENT: ICD-10-PCS | Mod: S$GLB,,, | Performed by: NEUROLOGICAL SURGERY

## 2020-08-20 PROCEDURE — 99999 PR PBB SHADOW E&M-EST. PATIENT-LVL V: CPT | Mod: PBBFAC,,, | Performed by: NEUROLOGICAL SURGERY

## 2020-08-20 PROCEDURE — 1159F PR MEDICATION LIST DOCUMENTED IN MEDICAL RECORD: ICD-10-PCS | Mod: S$GLB,,, | Performed by: NEUROLOGICAL SURGERY

## 2020-08-20 NOTE — PROGRESS NOTES
CHIEF COMPLAINT:  Neck pain  Odontoid mass    Interval History 8/20/2020:    Patient was recently found to have a lytic lesion within the odontoid process concerning for malignancy.  Patient was unable to get contrasted MRIs due to severe kidney failure so the diagnosis remains uncertain.  Patient returns for follow-up from her hospital admission.  Main complaint is neck and posterior head pain that has been ongoing for 6-7 months.  She denies any injury or trauma to the area.  She and her daughter report that she has been wheelchair dependent for 3 years due to leg weakness but stopped being able to walk approximately 6 months ago.  She progressively got worse and progressed from needing a cane to a front wheel walker to now dependent on a wheelchair.  She is currently residing at [facility] where they have to use a lift to move her.  She denies any history of cancer or rheumatoid arthritis.  She has multiple medical comorbidities.  She reports that her arms are fine and even though she cannot carry heavy objects she does not find herself dropping anything or having any issues with dexterity.      Patient's daughter states that they would not want to have any surgery or invasive procedure.    HPI:  Joyce Riley is a 86 y.o. female who presented to [facility] from [facility] with complaints of hematochezia. She was found to be C.diff positive on 7/28 and remains on contact precautions. She has extensive medical history including Afib, CHF, CKD-stage 5, rheumatoid arthitis, and DM. She is chronically wheelchair bound. During her admission, she endorsed severe, chronic neck pain. Orthopedics was consulted and CT cervical spine was ordered. Extensive erosion of the odontoid process is visualized on CT. Neurosurgery is consulted for evaluation of neck pain and abnormal CT.      Patient reports 4 month history of severe neck pain. Denies numbness and weakness of upper and lower  extremities. She further denies radicular pain.         Review of patient's allergies indicates:   Allergen Reactions    Indomethacin     Nsaids (non-steroidal anti-inflammatory drug)     Percocet [oxycodone-acetaminophen]        Past Medical History:   Diagnosis Date    Anemia     Anticoagulant long-term use     Arthritis     Atrial fibrillation     Cataract     CHF (congestive heart failure)     CKD (chronic kidney disease), stage V     Diabetes mellitus     Type 2    GERD (gastroesophageal reflux disease)     GI bleed 2018    Gout     Hypertension     Obese     Requires assistance with all daily activities     Stroke     Wheelchair dependent      Past Surgical History:   Procedure Laterality Date     SECTION      Patient unsure, believe she had 3-4    CHOLECYSTECTOMY      EYE SURGERY      HYSTERECTOMY      JOINT REPLACEMENT Right     knee    TOTAL KNEE ARTHROPLASTY Right      Family History   Problem Relation Age of Onset    Cancer Mother          age 98    Diabetes Mother     Hypertension Mother     Cancer Brother      Social History     Tobacco Use    Smoking status: Never Smoker    Smokeless tobacco: Never Used   Substance Use Topics    Alcohol use: No    Drug use: No        Review of Systems   Constitutional: Negative.    Respiratory: Negative for cough and shortness of breath.    Cardiovascular: Negative for chest pain, palpitations, claudication and leg swelling.   Gastrointestinal: Negative for abdominal pain, constipation and diarrhea.   Genitourinary: Negative for flank pain, frequency and urgency.   Musculoskeletal: Positive for neck pain. Negative for back pain, falls, joint pain and myalgias.   Skin: Negative.    Neurological: Positive for focal weakness, weakness and headaches. Negative for dizziness, tingling, tremors, sensory change, speech change, seizures and loss of consciousness.   Psychiatric/Behavioral: Negative.        OBJECTIVE:   Vital  Signs:  Temp: 98 °F (36.7 °C) (08/20/20 0806)  Pulse: 98 (08/20/20 0806)  BP: (!) 160/72 (08/20/20 0806)    Physical Exam:  Constitutional: Patient sitting comfortably in wheel chair with airplane pillow around neck. Frail appearing.  Skin: Exposed areas are intact without abnormal markings, rashes or other lesions.  HEENT: Normocephalic. Normal conjunctivae.  Cardiovascular: Normal rate and regular rhythm.  Respiratory: Chest wall rises and falls symmetrically, without signs of respiratory distress.  Abdomen: Soft and non-tender.  Extremities: Warm and without edema. Calves supple, non-tender.  Psych/Behavior: Normal affect.    Neurological:    Mental status: Alert and oriented. Conversational and appropriate.       Cranial Nerves: VFF to confrontation. PERRL. EOMI without nystagmus. Facial STLT normal and symmetric. Strong, symmetric muscles of mastication. Facial strength full and symmetric. Hearing equal bilaterally to finger rub. Palate and uvula rise and fall normally in midline. Shoulder shrug 5/5 strength. Tongue midline.     Motor:    Upper:  Deltoids Triceps Biceps WE WF  FA    R 5/5 5/5 5/5 5/5 5/5 5/5 5/5    L 5/5 5/5 5/5 5/5 5/5 5/5 5/5      Lower:  HF KE DF PF EHL    R 4+/5 4+/5 1/5 1/5 1/5    L 4-/5 4+/5 1/5 1/5 1/5     Sensory: Intact sensation to light touch and pinprick in all extremities.     Reflexes:      DTR: 1+ knees and biceps symmetrically.     Salvador's: Negative.     Babinski's: Negative.     Clonus: Negative.    Gait: non-ambulatory, wheel-chair bound    Spine:    Posture: Head angled to the left, patient using neck pillow to keep head up.  No focal or global spinal deformity visible on inspection.     Cervical:      ROM: pain limited with flexion, extension, lateral rotation and ear-to-shoulder bend.      Midline TTP: Negative.     Spurling's test: Negative.     Lhermitte's: Negative.    Diagnostic Results:  All imaging was independently reviewed by me.    MRI C-spine, dated  08/04/2020:  1. Diffuse severe spondylosis  2. Odontoid pannus vs mass  3. Kinking of cord at C2  4. Severe stenosis at C4-5    CT C spine, dated 8/3/20:  1. 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  During a recent hospitalization, a lytic lesion was identified on cervical CT concerning for malignancy.  Due to the patient's end-stage renal disease, she was unable to get contrast which would of better characterize the lesion.  Patient has diffuse severe spondylosis and severe stenosis at C4-5 which could cause cervical myelopathy.  I explained to the patient and the patient's daughter that she could be a candidate for a posterior cervical decompression and fusion however given her medical comorbidities the risks would be increased.  The patient's daughter stated she would not want surgery or invasive procedure.    I also shared my concern about the presence of the lytic lesion within the odontoid process and recommended that we do further workup to better understand.  Even though it does not appear that she will ever choose to undergo surgery, I think it is important to diagnosis the source of her  myelopathy.  I will order a PET scan to better characterize the lesion in the odontoid as well as T and L spine MRIs to rule out spinal cord compression.  I will defer further workup of malignancy to her primary care physician.    PATIENT EDUCATION:  More than half the clinic visit was spent showing with patient the pertinent findings on imaging and educating the patient about natural history of the pathology.   We discussed options for treatment as well as the risks and benefits of each option.  All questions were answered.     The patient understands and agrees with the following plan of care.    - T+L spine MRIs (will call)  - PET scan to evaluate odontoid lesions to r/o malignancy  - Soft collar ([facility])                  .

## 2020-08-30 NOTE — PLAN OF CARE
08/03/20 1620   Medicare Message   Important Message from Medicare regarding Discharge Appeal Rights Given to patient/caregiver;Explained to patient/caregiver   Date IMM was signed 08/03/20   Time IMM was signed 1507     IMM completed with pt's daughter, Antonia Fu [phone]. Antonia verbalized understanding of IMM and discharge appeal rights. IMM emailed to Antonia at [phone].    No

## 2020-10-14 PROBLEM — M89.9 CHRONIC KIDNEY DISEASE-MINERAL AND BONE DISORDER: Status: ACTIVE | Noted: 2020-10-14

## 2020-10-14 PROBLEM — E83.9 CHRONIC KIDNEY DISEASE-MINERAL AND BONE DISORDER: Status: ACTIVE | Noted: 2020-10-14

## 2020-10-14 PROBLEM — N18.9 CHRONIC KIDNEY DISEASE-MINERAL AND BONE DISORDER: Status: ACTIVE | Noted: 2020-10-14

## 2020-11-04 ENCOUNTER — HOSPITAL ENCOUNTER (INPATIENT)
Facility: HOSPITAL | Age: 85
LOS: 2 days | Discharge: HOME | DRG: 690 | End: 2020-11-06
Attending: EMERGENCY MEDICINE | Admitting: EMERGENCY MEDICINE
Payer: MEDICARE

## 2020-11-04 DIAGNOSIS — N39.0 UTI (URINARY TRACT INFECTION): ICD-10-CM

## 2020-11-04 LAB
ALBUMIN SERPL BCP-MCNC: 2.9 G/DL (ref 3.5–5.2)
ALP SERPL-CCNC: 82 U/L (ref 55–135)
ALT SERPL W/O P-5'-P-CCNC: 10 U/L (ref 10–44)
ANION GAP SERPL CALC-SCNC: 11 MMOL/L (ref 8–16)
AST SERPL-CCNC: 14 U/L (ref 10–40)
BACTERIA #/AREA URNS HPF: ABNORMAL /HPF
BASOPHILS # BLD AUTO: 0.07 K/UL (ref 0–0.2)
BASOPHILS NFR BLD: 0.5 % (ref 0–1.9)
BILIRUB SERPL-MCNC: 0.3 MG/DL (ref 0.1–1)
BILIRUB UR QL STRIP: NEGATIVE
BUN SERPL-MCNC: 54 MG/DL (ref 8–23)
CALCIUM SERPL-MCNC: 10.2 MG/DL (ref 8.7–10.5)
CHLORIDE SERPL-SCNC: 104 MMOL/L (ref 95–110)
CLARITY UR: ABNORMAL
CO2 SERPL-SCNC: 25 MMOL/L (ref 23–29)
COLOR UR: YELLOW
CREAT SERPL-MCNC: 5.1 MG/DL (ref 0.5–1.4)
CTP QC/QA: YES
DIFFERENTIAL METHOD: ABNORMAL
EOSINOPHIL # BLD AUTO: 0.1 K/UL (ref 0–0.5)
EOSINOPHIL NFR BLD: 0.8 % (ref 0–8)
ERYTHROCYTE [DISTWIDTH] IN BLOOD BY AUTOMATED COUNT: 15.7 % (ref 11.5–14.5)
EST. GFR  (AFRICAN AMERICAN): 8 ML/MIN/1.73 M^2
EST. GFR  (NON AFRICAN AMERICAN): 7 ML/MIN/1.73 M^2
GLUCOSE SERPL-MCNC: 104 MG/DL (ref 70–110)
GLUCOSE UR QL STRIP: NEGATIVE
HCT VFR BLD AUTO: 26.5 % (ref 37–48.5)
HGB BLD-MCNC: 8.4 G/DL (ref 12–16)
HGB UR QL STRIP: NEGATIVE
HYALINE CASTS #/AREA URNS LPF: 0 /LPF
IMM GRANULOCYTES # BLD AUTO: 0.12 K/UL (ref 0–0.04)
IMM GRANULOCYTES NFR BLD AUTO: 0.9 % (ref 0–0.5)
KETONES UR QL STRIP: NEGATIVE
LACTATE SERPL-SCNC: 0.7 MMOL/L (ref 0.5–2.2)
LEUKOCYTE ESTERASE UR QL STRIP: ABNORMAL
LYMPHOCYTES # BLD AUTO: 1.5 K/UL (ref 1–4.8)
LYMPHOCYTES NFR BLD: 11.1 % (ref 18–48)
MCH RBC QN AUTO: 30.7 PG (ref 27–31)
MCHC RBC AUTO-ENTMCNC: 31.7 G/DL (ref 32–36)
MCV RBC AUTO: 97 FL (ref 82–98)
MICROSCOPIC COMMENT: ABNORMAL
MONOCYTES # BLD AUTO: 0.4 K/UL (ref 0.3–1)
MONOCYTES NFR BLD: 3.3 % (ref 4–15)
NEUTROPHILS # BLD AUTO: 11 K/UL (ref 1.8–7.7)
NEUTROPHILS NFR BLD: 83.4 % (ref 38–73)
NITRITE UR QL STRIP: NEGATIVE
NRBC BLD-RTO: 0 /100 WBC
PH UR STRIP: 7 [PH] (ref 5–8)
PLATELET # BLD AUTO: 379 K/UL (ref 150–350)
PMV BLD AUTO: 9.9 FL (ref 9.2–12.9)
POCT GLUCOSE: 116 MG/DL (ref 70–110)
POTASSIUM SERPL-SCNC: 4.4 MMOL/L (ref 3.5–5.1)
PROCALCITONIN SERPL IA-MCNC: 0.26 NG/ML
PROT SERPL-MCNC: 7.9 G/DL (ref 6–8.4)
PROT UR QL STRIP: ABNORMAL
RBC # BLD AUTO: 2.74 M/UL (ref 4–5.4)
RBC #/AREA URNS HPF: 0 /HPF (ref 0–4)
SARS-COV-2 RDRP RESP QL NAA+PROBE: NEGATIVE
SODIUM SERPL-SCNC: 140 MMOL/L (ref 136–145)
SP GR UR STRIP: 1.01 (ref 1–1.03)
URN SPEC COLLECT METH UR: ABNORMAL
UROBILINOGEN UR STRIP-ACNC: NEGATIVE EU/DL
WBC # BLD AUTO: 13.14 K/UL (ref 3.9–12.7)
WBC #/AREA URNS HPF: >100 /HPF (ref 0–5)
WBC CLUMPS URNS QL MICRO: ABNORMAL

## 2020-11-04 PROCEDURE — 84145 PROCALCITONIN (PCT): CPT

## 2020-11-04 PROCEDURE — 25000003 PHARM REV CODE 250: Performed by: EMERGENCY MEDICINE

## 2020-11-04 PROCEDURE — 81000 URINALYSIS NONAUTO W/SCOPE: CPT

## 2020-11-04 PROCEDURE — 87088 URINE BACTERIA CULTURE: CPT

## 2020-11-04 PROCEDURE — 83036 HEMOGLOBIN GLYCOSYLATED A1C: CPT

## 2020-11-04 PROCEDURE — 25000003 PHARM REV CODE 250: Performed by: INTERNAL MEDICINE

## 2020-11-04 PROCEDURE — 96365 THER/PROPH/DIAG IV INF INIT: CPT

## 2020-11-04 PROCEDURE — 87186 SC STD MICRODIL/AGAR DIL: CPT

## 2020-11-04 PROCEDURE — 83605 ASSAY OF LACTIC ACID: CPT

## 2020-11-04 PROCEDURE — 87077 CULTURE AEROBIC IDENTIFY: CPT

## 2020-11-04 PROCEDURE — 80053 COMPREHEN METABOLIC PANEL: CPT

## 2020-11-04 PROCEDURE — 82962 GLUCOSE BLOOD TEST: CPT

## 2020-11-04 PROCEDURE — U0002 COVID-19 LAB TEST NON-CDC: HCPCS | Performed by: EMERGENCY MEDICINE

## 2020-11-04 PROCEDURE — 63600175 PHARM REV CODE 636 W HCPCS: Performed by: INTERNAL MEDICINE

## 2020-11-04 PROCEDURE — 63600175 PHARM REV CODE 636 W HCPCS: Performed by: EMERGENCY MEDICINE

## 2020-11-04 PROCEDURE — 87086 URINE CULTURE/COLONY COUNT: CPT

## 2020-11-04 PROCEDURE — 21400001 HC TELEMETRY ROOM

## 2020-11-04 PROCEDURE — 85025 COMPLETE CBC W/AUTO DIFF WBC: CPT

## 2020-11-04 PROCEDURE — 87040 BLOOD CULTURE FOR BACTERIA: CPT

## 2020-11-04 PROCEDURE — 99285 EMERGENCY DEPT VISIT HI MDM: CPT | Mod: 25

## 2020-11-04 RX ORDER — MEROPENEM AND SODIUM CHLORIDE 1 G/50ML
1 INJECTION, SOLUTION INTRAVENOUS EVERY 24 HOURS
Status: DISCONTINUED | OUTPATIENT
Start: 2020-11-05 | End: 2020-11-06 | Stop reason: HOSPADM

## 2020-11-04 RX ORDER — CARVEDILOL 12.5 MG/1
12.5 TABLET ORAL 2 TIMES DAILY
Status: DISCONTINUED | OUTPATIENT
Start: 2020-11-04 | End: 2020-11-06 | Stop reason: HOSPADM

## 2020-11-04 RX ORDER — TRAMADOL HYDROCHLORIDE 50 MG/1
50 TABLET ORAL EVERY 6 HOURS PRN
Status: DISCONTINUED | OUTPATIENT
Start: 2020-11-04 | End: 2020-11-06 | Stop reason: HOSPADM

## 2020-11-04 RX ORDER — HYDRALAZINE HYDROCHLORIDE 25 MG/1
25 TABLET, FILM COATED ORAL ONCE
Status: COMPLETED | OUTPATIENT
Start: 2020-11-04 | End: 2020-11-04

## 2020-11-04 RX ORDER — CARVEDILOL 12.5 MG/1
12.5 TABLET ORAL ONCE
Status: COMPLETED | OUTPATIENT
Start: 2020-11-04 | End: 2020-11-04

## 2020-11-04 RX ORDER — HEPARIN SODIUM 5000 [USP'U]/ML
5000 INJECTION, SOLUTION INTRAVENOUS; SUBCUTANEOUS EVERY 12 HOURS
Status: DISCONTINUED | OUTPATIENT
Start: 2020-11-04 | End: 2020-11-06 | Stop reason: HOSPADM

## 2020-11-04 RX ORDER — IPRATROPIUM BROMIDE AND ALBUTEROL SULFATE 2.5; .5 MG/3ML; MG/3ML
3 SOLUTION RESPIRATORY (INHALATION) EVERY 4 HOURS PRN
Status: DISCONTINUED | OUTPATIENT
Start: 2020-11-04 | End: 2020-11-06 | Stop reason: HOSPADM

## 2020-11-04 RX ORDER — NIFEDIPINE 30 MG/1
90 TABLET, EXTENDED RELEASE ORAL DAILY
Status: DISCONTINUED | OUTPATIENT
Start: 2020-11-05 | End: 2020-11-06 | Stop reason: HOSPADM

## 2020-11-04 RX ORDER — HYDRALAZINE HYDROCHLORIDE 25 MG/1
25 TABLET, FILM COATED ORAL EVERY 8 HOURS
Status: DISCONTINUED | OUTPATIENT
Start: 2020-11-04 | End: 2020-11-06 | Stop reason: HOSPADM

## 2020-11-04 RX ORDER — SODIUM BICARBONATE 325 MG/1
1300 TABLET ORAL 3 TIMES DAILY
Status: DISCONTINUED | OUTPATIENT
Start: 2020-11-04 | End: 2020-11-06 | Stop reason: HOSPADM

## 2020-11-04 RX ADMIN — HYDRALAZINE HYDROCHLORIDE 25 MG: 25 TABLET, FILM COATED ORAL at 06:11

## 2020-11-04 RX ADMIN — SODIUM BICARBONATE TAB 325 MG 1300 MG: 325 TAB at 09:11

## 2020-11-04 RX ADMIN — CARVEDILOL 12.5 MG: 12.5 TABLET, FILM COATED ORAL at 06:11

## 2020-11-04 RX ADMIN — FUROSEMIDE 60 MG: 40 TABLET ORAL at 06:11

## 2020-11-04 RX ADMIN — TRAMADOL HYDROCHLORIDE 50 MG: 50 TABLET, FILM COATED ORAL at 06:11

## 2020-11-04 RX ADMIN — MEROPENEM 2 G: 1 INJECTION, POWDER, FOR SOLUTION INTRAVENOUS at 01:11

## 2020-11-04 RX ADMIN — CARVEDILOL 12.5 MG: 12.5 TABLET, FILM COATED ORAL at 09:11

## 2020-11-04 RX ADMIN — HYDRALAZINE HYDROCHLORIDE 25 MG: 25 TABLET, FILM COATED ORAL at 09:11

## 2020-11-04 RX ADMIN — HEPARIN SODIUM 5000 UNITS: 5000 INJECTION, SOLUTION INTRAVENOUS; SUBCUTANEOUS at 09:11

## 2020-11-04 NOTE — ASSESSMENT & PLAN NOTE
Klebsiella with resistance- No po options.  ID consulted. No sepsis present. Minimal WBC count elevation. Wonder if this is colonization?  Will check pro-sruthi.

## 2020-11-04 NOTE — ED PROVIDER NOTES
"Encounter Date: 11/4/2020    SCRIBE #1 NOTE: I, Clay Obrien, am scribing for, and in the presence of,  Virgilio Rhodes MD. I have scribed the following portions of the note - Other sections scribed: HPI, ROS, PE.       History     Chief Complaint   Patient presents with    Kidney infection     EMS reports pt sent from [facility] due to a "kidney infection" staff reports the pt needs IV antibiotics. no distress.      This 87 y.o F with a hx of Anemia, A-fib, CHF, CKD stage V, DM, GI bleed, HTN, Obese, Stroke and Wheelchair dependent presents to the ED via EMS from [facility] c/o abnormal labs. The pt did have blood work drawn 11/2 and urine cultures 10/18/20. She was sent for IV abx. EMS reports a BP of 176/70 and a HR 86 bpm on arrival. The pt denies fever, chills, diaphoresis, constipation, SOB, abdominal pain, nausea, emesis, diarrhea, constipation, appetite change, rash and any other associated symptoms. No prior tx. The pt is DNR.    11/2/20  Creatinine: 4.92   BUN: 57   WBC: 10.9  HGB: 8.3   Platelets: 392 K/uL      Urine collected: 10/18/20  Urine culture with Klebsiella and Pneumoniae    Sensitive to Meropenem     Resistent to Augmentin, Ampicillin, Cefazolin, Cefepime, Ceftriaxone, Cefuroxime, Ciprofloxacin, Gentamicin, Imipenem, Levofloxacin,  Nitrofurantoin. Piperacillin/ Tazobactam, Tetracycline, Tobramycin, Trimethoprim/Sulfa        The history is provided by the patient, the EMS personnel and the [facility].     Review of patient's allergies indicates:   Allergen Reactions    Indomethacin     Nsaids (non-steroidal anti-inflammatory drug)     Percocet [oxycodone-acetaminophen]      Past Medical History:   Diagnosis Date    Anemia     Anticoagulant long-term use     Arthritis     Atrial fibrillation     Cataract     CHF (congestive heart failure)     CKD (chronic kidney disease), stage V     Dermatophytosis     Diabetes mellitus     Type 2    GERD (gastroesophageal reflux " disease)     GI bleed 2018    Gout     Gout     Hypertension     Obese     Requires assistance with all daily activities     Stroke 2014    Wheelchair dependent      Past Surgical History:   Procedure Laterality Date     SECTION      Patient unsure, believe she had 3-4    CHOLECYSTECTOMY      EYE SURGERY      HYSTERECTOMY      JOINT REPLACEMENT Right     knee    TOTAL KNEE ARTHROPLASTY Right      Family History   Problem Relation Age of Onset    Cancer Mother          age 98    Diabetes Mother     Hypertension Mother     Cancer Brother      Social History     Tobacco Use    Smoking status: Never Smoker    Smokeless tobacco: Never Used   Substance Use Topics    Alcohol use: No    Drug use: No     Review of Systems    Physical Exam     Initial Vitals [20 1227]   BP Pulse Resp Temp SpO2   (!) 188/87 77 16 98.4 °F (36.9 °C) 99 %      MAP       --         Physical Exam    Nursing note and vitals reviewed.  Constitutional: She appears well-developed and well-nourished.   Elderly female in bed with a diaper on.   HENT:   Head: Normocephalic and atraumatic.   Mouth/Throat: Oropharynx is clear and moist and mucous membranes are normal.   Eyes: Conjunctivae and EOM are normal. Pupils are equal, round, and reactive to light. Right conjunctiva is not injected. Left conjunctiva is not injected. No scleral icterus.   Neck: Normal range of motion and full passive range of motion without pain. Neck supple.   Cardiovascular: Normal rate, regular rhythm, S1 normal, S2 normal and normal heart sounds. Exam reveals no gallop.    No murmur heard.  Pulses:       Radial pulses are 2+ on the right side and 2+ on the left side.   Pulmonary/Chest: Effort normal and breath sounds normal. No respiratory distress.   Abdominal: Soft. She exhibits no distension. There is no abdominal tenderness.   No major CVA tenderness.    Musculoskeletal: Normal range of motion. No edema.      Comments: Good active ROM  of all extremities. No lower extremity edema or cyanosis.    Neurological: No cranial nerve deficit or sensory deficit. Gait normal.   A&Ox4. Normal Speech.    Skin: Skin is warm. No ecchymosis and no rash noted.   Psychiatric: She has a normal mood and affect. Thought content normal.         ED Course   Procedures  Labs Reviewed   CBC W/ AUTO DIFFERENTIAL - Abnormal; Notable for the following components:       Result Value    WBC 13.14 (*)     RBC 2.74 (*)     Hemoglobin 8.4 (*)     Hematocrit 26.5 (*)     MCHC 31.7 (*)     RDW 15.7 (*)     Platelets 379 (*)     Immature Granulocytes 0.9 (*)     Gran # (ANC) 11.0 (*)     Immature Grans (Abs) 0.12 (*)     Gran % 83.4 (*)     Lymph % 11.1 (*)     Mono % 3.3 (*)     All other components within normal limits   COMPREHENSIVE METABOLIC PANEL - Abnormal; Notable for the following components:    BUN 54 (*)     Creatinine 5.1 (*)     Albumin 2.9 (*)     eGFR if  8 (*)     eGFR if non  7 (*)     All other components within normal limits   URINALYSIS, REFLEX TO URINE CULTURE - Abnormal; Notable for the following components:    Appearance, UA Cloudy (*)     Protein, UA 2+ (*)     Leukocytes, UA 3+ (*)     All other components within normal limits    Narrative:     Specimen Source->Urine   URINALYSIS MICROSCOPIC - Abnormal; Notable for the following components:    WBC, UA >100 (*)     WBC Clumps, UA Marked (*)     All other components within normal limits    Narrative:     Specimen Source->Urine   POCT GLUCOSE - Abnormal; Notable for the following components:    POCT Glucose 116 (*)     All other components within normal limits   CULTURE, BLOOD   CULTURE, BLOOD   CULTURE, URINE   LACTIC ACID, PLASMA   PROCALCITONIN   HEMOGLOBIN A1C   HEMOGLOBIN A1C   SARS-COV-2 RDRP GENE          Imaging Results    None          Medical Decision Making:   Initial Assessment:   87-year-old female presenting secondary to UTI that is resistant to everything but  meropenem.  Does have white count.  Vitals reassuring.  Started on meropenem.  Admitted to the hospitalist for further workup management.  High risk of outpatient failure.  Virgilio Rhodes    Clinical Tests:   Lab Tests: Ordered and Reviewed            Scribe Attestation:   Scribe #1: I performed the above scribed service and the documentation accurately describes the services I performed. I attest to the accuracy of the note.                      Clinical Impression:     ICD-10-CM ICD-9-CM   1. UTI (urinary tract infection)  N39.0 599.0                          ED Disposition Condition    Admit                    I, virgilio rhodes, personally performed the services described in this documentation. All medical record entries made by the scribe were at my direction and in my presence. I have reviewed the chart and agree that the record reflects my personal performance and is accurate and complete.           Virgilio Rhodes MD  11/04/20 2001

## 2020-11-04 NOTE — PLAN OF CARE
11/04/20 1530   Discharge Assessment   Assessment Type Discharge Planning Assessment   Confirmed/corrected address and phone number on facesheet? Yes   Assessment information obtained from? Medical Record   Expected Length of Stay (days) 2   Prior to hospitilization cognitive status: Alert/Oriented   Prior to hospitalization functional status: Partially Dependent;Needs Assistance   Current cognitive status: Alert/Oriented   Current Functional Status: Needs Assistance;Partially Dependent   Facility Arrived From: [facility]   Lives With facility resident  ([place])   Able to Return to Prior Arrangements yes   Is patient able to care for self after discharge? No  (Nursing home resident.)   Who are your caregiver(s) and their phone number(s)? daughter/Antonia Fu/[phone]   Patient's perception of discharge disposition nursing home   Readmission Within the Last 30 Days no previous admission in last 30 days   Patient currently being followed by outpatient case management? No   Patient currently receives any other outside agency services? No   Equipment Currently Used at Home wheelchair;other (see comments)  (All DME provided by the nursing home. (Soft Cervical Collar))   Part D Coverage [employer] H[employer]   Do you have any problems affording any of your prescribed medications? No   Is the patient taking medications as prescribed? yes   Does the patient have transportation home? Yes   Transportation Anticipated other (see comments)  ([facility] is the preferred provider for [place].)   Dialysis Name and Scheduled days n/a   Does the patient receive services at the [facility]? No   Discharge Plan A Return to nursing home   Discharge Plan B Return to Nursing Home   DME Needed Upon Discharge  none   Patient/Family in Agreement with Plan other (see comments)  (Completed from chart review.)

## 2020-11-04 NOTE — SUBJECTIVE & OBJECTIVE
Past Medical History:   Diagnosis Date    Anemia     Anticoagulant long-term use     Arthritis     Atrial fibrillation     Cataract     CHF (congestive heart failure)     CKD (chronic kidney disease), stage V     Dermatophytosis     Diabetes mellitus     Type 2    GERD (gastroesophageal reflux disease)     GI bleed 2018    Gout     Gout     Hypertension     Obese     Requires assistance with all daily activities     Stroke     Wheelchair dependent        Past Surgical History:   Procedure Laterality Date     SECTION      Patient unsure, believe she had 3-4    CHOLECYSTECTOMY      EYE SURGERY      HYSTERECTOMY      JOINT REPLACEMENT Right     knee    TOTAL KNEE ARTHROPLASTY Right        Review of patient's allergies indicates:   Allergen Reactions    Indomethacin     Nsaids (non-steroidal anti-inflammatory drug)     Percocet [oxycodone-acetaminophen]        No current facility-administered medications on file prior to encounter.      Current Outpatient Medications on File Prior to Encounter   Medication Sig    acetaminophen (TYLENOL) 325 MG tablet Take 2 tablets (650 mg total) by mouth every 4 (four) hours as needed. (Patient taking differently: Take 650 mg by mouth 2 (two) times daily. )    albuterol-ipratropium (DUO-NEB) 2.5 mg-0.5 mg/3 mL nebulizer solution Take 3 mLs by nebulization every 4 (four) hours as needed for Wheezing. Rescue    B complex-vitamin C-folic acid (NEPHRO-MICHAEL) 0.8 mg Tab Take by mouth once daily.    calcitRIOL (ROCALTROL) 0.25 MCG Cap Take 1 capsule (0.25 mcg total) by mouth every other day.    carvediloL (COREG) 12.5 MG tablet Take 1 tablet (12.5 mg total) by mouth 2 (two) times daily.    epoetin diana (EPOGEN) 10,000 unit/mL injection Inject 1 mL (10,000 Units total) into the skin every 7 days.    ergocalciferol (ERGOCALCIFEROL) 50,000 unit Cap Take 1 capsule (50,000 Units total) by mouth every 7 days.    ferrous sulfate (FEOSOL) 325 mg (65  mg iron) Tab tablet Take 325 mg by mouth 2 (two) times daily.    furosemide (LASIX) 20 MG tablet Take 3 tablets (60 mg total) by mouth once daily.    hydrALAZINE (APRESOLINE) 25 MG tablet Take 1 tablet (25 mg total) by mouth every 8 (eight) hours.    insulin degludec (TRESIBA FLEXTOUCH U-100) 100 unit/mL (3 mL) InPn Inject 5 Units into the skin every evening.    lactobacillus acidophilus & bulgar (LACTINEX) 100 million cell packet Take 1 packet (1 each total) by mouth 2 (two) times daily.    methyl salicylate-menthol 15-10% 15-10 % Crea Apply topically 4 (four) times daily.    NIFEdipine (PROCARDIA-XL) 90 MG (OSM) 24 hr tablet Take 1 tablet (90 mg total) by mouth once daily.    senna-docusate 8.6-50 mg (PERICOLACE) 8.6-50 mg per tablet Take 1 tablet by mouth 2 (two) times daily.    sodium bicarbonate 650 MG tablet Take 2 tablets (1,300 mg total) by mouth 3 (three) times daily.    traMADoL (ULTRAM) 50 mg tablet Take 1 tablet (50 mg total) by mouth every 8 (eight) hours as needed for Pain.     Family History     Problem Relation (Age of Onset)    Cancer Mother, Brother    Diabetes Mother    Hypertension Mother        Tobacco Use    Smoking status: Never Smoker    Smokeless tobacco: Never Used   Substance and Sexual Activity    Alcohol use: No    Drug use: No    Sexual activity: Never     Review of Systems   Constitutional: Negative for activity change, appetite change, chills, diaphoresis, fatigue and fever.   HENT: Negative for congestion and dental problem.    Eyes: Negative for discharge.   Respiratory: Negative for apnea, cough, chest tightness and shortness of breath.    Cardiovascular: Negative for chest pain and leg swelling.   Gastrointestinal: Negative for abdominal distention, abdominal pain, diarrhea, nausea and vomiting.   Genitourinary: Negative for difficulty urinating and dyspareunia.   Musculoskeletal: Positive for arthralgias and neck pain.   Neurological: Negative for dizziness.    Psychiatric/Behavioral: Negative for agitation and confusion.     Objective:     Vital Signs (Most Recent):  Temp: 97.9 °F (36.6 °C) (11/04/20 1545)  Pulse: 80 (11/04/20 1544)  Resp: (!) 32 (11/04/20 1544)  BP: (!) 166/79 (11/04/20 1544)  SpO2: 99 % (11/04/20 1544) Vital Signs (24h Range):  Temp:  [97.7 °F (36.5 °C)-98.4 °F (36.9 °C)] 97.9 °F (36.6 °C)  Pulse:  [72-80] 80  Resp:  [16-32] 32  SpO2:  [99 %-100 %] 99 %  BP: (166-189)/(79-87) 166/79     Weight: 73.9 kg (163 lb)  Body mass index is 26.31 kg/m².    Physical Exam  Vitals signs and nursing note reviewed.   Constitutional:       General: She is not in acute distress.     Appearance: Normal appearance. She is normal weight. She is not ill-appearing, toxic-appearing or diaphoretic.   HENT:      Head: Normocephalic and atraumatic.   Cardiovascular:      Rate and Rhythm: Normal rate and regular rhythm.      Heart sounds: Normal heart sounds. No murmur. No friction rub. No gallop.    Pulmonary:      Effort: Pulmonary effort is normal. No respiratory distress.      Breath sounds: No stridor. No wheezing, rhonchi or rales.   Abdominal:      General: Abdomen is flat. Bowel sounds are normal. There is no distension.      Palpations: Abdomen is soft.      Tenderness: There is no abdominal tenderness. There is no guarding.   Neurological:      Mental Status: She is alert and oriented to person, place, and time.   Psychiatric:         Mood and Affect: Mood normal.         Behavior: Behavior normal.             Significant Labs:   BMP:   Recent Labs   Lab 11/04/20  1312         K 4.4      CO2 25   BUN 54*   CREATININE 5.1*   CALCIUM 10.2     CBC:   Recent Labs   Lab 11/04/20  1312   WBC 13.14*   HGB 8.4*   HCT 26.5*   *       Significant Imaging:

## 2020-11-04 NOTE — ED NOTES
Per EMS pt sent by [facility] for IV abx for kidney infection.  Pt aaox2, family at bs.

## 2020-11-04 NOTE — HOSPITAL COURSE
Mrs. Riley is a 86 yo F who presents from [facility] with abnormal urine. Patient has history of multiple UTI's in the past and has resistance.  The patient notes burning with urination for a week. UA at the NH showed Klebsiella with known PO options and started on Meropenem in the ED. Patient has only a WBC count of 13K without sepsis. She is non ill appearing and has no other complaints other than neck pain that is chronic.  She is mainly bed bound but has had PT/OT in the past. She was started on Meropenem and admitted to the hospital. ID was consulted for Abx recs for discharge. PT/OT were consulted and rec: return to NH- basic. Urine culture grew pseudomonas with resistance.   Spoke with ID- DC on Meropenem for 7 days. Sent for a picc line. Activity as tolerated. Diet- ADA 2000, 2gm NA  diet. Follow up with PCP in one week. Discharging back to [facility].

## 2020-11-04 NOTE — H&P
"[facility] Hospital Medicine  History & Physical    Patient Name: Joyce Riley  MRN: [id number]  Admission Date: 2020  Attending Physician: Virgilio Rhodes MD   Primary Care Provider: Kalpana Staton MD         Patient information was obtained from patient and ER records.     Subjective:     Principal Problem:UTI (urinary tract infection)    Chief Complaint:   Chief Complaint   Patient presents with    Kidney infection     EMS reports pt sent from [facility] due to a "kidney infection" staff reports the pt needs IV antibiotics. no distress.         HPI: Mrs. Riley is a 88 yo F who presents from [facility] with abnormal urine. Patient has history of multiple UTI's in the past and has resistance.  The patient notes burning with urination for a week. UA at the NH showed Klebsiella with known PO options and started on Meropenem in the ED. Patient has only a WBC count of 13K without sepsis. She is non ill appearing and has no other complaints other than neck pain that is chronic.  She is mainly bed bound but has had PT/OT in the past.     Past Medical History:   Diagnosis Date    Anemia     Anticoagulant long-term use     Arthritis     Atrial fibrillation     Cataract     CHF (congestive heart failure)     CKD (chronic kidney disease), stage V     Dermatophytosis     Diabetes mellitus     Type 2    GERD (gastroesophageal reflux disease)     GI bleed 2018    Gout     Gout     Hypertension     Obese     Requires assistance with all daily activities     Stroke     Wheelchair dependent        Past Surgical History:   Procedure Laterality Date     SECTION      Patient unsure, believe she had 3-4    CHOLECYSTECTOMY      EYE SURGERY      HYSTERECTOMY      JOINT REPLACEMENT Right     knee    TOTAL KNEE ARTHROPLASTY Right        Review of patient's allergies indicates:   Allergen Reactions    Indomethacin     Nsaids (non-steroidal " anti-inflammatory drug)     Percocet [oxycodone-acetaminophen]        No current facility-administered medications on file prior to encounter.      Current Outpatient Medications on File Prior to Encounter   Medication Sig    acetaminophen (TYLENOL) 325 MG tablet Take 2 tablets (650 mg total) by mouth every 4 (four) hours as needed. (Patient taking differently: Take 650 mg by mouth 2 (two) times daily. )    albuterol-ipratropium (DUO-NEB) 2.5 mg-0.5 mg/3 mL nebulizer solution Take 3 mLs by nebulization every 4 (four) hours as needed for Wheezing. Rescue    B complex-vitamin C-folic acid (NEPHRO-MICHAEL) 0.8 mg Tab Take by mouth once daily.    calcitRIOL (ROCALTROL) 0.25 MCG Cap Take 1 capsule (0.25 mcg total) by mouth every other day.    carvediloL (COREG) 12.5 MG tablet Take 1 tablet (12.5 mg total) by mouth 2 (two) times daily.    epoetin diana (EPOGEN) 10,000 unit/mL injection Inject 1 mL (10,000 Units total) into the skin every 7 days.    ergocalciferol (ERGOCALCIFEROL) 50,000 unit Cap Take 1 capsule (50,000 Units total) by mouth every 7 days.    ferrous sulfate (FEOSOL) 325 mg (65 mg iron) Tab tablet Take 325 mg by mouth 2 (two) times daily.    furosemide (LASIX) 20 MG tablet Take 3 tablets (60 mg total) by mouth once daily.    hydrALAZINE (APRESOLINE) 25 MG tablet Take 1 tablet (25 mg total) by mouth every 8 (eight) hours.    insulin degludec (TRESIBA FLEXTOUCH U-100) 100 unit/mL (3 mL) InPn Inject 5 Units into the skin every evening.    lactobacillus acidophilus & bulgar (LACTINEX) 100 million cell packet Take 1 packet (1 each total) by mouth 2 (two) times daily.    methyl salicylate-menthol 15-10% 15-10 % Crea Apply topically 4 (four) times daily.    NIFEdipine (PROCARDIA-XL) 90 MG (OSM) 24 hr tablet Take 1 tablet (90 mg total) by mouth once daily.    senna-docusate 8.6-50 mg (PERICOLACE) 8.6-50 mg per tablet Take 1 tablet by mouth 2 (two) times daily.    sodium bicarbonate 650 MG tablet Take 2  tablets (1,300 mg total) by mouth 3 (three) times daily.    traMADoL (ULTRAM) 50 mg tablet Take 1 tablet (50 mg total) by mouth every 8 (eight) hours as needed for Pain.     Family History     Problem Relation (Age of Onset)    Cancer Mother, Brother    Diabetes Mother    Hypertension Mother        Tobacco Use    Smoking status: Never Smoker    Smokeless tobacco: Never Used   Substance and Sexual Activity    Alcohol use: No    Drug use: No    Sexual activity: Never     Review of Systems   Constitutional: Negative for activity change, appetite change, chills, diaphoresis, fatigue and fever.   HENT: Negative for congestion and dental problem.    Eyes: Negative for discharge.   Respiratory: Negative for apnea, cough, chest tightness and shortness of breath.    Cardiovascular: Negative for chest pain and leg swelling.   Gastrointestinal: Negative for abdominal distention, abdominal pain, diarrhea, nausea and vomiting.   Genitourinary: Negative for difficulty urinating and dyspareunia.   Musculoskeletal: Positive for arthralgias and neck pain.   Neurological: Negative for dizziness.   Psychiatric/Behavioral: Negative for agitation and confusion.     Objective:     Vital Signs (Most Recent):  Temp: 97.9 °F (36.6 °C) (11/04/20 1545)  Pulse: 80 (11/04/20 1544)  Resp: (!) 32 (11/04/20 1544)  BP: (!) 166/79 (11/04/20 1544)  SpO2: 99 % (11/04/20 1544) Vital Signs (24h Range):  Temp:  [97.7 °F (36.5 °C)-98.4 °F (36.9 °C)] 97.9 °F (36.6 °C)  Pulse:  [72-80] 80  Resp:  [16-32] 32  SpO2:  [99 %-100 %] 99 %  BP: (166-189)/(79-87) 166/79     Weight: 73.9 kg (163 lb)  Body mass index is 26.31 kg/m².    Physical Exam  Vitals signs and nursing note reviewed.   Constitutional:       General: She is not in acute distress.     Appearance: Normal appearance. She is normal weight. She is not ill-appearing, toxic-appearing or diaphoretic.   HENT:      Head: Normocephalic and atraumatic.   Cardiovascular:      Rate and Rhythm: Normal  rate and regular rhythm.      Heart sounds: Normal heart sounds. No murmur. No friction rub. No gallop.    Pulmonary:      Effort: Pulmonary effort is normal. No respiratory distress.      Breath sounds: No stridor. No wheezing, rhonchi or rales.   Abdominal:      General: Abdomen is flat. Bowel sounds are normal. There is no distension.      Palpations: Abdomen is soft.      Tenderness: There is no abdominal tenderness. There is no guarding.   Neurological:      Mental Status: She is alert and oriented to person, place, and time.   Psychiatric:         Mood and Affect: Mood normal.         Behavior: Behavior normal.             Significant Labs:   BMP:   Recent Labs   Lab 11/04/20  1312         K 4.4      CO2 25   BUN 54*   CREATININE 5.1*   CALCIUM 10.2     CBC:   Recent Labs   Lab 11/04/20  1312   WBC 13.14*   HGB 8.4*   HCT 26.5*   *       Significant Imaging:     Assessment/Plan:     * UTI (urinary tract infection)  Klebsiella with resistance- No po options.  ID consulted. No sepsis present. Minimal WBC count elevation. Wonder if this is colonization?  Will check pro-sruthi.      Neck pain, chronic  Known OA.       Leukocytosis  Likely from UTI.  No sepsis       Paroxysmal atrial fibrillation  No acute issues       Chronic diastolic heart failure  No acute issues       Anemia of renal disease  No acute issues       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  No acute issues       CKD stage 5  At baseline     Type 2 diabetes mellitus, controlled, with renal complications  Well controlled. Manifestations of CKD 5. Will check an A1c.       Debility- PT/OT eval.     VTE Risk Mitigation (From admission, onward)    None             Shahab Oscar MD  Department of Hospital Medicine   [facility]

## 2020-11-04 NOTE — HPI
Mrs. Rilye is a 88 yo F who presents from [facility] with abnormal urine. Patient has history of multiple UTI's in the past and has resistance.  The patient notes burning with urination for a week. UA at the NH showed Klebsiella with known PO options and started on Meropenem in the ED. Patient has only a WBC count of 13K without sepsis. She is non ill appearing and has no other complaints other than neck pain that is chronic.  She is mainly bed bound but has had PT/OT in the past.

## 2020-11-05 LAB
ANION GAP SERPL CALC-SCNC: 12 MMOL/L (ref 8–16)
BASOPHILS # BLD AUTO: 0.07 K/UL (ref 0–0.2)
BASOPHILS NFR BLD: 0.6 % (ref 0–1.9)
BUN SERPL-MCNC: 57 MG/DL (ref 8–23)
CALCIUM SERPL-MCNC: 10.4 MG/DL (ref 8.7–10.5)
CHLORIDE SERPL-SCNC: 106 MMOL/L (ref 95–110)
CO2 SERPL-SCNC: 22 MMOL/L (ref 23–29)
CREAT SERPL-MCNC: 5 MG/DL (ref 0.5–1.4)
DIFFERENTIAL METHOD: ABNORMAL
EOSINOPHIL # BLD AUTO: 0.2 K/UL (ref 0–0.5)
EOSINOPHIL NFR BLD: 1.3 % (ref 0–8)
ERYTHROCYTE [DISTWIDTH] IN BLOOD BY AUTOMATED COUNT: 15.7 % (ref 11.5–14.5)
EST. GFR  (AFRICAN AMERICAN): 8 ML/MIN/1.73 M^2
EST. GFR  (NON AFRICAN AMERICAN): 7 ML/MIN/1.73 M^2
ESTIMATED AVG GLUCOSE: 74 MG/DL (ref 68–131)
GLUCOSE SERPL-MCNC: 104 MG/DL (ref 70–110)
HBA1C MFR BLD HPLC: 4.2 % (ref 4–5.6)
HCT VFR BLD AUTO: 23.8 % (ref 37–48.5)
HGB BLD-MCNC: 7.6 G/DL (ref 12–16)
IMM GRANULOCYTES # BLD AUTO: 0.13 K/UL (ref 0–0.04)
IMM GRANULOCYTES NFR BLD AUTO: 1.1 % (ref 0–0.5)
LYMPHOCYTES # BLD AUTO: 1.7 K/UL (ref 1–4.8)
LYMPHOCYTES NFR BLD: 14.4 % (ref 18–48)
MCH RBC QN AUTO: 30.8 PG (ref 27–31)
MCHC RBC AUTO-ENTMCNC: 31.9 G/DL (ref 32–36)
MCV RBC AUTO: 96 FL (ref 82–98)
MONOCYTES # BLD AUTO: 0.5 K/UL (ref 0.3–1)
MONOCYTES NFR BLD: 4.2 % (ref 4–15)
NEUTROPHILS # BLD AUTO: 9.2 K/UL (ref 1.8–7.7)
NEUTROPHILS NFR BLD: 78.4 % (ref 38–73)
NRBC BLD-RTO: 0 /100 WBC
PLATELET # BLD AUTO: 326 K/UL (ref 150–350)
PMV BLD AUTO: 9.5 FL (ref 9.2–12.9)
POTASSIUM SERPL-SCNC: 4.4 MMOL/L (ref 3.5–5.1)
RBC # BLD AUTO: 2.47 M/UL (ref 4–5.4)
SODIUM SERPL-SCNC: 140 MMOL/L (ref 136–145)
WBC # BLD AUTO: 11.69 K/UL (ref 3.9–12.7)

## 2020-11-05 PROCEDURE — 97161 PT EVAL LOW COMPLEX 20 MIN: CPT

## 2020-11-05 PROCEDURE — 21400001 HC TELEMETRY ROOM

## 2020-11-05 PROCEDURE — 25000003 PHARM REV CODE 250: Performed by: INTERNAL MEDICINE

## 2020-11-05 PROCEDURE — 97165 OT EVAL LOW COMPLEX 30 MIN: CPT

## 2020-11-05 PROCEDURE — 80048 BASIC METABOLIC PNL TOTAL CA: CPT

## 2020-11-05 PROCEDURE — 63600175 PHARM REV CODE 636 W HCPCS: Performed by: INTERNAL MEDICINE

## 2020-11-05 PROCEDURE — 85025 COMPLETE CBC W/AUTO DIFF WBC: CPT

## 2020-11-05 PROCEDURE — 36415 COLL VENOUS BLD VENIPUNCTURE: CPT

## 2020-11-05 RX ADMIN — SODIUM BICARBONATE TAB 325 MG 1300 MG: 325 TAB at 08:11

## 2020-11-05 RX ADMIN — CARVEDILOL 12.5 MG: 12.5 TABLET, FILM COATED ORAL at 09:11

## 2020-11-05 RX ADMIN — HEPARIN SODIUM 5000 UNITS: 5000 INJECTION, SOLUTION INTRAVENOUS; SUBCUTANEOUS at 09:11

## 2020-11-05 RX ADMIN — TRAMADOL HYDROCHLORIDE 50 MG: 50 TABLET, FILM COATED ORAL at 09:11

## 2020-11-05 RX ADMIN — CARVEDILOL 12.5 MG: 12.5 TABLET, FILM COATED ORAL at 08:11

## 2020-11-05 RX ADMIN — HYDRALAZINE HYDROCHLORIDE 25 MG: 25 TABLET, FILM COATED ORAL at 06:11

## 2020-11-05 RX ADMIN — HYDRALAZINE HYDROCHLORIDE 25 MG: 25 TABLET, FILM COATED ORAL at 04:11

## 2020-11-05 RX ADMIN — FUROSEMIDE 60 MG: 40 TABLET ORAL at 09:11

## 2020-11-05 RX ADMIN — HEPARIN SODIUM 5000 UNITS: 5000 INJECTION, SOLUTION INTRAVENOUS; SUBCUTANEOUS at 08:11

## 2020-11-05 RX ADMIN — MEROPENEM AND SODIUM CHLORIDE 1 G: 1 INJECTION, SOLUTION INTRAVENOUS at 09:11

## 2020-11-05 RX ADMIN — SODIUM BICARBONATE TAB 325 MG 1300 MG: 325 TAB at 09:11

## 2020-11-05 RX ADMIN — HYDRALAZINE HYDROCHLORIDE 25 MG: 25 TABLET, FILM COATED ORAL at 10:11

## 2020-11-05 RX ADMIN — SODIUM BICARBONATE TAB 325 MG 1300 MG: 325 TAB at 04:11

## 2020-11-05 RX ADMIN — NIFEDIPINE 90 MG: 30 TABLET, FILM COATED, EXTENDED RELEASE ORAL at 09:11

## 2020-11-05 RX ADMIN — TRAMADOL HYDROCHLORIDE 50 MG: 50 TABLET, FILM COATED ORAL at 08:11

## 2020-11-05 NOTE — ASSESSMENT & PLAN NOTE
Klebsiella with resistance- No po options.  ID consulted. No sepsis present. Minimal WBC count elevation. Wonder if this is colonization?  Will check pro-sruthi- minimally elevated.     ID consult pending

## 2020-11-05 NOTE — PT/OT/SLP EVAL
Occupational Therapy   Evaluation and Discharge Note    Name: Joyce Riley  MRN: [id number]  Admitting Diagnosis:  UTI (urinary tract infection)      Recommendations:     Discharge Recommendations: nursing facility, basic  Discharge Equipment Recommendations:  none  Barriers to discharge:  None    Assessment:     Joyce Riley is a 87 y.o. female with a medical diagnosis of UTI (urinary tract infection). At this time, patient is functioning at their prior level of function and does not require further acute OT services.     Plan:     During this hospitalization, patient does not require further acute OT services.  Please re-consult if situation changes.    · Plan of Care Reviewed with: patient    Subjective     Chief Complaint: neck pain and feels cold  Patient/Family Comments/goals: wants a towel wrapped around her neck    Occupational Profile:  Living Environment: [facility]  Previous level of function: The patient was able to perform grooming tasks nd feed herself and received assist fpr other self care. The patient reports getting OOB daily via Kristin lift to a W/C  Roles and Routines: per NH  Equipment Used at home:  wheelchair, none(DME per NH)  Assistance upon Discharge: NH    Pain/Comfort:  · Pain Rating 1: (yes-did not rate but reports as chronic)  · Location 1: neck  · Pain Addressed 1: Reposition, Cessation of Activity, Nurse notified    Patients cultural, spiritual, Islam conflicts given the current situation: no    Objective:     Communicated with: Jenni somers prior to session.  Patient found HOB elevated with bed alarm upon OT entry to room.    General Precautions: Standard, fall   Orthopedic Precautions:N/A   Braces: N/A     Occupational Performance:    Bed Mobility:    · Patient completed Rolling/Turning to Left with  total assistance and 2 persons  · Patient completed Rolling/Turning to Right with total assistance and 2 persons  · Patient completed Scooting/Bridging with  "total assistance, drwsheet lift to the top of the bed persons      Functional Mobility/Transfers:  · Functional Mobility: N/T 2* uses a Kristin lift at the NH    Activities of Daily Living:  · Feeding:  modified independence and set up assist in bed  · Grooming: The patient was provided items to perform denture care in bed. The patient refused to perform with OT stating she will "do it later"      Cognitive/Visual Perceptual:  Cognitive/Psychosocial Skills:     -       Oriented to: Person and Place   -       Follows Commands/attention:Follows two-step commands  -       Communication: clear/fluent  -       Memory: No Deficits noted  -       Safety awareness/insight to disability: intact   -       Mood/Affect/Coping skills/emotional control: Appropriate to situation    Physical Exam:  Skin integrity: Visible skin intact  Dominant hand: -       right  Upper Extremity Range of Motion:     -       Right Upper Extremity: WFL except shldr limited to 90*  -       Left Upper Extremity: WFL  Upper Extremity Strength:    -       Right Upper Extremity: WFL  -       Left Upper Extremity: WFL   Strength:    -       Right Upper Extremity: WFL  -       Left Upper Extremity: WFL  Fine Motor Coordination:    -       Intact    AMPAC 6 Click ADL:  AMPAC Total Score: 14    Treatment & Education:  The patient participated in bed level OT evaluation. OT assisted the patient to have a towel and blanket wrapped around her neck for pain relief. The patient was educated re: OT role and encouraged the patient to participate in self care tasks as able.  Education:    Patient left HOB elevated with all lines intact and call button in reach    GOALS:   Multidisciplinary Problems     Occupational Therapy Goals     Not on file          Multidisciplinary Problems (Resolved)        Problem: Occupational Therapy Goal    Goal Priority Disciplines Outcome Interventions   Occupational Therapy Goal   (Resolved)     OT, PT/OT Met              "       History:     Past Medical History:   Diagnosis Date    Anemia     Anticoagulant long-term use     Arthritis     Atrial fibrillation     Cataract     CHF (congestive heart failure)     CKD (chronic kidney disease), stage V     Dermatophytosis     Diabetes mellitus     Type 2    GERD (gastroesophageal reflux disease)     GI bleed 2018    Gout     Gout     Hypertension     Obese     Requires assistance with all daily activities     Stroke     Wheelchair dependent        Past Surgical History:   Procedure Laterality Date     SECTION      Patient unsure, believe she had 3-4    CHOLECYSTECTOMY      EYE SURGERY      HYSTERECTOMY      JOINT REPLACEMENT Right     knee    TOTAL KNEE ARTHROPLASTY Right        Time Tracking:     OT Date of Treatment: 20  OT Start Time: 946  OT Stop Time: 1000  OT Total Time (min): 14 min    Billable Minutes:Evaluation 14 (with PT)    Maye Morocho, OT  2020

## 2020-11-05 NOTE — PROGRESS NOTES
Received report from KETAN Arteaga. Patient awake & alert resting quietly in bed w/ family at bedside, telemetry monitoring in place, no distress noted. Safety maintained, call light in reach.

## 2020-11-05 NOTE — PROGRESS NOTES
Pharmacist Renal Dose Adjustment Note    Joyce Riley is a 87 y.o. female being treated with the medication meropenem    Patient Data:    Vital Signs (Most Recent):  Temp: 98.6 °F (37 °C) (11/05/20 0827)  Pulse: 87 (11/05/20 0827)  Resp: 16 (11/05/20 0827)  BP: 132/60 (11/05/20 0827)  SpO2: 99 % (11/05/20 0827) Vital Signs (72h Range):  Temp:  [97.7 °F (36.5 °C)-98.9 °F (37.2 °C)]   Pulse:  [72-92]   Resp:  [16-32]   BP: (132-194)/(60-87)   SpO2:  [96 %-100 %]      Recent Labs   Lab 11/04/20  1312 11/05/20  0413   CREATININE 5.1* 5.0*     Serum creatinine: 5 mg/dL (H) 11/05/20 0413  Estimated creatinine clearance: 8 mL/min (A)    Medication:meropenem dose: 1g frequency q 24 hrs will be changed to medication:meropenem dose:500 mg frequency: q 24 hrs    Pharmacist's Name: Lexis Fritz  Pharmacist's Extension: [phone]

## 2020-11-05 NOTE — PLAN OF CARE
Problem: Occupational Therapy Goal  Goal: Occupational Therapy Goal  Outcome: Met   Bed level OT eval was complete. The patient lives at [facility] where she is dependent for W/C transfers via Kristin Lift. The patient is at her baseline for self care as she is able to feed herself and  perform grooming tasks. No OT is recommended.

## 2020-11-05 NOTE — PROGRESS NOTES
[facility] Hospital Medicine  Progress Note    Patient Name: Joyce Riley  MRN: [id number]  Patient Class: IP- Inpatient   Admission Date: 11/4/2020  Length of Stay: 1 days  Attending Physician: Shahab Chinchilla MD  Primary Care Provider: Kalpana Staton MD        Subjective:     Principal Problem:UTI (urinary tract infection)        HPI:  Mrs. Riley is a 86 yo F who presents from [facility] with abnormal urine. Patient has history of multiple UTI's in the past and has resistance.  The patient notes burning with urination for a week. UA at the NH showed Klebsiella with known PO options and started on Meropenem in the ED. Patient has only a WBC count of 13K without sepsis. She is non ill appearing and has no other complaints other than neck pain that is chronic.  She is mainly bed bound but has had PT/OT in the past.     Overview/Hospital Course:  Mrs. Riley is a 86 yo F who presents from [facility] with abnormal urine. Patient has history of multiple UTI's in the past and has resistance.  The patient notes burning with urination for a week. UA at the NH showed Klebsiella with known PO options and started on Meropenem in the ED. Patient has only a WBC count of 13K without sepsis. She is non ill appearing and has no other complaints other than neck pain that is chronic.  She is mainly bed bound but has had PT/OT in the past. She was started on Meropenem and admitted to the hospital. ID was consulted for Abx recs for discharge. PT/OT were consulted.     Interval History: No new issues     Review of Systems   Constitutional: Negative for activity change, appetite change, chills, diaphoresis, fatigue and fever.   HENT: Negative for congestion and dental problem.    Eyes: Negative for discharge.   Respiratory: Negative for apnea, cough, chest tightness and shortness of breath.    Cardiovascular: Negative for chest pain and leg swelling.   Gastrointestinal: Negative for abdominal distention,  abdominal pain, diarrhea, nausea and vomiting.   Genitourinary: Negative for difficulty urinating and dyspareunia.   Musculoskeletal: Positive for arthralgias and neck pain.   Neurological: Negative for dizziness.   Psychiatric/Behavioral: Negative for agitation and confusion.     Objective:     Vital Signs (Most Recent):  Temp: 98.9 °F (37.2 °C) (11/05/20 0417)  Pulse: 90 (11/05/20 0417)  Resp: 18 (11/05/20 0417)  BP: 139/60 (11/05/20 0417)  SpO2: 96 % (11/05/20 0417) Vital Signs (24h Range):  Temp:  [97.7 °F (36.5 °C)-98.9 °F (37.2 °C)] 98.9 °F (37.2 °C)  Pulse:  [72-92] 90  Resp:  [16-32] 18  SpO2:  [96 %-100 %] 96 %  BP: (139-194)/(60-87) 139/60     Weight: 71.7 kg (158 lb 1.1 oz)  Body mass index is 25.51 kg/m².    Intake/Output Summary (Last 24 hours) at 11/5/2020 0701  Last data filed at 11/4/2020 1803  Gross per 24 hour   Intake 300 ml   Output --   Net 300 ml      Physical Exam  Vitals signs and nursing note reviewed.   Constitutional:       General: She is not in acute distress.     Appearance: Normal appearance. She is normal weight. She is not ill-appearing, toxic-appearing or diaphoretic.   HENT:      Head: Normocephalic and atraumatic.   Cardiovascular:      Rate and Rhythm: Normal rate and regular rhythm.      Heart sounds: Normal heart sounds. No murmur. No friction rub. No gallop.    Pulmonary:      Effort: Pulmonary effort is normal. No respiratory distress.      Breath sounds: No stridor. No wheezing, rhonchi or rales.   Abdominal:      General: Abdomen is flat. Bowel sounds are normal. There is no distension.      Palpations: Abdomen is soft.      Tenderness: There is no abdominal tenderness. There is no guarding.   Neurological:      Mental Status: She is alert and oriented to person, place, and time.   Psychiatric:         Mood and Affect: Mood normal.         Behavior: Behavior normal.         Significant Labs:   BMP:   Recent Labs   Lab 11/05/20 0413         K 4.4      CO2  22*   BUN 57*   CREATININE 5.0*   CALCIUM 10.4     CBC:   Recent Labs   Lab 11/04/20  1312 11/05/20  0413   WBC 13.14* 11.69   HGB 8.4* 7.6*   HCT 26.5* 23.8*   * 326       Significant Imaging:       Assessment/Plan:      * UTI (urinary tract infection)  Klebsiella with resistance- No po options.  ID consulted. No sepsis present. Minimal WBC count elevation. Wonder if this is colonization?  Will check pro-sruthi- minimally elevated.     ID consult pending       Neck pain, chronic  Known OA.       Leukocytosis  Likely from UTI.  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 Manifestations of CKD 5. Will check an A1c.         VTE Risk Mitigation (From admission, onward)         Ordered     heparin (porcine) injection 5,000 Units  Every 12 hours      11/04/20 1551                Discharge Planning   PAM:      Code Status: Prior   Is the patient medically ready for discharge?:     Reason for patient still in hospital (select all that apply): Patient unstable  Discharge Plan A: Return to nursing home          ID consult recs. PT/OT eval. Back to  by tomorrow         Shahab Oscar MD  Department of Hospital Medicine   [facility]

## 2020-11-05 NOTE — PLAN OF CARE
11/05/20 0859   Post-Acute Status   Post-Acute Authorization Placement   Post-Acute Placement Status Pending Payor Review   Patient choice form signed by patient/caregiver List with quality metrics by geographic area provided   Discharge Delays None known at this time   Discharge Plan   Discharge Plan A Return to nursing home     VIKTOR contacted pt's daughter, Antonia, and confirmed pt is a resident of [facility]. According to Antonia, pt has been at [facility] for over a year and d/c goal is to have pt return. VIKTOR faxed packet and MAR to [facility] via [facility]. VIKTOR waiting confirmation of services.

## 2020-11-05 NOTE — PLAN OF CARE
Problem: Physical Therapy Goal  Goal: Physical Therapy Goal  Outcome: Met    No acute PT or DME needs

## 2020-11-05 NOTE — SUBJECTIVE & OBJECTIVE
Interval History: No new issues     Review of Systems   Constitutional: Negative for activity change, appetite change, chills, diaphoresis, fatigue and fever.   HENT: Negative for congestion and dental problem.    Eyes: Negative for discharge.   Respiratory: Negative for apnea, cough, chest tightness and shortness of breath.    Cardiovascular: Negative for chest pain and leg swelling.   Gastrointestinal: Negative for abdominal distention, abdominal pain, diarrhea, nausea and vomiting.   Genitourinary: Negative for difficulty urinating and dyspareunia.   Musculoskeletal: Positive for arthralgias and neck pain.   Neurological: Negative for dizziness.   Psychiatric/Behavioral: Negative for agitation and confusion.     Objective:     Vital Signs (Most Recent):  Temp: 98.9 °F (37.2 °C) (11/05/20 0417)  Pulse: 90 (11/05/20 0417)  Resp: 18 (11/05/20 0417)  BP: 139/60 (11/05/20 0417)  SpO2: 96 % (11/05/20 0417) Vital Signs (24h Range):  Temp:  [97.7 °F (36.5 °C)-98.9 °F (37.2 °C)] 98.9 °F (37.2 °C)  Pulse:  [72-92] 90  Resp:  [16-32] 18  SpO2:  [96 %-100 %] 96 %  BP: (139-194)/(60-87) 139/60     Weight: 71.7 kg (158 lb 1.1 oz)  Body mass index is 25.51 kg/m².    Intake/Output Summary (Last 24 hours) at 11/5/2020 0701  Last data filed at 11/4/2020 1803  Gross per 24 hour   Intake 300 ml   Output --   Net 300 ml      Physical Exam  Vitals signs and nursing note reviewed.   Constitutional:       General: She is not in acute distress.     Appearance: Normal appearance. She is normal weight. She is not ill-appearing, toxic-appearing or diaphoretic.   HENT:      Head: Normocephalic and atraumatic.   Cardiovascular:      Rate and Rhythm: Normal rate and regular rhythm.      Heart sounds: Normal heart sounds. No murmur. No friction rub. No gallop.    Pulmonary:      Effort: Pulmonary effort is normal. No respiratory distress.      Breath sounds: No stridor. No wheezing, rhonchi or rales.   Abdominal:      General: Abdomen is flat.  Bowel sounds are normal. There is no distension.      Palpations: Abdomen is soft.      Tenderness: There is no abdominal tenderness. There is no guarding.   Neurological:      Mental Status: She is alert and oriented to person, place, and time.   Psychiatric:         Mood and Affect: Mood normal.         Behavior: Behavior normal.         Significant Labs:   BMP:   Recent Labs   Lab 11/05/20 0413         K 4.4      CO2 22*   BUN 57*   CREATININE 5.0*   CALCIUM 10.4     CBC:   Recent Labs   Lab 11/04/20  1312 11/05/20 0413   WBC 13.14* 11.69   HGB 8.4* 7.6*   HCT 26.5* 23.8*   * 326       Significant Imaging:

## 2020-11-05 NOTE — PROGRESS NOTES
Patient's updated [facility] along w/ Palliative Medicine Consult Note by Consulting Provider- Bonita Coreas NP in blue folder at nurses station.

## 2020-11-05 NOTE — PT/OT/SLP EVAL
"Physical Therapy Evaluation and Discharge Note    Patient Name:  Joyce Riley   MRN:  [id number]    Recommendations:     Discharge Recommendations:  nursing facility, basic   Discharge Equipment Recommendations: none   Barriers to discharge: None    Assessment:     Joyce Riley is a 87 y.o. female admitted with a medical diagnosis of UTI (urinary tract infection). .  At this time, patient is functioning at their prior level of function and does not require further acute PT services.     Recommend RN staff reposition and elevated HOB as tolerated given severe neck pain (chronic).   Progressive Mobility Level 1: In-bed activity. Positioning, ROM, and turning as tolerated.       Plan:     During this hospitalization, patient does not require further acute PT services.  Please re-consult if situation changes.      Subjective     Chief Complaint: neck pain, chronic. "they say it's arthritis but I don't know"  Patient/Family Comments/goals: have neck repositioned  Pain/Comfort:  · Pain Rating 1: (moeller baker 10 with AROM)  · Location 1: neck  · Pain Addressed 1: Reposition, Cessation of Activity    Patients cultural, spiritual, Rastafari conflicts given the current situation: no    Living Environment:  NH.   Prior to admission, patients level of function was dillon t/f to WC. Does not self propel Equipment used at home:  .  DME owned (not currently used): none.  Upon discharge, patient will have assistance from n/a.    Objective:     Communicated with SUKUMAR Arteaga prior to session.  Patient found HOB elevated with bed alarm upon PT entry to room.    General Precautions: Standard, fall   Orthopedic Precautions:N/A   Braces: (pt reports having a cervical hard collar at the NH)     Exams:  · pain dominant.  · Cognitive Exam:  Patient is alert and appropriate. Follows commands.   · Gross Motor Coordination:  WFL  · Skin Integrity/Edema:      · -       Skin integrity: Visible skin intact  · RLE ROM: Deficits: " PROM DF limited by c/o pain. Hip and knee > 90degrees  · RLE Strength: Deficits: hip and knee move against gravity. DF/PF 2/5  · LLE ROM: Deficits: see RLE  · LLE Strength: Deficits: see RLE  · See OT note for UEs    Functional Mobility:  · Bed Mobility:   Unable to roll or sit upright today; pt declined 2/2 neck pain  · Scooting: total assistance and of 2 persons    AM-PAC 6 CLICK MOBILITY  Total Score:6       Therapeutic Activities and Exercises:   n/a    AM-PAC 6 CLICK MOBILITY  Total Score:6     Patient left HOB elevated with call button in reach and bed alarm on.    GOALS:   Multidisciplinary Problems     Physical Therapy Goals     Not on file          Multidisciplinary Problems (Resolved)        Problem: Physical Therapy Goal    Goal Priority Disciplines Outcome Goal Variances Interventions   Physical Therapy Goal   (Resolved)     PT, PT/OT Met                     History:     Past Medical History:   Diagnosis Date    Anemia     Anticoagulant long-term use     Arthritis     Atrial fibrillation     Cataract     CHF (congestive heart failure)     CKD (chronic kidney disease), stage V     Dermatophytosis     Diabetes mellitus     Type 2    GERD (gastroesophageal reflux disease)     GI bleed 2018    Gout     Gout     Hypertension     Obese     Requires assistance with all daily activities     Stroke 2014    Wheelchair dependent        Past Surgical History:   Procedure Laterality Date     SECTION      Patient unsure, believe she had 3-4    CHOLECYSTECTOMY      EYE SURGERY      HYSTERECTOMY      JOINT REPLACEMENT Right     knee    TOTAL KNEE ARTHROPLASTY Right        Time Tracking:     PT Received On: 20  PT Start Time: 943     PT Stop Time: 957  PT Total Time (min): 14 min     Billable Minutes: Evaluation 14      Ema Murillo, PT  2020

## 2020-11-06 VITALS
SYSTOLIC BLOOD PRESSURE: 125 MMHG | HEIGHT: 66 IN | HEART RATE: 103 BPM | RESPIRATION RATE: 18 BRPM | OXYGEN SATURATION: 97 % | DIASTOLIC BLOOD PRESSURE: 60 MMHG | TEMPERATURE: 98 F | BODY MASS INDEX: 23.74 KG/M2 | WEIGHT: 147.69 LBS

## 2020-11-06 PROBLEM — D72.829 LEUKOCYTOSIS: Status: RESOLVED | Noted: 2020-07-29 | Resolved: 2020-11-06

## 2020-11-06 LAB
ANION GAP SERPL CALC-SCNC: 13 MMOL/L (ref 8–16)
BACTERIA UR CULT: ABNORMAL
BASOPHILS # BLD AUTO: 0.07 K/UL (ref 0–0.2)
BASOPHILS NFR BLD: 0.7 % (ref 0–1.9)
BUN SERPL-MCNC: 57 MG/DL (ref 8–23)
CALCIUM SERPL-MCNC: 10.4 MG/DL (ref 8.7–10.5)
CHLORIDE SERPL-SCNC: 105 MMOL/L (ref 95–110)
CO2 SERPL-SCNC: 21 MMOL/L (ref 23–29)
CREAT SERPL-MCNC: 5.1 MG/DL (ref 0.5–1.4)
DIFFERENTIAL METHOD: ABNORMAL
EOSINOPHIL # BLD AUTO: 0.1 K/UL (ref 0–0.5)
EOSINOPHIL NFR BLD: 1 % (ref 0–8)
ERYTHROCYTE [DISTWIDTH] IN BLOOD BY AUTOMATED COUNT: 15.5 % (ref 11.5–14.5)
EST. GFR  (AFRICAN AMERICAN): 8 ML/MIN/1.73 M^2
EST. GFR  (NON AFRICAN AMERICAN): 7 ML/MIN/1.73 M^2
GLUCOSE SERPL-MCNC: 82 MG/DL (ref 70–110)
HCT VFR BLD AUTO: 24.7 % (ref 37–48.5)
HGB BLD-MCNC: 7.7 G/DL (ref 12–16)
IMM GRANULOCYTES # BLD AUTO: 0.09 K/UL (ref 0–0.04)
IMM GRANULOCYTES NFR BLD AUTO: 0.9 % (ref 0–0.5)
LYMPHOCYTES # BLD AUTO: 1.5 K/UL (ref 1–4.8)
LYMPHOCYTES NFR BLD: 14.5 % (ref 18–48)
MCH RBC QN AUTO: 30.6 PG (ref 27–31)
MCHC RBC AUTO-ENTMCNC: 31.2 G/DL (ref 32–36)
MCV RBC AUTO: 98 FL (ref 82–98)
MONOCYTES # BLD AUTO: 0.4 K/UL (ref 0.3–1)
MONOCYTES NFR BLD: 4.1 % (ref 4–15)
NEUTROPHILS # BLD AUTO: 8 K/UL (ref 1.8–7.7)
NEUTROPHILS NFR BLD: 78.8 % (ref 38–73)
NRBC BLD-RTO: 0 /100 WBC
PLATELET # BLD AUTO: 346 K/UL (ref 150–350)
PMV BLD AUTO: 9.9 FL (ref 9.2–12.9)
POTASSIUM SERPL-SCNC: 4.6 MMOL/L (ref 3.5–5.1)
RBC # BLD AUTO: 2.52 M/UL (ref 4–5.4)
SODIUM SERPL-SCNC: 139 MMOL/L (ref 136–145)
WBC # BLD AUTO: 10.11 K/UL (ref 3.9–12.7)

## 2020-11-06 PROCEDURE — 36569 INSJ PICC 5 YR+ W/O IMAGING: CPT

## 2020-11-06 PROCEDURE — C1751 CATH, INF, PER/CENT/MIDLINE: HCPCS

## 2020-11-06 PROCEDURE — 80048 BASIC METABOLIC PNL TOTAL CA: CPT

## 2020-11-06 PROCEDURE — 85025 COMPLETE CBC W/AUTO DIFF WBC: CPT

## 2020-11-06 PROCEDURE — 63600175 PHARM REV CODE 636 W HCPCS: Performed by: INTERNAL MEDICINE

## 2020-11-06 PROCEDURE — 25000003 PHARM REV CODE 250: Performed by: INTERNAL MEDICINE

## 2020-11-06 PROCEDURE — 36415 COLL VENOUS BLD VENIPUNCTURE: CPT

## 2020-11-06 RX ORDER — SODIUM CHLORIDE 0.9 % (FLUSH) 0.9 %
10 SYRINGE (ML) INJECTION EVERY 6 HOURS
Status: DISCONTINUED | OUTPATIENT
Start: 2020-11-06 | End: 2020-11-06 | Stop reason: HOSPADM

## 2020-11-06 RX ORDER — SODIUM CHLORIDE 0.9 % (FLUSH) 0.9 %
10 SYRINGE (ML) INJECTION
Status: DISCONTINUED | OUTPATIENT
Start: 2020-11-06 | End: 2020-11-06 | Stop reason: HOSPADM

## 2020-11-06 RX ORDER — DIPHENHYDRAMINE HCL 25 MG
25 CAPSULE ORAL EVERY 6 HOURS PRN
Status: DISCONTINUED | OUTPATIENT
Start: 2020-11-06 | End: 2020-11-06 | Stop reason: HOSPADM

## 2020-11-06 RX ORDER — TRAMADOL HYDROCHLORIDE 50 MG/1
50 TABLET ORAL EVERY 8 HOURS PRN
Qty: 20 TABLET | Refills: 0 | Status: ON HOLD | OUTPATIENT
Start: 2020-11-06 | End: 2020-11-18 | Stop reason: SDUPTHER

## 2020-11-06 RX ORDER — MUPIROCIN 20 MG/G
OINTMENT TOPICAL 2 TIMES DAILY
Status: DISCONTINUED | OUTPATIENT
Start: 2020-11-06 | End: 2020-11-06 | Stop reason: HOSPADM

## 2020-11-06 RX ORDER — MEROPENEM AND SODIUM CHLORIDE 1 G/50ML
1 INJECTION, SOLUTION INTRAVENOUS DAILY
Qty: 350 ML | Refills: 0 | Status: ON HOLD
Start: 2020-11-06 | End: 2020-11-18 | Stop reason: HOSPADM

## 2020-11-06 RX ADMIN — SODIUM BICARBONATE TAB 325 MG 1300 MG: 325 TAB at 03:11

## 2020-11-06 RX ADMIN — CARVEDILOL 12.5 MG: 12.5 TABLET, FILM COATED ORAL at 08:11

## 2020-11-06 RX ADMIN — TRAMADOL HYDROCHLORIDE 50 MG: 50 TABLET, FILM COATED ORAL at 07:11

## 2020-11-06 RX ADMIN — MEROPENEM AND SODIUM CHLORIDE 1 G: 1 INJECTION, SOLUTION INTRAVENOUS at 08:11

## 2020-11-06 RX ADMIN — NIFEDIPINE 90 MG: 30 TABLET, FILM COATED, EXTENDED RELEASE ORAL at 08:11

## 2020-11-06 RX ADMIN — DIPHENHYDRAMINE HYDROCHLORIDE 25 MG: 25 CAPSULE ORAL at 12:11

## 2020-11-06 RX ADMIN — HEPARIN SODIUM 5000 UNITS: 5000 INJECTION, SOLUTION INTRAVENOUS; SUBCUTANEOUS at 08:11

## 2020-11-06 RX ADMIN — SODIUM BICARBONATE TAB 325 MG 1300 MG: 325 TAB at 08:11

## 2020-11-06 RX ADMIN — HYDRALAZINE HYDROCHLORIDE 25 MG: 25 TABLET, FILM COATED ORAL at 05:11

## 2020-11-06 RX ADMIN — HYDRALAZINE HYDROCHLORIDE 25 MG: 25 TABLET, FILM COATED ORAL at 03:11

## 2020-11-06 RX ADMIN — FUROSEMIDE 60 MG: 40 TABLET ORAL at 08:11

## 2020-11-06 NOTE — PROGRESS NOTES
[facility] Hospital Medicine  Progress Note    Patient Name: Joyce Riley  MRN: [id number]  Patient Class: IP- Inpatient   Admission Date: 11/4/2020  Length of Stay: 2 days  Attending Physician: Shahab Chinchilla MD  Primary Care Provider: Kalpana Staton MD        Subjective:     Principal Problem:UTI (urinary tract infection)        HPI:  Mrs. Riley is a 86 yo F who presents from [facility] with abnormal urine. Patient has history of multiple UTI's in the past and has resistance.  The patient notes burning with urination for a week. UA at the NH showed Klebsiella with known PO options and started on Meropenem in the ED. Patient has only a WBC count of 13K without sepsis. She is non ill appearing and has no other complaints other than neck pain that is chronic.  She is mainly bed bound but has had PT/OT in the past.     Overview/Hospital Course:  Mrs. Riley is a 86 yo F who presents from [facility] with abnormal urine. Patient has history of multiple UTI's in the past and has resistance.  The patient notes burning with urination for a week. UA at the NH showed Klebsiella with known PO options and started on Meropenem in the ED. Patient has only a WBC count of 13K without sepsis. She is non ill appearing and has no other complaints other than neck pain that is chronic.  She is mainly bed bound but has had PT/OT in the past. She was started on Meropenem and admitted to the hospital. ID was consulted for Abx recs for discharge. PT/OT were consulted and rec: return to NH- basic. Urine culture grew pseudomonas with resistance.      Interval History: No new issues.     Review of Systems   Constitutional: Negative for activity change, appetite change, chills, diaphoresis, fatigue and fever.   HENT: Negative for congestion and dental problem.    Eyes: Negative for discharge.   Respiratory: Negative for apnea, cough, chest tightness and shortness of breath.    Cardiovascular: Negative for chest pain  and leg swelling.   Gastrointestinal: Negative for abdominal distention, abdominal pain, diarrhea, nausea and vomiting.   Genitourinary: Negative for difficulty urinating and dyspareunia.   Musculoskeletal: Positive for arthralgias and neck pain.   Neurological: Negative for dizziness.   Psychiatric/Behavioral: Negative for agitation and confusion.     Objective:     Vital Signs (Most Recent):  Temp: 98.4 °F (36.9 °C) (11/06/20 0804)  Pulse: 88 (11/06/20 0804)  Resp: 16 (11/06/20 0804)  BP: 137/65 (11/06/20 0804)  SpO2: 98 % (11/06/20 0804) Vital Signs (24h Range):  Temp:  [97.6 °F (36.4 °C)-99.4 °F (37.4 °C)] 98.4 °F (36.9 °C)  Pulse:  [85-95] 88  Resp:  [16-20] 16  SpO2:  [95 %-100 %] 98 %  BP: (124-161)/(58-74) 137/65     Weight: 67 kg (147 lb 11.3 oz)  Body mass index is 23.84 kg/m².    Intake/Output Summary (Last 24 hours) at 11/6/2020 1037  Last data filed at 11/5/2020 2025  Gross per 24 hour   Intake 630 ml   Output --   Net 630 ml      Physical Exam  Vitals signs and nursing note reviewed.   Constitutional:       General: She is not in acute distress.     Appearance: Normal appearance. She is normal weight. She is not ill-appearing, toxic-appearing or diaphoretic.   HENT:      Head: Normocephalic and atraumatic.   Cardiovascular:      Rate and Rhythm: Normal rate and regular rhythm.      Heart sounds: Normal heart sounds. No murmur. No friction rub. No gallop.    Pulmonary:      Effort: Pulmonary effort is normal. No respiratory distress.      Breath sounds: No stridor. No wheezing, rhonchi or rales.   Abdominal:      General: Abdomen is flat. Bowel sounds are normal. There is no distension.      Palpations: Abdomen is soft.      Tenderness: There is no abdominal tenderness. There is no guarding.   Neurological:      Mental Status: She is alert and oriented to person, place, and time.   Psychiatric:         Mood and Affect: Mood normal.         Behavior: Behavior normal.         Significant Labs:   BMP:    Recent Labs   Lab 11/06/20  0545   GLU 82      K 4.6      CO2 21*   BUN 57*   CREATININE 5.1*   CALCIUM 10.4     CBC:   Recent Labs   Lab 11/04/20  1312 11/05/20  0413 11/06/20  0545   WBC 13.14* 11.69 10.11   HGB 8.4* 7.6* 7.7*   HCT 26.5* 23.8* 24.7*   * 326 346       Significant Imaging:       Assessment/Plan:      * UTI (urinary tract infection)  Klebsiella with resistance- No po options.  ID consulted. No sepsis present. Minimal WBC count elevation. Wonder if this is colonization?  Will check pro-sruthi- minimally elevated.     ID consult pending     Urine culture here with pseudomonas with resistance. Follow ID recs       Neck pain, chronic  Known OA.       Leukocytosis  Likely from UTI.  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 Manifestations of CKD 5. Will check an A1c.       Debility- PT/OT rec: return to NH/basic     VTE Risk Mitigation (From admission, onward)         Ordered     heparin (porcine) injection 5,000 Units  Every 12 hours      11/04/20 1551                Discharge Planning   PAM:      Code Status: DNR   Is the patient medically ready for discharge?:     Reason for patient still in hospital (select all that apply): Patient unstable  Discharge Plan A: Return to nursing home   Discharge Delays: None known at this time        Awaiting ID recs. Would like to d/c back to [facility] today         Shahab Oscar MD  Department of Hospital Medicine   [facility]

## 2020-11-06 NOTE — PROCEDURES
"Joyce Riley is a 87 y.o. female patient.    Temp: 98.4 °F (36.9 °C) (11/06/20 1619)  Pulse: 103 (11/06/20 1619)  Resp: 18 (11/06/20 1619)  BP: 125/60 (11/06/20 1619)  SpO2: 97 % (11/06/20 1619)  Weight: 67 kg (147 lb 11.3 oz) (11/06/20 0356)  Height: 5' 6" (167.6 cm) (11/04/20 1725)    PICC  Date/Time: 11/6/2020 4:40 PM  Performed by: Derrick Vicente RN  Consent Done: Yes  Time out: Immediately prior to procedure a time out was called to verify the correct patient, procedure, equipment, support staff and site/side marked as required  Indications: med administration  Anesthesia: local infiltration  Local anesthetic: lidocaine 1% without epinephrine  Anesthetic Total (mL): 1  Preparation: skin prepped with ChloraPrep  Skin prep agent dried: skin prep agent completely dried prior to procedure  Sterile barriers: all five maximum sterile barriers used - cap, mask, sterile gown, sterile gloves, and large sterile sheet  Hand hygiene: hand hygiene performed prior to central venous catheter insertion  Location details: right basilic  Catheter type: double lumen  Catheter size: 5 Fr  Catheter Length: 35cm    Ultrasound guidance: yes  Vessel Caliber: medium and large and patent, compressibility normal  Needle advanced into vessel with real time Ultrasound guidance.  Guidewire confirmed in vessel.  Sterile sheath used.  Number of attempts: 1  Post-procedure: blood return through all ports, chlorhexidine patch and sterile dressing applied  Estimated blood loss (mL): 0            Derrick Vicente  11/6/2020    "

## 2020-11-06 NOTE — PLAN OF CARE
11/06/20 1255   Post-Acute Status   Post-Acute Authorization Placement   Post-Acute Placement Status Pending Payor Review   Patient choice form signed by patient/caregiver List with quality metrics by geographic area provided  (Returning to [facility])   Discharge Delays None known at this time   Discharge Plan   Discharge Plan A Skilled Nursing Facility   Discharge Plan B Return to Nursing Home     Pt returning to NH with abx an will need SNF orders. MD notified waiting on SNF orders.     1:14pm  Updated orders faxed to [facility] via [facility]. VIKTOR waiting call report.     2:12pm  Pt declined for SNF via [employer]. VIKTOR contacted Lilly (Utilization Management with [employer]) but she did not answer. VIKTOR waiting call back.     VIKTOR notified Alix with [employer] pt not approved for SNF.     2:56pm  VIKTOR spoke with Lilly with [employer] and was informed she was unable to approve SNF auth. According to Lilly, pt is getting abx q24 and that is not a skilled need.     2:58pm  ADT 30 order placed for [employer].  Requested  time: 5:30pm  If transportation does not arrive at ETA time nurse will be instructed to follow protocol for transportation below:   How can I get in touch directly with dispatch, if needed?                 Non-emergent dispatch: [phone]      +++NURSING:  If Van does not arrive at requested time please call the above Non Emergent Dispatcher.  If issue not resolved please escalate to your charge nurse for further instructions.    3:03pm  VIKTOR notified pt's daughter, Antonia, of d/c and explained transportation has been arranged for 5:30pm; Antonia verbalized understanding.    3:05pm  Message sent to pt's nurse, Lisseth, informing packet located by blue folder. VIKTOR provided call report information and transport ETA.

## 2020-11-06 NOTE — ASSESSMENT & PLAN NOTE
Klebsiella with resistance- No po options.  ID consulted. No sepsis present. Minimal WBC count elevation. Wonder if this is colonization?  Will check pro-sruthi- minimally elevated.     ID consult pending     Urine culture here with pseudomonas with resistance. Follow ID recs

## 2020-11-06 NOTE — PLAN OF CARE
[facility]     Department of Hospital Medicine     [address] [place] [address]     [phone] after hours  [phone] fax       NURSING HOME ORDERS    11/06/2020    Admit to Nursing Home:  [facility]                                              Diagnoses:  UTI with resistance.     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]  Yes     Chronic      Resolved Hospital Problems    Diagnosis Date Resolved POA    Leukocytosis [D72.829] 11/06/2020 Yes       Patient is homebound due to:  UTI (urinary tract infection)    Allergies:  Review of patient's allergies indicates:   Allergen Reactions    Indomethacin     Nsaids (non-steroidal anti-inflammatory drug)     Percocet [oxycodone-acetaminophen]        Vitals:       Routine    Diet: 2000 ADA sruthi diet, low NA 2gm    Acitivities:     - Up in a chair each morning as tolerated       LABS:  Per facility protocol    Nursing Precautions:       - Aspiration precautions:    - Fall precautions per nursing home protocol   - Seizure precaution per [facility] protocol   - Decubitus precautions:        -  for positioning   - Pressure reducing foam mattress   - Turn patient every two hours. Use wedge pillows to anchor patient       MISCELLANEOUS CARE:    Routine Picc line care.  Pull Picc line after completion of Abx on 11/13/20                             DIABETES CARE:     Check blood sugar:         Fingerstick blood sugar AC and  HS   Fingerstick blood sugar every 6 hours if unable to eat      Report CBG < 60 or > 400 to physician.                                          Insulin Sliding Scale          Glucose  Novolog Insulin Subcutaneous        0 - 60   Orange juice or glucose tablet, hold insulin      No insulin   201-250  2 units   251-300  4 units   301-350  6 units   351-400  8 units   >400   10 units then call physician      Medications: Discontinue all previous medication orders, if any. See new list below.     Joyce Riley Destinee   Home Medication Instructions WILLA:[id number]    Printed on:11/06/20 1206   Medication Information                      acetaminophen (TYLENOL) 325 MG tablet  Take 2 tablets (650 mg total) by mouth every 4 (four) hours as needed.             albuterol-ipratropium (DUO-NEB) 2.5 mg-0.5 mg/3 mL nebulizer solution  Take 3 mLs by nebulization every 4 (four) hours as needed for Wheezing. Rescue             B complex-vitamin C-folic acid (NEPHRO-MICHAEL) 0.8 mg Tab  Take by mouth once daily.             calcitRIOL (ROCALTROL) 0.25 MCG Cap  Take 1 capsule (0.25 mcg total) by mouth every other day.             carvediloL (COREG) 12.5 MG tablet  Take 1 tablet (12.5 mg total) by mouth 2 (two) times daily.             epoetin diana (EPOGEN) 10,000 unit/mL injection  Inject 1 mL (10,000 Units total) into the skin every 7 days.             ergocalciferol (ERGOCALCIFEROL) 50,000 unit Cap  Take 1 capsule (50,000 Units total) by mouth every 7 days.             ferrous sulfate (FEOSOL) 325 mg (65 mg iron) Tab tablet  Take 325 mg by mouth 2 (two) times daily.             furosemide (LASIX) 20 MG tablet  Take 3 tablets (60 mg total) by mouth once daily.             hydrALAZINE (APRESOLINE) 25 MG tablet  Take 1 tablet (25 mg total) by mouth every 8 (eight) hours.             insulin degludec (TRESIBA FLEXTOUCH U-100) 100 unit/mL (3 mL) InPn  Inject 5 Units into the skin every evening.             lactobacillus  acidophilus & bulgar (LACTINEX) 100 million cell packet  Take 1 packet (1 each total) by mouth 2 (two) times daily.             meropenem-0.9% sodium chloride 1 gram/50 mL PgBk  Inject 50 mLs (1 g total) into the vein once daily. for 7 days  D/C Abx after last dose on 11/13/20            methyl salicylate-menthol 15-10% 15-10 % Crea  Apply topically 4 (four) times daily.             NIFEdipine (PROCARDIA-XL) 90 MG (OSM) 24 hr tablet  Take 1 tablet (90 mg total) by mouth once daily.             senna-docusate 8.6-50 mg (PERICOLACE) 8.6-50 mg per tablet  Take 1 tablet by mouth 2 (two) times daily.             sodium bicarbonate 650 MG tablet  Take 2 tablets (1,300 mg total) by mouth 3 (three) times daily.             traMADoL (ULTRAM) 50 mg tablet  Take 1 tablet (50 mg total) by mouth every 8 (eight) hours as needed for Pain.                       _________________________________  Shahab Oscar MD  11/06/2020

## 2020-11-06 NOTE — DISCHARGE SUMMARY
[facility] Hospital Medicine  Discharge Summary      Patient Name: Joyce Riley  MRN: [id number]  Admission Date: 11/4/2020  Hospital Length of Stay: 2 days  Discharge Date and Time:  11/06/2020 12:05 PM  Attending Physician: Shahab Chinchilla MD   Discharging Provider: Shahab Chinchilla MD  Primary Care Provider: Kalpana Staton MD      HPI:   Mrs. Riley is a 86 yo F who presents from [facility] with abnormal urine. Patient has history of multiple UTI's in the past and has resistance.  The patient notes burning with urination for a week. UA at the NH showed Klebsiella with known PO options and started on Meropenem in the ED. Patient has only a WBC count of 13K without sepsis. She is non ill appearing and has no other complaints other than neck pain that is chronic.  She is mainly bed bound but has had PT/OT in the past.     * No surgery found *      Hospital Course:   Mrs. Riley is a 86 yo F who presents from [facility] with abnormal urine. Patient has history of multiple UTI's in the past and has resistance.  The patient notes burning with urination for a week. UA at the NH showed Klebsiella with known PO options and started on Meropenem in the ED. Patient has only a WBC count of 13K without sepsis. She is non ill appearing and has no other complaints other than neck pain that is chronic.  She is mainly bed bound but has had PT/OT in the past. She was started on Meropenem and admitted to the hospital. ID was consulted for Abx recs for discharge. PT/OT were consulted and rec: return to NH- basic. Urine culture grew pseudomonas with resistance.   Spoke with ID- DC on Meropenem for 7 days. Sent for a picc line. Activity as tolerated. Diet- ADA 2000, 2gm NA  diet. Follow up with PCP in one week. Discharging back to [facility].      Consults:   Consults (From admission, onward)        Status Ordering Provider     Inpatient consult to PICC team (ALPHONSES)  Once     Provider:  (Not yet assigned)     MARIANA Freeman          No new Assessment & Plan notes have been filed under this hospital service since the last note was generated.  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    Specialty: General Practice  Contact information:  [address] [place] [address]  [address]  [place] [address] [phone]                 Patient Instructions:   No discharge procedures on file.    Significant Diagnostic Studies:     Pending Diagnostic Studies:     None         Medications:  Reconciled Home Medications:      Medication List      START taking these medications    meropenem-0.9% sodium chloride 1 gram/50 mL Pgbk  Inject 50 mLs (1 g total) into the vein once daily. for 7 days        CHANGE how you take these medications    acetaminophen 325 MG tablet  Commonly known as: TYLENOL  Take 2 tablets (650 mg total) by mouth every 4 (four) hours as needed.  What changed: when to take this        CONTINUE taking these medications    albuterol-ipratropium 2.5 mg-0.5 mg/3 mL nebulizer solution  Commonly known as: DUO-NEB  Take 3 mLs by  nebulization every 4 (four) hours as needed for Wheezing. Rescue     calcitRIOL 0.25 MCG Cap  Commonly known as: ROCALTROL  Take 1 capsule (0.25 mcg total) by mouth every other day.     carvediloL 12.5 MG tablet  Commonly known as: COREG  Take 1 tablet (12.5 mg total) by mouth 2 (two) times daily.     EPOGEN 10,000 unit/mL injection  Generic drug: epoetin diana  Inject 1 mL (10,000 Units total) into the skin every 7 days.     ergocalciferol 50,000 unit Cap  Commonly known as: ERGOCALCIFEROL  Take 1 capsule (50,000 Units total) by mouth every 7 days.     ferrous sulfate 325 mg (65 mg iron) Tab tablet  Commonly known as: FEOSOL  Take 325 mg by mouth 2 (two) times daily.     furosemide 20 MG tablet  Commonly known as: LASIX  Take 3 tablets (60 mg total) by mouth once daily.     hydrALAZINE 25 MG tablet  Commonly known as: APRESOLINE  Take 1 tablet (25 mg total) by mouth every 8 (eight) hours.     insulin degludec 100 unit/mL (3 mL) Inpn  Commonly known as: TRESIBA FLEXTOUCH U-100  Inject 5 Units into the skin every evening.     lactobacillus acidophilus & bulgar 100 million cell packet  Commonly known as: LACTINEX  Take 1 packet (1 each total) by mouth 2 (two) times daily.     methyl salicylate-menthol 15-10% 15-10 % Crea  Apply topically 4 (four) times daily.     NEPHRO-MICHAEL 0.8 mg Tab  Generic drug: B complex-vitamin C-folic acid  Take by mouth once daily.     NIFEdipine 90 MG (OSM) 24 hr tablet  Commonly known as: PROCARDIA-XL  Take 1 tablet (90 mg total) by mouth once daily.     senna-docusate 8.6-50 mg 8.6-50 mg per tablet  Commonly known as: PERICOLACE  Take 1 tablet by mouth 2 (two) times daily.     sodium bicarbonate 650 MG tablet  Take 2 tablets (1,300 mg total) by mouth 3 (three) times daily.     traMADoL 50 mg tablet  Commonly known as: ULTRAM  Take 1 tablet (50 mg total) by mouth every 8 (eight) hours as needed for Pain.            Indwelling Lines/Drains at time of discharge:   Lines/Drains/Airways      None                 Time spent on the discharge of patient: > 30 minutes  Patient was seen and examined on the date of discharge and determined to be suitable for discharge.         Shahab Oscar MD  Department of Hospital Medicine  [facility]

## 2020-11-06 NOTE — PROGRESS NOTES
Report given to Nurse Taylor at [facility]. Transport scheduled for 17:30 to transport patient back to facility.

## 2020-11-06 NOTE — HPI
Mrs. Riley is a 88 yo F who presents from [facility] with abnormal urine. Patient has history of multiple UTI's in the past and has resistance.  The patient notes burning with urination for a week. UA at the NH showed Klebsiella with known PO options and started on Meropenem in the ED. Patient has only a WBC count of 13K without sepsis. She is non ill appearing and has no other complaints other than neck pain that is chronic.  She is mainly bed bound but has had PT/OT in the past. She was started on meropenem and admitted to the hospital. ID was consulted for abx recs for discharge. Unfortunately, the patient is resistant to po abx.

## 2020-11-06 NOTE — PLAN OF CARE
11/06/20 1507   Final Note   Assessment Type Final Discharge Note   Anticipated Discharge Disposition correction Nu   What phone number can be called within the next 1-3 days to see how you are doing after discharge? [phone]   Right Care Referral Info   Post Acute Recommendation Other  (correction)   Referral Type correction   Facility Name [facility]   Post-Acute Status   Post-Acute Authorization Placement   Post-Acute Placement Status Set-up Complete   Discharge Delays None known at this time

## 2020-11-06 NOTE — SUBJECTIVE & OBJECTIVE
Interval History: No new issues.     Review of Systems   Constitutional: Negative for activity change, appetite change, chills, diaphoresis, fatigue and fever.   HENT: Negative for congestion and dental problem.    Eyes: Negative for discharge.   Respiratory: Negative for apnea, cough, chest tightness and shortness of breath.    Cardiovascular: Negative for chest pain and leg swelling.   Gastrointestinal: Negative for abdominal distention, abdominal pain, diarrhea, nausea and vomiting.   Genitourinary: Negative for difficulty urinating and dyspareunia.   Musculoskeletal: Positive for arthralgias and neck pain.   Neurological: Negative for dizziness.   Psychiatric/Behavioral: Negative for agitation and confusion.     Objective:     Vital Signs (Most Recent):  Temp: 98.4 °F (36.9 °C) (11/06/20 0804)  Pulse: 88 (11/06/20 0804)  Resp: 16 (11/06/20 0804)  BP: 137/65 (11/06/20 0804)  SpO2: 98 % (11/06/20 0804) Vital Signs (24h Range):  Temp:  [97.6 °F (36.4 °C)-99.4 °F (37.4 °C)] 98.4 °F (36.9 °C)  Pulse:  [85-95] 88  Resp:  [16-20] 16  SpO2:  [95 %-100 %] 98 %  BP: (124-161)/(58-74) 137/65     Weight: 67 kg (147 lb 11.3 oz)  Body mass index is 23.84 kg/m².    Intake/Output Summary (Last 24 hours) at 11/6/2020 1037  Last data filed at 11/5/2020 2025  Gross per 24 hour   Intake 630 ml   Output --   Net 630 ml      Physical Exam  Vitals signs and nursing note reviewed.   Constitutional:       General: She is not in acute distress.     Appearance: Normal appearance. She is normal weight. She is not ill-appearing, toxic-appearing or diaphoretic.   HENT:      Head: Normocephalic and atraumatic.   Cardiovascular:      Rate and Rhythm: Normal rate and regular rhythm.      Heart sounds: Normal heart sounds. No murmur. No friction rub. No gallop.    Pulmonary:      Effort: Pulmonary effort is normal. No respiratory distress.      Breath sounds: No stridor. No wheezing, rhonchi or rales.   Abdominal:      General: Abdomen is flat.  Bowel sounds are normal. There is no distension.      Palpations: Abdomen is soft.      Tenderness: There is no abdominal tenderness. There is no guarding.   Neurological:      Mental Status: She is alert and oriented to person, place, and time.   Psychiatric:         Mood and Affect: Mood normal.         Behavior: Behavior normal.         Significant Labs:   BMP:   Recent Labs   Lab 11/06/20  0545   GLU 82      K 4.6      CO2 21*   BUN 57*   CREATININE 5.1*   CALCIUM 10.4     CBC:   Recent Labs   Lab 11/04/20  1312 11/05/20  0413 11/06/20  0545   WBC 13.14* 11.69 10.11   HGB 8.4* 7.6* 7.7*   HCT 26.5* 23.8* 24.7*   * [phone]       Significant Imaging:

## 2020-11-07 ENCOUNTER — HOSPITAL ENCOUNTER (INPATIENT)
Facility: HOSPITAL | Age: 85
LOS: 11 days | Discharge: HOME | DRG: 378 | End: 2020-11-18
Attending: EMERGENCY MEDICINE | Admitting: INTERNAL MEDICINE
Payer: MEDICARE

## 2020-11-07 DIAGNOSIS — N39.0 URINARY TRACT INFECTION DUE TO ESBL KLEBSIELLA: ICD-10-CM

## 2020-11-07 DIAGNOSIS — D62 ACUTE BLOOD LOSS ANEMIA: ICD-10-CM

## 2020-11-07 DIAGNOSIS — Z51.5 PALLIATIVE CARE BY SPECIALIST: ICD-10-CM

## 2020-11-07 DIAGNOSIS — Z71.89 ADVANCE CARE PLANNING: ICD-10-CM

## 2020-11-07 DIAGNOSIS — B96.89 URINARY TRACT INFECTION DUE TO ESBL KLEBSIELLA: ICD-10-CM

## 2020-11-07 DIAGNOSIS — Z01.810 PREOP CARDIOVASCULAR EXAM: ICD-10-CM

## 2020-11-07 DIAGNOSIS — Z51.5 PALLIATIVE CARE ENCOUNTER: ICD-10-CM

## 2020-11-07 DIAGNOSIS — D64.9 SEVERE ANEMIA: ICD-10-CM

## 2020-11-07 DIAGNOSIS — K92.2 LOWER GI BLEEDING: Primary | ICD-10-CM

## 2020-11-07 LAB
ABO + RH BLD: NORMAL
ALBUMIN SERPL BCP-MCNC: 2.4 G/DL (ref 3.5–5.2)
ALP SERPL-CCNC: 63 U/L (ref 55–135)
ALT SERPL W/O P-5'-P-CCNC: 6 U/L (ref 10–44)
ANION GAP SERPL CALC-SCNC: 12 MMOL/L (ref 8–16)
APTT BLDCRRT: 30.2 SEC (ref 21–32)
AST SERPL-CCNC: 12 U/L (ref 10–40)
BASOPHILS # BLD AUTO: 0.05 K/UL (ref 0–0.2)
BASOPHILS NFR BLD: 0.5 % (ref 0–1.9)
BILIRUB SERPL-MCNC: 0.3 MG/DL (ref 0.1–1)
BLD GP AB SCN CELLS X3 SERPL QL: NORMAL
BUN SERPL-MCNC: 60 MG/DL (ref 8–23)
CALCIUM SERPL-MCNC: 9.5 MG/DL (ref 8.7–10.5)
CHLORIDE SERPL-SCNC: 105 MMOL/L (ref 95–110)
CO2 SERPL-SCNC: 23 MMOL/L (ref 23–29)
CREAT SERPL-MCNC: 5.3 MG/DL (ref 0.5–1.4)
DIFFERENTIAL METHOD: ABNORMAL
EOSINOPHIL # BLD AUTO: 0.1 K/UL (ref 0–0.5)
EOSINOPHIL NFR BLD: 0.8 % (ref 0–8)
ERYTHROCYTE [DISTWIDTH] IN BLOOD BY AUTOMATED COUNT: 15.8 % (ref 11.5–14.5)
EST. GFR  (AFRICAN AMERICAN): 8 ML/MIN/1.73 M^2
EST. GFR  (NON AFRICAN AMERICAN): 7 ML/MIN/1.73 M^2
GLUCOSE SERPL-MCNC: 134 MG/DL (ref 70–110)
HCT VFR BLD AUTO: 20.7 % (ref 37–48.5)
HGB BLD-MCNC: 6.5 G/DL (ref 12–16)
HGB BLD-MCNC: 6.9 G/DL (ref 12–16)
IMM GRANULOCYTES # BLD AUTO: 0.08 K/UL (ref 0–0.04)
IMM GRANULOCYTES NFR BLD AUTO: 0.8 % (ref 0–0.5)
INR PPP: 1 (ref 0.8–1.2)
LYMPHOCYTES # BLD AUTO: 1.5 K/UL (ref 1–4.8)
LYMPHOCYTES NFR BLD: 14.7 % (ref 18–48)
MCH RBC QN AUTO: 30.2 PG (ref 27–31)
MCHC RBC AUTO-ENTMCNC: 31.4 G/DL (ref 32–36)
MCV RBC AUTO: 96 FL (ref 82–98)
MONOCYTES # BLD AUTO: 0.3 K/UL (ref 0.3–1)
MONOCYTES NFR BLD: 3.3 % (ref 4–15)
NEUTROPHILS # BLD AUTO: 8 K/UL (ref 1.8–7.7)
NEUTROPHILS NFR BLD: 79.9 % (ref 38–73)
NRBC BLD-RTO: 0 /100 WBC
PLATELET # BLD AUTO: 336 K/UL (ref 150–350)
PMV BLD AUTO: 9.6 FL (ref 9.2–12.9)
POCT GLUCOSE: 100 MG/DL (ref 70–110)
POCT GLUCOSE: 134 MG/DL (ref 70–110)
POCT GLUCOSE: 98 MG/DL (ref 70–110)
POTASSIUM SERPL-SCNC: 4.4 MMOL/L (ref 3.5–5.1)
PROT SERPL-MCNC: 6.6 G/DL (ref 6–8.4)
PROTHROMBIN TIME: 11.2 SEC (ref 9–12.5)
RBC # BLD AUTO: 2.15 M/UL (ref 4–5.4)
SODIUM SERPL-SCNC: 140 MMOL/L (ref 136–145)
WBC # BLD AUTO: 9.95 K/UL (ref 3.9–12.7)

## 2020-11-07 PROCEDURE — 25000003 PHARM REV CODE 250: Performed by: EMERGENCY MEDICINE

## 2020-11-07 PROCEDURE — C9113 INJ PANTOPRAZOLE SODIUM, VIA: HCPCS | Performed by: INTERNAL MEDICINE

## 2020-11-07 PROCEDURE — 99291 CRITICAL CARE FIRST HOUR: CPT | Mod: 25

## 2020-11-07 PROCEDURE — 96374 THER/PROPH/DIAG INJ IV PUSH: CPT

## 2020-11-07 PROCEDURE — 63600175 PHARM REV CODE 636 W HCPCS: Performed by: INTERNAL MEDICINE

## 2020-11-07 PROCEDURE — 85730 THROMBOPLASTIN TIME PARTIAL: CPT

## 2020-11-07 PROCEDURE — 86901 BLOOD TYPING SEROLOGIC RH(D): CPT

## 2020-11-07 PROCEDURE — 86920 COMPATIBILITY TEST SPIN: CPT

## 2020-11-07 PROCEDURE — 99292 CRITICAL CARE ADDL 30 MIN: CPT

## 2020-11-07 PROCEDURE — 25000003 PHARM REV CODE 250: Performed by: INTERNAL MEDICINE

## 2020-11-07 PROCEDURE — 36430 TRANSFUSION BLD/BLD COMPNT: CPT

## 2020-11-07 PROCEDURE — P9016 RBC LEUKOCYTES REDUCED: HCPCS

## 2020-11-07 PROCEDURE — 85025 COMPLETE CBC W/AUTO DIFF WBC: CPT

## 2020-11-07 PROCEDURE — 36415 COLL VENOUS BLD VENIPUNCTURE: CPT

## 2020-11-07 PROCEDURE — 21400001 HC TELEMETRY ROOM

## 2020-11-07 PROCEDURE — 85610 PROTHROMBIN TIME: CPT

## 2020-11-07 PROCEDURE — 80053 COMPREHEN METABOLIC PANEL: CPT

## 2020-11-07 PROCEDURE — 63600175 PHARM REV CODE 636 W HCPCS: Performed by: EMERGENCY MEDICINE

## 2020-11-07 PROCEDURE — 85018 HEMOGLOBIN: CPT

## 2020-11-07 PROCEDURE — 99232 SBSQ HOSP IP/OBS MODERATE 35: CPT | Mod: ,,, | Performed by: INTERNAL MEDICINE

## 2020-11-07 PROCEDURE — 83036 HEMOGLOBIN GLYCOSYLATED A1C: CPT

## 2020-11-07 PROCEDURE — 99232 PR SUBSEQUENT HOSPITAL CARE,LEVL II: ICD-10-PCS | Mod: ,,, | Performed by: INTERNAL MEDICINE

## 2020-11-07 RX ORDER — HYDRALAZINE HYDROCHLORIDE 25 MG/1
25 TABLET, FILM COATED ORAL EVERY 8 HOURS
Status: DISCONTINUED | OUTPATIENT
Start: 2020-11-07 | End: 2020-11-17

## 2020-11-07 RX ORDER — INSULIN ASPART 100 [IU]/ML
0-5 INJECTION, SOLUTION INTRAVENOUS; SUBCUTANEOUS
Status: DISCONTINUED | OUTPATIENT
Start: 2020-11-07 | End: 2020-11-18 | Stop reason: HOSPADM

## 2020-11-07 RX ORDER — GLUCAGON 1 MG
1 KIT INJECTION
Status: DISCONTINUED | OUTPATIENT
Start: 2020-11-07 | End: 2020-11-18 | Stop reason: HOSPADM

## 2020-11-07 RX ORDER — HYDROCODONE BITARTRATE AND ACETAMINOPHEN 500; 5 MG/1; MG/1
TABLET ORAL
Status: DISCONTINUED | OUTPATIENT
Start: 2020-11-07 | End: 2020-11-18 | Stop reason: HOSPADM

## 2020-11-07 RX ORDER — HYDROMORPHONE HYDROCHLORIDE 2 MG/ML
0.5 INJECTION, SOLUTION INTRAMUSCULAR; INTRAVENOUS; SUBCUTANEOUS
Status: COMPLETED | OUTPATIENT
Start: 2020-11-07 | End: 2020-11-07

## 2020-11-07 RX ORDER — IBUPROFEN 200 MG
16 TABLET ORAL
Status: DISCONTINUED | OUTPATIENT
Start: 2020-11-07 | End: 2020-11-18 | Stop reason: HOSPADM

## 2020-11-07 RX ORDER — SODIUM BICARBONATE 325 MG/1
1300 TABLET ORAL 3 TIMES DAILY
Status: DISCONTINUED | OUTPATIENT
Start: 2020-11-07 | End: 2020-11-18 | Stop reason: HOSPADM

## 2020-11-07 RX ORDER — IPRATROPIUM BROMIDE AND ALBUTEROL SULFATE 2.5; .5 MG/3ML; MG/3ML
3 SOLUTION RESPIRATORY (INHALATION) EVERY 4 HOURS PRN
Status: DISCONTINUED | OUTPATIENT
Start: 2020-11-07 | End: 2020-11-18 | Stop reason: HOSPADM

## 2020-11-07 RX ORDER — IBUPROFEN 200 MG
24 TABLET ORAL
Status: DISCONTINUED | OUTPATIENT
Start: 2020-11-07 | End: 2020-11-18 | Stop reason: HOSPADM

## 2020-11-07 RX ORDER — NIFEDIPINE 30 MG/1
90 TABLET, EXTENDED RELEASE ORAL DAILY
Status: DISCONTINUED | OUTPATIENT
Start: 2020-11-07 | End: 2020-11-18 | Stop reason: HOSPADM

## 2020-11-07 RX ORDER — MEROPENEM AND SODIUM CHLORIDE 1 G/50ML
1 INJECTION, SOLUTION INTRAVENOUS DAILY
Status: DISCONTINUED | OUTPATIENT
Start: 2020-11-07 | End: 2020-11-07

## 2020-11-07 RX ORDER — MEROPENEM AND SODIUM CHLORIDE 1 G/50ML
1 INJECTION, SOLUTION INTRAVENOUS DAILY
Status: DISCONTINUED | OUTPATIENT
Start: 2020-11-08 | End: 2020-11-13

## 2020-11-07 RX ORDER — CALCITRIOL 0.25 UG/1
0.25 CAPSULE ORAL EVERY OTHER DAY
Status: DISCONTINUED | OUTPATIENT
Start: 2020-11-08 | End: 2020-11-18 | Stop reason: HOSPADM

## 2020-11-07 RX ORDER — PANTOPRAZOLE SODIUM 40 MG/10ML
40 INJECTION, POWDER, LYOPHILIZED, FOR SOLUTION INTRAVENOUS 2 TIMES DAILY
Status: DISCONTINUED | OUTPATIENT
Start: 2020-11-07 | End: 2020-11-18 | Stop reason: HOSPADM

## 2020-11-07 RX ORDER — AMOXICILLIN 250 MG
1 CAPSULE ORAL 2 TIMES DAILY
Status: DISCONTINUED | OUTPATIENT
Start: 2020-11-07 | End: 2020-11-18 | Stop reason: HOSPADM

## 2020-11-07 RX ORDER — ONDANSETRON 4 MG/1
4 TABLET, ORALLY DISINTEGRATING ORAL
Status: COMPLETED | OUTPATIENT
Start: 2020-11-07 | End: 2020-11-07

## 2020-11-07 RX ORDER — TRAMADOL HYDROCHLORIDE 50 MG/1
50 TABLET ORAL EVERY 4 HOURS PRN
Status: DISCONTINUED | OUTPATIENT
Start: 2020-11-07 | End: 2020-11-12

## 2020-11-07 RX ORDER — CARVEDILOL 12.5 MG/1
12.5 TABLET ORAL 2 TIMES DAILY
Status: DISCONTINUED | OUTPATIENT
Start: 2020-11-07 | End: 2020-11-18 | Stop reason: HOSPADM

## 2020-11-07 RX ADMIN — NIFEDIPINE 90 MG: 30 TABLET, FILM COATED, EXTENDED RELEASE ORAL at 05:11

## 2020-11-07 RX ADMIN — TRAMADOL HYDROCHLORIDE 50 MG: 50 TABLET, FILM COATED ORAL at 05:11

## 2020-11-07 RX ADMIN — ONDANSETRON 4 MG: 4 TABLET, ORALLY DISINTEGRATING ORAL at 12:11

## 2020-11-07 RX ADMIN — PANTOPRAZOLE SODIUM 40 MG: 40 INJECTION, POWDER, FOR SOLUTION INTRAVENOUS at 02:11

## 2020-11-07 RX ADMIN — SODIUM BICARBONATE TAB 325 MG 1300 MG: 325 TAB at 05:11

## 2020-11-07 RX ADMIN — HYDROMORPHONE HYDROCHLORIDE 0.5 MG: 2 INJECTION, SOLUTION INTRAMUSCULAR; INTRAVENOUS; SUBCUTANEOUS at 12:11

## 2020-11-07 RX ADMIN — CARVEDILOL 12.5 MG: 12.5 TABLET, FILM COATED ORAL at 09:11

## 2020-11-07 RX ADMIN — TRAMADOL HYDROCHLORIDE 50 MG: 50 TABLET, FILM COATED ORAL at 09:11

## 2020-11-07 RX ADMIN — FUROSEMIDE 60 MG: 40 TABLET ORAL at 05:11

## 2020-11-07 RX ADMIN — HYDRALAZINE HYDROCHLORIDE 25 MG: 25 TABLET ORAL at 09:11

## 2020-11-07 RX ADMIN — PANTOPRAZOLE SODIUM 40 MG: 40 INJECTION, POWDER, FOR SOLUTION INTRAVENOUS at 10:11

## 2020-11-07 RX ADMIN — DOCUSATE SODIUM 50 MG AND SENNOSIDES 8.6 MG 1 TABLET: 8.6; 5 TABLET, FILM COATED ORAL at 09:11

## 2020-11-07 NOTE — ASSESSMENT & PLAN NOTE
Reported R Klebsiella (?)  Pseudomonas here at our hospital.  6 more days of Meropenem. End date 11/13.   No sepsis

## 2020-11-07 NOTE — NURSING
Pt arrived on unit assisted to bed without injury,no s/s adverse reaction noted from blood transfusion,tele monitor box #3096 applied to pt, no Gi bleeding noted,pt oriented to room and call light,POC explained,i agree with previous assessment care resumed by this nurse.

## 2020-11-07 NOTE — HOSPITAL COURSE
Mrs. Riley is a 88 yo F known to me for an admission and discharge to [facility] .  She had a few day hospital stay for evaluation and management of resistant UTI with Klebsiella and pseudomonas. She was discharged back to [facility] SNF (for Abx) on 11/6 with Meropenem for 10 days per ID recs.  The patient returns today with dark and maroon stools with visible blood clots. Not on any blood thinners. No abdominal pain. Hgb was 7.7 then  6.5. GI was consulted and received one unit of blood.  Patient has a LAPOST for DNR but daughter says full code.  Palliative care investigating further.remains full code, GI consulted and patient initially refuses C-scope.  Another unit of blood given on 11/9. PT/OT eval- back to NH ()- basic.   Patient and family finally agree with endoscopic evaluation,S/P colonoscopy,has diverticulosis and polyps with no bleeding,but she has still occasional again hematochezia,GI is aware and recommending CTA abdomen,pelvic if again has hematochezia,but patient has advanced CKD and can not have CTA,no sign of active bleeding at this time,will monitor.  Still complain of neck pain,spoke with neurosurgery,they know patient,require surgery,but patient is not good candidate for procedure,daughter did not want intervention neither,changed pain medications to oxycodone.  Finishing meropenem today.    Plan,S/P coloscopy,has diverticulosis with polyps,still has occasional hematochezia,GI is aware and recommending CTA abdomen,pelvic if again has hematochezia,but patient has advanced CKD and can not have CTA,no sign of active bleeding at this time,will monitor,still has neck pain ,started on percocet,spoke with neurosurgery,fishiging meropenem today.    Patient required another unit of blood on 11/15.  Patient's blood count remained stable for approx 6 days.  She will be discharged back to [facility] today. Activity as tolerated. Diet- low NA, ADA 2000. Follow up with PCP in one week

## 2020-11-07 NOTE — HPI
Mrs. Riley is a 86 yo F known to me for an admission and discharge to [facility] yesterday.  She had a few day hospital stay for evaluation and management of resistant UTI with Klebsiella and pseudomonas. She was discharged back to [facility] SNF (for Abx) on 11/6 with Meropenem for 10 days per ID recs.  The patient returns today with dark and maroon stools with visible blood clots. Not on any blood thinners. No abdominal pain. Hgb was 7.7 yesterday- today 6.5. GI was consulted and received one unit of blood.  Patient has a LAPOST for DNR but daughter says full code.

## 2020-11-07 NOTE — ED PROVIDER NOTES
Encounter Date: 2020    SCRIBE #1 NOTE: I, Lucero Reddy, am scribing for, and in the presence of,  Cole Ward MD. I have scribed the following portions of the note - Other sections scribed: HPI, ROS.       History     Chief Complaint   Patient presents with    Rectal Bleeding     pt comes in via EMS from [facility] with c/o rectal bleeding. states hx of rectal bleeding.      This is a 87-year-old female with a PMHx of CHF, Diabetes Mellitus, GERD, GI bleed, Hypertension, and CKD who presents to the ED via EMS from [facility] complaining of rectal bleeding that onset yesterday. She states she was hemorrhaging this morning, and has black stool as well as large maroon blood clots. Patient denies fever, chills, diaphoresis, headache, eye problems, ear pain, sore throat, chest pain, cough, shortness of breath, abdominal pain, nausea, vomiting, diarrhea, dysuria, arthralgias, or rash. No prior treatment. No alleviating or worsening factors. Per the patient's chart, she was in the ED yesterday due to neck pain and had a PICC line placed. Patient is DNR, comfort measures only.    The history is provided by the patient and the nursing home. No  was used.     Review of patient's allergies indicates:   Allergen Reactions    Indomethacin     Nsaids (non-steroidal anti-inflammatory drug)     Percocet [oxycodone-acetaminophen]      Past Medical History:   Diagnosis Date    Anemia     Anticoagulant long-term use     Arthritis     Atrial fibrillation     Cataract     CHF (congestive heart failure)     CKD (chronic kidney disease), stage V     Dermatophytosis     Diabetes mellitus     Type 2    GERD (gastroesophageal reflux disease)     GI bleed 2018    Gout     Gout     Hypertension     Obese     Requires assistance with all daily activities     Stroke 2014    Wheelchair dependent      Past Surgical History:   Procedure Laterality Date     SECTION       Patient unsure, believe she had 3-4    CHOLECYSTECTOMY      EYE SURGERY      HYSTERECTOMY      JOINT REPLACEMENT Right     knee    TOTAL KNEE ARTHROPLASTY Right      Family History   Problem Relation Age of Onset    Cancer Mother          age 98    Diabetes Mother     Hypertension Mother     Cancer Brother      Social History     Tobacco Use    Smoking status: Never Smoker    Smokeless tobacco: Never Used   Substance Use Topics    Alcohol use: No    Drug use: No     Review of Systems   Constitutional: Negative for chills, diaphoresis and fever.   HENT: Negative for ear pain and sore throat.    Eyes: Negative for pain, discharge, redness and itching.   Respiratory: Negative for cough and shortness of breath.    Gastrointestinal: Positive for anal bleeding and blood in stool. Negative for abdominal pain, diarrhea, nausea and vomiting.   Genitourinary: Negative for dysuria.   Musculoskeletal: Positive for neck pain. Negative for arthralgias.   Skin: Negative for rash.   Neurological: Negative for headaches.       Physical Exam     Initial Vitals [20 1053]   BP Pulse Resp Temp SpO2   113/63 95 12 98.5 °F (36.9 °C) 100 %      MAP       --         Physical Exam  The patient was examined specifically for the following:   General:No significant distress, Good color, Warm and dry. Head and neck:Scalp atraumatic, Neck supple. Neurological:Appropriate conversation, Gross motor deficits. Eyes:Conjugate gaze, Clear corneas. ENT: No epistaxis. Cardiac: Regular rate and rhythm, Grossly normal heart tones. Pulmonary: Wheezing, Rales. Gastrointestinal: Abdominal tenderness, Abdominal distention. Musculoskeletal: Extremity deformity, Apparent pain with range of motion of the joints. Skin: Rash.   The findings on examination were normal except for the following:  The patient has dark maroon blood clots in her diaper.  The abdomen is soft.  Lungs are clear the heart tones are normal.  Vital signs are stable.   ED  Course   Critical Care    Date/Time: 11/7/2020 1:42 PM  Performed by: Cole Ward MD  Authorized by: Cole Ward MD   Direct patient critical care time: 22 minutes  Additional history critical care time: 11 minutes  Ordering / reviewing critical care time: 11 minutes  Documentation critical care time: 11 minutes  Consulting other physicians critical care time: 11 minutes  Consult with family critical care time: 11 minutes  Total critical care time (exclusive of procedural time) : 77 minutes  Critical care time was exclusive of separately billable procedures and treating other patients and teaching time.  Critical care was necessary to treat or prevent imminent or life-threatening deterioration of the following conditions: metabolic crisis.  Critical care was time spent personally by me on the following activities: discussions with consultants, discussions with primary provider, development of treatment plan with patient or surrogate, evaluation of patient's response to treatment, examination of patient, obtaining history from patient or surrogate, ordering and performing treatments and interventions, ordering and review of laboratory studies, ordering and review of radiographic studies, pulse oximetry, re-evaluation of patient's condition and review of old charts.        Labs Reviewed   COMPREHENSIVE METABOLIC PANEL - Abnormal; Notable for the following components:       Result Value    Glucose 134 (*)     BUN 60 (*)     Creatinine 5.3 (*)     Albumin 2.4 (*)     ALT 6 (*)     eGFR if  8 (*)     eGFR if non  7 (*)     All other components within normal limits   CBC W/ AUTO DIFFERENTIAL - Abnormal; Notable for the following components:    RBC 2.15 (*)     Hemoglobin 6.5 (*)     Hematocrit 20.7 (*)     MCHC 31.4 (*)     RDW 15.8 (*)     Immature Granulocytes 0.8 (*)     Gran # (ANC) 8.0 (*)     Immature Grans (Abs) 0.08 (*)     Gran % 79.9 (*)     Lymph % 14.7 (*)     Mono %  3.3 (*)     All other components within normal limits   APTT   PROTIME-INR   SARS-COV-2 RDRP GENE   TYPE & SCREEN   PREPARE RBC SOFT          Imaging Results    None      Medical decision making:  Given the above this patient presents to the emergency room with maroon blood clots in her diaper.  I believe she is having a lower GI bleed.  I discussed this case with Gastroenterology, Dr. Bryant Batista who is willing to attend.  Arrangements are being made for transfusion.  I spoke with the patient and her daughter they are agreeable to receiving blood.  The daughter reports that they do not wish to have a DNR status in the hospital.  They do wish to avoid dialysis.  Family reports that the patient has had rectal bleeding before.  The patient is on iron and Epogen.  I will discuss this case with hospital medicine.  Dr. Chinchilla agrees with admission to inpatient treatment.  I will type and cross for 2 an give 1.  I will have the patient sign her blood consent.  I believe the bleeding is likely diverticular.                Scribe Attestation:   Scribe #1: I performed the above scribed service and the documentation accurately describes the services I performed. I attest to the accuracy of the note.                      Clinical Impression:     ICD-10-CM ICD-9-CM   1. Lower GI bleeding  K92.2 578.9   2. Severe anemia  D64.9 285.9   3. Urinary tract infection due to ESBL Klebsiella  N39.0 599.0    B96.89 041.84                   1. Lower GI bleeding    2. Severe anemia    3. Urinary tract infection due to ESBL Klebsiella            ED Disposition Condition    Admit                  I personally performed the services described in this documentation.  All medical record  entries made by the scribe are at my direction and in my presence.  Signed, Dr. Sylvia Ward MD  11/07/20 5015

## 2020-11-07 NOTE — NURSING
Report received on pt  from ED nurse Keri pt will be transfer to room 328,pt has  1 unit PRB  transfusion  In progress now started [id number].

## 2020-11-07 NOTE — H&P
[facility] Hospital Medicine  History & Physical    Patient Name: Joyce Riley  MRN: [id number]  Admission Date: 2020  Attending Physician: Cole Ward MD   Primary Care Provider: Kalpana Staton MD         Patient information was obtained from patient and ER records.     Subjective:     Principal Problem:Acute blood loss anemia    Chief Complaint:   Chief Complaint   Patient presents with    Rectal Bleeding     pt comes in via EMS from [facility] with c/o rectal bleeding. states hx of rectal bleeding.         HPI: Mrs. Riley is a 86 yo F known to me for an admission and discharge to [facility] yesterday.  She had a few day hospital stay for evaluation and management of resistant UTI with Klebsiella and pseudomonas. She was discharged back to [facility] SNF (for Abx) on  with Meropenem for 10 days per ID recs.  The patient returns today with dark and maroon stools with visible blood clots. Not on any blood thinners. No abdominal pain. Hgb was 7.7 yesterday- today 6.5. GI was consulted and received one unit of blood.  Patient has a LAPOST for DNR but daughter says full code.      Past Medical History:   Diagnosis Date    Anemia     Anticoagulant long-term use     Arthritis     Atrial fibrillation     Cataract     CHF (congestive heart failure)     CKD (chronic kidney disease), stage V     Dermatophytosis     Diabetes mellitus     Type 2    GERD (gastroesophageal reflux disease)     GI bleed 2018    Gout     Gout     Hypertension     Obese     Requires assistance with all daily activities     Stroke 2014    Wheelchair dependent        Past Surgical History:   Procedure Laterality Date     SECTION      Patient unsure, believe she had 3-4    CHOLECYSTECTOMY      EYE SURGERY      HYSTERECTOMY      JOINT REPLACEMENT Right     knee    TOTAL KNEE ARTHROPLASTY Right        Review of patient's allergies indicates:   Allergen Reactions    Indomethacin      Nsaids (non-steroidal anti-inflammatory drug)     Percocet [oxycodone-acetaminophen]        Current Facility-Administered Medications on File Prior to Encounter   Medication    [DISCONTINUED] albuterol-ipratropium 2.5 mg-0.5 mg/3 mL nebulizer solution 3 mL    [DISCONTINUED] carvediloL tablet 12.5 mg    [DISCONTINUED] diphenhydrAMINE capsule 25 mg    [DISCONTINUED] furosemide tablet 60 mg    [DISCONTINUED] heparin (porcine) injection 5,000 Units    [DISCONTINUED] hydrALAZINE tablet 25 mg    [DISCONTINUED] meropenem-0.9% sodium chloride 1 g/50 mL IVPB    [DISCONTINUED] mupirocin 2 % ointment    [DISCONTINUED] NIFEdipine 24 hr tablet 90 mg    [DISCONTINUED] sodium bicarbonate tablet 1,300 mg    [DISCONTINUED] sodium chloride 0.9% flush 10 mL    [DISCONTINUED] sodium chloride 0.9% flush 10 mL    [DISCONTINUED] traMADoL tablet 50 mg     Current Outpatient Medications on File Prior to Encounter   Medication Sig    acetaminophen (TYLENOL) 325 MG tablet Take 2 tablets (650 mg total) by mouth every 4 (four) hours as needed. (Patient taking differently: Take 650 mg by mouth 2 (two) times daily. )    B complex-vitamin C-folic acid (NEPHRO-MICHAEL) 0.8 mg Tab Take by mouth once daily.    calcitRIOL (ROCALTROL) 0.25 MCG Cap Take 1 capsule (0.25 mcg total) by mouth every other day.    carvediloL (COREG) 12.5 MG tablet Take 1 tablet (12.5 mg total) by mouth 2 (two) times daily.    epoetin diana (EPOGEN) 10,000 unit/mL injection Inject 1 mL (10,000 Units total) into the skin every 7 days.    ergocalciferol (ERGOCALCIFEROL) 50,000 unit Cap Take 1 capsule (50,000 Units total) by mouth every 7 days.    ferrous sulfate (FEOSOL) 325 mg (65 mg iron) Tab tablet Take 325 mg by mouth 2 (two) times daily.    furosemide (LASIX) 20 MG tablet Take 3 tablets (60 mg total) by mouth once daily.    hydrALAZINE (APRESOLINE) 25 MG tablet Take 1 tablet (25 mg total) by mouth every 8 (eight) hours.    insulin degludec (TRESIBA  FLEXTOUCH U-100) 100 unit/mL (3 mL) InPn Inject 5 Units into the skin every evening.    lactobacillus acidophilus & bulgar (LACTINEX) 100 million cell packet Take 1 packet (1 each total) by mouth 2 (two) times daily.    meropenem-0.9% sodium chloride 1 gram/50 mL PgBk Inject 50 mLs (1 g total) into the vein once daily. for 7 days    methyl salicylate-menthol 15-10% 15-10 % Crea Apply topically 4 (four) times daily.    NIFEdipine (PROCARDIA-XL) 90 MG (OSM) 24 hr tablet Take 1 tablet (90 mg total) by mouth once daily.    senna-docusate 8.6-50 mg (PERICOLACE) 8.6-50 mg per tablet Take 1 tablet by mouth 2 (two) times daily.    sodium bicarbonate 650 MG tablet Take 2 tablets (1,300 mg total) by mouth 3 (three) times daily.    traMADoL (ULTRAM) 50 mg tablet Take 1 tablet (50 mg total) by mouth every 8 (eight) hours as needed for Pain.    albuterol-ipratropium (DUO-NEB) 2.5 mg-0.5 mg/3 mL nebulizer solution Take 3 mLs by nebulization every 4 (four) hours as needed for Wheezing. Rescue     Family History     Problem Relation (Age of Onset)    Cancer Mother, Brother    Diabetes Mother    Hypertension Mother        Tobacco Use    Smoking status: Never Smoker    Smokeless tobacco: Never Used   Substance and Sexual Activity    Alcohol use: No    Drug use: No    Sexual activity: Never     Review of Systems   Constitutional: Negative for activity change, appetite change, chills, diaphoresis, fatigue and fever.   HENT: Negative for congestion and dental problem.    Eyes: Negative for discharge and itching.   Respiratory: Negative for cough, choking, shortness of breath and stridor.    Cardiovascular: Negative for chest pain, palpitations and leg swelling.   Gastrointestinal: Positive for blood in stool. Negative for abdominal pain, constipation, diarrhea, nausea and rectal pain.   Genitourinary: Negative for difficulty urinating, dyspareunia and enuresis.   Musculoskeletal: Positive for arthralgias and neck pain.    Neurological: Negative for dizziness.   Hematological: Negative for adenopathy.   Psychiatric/Behavioral: Negative for agitation and behavioral problems.     Objective:     Vital Signs (Most Recent):  Temp: 98.5 °F (36.9 °C) (11/07/20 1053)  Pulse: 98 (11/07/20 1316)  Resp: 14 (11/07/20 1302)  BP: (!) 150/67 (11/07/20 1316)  SpO2: 98 % (11/07/20 1316) Vital Signs (24h Range):  Temp:  [98.4 °F (36.9 °C)-98.5 °F (36.9 °C)] 98.5 °F (36.9 °C)  Pulse:  [] 98  Resp:  [12-21] 14  SpO2:  [97 %-100 %] 98 %  BP: (113-166)/(59-67) 150/67     Weight: 68 kg (150 lb)  Body mass index is 24.21 kg/m².    Physical Exam  Vitals signs and nursing note reviewed.   Constitutional:       General: She is not in acute distress.     Appearance: Normal appearance. She is not ill-appearing, toxic-appearing or diaphoretic.   HENT:      Head: Normocephalic and atraumatic.   Cardiovascular:      Rate and Rhythm: Normal rate and regular rhythm.      Heart sounds: No murmur. No gallop.    Pulmonary:      Effort: Pulmonary effort is normal. No respiratory distress.      Breath sounds: No stridor. No wheezing or rales.   Chest:      Chest wall: No tenderness.   Abdominal:      General: Abdomen is flat. Bowel sounds are normal.      Tenderness: There is no abdominal tenderness. There is no guarding or rebound.   Neurological:      General: No focal deficit present.      Mental Status: She is alert and oriented to person, place, and time.   Psychiatric:         Mood and Affect: Mood normal.         Behavior: Behavior normal.         Thought Content: Thought content normal.             Significant Labs:   BMP:   Recent Labs   Lab 11/07/20  1143   *      K 4.4      CO2 23   BUN 60*   CREATININE 5.3*   CALCIUM 9.5     CBC:   Recent Labs   Lab 11/06/20  0545 11/07/20  1143   WBC 10.11 9.95   HGB 7.7* 6.5*   HCT 24.7* 20.7*    336       Significant Imaging:     Assessment/Plan:     * Acute blood loss anemia  Likely lower  GI bleed. Possibly diverticular. 7.7 to 6.5 Hgb over past 24 hours. Visible bleeding. PPI. GI consult.  Unit of blood.       Anemia of renal disease  Now with acute blood loss anemia as noted.       UTI (urinary tract infection)  Reported R Klebsiella (?)  Pseudomonas here at our hospital.  6 more days of Meropenem. End date 11/13.   No sepsis       History of stroke  No acute issues   Last time PT/OT eval- return to NH basic      Paroxysmal atrial fibrillation  Does not appear to be on any NOAC   History of bleeding reported in the past       Chronic diastolic heart failure  Stable       Renal hypertension  No acute issues. Resume home meds       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  No acute issues      CKD stage 5  At baseline      Type 2 diabetes mellitus, controlled, with renal complications  No acute issues. A1c last stay. Sliding scale for now. accu checks  Home insulin         VTE Risk Mitigation (From admission, onward)    None        Addendum 5:09pm  Discussed with patient and daughter. They say YUN is old with DNR status and whish to change to Full code.  Order placed. Consult Palliative care for new YUN     Shahab Oscar MD  Department of Hospital Medicine   [facility]

## 2020-11-07 NOTE — ASSESSMENT & PLAN NOTE
Likely lower GI bleed. Possibly diverticular. 7.7 to 6.5 Hgb over past 24 hours. Visible bleeding. PPI. GI consult.  Unit of blood.

## 2020-11-07 NOTE — CONSULTS
[facility]  Gastroenterology consult note    Requesting service:  Hospital medicine  Reason for Consult:  Hematochezia      HPI:  Joyce Riley is a 87 y.o. woman who was admitted today from a nursing facility for hematochezia.  She was just discharged from the hospital yesterday for treatment of UTI.  This morning she had maroon-colored stools with clots.  She has had this in the past.  This is now the 4th admission to the hospital in the last 15 months.  Last admission was in July of this year.  Previous episodes similar to this one.  She describes pain with hematochezia.  Denies abdominal pain, nausea, or vomiting.  She is not on anticoagulation.  Previous episodes suspected to have been from diverticular or hemorrhoids.  She is currently receiving 1 unit PRBC.    During previous admissions, the patient and her family have elected for conservative management and deferred endoscopic evaluation.  During my interview with the patient, I was able to also discuss her care with her granddaughter, Antonia, who is her primary decision maker.        Endoscopic Hx:  Colonoscopy 02/26/2019 done by [facility] at outside facility.  Report is per the GI consultation note from July of this year.  Notation of an ascending colon polyp was noted.  She had severe pandiverticulosis.        Review of Systems:  Constitutional: no fever, chills or change in weight   Eyes: no visual changes ; no icterus  ENT: no sore throat or dysphagia  Respiratory: no cough or shortness of breath   Cardiovascular: no chest pain or palpitations   Gastrointestinal: as per HPI  Hematologic/Lymphatic: no easy bruising or lymphadenopathy   Musculoskeletal: no arthralgias or myalgias   Neurological: no seizures, tremors or change in mental status  Behavioral/Psych: no auditory or visual hallucinations    Past Medical History:   Diagnosis Date    Anemia     Anticoagulant long-term use     Arthritis     Atrial  fibrillation     Cataract     CHF (congestive heart failure)     CKD (chronic kidney disease), stage V     Dermatophytosis     Diabetes mellitus     Type 2    GERD (gastroesophageal reflux disease)     GI bleed 2018    Gout     Gout     Hypertension     Obese     Requires assistance with all daily activities     Stroke     Wheelchair dependent        Past Surgical History:   Procedure Laterality Date     SECTION      Patient unsure, believe she had 3-4    CHOLECYSTECTOMY      EYE SURGERY      HYSTERECTOMY      JOINT REPLACEMENT Right     knee    TOTAL KNEE ARTHROPLASTY Right        Family History   Problem Relation Age of Onset    Cancer Mother          age 98    Diabetes Mother     Hypertension Mother     Cancer Brother        Review of patient's allergies indicates:   Allergen Reactions    Indomethacin     Nsaids (non-steroidal anti-inflammatory drug)     Percocet [oxycodone-acetaminophen]        Social History     Socioeconomic History    Marital status:      Spouse name: Not on file    Number of children: 6    Years of education: Not on file    Highest education level: Not on file   Occupational History    Occupation:      Comment: Retired   Social Needs    Financial resource strain: Not on file    Food insecurity     Worry: Not on file     Inability: Not on file    Transportation needs     Medical: Not on file     Non-medical: Not on file   Tobacco Use    Smoking status: Never Smoker    Smokeless tobacco: Never Used   Substance and Sexual Activity    Alcohol use: No    Drug use: No    Sexual activity: Never   Lifestyle    Physical activity     Days per week: Not on file     Minutes per session: Not on file    Stress: Not on file   Relationships    Social connections     Talks on phone: Not on file     Gets together: Not on file     Attends Mandaen service: Not on file     Active member of club or organization: Not on file      "Attends meetings of clubs or organizations: Not on file     Relationship status: Not on file   Other Topics Concern    Not on file   Social History Narrative    Not on file       Medications:     [START ON 11/8/2020] calcitRIOL  0.25 mcg Oral Every other day    carvediloL  12.5 mg Oral BID    furosemide  60 mg Oral Daily    hydrALAZINE  25 mg Oral Q8H    insulin detemir U-100  5 Units Subcutaneous QHS    meropenem-0.9% sodium chloride  1 g Intravenous Daily    NIFEdipine  90 mg Oral Daily    pantoprazole  40 mg Intravenous BID    senna-docusate 8.6-50 mg  1 tablet Oral BID    sodium bicarbonate  1,300 mg Oral TID       Physical exam:  /60   Pulse 98   Temp 97.5 °F (36.4 °C) (Oral)   Resp 18   Ht 5' 6" (1.676 m)   Wt 68 kg (150 lb)   LMP 07/29/1966 (Approximate)   SpO2 98%   Breastfeeding No   BMI 24.21 kg/m²     Gen: pleasant ; NAD  HEENT: normocephalic; sclera non-icteric  Chest: non-labored breathing; symmetrical expansion  CV: RRR ; pulses intact  Abd: soft, NT , ND ; active BS ; no organomegaly  Ext: no LE edema ; no cyanosis  Skin: no rashes,  or lesions   Pscyh: appropriate mood, congruent affect  Neuro: alert, oriented ; no focal deficits noted      Recent Labs   Lab 11/07/20  1143   WBC 9.95   RBC 2.15*   HGB 6.5*   HCT 20.7*      MCV 96   MCH 30.2   MCHC 31.4*     Recent Labs   Lab 11/07/20  1143   CALCIUM 9.5   PROT 6.6      K 4.4   CO2 23      BUN 60*   CREATININE 5.3*   ALKPHOS 63   ALT 6*   AST 12   BILITOT 0.3     Recent Labs   Lab 11/07/20  1143   INR 1.0   APTT 30.2                   Assessment:  Joyce Riley is a 87 y.o. woman admitted with recurrent lower GI bleeding, suspected diverticular source.  She had severe pandiverticulosis seen during colonoscopy in February 2019.  Hemodynamically stable.  Receiving 1 unit PRBC.  Her care is complicated by CKD.      Recs:  As with her previous admissions, the patient and her family have elected to " defer colonoscopy for time being.  Conservative management and supportive care.    We will continue to follow.        Thank you for the consult.  Please call with any additional concerns/ questions.      Bryant Batista MD  Senior Staff gastroenterologist  [facility]

## 2020-11-07 NOTE — ED TRIAGE NOTES
Patient presented to ED via EMS c/o rectal bleeding. Patient sent from [facility], nurse reported patient had blood in stool this morning. Patient seen yesterday in ER for neck pain and PICC line placement.     Denies abd pain, n/v/d, chest pain, sob, fever chills, HA, urinary symptoms    AAO x 4.

## 2020-11-07 NOTE — SUBJECTIVE & OBJECTIVE
Past Medical History:   Diagnosis Date    Anemia     Anticoagulant long-term use     Arthritis     Atrial fibrillation     Cataract     CHF (congestive heart failure)     CKD (chronic kidney disease), stage V     Dermatophytosis     Diabetes mellitus     Type 2    GERD (gastroesophageal reflux disease)     GI bleed 2018    Gout     Gout     Hypertension     Obese     Requires assistance with all daily activities     Stroke     Wheelchair dependent        Past Surgical History:   Procedure Laterality Date     SECTION      Patient unsure, believe she had 3-4    CHOLECYSTECTOMY      EYE SURGERY      HYSTERECTOMY      JOINT REPLACEMENT Right     knee    TOTAL KNEE ARTHROPLASTY Right        Review of patient's allergies indicates:   Allergen Reactions    Indomethacin     Nsaids (non-steroidal anti-inflammatory drug)     Percocet [oxycodone-acetaminophen]        Current Facility-Administered Medications on File Prior to Encounter   Medication    [DISCONTINUED] albuterol-ipratropium 2.5 mg-0.5 mg/3 mL nebulizer solution 3 mL    [DISCONTINUED] carvediloL tablet 12.5 mg    [DISCONTINUED] diphenhydrAMINE capsule 25 mg    [DISCONTINUED] furosemide tablet 60 mg    [DISCONTINUED] heparin (porcine) injection 5,000 Units    [DISCONTINUED] hydrALAZINE tablet 25 mg    [DISCONTINUED] meropenem-0.9% sodium chloride 1 g/50 mL IVPB    [DISCONTINUED] mupirocin 2 % ointment    [DISCONTINUED] NIFEdipine 24 hr tablet 90 mg    [DISCONTINUED] sodium bicarbonate tablet 1,300 mg    [DISCONTINUED] sodium chloride 0.9% flush 10 mL    [DISCONTINUED] sodium chloride 0.9% flush 10 mL    [DISCONTINUED] traMADoL tablet 50 mg     Current Outpatient Medications on File Prior to Encounter   Medication Sig    acetaminophen (TYLENOL) 325 MG tablet Take 2 tablets (650 mg total) by mouth every 4 (four) hours as needed. (Patient taking differently: Take 650 mg by mouth 2 (two) times daily. )    B  complex-vitamin C-folic acid (NEPHRO-MICHAEL) 0.8 mg Tab Take by mouth once daily.    calcitRIOL (ROCALTROL) 0.25 MCG Cap Take 1 capsule (0.25 mcg total) by mouth every other day.    carvediloL (COREG) 12.5 MG tablet Take 1 tablet (12.5 mg total) by mouth 2 (two) times daily.    epoetin diana (EPOGEN) 10,000 unit/mL injection Inject 1 mL (10,000 Units total) into the skin every 7 days.    ergocalciferol (ERGOCALCIFEROL) 50,000 unit Cap Take 1 capsule (50,000 Units total) by mouth every 7 days.    ferrous sulfate (FEOSOL) 325 mg (65 mg iron) Tab tablet Take 325 mg by mouth 2 (two) times daily.    furosemide (LASIX) 20 MG tablet Take 3 tablets (60 mg total) by mouth once daily.    hydrALAZINE (APRESOLINE) 25 MG tablet Take 1 tablet (25 mg total) by mouth every 8 (eight) hours.    insulin degludec (TRESIBA FLEXTOUCH U-100) 100 unit/mL (3 mL) InPn Inject 5 Units into the skin every evening.    lactobacillus acidophilus & bulgar (LACTINEX) 100 million cell packet Take 1 packet (1 each total) by mouth 2 (two) times daily.    meropenem-0.9% sodium chloride 1 gram/50 mL PgBk Inject 50 mLs (1 g total) into the vein once daily. for 7 days    methyl salicylate-menthol 15-10% 15-10 % Crea Apply topically 4 (four) times daily.    NIFEdipine (PROCARDIA-XL) 90 MG (OSM) 24 hr tablet Take 1 tablet (90 mg total) by mouth once daily.    senna-docusate 8.6-50 mg (PERICOLACE) 8.6-50 mg per tablet Take 1 tablet by mouth 2 (two) times daily.    sodium bicarbonate 650 MG tablet Take 2 tablets (1,300 mg total) by mouth 3 (three) times daily.    traMADoL (ULTRAM) 50 mg tablet Take 1 tablet (50 mg total) by mouth every 8 (eight) hours as needed for Pain.    albuterol-ipratropium (DUO-NEB) 2.5 mg-0.5 mg/3 mL nebulizer solution Take 3 mLs by nebulization every 4 (four) hours as needed for Wheezing. Rescue     Family History     Problem Relation (Age of Onset)    Cancer Mother, Brother    Diabetes Mother    Hypertension Mother         Tobacco Use    Smoking status: Never Smoker    Smokeless tobacco: Never Used   Substance and Sexual Activity    Alcohol use: No    Drug use: No    Sexual activity: Never     Review of Systems   Constitutional: Negative for activity change, appetite change, chills, diaphoresis, fatigue and fever.   HENT: Negative for congestion and dental problem.    Eyes: Negative for discharge and itching.   Respiratory: Negative for cough, choking, shortness of breath and stridor.    Cardiovascular: Negative for chest pain, palpitations and leg swelling.   Gastrointestinal: Positive for blood in stool. Negative for abdominal pain, constipation, diarrhea, nausea and rectal pain.   Genitourinary: Negative for difficulty urinating, dyspareunia and enuresis.   Musculoskeletal: Positive for arthralgias and neck pain.   Neurological: Negative for dizziness.   Hematological: Negative for adenopathy.   Psychiatric/Behavioral: Negative for agitation and behavioral problems.     Objective:     Vital Signs (Most Recent):  Temp: 98.5 °F (36.9 °C) (11/07/20 1053)  Pulse: 98 (11/07/20 1316)  Resp: 14 (11/07/20 1302)  BP: (!) 150/67 (11/07/20 1316)  SpO2: 98 % (11/07/20 1316) Vital Signs (24h Range):  Temp:  [98.4 °F (36.9 °C)-98.5 °F (36.9 °C)] 98.5 °F (36.9 °C)  Pulse:  [] 98  Resp:  [12-21] 14  SpO2:  [97 %-100 %] 98 %  BP: (113-166)/(59-67) 150/67     Weight: 68 kg (150 lb)  Body mass index is 24.21 kg/m².    Physical Exam  Vitals signs and nursing note reviewed.   Constitutional:       General: She is not in acute distress.     Appearance: Normal appearance. She is not ill-appearing, toxic-appearing or diaphoretic.   HENT:      Head: Normocephalic and atraumatic.   Cardiovascular:      Rate and Rhythm: Normal rate and regular rhythm.      Heart sounds: No murmur. No gallop.    Pulmonary:      Effort: Pulmonary effort is normal. No respiratory distress.      Breath sounds: No stridor. No wheezing or rales.   Chest:      Chest  wall: No tenderness.   Abdominal:      General: Abdomen is flat. Bowel sounds are normal.      Tenderness: There is no abdominal tenderness. There is no guarding or rebound.   Neurological:      General: No focal deficit present.      Mental Status: She is alert and oriented to person, place, and time.   Psychiatric:         Mood and Affect: Mood normal.         Behavior: Behavior normal.         Thought Content: Thought content normal.             Significant Labs:   BMP:   Recent Labs   Lab 11/07/20  1143   *      K 4.4      CO2 23   BUN 60*   CREATININE 5.3*   CALCIUM 9.5     CBC:   Recent Labs   Lab 11/06/20  0545 11/07/20  1143   WBC 10.11 9.95   HGB 7.7* 6.5*   HCT 24.7* 20.7*    336       Significant Imaging:

## 2020-11-07 NOTE — ED NOTES
Called to give report to receiving floor   Left on hold for 9 minutes   Called back was told that they will have the Nurse that is taking the patient call me back

## 2020-11-08 LAB
ANION GAP SERPL CALC-SCNC: 11 MMOL/L (ref 8–16)
BLD PROD TYP BPU: NORMAL
BLD PROD TYP BPU: NORMAL
BLOOD UNIT EXPIRATION DATE: NORMAL
BLOOD UNIT EXPIRATION DATE: NORMAL
BLOOD UNIT TYPE CODE: 5100
BLOOD UNIT TYPE CODE: 5100
BLOOD UNIT TYPE: NORMAL
BLOOD UNIT TYPE: NORMAL
BUN SERPL-MCNC: 62 MG/DL (ref 8–23)
CALCIUM SERPL-MCNC: 9.6 MG/DL (ref 8.7–10.5)
CHLORIDE SERPL-SCNC: 105 MMOL/L (ref 95–110)
CO2 SERPL-SCNC: 23 MMOL/L (ref 23–29)
CODING SYSTEM: NORMAL
CODING SYSTEM: NORMAL
CREAT SERPL-MCNC: 5.2 MG/DL (ref 0.5–1.4)
DISPENSE STATUS: NORMAL
DISPENSE STATUS: NORMAL
EST. GFR  (AFRICAN AMERICAN): 8 ML/MIN/1.73 M^2
EST. GFR  (NON AFRICAN AMERICAN): 7 ML/MIN/1.73 M^2
ESTIMATED AVG GLUCOSE: 71 MG/DL (ref 68–131)
GLUCOSE SERPL-MCNC: 81 MG/DL (ref 70–110)
HBA1C MFR BLD HPLC: 4.1 % (ref 4–5.6)
HGB BLD-MCNC: 6.9 G/DL (ref 12–16)
HGB BLD-MCNC: 7.4 G/DL (ref 12–16)
HGB BLD-MCNC: 7.6 G/DL (ref 12–16)
NUM UNITS TRANS PACKED RBC: NORMAL
POCT GLUCOSE: 108 MG/DL (ref 70–110)
POCT GLUCOSE: 121 MG/DL (ref 70–110)
POCT GLUCOSE: 122 MG/DL (ref 70–110)
POCT GLUCOSE: 87 MG/DL (ref 70–110)
POTASSIUM SERPL-SCNC: 4.7 MMOL/L (ref 3.5–5.1)
SODIUM SERPL-SCNC: 139 MMOL/L (ref 136–145)
TRANS ERYTHROCYTES VOL PATIENT: NORMAL ML

## 2020-11-08 PROCEDURE — C9399 UNCLASSIFIED DRUGS OR BIOLOG: HCPCS | Performed by: INTERNAL MEDICINE

## 2020-11-08 PROCEDURE — 25000003 PHARM REV CODE 250: Performed by: INTERNAL MEDICINE

## 2020-11-08 PROCEDURE — 99232 SBSQ HOSP IP/OBS MODERATE 35: CPT | Mod: ,,, | Performed by: INTERNAL MEDICINE

## 2020-11-08 PROCEDURE — 99232 PR SUBSEQUENT HOSPITAL CARE,LEVL II: ICD-10-PCS | Mod: ,,, | Performed by: INTERNAL MEDICINE

## 2020-11-08 PROCEDURE — C9113 INJ PANTOPRAZOLE SODIUM, VIA: HCPCS | Performed by: INTERNAL MEDICINE

## 2020-11-08 PROCEDURE — 63600175 PHARM REV CODE 636 W HCPCS: Performed by: INTERNAL MEDICINE

## 2020-11-08 PROCEDURE — 21400001 HC TELEMETRY ROOM

## 2020-11-08 PROCEDURE — P9021 RED BLOOD CELLS UNIT: HCPCS

## 2020-11-08 PROCEDURE — 80048 BASIC METABOLIC PNL TOTAL CA: CPT

## 2020-11-08 PROCEDURE — 85018 HEMOGLOBIN: CPT

## 2020-11-08 RX ORDER — DIPHENHYDRAMINE HYDROCHLORIDE 50 MG/ML
25 INJECTION INTRAMUSCULAR; INTRAVENOUS EVERY 6 HOURS PRN
Status: DISCONTINUED | OUTPATIENT
Start: 2020-11-08 | End: 2020-11-12

## 2020-11-08 RX ADMIN — HYDRALAZINE HYDROCHLORIDE 25 MG: 25 TABLET ORAL at 03:11

## 2020-11-08 RX ADMIN — SODIUM BICARBONATE TAB 325 MG 1300 MG: 325 TAB at 08:11

## 2020-11-08 RX ADMIN — MEROPENEM AND SODIUM CHLORIDE 1 G: 1 INJECTION, SOLUTION INTRAVENOUS at 08:11

## 2020-11-08 RX ADMIN — CALCITRIOL CAPSULES 0.25 MCG 0.25 MCG: 0.25 CAPSULE ORAL at 09:11

## 2020-11-08 RX ADMIN — FUROSEMIDE 60 MG: 40 TABLET ORAL at 09:11

## 2020-11-08 RX ADMIN — PANTOPRAZOLE SODIUM 40 MG: 40 INJECTION, POWDER, FOR SOLUTION INTRAVENOUS at 08:11

## 2020-11-08 RX ADMIN — HYDRALAZINE HYDROCHLORIDE 25 MG: 25 TABLET ORAL at 10:11

## 2020-11-08 RX ADMIN — SODIUM BICARBONATE TAB 325 MG 1300 MG: 325 TAB at 09:11

## 2020-11-08 RX ADMIN — CARVEDILOL 12.5 MG: 12.5 TABLET, FILM COATED ORAL at 08:11

## 2020-11-08 RX ADMIN — TRAMADOL HYDROCHLORIDE 50 MG: 50 TABLET, FILM COATED ORAL at 06:11

## 2020-11-08 RX ADMIN — DOCUSATE SODIUM 50 MG AND SENNOSIDES 8.6 MG 1 TABLET: 8.6; 5 TABLET, FILM COATED ORAL at 08:11

## 2020-11-08 RX ADMIN — INSULIN DETEMIR 5 UNITS: 100 INJECTION, SOLUTION SUBCUTANEOUS at 08:11

## 2020-11-08 RX ADMIN — SODIUM BICARBONATE TAB 325 MG 1300 MG: 325 TAB at 03:11

## 2020-11-08 RX ADMIN — TRAMADOL HYDROCHLORIDE 50 MG: 50 TABLET, FILM COATED ORAL at 08:11

## 2020-11-08 RX ADMIN — CARVEDILOL 12.5 MG: 12.5 TABLET, FILM COATED ORAL at 09:11

## 2020-11-08 RX ADMIN — DOCUSATE SODIUM 50 MG AND SENNOSIDES 8.6 MG 1 TABLET: 8.6; 5 TABLET, FILM COATED ORAL at 09:11

## 2020-11-08 RX ADMIN — NIFEDIPINE 90 MG: 30 TABLET, FILM COATED, EXTENDED RELEASE ORAL at 09:11

## 2020-11-08 RX ADMIN — HYDRALAZINE HYDROCHLORIDE 25 MG: 25 TABLET ORAL at 06:11

## 2020-11-08 RX ADMIN — PANTOPRAZOLE SODIUM 40 MG: 40 INJECTION, POWDER, FOR SOLUTION INTRAVENOUS at 09:11

## 2020-11-08 RX ADMIN — DIPHENHYDRAMINE HYDROCHLORIDE 25 MG: 50 INJECTION INTRAMUSCULAR; INTRAVENOUS at 12:11

## 2020-11-08 NOTE — PLAN OF CARE
11/08/20 0907   Discharge Assessment   Assessment Type Discharge Planning Assessment   Confirmed/corrected address and phone number on facesheet? Yes  (Currently at [facility])   Assessment information obtained from? Medical Record;Patient   Communicated expected length of stay with patient/caregiver no   Prior to hospitilization cognitive status: Unable to Assess   Prior to hospitalization functional status: Assistive Equipment;Needs Assistance  (Wheelchair; nursing home)   Current cognitive status: Unable to Assess  (Some confusion: Reported she came here from daughter's home.)   Current Functional Status: Assistive Equipment;Needs Assistance  (Wheelchair; nursing home staff to assist)   Facility Arrived From: [facility]   Lives With facility resident   Able to Return to Prior Arrangements yes   Is patient able to care for self after discharge? No   Who are your caregiver(s) and their phone number(s)? [facility] staff: [phone]; Antonia-daughter: [phone]   Patient's perception of discharge disposition nursing home  (Return to [facility])   Readmission Within the Last 30 Days planned readmission   If yes, most recent facility name: [facility]   Patient currently being followed by outpatient case management? No   Patient currently receives any other outside agency services? No   Equipment Currently Used at Home wheelchair   Do you have any problems affording any of your prescribed medications? No   Is the patient taking medications as prescribed? yes   Does the patient have transportation home? Yes   Transportation Anticipated agency   Does the patient receive services at the [facility]? No   Discharge Plan A Return to nursing home  ([facility])   DME Needed Upon Discharge  wheelchair   Patient/Family in Agreement with Plan yes   Readmission Questionnaire   At the time of your discharge, did someone talk to you about what your health problems were? Yes   At the time of discharge, did someone talk to you  about what to watch out for regarding worsening of your health problem? Yes   At the time of discharge, did someone talk to you about what to do if you experienced worsening of your health problem? Yes   At the time of discharge, did someone talk to you about which medication to take when you left the hospital and which ones to stop taking? Yes   At the time of discharge, did someone talk to you about when and where to follow up with a doctor after you left the hospital? Yes   What do you believe caused you to be sick enough to be re-admitted? Bleeding   How often do you need to have someone help you when you read instructions, pamphlets, or other written material from your doctor or pharmacy? Often   Do you have problems taking your medications as prescribed? No   Do you have any problems affording any of  your prescribed medications? No   Do you have problems obtaining/receiving your medications? No   Does the patient have transportation to healthcare appointments? Yes   Living Arrangements other (see comments)  ([facility])   Does the patient have family/friends to help with healtcare needs after discharge? yes   Does your caregiver provide all the help you need? Yes   If no, what kind of help do you need at home? Personal care and life functioning: [facility] staff assist   SW Role explained to patient; two patient identifiers recognized; SW contact information placed on Communication board. Discussed patient managing health care at home; determined who would be helping patient at home with recovery: [facility] nursing staff with help with recovery    PCP: Dr. Doran or Dr. Reynaldo Chao     Extended Emergency Contact Information  Primary Emergency Contact: Antonia Fu   [facility]  Home Phone: [phone]  Mobile Phone: [phone]  Relation: Daughter  Secondary Emergency Contact: Javed Riley  Mobile Phone: [phone]  Relation: [employer] Card provides medications    [facility] -  [place] - [address]  [address]  [place] [address]  Phone: [phone] Fax: [phone]    Payor: [facility] MEDICARE / Plan: [employer] MEDICARE HMO / Product Type: Capitation /

## 2020-11-08 NOTE — SUBJECTIVE & OBJECTIVE
Interval History: No new issues     Review of Systems   Constitutional: Negative for activity change, appetite change, chills, diaphoresis, fatigue and fever.   HENT: Negative for congestion and dental problem.    Eyes: Negative for discharge and itching.   Respiratory: Negative for cough, choking, shortness of breath and stridor.    Cardiovascular: Negative for chest pain, palpitations and leg swelling.   Gastrointestinal: Negative for abdominal pain, blood in stool, constipation, diarrhea, nausea and rectal pain.   Genitourinary: Negative for difficulty urinating, dyspareunia and enuresis.   Musculoskeletal: Positive for arthralgias and neck pain.   Neurological: Negative for dizziness.   Hematological: Negative for adenopathy.   Psychiatric/Behavioral: Negative for agitation and behavioral problems.     Objective:     Vital Signs (Most Recent):  Temp: 98.8 °F (37.1 °C) (11/08/20 0800)  Pulse: 83 (11/08/20 0800)  Resp: 18 (11/08/20 0800)  BP: 129/60 (11/08/20 0800)  SpO2: 97 % (11/08/20 0800) Vital Signs (24h Range):  Temp:  [97.4 °F (36.3 °C)-99 °F (37.2 °C)] 98.8 °F (37.1 °C)  Pulse:  [] 83  Resp:  [12-21] 18  SpO2:  [94 %-100 %] 97 %  BP: (109-166)/(53-72) 129/60     Weight: 68 kg (150 lb)  Body mass index is 24.21 kg/m².    Intake/Output Summary (Last 24 hours) at 11/8/2020 1000  Last data filed at 11/8/2020 0505  Gross per 24 hour   Intake 1320.25 ml   Output --   Net 1320.25 ml      Physical Exam  Vitals signs and nursing note reviewed.   Constitutional:       General: She is not in acute distress.     Appearance: Normal appearance. She is not ill-appearing, toxic-appearing or diaphoretic.   HENT:      Head: Normocephalic and atraumatic.   Cardiovascular:      Rate and Rhythm: Normal rate and regular rhythm.      Heart sounds: No murmur. No gallop.    Pulmonary:      Effort: Pulmonary effort is normal. No respiratory distress.      Breath sounds: No stridor. No wheezing or rales.   Chest:      Chest  wall: No tenderness.   Abdominal:      General: Abdomen is flat. Bowel sounds are normal.      Tenderness: There is no abdominal tenderness. There is no guarding or rebound.   Neurological:      General: No focal deficit present.      Mental Status: She is alert and oriented to person, place, and time.   Psychiatric:         Mood and Affect: Mood normal.         Behavior: Behavior normal.         Thought Content: Thought content normal.         Significant Labs:   CBC:   Recent Labs   Lab 11/07/20  1143 11/07/20  2155 11/08/20  0619   WBC 9.95  --   --    HGB 6.5* 6.9* 7.4*   HCT 20.7*  --   --      --   --      CMP:   Recent Labs   Lab 11/07/20  1143 11/08/20  0619    139   K 4.4 4.7    105   CO2 23 23   * 81   BUN 60* 62*   CREATININE 5.3* 5.2*   CALCIUM 9.5 9.6   PROT 6.6  --    ALBUMIN 2.4*  --    BILITOT 0.3  --    ALKPHOS 63  --    AST 12  --    ALT 6*  --    ANIONGAP 12 11   EGFRNONAA 7* 7*       Significant Imaging

## 2020-11-08 NOTE — PROGRESS NOTES
[facility] Hospital Medicine  Progress Note    Patient Name: Joyce Rliey  MRN: [id number]  Patient Class: IP- Inpatient   Admission Date: 11/7/2020  Length of Stay: 1 days  Attending Physician: Shahab Chinchilla MD  Primary Care Provider: Kalpana Staton MD        Subjective:     Principal Problem:Acute blood loss anemia        HPI:  Mrs. Riley is a 88 yo F known to me for an admission and discharge to [facility] yesterday.  She had a few day hospital stay for evaluation and management of resistant UTI with Klebsiella and pseudomonas. She was discharged back to [facility] (for Abx) on 11/6 with Meropenem for 10 days per ID recs.  The patient returns today with dark and maroon stools with visible blood clots. Not on any blood thinners. No abdominal pain. Hgb was 7.7 yesterday- today 6.5. GI was consulted and received one unit of blood.  Patient has a LAPOST for DNR but daughter says full code.      Overview/Hospital Course:  Mrs. Riley is a 88 yo F known to me for an admission and discharge to [facility] yesterday.  She had a few day hospital stay for evaluation and management of resistant UTI with Klebsiella and pseudomonas. She was discharged back to [facility] (for Abx) on 11/6 with Meropenem for 10 days per ID recs.  The patient returns today with dark and maroon stools with visible blood clots. Not on any blood thinners. No abdominal pain. Hgb was 7.7 yesterday- today 6.5. GI was consulted and received one unit of blood.  Patient has a LAPOST for DNR but daughter says full code.      Interval History: No new issues     Review of Systems   Constitutional: Negative for activity change, appetite change, chills, diaphoresis, fatigue and fever.   HENT: Negative for congestion and dental problem.    Eyes: Negative for discharge and itching.   Respiratory: Negative for cough, choking, shortness of breath and stridor.    Cardiovascular: Negative for chest pain, palpitations and leg swelling.    Gastrointestinal: Negative for abdominal pain, blood in stool, constipation, diarrhea, nausea and rectal pain.   Genitourinary: Negative for difficulty urinating, dyspareunia and enuresis.   Musculoskeletal: Positive for arthralgias and neck pain.   Neurological: Negative for dizziness.   Hematological: Negative for adenopathy.   Psychiatric/Behavioral: Negative for agitation and behavioral problems.     Objective:     Vital Signs (Most Recent):  Temp: 98.8 °F (37.1 °C) (11/08/20 0800)  Pulse: 83 (11/08/20 0800)  Resp: 18 (11/08/20 0800)  BP: 129/60 (11/08/20 0800)  SpO2: 97 % (11/08/20 0800) Vital Signs (24h Range):  Temp:  [97.4 °F (36.3 °C)-99 °F (37.2 °C)] 98.8 °F (37.1 °C)  Pulse:  [] 83  Resp:  [12-21] 18  SpO2:  [94 %-100 %] 97 %  BP: (109-166)/(53-72) 129/60     Weight: 68 kg (150 lb)  Body mass index is 24.21 kg/m².    Intake/Output Summary (Last 24 hours) at 11/8/2020 1000  Last data filed at 11/8/2020 0505  Gross per 24 hour   Intake 1320.25 ml   Output --   Net 1320.25 ml      Physical Exam  Vitals signs and nursing note reviewed.   Constitutional:       General: She is not in acute distress.     Appearance: Normal appearance. She is not ill-appearing, toxic-appearing or diaphoretic.   HENT:      Head: Normocephalic and atraumatic.   Cardiovascular:      Rate and Rhythm: Normal rate and regular rhythm.      Heart sounds: No murmur. No gallop.    Pulmonary:      Effort: Pulmonary effort is normal. No respiratory distress.      Breath sounds: No stridor. No wheezing or rales.   Chest:      Chest wall: No tenderness.   Abdominal:      General: Abdomen is flat. Bowel sounds are normal.      Tenderness: There is no abdominal tenderness. There is no guarding or rebound.   Neurological:      General: No focal deficit present.      Mental Status: She is alert and oriented to person, place, and time.   Psychiatric:         Mood and Affect: Mood normal.         Behavior: Behavior normal.         Thought  Content: Thought content normal.         Significant Labs:   CBC:   Recent Labs   Lab 11/07/20  1143 11/07/20  2155 11/08/20  0619   WBC 9.95  --   --    HGB 6.5* 6.9* 7.4*   HCT 20.7*  --   --      --   --      CMP:   Recent Labs   Lab 11/07/20  1143 11/08/20  0619    139   K 4.4 4.7    105   CO2 23 23   * 81   BUN 60* 62*   CREATININE 5.3* 5.2*   CALCIUM 9.5 9.6   PROT 6.6  --    ALBUMIN 2.4*  --    BILITOT 0.3  --    ALKPHOS 63  --    AST 12  --    ALT 6*  --    ANIONGAP 12 11   EGFRNONAA 7* 7*       Significant Imaging      Assessment/Plan:      * Acute blood loss anemia  Likely lower GI bleed. Possibly diverticular. 7.7 to 6.5 Hgb over past 24 hours. Visible bleeding. PPI. GI consult.  Unit of blood.     Hgb from 6.9 to 7.4 today. Patient does not want C-scope eval. Monitor H/H today- if stable- then to   [facility] tomorrow     Anemia of renal disease  Now with acute blood loss anemia as noted.       UTI (urinary tract infection)  Reported R Klebsiella (?)  Pseudomonas here at our hospital.  6 more days of Meropenem. End date 11/13.   No sepsis       History of stroke  No acute issues   Last time PT/OT eval- return to NH basic      Paroxysmal atrial fibrillation  Does not appear to be on any NOAC   History of bleeding reported in the past       Chronic diastolic heart failure  Stable       Renal hypertension  No acute issues. Resume home meds       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  No acute issues      CKD stage 5  At baseline      Type 2 diabetes mellitus, controlled, with renal complications  No acute issues. A1c last stay. Sliding scale for now. accu checks  Home insulin         VTE Risk Mitigation (From admission, onward)    None          Discharge Planning   PAM:      Code Status: Full Code   Is the patient medically ready for discharge?:     Reason for patient still in hospital (select all that apply): Patient unstable  Discharge Plan A: Return to [facility]         To [facility] tomorrow if H/H stable.             Shahab Oscar MD  Department of Hospital Medicine   [facility]

## 2020-11-08 NOTE — ASSESSMENT & PLAN NOTE
Likely lower GI bleed. Possibly diverticular. 7.7 to 6.5 Hgb over past 24 hours. Visible bleeding. PPI. GI consult.  Unit of blood.     Hgb from 6.9 to 7.4 today. Patient does not want C-scope eval. Monitor H/H today- if stable- then to   [facility] tomorrow

## 2020-11-08 NOTE — PLAN OF CARE
Problem: Fall Injury Risk  Goal: Absence of Fall and Fall-Related Injury  Outcome: Ongoing, Progressing     Problem: Adult Inpatient Plan of Care  Goal: Plan of Care Review  Outcome: Ongoing, Progressing  Goal: Patient-Specific Goal (Individualization)  Outcome: Ongoing, Progressing  Goal: Absence of Hospital-Acquired Illness or Injury  Outcome: Ongoing, Progressing  Goal: Optimal Comfort and Wellbeing  Outcome: Ongoing, Progressing  Goal: Readiness for Transition of Care  Outcome: Ongoing, Progressing  Goal: Rounds/Family Conference  Outcome: Ongoing, Progressing     Problem: Diabetes Comorbidity  Goal: Blood Glucose Level Within Desired Range  Outcome: Ongoing, Progressing     Problem: Infection  Goal: Infection Symptom Resolution  Outcome: Ongoing, Progressing     Problem: Coping Ineffective  Goal: Effective Coping  Outcome: Ongoing, Progressing     Problem: Skin Injury Risk Increased  Goal: Skin Health and Integrity  Outcome: Ongoing, Progressing     Problem: Bleeding (Gastrointestinal Bleeding)  Goal: Hemostasis  Outcome: Ongoing, Progressing

## 2020-11-08 NOTE — PROGRESS NOTES
Made NP PONCE Cerda aware of last hgb of 6.9 after having one Unit of PRBC. Stopped infusion after 4 hours but still had about 40 mL in bag according to what volume was documented on blood admin record. No signs of bleeding during this shift. Orders placed for 1 more Unit to be prepared and transfused and run over 2 hrs. Explained new plan of care to pt and verbalized understanding.

## 2020-11-08 NOTE — PROGRESS NOTES
Progress Note  Gastroenterology    Admit Date: 11/7/2020   LOS: 1 day     SUBJECTIVE:     Follow-up For:  Hematochezia.  No recurrent bleeding since admit.  She is having mild nausea.  No abdominal pain.  No acute events.      Scheduled Meds:   calcitRIOL  0.25 mcg Oral Every other day    carvediloL  12.5 mg Oral BID    furosemide  60 mg Oral Daily    hydrALAZINE  25 mg Oral Q8H    insulin detemir U-100  5 Units Subcutaneous QHS    meropenem-0.9% sodium chloride  1 g Intravenous Daily    NIFEdipine  90 mg Oral Daily    pantoprazole  40 mg Intravenous BID    senna-docusate 8.6-50 mg  1 tablet Oral BID    sodium bicarbonate  1,300 mg Oral TID     Continuous Infusions:  PRN Meds:sodium chloride, albuterol-ipratropium, dextrose 50%, dextrose 50%, diphenhydrAMINE, glucagon (human recombinant), glucose, glucose, insulin aspart U-100, traMADoL    Review of patient's allergies indicates:   Allergen Reactions    Indomethacin     Nsaids (non-steroidal anti-inflammatory drug)     Percocet [oxycodone-acetaminophen]          OBJECTIVE:     Vital Signs (Most Recent)  Temp: 97.8 °F (36.6 °C) (11/08/20 1138)  Pulse: 89 (11/08/20 1138)  Resp: 18 (11/08/20 1138)  BP: (!) 122/56 (11/08/20 1138)  SpO2: 99 % (11/08/20 1138)    Temperature Range Min/Max (Last 24H):  Temp:  [97.5 °F (36.4 °C)-99 °F (37.2 °C)]     I & O (Last 24H):    Intake/Output Summary (Last 24 hours) at 11/8/2020 1554  Last data filed at 11/8/2020 0505  Gross per 24 hour   Intake 976.25 ml   Output --   Net 976.25 ml     Physical Exam:  General: well developed, well nourished, no distress  Head: normocephalic, atraumatic  Eyes:  conjunctivae/corneas clear. PERRL.  Neurologic: Alert and oriented x 3    Laboratory:  CBC:   Recent Labs   Lab 11/07/20  1143  11/08/20  1358   WBC 9.95  --   --    RBC 2.15*  --   --    HGB 6.5*   < > 7.6*   HCT 20.7*  --   --      --   --    MCV 96  --   --    MCH 30.2  --   --    MCHC 31.4*  --   --     < > = values in  this interval not displayed.     CMP:   Recent Labs   Lab 11/07/20  1143 11/08/20  0619   * 81   CALCIUM 9.5 9.6   ALBUMIN 2.4*  --    PROT 6.6  --     139   K 4.4 4.7   CO2 23 23    105   BUN 60* 62*   CREATININE 5.3* 5.2*   ALKPHOS 63  --    ALT 6*  --    AST 12  --    BILITOT 0.3  --                  ASSESSMENT/PLAN:     87 y.o. woman admitted with recurrent lower GI bleeding, suspected diverticular source.  She had severe pandiverticulosis seen during colonoscopy in February 2019.  Hemodynamically stable.  Blood counts have improved after blood transfusion.  Her care is complicated by CKD.      Plan:  Will continue to monitor.  Discussed again with patient and family at bedside regarding further evaluation.  I highly suggested if she has another episode of hematochezia, she should consider colonoscopy.  Colonoscopy has the highest chance for success if performed within 48 hr of bleeding episode.      Bryant Batista MD  Senior Staff Gastroenterologist  [facility]

## 2020-11-08 NOTE — PLAN OF CARE
Problem: Fall Injury Risk  Goal: Absence of Fall and Fall-Related Injury  Outcome: Ongoing, Progressing     Problem: Bleeding (Gastrointestinal Bleeding)  Goal: Hemostasis  Outcome: Ongoing, Progressing   PRN medication for pain effective. Remains SR on telemetry monitor. No skin issues and no injury during shift. No evidence of bleeding during shift. Received 2 units of blood. Awaiting morning labs. Educated on new medication and verbalized understanding. Bed alarm activated and audible. Call light at side.

## 2020-11-09 LAB
ANION GAP SERPL CALC-SCNC: 10 MMOL/L (ref 8–16)
BACTERIA BLD CULT: NORMAL
BACTERIA BLD CULT: NORMAL
BLD PROD TYP BPU: NORMAL
BLOOD UNIT EXPIRATION DATE: NORMAL
BLOOD UNIT TYPE CODE: 5100
BLOOD UNIT TYPE: NORMAL
BUN SERPL-MCNC: 69 MG/DL (ref 8–23)
CALCIUM SERPL-MCNC: 9 MG/DL (ref 8.7–10.5)
CHLORIDE SERPL-SCNC: 105 MMOL/L (ref 95–110)
CO2 SERPL-SCNC: 22 MMOL/L (ref 23–29)
CODING SYSTEM: NORMAL
CREAT SERPL-MCNC: 5.3 MG/DL (ref 0.5–1.4)
DISPENSE STATUS: NORMAL
EST. GFR  (AFRICAN AMERICAN): 8 ML/MIN/1.73 M^2
EST. GFR  (NON AFRICAN AMERICAN): 7 ML/MIN/1.73 M^2
GLUCOSE SERPL-MCNC: 86 MG/DL (ref 70–110)
HGB BLD-MCNC: 7 G/DL (ref 12–16)
HGB BLD-MCNC: 7.5 G/DL (ref 12–16)
HGB BLD-MCNC: 8.7 G/DL (ref 12–16)
POCT GLUCOSE: 111 MG/DL (ref 70–110)
POCT GLUCOSE: 122 MG/DL (ref 70–110)
POCT GLUCOSE: 59 MG/DL (ref 70–110)
POCT GLUCOSE: 91 MG/DL (ref 70–110)
POTASSIUM SERPL-SCNC: 5 MMOL/L (ref 3.5–5.1)
SODIUM SERPL-SCNC: 137 MMOL/L (ref 136–145)
TRANS ERYTHROCYTES VOL PATIENT: NORMAL ML

## 2020-11-09 PROCEDURE — 99223 PR INITIAL HOSPITAL CARE,LEVL III: ICD-10-PCS | Mod: ,,, | Performed by: NURSE PRACTITIONER

## 2020-11-09 PROCEDURE — C9399 UNCLASSIFIED DRUGS OR BIOLOG: HCPCS | Performed by: INTERNAL MEDICINE

## 2020-11-09 PROCEDURE — 99231 SBSQ HOSP IP/OBS SF/LOW 25: CPT | Mod: ,,, | Performed by: INTERNAL MEDICINE

## 2020-11-09 PROCEDURE — 99223 1ST HOSP IP/OBS HIGH 75: CPT | Mod: ,,, | Performed by: NURSE PRACTITIONER

## 2020-11-09 PROCEDURE — 63600175 PHARM REV CODE 636 W HCPCS: Performed by: INTERNAL MEDICINE

## 2020-11-09 PROCEDURE — 85018 HEMOGLOBIN: CPT | Mod: 91

## 2020-11-09 PROCEDURE — 36415 COLL VENOUS BLD VENIPUNCTURE: CPT

## 2020-11-09 PROCEDURE — 80048 BASIC METABOLIC PNL TOTAL CA: CPT

## 2020-11-09 PROCEDURE — 21400001 HC TELEMETRY ROOM

## 2020-11-09 PROCEDURE — 36430 TRANSFUSION BLD/BLD COMPNT: CPT

## 2020-11-09 PROCEDURE — 99497 ADVNCD CARE PLAN 30 MIN: CPT | Mod: 25,,, | Performed by: NURSE PRACTITIONER

## 2020-11-09 PROCEDURE — C9113 INJ PANTOPRAZOLE SODIUM, VIA: HCPCS | Performed by: INTERNAL MEDICINE

## 2020-11-09 PROCEDURE — P9021 RED BLOOD CELLS UNIT: HCPCS

## 2020-11-09 PROCEDURE — 99497 PR ADVNCD CARE PLAN 30 MIN: ICD-10-PCS | Mod: 25,,, | Performed by: NURSE PRACTITIONER

## 2020-11-09 PROCEDURE — 25000003 PHARM REV CODE 250: Performed by: INTERNAL MEDICINE

## 2020-11-09 PROCEDURE — 99231 PR SUBSEQUENT HOSPITAL CARE,LEVL I: ICD-10-PCS | Mod: ,,, | Performed by: INTERNAL MEDICINE

## 2020-11-09 RX ORDER — LIDOCAINE 50 MG/G
1 PATCH TOPICAL
Status: DISCONTINUED | OUTPATIENT
Start: 2020-11-09 | End: 2020-11-18 | Stop reason: HOSPADM

## 2020-11-09 RX ORDER — HYDROCODONE BITARTRATE AND ACETAMINOPHEN 500; 5 MG/1; MG/1
TABLET ORAL
Status: DISCONTINUED | OUTPATIENT
Start: 2020-11-09 | End: 2020-11-18 | Stop reason: HOSPADM

## 2020-11-09 RX ORDER — MUPIROCIN 20 MG/G
OINTMENT TOPICAL 2 TIMES DAILY
Status: COMPLETED | OUTPATIENT
Start: 2020-11-09 | End: 2020-11-13

## 2020-11-09 RX ADMIN — HYDRALAZINE HYDROCHLORIDE 25 MG: 25 TABLET ORAL at 04:11

## 2020-11-09 RX ADMIN — MEROPENEM AND SODIUM CHLORIDE 1 G: 1 INJECTION, SOLUTION INTRAVENOUS at 10:11

## 2020-11-09 RX ADMIN — SODIUM BICARBONATE TAB 325 MG 1300 MG: 325 TAB at 03:11

## 2020-11-09 RX ADMIN — HYDRALAZINE HYDROCHLORIDE 25 MG: 25 TABLET ORAL at 06:11

## 2020-11-09 RX ADMIN — NIFEDIPINE 90 MG: 30 TABLET, FILM COATED, EXTENDED RELEASE ORAL at 10:11

## 2020-11-09 RX ADMIN — MUPIROCIN: 20 OINTMENT TOPICAL at 10:11

## 2020-11-09 RX ADMIN — INSULIN DETEMIR 5 UNITS: 100 INJECTION, SOLUTION SUBCUTANEOUS at 09:11

## 2020-11-09 RX ADMIN — DOCUSATE SODIUM 50 MG AND SENNOSIDES 8.6 MG 1 TABLET: 8.6; 5 TABLET, FILM COATED ORAL at 10:11

## 2020-11-09 RX ADMIN — MUPIROCIN: 20 OINTMENT TOPICAL at 09:11

## 2020-11-09 RX ADMIN — DIPHENHYDRAMINE HYDROCHLORIDE 25 MG: 50 INJECTION INTRAMUSCULAR; INTRAVENOUS at 04:11

## 2020-11-09 RX ADMIN — LIDOCAINE 1 PATCH: 50 PATCH TOPICAL at 03:11

## 2020-11-09 RX ADMIN — TRAMADOL HYDROCHLORIDE 50 MG: 50 TABLET, FILM COATED ORAL at 09:11

## 2020-11-09 RX ADMIN — PANTOPRAZOLE SODIUM 40 MG: 40 INJECTION, POWDER, FOR SOLUTION INTRAVENOUS at 09:11

## 2020-11-09 RX ADMIN — FUROSEMIDE 60 MG: 40 TABLET ORAL at 10:11

## 2020-11-09 RX ADMIN — SODIUM BICARBONATE TAB 325 MG 1300 MG: 325 TAB at 09:11

## 2020-11-09 RX ADMIN — SODIUM BICARBONATE TAB 325 MG 1300 MG: 325 TAB at 10:11

## 2020-11-09 RX ADMIN — CARVEDILOL 12.5 MG: 12.5 TABLET, FILM COATED ORAL at 09:11

## 2020-11-09 RX ADMIN — HYDRALAZINE HYDROCHLORIDE 25 MG: 25 TABLET ORAL at 10:11

## 2020-11-09 RX ADMIN — DOCUSATE SODIUM 50 MG AND SENNOSIDES 8.6 MG 1 TABLET: 8.6; 5 TABLET, FILM COATED ORAL at 09:11

## 2020-11-09 RX ADMIN — CARVEDILOL 12.5 MG: 12.5 TABLET, FILM COATED ORAL at 10:11

## 2020-11-09 RX ADMIN — PANTOPRAZOLE SODIUM 40 MG: 40 INJECTION, POWDER, FOR SOLUTION INTRAVENOUS at 10:11

## 2020-11-09 NOTE — SUBJECTIVE & OBJECTIVE
Interval History: No new issues     Review of Systems   Constitutional: Negative for activity change, appetite change, chills, diaphoresis, fatigue and fever.   HENT: Negative for congestion and dental problem.    Eyes: Negative for discharge and itching.   Respiratory: Negative for cough, choking, shortness of breath and stridor.    Cardiovascular: Negative for chest pain, palpitations and leg swelling.   Gastrointestinal: Negative for abdominal pain, blood in stool, constipation, diarrhea, nausea and rectal pain.   Genitourinary: Negative for difficulty urinating, dyspareunia and enuresis.   Musculoskeletal: Positive for arthralgias and neck pain.   Neurological: Negative for dizziness.   Hematological: Negative for adenopathy.   Psychiatric/Behavioral: Negative for agitation and behavioral problems.     Objective:     Vital Signs (Most Recent):  Temp: 98 °F (36.7 °C) (11/09/20 0759)  Pulse: 83 (11/09/20 0759)  Resp: 17 (11/09/20 0759)  BP: (!) 149/67 (11/09/20 0759)  SpO2: 97 % (11/09/20 0759) Vital Signs (24h Range):  Temp:  [97.8 °F (36.6 °C)-98.4 °F (36.9 °C)] 98 °F (36.7 °C)  Pulse:  [83-94] 83  Resp:  [17-18] 17  SpO2:  [97 %-99 %] 97 %  BP: (116-149)/(56-67) 149/67     Weight: 68 kg (150 lb)  Body mass index is 24.21 kg/m².    Intake/Output Summary (Last 24 hours) at 11/9/2020 1051  Last data filed at 11/8/2020 2100  Gross per 24 hour   Intake 680 ml   Output --   Net 680 ml      Physical Exam  Vitals signs and nursing note reviewed.   Constitutional:       General: She is not in acute distress.     Appearance: Normal appearance. She is not ill-appearing, toxic-appearing or diaphoretic.   HENT:      Head: Normocephalic and atraumatic.   Cardiovascular:      Rate and Rhythm: Normal rate and regular rhythm.      Heart sounds: No murmur. No gallop.    Pulmonary:      Effort: Pulmonary effort is normal. No respiratory distress.      Breath sounds: No stridor. No wheezing or rales.   Chest:      Chest wall: No  tenderness.   Abdominal:      General: Abdomen is flat. Bowel sounds are normal.      Tenderness: There is no abdominal tenderness. There is no guarding or rebound.   Neurological:      General: No focal deficit present.      Mental Status: She is alert and oriented to person, place, and time.   Psychiatric:         Mood and Affect: Mood normal.         Behavior: Behavior normal.         Thought Content: Thought content normal.         Significant Labs:   BMP:   Recent Labs   Lab 11/09/20  0414   GLU 86      K 5.0      CO2 22*   BUN 69*   CREATININE 5.3*   CALCIUM 9.0     CBC:   Recent Labs   Lab 11/07/20  1143  11/08/20  2240 11/09/20  0415 11/09/20  1007   WBC 9.95  --   --   --   --    HGB 6.5*   < > 6.9* 7.0* 7.5*   HCT 20.7*  --   --   --   --      --   --   --   --     < > = values in this interval not displayed.       Significant Imaging:

## 2020-11-09 NOTE — ASSESSMENT & PLAN NOTE
Reported R Klebsiella (?)  Pseudomonas here at our hospital.  6 more days of Meropenem. End date 11/13.   No sepsis     Meropenem on d/c  11/13 end. Has picc line

## 2020-11-09 NOTE — NURSING
Blood sugar reported to me by Bereket 59mg/dl. Pt is awake alert and oriented and able to take po food and fluids. Breakfast is at the bedside and patient given an orange juice to drink.

## 2020-11-09 NOTE — PLAN OF CARE
11/09/20 1401   Post-Acute Status   Post-Acute Authorization Placement   Post-Acute Placement Status Awaiting Internal Medical Clearance   Patient choice form signed by patient/caregiver List with quality metrics by geographic area provided  (Return to [facility])   Discharge Plan   Discharge Plan A Return to nursing home

## 2020-11-09 NOTE — HPI
Principal Problem:Acute blood loss anemia           HPI:  Mrs. Riley is a 88 yo F known to me for an admission and discharge to [facility] yesterday.  She had a few day hospital stay for evaluation and management of resistant UTI with Klebsiella and pseudomonas. She was discharged back to [facility] SNF (for Abx) on 11/6 with Meropenem for 10 days per ID recs.  The patient returns today with dark and maroon stools with visible blood clots. Not on any blood thinners. No abdominal pain. Hgb was 7.7 yesterday- today 6.5. GI was consulted and received one unit of blood.  Patient has a LAPOST for DNR but daughter says full code.       Overview/Hospital Course:  Mrs. Riley is a 88 yo F known to me for an admission and discharge to [facility] yesterday.  She had a few day hospital stay for evaluation and management of resistant UTI with Klebsiella and pseudomonas. She was discharged back to [facility] SN[facility] (for Abx) on 11/6 with Meropenem for 10 days per ID recs.  The patient returns today with dark and maroon stools with visible blood clots. Not on any blood thinners. No abdominal pain. Hgb was 7.7 yesterday- today 6.5. GI was consulted and received one unit of blood.  Patient has a LAPOST for DNR but daughter says full code.  Palliative care investigating further. GI consulted and patient refuses C-scope.  Another unit of blood given on 11/9. PT/OT eval- back to NH ()- basic.      Discussed patient with Dr Oscar prior to encounter    Advance Care Planning   Patient known to me. Palliative consulted after confusion of code status. Patient has a LaPOST that I completed with her and her daughter Antonia in 7/2019 with DNR but apparently the code status was changed to Full code per daughter and patient this hospitalization.    Met with patient in room and daughter Antonia on phone.     [facility]  I engaged the patient and family daughter Antonia Cordell in a conversation about advance care planning and we specifically addressed what the goals of care  "would be moving forward, in light of the patient's change in clinical status, specifically the code status. We discussed her current clinical condition  And also went over the previous documented discussions and the LaPOST that was completed at the time and is scanned in to EPIC. Daughter reports that the family discussed the code status after it was brought to their attention by physician yesterday. She states they discussed it  and that they feel she has improved over the last year and would like her to be a full code at this time.Antonia did say that if at any time she declines and unable to recover they will at that time discuss and change her back to DNR. When asking the patient about CPR and explaining in simple terms she states " I don't know" but when going further in conversation and mentioning life sustaining measures she commented she wouldn't want to just lie there. Her family seem realistic and I do not feel from our discussion today or from last time that they would want to cause their mother any undue stress or suffering.    The daughter states that the patient will say ok to some things na no to others so its best that her family make her decisions as they do not feel she is able to make complex medical decisions now. I agree. The patient is confused easily. The daughter asked about labs specifically the GFR which I went over and wanted to know if her mother had any more bleeding today. I let her know GI noted this am no bleeding but I will ask nurse and have her notify if so. The daughter mentioned that they had been asked about a colonoscopy but had refused because they were concerned about her kidneys and the dye that would be used. I explained there is no dye involved and discussed the procedure. They have decided to go ahead with colonoscopy after our discussion.    We did not specifically address the patient's likely prognosis, which is undetermined at this time.  We explored the patient's values " and preferences for future care. The family endorses that what is most important right now is to go ahead with the colonoscopy and see what is revealed and make decisions as they arise.     Accordingly, we have decided that the best plan to meet the patient's goals includes continuing with treatment    Updated Dr Oscar    Patient continues to have blood in stool; Colonoscopy in am 11/11  Neck pain continues but better with Lidocaine patch

## 2020-11-09 NOTE — ASSESSMENT & PLAN NOTE
Likely lower GI bleed. Possibly diverticular. 7.7 to 6.5 Hgb over past 24 hours. Visible bleeding. PPI. GI consult.  Unit of blood.     Hgb from 6.9 to 7.4 today. Patient does not want C-scope eval. Monitor H/H today- if stable- then to   [facility] tomorrow     Hgb at 7. Low but stable. Will give one unit of blood.  Refuses C-scope. GI following.  If H/H stable on 11/10- back to NH  ([place])

## 2020-11-09 NOTE — PROGRESS NOTES
Progress Note  Gastroenterology    Admit Date: 11/7/2020   LOS: 2 days     SUBJECTIVE:     Follow-up For:  Hematochezia. No further bleeding.    Scheduled Meds:   calcitRIOL  0.25 mcg Oral Every other day    carvediloL  12.5 mg Oral BID    furosemide  60 mg Oral Daily    hydrALAZINE  25 mg Oral Q8H    insulin detemir U-100  5 Units Subcutaneous QHS    meropenem-0.9% sodium chloride  1 g Intravenous Daily    NIFEdipine  90 mg Oral Daily    pantoprazole  40 mg Intravenous BID    senna-docusate 8.6-50 mg  1 tablet Oral BID    sodium bicarbonate  1,300 mg Oral TID     Continuous Infusions:  PRN Meds:sodium chloride, sodium chloride, albuterol-ipratropium, dextrose 50%, dextrose 50%, diphenhydrAMINE, glucagon (human recombinant), glucose, glucose, insulin aspart U-100, traMADoL    Review of patient's allergies indicates:   Allergen Reactions    Indomethacin     Nsaids (non-steroidal anti-inflammatory drug)     Percocet [oxycodone-acetaminophen]          OBJECTIVE:     Vital Signs (Most Recent)  Temp: 98 °F (36.7 °C) (11/09/20 0759)  Pulse: 83 (11/09/20 0759)  Resp: 17 (11/09/20 0759)  BP: (!) 149/67 (11/09/20 0759)  SpO2: 97 % (11/09/20 0759)    Temperature Range Min/Max (Last 24H):  Temp:  [97.8 °F (36.6 °C)-98.4 °F (36.9 °C)]     I & O (Last 24H):    Intake/Output Summary (Last 24 hours) at 11/9/2020 0918  Last data filed at 11/8/2020 2100  Gross per 24 hour   Intake 680 ml   Output --   Net 680 ml     Physical Exam:  General: well developed, well nourished, no distress  Head: normocephalic, atraumatic. Using a neck pillow (c/o neck pain since this morning)  Eyes:  conjunctivae/corneas clear. PERRL.  Neurologic: Alert and oriented x 3       Laboratory:  CBC:   Recent Labs   Lab 11/07/20  1143  11/09/20  0415   WBC 9.95  --   --    RBC 2.15*  --   --    HGB 6.5*   < > 7.0*   HCT 20.7*  --   --      --   --    MCV 96  --   --    MCH 30.2  --   --    MCHC 31.4*  --   --     < > = values in this  interval not displayed.         ASSESSMENT/PLAN:     87 y.o. woman admitted with recurrent lower GI bleeding, suspected diverticular source.  She had severe pandiverticulosis seen during colonoscopy in February 2019.  Hemodynamically stable.  Blood counts are stable after blood transfusion with no further bleeding.      Plan:  Will continue to monitor. As with her previous admissions, the patient and her family have elected to defer colonoscopy for time being.  Conservative management and supportive care.

## 2020-11-09 NOTE — PROGRESS NOTES
[facility] Hospital Medicine  Progress Note    Patient Name: Joyce Riley  MRN: [id number]  Patient Class: IP- Inpatient   Admission Date: 11/7/2020  Length of Stay: 2 days  Attending Physician: Shahab Chinchilla MD  Primary Care Provider: Kalpana Staton MD        Subjective:     Principal Problem:Acute blood loss anemia        HPI:  Mrs. Riley is a 86 yo F known to me for an admission and discharge to [facility] yesterday.  She had a few day hospital stay for evaluation and management of resistant UTI with Klebsiella and pseudomonas. She was discharged back to [facility] SN[facility] (for Abx) on 11/6 with Meropenem for 10 days per ID recs.  The patient returns today with dark and maroon stools with visible blood clots. Not on any blood thinners. No abdominal pain. Hgb was 7.7 yesterday- today 6.5. GI was consulted and received one unit of blood.  Patient has a LAPOST for DNR but daughter says full code.      Overview/Hospital Course:  Mrs. Riley is a 86 yo F known to me for an admission and discharge to [facility] yesterday.  She had a few day hospital stay for evaluation and management of resistant UTI with Klebsiella and pseudomonas. She was discharged back to [facility] (for Abx) on 11/6 with Meropenem for 10 days per ID recs.  The patient returns today with dark and maroon stools with visible blood clots. Not on any blood thinners. No abdominal pain. Hgb was 7.7 yesterday- today 6.5. GI was consulted and received one unit of blood.  Patient has a LAPOST for DNR but daughter says full code.  Palliative care investigating further. GI consulted and patient refuses C-scope.  Another unit of blood given on 11/9. PT/OT eval- back to NH ()- basic.     Plan: monitor H/H. Has refused C-scope initially - now [place] with palliative care states family agrees. Will notify . GI.  Wish to remain full code for now.Likely back to  basic next 1-2 days .  Continue meropenem for resistant UTI as is through 11/13.  Has Picc line.     Interval History: No new issues     Review of Systems   Constitutional: Negative for activity change, appetite change, chills, diaphoresis, fatigue and fever.   HENT: Negative for congestion and dental problem.    Eyes: Negative for discharge and itching.   Respiratory: Negative for cough, choking, shortness of breath and stridor.    Cardiovascular: Negative for chest pain, palpitations and leg swelling.   Gastrointestinal: Negative for abdominal pain, blood in stool, constipation, diarrhea, nausea and rectal pain.   Genitourinary: Negative for difficulty urinating, dyspareunia and enuresis.   Musculoskeletal: Positive for arthralgias and neck pain.   Neurological: Negative for dizziness.   Hematological: Negative for adenopathy.   Psychiatric/Behavioral: Negative for agitation and behavioral problems.     Objective:     Vital Signs (Most Recent):  Temp: 98 °F (36.7 °C) (11/09/20 0759)  Pulse: 83 (11/09/20 0759)  Resp: 17 (11/09/20 0759)  BP: (!) 149/67 (11/09/20 0759)  SpO2: 97 % (11/09/20 0759) Vital Signs (24h Range):  Temp:  [97.8 °F (36.6 °C)-98.4 °F (36.9 °C)] 98 °F (36.7 °C)  Pulse:  [83-94] 83  Resp:  [17-18] 17  SpO2:  [97 %-99 %] 97 %  BP: (116-149)/(56-67) 149/67     Weight: 68 kg (150 lb)  Body mass index is 24.21 kg/m².    Intake/Output Summary (Last 24 hours) at 11/9/2020 1051  Last data filed at 11/8/2020 2100  Gross per 24 hour   Intake 680 ml   Output --   Net 680 ml      Physical Exam  Vitals signs and nursing note reviewed.   Constitutional:       General: She is not in acute distress.     Appearance: Normal appearance. She is not ill-appearing, toxic-appearing or diaphoretic.   HENT:      Head: Normocephalic and atraumatic.   Cardiovascular:      Rate and Rhythm: Normal rate and regular rhythm.      Heart sounds: No murmur. No gallop.    Pulmonary:      Effort: Pulmonary effort is normal. No respiratory distress.      Breath sounds: No stridor. No wheezing or rales.   Chest:       Chest wall: No tenderness.   Abdominal:      General: Abdomen is flat. Bowel sounds are normal.      Tenderness: There is no abdominal tenderness. There is no guarding or rebound.   Neurological:      General: No focal deficit present.      Mental Status: She is alert and oriented to person, place, and time.   Psychiatric:         Mood and Affect: Mood normal.         Behavior: Behavior normal.         Thought Content: Thought content normal.         Significant Labs:   BMP:   Recent Labs   Lab 11/09/20  0414   GLU 86      K 5.0      CO2 22*   BUN 69*   CREATININE 5.3*   CALCIUM 9.0     CBC:   Recent Labs   Lab 11/07/20  1143  11/08/20  2240 11/09/20  0415 11/09/20  1007   WBC 9.95  --   --   --   --    HGB 6.5*   < > 6.9* 7.0* 7.5*   HCT 20.7*  --   --   --   --      --   --   --   --     < > = values in this interval not displayed.       Significant Imaging:       Assessment/Plan:      * Acute blood loss anemia  Likely lower GI bleed. Possibly diverticular. 7.7 to 6.5 Hgb over past 24 hours. Visible bleeding. PPI. GI consult.  Unit of blood.     Hgb from 6.9 to 7.4 today. Patient does not want C-scope eval. Monitor H/H today- if stable- then to   [facility] tomorrow     Hgb at 7. Low but stable. Will give one unit of blood.  Refuses C-scope. GI following.  If H/H stable on 11/10- back to NH  ([place])      Anemia of renal disease  Now with acute blood loss anemia as noted.       UTI (urinary tract infection)  Reported R Klebsiella (?)  Pseudomonas here at our hospital.  6 more days of Meropenem. End date 11/13.   No sepsis     Meropenem on d/c  11/13 end. Has picc line       Goals of care, counseling/discussion  Advance Care Planning         Patient's daughter stated full code over the weekend. This is different than DNR status in past with YUN.  Palliative care will address/clarify on 11/9 (Today).      History of stroke  No acute issues   Last time PT/OT eval- return to NH  basic      Paroxysmal atrial fibrillation  Does not appear to be on any NOAC   History of bleeding reported in the past       Chronic diastolic heart failure  Stable       Renal hypertension  No acute issues. Resume home meds       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  No acute issues      CKD stage 5  At baseline      Type 2 diabetes mellitus, controlled, with renal complications  No acute issues. A1c last stay. Sliding scale for now. accu checks  Home insulin         VTE Risk Mitigation (From admission, onward)    None          Discharge Planning   PAM:      Code Status: Full Code   Is the patient medically ready for discharge?:     Reason for patient still in hospital (select all that apply): Patient unstable  Discharge Plan A: Return to snf([place])                  Shahab Oscar MD  Department of Hospital Medicine   [facility]

## 2020-11-09 NOTE — PLAN OF CARE
11/09/20 1159   Medicare Message   Important Message from Medicare regarding Discharge Appeal Rights Given to patient/caregiver;Explained to patient/caregiver;Signed/date by patient/caregiver   Date IMM was signed 11/09/20   Time IMM was signed [id number]

## 2020-11-09 NOTE — CONSULTS
[facility]  Palliative Medicine  Consult Note    Patient Name: Joyce Riley  MRN: [id number]  Admission Date: 11/7/2020  Hospital Length of Stay: 2 days  Code Status: Full Code   Attending Provider: Shahab Chinchilla MD  Consulting Provider: Bonita Coreas NP  Primary Care Physician: Kalpana Staton MD  Principal Problem:Acute blood loss anemia    Patient information was obtained from patient, past medical records, daughter Antonia and ER records.      Inpatient consult to Palliative Care  Consult performed by: Bonita Coreas NP  Consult ordered by: Shahab Chinchilla MD  Reason for consult: [facility]        Assessment/Plan:     Plan:  -continue current treatment  -would like colonoscopy to be done now  -patient is Full code (will void LaPOST in Epic)  -recommend Lidocaine patch for neck pain     Thank you for your consult. I will follow-up with patient. Please contact us if you have any additional questions.    Subjective:     HPI:   Principal Problem:Acute blood loss anemia           HPI:  Mrs. Riley is a 86 yo F known to me for an admission and discharge to [facility] yesterday.  She had a few day hospital stay for evaluation and management of resistant UTI with Klebsiella and pseudomonas. She was discharged back to [facility] (for Abx) on 11/6 with Meropenem for 10 days per ID recs.  The patient returns today with dark and maroon stools with visible blood clots. Not on any blood thinners. No abdominal pain. Hgb was 7.7 yesterday- today 6.5. GI was consulted and received one unit of blood.  Patient has a LAPOST for DNR but daughter says full code.       Overview/Hospital Course:  Mrs. Riley is a 86 yo F known to me for an admission and discharge to [facility] yesterday.  She had a few day hospital stay for evaluation and management of resistant UTI with Klebsiella and pseudomonas. She was discharged back to [facility] (for Abx) on 11/6 with Meropenem for 10 days per ID recs.  The patient  "returns today with dark and maroon stools with visible blood clots. Not on any blood thinners. No abdominal pain. Hgb was 7.7 yesterday- today 6.5. GI was consulted and received one unit of blood.  Patient has a LAPOST for DNR but daughter says full code.  Palliative care investigating further. GI consulted and patient refuses C-scope.  Another unit of blood given on 11/9. PT/OT eval- back to [facility] ([facility])- basic.      Discussed patient with Dr Oscar prior to encounter    Advance Care Planning   Patient known to me. Palliative consulted after confusion of code status. Patient has a LaPOST that I completed with her and her daughter Antonia in 7/2019 with DNR but apparently the code status was changed to Full code per daughter and patient this hospitalization.    Met with patient in room and daughter Antonia on phone.     [facility]  I engaged the patient and family daughter Antonia Landa in a conversation about advance care planning and we specifically addressed what the goals of care would be moving forward, in light of the patient's change in clinical status, specifically the code status. We discussed her current clinical condition  And also went over the previous documented discussions and the LaPOST that was completed at the time and is scanned in to EPIC. Daughter reports that the family discussed the code status after it was brought to their attention by physician yesterday. She states they discussed it  and that they feel she has improved over the last year and would like her to be a full code at this time.Antonia did say that if at any time she declines and unable to recover they will at that time discuss and change her back to DNR. When asking the patient about CPR and explaining in simple terms she states " I don't know" but when going further in conversation and mentioning life sustaining measures she commented she wouldn't want to just lie there. Her family seem realistic and I do not feel from our discussion today or from " last time that they would want to cause their mother any undue stress or suffering.    The daughter states that the patient will say ok to some things na no to others so its best that her family make her decisions as they do not feel she is able to make complex medical decisions now. I agree. The patient is confused easily. The daughter asked about labs specifically the GFR which I went over and wanted to know if her mother had any more bleeding today. I let her know GI noted this am no bleeding but I will ask nurse and have her notify if so. The daughter mentioned that they had been asked about a colonoscopy but had refused because they were concerned about her kidneys and the dye that would be used. I explained there is no dye involved and discussed the procedure. They have decided to go ahead with colonoscopy after our discussion.    We did not specifically address the patient's likely prognosis, which is undetermined at this time.  We explored the patient's values and preferences for future care. The family endorses that what is most important right now is to go ahead with the colonoscopy and see what is revealed and make decisions as they arise.     Accordingly, we have decided that the best plan to meet the patient's goals includes continuing with treatment    Updated Dr Oscar      [facility] Course:  No notes on file    Interval History: in bed with neck pillow on. Continues to c/o neck pain    Past Medical History:   Diagnosis Date    Anemia     Anticoagulant long-term use     Arthritis     Atrial fibrillation     Cataract     CHF (congestive heart failure)     CKD (chronic kidney disease), stage V     Dermatophytosis     Diabetes mellitus     Type 2    GERD (gastroesophageal reflux disease)     GI bleed 12/2018    Gout     Gout     Hypertension     Obese     Requires assistance with all daily activities     Stroke 2014    Wheelchair dependent        Past Surgical History:   Procedure  Laterality Date     SECTION      Patient unsure, believe she had 3-4    CHOLECYSTECTOMY      EYE SURGERY      HYSTERECTOMY      JOINT REPLACEMENT Right     knee    TOTAL KNEE ARTHROPLASTY Right        Review of patient's allergies indicates:   Allergen Reactions    Indomethacin     Nsaids (non-steroidal anti-inflammatory drug)     Percocet [oxycodone-acetaminophen]        Medications:  Continuous Infusions:  Scheduled Meds:   calcitRIOL  0.25 mcg Oral Every other day    carvediloL  12.5 mg Oral BID    furosemide  60 mg Oral Daily    hydrALAZINE  25 mg Oral Q8H    insulin detemir U-100  5 Units Subcutaneous QHS    lidocaine  1 patch Transdermal Q24H    meropenem-0.9% sodium chloride  1 g Intravenous Daily    mupirocin   Nasal BID    NIFEdipine  90 mg Oral Daily    pantoprazole  40 mg Intravenous BID    senna-docusate 8.6-50 mg  1 tablet Oral BID    sodium bicarbonate  1,300 mg Oral TID     PRN Meds:sodium chloride, sodium chloride, albuterol-ipratropium, dextrose 50%, dextrose 50%, diphenhydrAMINE, glucagon (human recombinant), glucose, glucose, insulin aspart U-100, traMADoL    Family History     Problem Relation (Age of Onset)    Cancer Mother, Brother    Diabetes Mother    Hypertension Mother        Tobacco Use    Smoking status: Never Smoker    Smokeless tobacco: Never Used   Substance and Sexual Activity    Alcohol use: No    Drug use: No    Sexual activity: Never       Review of Systems   Constitutional: Positive for activity change and appetite change.   Musculoskeletal: Positive for arthralgias.        Neck pain , right great toe pain from old fx     Objective:     Vital Signs (Most Recent):  Temp: 98.1 °F (36.7 °C) (20 1202)  Pulse: 85 (20 1415)  Resp: 18 (20 1415)  BP: (!) 146/65 (20 1415)  SpO2: 100 % (20 1415) Vital Signs (24h Range):  Temp:  [98 °F (36.7 °C)-98.4 °F (36.9 °C)] 98.1 °F (36.7 °C)  Pulse:  [83-94] 85  Resp:  [17-18] 18  SpO2:   [97 %-100 %] 100 %  BP: (116-158)/(58-70) 146/65     Weight: 68 kg (150 lb)  Body mass index is 24.21 kg/m².    Physical Exam  Vitals signs and nursing note reviewed.   Constitutional:       General: She is not in acute distress.     Appearance: She is ill-appearing. She is not toxic-appearing.   Neck:      Musculoskeletal: Neck rigidity and muscular tenderness (neck) present.      Comments: Chronic neck pain  Cardiovascular:      Rate and Rhythm: Normal rate and regular rhythm.      Pulses: Normal pulses.      Heart sounds: Normal heart sounds.   Pulmonary:      Effort: Pulmonary effort is normal.      Breath sounds: Normal breath sounds.   Abdominal:      General: Abdomen is flat. Bowel sounds are normal.      Palpations: Abdomen is soft.   Musculoskeletal:         General: Tenderness (R great toe and foot) present.      Right lower leg: No edema.      Left lower leg: No edema.   Skin:     General: Skin is warm and dry.   Neurological:      Mental Status: She is alert and oriented to person, place, and time.      Comments: Confused easily, forgetful   Psychiatric:         Mood and Affect: Mood normal.         Behavior: Behavior normal.         Review of Symptoms    Symptom Assessment (ESAS 0-10 Scale)  Pain:  10  Dyspnea:  0  Anxiety:  0  Nausea:  0  Depression:  5  Anorexia:  0  Fatigue:  0  Insomnia:  0  Restlessness:  0  Agitation:  0     CAM / Delirium:  Negative  Constipation:  Negative  Diarrhea:  Negative    Comments:  Reports she is depressed about her QOL and feels she will never be any better then she is now. Recommending Lidocaine patch     Pain Assessment:  Location(s): neck           Alleviating Factors NECK: neck pillow, tylenol.     Performance Status:  50      Advance Care Planning   Advance Directives:   Living Will: No    LaPOST: Yes (it will be voided)    Do Not Resuscitate Status: No (revoked DNR)    Medical Power of : No      Decision Making:  Patient answered questions and Family  answered questions         Significant Labs: All pertinent labs within the past 24 hours have been reviewed.  CBC:   Recent Labs   Lab 11/07/20  1143  11/09/20  1007   WBC 9.95  --   --    HGB 6.5*   < > 7.5*   HCT 20.7*  --   --    MCV 96  --   --      --   --     < > = values in this interval not displayed.     BMP:  Recent Labs   Lab 11/09/20  0414   GLU 86      K 5.0      CO2 22*   BUN 69*   CREATININE 5.3*   CALCIUM 9.0     LFT:  Lab Results   Component Value Date    AST 12 11/07/2020    ALKPHOS 63 11/07/2020    BILITOT 0.3 11/07/2020     Albumin:   Albumin   Date Value Ref Range Status   11/07/2020 2.4 (L) 3.5 - 5.2 g/dL Final   10/05/2020 2.9 (L) 3.2 - 5.6 Final     Protein:   Total Protein   Date Value Ref Range Status   11/07/2020 6.6 6.0 - 8.4 g/dL Final     Lactic acid:   Lab Results   Component Value Date    LACTATE 0.7 11/04/2020    LACTATE 1.1 07/28/2020       Significant Imaging: I have reviewed all pertinent imaging results/findings within the past 24 hours.    28 minutes in ACP  > 50% of 60 min visit spent in chart review, face to face discussion of goals of care,  symptom assessment, coordination of care and emotional support.    Bonita Coreas NP  Palliative Medicine  [facility]

## 2020-11-09 NOTE — PLAN OF CARE
Problem: Bleeding (Gastrointestinal Bleeding)  Goal: Hemostasis  Outcome: Ongoing, Progressing     Problem: Diabetes Comorbidity  Goal: Blood Glucose Level Within Desired Range  Outcome: Ongoing, Progressing     Problem: Infection  Goal: Infection Symptom Resolution  Outcome: Ongoing, Progressing   PRN medications for pain and itching effective. Remains SR on telemetry monitor. No skin issues and no injury during shift. 3 large blood BMs during shift. Last hemoglobin at 0400 was 7. Received scheduled antibiotics.  Educated on plan of care and verbalized understanding. Bed alarm activated and audible. Call light at side.

## 2020-11-09 NOTE — ASSESSMENT & PLAN NOTE
Advance Care Planning         Patient's daughter stated full code over the weekend. This is different than DNR status in past with YUN.  Palliative care will address/clarify on 11/9 (Today).

## 2020-11-09 NOTE — SUBJECTIVE & OBJECTIVE
Interval History: in bed with neck pillow on. Continues to c/o neck pain    Past Medical History:   Diagnosis Date    Anemia     Anticoagulant long-term use     Arthritis     Atrial fibrillation     Cataract     CHF (congestive heart failure)     CKD (chronic kidney disease), stage V     Dermatophytosis     Diabetes mellitus     Type 2    GERD (gastroesophageal reflux disease)     GI bleed 2018    Gout     Gout     Hypertension     Obese     Requires assistance with all daily activities     Stroke     Wheelchair dependent        Past Surgical History:   Procedure Laterality Date     SECTION      Patient unsure, believe she had 3-4    CHOLECYSTECTOMY      EYE SURGERY      HYSTERECTOMY      JOINT REPLACEMENT Right     knee    TOTAL KNEE ARTHROPLASTY Right        Review of patient's allergies indicates:   Allergen Reactions    Indomethacin     Nsaids (non-steroidal anti-inflammatory drug)     Percocet [oxycodone-acetaminophen]        Medications:  Continuous Infusions:  Scheduled Meds:   calcitRIOL  0.25 mcg Oral Every other day    carvediloL  12.5 mg Oral BID    furosemide  60 mg Oral Daily    hydrALAZINE  25 mg Oral Q8H    insulin detemir U-100  5 Units Subcutaneous QHS    lidocaine  1 patch Transdermal Q24H    meropenem-0.9% sodium chloride  1 g Intravenous Daily    mupirocin   Nasal BID    NIFEdipine  90 mg Oral Daily    pantoprazole  40 mg Intravenous BID    senna-docusate 8.6-50 mg  1 tablet Oral BID    sodium bicarbonate  1,300 mg Oral TID     PRN Meds:sodium chloride, sodium chloride, albuterol-ipratropium, dextrose 50%, dextrose 50%, diphenhydrAMINE, glucagon (human recombinant), glucose, glucose, insulin aspart U-100, traMADoL    Family History     Problem Relation (Age of Onset)    Cancer Mother, Brother    Diabetes Mother    Hypertension Mother        Tobacco Use    Smoking status: Never Smoker    Smokeless tobacco: Never Used   Substance and Sexual  Activity    Alcohol use: No    Drug use: No    Sexual activity: Never       Review of Systems   Constitutional: Positive for activity change and appetite change.   Musculoskeletal: Positive for arthralgias.        Neck pain , right great toe pain from old fx     Objective:     Vital Signs (Most Recent):  Temp: 98.1 °F (36.7 °C) (11/09/20 1202)  Pulse: 85 (11/09/20 1415)  Resp: 18 (11/09/20 1415)  BP: (!) 146/65 (11/09/20 1415)  SpO2: 100 % (11/09/20 1415) Vital Signs (24h Range):  Temp:  [98 °F (36.7 °C)-98.4 °F (36.9 °C)] 98.1 °F (36.7 °C)  Pulse:  [83-94] 85  Resp:  [17-18] 18  SpO2:  [97 %-100 %] 100 %  BP: (116-158)/(58-70) 146/65     Weight: 68 kg (150 lb)  Body mass index is 24.21 kg/m².    Physical Exam  Vitals signs and nursing note reviewed.   Constitutional:       General: She is not in acute distress.     Appearance: She is ill-appearing. She is not toxic-appearing.   Neck:      Musculoskeletal: Neck rigidity and muscular tenderness (neck) present.      Comments: Chronic neck pain  Cardiovascular:      Rate and Rhythm: Normal rate and regular rhythm.      Pulses: Normal pulses.      Heart sounds: Normal heart sounds.   Pulmonary:      Effort: Pulmonary effort is normal.      Breath sounds: Normal breath sounds.   Abdominal:      General: Abdomen is flat. Bowel sounds are normal.      Palpations: Abdomen is soft.   Musculoskeletal:         General: Tenderness (R great toe and foot) present.      Right lower leg: No edema.      Left lower leg: No edema.   Skin:     General: Skin is warm and dry.   Neurological:      Mental Status: She is alert and oriented to person, place, and time.      Comments: Confused easily, forgetful   Psychiatric:         Mood and Affect: Mood normal.         Behavior: Behavior normal.         Review of Symptoms    Symptom Assessment (ESAS 0-10 Scale)  Pain:  10  Dyspnea:  0  Anxiety:  0  Nausea:  0  Depression:  5  Anorexia:  0  Fatigue:  0  Insomnia:  0  Restlessness:   0  Agitation:  0     CAM / Delirium:  Negative  Constipation:  Negative  Diarrhea:  Negative    Comments:  Reports she is depressed about her QOL and feels she will never be any better then she is now. Recommending Lidocaine patch     Pain Assessment:  Location(s): neck           Alleviating Factors NECK: neck pillow, tylenol.     Performance Status:  50      Advance Care Planning   Advance Directives:   Living Will: No    LaPOST: Yes (it will be voided)    Do Not Resuscitate Status: No (revoked DNR)    Medical Power of : No      Decision Making:  Patient answered questions and Family answered questions         Significant Labs: All pertinent labs within the past 24 hours have been reviewed.  CBC:   Recent Labs   Lab 11/07/20  1143  11/09/20  1007   WBC 9.95  --   --    HGB 6.5*   < > 7.5*   HCT 20.7*  --   --    MCV 96  --   --      --   --     < > = values in this interval not displayed.     BMP:  Recent Labs   Lab 11/09/20  0414   GLU 86      K 5.0      CO2 22*   BUN 69*   CREATININE 5.3*   CALCIUM 9.0     LFT:  Lab Results   Component Value Date    AST 12 11/07/2020    ALKPHOS 63 11/07/2020    BILITOT 0.3 11/07/2020     Albumin:   Albumin   Date Value Ref Range Status   11/07/2020 2.4 (L) 3.5 - 5.2 g/dL Final   10/05/2020 2.9 (L) 3.2 - 5.6 Final     Protein:   Total Protein   Date Value Ref Range Status   11/07/2020 6.6 6.0 - 8.4 g/dL Final     Lactic acid:   Lab Results   Component Value Date    LACTATE 0.7 11/04/2020    LACTATE 1.1 07/28/2020       Significant Imaging: I have reviewed all pertinent imaging results/findings within the past 24 hours.

## 2020-11-10 ENCOUNTER — ANESTHESIA EVENT (OUTPATIENT)
Dept: ENDOSCOPY | Facility: HOSPITAL | Age: 85
DRG: 378 | End: 2020-11-10
Payer: MEDICARE

## 2020-11-10 LAB
ABO + RH BLD: NORMAL
ANION GAP SERPL CALC-SCNC: 11 MMOL/L (ref 8–16)
BLD GP AB SCN CELLS X3 SERPL QL: NORMAL
BUN SERPL-MCNC: 63 MG/DL (ref 8–23)
CALCIUM SERPL-MCNC: 9.4 MG/DL (ref 8.7–10.5)
CHLORIDE SERPL-SCNC: 106 MMOL/L (ref 95–110)
CO2 SERPL-SCNC: 21 MMOL/L (ref 23–29)
CREAT SERPL-MCNC: 5.1 MG/DL (ref 0.5–1.4)
EST. GFR  (AFRICAN AMERICAN): 8 ML/MIN/1.73 M^2
EST. GFR  (NON AFRICAN AMERICAN): 7 ML/MIN/1.73 M^2
GLUCOSE SERPL-MCNC: 76 MG/DL (ref 70–110)
HGB BLD-MCNC: 7.8 G/DL (ref 12–16)
HGB BLD-MCNC: 7.9 G/DL (ref 12–16)
POCT GLUCOSE: 101 MG/DL (ref 70–110)
POCT GLUCOSE: 112 MG/DL (ref 70–110)
POCT GLUCOSE: 120 MG/DL (ref 70–110)
POCT GLUCOSE: 73 MG/DL (ref 70–110)
POCT GLUCOSE: 94 MG/DL (ref 70–110)
POTASSIUM SERPL-SCNC: 4.8 MMOL/L (ref 3.5–5.1)
SODIUM SERPL-SCNC: 138 MMOL/L (ref 136–145)

## 2020-11-10 PROCEDURE — 86920 COMPATIBILITY TEST SPIN: CPT

## 2020-11-10 PROCEDURE — 63600175 PHARM REV CODE 636 W HCPCS: Performed by: INTERNAL MEDICINE

## 2020-11-10 PROCEDURE — 85018 HEMOGLOBIN: CPT

## 2020-11-10 PROCEDURE — 80048 BASIC METABOLIC PNL TOTAL CA: CPT

## 2020-11-10 PROCEDURE — C9113 INJ PANTOPRAZOLE SODIUM, VIA: HCPCS | Performed by: INTERNAL MEDICINE

## 2020-11-10 PROCEDURE — 21400001 HC TELEMETRY ROOM

## 2020-11-10 PROCEDURE — 36415 COLL VENOUS BLD VENIPUNCTURE: CPT

## 2020-11-10 PROCEDURE — 25000003 PHARM REV CODE 250: Performed by: INTERNAL MEDICINE

## 2020-11-10 PROCEDURE — 99233 SBSQ HOSP IP/OBS HIGH 50: CPT | Mod: ,,, | Performed by: NURSE PRACTITIONER

## 2020-11-10 PROCEDURE — 99233 PR SUBSEQUENT HOSPITAL CARE,LEVL III: ICD-10-PCS | Mod: ,,, | Performed by: NURSE PRACTITIONER

## 2020-11-10 PROCEDURE — 86901 BLOOD TYPING SEROLOGIC RH(D): CPT

## 2020-11-10 RX ORDER — POLYETHYLENE GLYCOL 3350, SODIUM SULFATE ANHYDROUS, SODIUM BICARBONATE, SODIUM CHLORIDE, POTASSIUM CHLORIDE 236; 22.74; 6.74; 5.86; 2.97 G/4L; G/4L; G/4L; G/4L; G/4L
2000 POWDER, FOR SOLUTION ORAL
Status: COMPLETED | OUTPATIENT
Start: 2020-11-10 | End: 2020-11-11

## 2020-11-10 RX ORDER — ACETAMINOPHEN 500 MG
500 TABLET ORAL EVERY 4 HOURS PRN
Status: DISCONTINUED | OUTPATIENT
Start: 2020-11-10 | End: 2020-11-18 | Stop reason: HOSPADM

## 2020-11-10 RX ADMIN — POLYETHYLENE GLYCOL 3350, SODIUM SULFATE ANHYDROUS, SODIUM BICARBONATE, SODIUM CHLORIDE, POTASSIUM CHLORIDE 2000 ML: 236; 22.74; 6.74; 5.86; 2.97 POWDER, FOR SOLUTION ORAL at 06:11

## 2020-11-10 RX ADMIN — SODIUM BICARBONATE TAB 325 MG 1300 MG: 325 TAB at 09:11

## 2020-11-10 RX ADMIN — HYDRALAZINE HYDROCHLORIDE 25 MG: 25 TABLET ORAL at 05:11

## 2020-11-10 RX ADMIN — DIPHENHYDRAMINE HYDROCHLORIDE 25 MG: 50 INJECTION INTRAMUSCULAR; INTRAVENOUS at 10:11

## 2020-11-10 RX ADMIN — HYDRALAZINE HYDROCHLORIDE 25 MG: 25 TABLET ORAL at 10:11

## 2020-11-10 RX ADMIN — TRAMADOL HYDROCHLORIDE 50 MG: 50 TABLET, FILM COATED ORAL at 11:11

## 2020-11-10 RX ADMIN — PANTOPRAZOLE SODIUM 40 MG: 40 INJECTION, POWDER, FOR SOLUTION INTRAVENOUS at 10:11

## 2020-11-10 RX ADMIN — CALCITRIOL CAPSULES 0.25 MCG 0.25 MCG: 0.25 CAPSULE ORAL at 09:11

## 2020-11-10 RX ADMIN — FUROSEMIDE 60 MG: 40 TABLET ORAL at 09:11

## 2020-11-10 RX ADMIN — MUPIROCIN: 20 OINTMENT TOPICAL at 09:11

## 2020-11-10 RX ADMIN — TRAMADOL HYDROCHLORIDE 50 MG: 50 TABLET, FILM COATED ORAL at 06:11

## 2020-11-10 RX ADMIN — PANTOPRAZOLE SODIUM 40 MG: 40 INJECTION, POWDER, FOR SOLUTION INTRAVENOUS at 09:11

## 2020-11-10 RX ADMIN — NIFEDIPINE 90 MG: 30 TABLET, FILM COATED, EXTENDED RELEASE ORAL at 09:11

## 2020-11-10 RX ADMIN — CARVEDILOL 12.5 MG: 12.5 TABLET, FILM COATED ORAL at 10:11

## 2020-11-10 RX ADMIN — SODIUM BICARBONATE TAB 325 MG 1300 MG: 325 TAB at 10:11

## 2020-11-10 RX ADMIN — CARVEDILOL 12.5 MG: 12.5 TABLET, FILM COATED ORAL at 09:11

## 2020-11-10 RX ADMIN — TRAMADOL HYDROCHLORIDE 50 MG: 50 TABLET, FILM COATED ORAL at 04:11

## 2020-11-10 RX ADMIN — LIDOCAINE 1 PATCH: 50 PATCH TOPICAL at 03:11

## 2020-11-10 RX ADMIN — MEROPENEM AND SODIUM CHLORIDE 1 G: 1 INJECTION, SOLUTION INTRAVENOUS at 10:11

## 2020-11-10 RX ADMIN — SODIUM BICARBONATE TAB 325 MG 1300 MG: 325 TAB at 03:11

## 2020-11-10 RX ADMIN — HYDRALAZINE HYDROCHLORIDE 25 MG: 25 TABLET ORAL at 03:11

## 2020-11-10 RX ADMIN — MUPIROCIN: 20 OINTMENT TOPICAL at 10:11

## 2020-11-10 NOTE — PLAN OF CARE
[facility]    The patient was seen and examined at the bedside.  She and her family are ok with proceeding with colonoscopy to evaluate for a cause for her hematochezia and anemia.  Abdomen soft.    Plan:  -Colonoscopy with MAC tomorrow.  -Clear liquid diet today and bowel prep ordered for this evening.  -NPO past midnight except for bowel prep.    Please call with questions.    Mary Lake MD

## 2020-11-10 NOTE — SUBJECTIVE & OBJECTIVE
Interval History: No new issues     Review of Systems   Constitutional: Negative for activity change, appetite change, chills, diaphoresis, fatigue and fever.   HENT: Negative for congestion and dental problem.    Eyes: Negative for discharge and itching.   Respiratory: Negative for cough, choking, shortness of breath and stridor.    Cardiovascular: Negative for chest pain, palpitations and leg swelling.   Gastrointestinal: Negative for abdominal pain, blood in stool, constipation, diarrhea, nausea and rectal pain.   Genitourinary: Negative for difficulty urinating, dyspareunia and enuresis.   Musculoskeletal: Positive for arthralgias and neck pain.   Neurological: Negative for dizziness.   Hematological: Negative for adenopathy.   Psychiatric/Behavioral: Negative for agitation and behavioral problems.     Objective:     Vital Signs (Most Recent):  Temp: 98.4 °F (36.9 °C) (11/10/20 0800)  Pulse: 90 (11/10/20 0800)  Resp: 17 (11/10/20 0800)  BP: (!) 146/70 (11/10/20 0800)  SpO2: 99 % (11/10/20 0800) Vital Signs (24h Range):  Temp:  [97.5 °F (36.4 °C)-98.9 °F (37.2 °C)] 98.4 °F (36.9 °C)  Pulse:  [84-91] 90  Resp:  [16-20] 17  SpO2:  [99 %-100 %] 99 %  BP: (130-158)/(61-72) 146/70     Weight: 68 kg (150 lb)  Body mass index is 24.21 kg/m².    Intake/Output Summary (Last 24 hours) at 11/10/2020 1036  Last data filed at 11/9/2020 1845  Gross per 24 hour   Intake 615 ml   Output --   Net 615 ml      Physical Exam  Vitals signs and nursing note reviewed.   Constitutional:       General: She is not in acute distress.     Appearance: Normal appearance. She is not ill-appearing, toxic-appearing or diaphoretic.   HENT:      Head: Normocephalic and atraumatic.   Cardiovascular:      Rate and Rhythm: Normal rate and regular rhythm.      Heart sounds: No murmur. No gallop.    Pulmonary:      Effort: Pulmonary effort is normal. No respiratory distress.      Breath sounds: No stridor. No wheezing or rales.   Chest:      Chest  wall: No tenderness.   Abdominal:      General: Abdomen is flat. Bowel sounds are normal.      Tenderness: There is no abdominal tenderness. There is no guarding or rebound.   Neurological:      General: No focal deficit present.      Mental Status: She is alert and oriented to person, place, and time.   Psychiatric:         Mood and Affect: Mood normal.         Behavior: Behavior normal.         Thought Content: Thought content normal.         Significant Labs:   BMP:   Recent Labs   Lab 11/10/20  0538   GLU 76      K 4.8      CO2 21*   BUN 63*   CREATININE 5.1*   CALCIUM 9.4     CBC:   Recent Labs   Lab 11/09/20  1941 11/10/20  0221 11/10/20  0538   HGB 8.7* 7.8* 7.9*       Significant Imaging:

## 2020-11-10 NOTE — PROGRESS NOTES
[facility]  Palliative Medicine  Progress Note    Patient Name: Joyce Riley  MRN: [id number]  Admission Date: 11/7/2020  Hospital Length of Stay: 3 days  Code Status: Full Code   Attending Provider: Veronica Berry MD  Consulting Provider: Bonita Coreas NP  Primary Care Physician: Kalpana Staton MD  Principal Problem:Acute blood loss anemia        Assessment/Plan:     Plan:   -Continue current treatment plan  -will await colonoscopy results and depending on results may need to have another GO discussions  -continue to offer support    Subjective:     Chief Complaint:   Chief Complaint   Patient presents with    Rectal Bleeding     pt comes in via EMS from [facility] with c/o rectal bleeding. states hx of rectal bleeding.        HPI:   Principal Problem:Acute blood loss anemia           HPI:  Mrs. Riley is a 86 yo F known to me for an admission and discharge to [facility] yesterday.  She had a few day hospital stay for evaluation and management of resistant UTI with Klebsiella and pseudomonas. She was discharged back to [facility] (for Abx) on 11/6 with Meropenem for 10 days per ID recs.  The patient returns today with dark and maroon stools with visible blood clots. Not on any blood thinners. No abdominal pain. Hgb was 7.7 yesterday- today 6.5. GI was consulted and received one unit of blood.  Patient has a LAPOST for DNR but daughter says full code.       Overview/Hospital Course:  Mrs. Riley is a 86 yo F known to me for an admission and discharge to [facility] yesterday.  She had a few day hospital stay for evaluation and management of resistant UTI with Klebsiella and pseudomonas. She was discharged back to [facility] (for Abx) on 11/6 with Meropenem for 10 days per ID recs.  The patient returns today with dark and maroon stools with visible blood clots. Not on any blood thinners. No abdominal pain. Hgb was 7.7 yesterday- today 6.5. GI was consulted and received one unit of  "blood.  Patient has a LAPOST for DNR but daughter says full code.  Palliative care investigating further. GI consulted and patient refuses C-scope.  Another unit of blood given on 11/9. PT/OT eval- back to NH ([facility])- basic.      Discussed patient with Dr Oscar prior to encounter    Advance Care Planning   Patient known to me. Palliative consulted after confusion of code status. Patient has a LaPOST that I completed with her and her daughter Antonia in 7/2019 with DNR but apparently the code status was changed to Full code per daughter and patient this hospitalization.    Met with patient in room and daughter Antonia on phone.     [facility]  I engaged the patient and family daughter Antonia Landa in a conversation about advance care planning and we specifically addressed what the goals of care would be moving forward, in light of the patient's change in clinical status, specifically the code status. We discussed her current clinical condition  And also went over the previous documented discussions and the LaPOST that was completed at the time and is scanned in to EP[employer]. Daughter reports that the family discussed the code status after it was brought to their attention by physician yesterday. She states they discussed it  and that they feel she has improved over the last year and would like her to be a full code at this time.Antonia did say that if at any time she declines and unable to recover they will at that time discuss and change her back to DNR. When asking the patient about CPR and explaining in simple terms she states " I don't know" but when going further in conversation and mentioning life sustaining measures she commented she wouldn't want to just lie there. Her family seem realistic and I do not feel from our discussion today or from last time that they would want to cause their mother any undue stress or suffering.    The daughter states that the patient will say ok to some things na no to others so its best that her " family make her decisions as they do not feel she is able to make complex medical decisions now. I agree. The patient is confused easily. The daughter asked about labs specifically the GFR which I went over and wanted to know if her mother had any more bleeding today. I let her know GI noted this am no bleeding but I will ask nurse and have her notify if so. The daughter mentioned that they had been asked about a colonoscopy but had refused because they were concerned about her kidneys and the dye that would be used. I explained there is no dye involved and discussed the procedure. They have decided to go ahead with colonoscopy after our discussion.    We did not specifically address the patient's likely prognosis, which is undetermined at this time.  We explored the patient's values and preferences for future care. The family endorses that what is most important right now is to go ahead with the colonoscopy and see what is revealed and make decisions as they arise.     Accordingly, we have decided that the best plan to meet the patient's goals includes continuing with treatment    Updated Dr DuffOscarEleanor Slater Hospital Course:  Continues to have neck pain but Lidocaine gives relief.  Bloody stools continue and Colonoscopy in am      Interval History: patient alert, c/o pain in neck. Confused easily during conversation      Medications:  Continuous Infusions:  Scheduled Meds:   calcitRIOL  0.25 mcg Oral Every other day    carvediloL  12.5 mg Oral BID    hydrALAZINE  25 mg Oral Q8H    insulin detemir U-100  5 Units Subcutaneous QHS    lidocaine  1 patch Transdermal Q24H    meropenem-0.9% sodium chloride  1 g Intravenous Daily    mupirocin   Nasal BID    NIFEdipine  90 mg Oral Daily    pantoprazole  40 mg Intravenous BID    polyethylene glycol  2,000 mL Oral Q8H    senna-docusate 8.6-50 mg  1 tablet Oral BID    sodium bicarbonate  1,300 mg Oral TID     PRN Meds:sodium chloride, sodium chloride, acetaminophen,  "albuterol-ipratropium, dextrose 50%, dextrose 50%, diphenhydrAMINE, glucagon (human recombinant), glucose, glucose, insulin aspart U-100, traMADoL    Objective:     Vital Signs (Most Recent):  Temp: 98.4 °F (36.9 °C) (11/10/20 0800)  Pulse: 90 (11/10/20 0800)  Resp: 18 (11/10/20 1106)  BP: (!) 146/70 (11/10/20 0800)  SpO2: 99 % (11/10/20 0800) Vital Signs (24h Range):  Temp:  [97.5 °F (36.4 °C)-98.9 °F (37.2 °C)] 98.4 °F (36.9 °C)  Pulse:  [84-91] 90  Resp:  [16-20] 18  SpO2:  [99 %-100 %] 99 %  BP: (130-158)/(61-72) 146/70     Weight: 68 kg (150 lb)  Body mass index is 24.21 kg/m².    Physical Exam  Vitals signs and nursing note reviewed.   Constitutional:       General: She is not in acute distress.     Appearance: She is ill-appearing.   Neck:      Musculoskeletal: Neck rigidity and muscular tenderness present.   Cardiovascular:      Rate and Rhythm: Normal rate and regular rhythm.      Heart sounds: Normal heart sounds.   Pulmonary:      Effort: Pulmonary effort is normal. No respiratory distress.      Breath sounds: Normal breath sounds. No wheezing.   Abdominal:      General: Abdomen is flat. Bowel sounds are normal. There is no distension.      Palpations: Abdomen is soft.   Musculoskeletal:         General: Tenderness (right foot, right great toe) present.      Right lower leg: No edema.      Left lower leg: No edema.   Skin:     General: Skin is warm and dry.   Neurological:      Mental Status: She is alert.      Comments: Oriented to self, confused easily         Review of Symptoms    Symptom Assessment (ESAS 0-10 Scale)  Pain:  0  Dyspnea:  0  Anxiety:  0  Nausea:  0  Depression:  0  Anorexia:  0  Fatigue:  0  Insomnia:  0  Restlessness:  0  Agitation:  0         Comments:  Patient does not understand ESAS scale but states "it hurts bad" when asked about her pain today.   Lidocaine patches started yesterday and nurse reports patient did not c/o pain when patient on. Patient does not have patch on now but " due again at 1400. Nurse to give Tramadol. I also ordered tylenol which patient states works good for her.     Pain Assessment:  Location(s): neck      Performance Status:  50    Living Arrangements:  Lives in nursing home      Advance Care Planning   Advance Care Planning       Significant Labs: All pertinent labs within the past 24 hours have been reviewed.  CBC:   Recent Labs   Lab 11/07/20  1143  11/10/20  0538   WBC 9.95  --   --    HGB 6.5*   < > 7.9*   HCT 20.7*  --   --    MCV 96  --   --      --   --     < > = values in this interval not displayed.     BMP:  Recent Labs   Lab 11/10/20  0538   GLU 76      K 4.8      CO2 21*   BUN 63*   CREATININE 5.1*   CALCIUM 9.4     LFT:  Lab Results   Component Value Date    AST 12 11/07/2020    ALKPHOS 63 11/07/2020    BILITOT 0.3 11/07/2020     Albumin:   Albumin   Date Value Ref Range Status   11/07/2020 2.4 (L) 3.5 - 5.2 g/dL Final   10/05/2020 2.9 (L) 3.2 - 5.6 Final     Protein:   Total Protein   Date Value Ref Range Status   11/07/2020 6.6 6.0 - 8.4 g/dL Final     Lactic acid:   Lab Results   Component Value Date    LACTATE 0.7 11/04/2020    LACTATE 1.1 07/28/2020       Significant Imaging: none in last 24 hours      > 50% of 60 min visit spent in chart review, face to face discussion of goals of care,  symptom assessment, coordination of care and emotional support.    Bonita Coreas NP  Palliative Medicine  [facility]

## 2020-11-10 NOTE — NURSING
Pt. Had a blood clots to passed through her rectum for four times for the shift. Numerous amounts of large size clots noted in brief. No acute distress noted at this time.

## 2020-11-10 NOTE — ASSESSMENT & PLAN NOTE
Likely lower GI bleed. Possibly diverticular. 7.7 to 6.5 Hgb over past 24 hours. Visible bleeding. PPI. GI consult.  Unit of blood.     Hgb from 6.9 to 7.4 today. Patient does not want C-scope eval. Monitor H/H today- if stable- then to   [facility] tomorrow     Hgb at 7. Low but stable. Will give one unit of blood.  Refuses C-scope. GI following.  If H/H stable on 11/10- back to NH  ([place])    Patient and family finally agree with endoscopic evaluation,keep NPO past MN.

## 2020-11-10 NOTE — ANESTHESIA PREPROCEDURE EVALUATION
11/10/2020  Joyce Riley is a 87 y.o., female.  Pre-operative evaluation for Procedure(s) (LRB):  COLONOSCOPY (N/A)    NPO >8h instructions  METS 1-3; wheelchair dependent    Admitted 11/7/20 after recent discharge 11/6 for UTI with klebsiella and psuedomonas now with  for GI bleed (bloody stool)  s/p 3 u PRBCs on 11/7/20 for Hgb 6.5. Not on anticoagulation. Patient has a LAPOST for DNR but daughter says full code noted on Hospitalist notes.    Vitals:    11/10/20 0800 11/10/20 1106 11/10/20 1201 11/10/20 1556   BP: (!) 146/70  133/60 (!) 145/66   BP Location: Left arm  Left arm Left arm   Patient Position: Lying  Lying Lying   Pulse: 90  83 92   Resp: 17 18 16 17   Temp: 36.9 °C (98.4 °F)  36.8 °C (98.3 °F) 36.6 °C (97.8 °F)   TempSrc: Oral  Oral Oral   SpO2: 99%  100% 100%   Weight:       Height:           Patient Active Problem List   Diagnosis    Type 2 diabetes mellitus, controlled, with renal complications    CKD stage 5    Patent foramen ovale with right to left shunt    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    Anemia of renal disease    Renal hypertension    Chronic diastolic heart failure    Paroxysmal atrial fibrillation    Acute blood loss anemia    Vitamin D deficiency    Hematochezia    History of stroke    Neck pain, chronic    Palliative care encounter    Clostridium difficile infection    Gastrointestinal hemorrhage    Bilateral paraparesis    Rheumatoid pannus of cervical spine    Myelopathy    Cervical spondylosis    Chronic kidney disease-mineral and bone disorder    UTI (urinary tract infection)    Lower GI bleeding       Review of patient's allergies indicates:   Allergen Reactions    Indomethacin     Nsaids (non-steroidal anti-inflammatory drug)     Percocet [oxycodone-acetaminophen]      No allergy to acetaminophen. Patient reports she takes all  the time for her neck and it helps       No current facility-administered medications on file prior to encounter.      Current Outpatient Medications on File Prior to Encounter   Medication Sig Dispense Refill    acetaminophen (TYLENOL) 325 MG tablet Take 2 tablets (650 mg total) by mouth every 4 (four) hours as needed. (Patient taking differently: Take 650 mg by mouth 2 (two) times daily. )  0    B complex-vitamin C-folic acid (NEPHRO-MICHAEL) 0.8 mg Tab Take by mouth once daily.      calcitRIOL (ROCALTROL) 0.25 MCG Cap Take 1 capsule (0.25 mcg total) by mouth every other day.      carvediloL (COREG) 12.5 MG tablet Take 1 tablet (12.5 mg total) by mouth 2 (two) times daily. 60 tablet 11    epoetin diana (EPOGEN) 10,000 unit/mL injection Inject 1 mL (10,000 Units total) into the skin every 7 days. 1 mL 0    ergocalciferol (ERGOCALCIFEROL) 50,000 unit Cap Take 1 capsule (50,000 Units total) by mouth every 7 days.      ferrous sulfate (FEOSOL) 325 mg (65 mg iron) Tab tablet Take 325 mg by mouth 2 (two) times daily.      furosemide (LASIX) 20 MG tablet Take 3 tablets (60 mg total) by mouth once daily. 90 tablet 11    hydrALAZINE (APRESOLINE) 25 MG tablet Take 1 tablet (25 mg total) by mouth every 8 (eight) hours. 90 tablet 11    insulin degludec (TRESIBA FLEXTOUCH U-100) 100 unit/mL (3 mL) InPn Inject 5 Units into the skin every evening.      lactobacillus acidophilus & bulgar (LACTINEX) 100 million cell packet Take 1 packet (1 each total) by mouth 2 (two) times daily. 60 packet 1    meropenem-0.9% sodium chloride 1 gram/50 mL PgBk Inject 50 mLs (1 g total) into the vein once daily. for 7 days 350 mL 0    methyl salicylate-menthol 15-10% 15-10 % Crea Apply topically 4 (four) times daily.  0    NIFEdipine (PROCARDIA-XL) 90 MG (OSM) 24 hr tablet Take 1 tablet (90 mg total) by mouth once daily. 30 tablet 11    senna-docusate 8.6-50 mg (PERICOLACE) 8.6-50 mg per tablet Take 1 tablet by mouth 2 (two) times daily.       sodium bicarbonate 650 MG tablet Take 2 tablets (1,300 mg total) by mouth 3 (three) times daily. 180 tablet 11    traMADoL (ULTRAM) 50 mg tablet Take 1 tablet (50 mg total) by mouth every 8 (eight) hours as needed for Pain. 20 tablet 0    albuterol-ipratropium (DUO-NEB) 2.5 mg-0.5 mg/3 mL nebulizer solution Take 3 mLs by nebulization every 4 (four) hours as needed for Wheezing. Rescue         Past Surgical History:   Procedure Laterality Date     SECTION      Patient unsure, believe she had 3-4    CHOLECYSTECTOMY      EYE SURGERY      HYSTERECTOMY      JOINT REPLACEMENT Right     knee    TOTAL KNEE ARTHROPLASTY Right        Social History     Socioeconomic History    Marital status:      Spouse name: Not on file    Number of children: 6    Years of education: Not on file    Highest education level: Not on file   Occupational History    Occupation:      Comment: Retired   Social Needs    Financial resource strain: Not on file    Food insecurity     Worry: Not on file     Inability: Not on file    Transportation needs     Medical: Not on file     Non-medical: Not on file   Tobacco Use    Smoking status: Never Smoker    Smokeless tobacco: Never Used   Substance and Sexual Activity    Alcohol use: No    Drug use: No    Sexual activity: Never   Lifestyle    Physical activity     Days per week: Not on file     Minutes per session: Not on file    Stress: Not on file   Relationships    Social connections     Talks on phone: Not on file     Gets together: Not on file     Attends Shinto service: Not on file     Active member of club or organization: Not on file     Attends meetings of clubs or organizations: Not on file     Relationship status: Not on file   Other Topics Concern    Not on file   Social History Narrative    Not on file         CBC:   Recent Labs     11/10/20  0221 11/10/20  0538   HGB 7.8* 7.9*       CMP:   Recent Labs     20  0414  11/10/20  0538    138   K 5.0 4.8    106   CO2 22* 21*   BUN 69* 63*   CREATININE 5.3* 5.1*   GLU 86 76   CALCIUM 9.0 9.4       INR  No results for input(s): PT, INR, PROTIME, APTT in the last 72 hours.        Diagnostic Studies:  CXR 11/6/20  Right-sided PICC line is visualized with distal tip near the cavoatrial junction.  Heart is stable in size.  Lungs are significantly hypoinflated.  There is increased interstitial attenuation which may reflect chronic change versus mild interstitial edema.  There is unchanged right hilar prominence.  No pneumothorax or large pleural effusion seen.       EKG:  Vent. Rate : 093 BPM     Atrial Rate : 093 BPM      P-R Int : 168 ms          QRS Dur : 080 ms       QT Int : 348 ms       P-R-T Axes : 070 -37 097 degrees      QTc Int : 432 ms     Sinus rhythm with Premature supraventricular complexes   Left axis deviation   LVH with repolarization abnormality   Cannot rule out Septal infarct ,age undetermined   Abnormal ECG   When compared with ECG of 14-MAR-2020 22:34,   Significant changes have occurred   Confirmed by Nela HUANG, Nicole WHEELER ([id number]) on 7/29/2020 9:17:32 PM     2D Echo:  4/1/19 TTE  · Moderate concentric left ventricular hypertrophy.  · Severe left atrial enlargement.  · Increased (hyperdynamic) left ventricular systolic function. The estimated ejection fraction is 80%  · Grade II (moderate) left ventricular diastolic dysfunction consistent with pseudonormalization.  · Elevated left atrial pressure.  · Normal right ventricular systolic function.  · Moderate mitral sclerosis.  · Mild mitral regurgitation.  · Mild tricuspid regurgitation.  · Intermediate central venous pressure (8 mm Hg).  · The estimated PA systolic pressure is 74 mm Hg  · Pulmonary hypertension present.  · No shunting seen with agitated saline.    Anesthesia Evaluation    I have reviewed the Patient Summary Reports.    I have reviewed the Nursing Notes.    I have reviewed the Medications.      Review of Systems  Anesthesia Hx:  History of prior surgery of interest to airway management or planning:  Denies Personal Hx of Anesthesia complications.   Hematology/Oncology:         -- Anemia (has received prbc):   Cardiovascular:   Exercise tolerance: poor Hypertension Dysrhythmias atrial fibrillation CHF hyperlipidemia ECG has been reviewed. 2019 echo no shunting seen,pulm htn, mitral sclerisis..[id number]   Renal/:   Chronic Renal Disease, CRI    Hepatic/GI:   GERD GI bleed   Musculoskeletal:   Arthritis     Neurological:   CVA, residual symptoms    Endocrine:   Diabetes, type 2, using insulin        Physical Exam  General:  Well nourished    Airway/Jaw/Neck:  AIRWAY FINDINGS: Normal      Chest/Lungs:  Chest/Lungs Clear    Heart/Vascular:  Heart Findings: Normal       Mental Status:  Mental Status Findings: Normal        Anesthesia Plan  Type of Anesthesia, risks & benefits discussed:  Anesthesia Type:  general  Patient's Preference:   Intra-op Monitoring Plan: standard ASA monitors  Intra-op Monitoring Plan Comments:   Post Op Pain Control Plan:   Post Op Pain Control Plan Comments:   Induction:   IV  Beta Blocker:  Patient is on a Beta-Blocker and has received one dose within the past 24 hours (No further documentation required).       Informed Consent: Patient representative understands risks and agrees with Anesthesia plan.  Questions answered. Anesthesia consent signed with patient representative.  ASA Score: 3     Day of Surgery Review of History & Physical:  There are no significant changes.  H&P update referred to the provider.     Anesthesia Plan Notes: Pt states her breathing feels normal, she has been transfused today, she denies vomiting.        Ready For Surgery From Anesthesia Perspective.

## 2020-11-10 NOTE — SUBJECTIVE & OBJECTIVE
Interval History: patient alert, c/o pain in neck. Confused easily during conversation      Medications:  Continuous Infusions:  Scheduled Meds:   calcitRIOL  0.25 mcg Oral Every other day    carvediloL  12.5 mg Oral BID    hydrALAZINE  25 mg Oral Q8H    insulin detemir U-100  5 Units Subcutaneous QHS    lidocaine  1 patch Transdermal Q24H    meropenem-0.9% sodium chloride  1 g Intravenous Daily    mupirocin   Nasal BID    NIFEdipine  90 mg Oral Daily    pantoprazole  40 mg Intravenous BID    polyethylene glycol  2,000 mL Oral Q8H    senna-docusate 8.6-50 mg  1 tablet Oral BID    sodium bicarbonate  1,300 mg Oral TID     PRN Meds:sodium chloride, sodium chloride, acetaminophen, albuterol-ipratropium, dextrose 50%, dextrose 50%, diphenhydrAMINE, glucagon (human recombinant), glucose, glucose, insulin aspart U-100, traMADoL    Objective:     Vital Signs (Most Recent):  Temp: 98.4 °F (36.9 °C) (11/10/20 0800)  Pulse: 90 (11/10/20 0800)  Resp: 18 (11/10/20 1106)  BP: (!) 146/70 (11/10/20 0800)  SpO2: 99 % (11/10/20 0800) Vital Signs (24h Range):  Temp:  [97.5 °F (36.4 °C)-98.9 °F (37.2 °C)] 98.4 °F (36.9 °C)  Pulse:  [84-91] 90  Resp:  [16-20] 18  SpO2:  [99 %-100 %] 99 %  BP: (130-158)/(61-72) 146/70     Weight: 68 kg (150 lb)  Body mass index is 24.21 kg/m².    Physical Exam  Vitals signs and nursing note reviewed.   Constitutional:       General: She is not in acute distress.     Appearance: She is ill-appearing.   Neck:      Musculoskeletal: Neck rigidity and muscular tenderness present.   Cardiovascular:      Rate and Rhythm: Normal rate and regular rhythm.      Heart sounds: Normal heart sounds.   Pulmonary:      Effort: Pulmonary effort is normal. No respiratory distress.      Breath sounds: Normal breath sounds. No wheezing.   Abdominal:      General: Abdomen is flat. Bowel sounds are normal. There is no distension.      Palpations: Abdomen is soft.   Musculoskeletal:         General: Tenderness  "(right foot, right great toe) present.      Right lower leg: No edema.      Left lower leg: No edema.   Skin:     General: Skin is warm and dry.   Neurological:      Mental Status: She is alert.      Comments: Oriented to self, confused easily         Review of Symptoms    Symptom Assessment (ESAS 0-10 Scale)  Pain:  0  Dyspnea:  0  Anxiety:  0  Nausea:  0  Depression:  0  Anorexia:  0  Fatigue:  0  Insomnia:  0  Restlessness:  0  Agitation:  0         Comments:  Patient does not understand ESAS scale but states "it hurts bad" when asked about her pain today.   Lidocaine patches started yesterday and nurse reports patient did not c/o pain when patient on. Patient does not have patch on now but due again at 1400. Nurse to give Tramadol. I also ordered tylenol which patient states works good for her.     Pain Assessment:  Location(s): neck      Performance Status:  50    Living Arrangements:  Lives in nursing home      Advance Care Planning   Advance Care Planning       Significant Labs: All pertinent labs within the past 24 hours have been reviewed.  CBC:   Recent Labs   Lab 11/07/20  1143  11/10/20  0538   WBC 9.95  --   --    HGB 6.5*   < > 7.9*   HCT 20.7*  --   --    MCV 96  --   --      --   --     < > = values in this interval not displayed.     BMP:  Recent Labs   Lab 11/10/20  0538   GLU 76      K 4.8      CO2 21*   BUN 63*   CREATININE 5.1*   CALCIUM 9.4     LFT:  Lab Results   Component Value Date    AST 12 11/07/2020    ALKPHOS 63 11/07/2020    BILITOT 0.3 11/07/2020     Albumin:   Albumin   Date Value Ref Range Status   11/07/2020 2.4 (L) 3.5 - 5.2 g/dL Final   10/05/2020 2.9 (L) 3.2 - 5.6 Final     Protein:   Total Protein   Date Value Ref Range Status   11/07/2020 6.6 6.0 - 8.4 g/dL Final     Lactic acid:   Lab Results   Component Value Date    LACTATE 0.7 11/04/2020    LACTATE 1.1 07/28/2020       Significant Imaging: none in last 24 hours  "

## 2020-11-10 NOTE — PROGRESS NOTES
[facility] Hospital Medicine  Progress Note    Patient Name: Joyce Riley  MRN: [id number]  Patient Class: IP- Inpatient   Admission Date: 11/7/2020  Length of Stay: 3 days  Attending Physician: Veronica Berry MD  Primary Care Provider: Kalpana Staton MD        Subjective:     Principal Problem:Acute blood loss anemia        HPI:  Mrs. Riley is a 86 yo F known to me for an admission and discharge to [facility] yesterday.  She had a few day hospital stay for evaluation and management of resistant UTI with Klebsiella and pseudomonas. She was discharged back to [facility] (for Abx) on 11/6 with Meropenem for 10 days per ID recs.  The patient returns today with dark and maroon stools with visible blood clots. Not on any blood thinners. No abdominal pain. Hgb was 7.7 yesterday- today 6.5. GI was consulted and received one unit of blood.  Patient has a LAPOST for DNR but daughter says full code.      Overview/Hospital Course:  Mrs. Riley is a 86 yo F known to me for an admission and discharge to [facility] yesterday.  She had a few day hospital stay for evaluation and management of resistant UTI with Klebsiella and pseudomonas. She was discharged back to [facility] (for Abx) on 11/6 with Meropenem for 10 days per ID recs.  The patient returns today with dark and maroon stools with visible blood clots. Not on any blood thinners. No abdominal pain. Hgb was 7.7 yesterday- today 6.5. GI was consulted and received one unit of blood.  Patient has a LAPOST for DNR but daughter says full code.  Palliative care investigating further. GI consulted and patient refuses C-scope.  Another unit of blood given on 11/9. PT/OT eval- back to NH ()- basic.   Patient and family finally agree with endoscopic evaluation,keep NPO past MN.    Interval History: No new issues     Review of Systems   Constitutional: Negative for activity change, appetite change, chills, diaphoresis, fatigue and fever.   HENT: Negative  for congestion and dental problem.    Eyes: Negative for discharge and itching.   Respiratory: Negative for cough, choking, shortness of breath and stridor.    Cardiovascular: Negative for chest pain, palpitations and leg swelling.   Gastrointestinal: Negative for abdominal pain, blood in stool, constipation, diarrhea, nausea and rectal pain.   Genitourinary: Negative for difficulty urinating, dyspareunia and enuresis.   Musculoskeletal: Positive for arthralgias and neck pain.   Neurological: Negative for dizziness.   Hematological: Negative for adenopathy.   Psychiatric/Behavioral: Negative for agitation and behavioral problems.     Objective:     Vital Signs (Most Recent):  Temp: 98.4 °F (36.9 °C) (11/10/20 0800)  Pulse: 90 (11/10/20 0800)  Resp: 17 (11/10/20 0800)  BP: (!) 146/70 (11/10/20 0800)  SpO2: 99 % (11/10/20 0800) Vital Signs (24h Range):  Temp:  [97.5 °F (36.4 °C)-98.9 °F (37.2 °C)] 98.4 °F (36.9 °C)  Pulse:  [84-91] 90  Resp:  [16-20] 17  SpO2:  [99 %-100 %] 99 %  BP: (130-158)/(61-72) 146/70     Weight: 68 kg (150 lb)  Body mass index is 24.21 kg/m².    Intake/Output Summary (Last 24 hours) at 11/10/2020 1036  Last data filed at 11/9/2020 1845  Gross per 24 hour   Intake 615 ml   Output --   Net 615 ml      Physical Exam  Vitals signs and nursing note reviewed.   Constitutional:       General: She is not in acute distress.     Appearance: Normal appearance. She is not ill-appearing, toxic-appearing or diaphoretic.   HENT:      Head: Normocephalic and atraumatic.   Cardiovascular:      Rate and Rhythm: Normal rate and regular rhythm.      Heart sounds: No murmur. No gallop.    Pulmonary:      Effort: Pulmonary effort is normal. No respiratory distress.      Breath sounds: No stridor. No wheezing or rales.   Chest:      Chest wall: No tenderness.   Abdominal:      General: Abdomen is flat. Bowel sounds are normal.      Tenderness: There is no abdominal tenderness. There is no guarding or rebound.    Neurological:      General: No focal deficit present.      Mental Status: She is alert and oriented to person, place, and time.   Psychiatric:         Mood and Affect: Mood normal.         Behavior: Behavior normal.         Thought Content: Thought content normal.         Significant Labs:   BMP:   Recent Labs   Lab 11/10/20  0538   GLU 76      K 4.8      CO2 21*   BUN 63*   CREATININE 5.1*   CALCIUM 9.4     CBC:   Recent Labs   Lab 11/09/20  1941 11/10/20  0221 11/10/20  0538   HGB 8.7* 7.8* 7.9*       Significant Imaging:       Assessment/Plan:      * Acute blood loss anemia  Likely lower GI bleed. Possibly diverticular. 7.7 to 6.5 Hgb over past 24 hours. Visible bleeding. PPI. GI consult.  Unit of blood.     Hgb from 6.9 to 7.4 today. Patient does not want C-scope eval. Monitor H/H today- if stable- then to   [facility] tomorrow     Hgb at 7. Low but stable. Will give one unit of blood.  Refuses C-scope. GI following.  If H/H stable on 11/10- back to NH  ([place])    Patient and family finally agree with endoscopic evaluation,keep NPO past MN.    Lower GI bleeding  Patient and family finally agree with endoscopic evaluation,keep NPO past MN.      UTI (urinary tract infection)  Reported R Klebsiella (?)  Pseudomonas here at our hospital.  6 more days of Meropenem. End date 11/13.   No sepsis     Meropenem on d/c  11/13 end. Has picc line       Palliative care encounter  Advance Care Planning         Patient's daughter stated full code over the weekend. This is different than DNR status in past with YUN.  Palliative care will address/clarify on 11/9 (Today).      History of stroke  No acute issues   Last time PT/OT eval- return to NH basic      Paroxysmal atrial fibrillation  Does not appear to be on any NOAC   History of bleeding reported in the past       Chronic diastolic heart failure  Stable       Renal hypertension  No acute issues. Resume home meds       Anemia of renal disease  Now with acute  blood loss anemia as noted.       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  No acute issues      CKD stage 5  At baseline      Type 2 diabetes mellitus, controlled, with renal complications  No acute issues. A1c last stay. Sliding scale for now. accu checks  Home insulin         VTE Risk Mitigation (From admission, onward)    None          Discharge Planning   PAM:      Code Status: Full Code   Is the patient medically ready for discharge?:     Reason for patient still in hospital (select all that apply): Patient trending condition  Discharge Plan A: Return to nursing home                  Veronica Berry MD  Department of Hospital Medicine   [facility]

## 2020-11-11 ENCOUNTER — ANESTHESIA (OUTPATIENT)
Dept: ENDOSCOPY | Facility: HOSPITAL | Age: 85
DRG: 378 | End: 2020-11-11
Payer: MEDICARE

## 2020-11-11 LAB
ANION GAP SERPL CALC-SCNC: 9 MMOL/L (ref 8–16)
BASOPHILS # BLD AUTO: 0.04 K/UL (ref 0–0.2)
BASOPHILS NFR BLD: 0.5 % (ref 0–1.9)
BLD PROD TYP BPU: NORMAL
BLOOD UNIT EXPIRATION DATE: NORMAL
BLOOD UNIT TYPE CODE: 5100
BLOOD UNIT TYPE: NORMAL
BUN SERPL-MCNC: 53 MG/DL (ref 8–23)
CALCIUM SERPL-MCNC: 9.3 MG/DL (ref 8.7–10.5)
CHLORIDE SERPL-SCNC: 103 MMOL/L (ref 95–110)
CO2 SERPL-SCNC: 26 MMOL/L (ref 23–29)
CODING SYSTEM: NORMAL
CREAT SERPL-MCNC: 4.9 MG/DL (ref 0.5–1.4)
DIFFERENTIAL METHOD: ABNORMAL
DISPENSE STATUS: NORMAL
EOSINOPHIL # BLD AUTO: 0.3 K/UL (ref 0–0.5)
EOSINOPHIL NFR BLD: 3 % (ref 0–8)
ERYTHROCYTE [DISTWIDTH] IN BLOOD BY AUTOMATED COUNT: 15.9 % (ref 11.5–14.5)
EST. GFR  (AFRICAN AMERICAN): 9 ML/MIN/1.73 M^2
EST. GFR  (NON AFRICAN AMERICAN): 7 ML/MIN/1.73 M^2
GLUCOSE SERPL-MCNC: 72 MG/DL (ref 70–110)
HCT VFR BLD AUTO: 20 % (ref 37–48.5)
HGB BLD-MCNC: 6.3 G/DL (ref 12–16)
IMM GRANULOCYTES # BLD AUTO: 0.12 K/UL (ref 0–0.04)
IMM GRANULOCYTES NFR BLD AUTO: 1.4 % (ref 0–0.5)
LYMPHOCYTES # BLD AUTO: 1.6 K/UL (ref 1–4.8)
LYMPHOCYTES NFR BLD: 18.9 % (ref 18–48)
MCH RBC QN AUTO: 29.4 PG (ref 27–31)
MCHC RBC AUTO-ENTMCNC: 31.5 G/DL (ref 32–36)
MCV RBC AUTO: 94 FL (ref 82–98)
MONOCYTES # BLD AUTO: 0.4 K/UL (ref 0.3–1)
MONOCYTES NFR BLD: 5.1 % (ref 4–15)
NEUTROPHILS # BLD AUTO: 6.1 K/UL (ref 1.8–7.7)
NEUTROPHILS NFR BLD: 71.1 % (ref 38–73)
NRBC BLD-RTO: 0 /100 WBC
PLATELET # BLD AUTO: 191 K/UL (ref 150–350)
PMV BLD AUTO: 10.1 FL (ref 9.2–12.9)
POCT GLUCOSE: 182 MG/DL (ref 70–110)
POCT GLUCOSE: 74 MG/DL (ref 70–110)
POCT GLUCOSE: 74 MG/DL (ref 70–110)
POTASSIUM SERPL-SCNC: 4.3 MMOL/L (ref 3.5–5.1)
RBC # BLD AUTO: 2.14 M/UL (ref 4–5.4)
SARS-COV-2 RDRP RESP QL NAA+PROBE: NEGATIVE
SODIUM SERPL-SCNC: 138 MMOL/L (ref 136–145)
TRANS ERYTHROCYTES VOL PATIENT: NORMAL ML
WBC # BLD AUTO: 8.55 K/UL (ref 3.9–12.7)

## 2020-11-11 PROCEDURE — D9220A PRA ANESTHESIA: Mod: ANES,,, | Performed by: ANESTHESIOLOGY

## 2020-11-11 PROCEDURE — 99900035 HC TECH TIME PER 15 MIN (STAT)

## 2020-11-11 PROCEDURE — 85025 COMPLETE CBC W/AUTO DIFF WBC: CPT

## 2020-11-11 PROCEDURE — 25000003 PHARM REV CODE 250: Performed by: NURSE PRACTITIONER

## 2020-11-11 PROCEDURE — 88341 PR IHC OR ICC EACH ADD'L SINGLE ANTIBODY  STAINPR: ICD-10-PCS | Mod: 26,,, | Performed by: PATHOLOGY

## 2020-11-11 PROCEDURE — 93010 EKG 12-LEAD: ICD-10-PCS | Mod: ,,, | Performed by: INTERNAL MEDICINE

## 2020-11-11 PROCEDURE — 27201012 HC FORCEPS, HOT/COLD, DISP: Performed by: INTERNAL MEDICINE

## 2020-11-11 PROCEDURE — 21400001 HC TELEMETRY ROOM

## 2020-11-11 PROCEDURE — 88305 TISSUE EXAM BY PATHOLOGIST: ICD-10-PCS | Mod: 26,,, | Performed by: PATHOLOGY

## 2020-11-11 PROCEDURE — U0002 COVID-19 LAB TEST NON-CDC: HCPCS

## 2020-11-11 PROCEDURE — 88305 TISSUE EXAM BY PATHOLOGIST: CPT | Mod: 26,,, | Performed by: PATHOLOGY

## 2020-11-11 PROCEDURE — 63600175 PHARM REV CODE 636 W HCPCS: Performed by: INTERNAL MEDICINE

## 2020-11-11 PROCEDURE — D9220A PRA ANESTHESIA: Mod: CRNA,,, | Performed by: NURSE ANESTHETIST, CERTIFIED REGISTERED

## 2020-11-11 PROCEDURE — 88342 IMHCHEM/IMCYTCHM 1ST ANTB: CPT | Performed by: PATHOLOGY

## 2020-11-11 PROCEDURE — 93005 ELECTROCARDIOGRAM TRACING: CPT

## 2020-11-11 PROCEDURE — 45380 COLONOSCOPY AND BIOPSY: CPT | Mod: ,,, | Performed by: INTERNAL MEDICINE

## 2020-11-11 PROCEDURE — 63600175 PHARM REV CODE 636 W HCPCS: Performed by: NURSE ANESTHETIST, CERTIFIED REGISTERED

## 2020-11-11 PROCEDURE — 88342 IMHCHEM/IMCYTCHM 1ST ANTB: CPT | Mod: 26,,, | Performed by: PATHOLOGY

## 2020-11-11 PROCEDURE — 88305 TISSUE EXAM BY PATHOLOGIST: CPT | Mod: 59 | Performed by: PATHOLOGY

## 2020-11-11 PROCEDURE — 25000003 PHARM REV CODE 250: Performed by: INTERNAL MEDICINE

## 2020-11-11 PROCEDURE — 80048 BASIC METABOLIC PNL TOTAL CA: CPT

## 2020-11-11 PROCEDURE — 45380 COLONOSCOPY AND BIOPSY: CPT | Performed by: INTERNAL MEDICINE

## 2020-11-11 PROCEDURE — 25000003 PHARM REV CODE 250: Performed by: NURSE ANESTHETIST, CERTIFIED REGISTERED

## 2020-11-11 PROCEDURE — D9220A PRA ANESTHESIA: ICD-10-PCS | Mod: ANES,,, | Performed by: ANESTHESIOLOGY

## 2020-11-11 PROCEDURE — 88342 CHG IMMUNOCYTOCHEMISTRY: ICD-10-PCS | Mod: 26,,, | Performed by: PATHOLOGY

## 2020-11-11 PROCEDURE — 45380 PR COLONOSCOPY,BIOPSY: ICD-10-PCS | Mod: ,,, | Performed by: INTERNAL MEDICINE

## 2020-11-11 PROCEDURE — 94761 N-INVAS EAR/PLS OXIMETRY MLT: CPT

## 2020-11-11 PROCEDURE — 88341 IMHCHEM/IMCYTCHM EA ADD ANTB: CPT | Performed by: PATHOLOGY

## 2020-11-11 PROCEDURE — C9399 UNCLASSIFIED DRUGS OR BIOLOG: HCPCS | Performed by: INTERNAL MEDICINE

## 2020-11-11 PROCEDURE — 93010 ELECTROCARDIOGRAM REPORT: CPT | Mod: ,,, | Performed by: INTERNAL MEDICINE

## 2020-11-11 PROCEDURE — 37000008 HC ANESTHESIA 1ST 15 MINUTES: Performed by: INTERNAL MEDICINE

## 2020-11-11 PROCEDURE — P9021 RED BLOOD CELLS UNIT: HCPCS

## 2020-11-11 PROCEDURE — 37000009 HC ANESTHESIA EA ADD 15 MINS: Performed by: INTERNAL MEDICINE

## 2020-11-11 PROCEDURE — D9220A PRA ANESTHESIA: ICD-10-PCS | Mod: CRNA,,, | Performed by: NURSE ANESTHETIST, CERTIFIED REGISTERED

## 2020-11-11 PROCEDURE — C9113 INJ PANTOPRAZOLE SODIUM, VIA: HCPCS | Performed by: INTERNAL MEDICINE

## 2020-11-11 PROCEDURE — 88341 IMHCHEM/IMCYTCHM EA ADD ANTB: CPT | Mod: 26,,, | Performed by: PATHOLOGY

## 2020-11-11 PROCEDURE — 36430 TRANSFUSION BLD/BLD COMPNT: CPT

## 2020-11-11 RX ORDER — HYDROCODONE BITARTRATE AND ACETAMINOPHEN 500; 5 MG/1; MG/1
TABLET ORAL
Status: DISCONTINUED | OUTPATIENT
Start: 2020-11-11 | End: 2020-11-18 | Stop reason: HOSPADM

## 2020-11-11 RX ORDER — PROPOFOL 10 MG/ML
VIAL (ML) INTRAVENOUS
Status: DISCONTINUED | OUTPATIENT
Start: 2020-11-11 | End: 2020-11-11

## 2020-11-11 RX ORDER — LIDOCAINE HYDROCHLORIDE 20 MG/ML
INJECTION INTRAVENOUS
Status: DISCONTINUED | OUTPATIENT
Start: 2020-11-11 | End: 2020-11-11

## 2020-11-11 RX ORDER — PROPOFOL 10 MG/ML
INJECTION, EMULSION INTRAVENOUS
Status: DISPENSED
Start: 2020-11-11 | End: 2020-11-12

## 2020-11-11 RX ORDER — LIDOCAINE HYDROCHLORIDE 20 MG/ML
INJECTION, SOLUTION EPIDURAL; INFILTRATION; INTRACAUDAL; PERINEURAL
Status: DISPENSED
Start: 2020-11-11 | End: 2020-11-12

## 2020-11-11 RX ORDER — SODIUM CHLORIDE, SODIUM LACTATE, POTASSIUM CHLORIDE, CALCIUM CHLORIDE 600; 310; 30; 20 MG/100ML; MG/100ML; MG/100ML; MG/100ML
INJECTION, SOLUTION INTRAVENOUS CONTINUOUS PRN
Status: DISCONTINUED | OUTPATIENT
Start: 2020-11-11 | End: 2020-11-11

## 2020-11-11 RX ADMIN — SODIUM BICARBONATE TAB 325 MG 1300 MG: 325 TAB at 06:11

## 2020-11-11 RX ADMIN — ACETAMINOPHEN 500 MG: 500 TABLET ORAL at 09:11

## 2020-11-11 RX ADMIN — PROPOFOL 5 MG: 10 INJECTION, EMULSION INTRAVENOUS at 04:11

## 2020-11-11 RX ADMIN — CARVEDILOL 12.5 MG: 12.5 TABLET, FILM COATED ORAL at 09:11

## 2020-11-11 RX ADMIN — HYDRALAZINE HYDROCHLORIDE 25 MG: 25 TABLET ORAL at 09:11

## 2020-11-11 RX ADMIN — DIPHENHYDRAMINE HYDROCHLORIDE 25 MG: 50 INJECTION INTRAMUSCULAR; INTRAVENOUS at 01:11

## 2020-11-11 RX ADMIN — Medication 50 MG: at 03:11

## 2020-11-11 RX ADMIN — PROPOFOL 40 MG: 10 INJECTION, EMULSION INTRAVENOUS at 03:11

## 2020-11-11 RX ADMIN — HYDRALAZINE HYDROCHLORIDE 25 MG: 25 TABLET ORAL at 05:11

## 2020-11-11 RX ADMIN — PROPOFOL 5 MG: 10 INJECTION, EMULSION INTRAVENOUS at 03:11

## 2020-11-11 RX ADMIN — DIPHENHYDRAMINE HYDROCHLORIDE 25 MG: 50 INJECTION INTRAMUSCULAR; INTRAVENOUS at 06:11

## 2020-11-11 RX ADMIN — DEXTROSE MONOHYDRATE 12.5 G: 25 INJECTION, SOLUTION INTRAVENOUS at 03:11

## 2020-11-11 RX ADMIN — LIDOCAINE 1 PATCH: 50 PATCH TOPICAL at 06:11

## 2020-11-11 RX ADMIN — SODIUM BICARBONATE TAB 325 MG 1300 MG: 325 TAB at 09:11

## 2020-11-11 RX ADMIN — PANTOPRAZOLE SODIUM 40 MG: 40 INJECTION, POWDER, FOR SOLUTION INTRAVENOUS at 09:11

## 2020-11-11 RX ADMIN — MEROPENEM AND SODIUM CHLORIDE 1 G: 1 INJECTION, SOLUTION INTRAVENOUS at 09:11

## 2020-11-11 RX ADMIN — SODIUM CHLORIDE, SODIUM LACTATE, POTASSIUM CHLORIDE, AND CALCIUM CHLORIDE: .6; .31; .03; .02 INJECTION, SOLUTION INTRAVENOUS at 02:11

## 2020-11-11 RX ADMIN — MUPIROCIN: 20 OINTMENT TOPICAL at 09:11

## 2020-11-11 RX ADMIN — NIFEDIPINE 90 MG: 30 TABLET, FILM COATED, EXTENDED RELEASE ORAL at 09:11

## 2020-11-11 RX ADMIN — INSULIN DETEMIR 5 UNITS: 100 INJECTION, SOLUTION SUBCUTANEOUS at 09:11

## 2020-11-11 RX ADMIN — Medication 50 MG: at 04:11

## 2020-11-11 RX ADMIN — POLYETHYLENE GLYCOL 3350, SODIUM SULFATE ANHYDROUS, SODIUM BICARBONATE, SODIUM CHLORIDE, POTASSIUM CHLORIDE 2000 ML: 236; 22.74; 6.74; 5.86; 2.97 POWDER, FOR SOLUTION ORAL at 03:11

## 2020-11-11 NOTE — PROGRESS NOTES
[facility] Hospital Medicine  Progress Note    Patient Name: Joyce Riley  MRN: [id number]  Patient Class: IP- Inpatient   Admission Date: 11/7/2020  Length of Stay: 4 days  Attending Physician: Veronica Berry MD  Primary Care Provider: Kalpana Staton MD        Subjective:     Principal Problem:Acute blood loss anemia        HPI:  Mrs. Riley is a 86 yo F known to me for an admission and discharge to [facility] yesterday.  She had a few day hospital stay for evaluation and management of resistant UTI with Klebsiella and pseudomonas. She was discharged back to [facility] (for Abx) on 11/6 with Meropenem for 10 days per ID recs.  The patient returns today with dark and maroon stools with visible blood clots. Not on any blood thinners. No abdominal pain. Hgb was 7.7 yesterday- today 6.5. GI was consulted and received one unit of blood.  Patient has a LAPOST for DNR but daughter says full code.      Overview/Hospital Course:  Mrs. Riley is a 86 yo F known to me for an admission and discharge to [facility] yesterday.  She had a few day hospital stay for evaluation and management of resistant UTI with Klebsiella and pseudomonas. She was discharged back to [facility] (for Abx) on 11/6 with Meropenem for 10 days per ID recs.  The patient returns today with dark and maroon stools with visible blood clots. Not on any blood thinners. No abdominal pain. Hgb was 7.7 yesterday- today 6.5. GI was consulted and received one unit of blood.  Patient has a LAPOST for DNR but daughter says full code.  Palliative care investigating further. GI consulted and patient refuses C-scope.  Another unit of blood given on 11/9. PT/OT eval- back to NH ()- basic.   Patient and family finally agree with endoscopic evaluation,will have colonoscopy today,    Interval History: No new issues     Review of Systems   Constitutional: Negative for activity change, appetite change, chills, diaphoresis, fatigue and fever.   HENT:  Negative for congestion and dental problem.    Eyes: Negative for discharge and itching.   Respiratory: Negative for cough, choking, shortness of breath and stridor.    Cardiovascular: Negative for chest pain, palpitations and leg swelling.   Gastrointestinal: Negative for abdominal pain, blood in stool, constipation, diarrhea, nausea and rectal pain.   Genitourinary: Negative for difficulty urinating, dyspareunia and enuresis.   Musculoskeletal: Positive for arthralgias and neck pain.   Neurological: Negative for dizziness.   Hematological: Negative for adenopathy.   Psychiatric/Behavioral: Negative for agitation and behavioral problems.     Objective:     Vital Signs (Most Recent):  Temp: 98.7 °F (37.1 °C) (11/11/20 0809)  Pulse: 91 (11/11/20 0809)  Resp: 18 (11/11/20 0809)  BP: (!) 166/69 (11/11/20 0809)  SpO2: 97 % (11/11/20 0809) Vital Signs (24h Range):  Temp:  [97.8 °F (36.6 °C)-98.7 °F (37.1 °C)] 98.7 °F (37.1 °C)  Pulse:  [83-97] 91  Resp:  [16-18] 18  SpO2:  [97 %-100 %] 97 %  BP: (133-166)/(60-76) 166/69     Weight: 71.7 kg (158 lb 1.1 oz)  Body mass index is 25.51 kg/m².    Intake/Output Summary (Last 24 hours) at 11/11/2020 0847  Last data filed at 11/10/2020 1831  Gross per 24 hour   Intake 400 ml   Output --   Net 400 ml      Physical Exam  Vitals signs and nursing note reviewed.   Constitutional:       General: She is not in acute distress.     Appearance: Normal appearance. She is not ill-appearing, toxic-appearing or diaphoretic.   HENT:      Head: Normocephalic and atraumatic.   Cardiovascular:      Rate and Rhythm: Normal rate and regular rhythm.      Heart sounds: No murmur. No gallop.    Pulmonary:      Effort: Pulmonary effort is normal. No respiratory distress.      Breath sounds: No stridor. No wheezing or rales.   Chest:      Chest wall: No tenderness.   Abdominal:      General: Abdomen is flat. Bowel sounds are normal.      Tenderness: There is no abdominal tenderness. There is no guarding  or rebound.   Neurological:      General: No focal deficit present.      Mental Status: She is alert and oriented to person, place, and time.   Psychiatric:         Mood and Affect: Mood normal.         Behavior: Behavior normal.         Thought Content: Thought content normal.         Significant Labs:   BMP:   Recent Labs   Lab 11/11/20  0615   GLU 72      K 4.3      CO2 26   BUN 53*   CREATININE 4.9*   CALCIUM 9.3     CBC:   Recent Labs   Lab 11/09/20  1941 11/10/20  0221 11/10/20  0538   HGB 8.7* 7.8* 7.9*       Significant Imaging:       Assessment/Plan:      * Acute blood loss anemia  Likely lower GI bleed. Possibly diverticular. 7.7 to 6.5 Hgb over past 24 hours. Visible bleeding. PPI. GI consult.  Unit of blood.     Hgb from 6.9 to 7.4 today. Patient does not want C-scope eval. Monitor H/H today- if stable- then to   [facility] tomorrow     Hgb at 7. Low but stable. Will give one unit of blood.  Refuses C-scope. GI following.  If H/H stable on 11/10- back to NH  ([place])    Patient and family finally agree with endoscopic evaluation,will have colonoscopy today,    Lower GI bleeding  Patient and family finally agree with endoscopic evaluation,keep NPO past MN.      UTI (urinary tract infection)  Reported R Klebsiella (?)  Pseudomonas here at our hospital.  6 more days of Meropenem. End date 11/13.   No sepsis     Meropenem on d/c  11/13 end. Has picc line       Palliative care encounter  Advance Care Planning         Patient's daughter stated full code over the weekend. This is different than DNR status in past with YUN.  Palliative care will address/clarify on 11/9 (Today).      History of stroke  No acute issues   Last time PT/OT eval- return to NH basic      Paroxysmal atrial fibrillation  Does not appear to be on any NOAC   History of bleeding reported in the past       Chronic diastolic heart failure  Stable       Renal hypertension  No acute issues. Resume home meds       Anemia of renal  disease  Now with acute blood loss anemia as noted.       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  No acute issues      CKD stage 5  At baseline      Type 2 diabetes mellitus, controlled, with renal complications  No acute issues. A1c last stay. Sliding scale for now. accu checks  Home insulin         VTE Risk Mitigation (From admission, onward)    None          Discharge Planning   PAM:      Code Status: Full Code   Is the patient medically ready for discharge?:     Reason for patient still in hospital (select all that apply): Patient trending condition  Discharge Plan A: Return to nursing home                  Veronica Berry MD  Department of Hospital Medicine   [facility]

## 2020-11-11 NOTE — NURSING
19:14 Received bedside report from KETAN Arteaga. Patient alert and oriented. Complaints of neck pain - just had PRN pain meds. Cardiac monitor in place, on room air. V line intact - saline locked. Call bell given on her reach, instructed to call for assistance all the time. Bed on lowest position. Bed alarm on. Safety maintained.

## 2020-11-11 NOTE — ASSESSMENT & PLAN NOTE
Likely lower GI bleed. Possibly diverticular. 7.7 to 6.5 Hgb over past 24 hours. Visible bleeding. PPI. GI consult.  Unit of blood.     Hgb from 6.9 to 7.4 today. Patient does not want C-scope eval. Monitor H/H today- if stable- then to   [facility] tomorrow     Hgb at 7. Low but stable. Will give one unit of blood.  Refuses C-scope. GI following.  If H/H stable on 11/10- back to NH  ([place])    Patient and family finally agree with endoscopic evaluation,will have colonoscopy today,

## 2020-11-11 NOTE — NURSING
Called lab to check status of blood, states that she has not been cross matched yet and will call me when it is ready.

## 2020-11-11 NOTE — PLAN OF CARE
Problem: Fall Injury Risk  Goal: Absence of Fall and Fall-Related Injury  Outcome: Ongoing, Progressing  Intervention: Identify and Manage Contributors to Fall Injury Risk  Flowsheets (Taken 11/11/2020 0743)  Self-Care Promotion:   independence encouraged   BADL personal objects within reach  Medication Review/Management: medications reviewed  Intervention: Promote Injury-Free Environment  Flowsheets (Taken 11/11/2020 0743)  Safety Promotion/Fall Prevention:   assistive device/personal item within reach   bed alarm set   instructed to call staff for mobility   nonskid shoes/socks when out of bed   side rails raised x 2  Environmental Safety Modification:   assistive device/personal items within reach   clutter free environment maintained

## 2020-11-11 NOTE — PLAN OF CARE
11/11/20 1134   Medicare Message   Important Message from Medicare regarding Discharge Appeal Rights Given to patient/caregiver;Explained to patient/caregiver;Signed/date by patient/caregiver   Date IMM was signed 11/11/20   Time IMM was signed 1135

## 2020-11-11 NOTE — [employer]ATION PATIENT INSTRUCTIONS
Discharge Summary/Instructions after an Endoscopic Procedure  Patient Name: Joyce Riley  Patient MRN: [id number]  Patient YOB: 1933  Wednesday, November 11, 2020  Mary Lake MD  RESTRICTIONS:  During your procedure today, you received medications for sedation.  These   medications may affect your judgment, balance and coordination.  Therefore,   for 24 hours, you have the following restrictions:   - DO NOT drive a car, operate machinery, make legal/financial decisions,   sign important papers or drink alcohol.    ACTIVITY:  Today: no heavy lifting, straining or running due to procedural   sedation/anesthesia.  The following day: return to full activity including work.  DIET:  Eat and drink normally unless instructed otherwise.     TREATMENT FOR COMMON SIDE EFFECTS:  - Mild abdominal pain, nausea, belching, bloating or excessive gas:  rest,   eat lightly and use a heating pad.  - Sore Throat: treat with throat lozenges and/or gargle with warm salt   water.  - Because air was used during the procedure, expelling large amounts of air   from your rectum or belching is normal.  - If a bowel prep was taken, you may not have a bowel movement for 1-3 days.    This is normal.  SYMPTOMS TO WATCH FOR AND REPORT TO YOUR PHYSICIAN:  1. Abdominal pain or bloating, other than gas cramps.  2. Chest pain.  3. Back pain.  4. Signs of infection such as: chills or fever occurring within 24 hours   after the procedure.  5. Rectal bleeding, which would show as bright red, maroon, or black stools.   (A tablespoon of blood from the rectum is not serious, especially if   hemorrhoids are present.)  6. Vomiting.  7. Weakness or dizziness.  GO DIRECTLY TO THE NEAREST EMERGENCY ROOM IF YOU HAVE ANY OF THE FOLLOWING:      Difficulty breathing              Chills and/or fever over 101 F   Persistent vomiting and/or vomiting blood   Severe abdominal pain   Severe chest pain   Black, tarry stools   Bleeding- more than one  tablespoon   Any other symptom or condition that you feel may need urgent attention  Your doctor recommends these additional instructions:  If any biopsies were taken, your doctors clinic will contact you in 1 to 2   weeks with any results.  - Ok to discharge patient to home today from a GI standpoint.   - Resume previous diet.   - Continue present medications.   - Await pathology results.   - Repeat colonoscopy is not recommended for surveillance due to age.  For questions, problems or results please call your physician - Mary Lake MD at Work:  [phone].  [facility] can be reached at [phone]     IF A COMPLICATION OR EMERGENCY SITUATION ARISES AND YOU ARE UNABLE TO REACH   YOUR PHYSICIAN - GO DIRECTLY TO THE EMERGENCY ROOM.  Mary Lake MD  11/11/2020 4:37:01 PM  This report has been verified and signed electronically.  [employer]

## 2020-11-11 NOTE — PLAN OF CARE
Procedure complete. Awake and alert. Daughter at bedside. Resp even and unlabored. SAT's 96% on RA. 240 ml [place] juice PO, Tolerated well.  No acte distress noted. Report given to Eloisa PATINO primary nurse.

## 2020-11-11 NOTE — TRANSFER OF CARE
"Anesthesia Transfer of Care Note    Patient: Joyce Riley    Procedure(s) Performed: Procedure(s) (LRB):  COLONOSCOPY (N/A)    Patient location: GI    Anesthesia Type: general    Transport from OR: Transported from OR on room air with adequate spontaneous ventilation    Post pain: adequate analgesia    Post assessment: no apparent anesthetic complications and tolerated procedure well    Post vital signs: stable    Level of consciousness: awake and alert    Nausea/Vomiting: no nausea/vomiting    Complications: none    Transfer of care protocol was followed      Last vitals:   Visit Vitals  BP (!) 143/55 (BP Location: Left arm, Patient Position: Lying)   Pulse 93   Temp 36.8 °C (98.2 °F) (Oral)   Resp 14   Ht 5' 8" (1.727 m)   Wt 72.6 kg (160 lb)   LMP 07/29/1966 (Approximate)   SpO2 97%   Breastfeeding No   BMI 24.33 kg/m²     "

## 2020-11-11 NOTE — SUBJECTIVE & OBJECTIVE
Interval History: No new issues     Review of Systems   Constitutional: Negative for activity change, appetite change, chills, diaphoresis, fatigue and fever.   HENT: Negative for congestion and dental problem.    Eyes: Negative for discharge and itching.   Respiratory: Negative for cough, choking, shortness of breath and stridor.    Cardiovascular: Negative for chest pain, palpitations and leg swelling.   Gastrointestinal: Negative for abdominal pain, blood in stool, constipation, diarrhea, nausea and rectal pain.   Genitourinary: Negative for difficulty urinating, dyspareunia and enuresis.   Musculoskeletal: Positive for arthralgias and neck pain.   Neurological: Negative for dizziness.   Hematological: Negative for adenopathy.   Psychiatric/Behavioral: Negative for agitation and behavioral problems.     Objective:     Vital Signs (Most Recent):  Temp: 98.7 °F (37.1 °C) (11/11/20 0809)  Pulse: 91 (11/11/20 0809)  Resp: 18 (11/11/20 0809)  BP: (!) 166/69 (11/11/20 0809)  SpO2: 97 % (11/11/20 0809) Vital Signs (24h Range):  Temp:  [97.8 °F (36.6 °C)-98.7 °F (37.1 °C)] 98.7 °F (37.1 °C)  Pulse:  [83-97] 91  Resp:  [16-18] 18  SpO2:  [97 %-100 %] 97 %  BP: (133-166)/(60-76) 166/69     Weight: 71.7 kg (158 lb 1.1 oz)  Body mass index is 25.51 kg/m².    Intake/Output Summary (Last 24 hours) at 11/11/2020 0847  Last data filed at 11/10/2020 1831  Gross per 24 hour   Intake 400 ml   Output --   Net 400 ml      Physical Exam  Vitals signs and nursing note reviewed.   Constitutional:       General: She is not in acute distress.     Appearance: Normal appearance. She is not ill-appearing, toxic-appearing or diaphoretic.   HENT:      Head: Normocephalic and atraumatic.   Cardiovascular:      Rate and Rhythm: Normal rate and regular rhythm.      Heart sounds: No murmur. No gallop.    Pulmonary:      Effort: Pulmonary effort is normal. No respiratory distress.      Breath sounds: No stridor. No wheezing or rales.   Chest:       Chest wall: No tenderness.   Abdominal:      General: Abdomen is flat. Bowel sounds are normal.      Tenderness: There is no abdominal tenderness. There is no guarding or rebound.   Neurological:      General: No focal deficit present.      Mental Status: She is alert and oriented to person, place, and time.   Psychiatric:         Mood and Affect: Mood normal.         Behavior: Behavior normal.         Thought Content: Thought content normal.         Significant Labs:   BMP:   Recent Labs   Lab 11/11/20  0615   GLU 72      K 4.3      CO2 26   BUN 53*   CREATININE 4.9*   CALCIUM 9.3     CBC:   Recent Labs   Lab 11/09/20  1941 11/10/20  0221 11/10/20  0538   HGB 8.7* 7.8* 7.9*       Significant Imaging:

## 2020-11-11 NOTE — NURSING
Patient have 5 episode of loose stool with small amount of blood clots noted after taking Go lytly.

## 2020-11-12 LAB
ANION GAP SERPL CALC-SCNC: 11 MMOL/L (ref 8–16)
BASOPHILS # BLD AUTO: 0.07 K/UL (ref 0–0.2)
BASOPHILS NFR BLD: 0.7 % (ref 0–1.9)
BUN SERPL-MCNC: 48 MG/DL (ref 8–23)
CALCIUM SERPL-MCNC: 9.2 MG/DL (ref 8.7–10.5)
CHLORIDE SERPL-SCNC: 106 MMOL/L (ref 95–110)
CO2 SERPL-SCNC: 25 MMOL/L (ref 23–29)
CREAT SERPL-MCNC: 4.6 MG/DL (ref 0.5–1.4)
DIFFERENTIAL METHOD: ABNORMAL
EOSINOPHIL # BLD AUTO: 0.1 K/UL (ref 0–0.5)
EOSINOPHIL NFR BLD: 1.4 % (ref 0–8)
ERYTHROCYTE [DISTWIDTH] IN BLOOD BY AUTOMATED COUNT: 15.3 % (ref 11.5–14.5)
EST. GFR  (AFRICAN AMERICAN): 9 ML/MIN/1.73 M^2
EST. GFR  (NON AFRICAN AMERICAN): 8 ML/MIN/1.73 M^2
GLUCOSE SERPL-MCNC: 78 MG/DL (ref 70–110)
HCT VFR BLD AUTO: 23.3 % (ref 37–48.5)
HGB BLD-MCNC: 7.7 G/DL (ref 12–16)
IMM GRANULOCYTES # BLD AUTO: 0.09 K/UL (ref 0–0.04)
IMM GRANULOCYTES NFR BLD AUTO: 0.9 % (ref 0–0.5)
LYMPHOCYTES # BLD AUTO: 1.7 K/UL (ref 1–4.8)
LYMPHOCYTES NFR BLD: 16.9 % (ref 18–48)
MCH RBC QN AUTO: 30.8 PG (ref 27–31)
MCHC RBC AUTO-ENTMCNC: 33 G/DL (ref 32–36)
MCV RBC AUTO: 93 FL (ref 82–98)
MONOCYTES # BLD AUTO: 0.4 K/UL (ref 0.3–1)
MONOCYTES NFR BLD: 4.4 % (ref 4–15)
NEUTROPHILS # BLD AUTO: 7.6 K/UL (ref 1.8–7.7)
NEUTROPHILS NFR BLD: 75.7 % (ref 38–73)
NRBC BLD-RTO: 0 /100 WBC
PLATELET # BLD AUTO: 198 K/UL (ref 150–350)
PMV BLD AUTO: 9.9 FL (ref 9.2–12.9)
POCT GLUCOSE: 107 MG/DL (ref 70–110)
POCT GLUCOSE: 118 MG/DL (ref 70–110)
POCT GLUCOSE: 165 MG/DL (ref 70–110)
POCT GLUCOSE: 75 MG/DL (ref 70–110)
POTASSIUM SERPL-SCNC: 4 MMOL/L (ref 3.5–5.1)
RBC # BLD AUTO: 2.5 M/UL (ref 4–5.4)
SODIUM SERPL-SCNC: 142 MMOL/L (ref 136–145)
WBC # BLD AUTO: 10.08 K/UL (ref 3.9–12.7)

## 2020-11-12 PROCEDURE — 99232 SBSQ HOSP IP/OBS MODERATE 35: CPT | Mod: ,,, | Performed by: INTERNAL MEDICINE

## 2020-11-12 PROCEDURE — 94761 N-INVAS EAR/PLS OXIMETRY MLT: CPT

## 2020-11-12 PROCEDURE — 25000003 PHARM REV CODE 250: Performed by: HOSPITALIST

## 2020-11-12 PROCEDURE — C9113 INJ PANTOPRAZOLE SODIUM, VIA: HCPCS | Performed by: INTERNAL MEDICINE

## 2020-11-12 PROCEDURE — 80048 BASIC METABOLIC PNL TOTAL CA: CPT

## 2020-11-12 PROCEDURE — 99232 PR SUBSEQUENT HOSPITAL CARE,LEVL II: ICD-10-PCS | Mod: ,,, | Performed by: INTERNAL MEDICINE

## 2020-11-12 PROCEDURE — 99900035 HC TECH TIME PER 15 MIN (STAT)

## 2020-11-12 PROCEDURE — 63600175 PHARM REV CODE 636 W HCPCS: Performed by: INTERNAL MEDICINE

## 2020-11-12 PROCEDURE — 21400001 HC TELEMETRY ROOM

## 2020-11-12 PROCEDURE — 85025 COMPLETE CBC W/AUTO DIFF WBC: CPT

## 2020-11-12 PROCEDURE — 25000003 PHARM REV CODE 250: Performed by: INTERNAL MEDICINE

## 2020-11-12 RX ORDER — OXYCODONE AND ACETAMINOPHEN 5; 325 MG/1; MG/1
1 TABLET ORAL EVERY 6 HOURS PRN
Status: DISCONTINUED | OUTPATIENT
Start: 2020-11-12 | End: 2020-11-18 | Stop reason: HOSPADM

## 2020-11-12 RX ADMIN — PANTOPRAZOLE SODIUM 40 MG: 40 INJECTION, POWDER, FOR SOLUTION INTRAVENOUS at 08:11

## 2020-11-12 RX ADMIN — HYDRALAZINE HYDROCHLORIDE 25 MG: 25 TABLET ORAL at 03:11

## 2020-11-12 RX ADMIN — OXYCODONE HYDROCHLORIDE AND ACETAMINOPHEN 1 TABLET: 5; 325 TABLET ORAL at 08:11

## 2020-11-12 RX ADMIN — CALCITRIOL CAPSULES 0.25 MCG 0.25 MCG: 0.25 CAPSULE ORAL at 08:11

## 2020-11-12 RX ADMIN — SODIUM BICARBONATE TAB 325 MG 1300 MG: 325 TAB at 08:11

## 2020-11-12 RX ADMIN — LIDOCAINE 1 PATCH: 50 PATCH TOPICAL at 03:11

## 2020-11-12 RX ADMIN — HYDRALAZINE HYDROCHLORIDE 25 MG: 25 TABLET ORAL at 11:11

## 2020-11-12 RX ADMIN — MEROPENEM AND SODIUM CHLORIDE 1 G: 1 INJECTION, SOLUTION INTRAVENOUS at 08:11

## 2020-11-12 RX ADMIN — CARVEDILOL 12.5 MG: 12.5 TABLET, FILM COATED ORAL at 08:11

## 2020-11-12 RX ADMIN — HYDRALAZINE HYDROCHLORIDE 25 MG: 25 TABLET ORAL at 05:11

## 2020-11-12 RX ADMIN — NIFEDIPINE 90 MG: 30 TABLET, FILM COATED, EXTENDED RELEASE ORAL at 08:11

## 2020-11-12 RX ADMIN — DOCUSATE SODIUM 50 MG AND SENNOSIDES 8.6 MG 1 TABLET: 8.6; 5 TABLET, FILM COATED ORAL at 08:11

## 2020-11-12 RX ADMIN — SODIUM BICARBONATE TAB 325 MG 1300 MG: 325 TAB at 03:11

## 2020-11-12 RX ADMIN — INSULIN DETEMIR 5 UNITS: 100 INJECTION, SOLUTION SUBCUTANEOUS at 08:11

## 2020-11-12 RX ADMIN — MUPIROCIN: 20 OINTMENT TOPICAL at 08:11

## 2020-11-12 RX ADMIN — TRAMADOL HYDROCHLORIDE 50 MG: 50 TABLET, FILM COATED ORAL at 08:11

## 2020-11-12 NOTE — NURSING
Received bedside report from SUKUMAR Garcia last night. Alert and oriented. Family member at bedside. Complaints of neck pain - pain patch intact. No sign of distress. Neck supported by own pillow. PICC line intact - saline locked. Call bell given on her reach, instructed to call for assistance all the time. Incontinent of urine - kept dry and comfortable. Patient turn to side well. Bed on lowest position. Bed alarm on. Safety maintained.

## 2020-11-12 NOTE — PLAN OF CARE
11/12/20 0946   Discharge Reassessment   Assessment Type Discharge Planning Reassessment   Provided patient/caregiver education on the expected discharge date and the discharge plan No   Do you have any problems affording any of your prescribed medications? No   Discharge Plan A Return to nursing home   DME Needed Upon Discharge  wheelchair   Patient choice form signed by patient/caregiver Yes   Anticipated Discharge Disposition correction Nu   Can the patient/caregiver answer the patient profile reliably? Yes, cognitively intact   How does the patient rate their overall health at the present time? Fair   Describe the patient's ability to walk at the present time. Walks with the help of equipment   How often would a person be available to care for the patient? Whenever needed   Number of comorbid conditions (as recorded on the chart) Four   During the past month, has the patient often been bothered by feeling down, depressed or hopeless? No   During the past month, has the patient often been bothered by little interest or pleasure in doing things? No   Post-Acute Status   Post-Acute Authorization Placement   Post-Acute Placement Status Awaiting Internal Medical Clearance   Patient choice form signed by patient/caregiver List with quality metrics by geographic area provided  (Pt returning to [facility])   Discharge Delays None known at this time     Updated clinicals faxed to [facility] via [facility].

## 2020-11-12 NOTE — ASSESSMENT & PLAN NOTE
Likely lower GI bleed. Possibly diverticular. 7.7 to 6.5 Hgb over past 24 hours. Visible bleeding. PPI. GI consult.  Unit of blood.     Hgb from 6.9 to 7.4 today. Patient does not want C-scope eval. Monitor H/H today- if stable- then to   [facility] tomorrow     Hgb at 7. Low but stable. Will give one unit of blood.  Refuses C-scope. GI following.  If H/H stable on 11/10- back to NH  ([place])    Patient and family finally agree with endoscopic evaluation,S/P colonoscopy,has diverticulosis and polyps with no bleeding,but she had today again hematochezia.\

## 2020-11-12 NOTE — PROGRESS NOTES
[facility] Medicine  Progress Note    Patient Name: Joyce Riley  MRN: [id number]  Patient Class: IP- Inpatient   Admission Date: 11/7/2020  Length of Stay: 5 days  Attending Physician: Veronica Berry MD  Primary Care Provider: Kalpana Staton MD        Subjective:     Principal Problem:Acute blood loss anemia        HPI:  Mrs. Riley is a 88 yo F known to me for an admission and discharge to [facility] yesterday.  She had a few day hospital stay for evaluation and management of resistant UTI with Klebsiella and pseudomonas. She was discharged back to [facility] (for Abx) on 11/6 with Meropenem for 10 days per ID recs.  The patient returns today with dark and maroon stools with visible blood clots. Not on any blood thinners. No abdominal pain. Hgb was 7.7 yesterday- today 6.5. GI was consulted and received one unit of blood.  Patient has a LAPOST for DNR but daughter says full code.      Overview/Hospital Course:  Mrs. Riley is a 88 yo F known to me for an admission and discharge to [facility] yesterday.  She had a few day hospital stay for evaluation and management of resistant UTI with Klebsiella and pseudomonas. She was discharged back to [facility] (for Abx) on 11/6 with Meropenem for 10 days per ID recs.  The patient returns today with dark and maroon stools with visible blood clots. Not on any blood thinners. No abdominal pain. Hgb was 7.7 yesterday- today 6.5. GI was consulted and received one unit of blood.  Patient has a LAPOST for DNR but daughter says full code.  Palliative care investigating further. GI consulted and patient refuses C-scope.  Another unit of blood given on 11/9. PT/OT eval- back to NH ()- basic.   Patient and family finally agree with endoscopic evaluation,S/P colonoscopy,has diverticulosis and polyps with no bleeding,but she had today again hematochezia.\  Still complain of neck pain,will do X ray and  consult neurosurgery.on pain  medications.    Interval History: No new issues     Review of Systems   Constitutional: Negative for activity change, appetite change, chills, diaphoresis, fatigue and fever.   HENT: Negative for congestion and dental problem.    Eyes: Negative for discharge and itching.   Respiratory: Negative for cough, choking, shortness of breath and stridor.    Cardiovascular: Negative for chest pain, palpitations and leg swelling.   Gastrointestinal: Negative for abdominal pain, blood in stool, constipation, diarrhea, nausea and rectal pain.   Genitourinary: Negative for difficulty urinating, dyspareunia and enuresis.   Musculoskeletal: Positive for arthralgias and neck pain.   Neurological: Negative for dizziness.   Hematological: Negative for adenopathy.   Psychiatric/Behavioral: Negative for agitation and behavioral problems.     Objective:     Vital Signs (Most Recent):  Temp: 98.8 °F (37.1 °C) (11/12/20 1121)  Pulse: 96 (11/12/20 1121)  Resp: 18 (11/12/20 1121)  BP: (!) 154/68 (11/12/20 1121)  SpO2: 98 % (11/12/20 1121) Vital Signs (24h Range):  Temp:  [97.7 °F (36.5 °C)-99.1 °F (37.3 °C)] 98.8 °F (37.1 °C)  Pulse:  [79-98] 96  Resp:  [14-20] 18  SpO2:  [95 %-100 %] 98 %  BP: (124-164)/(55-78) 154/68     Weight: 71.7 kg (158 lb 1.1 oz)  Body mass index is 24.03 kg/m².    Intake/Output Summary (Last 24 hours) at 11/12/2020 1131  Last data filed at 11/12/2020 0845  Gross per 24 hour   Intake 1308.33 ml   Output --   Net 1308.33 ml      Physical Exam  Vitals signs and nursing note reviewed.   Constitutional:       General: She is not in acute distress.     Appearance: Normal appearance. She is not ill-appearing, toxic-appearing or diaphoretic.   HENT:      Head: Normocephalic and atraumatic.   Cardiovascular:      Rate and Rhythm: Normal rate and regular rhythm.      Heart sounds: No murmur. No gallop.    Pulmonary:      Effort: Pulmonary effort is normal. No respiratory distress.      Breath sounds: No stridor. No wheezing  or rales.   Chest:      Chest wall: No tenderness.   Abdominal:      General: Abdomen is flat. Bowel sounds are normal.      Tenderness: There is no abdominal tenderness. There is no guarding or rebound.   Neurological:      General: No focal deficit present.      Mental Status: She is alert and oriented to person, place, and time.   Psychiatric:         Mood and Affect: Mood normal.         Behavior: Behavior normal.         Thought Content: Thought content normal.         Significant Labs:   BMP:   Recent Labs   Lab 11/12/20  0436   GLU 78      K 4.0      CO2 25   BUN 48*   CREATININE 4.6*   CALCIUM 9.2     CBC:   Recent Labs   Lab 11/11/20  0615 11/12/20  0436   WBC 8.55 10.08   HGB 6.3* 7.7*   HCT 20.0* 23.3*    198       Significant Imaging:       Assessment/Plan:      * Acute blood loss anemia  Likely lower GI bleed. Possibly diverticular. 7.7 to 6.5 Hgb over past 24 hours. Visible bleeding. PPI. GI consult.  Unit of blood.     Hgb from 6.9 to 7.4 today. Patient does not want C-scope eval. Monitor H/H today- if stable- then to   [facility] tomorrow     Hgb at 7. Low but stable. Will give one unit of blood.  Refuses C-scope. GI following.  If H/H stable on 11/10- back to NH  ([place])    Patient and family finally agree with endoscopic evaluation,S/P colonoscopy,has diverticulosis and polyps with no bleeding,but she had today again hematochezia.\      Lower GI bleeding  Patient and family finally agree with endoscopic evaluation,S/P colonoscopy,has diverticulosis and polyps with no bleeding,but she had today again hematochezia.\        UTI (urinary tract infection)  Reported R Klebsiella (?)  Pseudomonas here at our hospital.  6 more days of Meropenem. End date 11/13.   No sepsis     Meropenem on d/c  11/13 end. Has picc line       Palliative care encounter  Advance Care Planning         Patient's daughter stated full code over the weekend. This is different than DNR status in past with YUN.   Palliative care will address/clarify on 11/9 (Today).      Neck pain, chronic    Still complain of neck pain,will do X ray and  consult neurosurgery.on pain medications.      History of stroke  No acute issues   Last time PT/OT eval- return to NH basic      Paroxysmal atrial fibrillation  Does not appear to be on any NOAC   History of bleeding reported in the past       Chronic diastolic heart failure  Stable       Renal hypertension  No acute issues. Resume home meds       Anemia of renal disease  Now with acute blood loss anemia as noted.       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  No acute issues      CKD stage 5  At baseline      Type 2 diabetes mellitus, controlled, with renal complications  No acute issues. A1c last stay. Sliding scale for now. accu checks  Home insulin         VTE Risk Mitigation (From admission, onward)    None          Discharge Planning   PAM:      Code Status: Full Code   Is the patient medically ready for discharge?:     Reason for patient still in hospital (select all that apply): Patient trending condition  Discharge Plan A: Return to nursing home   Discharge Delays: None known at this time              Veronica Berry MD  Department of Hospital Medicine   [facility]

## 2020-11-12 NOTE — ANESTHESIA POSTPROCEDURE EVALUATION
Anesthesia Post Evaluation    Patient: Joyce Riley    Procedure(s) Performed: Procedure(s) (LRB):  COLONOSCOPY (N/A)    Final Anesthesia Type: general    Patient location during evaluation: GI PACU  Patient participation: Yes- Able to Participate  Level of consciousness: awake and alert and oriented  Post-procedure vital signs: reviewed and stable  Pain management: adequate  Airway patency: patent    PONV status at discharge: No PONV  Anesthetic complications: no      Cardiovascular status: hemodynamically stable and blood pressure returned to baseline  Respiratory status: spontaneous ventilation, room air and unassisted  Hydration status: euvolemic  Follow-up not needed.          Vitals Value Taken Time   /70 11/11/20 1701   Temp 36.8 °C (98.2 °F) 11/11/20 1631   Pulse 79 11/11/20 1701   Resp 20 11/11/20 1701   SpO2 99 % 11/11/20 1701         Event Time   Out of Recovery 11/11/2020 17:08:52         Pain/Fabian Score: Pain Rating Prior to Med Admin: 6 (11/10/2020  6:31 PM)  Fabian Score: 10 (11/11/2020  5:01 PM)

## 2020-11-12 NOTE — NURSING
End of shift bedside report given to SUKUMAR Hernandez. Patient in no apparent distress.     12 hour chart check complete.

## 2020-11-12 NOTE — PLAN OF CARE
Problem: Diabetes Comorbidity  Goal: Blood Glucose Level Within Desired Range  Outcome: Ongoing, Progressing  Intervention: Maintain Glycemic Control  Flowsheets (Taken 11/12/2020 1733)  Glycemic Management:   blood glucose monitoring   supplemental insulin given

## 2020-11-12 NOTE — SUBJECTIVE & OBJECTIVE
Interval History: No new issues     Review of Systems   Constitutional: Negative for activity change, appetite change, chills, diaphoresis, fatigue and fever.   HENT: Negative for congestion and dental problem.    Eyes: Negative for discharge and itching.   Respiratory: Negative for cough, choking, shortness of breath and stridor.    Cardiovascular: Negative for chest pain, palpitations and leg swelling.   Gastrointestinal: Negative for abdominal pain, blood in stool, constipation, diarrhea, nausea and rectal pain.   Genitourinary: Negative for difficulty urinating, dyspareunia and enuresis.   Musculoskeletal: Positive for arthralgias and neck pain.   Neurological: Negative for dizziness.   Hematological: Negative for adenopathy.   Psychiatric/Behavioral: Negative for agitation and behavioral problems.     Objective:     Vital Signs (Most Recent):  Temp: 98.8 °F (37.1 °C) (11/12/20 1121)  Pulse: 96 (11/12/20 1121)  Resp: 18 (11/12/20 1121)  BP: (!) 154/68 (11/12/20 1121)  SpO2: 98 % (11/12/20 1121) Vital Signs (24h Range):  Temp:  [97.7 °F (36.5 °C)-99.1 °F (37.3 °C)] 98.8 °F (37.1 °C)  Pulse:  [79-98] 96  Resp:  [14-20] 18  SpO2:  [95 %-100 %] 98 %  BP: (124-164)/(55-78) 154/68     Weight: 71.7 kg (158 lb 1.1 oz)  Body mass index is 24.03 kg/m².    Intake/Output Summary (Last 24 hours) at 11/12/2020 1131  Last data filed at 11/12/2020 0845  Gross per 24 hour   Intake 1308.33 ml   Output --   Net 1308.33 ml      Physical Exam  Vitals signs and nursing note reviewed.   Constitutional:       General: She is not in acute distress.     Appearance: Normal appearance. She is not ill-appearing, toxic-appearing or diaphoretic.   HENT:      Head: Normocephalic and atraumatic.   Cardiovascular:      Rate and Rhythm: Normal rate and regular rhythm.      Heart sounds: No murmur. No gallop.    Pulmonary:      Effort: Pulmonary effort is normal. No respiratory distress.      Breath sounds: No stridor. No wheezing or rales.    Chest:      Chest wall: No tenderness.   Abdominal:      General: Abdomen is flat. Bowel sounds are normal.      Tenderness: There is no abdominal tenderness. There is no guarding or rebound.   Neurological:      General: No focal deficit present.      Mental Status: She is alert and oriented to person, place, and time.   Psychiatric:         Mood and Affect: Mood normal.         Behavior: Behavior normal.         Thought Content: Thought content normal.         Significant Labs:   BMP:   Recent Labs   Lab 11/12/20  0436   GLU 78      K 4.0      CO2 25   BUN 48*   CREATININE 4.6*   CALCIUM 9.2     CBC:   Recent Labs   Lab 11/11/20  0615 11/12/20  0436   WBC 8.55 10.08   HGB 6.3* 7.7*   HCT 20.0* 23.3*    198       Significant Imaging:

## 2020-11-12 NOTE — ASSESSMENT & PLAN NOTE
Patient and family finally agree with endoscopic evaluation,S/P colonoscopy,has diverticulosis and polyps with no bleeding,but she had today again hematochezia.\

## 2020-11-12 NOTE — PROGRESS NOTES
"[facility]    CC: hematochezia    HPI 87 y.o. female who presents for recurrent, moderate volume, painless hematochezia that has now resolved with associated anemia in the setting of Eliquis use.  Colonoscopy performed 11/12/20 showed diverticulosis throughout the colon which is the likely cause for her hematochezia.  Three small polyps were also removed.  This morning she denies abdominal pain, nausea, vomiting and hematochezia.  She is tolerating a regular breakfast.    Past Medical History  Past Medical History:   Diagnosis Date    Anemia     Anticoagulant long-term use     Arthritis     Atrial fibrillation     Cataract     CHF (congestive heart failure)     CKD (chronic kidney disease), stage V     Dermatophytosis     Diabetes mellitus     Type 2    GERD (gastroesophageal reflux disease)     GI bleed 12/2018    Gout     Gout     Hypertension     Obese     Requires assistance with all daily activities     Stroke 2014    Wheelchair dependent          Review of Systems  General ROS: negative for chills, fever or weight loss  Cardiovascular ROS: no chest pain or dyspnea on exertion  Gastrointestinal ROS: no abdominal pain, change in bowel habits, or black/ bloody stools    Physical Examination  BP (!) 160/72 (BP Location: Left arm, Patient Position: Lying)   Pulse 98   Temp 99.1 °F (37.3 °C) (Oral)   Resp 18   Ht 5' 8" (1.727 m)   Wt 71.7 kg (158 lb 1.1 oz)   LMP 07/29/1966 (Approximate)   SpO2 95%   Breastfeeding No   BMI 24.03 kg/m²   General appearance: alert, cooperative, no distress  HENT: Normocephalic, atraumatic, neck symmetrical, no nasal discharge   Lungs: clear to auscultation bilaterally, no dullness to percussion bilaterally  Heart: regular rate and rhythm without rub; no displacement of the PMI   Abdomen: soft, non-tender; bowel sounds normoactive; no organomegaly  Extremities: extremities symmetric; no clubbing, cyanosis, or edema  Neurologic: Alert and " oriented , moves all four extremities, no dysarthria    Labs:  Lab Results   Component Value Date    WBC 10.08 11/12/2020    HGB 7.7 (L) 11/12/2020    HCT 23.3 (L) 11/12/2020    MCV 93 11/12/2020     11/12/2020         CMP  Sodium   Date Value Ref Range Status   11/12/2020 142 136 - 145 mmol/L Final   10/19/2020 135 135 - 148 Final     Potassium   Date Value Ref Range Status   11/12/2020 4.0 3.5 - 5.1 mmol/L Final   10/19/2020 4.0 3.5 - 5.5 Final     Chloride   Date Value Ref Range Status   11/12/2020 106 95 - 110 mmol/L Final   10/19/2020 100 96 - 109 Final     CO2   Date Value Ref Range Status   11/12/2020 25 23 - 29 mmol/L Final   10/19/2020 22 20 - 32 Final     Glucose   Date Value Ref Range Status   11/12/2020 78 70 - 110 mg/dL Final     BUN   Date Value Ref Range Status   11/12/2020 48 (H) 8 - 23 mg/dL Final   10/19/2020 50 (H) 5 - 26 Final     Creatinine   Date Value Ref Range Status   11/12/2020 4.6 (H) 0.5 - 1.4 mg/dL Final   10/19/2020 4.52 (H) 0.50 - 1.50 Final     Calcium   Date Value Ref Range Status   11/12/2020 9.2 8.7 - 10.5 mg/dL Final   10/19/2020 9.6 8.5 - 10.6 Final     Total Protein   Date Value Ref Range Status   11/07/2020 6.6 6.0 - 8.4 g/dL Final     Albumin   Date Value Ref Range Status   11/07/2020 2.4 (L) 3.5 - 5.2 g/dL Final   10/05/2020 2.9 (L) 3.2 - 5.6 Final     Total Bilirubin   Date Value Ref Range Status   11/07/2020 0.3 0.1 - 1.0 mg/dL Final     Comment:     For infants and newborns, interpretation of results should be based  on gestational age, weight and in agreement with clinical  observations.  Premature Infant recommended reference ranges:  Up to 24 hours.............<8.0 mg/dL  Up to 48 hours............<12.0 mg/dL  3-5 days..................<15.0 mg/dL  6-29 days.................<15.0 mg/dL       Alkaline Phosphatase   Date Value Ref Range Status   11/07/2020 63 55 - 135 U/L Final     AST   Date Value Ref Range Status   11/07/2020 12 10 - 40 U/L Final   10/05/2020 15  0 - 40 Final     ALT   Date Value Ref Range Status   11/07/2020 6 (L) 10 - 44 U/L Final   10/05/2020 10 0 - 40 Final     Anion Gap   Date Value Ref Range Status   11/12/2020 11 8 - 16 mmol/L Final   10/19/2020 13 0 - 25 Final     eGFR if    Date Value Ref Range Status   11/12/2020 9 (A) >60 mL/min/1.73 m^2 Final     eGFR if non    Date Value Ref Range Status   11/12/2020 8 (A) >60 mL/min/1.73 m^2 Final     Comment:     Calculation used to obtain the estimated glomerular filtration  rate (eGFR) is the CKD-EPI equation.          Imaging: CXR 11/6/20 Right sided PICC in place.  Interstitial edema present    I have personally reviewed and interpreted these images.    Assessment:   1.  Lower GI bleeding likely secondary to diverticulosis noted throughout her colon now resolved  2.  Anemia of chronic inflammation is likely multifactorial due to CKD and recent GI bleeding  3.  Colon polyps removed on endoscopy 11/11/20    Plan:  -If bleeding re occurs then recommend STAT CT angiogram abdomen and IR consult to try to localize her bleeding source and for possible embolization.  - Monitor H/H, transfuse as needed.  -I will call the patient with her pathology results from her recent colonoscopy.  -Continue diet as tolerated.    GI will sign off, please call with questions or concerns.    Mary Lake MD

## 2020-11-13 LAB
ANION GAP SERPL CALC-SCNC: 11 MMOL/L (ref 8–16)
BASOPHILS # BLD AUTO: 0.05 K/UL (ref 0–0.2)
BASOPHILS NFR BLD: 0.5 % (ref 0–1.9)
BUN SERPL-MCNC: 44 MG/DL (ref 8–23)
CALCIUM SERPL-MCNC: 8.8 MG/DL (ref 8.7–10.5)
CHLORIDE SERPL-SCNC: 106 MMOL/L (ref 95–110)
CO2 SERPL-SCNC: 23 MMOL/L (ref 23–29)
CREAT SERPL-MCNC: 4.5 MG/DL (ref 0.5–1.4)
DIFFERENTIAL METHOD: ABNORMAL
EOSINOPHIL # BLD AUTO: 0.2 K/UL (ref 0–0.5)
EOSINOPHIL NFR BLD: 1.9 % (ref 0–8)
ERYTHROCYTE [DISTWIDTH] IN BLOOD BY AUTOMATED COUNT: 15.2 % (ref 11.5–14.5)
EST. GFR  (AFRICAN AMERICAN): 9 ML/MIN/1.73 M^2
EST. GFR  (NON AFRICAN AMERICAN): 8 ML/MIN/1.73 M^2
GLUCOSE SERPL-MCNC: 66 MG/DL (ref 70–110)
HCT VFR BLD AUTO: 22.6 % (ref 37–48.5)
HGB BLD-MCNC: 7.5 G/DL (ref 12–16)
IMM GRANULOCYTES # BLD AUTO: 0.05 K/UL (ref 0–0.04)
IMM GRANULOCYTES NFR BLD AUTO: 0.5 % (ref 0–0.5)
LYMPHOCYTES # BLD AUTO: 2 K/UL (ref 1–4.8)
LYMPHOCYTES NFR BLD: 21.1 % (ref 18–48)
MCH RBC QN AUTO: 30.9 PG (ref 27–31)
MCHC RBC AUTO-ENTMCNC: 33.2 G/DL (ref 32–36)
MCV RBC AUTO: 93 FL (ref 82–98)
MONOCYTES # BLD AUTO: 0.5 K/UL (ref 0.3–1)
MONOCYTES NFR BLD: 5.1 % (ref 4–15)
NEUTROPHILS # BLD AUTO: 6.7 K/UL (ref 1.8–7.7)
NEUTROPHILS NFR BLD: 70.9 % (ref 38–73)
NRBC BLD-RTO: 0 /100 WBC
PLATELET # BLD AUTO: 204 K/UL (ref 150–350)
PMV BLD AUTO: 9.7 FL (ref 9.2–12.9)
POCT GLUCOSE: 126 MG/DL (ref 70–110)
POCT GLUCOSE: 165 MG/DL (ref 70–110)
POCT GLUCOSE: 62 MG/DL (ref 70–110)
POTASSIUM SERPL-SCNC: 4.4 MMOL/L (ref 3.5–5.1)
RBC # BLD AUTO: 2.43 M/UL (ref 4–5.4)
SODIUM SERPL-SCNC: 140 MMOL/L (ref 136–145)
WBC # BLD AUTO: 9.43 K/UL (ref 3.9–12.7)

## 2020-11-13 PROCEDURE — 36415 COLL VENOUS BLD VENIPUNCTURE: CPT

## 2020-11-13 PROCEDURE — 94761 N-INVAS EAR/PLS OXIMETRY MLT: CPT

## 2020-11-13 PROCEDURE — 21400001 HC TELEMETRY ROOM

## 2020-11-13 PROCEDURE — 63600175 PHARM REV CODE 636 W HCPCS: Performed by: INTERNAL MEDICINE

## 2020-11-13 PROCEDURE — 80048 BASIC METABOLIC PNL TOTAL CA: CPT

## 2020-11-13 PROCEDURE — 99900035 HC TECH TIME PER 15 MIN (STAT)

## 2020-11-13 PROCEDURE — 85025 COMPLETE CBC W/AUTO DIFF WBC: CPT

## 2020-11-13 PROCEDURE — C9113 INJ PANTOPRAZOLE SODIUM, VIA: HCPCS | Performed by: INTERNAL MEDICINE

## 2020-11-13 PROCEDURE — 25000003 PHARM REV CODE 250: Performed by: HOSPITALIST

## 2020-11-13 PROCEDURE — 25000003 PHARM REV CODE 250: Performed by: INTERNAL MEDICINE

## 2020-11-13 PROCEDURE — 99223 PR INITIAL HOSPITAL CARE,LEVL III: ICD-10-PCS | Mod: ,,, | Performed by: PHYSICIAN ASSISTANT

## 2020-11-13 PROCEDURE — 99223 1ST HOSP IP/OBS HIGH 75: CPT | Mod: ,,, | Performed by: PHYSICIAN ASSISTANT

## 2020-11-13 RX ORDER — TROLAMINE SALICYLATE 10 G/100G
CREAM TOPICAL EVERY 6 HOURS PRN
Status: DISCONTINUED | OUTPATIENT
Start: 2020-11-13 | End: 2020-11-13

## 2020-11-13 RX ORDER — TROLAMINE SALICYLATE 10 G/100G
CREAM TOPICAL EVERY 6 HOURS PRN
Status: DISCONTINUED | OUTPATIENT
Start: 2020-11-13 | End: 2020-11-18 | Stop reason: HOSPADM

## 2020-11-13 RX ORDER — DIPHENHYDRAMINE HCL 25 MG
25 CAPSULE ORAL EVERY 6 HOURS PRN
Status: DISCONTINUED | OUTPATIENT
Start: 2020-11-13 | End: 2020-11-14

## 2020-11-13 RX ORDER — MEROPENEM AND SODIUM CHLORIDE 1 G/50ML
1 INJECTION, SOLUTION INTRAVENOUS DAILY
Status: DISCONTINUED | OUTPATIENT
Start: 2020-11-13 | End: 2020-11-13

## 2020-11-13 RX ADMIN — SODIUM BICARBONATE TAB 325 MG 1300 MG: 325 TAB at 08:11

## 2020-11-13 RX ADMIN — MUPIROCIN: 20 OINTMENT TOPICAL at 11:11

## 2020-11-13 RX ADMIN — CARVEDILOL 12.5 MG: 12.5 TABLET, FILM COATED ORAL at 09:11

## 2020-11-13 RX ADMIN — OXYCODONE HYDROCHLORIDE AND ACETAMINOPHEN 1 TABLET: 5; 325 TABLET ORAL at 09:11

## 2020-11-13 RX ADMIN — PANTOPRAZOLE SODIUM 40 MG: 40 INJECTION, POWDER, FOR SOLUTION INTRAVENOUS at 09:11

## 2020-11-13 RX ADMIN — SODIUM BICARBONATE TAB 325 MG 1300 MG: 325 TAB at 02:11

## 2020-11-13 RX ADMIN — CARVEDILOL 12.5 MG: 12.5 TABLET, FILM COATED ORAL at 08:11

## 2020-11-13 RX ADMIN — OXYCODONE HYDROCHLORIDE AND ACETAMINOPHEN 1 TABLET: 5; 325 TABLET ORAL at 02:11

## 2020-11-13 RX ADMIN — DIPHENHYDRAMINE HYDROCHLORIDE 25 MG: 25 CAPSULE ORAL at 01:11

## 2020-11-13 RX ADMIN — MUPIROCIN: 20 OINTMENT TOPICAL at 09:11

## 2020-11-13 RX ADMIN — PANTOPRAZOLE SODIUM 40 MG: 40 INJECTION, POWDER, FOR SOLUTION INTRAVENOUS at 08:11

## 2020-11-13 RX ADMIN — HYDRALAZINE HYDROCHLORIDE 25 MG: 25 TABLET ORAL at 09:11

## 2020-11-13 RX ADMIN — OXYCODONE HYDROCHLORIDE AND ACETAMINOPHEN 1 TABLET: 5; 325 TABLET ORAL at 08:11

## 2020-11-13 RX ADMIN — HYDRALAZINE HYDROCHLORIDE 25 MG: 25 TABLET ORAL at 06:11

## 2020-11-13 RX ADMIN — NIFEDIPINE 90 MG: 30 TABLET, FILM COATED, EXTENDED RELEASE ORAL at 08:11

## 2020-11-13 RX ADMIN — HYDRALAZINE HYDROCHLORIDE 25 MG: 25 TABLET ORAL at 02:11

## 2020-11-13 RX ADMIN — LIDOCAINE 1 PATCH: 50 PATCH TOPICAL at 02:11

## 2020-11-13 RX ADMIN — SODIUM BICARBONATE TAB 325 MG 1300 MG: 325 TAB at 09:11

## 2020-11-13 RX ADMIN — DOCUSATE SODIUM 50 MG AND SENNOSIDES 8.6 MG 1 TABLET: 8.6; 5 TABLET, FILM COATED ORAL at 09:11

## 2020-11-13 RX ADMIN — Medication: at 11:11

## 2020-11-13 RX ADMIN — DOCUSATE SODIUM 50 MG AND SENNOSIDES 8.6 MG 1 TABLET: 8.6; 5 TABLET, FILM COATED ORAL at 08:11

## 2020-11-13 NOTE — ASSESSMENT & PLAN NOTE
Reported R Klebsiella (?)  Pseudomonas here at our hospital.  6 more days of Meropenem. End date 11/13.   No sepsis     Meropenem on d/c  11/13 end. Has picc line ,finishing today.

## 2020-11-13 NOTE — ASSESSMENT & PLAN NOTE
Likely lower GI bleed. Possibly diverticular. 7.7 to 6.5 Hgb over past 24 hours. Visible bleeding. PPI. GI consult.  Unit of blood.     Hgb from 6.9 to 7.4 today. Patient does not want C-scope eval. Monitor H/H today- if stable- then to   [facility] tomorrow     Hgb at 7. Low but stable. Will give one unit of blood.  Refuses C-scope. GI following.  If H/H stable on 11/10- back to NH  ([facility])    Patient and family finally agree with endoscopic evaluation,S/P colonoscopy,has diverticulosis and polyps with no bleeding,but she had  again hematochezia.\GI is aware and recommending CTA abdomen,pelvic if again has hematochezia,but patient has advanced CKD and can not have CTA,no sign of active bleeding at this time,will monitor.

## 2020-11-13 NOTE — PLAN OF CARE
Problem: Diabetes Comorbidity  Goal: Blood Glucose Level Within Desired Range  Outcome: Ongoing, Progressing     Problem: Infection  Goal: Infection Symptom Resolution  Outcome: Ongoing, Progressing     Problem: Skin Injury Risk Increased  Goal: Skin Health and Integrity  Outcome: Ongoing, Progressing     Problem: Bleeding (Gastrointestinal Bleeding)  Goal: Hemostasis  Outcome: Ongoing, Progressing   PRN medication for pain effective. Remains SR on telemetry monitor. No skin issues and no injury during shift. No BM or any other signs of bleeding. PICC lumens positional and becoming harder to flush. Extra flushes provided during shift. Blood sugar 165, no additional coverage needed. Bed alarm activated and audible. Call light at side.

## 2020-11-13 NOTE — PROGRESS NOTES
[facility]  Progress Note    Patient Name: Joyce Riley  MRN: [id number]  Patient Class: IP- Inpatient   Admission Date: 11/7/2020  Length of Stay: 6 days  Attending Physician: Veronica Berry MD  Primary Care Provider: Kalpana Staton MD        Subjective:     Principal Problem:Acute blood loss anemia        HPI:  Mrs. Riley is a 88 yo F known to me for an admission and discharge to [facility] yesterday.  She had a few day hospital stay for evaluation and management of resistant UTI with Klebsiella and pseudomonas. She was discharged back to [facility] (for Abx) on 11/6 with Meropenem for 10 days per ID recs.  The patient returns today with dark and maroon stools with visible blood clots. Not on any blood thinners. No abdominal pain. Hgb was 7.7 yesterday- today 6.5. GI was consulted and received one unit of blood.  Patient has a LAPOST for DNR but daughter says full code.      Overview/Hospital Course:  Mrs. Riley is a 88 yo F known to me for an admission and discharge to [facility] .  She had a few day hospital stay for evaluation and management of resistant UTI with Klebsiella and pseudomonas. She was discharged back to [facility] (for Abx) on 11/6 with Meropenem for 10 days per ID recs.  The patient returns today with dark and maroon stools with visible blood clots. Not on any blood thinners. No abdominal pain. Hgb was 7.7 then  6.5. GI was consulted and received one unit of blood.  Patient has a LAPOST for DNR but daughter says full code.  Palliative care investigating further.remains full code, GI consulted and patient initially refuses C-scope.  Another unit of blood given on 11/9. PT/OT eval- back to NH ()- basic.   Patient and family finally agree with endoscopic evaluation,S/P colonoscopy,has diverticulosis and polyps with no bleeding,but she has still occasional again hematochezia,GI is aware and recommending CTA abdomen,pelvic if again has hematochezia,but  patient has advanced CKD and can not have CTA,no sign of active bleeding at this time,will monitor.  Still complain of neck pain,spoke with neurosurgery,they know patient,require surgery,but patient is not good candidate for procedure,daughter did not want intervention neither,changed pain medications to oxycodone.  Finishing meropenem today.    Plan,S/P coloscopy,has diverticulosis with polyps,still has occasional hematochezia,GI is aware and recommending CTA abdomen,pelvic if again has hematochezia,but patient has advanced CKD and can not have CTA,no sign of active bleeding at this time,will monitor,still has neck pain ,started on percocet,spoke with neurosurgery,fishiging meropenem today.    Interval History: No new issues     Review of Systems   Constitutional: Negative for activity change, appetite change, chills, diaphoresis, fatigue and fever.   HENT: Negative for congestion and dental problem.    Eyes: Negative for discharge and itching.   Respiratory: Negative for cough, choking, shortness of breath and stridor.    Cardiovascular: Negative for chest pain, palpitations and leg swelling.   Gastrointestinal: Negative for abdominal pain, blood in stool, constipation, diarrhea, nausea and rectal pain.   Genitourinary: Negative for difficulty urinating, dyspareunia and enuresis.   Musculoskeletal: Positive for arthralgias and neck pain.   Neurological: Negative for dizziness.   Hematological: Negative for adenopathy.   Psychiatric/Behavioral: Negative for agitation and behavioral problems.     Objective:     Vital Signs (Most Recent):  Temp: 98.9 °F (37.2 °C) (11/13/20 0803)  Pulse: 101 (11/13/20 0803)  Resp: 18 (11/13/20 0821)  BP: (!) 140/65 (11/13/20 0803)  SpO2: 97 % (11/13/20 0803) Vital Signs (24h Range):  Temp:  [98.2 °F (36.8 °C)-98.9 °F (37.2 °C)] 98.9 °F (37.2 °C)  Pulse:  [] 101  Resp:  [16-19] 18  SpO2:  [94 %-98 %] 97 %  BP: (122-154)/(60-82) 140/65     Weight: 70.9 kg (156 lb 4.9 oz)  Body  mass index is 23.77 kg/m².    Intake/Output Summary (Last 24 hours) at 11/13/2020 1042  Last data filed at 11/13/2020 0005  Gross per 24 hour   Intake 920 ml   Output --   Net 920 ml      Physical Exam  Vitals signs and nursing note reviewed.   Constitutional:       General: She is not in acute distress.     Appearance: Normal appearance. She is not ill-appearing, toxic-appearing or diaphoretic.   HENT:      Head: Normocephalic and atraumatic.   Cardiovascular:      Rate and Rhythm: Normal rate and regular rhythm.      Heart sounds: No murmur. No gallop.    Pulmonary:      Effort: Pulmonary effort is normal. No respiratory distress.      Breath sounds: No stridor. No wheezing or rales.   Chest:      Chest wall: No tenderness.   Abdominal:      General: Abdomen is flat. Bowel sounds are normal.      Tenderness: There is no abdominal tenderness. There is no guarding or rebound.   Neurological:      General: No focal deficit present.      Mental Status: She is alert and oriented to person, place, and time.   Psychiatric:         Mood and Affect: Mood normal.         Behavior: Behavior normal.         Thought Content: Thought content normal.         Significant Labs:   BMP:   Recent Labs   Lab 11/13/20  0410   GLU 66*      K 4.4      CO2 23   BUN 44*   CREATININE 4.5*   CALCIUM 8.8     CBC:   Recent Labs   Lab 11/12/20  0436 11/13/20  0410   WBC 10.08 9.43   HGB 7.7* 7.5*   HCT 23.3* 22.6*    204       Significant Imaging:       Assessment/Plan:      * Acute blood loss anemia  Likely lower GI bleed. Possibly diverticular. 7.7 to 6.5 Hgb over past 24 hours. Visible bleeding. PPI. GI consult.  Unit of blood.     Hgb from 6.9 to 7.4 today. Patient does not want C-scope eval. Monitor H/H today- if stable- then to   [facility] tomorrow     Hgb at 7. Low but stable. Will give one unit of blood.  Refuses C-scope. GI following.  If H/H stable on 11/10- back to NH  ([place])    Patient and family finally agree  with endoscopic evaluation,S/P colonoscopy,has diverticulosis and polyps with no bleeding,but she had  again hematochezia.\GI is aware and recommending CTA abdomen,pelvic if again has hematochezia,but patient has advanced CKD and can not have CTA,no sign of active bleeding at this time,will monitor.        Palliative care by specialist  Remains full code.      Lower GI bleeding  Patient and family finally agree with endoscopic evaluation,S/P colonoscopy,has diverticulosis and polyps with no bleeding,but she had today again hematochezia.\as above.        UTI (urinary tract infection)  Reported R Klebsiella (?)  Pseudomonas here at our hospital.  6 more days of Meropenem. End date 11/13.   No sepsis     Meropenem on d/c  11/13 end. Has picc line ,finishing today.      Palliative care encounter  Advance Care Planning         Patient's daughter stated full code over the weekend. This is different than DNR status in past with YUN.  Palliative care will address/clarify on 11/9 (Today).      Neck pain, chronic    Still complain of neck pain,    Still complain of neck pain,spoke with neurosurgery,they know patient,require surgery,but patient is not good candidate for procedure,daughter did not want intervention neither,changed pain medications to oxycodone.      History of stroke  No acute issues   Last time PT/OT eval- return to NH basic      Paroxysmal atrial fibrillation  Does not appear to be on any NOAC   History of bleeding reported in the past       Chronic diastolic heart failure  Stable       Renal hypertension  No acute issues. Resume home meds       Anemia of renal disease  Now with acute blood loss anemia as noted.       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  No acute issues      CKD stage 5  At baseline      Type 2 diabetes mellitus, controlled, with renal complications  No acute issues. A1c last stay. Sliding scale for now. accu checks  Home insulin         VTE Risk Mitigation (From admission, onward)     None          Discharge Planning   PAM:      Code Status: Full Code   Is the patient medically ready for discharge?:     Reason for patient still in hospital (select all that apply): Patient trending condition  Discharge Plan A: Return to nursing home   Discharge Delays: None known at this time              Veronica Berry MD  Department of Hospital Medicine   [facility]

## 2020-11-13 NOTE — SUBJECTIVE & OBJECTIVE
Interval History: No new issues     Review of Systems   Constitutional: Negative for activity change, appetite change, chills, diaphoresis, fatigue and fever.   HENT: Negative for congestion and dental problem.    Eyes: Negative for discharge and itching.   Respiratory: Negative for cough, choking, shortness of breath and stridor.    Cardiovascular: Negative for chest pain, palpitations and leg swelling.   Gastrointestinal: Negative for abdominal pain, blood in stool, constipation, diarrhea, nausea and rectal pain.   Genitourinary: Negative for difficulty urinating, dyspareunia and enuresis.   Musculoskeletal: Positive for arthralgias and neck pain.   Neurological: Negative for dizziness.   Hematological: Negative for adenopathy.   Psychiatric/Behavioral: Negative for agitation and behavioral problems.     Objective:     Vital Signs (Most Recent):  Temp: 98.9 °F (37.2 °C) (11/13/20 0803)  Pulse: 101 (11/13/20 0803)  Resp: 18 (11/13/20 0821)  BP: (!) 140/65 (11/13/20 0803)  SpO2: 97 % (11/13/20 0803) Vital Signs (24h Range):  Temp:  [98.2 °F (36.8 °C)-98.9 °F (37.2 °C)] 98.9 °F (37.2 °C)  Pulse:  [] 101  Resp:  [16-19] 18  SpO2:  [94 %-98 %] 97 %  BP: (122-154)/(60-82) 140/65     Weight: 70.9 kg (156 lb 4.9 oz)  Body mass index is 23.77 kg/m².    Intake/Output Summary (Last 24 hours) at 11/13/2020 1042  Last data filed at 11/13/2020 0005  Gross per 24 hour   Intake 920 ml   Output --   Net 920 ml      Physical Exam  Vitals signs and nursing note reviewed.   Constitutional:       General: She is not in acute distress.     Appearance: Normal appearance. She is not ill-appearing, toxic-appearing or diaphoretic.   HENT:      Head: Normocephalic and atraumatic.   Cardiovascular:      Rate and Rhythm: Normal rate and regular rhythm.      Heart sounds: No murmur. No gallop.    Pulmonary:      Effort: Pulmonary effort is normal. No respiratory distress.      Breath sounds: No stridor. No wheezing or rales.   Chest:       Chest wall: No tenderness.   Abdominal:      General: Abdomen is flat. Bowel sounds are normal.      Tenderness: There is no abdominal tenderness. There is no guarding or rebound.   Neurological:      General: No focal deficit present.      Mental Status: She is alert and oriented to person, place, and time.   Psychiatric:         Mood and Affect: Mood normal.         Behavior: Behavior normal.         Thought Content: Thought content normal.         Significant Labs:   BMP:   Recent Labs   Lab 11/13/20  0410   GLU 66*      K 4.4      CO2 23   BUN 44*   CREATININE 4.5*   CALCIUM 8.8     CBC:   Recent Labs   Lab 11/12/20  0436 11/13/20  0410   WBC 10.08 9.43   HGB 7.7* 7.5*   HCT 23.3* 22.6*    204       Significant Imaging:

## 2020-11-13 NOTE — ASSESSMENT & PLAN NOTE
Still complain of neck pain,    Still complain of neck pain,spoke with neurosurgery,they know patient,require surgery,but patient is not good candidate for procedure,daughter did not want intervention neither,changed pain medications to oxycodone.

## 2020-11-13 NOTE — ASSESSMENT & PLAN NOTE
Patient and family finally agree with endoscopic evaluation,S/P colonoscopy,has diverticulosis and polyps with no bleeding,but she had today again hematochezia.\as above.

## 2020-11-13 NOTE — NURSING
End of shift bedside report given to SUKUMAR Rothman. Patient in no apparent distress.     12 hour chart check complete.

## 2020-11-13 NOTE — NURSING
Dr. Damico notified that patient had a 8 beat run of v-tach, asymptomatic. Noted, continue to monitor.

## 2020-11-13 NOTE — CONSULTS
HPI:   Joyce Riley is an 87 y.o. female who is known to neurosurgery due to recently found lytic lesion within the odontoid process concerning for malignancy.  Patient was unable to get contrasted MRIs due to severe kidney failure so the diagnosis remains uncertain.  Her main complaint is neck and posterior head pain that has been ongoing for 10 months.  She denies any injury or trauma to the area.  She and her daughter report that she has been wheelchair dependent for 3 years due to leg weakness but stopped being able to walk approximately 6 months ago.  She progressively got worse and progressed from needing a cane to a front wheel walker to now dependent on a wheelchair.  She is currently residing at [facility] where they have to use a lift to move her.  She denies any history of cancer or rheumatoid arthritis.  She has multiple medical comorbidities.  She reports that her arms are fine and even though she cannot carry heavy objects she does not find herself dropping anything or having any issues with dexterity. Patient's daughter stated in clinic and again today that they would not want to have any surgery or invasive procedure.    She was admitted to [facility] on 11/7/20 for acute blood loss anemia. During her admission, she continued to endorse neck pain and neurosurgery was consulted for assistance with pain management.                  Review of patient's allergies indicates:   Allergen Reactions    Indomethacin      Nsaids (non-steroidal anti-inflammatory drug)      Percocet [oxycodone-acetaminophen]                Past Medical History:   Diagnosis Date    Anemia      Anticoagulant long-term use      Arthritis      Atrial fibrillation      Cataract      CHF (congestive heart failure)      CKD (chronic kidney disease), stage V      Diabetes mellitus       Type 2    GERD (gastroesophageal reflux disease)      GI bleed 12/2018    Gout      Hypertension      Obese       Requires assistance with all daily activities      Stroke 2014    Wheelchair dependent              Past Surgical History:   Procedure Laterality Date     SECTION         Patient unsure, believe she had 3-4    CHOLECYSTECTOMY        EYE SURGERY        HYSTERECTOMY        JOINT REPLACEMENT Right       knee    TOTAL KNEE ARTHROPLASTY Right              Family History   Problem Relation Age of Onset    Cancer Mother            age 98    Diabetes Mother      Hypertension Mother      Cancer Brother        Social History           Tobacco Use    Smoking status: Never Smoker    Smokeless tobacco: Never Used   Substance Use Topics    Alcohol use: No    Drug use: No         Review of Systems   As noted in HPI        OBJECTIVE:   Vital Signs:  Temp: 98 °F (36.7 °C) (20)  Pulse: 98 (20)  BP: (!) 160/72 (20)     Physical Exam:  General: well developed, well nourished, no distress  Neurologic: Alert and oriented. Thought content appropriate.  GCS: Motor: 6/Verbal: 5/Eyes: 4 GCS Total: 15  Cranial nerves: II-XII grossly intact  Neck: supple, without obvious masses or lesions, neck pillow wrapped around her neck for comfort   Skin: grossly intact in all 4 extremities without obvious rashes or lesions    Motor:     Upper:   Deltoids Triceps Biceps WE WF  FA     R 5/5 5/5 5/5 5/5 5/5 5/5 5/5     L 5/5 5/5 5/5 5/5 5/5 5/5 5/5      Lower:   HF KE DF PF EHL     R 4+/5 4+/5 1/5 1/5 1/5     L 4-/5 4+/5 1/5 1/5 1/5      Sensory: intact to light touch B/L UE and LE    Reflexes:      Salvador's: Negative.        Diagnostic Results:  All imaging was independently reviewed by me.     MRI C-spine, dated 2020:  1. Diffuse severe spondylosis  2. Odontoid pannus vs mass  3. Kinking of cord at C2  4. Severe stenosis at C4-5     CT C spine, dated 8/3/20:  1. Lytic lesions within odontoid process         ASSESSMENT/PLAN:       Joyce Riley is a 87 y.o. female with  progressively worsening bilateral lower extremity weakness over the past 9 months without inciting incident and is now wheelchair bound. During a recent hospitalization, a lytic lesion was identified on cervical CT concerning for malignancy.  Due to the patient's end-stage renal disease, she was unable to get contrast which would of better characterize the lesion.  Patient has diffuse severe spondylosis and severe stenosis at C4-5 which could cause cervical myelopathy. While she may be a candidate for posterior cervical fusion based on her pathology, her age and comorbidities would make such a procedure high risk. She has still not been able to complete contrasted imaging to better characterize the lesion. She and her family have stated that they would have no interest in surgery regardless of the imaging results.    Currently admitted for acute blood loss anemia. Neurosurgery consulted for neck pain.     -No acute neurosurgical intervention indicated   -Recommend continued pain control per primary team. May need pain mgmt vs pcp FU at discharge for continued pain mgmt.   -Soft collar ordered from [facility]. Confirmed delivery will be this evening.   -FU in neurosurgery clinic in one month           Lorna Stewart PA-C  [facility]  Department of Neurosurgery  [phone]

## 2020-11-14 LAB
ANION GAP SERPL CALC-SCNC: 11 MMOL/L (ref 8–16)
BASOPHILS # BLD AUTO: 0.08 K/UL (ref 0–0.2)
BASOPHILS NFR BLD: 1 % (ref 0–1.9)
BUN SERPL-MCNC: 44 MG/DL (ref 8–23)
CALCIUM SERPL-MCNC: 9.7 MG/DL (ref 8.7–10.5)
CHLORIDE SERPL-SCNC: 105 MMOL/L (ref 95–110)
CO2 SERPL-SCNC: 23 MMOL/L (ref 23–29)
CREAT SERPL-MCNC: 4.6 MG/DL (ref 0.5–1.4)
DIFFERENTIAL METHOD: ABNORMAL
EOSINOPHIL # BLD AUTO: 0.3 K/UL (ref 0–0.5)
EOSINOPHIL NFR BLD: 3.8 % (ref 0–8)
ERYTHROCYTE [DISTWIDTH] IN BLOOD BY AUTOMATED COUNT: 15.3 % (ref 11.5–14.5)
EST. GFR  (AFRICAN AMERICAN): 9 ML/MIN/1.73 M^2
EST. GFR  (NON AFRICAN AMERICAN): 8 ML/MIN/1.73 M^2
GLUCOSE SERPL-MCNC: 93 MG/DL (ref 70–110)
HCT VFR BLD AUTO: 23.3 % (ref 37–48.5)
HGB BLD-MCNC: 7.5 G/DL (ref 12–16)
IMM GRANULOCYTES # BLD AUTO: 0.08 K/UL (ref 0–0.04)
IMM GRANULOCYTES NFR BLD AUTO: 1 % (ref 0–0.5)
LYMPHOCYTES # BLD AUTO: 1.7 K/UL (ref 1–4.8)
LYMPHOCYTES NFR BLD: 21 % (ref 18–48)
MCH RBC QN AUTO: 30 PG (ref 27–31)
MCHC RBC AUTO-ENTMCNC: 32.2 G/DL (ref 32–36)
MCV RBC AUTO: 93 FL (ref 82–98)
MONOCYTES # BLD AUTO: 0.4 K/UL (ref 0.3–1)
MONOCYTES NFR BLD: 4.9 % (ref 4–15)
NEUTROPHILS # BLD AUTO: 5.6 K/UL (ref 1.8–7.7)
NEUTROPHILS NFR BLD: 68.3 % (ref 38–73)
NRBC BLD-RTO: 0 /100 WBC
PLATELET # BLD AUTO: 218 K/UL (ref 150–350)
PMV BLD AUTO: 10.1 FL (ref 9.2–12.9)
POCT GLUCOSE: 105 MG/DL (ref 70–110)
POCT GLUCOSE: 122 MG/DL (ref 70–110)
POCT GLUCOSE: 81 MG/DL (ref 70–110)
POCT GLUCOSE: 81 MG/DL (ref 70–110)
POCT GLUCOSE: 87 MG/DL (ref 70–110)
POTASSIUM SERPL-SCNC: 4.4 MMOL/L (ref 3.5–5.1)
RBC # BLD AUTO: 2.5 M/UL (ref 4–5.4)
SODIUM SERPL-SCNC: 139 MMOL/L (ref 136–145)
WBC # BLD AUTO: 8.16 K/UL (ref 3.9–12.7)

## 2020-11-14 PROCEDURE — 21400001 HC TELEMETRY ROOM

## 2020-11-14 PROCEDURE — 25000003 PHARM REV CODE 250: Performed by: NURSE PRACTITIONER

## 2020-11-14 PROCEDURE — 63600175 PHARM REV CODE 636 W HCPCS: Performed by: INTERNAL MEDICINE

## 2020-11-14 PROCEDURE — C9113 INJ PANTOPRAZOLE SODIUM, VIA: HCPCS | Performed by: INTERNAL MEDICINE

## 2020-11-14 PROCEDURE — 99900035 HC TECH TIME PER 15 MIN (STAT)

## 2020-11-14 PROCEDURE — 25000003 PHARM REV CODE 250: Performed by: INTERNAL MEDICINE

## 2020-11-14 PROCEDURE — 80048 BASIC METABOLIC PNL TOTAL CA: CPT

## 2020-11-14 PROCEDURE — 25000003 PHARM REV CODE 250: Performed by: HOSPITALIST

## 2020-11-14 PROCEDURE — 85025 COMPLETE CBC W/AUTO DIFF WBC: CPT

## 2020-11-14 PROCEDURE — 36415 COLL VENOUS BLD VENIPUNCTURE: CPT

## 2020-11-14 RX ORDER — DIPHENHYDRAMINE HYDROCHLORIDE 50 MG/ML
25 INJECTION INTRAMUSCULAR; INTRAVENOUS EVERY 6 HOURS PRN
Status: DISCONTINUED | OUTPATIENT
Start: 2020-11-14 | End: 2020-11-18 | Stop reason: HOSPADM

## 2020-11-14 RX ADMIN — SODIUM BICARBONATE TAB 325 MG 1300 MG: 325 TAB at 04:11

## 2020-11-14 RX ADMIN — OXYCODONE HYDROCHLORIDE AND ACETAMINOPHEN 1 TABLET: 5; 325 TABLET ORAL at 11:11

## 2020-11-14 RX ADMIN — DOCUSATE SODIUM 50 MG AND SENNOSIDES 8.6 MG 1 TABLET: 8.6; 5 TABLET, FILM COATED ORAL at 09:11

## 2020-11-14 RX ADMIN — LIDOCAINE 1 PATCH: 50 PATCH TOPICAL at 01:11

## 2020-11-14 RX ADMIN — DIPHENHYDRAMINE HYDROCHLORIDE 25 MG: 50 INJECTION INTRAMUSCULAR; INTRAVENOUS at 09:11

## 2020-11-14 RX ADMIN — NIFEDIPINE 90 MG: 30 TABLET, FILM COATED, EXTENDED RELEASE ORAL at 09:11

## 2020-11-14 RX ADMIN — PANTOPRAZOLE SODIUM 40 MG: 40 INJECTION, POWDER, FOR SOLUTION INTRAVENOUS at 09:11

## 2020-11-14 RX ADMIN — SODIUM BICARBONATE TAB 325 MG 1300 MG: 325 TAB at 09:11

## 2020-11-14 RX ADMIN — HYDRALAZINE HYDROCHLORIDE 25 MG: 25 TABLET ORAL at 09:11

## 2020-11-14 RX ADMIN — DIPHENHYDRAMINE HYDROCHLORIDE 25 MG: 25 CAPSULE ORAL at 09:11

## 2020-11-14 RX ADMIN — OXYCODONE HYDROCHLORIDE AND ACETAMINOPHEN 1 TABLET: 5; 325 TABLET ORAL at 09:11

## 2020-11-14 RX ADMIN — ACETAMINOPHEN 500 MG: 500 TABLET ORAL at 09:11

## 2020-11-14 RX ADMIN — HYDRALAZINE HYDROCHLORIDE 25 MG: 25 TABLET ORAL at 05:11

## 2020-11-14 RX ADMIN — CARVEDILOL 12.5 MG: 12.5 TABLET, FILM COATED ORAL at 09:11

## 2020-11-14 RX ADMIN — CALCITRIOL CAPSULES 0.25 MCG 0.25 MCG: 0.25 CAPSULE ORAL at 09:11

## 2020-11-14 RX ADMIN — HYDRALAZINE HYDROCHLORIDE 25 MG: 25 TABLET ORAL at 01:11

## 2020-11-14 RX ADMIN — OXYCODONE HYDROCHLORIDE AND ACETAMINOPHEN 1 TABLET: 5; 325 TABLET ORAL at 04:11

## 2020-11-14 RX ADMIN — DIPHENHYDRAMINE HYDROCHLORIDE 25 MG: 50 INJECTION INTRAMUSCULAR; INTRAVENOUS at 01:11

## 2020-11-14 NOTE — PLAN OF CARE
Problem: Diabetes Comorbidity  Goal: Blood Glucose Level Within Desired Range  Outcome: Ongoing, Progressing     Problem: Bleeding (Gastrointestinal Bleeding)  Goal: Hemostasis  Outcome: Ongoing, Progressing     Problem: Skin Injury Risk Increased  Goal: Skin Health and Integrity  Outcome: Ongoing, Progressing   Skin remains intact. No BM during night and no other signs of bleeding. Blood sugar WNL. No coverage needed. PRN pain medication effective. Rested well during night.

## 2020-11-14 NOTE — PROGRESS NOTES
[facility] [place] [facility]  Progress Note    Patient Name: Joyce Riley  MRN: [id number]  Patient Class: IP- Inpatient   Admission Date: 11/7/2020  Length of Stay: 7 days  Attending Physician: Shahab Chinchilla MD  Primary Care Provider: Kalpana Staton MD        Subjective:     Principal Problem:Acute blood loss anemia        HPI:  Mrs. Riley is a 86 yo F known to me for an admission and discharge to [facility] yesterday.  She had a few day hospital stay for evaluation and management of resistant UTI with Klebsiella and pseudomonas. She was discharged back to [facility] (for Abx) on 11/6 with Meropenem for 10 days per ID recs.  The patient returns today with dark and maroon stools with visible blood clots. Not on any blood thinners. No abdominal pain. Hgb was 7.7 yesterday- today 6.5. GI was consulted and received one unit of blood.  Patient has a LAPOST for DNR but daughter says full code.      Overview/Hospital Course:  Mrs. Riley is a 86 yo F known to me for an admission and discharge to [facility] .  She had a few day hospital stay for evaluation and management of resistant UTI with Klebsiella and pseudomonas. She was discharged back to [facility] (for Abx) on 11/6 with Meropenem for 10 days per ID recs.  The patient returns today with dark and maroon stools with visible blood clots. Not on any blood thinners. No abdominal pain. Hgb was 7.7 then  6.5. GI was consulted and received one unit of blood.  Patient has a LAPOST for DNR but daughter says full code.  Palliative care investigating further.remains full code, GI consulted and patient initially refuses C-scope.  Another unit of blood given on 11/9. PT/OT eval- back to NH ()- basic.   Patient and family finally agree with endoscopic evaluation,S/P colonoscopy,has diverticulosis and polyps with no bleeding,but she has still occasional again hematochezia,GI is aware and recommending CTA abdomen,pelvic if again has hematochezia,but  patient has advanced CKD and can not have CTA,no sign of active bleeding at this time,will monitor.  Still complain of neck pain,spoke with neurosurgery,they know patient,require surgery,but patient is not good candidate for procedure,daughter did not want intervention neither,changed pain medications to oxycodone.  Finishing meropenem today.    Plan,S/P coloscopy,has diverticulosis with polyps,still has occasional hematochezia,GI is aware and recommending CTA abdomen,pelvic if again has hematochezia,but patient has advanced CKD and can not have CTA,no sign of active bleeding at this time,will monitor,still has neck pain ,started on percocet,spoke with neurosurgery,fishiging meropenem today.    Interval History: No new complaints     Review of Systems   Constitutional: Negative for activity change, appetite change, chills, diaphoresis, fatigue and fever.   HENT: Negative for congestion and dental problem.    Eyes: Negative for discharge and itching.   Respiratory: Negative for cough, choking, shortness of breath and stridor.    Cardiovascular: Negative for chest pain, palpitations and leg swelling.   Gastrointestinal: Negative for abdominal pain, blood in stool, constipation, diarrhea, nausea and rectal pain.   Genitourinary: Negative for difficulty urinating, dyspareunia and enuresis.   Musculoskeletal: Positive for arthralgias and neck pain.   Neurological: Negative for dizziness.   Hematological: Negative for adenopathy.   Psychiatric/Behavioral: Negative for agitation and behavioral problems.     Objective:     Vital Signs (Most Recent):  Temp: 98.3 °F (36.8 °C) (11/14/20 0753)  Pulse: 94 (11/14/20 0753)  Resp: 18 (11/14/20 0945)  BP: (!) 150/78 (11/14/20 0753)  SpO2: 98 % (11/14/20 0753) Vital Signs (24h Range):  Temp:  [98.3 °F (36.8 °C)-99.3 °F (37.4 °C)] 98.3 °F (36.8 °C)  Pulse:  [84-94] 94  Resp:  [16-20] 18  SpO2:  [94 %-98 %] 98 %  BP: (132-150)/(59-78) 150/78     Weight: 81.3 kg (179 lb 3.7 oz)  Body  mass index is 27.25 kg/m².    Intake/Output Summary (Last 24 hours) at 11/14/2020 1016  Last data filed at 11/14/2020 0100  Gross per 24 hour   Intake 480 ml   Output --   Net 480 ml      Physical Exam  Vitals signs and nursing note reviewed.   Constitutional:       General: She is not in acute distress.     Appearance: Normal appearance. She is not ill-appearing, toxic-appearing or diaphoretic.   HENT:      Head: Normocephalic and atraumatic.   Cardiovascular:      Rate and Rhythm: Normal rate and regular rhythm.      Heart sounds: No murmur. No gallop.    Pulmonary:      Effort: Pulmonary effort is normal. No respiratory distress.      Breath sounds: No stridor. No wheezing or rales.   Chest:      Chest wall: No tenderness.   Abdominal:      General: Abdomen is flat. Bowel sounds are normal.      Tenderness: There is no abdominal tenderness. There is no guarding or rebound.   Neurological:      General: No focal deficit present.      Mental Status: She is alert and oriented to person, place, and time.   Psychiatric:         Mood and Affect: Mood normal.         Behavior: Behavior normal.         Thought Content: Thought content normal.         Significant Labs:   BMP:   Recent Labs   Lab 11/14/20  0509   GLU 93      K 4.4      CO2 23   BUN 44*   CREATININE 4.6*   CALCIUM 9.7     CBC:   Recent Labs   Lab 11/13/20  0410 11/14/20  0509   WBC 9.43 8.16   HGB 7.5* 7.5*   HCT 22.6* 23.3*    218       Significant Imaging:       Assessment/Plan:      * Acute blood loss anemia  Likely lower GI bleed. Possibly diverticular. 7.7 to 6.5 Hgb over past 24 hours. Visible bleeding. PPI. GI consult.  Unit of blood.     Hgb from 6.9 to 7.4 today. Patient does not want C-scope eval. Monitor H/H today- if stable- then to   [facility] tomorrow     Hgb at 7. Low but stable. Will give one unit of blood.  Refuses C-scope. GI following.  If H/H stable on 11/10- back to NH  ([place])    Patient and family finally agree  with endoscopic evaluation,S/P colonoscopy,has diverticulosis and polyps with no bleeding,but she had  again hematochezia.\GI is aware and recommending CTA abdomen,pelvic if again has hematochezia,but patient has advanced CKD and can not have CTA,no sign of active bleeding at this time,will monitor.    Hgb stable.         Anemia of renal disease  Now with acute blood loss anemia as noted.       UTI (urinary tract infection)  Reported R Klebsiella (?)  Pseudomonas here at our hospital.  6 more days of Meropenem. End date 11/13.   No sepsis     Meropenem on d/c  11/13 end. Has picc line ,finishing today.      Palliative care by specialist  Remains full code.      Lower GI bleeding  Patient and family finally agree with endoscopic evaluation,S/P colonoscopy,has diverticulosis and polyps with no bleeding,but she had today again hematochezia.\as above.        Palliative care encounter  Advance Care Planning         Patient's daughter stated full code over the weekend. This is different than DNR status in past with [facility].  Palliative care will address/clarify on 11/9 (Today).      Neck pain, chronic    Still complain of neck pain,    Still complain of neck pain,spoke with neurosurgery,they know patient,require surgery,but patient is not good candidate for procedure,daughter did not want intervention neither,changed pain medications to oxycodone.      History of stroke  No acute issues   Last time PT/OT eval- return to NH basic      Paroxysmal atrial fibrillation  Does not appear to be on any NOAC   History of bleeding reported in the past       Chronic diastolic heart failure  Stable       Renal hypertension  No acute issues. Resume home meds       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  No acute issues      CKD stage 5  At baseline      Type 2 diabetes mellitus, controlled, with renal complications  No acute issues. A1c last stay. Sliding scale for now. accu checks  Home insulin         VTE Risk Mitigation (From  admission, onward)    None          Discharge Planning   PAM:      Code Status: Full Code   Is the patient medically ready for discharge?:     Reason for patient still in hospital (select all that apply): Patient unstable  Discharge Plan A: Return to nursing home   Discharge Delays: None known at this time      Monitor Hgb over weekend. If stable- to [facility] on Monday           Shahab Oscar MD  Department of Hospital Medicine   [facility]

## 2020-11-14 NOTE — PLAN OF CARE
Important Message from Medicare and discharge appeal process reviewed with patient; patient was given opportunity to ask questions; after which, verbalized understanding of rights; copy was placed in medical record chart and one copy given to patient to place in Count includes the [facility] folder for her review and records       11/14/20 1220   Medicare Message   Important Message from Medicare regarding Discharge Appeal Rights Given to patient/caregiver;Explained to patient/caregiver;Signed/date by patient/caregiver   Date IMM was signed 11/14/20   Time IMM was signed 1210

## 2020-11-14 NOTE — ASSESSMENT & PLAN NOTE
Likely lower GI bleed. Possibly diverticular. 7.7 to 6.5 Hgb over past 24 hours. Visible bleeding. PPI. GI consult.  Unit of blood.     Hgb from 6.9 to 7.4 today. Patient does not want C-scope eval. Monitor H/H today- if stable- then to   [facility] tomorrow     Hgb at 7. Low but stable. Will give one unit of blood.  Refuses C-scope. GI following.  If H/H stable on 11/10- back to NH  ([place])    Patient and family finally agree with endoscopic evaluation,S/P colonoscopy,has diverticulosis and polyps with no bleeding,but she had  again hematochezia.\GI is aware and recommending CTA abdomen,pelvic if again has hematochezia,but patient has advanced CKD and can not have CTA,no sign of active bleeding at this time,will monitor.    Hgb stable.

## 2020-11-15 LAB
ABO + RH BLD: NORMAL
ANION GAP SERPL CALC-SCNC: 9 MMOL/L (ref 8–16)
BASOPHILS # BLD AUTO: 0.06 K/UL (ref 0–0.2)
BASOPHILS NFR BLD: 0.7 % (ref 0–1.9)
BLD GP AB SCN CELLS X3 SERPL QL: NORMAL
BLD PROD TYP BPU: NORMAL
BLOOD UNIT EXPIRATION DATE: NORMAL
BLOOD UNIT TYPE CODE: 5100
BLOOD UNIT TYPE: NORMAL
BUN SERPL-MCNC: 43 MG/DL (ref 8–23)
CALCIUM SERPL-MCNC: 8.7 MG/DL (ref 8.7–10.5)
CHLORIDE SERPL-SCNC: 106 MMOL/L (ref 95–110)
CO2 SERPL-SCNC: 24 MMOL/L (ref 23–29)
CODING SYSTEM: NORMAL
CREAT SERPL-MCNC: 4.4 MG/DL (ref 0.5–1.4)
DIFFERENTIAL METHOD: ABNORMAL
DISPENSE STATUS: NORMAL
EOSINOPHIL # BLD AUTO: 0.3 K/UL (ref 0–0.5)
EOSINOPHIL NFR BLD: 3.8 % (ref 0–8)
ERYTHROCYTE [DISTWIDTH] IN BLOOD BY AUTOMATED COUNT: 15.1 % (ref 11.5–14.5)
EST. GFR  (AFRICAN AMERICAN): 10 ML/MIN/1.73 M^2
EST. GFR  (NON AFRICAN AMERICAN): 8 ML/MIN/1.73 M^2
GLUCOSE SERPL-MCNC: 66 MG/DL (ref 70–110)
HCT VFR BLD AUTO: 20.9 % (ref 37–48.5)
HGB BLD-MCNC: 6.8 G/DL (ref 12–16)
IMM GRANULOCYTES # BLD AUTO: 0.06 K/UL (ref 0–0.04)
IMM GRANULOCYTES NFR BLD AUTO: 0.7 % (ref 0–0.5)
LYMPHOCYTES # BLD AUTO: 1.6 K/UL (ref 1–4.8)
LYMPHOCYTES NFR BLD: 19.3 % (ref 18–48)
MCH RBC QN AUTO: 30.4 PG (ref 27–31)
MCHC RBC AUTO-ENTMCNC: 32.5 G/DL (ref 32–36)
MCV RBC AUTO: 93 FL (ref 82–98)
MONOCYTES # BLD AUTO: 0.4 K/UL (ref 0.3–1)
MONOCYTES NFR BLD: 4.8 % (ref 4–15)
NEUTROPHILS # BLD AUTO: 5.9 K/UL (ref 1.8–7.7)
NEUTROPHILS NFR BLD: 70.7 % (ref 38–73)
NRBC BLD-RTO: 0 /100 WBC
NUM UNITS TRANS PACKED RBC: NORMAL
PLATELET # BLD AUTO: 196 K/UL (ref 150–350)
PMV BLD AUTO: 9.8 FL (ref 9.2–12.9)
POCT GLUCOSE: 64 MG/DL (ref 70–110)
POCT GLUCOSE: 87 MG/DL (ref 70–110)
POCT GLUCOSE: 91 MG/DL (ref 70–110)
POTASSIUM SERPL-SCNC: 4.5 MMOL/L (ref 3.5–5.1)
RBC # BLD AUTO: 2.24 M/UL (ref 4–5.4)
SODIUM SERPL-SCNC: 139 MMOL/L (ref 136–145)
WBC # BLD AUTO: 8.38 K/UL (ref 3.9–12.7)

## 2020-11-15 PROCEDURE — 86901 BLOOD TYPING SEROLOGIC RH(D): CPT

## 2020-11-15 PROCEDURE — 63600175 PHARM REV CODE 636 W HCPCS: Performed by: INTERNAL MEDICINE

## 2020-11-15 PROCEDURE — 80048 BASIC METABOLIC PNL TOTAL CA: CPT

## 2020-11-15 PROCEDURE — 25000003 PHARM REV CODE 250: Performed by: NURSE PRACTITIONER

## 2020-11-15 PROCEDURE — 85025 COMPLETE CBC W/AUTO DIFF WBC: CPT

## 2020-11-15 PROCEDURE — 21400001 HC TELEMETRY ROOM

## 2020-11-15 PROCEDURE — 86920 COMPATIBILITY TEST SPIN: CPT

## 2020-11-15 PROCEDURE — 36430 TRANSFUSION BLD/BLD COMPNT: CPT

## 2020-11-15 PROCEDURE — C9113 INJ PANTOPRAZOLE SODIUM, VIA: HCPCS | Performed by: INTERNAL MEDICINE

## 2020-11-15 PROCEDURE — 25000003 PHARM REV CODE 250: Performed by: INTERNAL MEDICINE

## 2020-11-15 PROCEDURE — P9016 RBC LEUKOCYTES REDUCED: HCPCS

## 2020-11-15 PROCEDURE — 36415 COLL VENOUS BLD VENIPUNCTURE: CPT

## 2020-11-15 RX ORDER — HYDROCODONE BITARTRATE AND ACETAMINOPHEN 500; 5 MG/1; MG/1
TABLET ORAL
Status: DISCONTINUED | OUTPATIENT
Start: 2020-11-15 | End: 2020-11-18 | Stop reason: HOSPADM

## 2020-11-15 RX ADMIN — HYDRALAZINE HYDROCHLORIDE 25 MG: 25 TABLET ORAL at 02:11

## 2020-11-15 RX ADMIN — CARVEDILOL 12.5 MG: 12.5 TABLET, FILM COATED ORAL at 09:11

## 2020-11-15 RX ADMIN — PANTOPRAZOLE SODIUM 40 MG: 40 INJECTION, POWDER, FOR SOLUTION INTRAVENOUS at 09:11

## 2020-11-15 RX ADMIN — DIPHENHYDRAMINE HYDROCHLORIDE 25 MG: 50 INJECTION INTRAMUSCULAR; INTRAVENOUS at 11:11

## 2020-11-15 RX ADMIN — HYDRALAZINE HYDROCHLORIDE 25 MG: 25 TABLET ORAL at 06:11

## 2020-11-15 RX ADMIN — NIFEDIPINE 90 MG: 30 TABLET, FILM COATED, EXTENDED RELEASE ORAL at 09:11

## 2020-11-15 RX ADMIN — DIPHENHYDRAMINE HYDROCHLORIDE 25 MG: 50 INJECTION INTRAMUSCULAR; INTRAVENOUS at 02:11

## 2020-11-15 RX ADMIN — SODIUM BICARBONATE TAB 325 MG 1300 MG: 325 TAB at 09:11

## 2020-11-15 RX ADMIN — LIDOCAINE 1 PATCH: 50 PATCH TOPICAL at 02:11

## 2020-11-15 RX ADMIN — DOCUSATE SODIUM 50 MG AND SENNOSIDES 8.6 MG 1 TABLET: 8.6; 5 TABLET, FILM COATED ORAL at 09:11

## 2020-11-15 RX ADMIN — SODIUM BICARBONATE TAB 325 MG 1300 MG: 325 TAB at 02:11

## 2020-11-15 RX ADMIN — ACETAMINOPHEN 500 MG: 500 TABLET ORAL at 07:11

## 2020-11-15 RX ADMIN — HYDRALAZINE HYDROCHLORIDE 25 MG: 25 TABLET ORAL at 09:11

## 2020-11-15 NOTE — SUBJECTIVE & OBJECTIVE
Interval History: No new complaints.     Review of Systems   Constitutional: Negative for activity change, appetite change, chills, diaphoresis, fatigue and fever.   HENT: Negative for congestion and dental problem.    Eyes: Negative for discharge and itching.   Respiratory: Negative for cough, choking, shortness of breath and stridor.    Cardiovascular: Negative for chest pain, palpitations and leg swelling.   Gastrointestinal: Negative for abdominal pain, blood in stool, constipation, diarrhea, nausea and rectal pain.   Genitourinary: Negative for difficulty urinating, dyspareunia and enuresis.   Musculoskeletal: Positive for arthralgias and neck pain.   Neurological: Negative for dizziness.   Hematological: Negative for adenopathy.   Psychiatric/Behavioral: Negative for agitation and behavioral problems.     Objective:     Vital Signs (Most Recent):  Temp: 99 °F (37.2 °C) (11/15/20 0808)  Pulse: 89 (11/15/20 0808)  Resp: 16 (11/15/20 0808)  BP: (!) 153/68 (11/15/20 0808)  SpO2: 96 % (11/15/20 0808) Vital Signs (24h Range):  Temp:  [98.5 °F (36.9 °C)-99.9 °F (37.7 °C)] 99 °F (37.2 °C)  Pulse:  [79-96] 89  Resp:  [16-18] 16  SpO2:  [96 %-100 %] 96 %  BP: (131-153)/(63-68) 153/68     Weight: 81.3 kg (179 lb 3.7 oz)  Body mass index is 27.25 kg/m².  No intake or output data in the 24 hours ending 11/15/20 1004   Physical Exam  Vitals signs and nursing note reviewed.   Constitutional:       General: She is not in acute distress.     Appearance: Normal appearance. She is not ill-appearing, toxic-appearing or diaphoretic.   HENT:      Head: Normocephalic and atraumatic.   Cardiovascular:      Rate and Rhythm: Normal rate and regular rhythm.      Heart sounds: No murmur. No gallop.    Pulmonary:      Effort: Pulmonary effort is normal. No respiratory distress.      Breath sounds: No stridor. No wheezing or rales.   Chest:      Chest wall: No tenderness.   Abdominal:      General: Abdomen is flat. Bowel sounds are normal.       Tenderness: There is no abdominal tenderness. There is no guarding or rebound.   Neurological:      General: No focal deficit present.      Mental Status: She is alert and oriented to person, place, and time.   Psychiatric:         Mood and Affect: Mood normal.         Behavior: Behavior normal.         Thought Content: Thought content normal.          Significant Labs:   BMP:   Recent Labs   Lab 11/15/20  0539   GLU 66*      K 4.5      CO2 24   BUN 43*   CREATININE 4.4*   CALCIUM 8.7     CBC:   Recent Labs   Lab 11/14/20  0509 11/15/20  0539   WBC 8.16 8.38   HGB 7.5* 6.8*   HCT 23.3* 20.9*    196       Significant Imaging:

## 2020-11-15 NOTE — ASSESSMENT & PLAN NOTE
Likely lower GI bleed. Possibly diverticular. 7.7 to 6.5 Hgb over past 24 hours. Visible bleeding. PPI. GI consult.  Unit of blood.     Hgb from 6.9 to 7.4 today. Patient does not want C-scope eval. Monitor H/H today- if stable- then to   [facility] tomorrow     Hgb at 7. Low but stable. Will give one unit of blood.  Refuses C-scope. GI following.  If H/H stable on 11/10- back to NH  ([place])    Patient and family finally agree with endoscopic evaluation,S/P colonoscopy,has diverticulosis and polyps with no bleeding,but she had  again hematochezia.\GI is aware and recommending CTA abdomen,pelvic if again has hematochezia,but patient has advanced CKD and can not have CTA,no sign of active bleeding at this time,will monitor.    Hgb stable.     Now Hgb to 6.8.  Will transfuse a unit of blood .  Talk to GI on 11/16.

## 2020-11-15 NOTE — PROGRESS NOTES
[facility]  Progress Note    Patient Name: Joyce Riley  MRN: [id number]  Patient Class: IP- Inpatient   Admission Date: 11/7/2020  Length of Stay: 8 days  Attending Physician: Shahab Chinchilla MD  Primary Care Provider: Kaplana Staton MD        Subjective:     Principal Problem:Acute blood loss anemia        HPI:  Mrs. Riley is a 86 yo F known to me for an admission and discharge to [facility] yesterday.  She had a few day hospital stay for evaluation and management of resistant UTI with Klebsiella and pseudomonas. She was discharged back to [facility] (for Abx) on 11/6 with Meropenem for 10 days per ID recs.  The patient returns today with dark and maroon stools with visible blood clots. Not on any blood thinners. No abdominal pain. Hgb was 7.7 yesterday- today 6.5. GI was consulted and received one unit of blood.  Patient has a LAPOST for DNR but daughter says full code.      Overview/Hospital Course:  Mrs. Riley is a 86 yo F known to me for an admission and discharge to [facility] .  She had a few day hospital stay for evaluation and management of resistant UTI with Klebsiella and pseudomonas. She was discharged back to [facility] (for Abx) on 11/6 with Meropenem for 10 days per ID recs.  The patient returns today with dark and maroon stools with visible blood clots. Not on any blood thinners. No abdominal pain. Hgb was 7.7 then  6.5. GI was consulted and received one unit of blood.  Patient has a LAPOST for DNR but daughter says full code.  Palliative care investigating further.remains full code, GI consulted and patient initially refuses C-scope.  Another unit of blood given on 11/9. PT/OT eval- back to NH ()- basic.   Patient and family finally agree with endoscopic evaluation,S/P colonoscopy,has diverticulosis and polyps with no bleeding,but she has still occasional again hematochezia,GI is aware and recommending CTA abdomen,pelvic if again has hematochezia,but  patient has advanced CKD and can not have CTA,no sign of active bleeding at this time,will monitor.  Still complain of neck pain,spoke with neurosurgery,they know patient,require surgery,but patient is not good candidate for procedure,daughter did not want intervention neither,changed pain medications to oxycodone.  Finishing meropenem today.    Plan,S/P coloscopy,has diverticulosis with polyps,still has occasional hematochezia,GI is aware and recommending CTA abdomen,pelvic if again has hematochezia,but patient has advanced CKD and can not have CTA,no sign of active bleeding at this time,will monitor,still has neck pain ,started on percocet,spoke with neurosurgery,fishiging meropenem today.    Patient required another unit of blood on 11/15.      Interval History: No new complaints.     Review of Systems   Constitutional: Negative for activity change, appetite change, chills, diaphoresis, fatigue and fever.   HENT: Negative for congestion and dental problem.    Eyes: Negative for discharge and itching.   Respiratory: Negative for cough, choking, shortness of breath and stridor.    Cardiovascular: Negative for chest pain, palpitations and leg swelling.   Gastrointestinal: Negative for abdominal pain, blood in stool, constipation, diarrhea, nausea and rectal pain.   Genitourinary: Negative for difficulty urinating, dyspareunia and enuresis.   Musculoskeletal: Positive for arthralgias and neck pain.   Neurological: Negative for dizziness.   Hematological: Negative for adenopathy.   Psychiatric/Behavioral: Negative for agitation and behavioral problems.     Objective:     Vital Signs (Most Recent):  Temp: 99 °F (37.2 °C) (11/15/20 0808)  Pulse: 89 (11/15/20 0808)  Resp: 16 (11/15/20 0808)  BP: (!) 153/68 (11/15/20 0808)  SpO2: 96 % (11/15/20 0808) Vital Signs (24h Range):  Temp:  [98.5 °F (36.9 °C)-99.9 °F (37.7 °C)] 99 °F (37.2 °C)  Pulse:  [79-96] 89  Resp:  [16-18] 16  SpO2:  [96 %-100 %] 96 %  BP: (131-153)/(63-68)  153/68     Weight: 81.3 kg (179 lb 3.7 oz)  Body mass index is 27.25 kg/m².  No intake or output data in the 24 hours ending 11/15/20 1004   Physical Exam  Vitals signs and nursing note reviewed.   Constitutional:       General: She is not in acute distress.     Appearance: Normal appearance. She is not ill-appearing, toxic-appearing or diaphoretic.   HENT:      Head: Normocephalic and atraumatic.   Cardiovascular:      Rate and Rhythm: Normal rate and regular rhythm.      Heart sounds: No murmur. No gallop.    Pulmonary:      Effort: Pulmonary effort is normal. No respiratory distress.      Breath sounds: No stridor. No wheezing or rales.   Chest:      Chest wall: No tenderness.   Abdominal:      General: Abdomen is flat. Bowel sounds are normal.      Tenderness: There is no abdominal tenderness. There is no guarding or rebound.   Neurological:      General: No focal deficit present.      Mental Status: She is alert and oriented to person, place, and time.   Psychiatric:         Mood and Affect: Mood normal.         Behavior: Behavior normal.         Thought Content: Thought content normal.          Significant Labs:   BMP:   Recent Labs   Lab 11/15/20  0539   GLU 66*      K 4.5      CO2 24   BUN 43*   CREATININE 4.4*   CALCIUM 8.7     CBC:   Recent Labs   Lab 11/14/20  0509 11/15/20  0539   WBC 8.16 8.38   HGB 7.5* 6.8*   HCT 23.3* 20.9*    196       Significant Imaging:       Assessment/Plan:      * Acute blood loss anemia  Likely lower GI bleed. Possibly diverticular. 7.7 to 6.5 Hgb over past 24 hours. Visible bleeding. PPI. GI consult.  Unit of blood.     Hgb from 6.9 to 7.4 today. Patient does not want C-scope eval. Monitor H/H today- if stable- then to   [facility] tomorrow     Hgb at 7. Low but stable. Will give one unit of blood.  Refuses C-scope. GI following.  If H/H stable on 11/10- back to NH  ([place])    Patient and family finally agree with endoscopic evaluation,S/P colonoscopy,has  diverticulosis and polyps with no bleeding,but she had  again hematochezia.\GI is aware and recommending CTA abdomen,pelvic if again has hematochezia,but patient has advanced CKD and can not have CTA,no sign of active bleeding at this time,will monitor.    Hgb stable.     Now Hgb to 6.8.  Will transfuse a unit of blood .  Talk to GI on 11/16.        Anemia of renal disease  Now with acute blood loss anemia as noted.       UTI (urinary tract infection)  Reported R Klebsiella (?)  Pseudomonas here at our hospital.  6 more days of Meropenem. End date 11/13.   No sepsis     Meropenem on d/c  11/13 end. Has picc line ,finishing today.      Palliative care by specialist  Remains full code.      Lower GI bleeding  Patient and family finally agree with endoscopic evaluation,S/P colonoscopy,has diverticulosis and polyps with no bleeding,but she had today again hematochezia.\as above.        Palliative care encounter  Advance Care Planning         Patient's daughter stated full code over the weekend. This is different than DNR status in past with [facility].  Palliative care will address/clarify on 11/9 (Today).      Neck pain, chronic    Still complain of neck pain,    Still complain of neck pain,spoke with neurosurgery,they know patient,require surgery,but patient is not good candidate for procedure,daughter did not want intervention neither,changed pain medications to oxycodone.      History of stroke  No acute issues   Last time PT/OT eval- return to NH basic      Paroxysmal atrial fibrillation  Does not appear to be on any NOAC   History of bleeding reported in the past       Chronic diastolic heart failure  Stable       Renal hypertension  No acute issues. Resume home meds       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  No acute issues      CKD stage 5  At baseline      Type 2 diabetes mellitus, controlled, with renal complications  No acute issues. A1c last stay. Sliding scale for now. accu checks  Home insulin          VTE Risk Mitigation (From admission, onward)    None          Discharge Planning   PAM:      Code Status: Full Code   Is the patient medically ready for discharge?:     Reason for patient still in hospital (select all that apply): Patient unstable  Discharge Plan A: Return to nursing home   Discharge Delays: None known at this time      Blood today. Will discuss with JEFF Oscar MD  Department of Hospital Medicine   [facility]

## 2020-11-15 NOTE — PLAN OF CARE
Problem: Adult Inpatient Plan of Care  Goal: Plan of Care Review  Flowsheets (Taken 11/15/2020 0635)  Plan of Care Reviewed With: patient     Problem: Diabetes Comorbidity  Goal: Blood Glucose Level Within Desired Range  Intervention: Maintain Glycemic Control  Flowsheets (Taken 11/15/2020 0635)  Glycemic Management: blood glucose monitoring

## 2020-11-15 NOTE — NURSING
Per handoff received from Kimberly SELBY RN. Patient care assumed. Patients overall condition assessed and patient appears to be in NAD with no complaints of pain though itching which will be treated. Skin dry but itchy with JESSICA PICC line is clean, dry, and intact. Patient with incontinence brief in place. Call light in reach and patient instructed to inform the nurse if anything is needed. Patient stable and will continue to be monitored.

## 2020-11-16 LAB
ANION GAP SERPL CALC-SCNC: 7 MMOL/L (ref 8–16)
BASOPHILS # BLD AUTO: 0.04 K/UL (ref 0–0.2)
BASOPHILS NFR BLD: 0.4 % (ref 0–1.9)
BUN SERPL-MCNC: 41 MG/DL (ref 8–23)
CALCIUM SERPL-MCNC: 8.1 MG/DL (ref 8.7–10.5)
CHLORIDE SERPL-SCNC: 108 MMOL/L (ref 95–110)
CO2 SERPL-SCNC: 24 MMOL/L (ref 23–29)
CREAT SERPL-MCNC: 4.2 MG/DL (ref 0.5–1.4)
DIFFERENTIAL METHOD: ABNORMAL
EOSINOPHIL # BLD AUTO: 0.2 K/UL (ref 0–0.5)
EOSINOPHIL NFR BLD: 2.5 % (ref 0–8)
ERYTHROCYTE [DISTWIDTH] IN BLOOD BY AUTOMATED COUNT: 15 % (ref 11.5–14.5)
EST. GFR  (AFRICAN AMERICAN): 10 ML/MIN/1.73 M^2
EST. GFR  (NON AFRICAN AMERICAN): 9 ML/MIN/1.73 M^2
GLUCOSE SERPL-MCNC: 66 MG/DL (ref 70–110)
HCT VFR BLD AUTO: 23.2 % (ref 37–48.5)
HGB BLD-MCNC: 7.3 G/DL (ref 12–16)
IMM GRANULOCYTES # BLD AUTO: 0.04 K/UL (ref 0–0.04)
IMM GRANULOCYTES NFR BLD AUTO: 0.4 % (ref 0–0.5)
LYMPHOCYTES # BLD AUTO: 1.3 K/UL (ref 1–4.8)
LYMPHOCYTES NFR BLD: 13.8 % (ref 18–48)
MCH RBC QN AUTO: 30.4 PG (ref 27–31)
MCHC RBC AUTO-ENTMCNC: 31.5 G/DL (ref 32–36)
MCV RBC AUTO: 97 FL (ref 82–98)
MONOCYTES # BLD AUTO: 0.4 K/UL (ref 0.3–1)
MONOCYTES NFR BLD: 4.4 % (ref 4–15)
NEUTROPHILS # BLD AUTO: 7.5 K/UL (ref 1.8–7.7)
NEUTROPHILS NFR BLD: 78.5 % (ref 38–73)
NRBC BLD-RTO: 0 /100 WBC
PLATELET # BLD AUTO: 200 K/UL (ref 150–350)
PMV BLD AUTO: 10.9 FL (ref 9.2–12.9)
POCT GLUCOSE: 131 MG/DL (ref 70–110)
POCT GLUCOSE: 73 MG/DL (ref 70–110)
POCT GLUCOSE: 94 MG/DL (ref 70–110)
POCT GLUCOSE: 97 MG/DL (ref 70–110)
POTASSIUM SERPL-SCNC: 5.8 MMOL/L (ref 3.5–5.1)
RBC # BLD AUTO: 2.4 M/UL (ref 4–5.4)
SODIUM SERPL-SCNC: 139 MMOL/L (ref 136–145)
WBC # BLD AUTO: 9.58 K/UL (ref 3.9–12.7)

## 2020-11-16 PROCEDURE — 21400001 HC TELEMETRY ROOM

## 2020-11-16 PROCEDURE — 85025 COMPLETE CBC W/AUTO DIFF WBC: CPT

## 2020-11-16 PROCEDURE — C9113 INJ PANTOPRAZOLE SODIUM, VIA: HCPCS | Performed by: INTERNAL MEDICINE

## 2020-11-16 PROCEDURE — 97802 MEDICAL NUTRITION INDIV IN: CPT

## 2020-11-16 PROCEDURE — 25000003 PHARM REV CODE 250: Performed by: NURSE PRACTITIONER

## 2020-11-16 PROCEDURE — 80048 BASIC METABOLIC PNL TOTAL CA: CPT

## 2020-11-16 PROCEDURE — 25000003 PHARM REV CODE 250: Performed by: HOSPITALIST

## 2020-11-16 PROCEDURE — 25000003 PHARM REV CODE 250: Performed by: INTERNAL MEDICINE

## 2020-11-16 PROCEDURE — 63600175 PHARM REV CODE 636 W HCPCS: Performed by: INTERNAL MEDICINE

## 2020-11-16 RX ADMIN — SODIUM BICARBONATE TAB 325 MG 1300 MG: 325 TAB at 09:11

## 2020-11-16 RX ADMIN — DOCUSATE SODIUM 50 MG AND SENNOSIDES 8.6 MG 1 TABLET: 8.6; 5 TABLET, FILM COATED ORAL at 10:11

## 2020-11-16 RX ADMIN — ACETAMINOPHEN 500 MG: 500 TABLET ORAL at 04:11

## 2020-11-16 RX ADMIN — CALCITRIOL CAPSULES 0.25 MCG 0.25 MCG: 0.25 CAPSULE ORAL at 08:11

## 2020-11-16 RX ADMIN — NIFEDIPINE 90 MG: 30 TABLET, FILM COATED, EXTENDED RELEASE ORAL at 08:11

## 2020-11-16 RX ADMIN — HYDRALAZINE HYDROCHLORIDE 25 MG: 25 TABLET ORAL at 01:11

## 2020-11-16 RX ADMIN — CARVEDILOL 12.5 MG: 12.5 TABLET, FILM COATED ORAL at 10:11

## 2020-11-16 RX ADMIN — PANTOPRAZOLE SODIUM 40 MG: 40 INJECTION, POWDER, FOR SOLUTION INTRAVENOUS at 08:11

## 2020-11-16 RX ADMIN — HYDRALAZINE HYDROCHLORIDE 25 MG: 25 TABLET ORAL at 10:11

## 2020-11-16 RX ADMIN — PANTOPRAZOLE SODIUM 40 MG: 40 INJECTION, POWDER, FOR SOLUTION INTRAVENOUS at 10:11

## 2020-11-16 RX ADMIN — LIDOCAINE 1 PATCH: 50 PATCH TOPICAL at 01:11

## 2020-11-16 RX ADMIN — DIPHENHYDRAMINE HYDROCHLORIDE 25 MG: 50 INJECTION INTRAMUSCULAR; INTRAVENOUS at 11:11

## 2020-11-16 RX ADMIN — OXYCODONE HYDROCHLORIDE AND ACETAMINOPHEN 1 TABLET: 5; 325 TABLET ORAL at 04:11

## 2020-11-16 RX ADMIN — SODIUM BICARBONATE TAB 325 MG 1300 MG: 325 TAB at 08:11

## 2020-11-16 RX ADMIN — DIPHENHYDRAMINE HYDROCHLORIDE 50 MG: 50 INJECTION INTRAMUSCULAR; INTRAVENOUS at 10:11

## 2020-11-16 RX ADMIN — DOCUSATE SODIUM 50 MG AND SENNOSIDES 8.6 MG 1 TABLET: 8.6; 5 TABLET, FILM COATED ORAL at 08:11

## 2020-11-16 RX ADMIN — SODIUM BICARBONATE TAB 325 MG 1300 MG: 325 TAB at 04:11

## 2020-11-16 RX ADMIN — HYDRALAZINE HYDROCHLORIDE 25 MG: 25 TABLET ORAL at 05:11

## 2020-11-16 RX ADMIN — CARVEDILOL 12.5 MG: 12.5 TABLET, FILM COATED ORAL at 08:11

## 2020-11-16 RX ADMIN — OXYCODONE HYDROCHLORIDE AND ACETAMINOPHEN 1 TABLET: 5; 325 TABLET ORAL at 08:11

## 2020-11-16 NOTE — PROGRESS NOTES
"[facility]  Adult Nutrition  Progress Note    SUMMARY       Recommendations    1. Cotninue diabetic cardiac diet.   2. If meal intake < 50% of meals add boost glucose control BID for added kcal and protein.   3. Weigh pt weekly.    Goals: 1. Consume >/=75% of meals by discharge  Nutrition Goal Status: new  Communication of RD Recs: (plan of care)    Reason for Assessment    Reason For Assessment: length of stay(day 9)  Diagnosis: (acute blood loss anemia)  Relevant Medical History: Arthritis, GERD, HTN, GI bleed, Gout, CHF, A. Fib, CKD5, stroke, obese, T2DM, dermatophytosis, gout  Interdisciplinary Rounds: did not attend    General Information Comments: 87 y.o. female presented to the ED from [facility] with c/o abnormal labs. Pt was d/c and readmitted the next day with rectal bleeding. Pt awake in bed at visit. Pt states she is enjoying the food and has a good appetite. Pt states "she cleans the plate". Pt denies any chewing/swallowing difficulty. No wt loss noted PTA. Last BM 11/15. NFPE not warranted.    Nutrition Discharge Planning: Adequate intake on diabetic/cardiac diet    Nutrition Risk Screen    Nutrition Risk Screen: no indicators present    Nutrition/Diet History    Patient Reported Diet/Restrictions/Preferences: diabetic diet, low salt  Spiritual, Cultural Beliefs, Mosque Practices, Values that Affect Care: no  Food Allergies: NKFA  Factors Affecting Nutritional Intake: None identified at this time    Anthropometrics    Temp: 99.1 °F (37.3 °C)  Height Method: Stated  Height: 5' 8" (172.7 cm)  Height (inches): 68 in  Weight Method: Bed Scale  Weight: 81.3 kg (179 lb 3.7 oz)  Weight (lb): 179.24 lb  Ideal Body Weight (IBW), Female: 140 lb  % Ideal Body Weight, Female (lb): 114.29 %  BMI (Calculated): 27.3  BMI Grade: 25 - 29.9 - overweight     Lab/Procedures/Meds    Pertinent Labs Reviewed: reviewed  Pertinent Labs Comments: H/H 7.3/23.3, K 5.8, BUN 41, Creat 4.2, Glu 66, Ca " 8.1  Pertinent Medications Reviewed: reviewed  Pertinent Medications Comments: hydralazine, pantoprazole, senna-docusate, Na bicarbonate    Estimated/Assessed Needs    Weight Used For Calorie Calculations: 81.3 kg (179 lb 3.7 oz)  Energy Calorie Requirements (kcal): 1626 kcal (20 kcal/kg)  Energy Need Method: Kcal/kg  Protein Requirements: 81g (1.0 g/kg)  Weight Used For Protein Calculations: 81.3 kg (179 lb 3.7 oz)  Fluid Requirements (mL): 1 ml/kcal or per MD  Estimated Fluid Requirement Method: RDA Method  RDA Method (mL): 1626  CHO Requirement: 200g daily    Nutrition Prescription Ordered    Current Diet Order: Diabetic, cardiac    Evaluation of Received Nutrient/Fluid Intake    I/O: +300 x3 UOP x1 stool  Energy Calories Required: meeting needs  Protein Required: meeting needs  Fluid Required: meeting needs  Comments: Last BM 11/15  Tolerance: tolerating  % Intake of Estimated Energy Needs: 50 - 75 %  % Meal Intake: 50 - 75 %    Nutrition Risk    Level of Risk/Frequency of Follow-up: low(1x/week)     Assessment and Plan    Nutrition Problem  Overweight    Related to (etiology):   Hx of excessive energy intake    Signs and Symptoms (as evidenced by):   BMI 27    Interventions (treatment strategy):  Decreased sodium and fat diet  Carbohydrate controlled diet  Collaboration with other providers    Nutrition Diagnosis Status:   New    Monitor and Evaluation    Food and Nutrient Intake: energy intake, food and beverage intake  Food and Nutrient Adminstration: diet order  Physical Activity and Function: nutrition-related ADLs and IADLs  Anthropometric Measurements: weight, weight change  Biochemical Data, Medical Tests and Procedures: glucose/endocrine profile, electrolyte and renal panel  Nutrition-Focused Physical Findings: overall appearance, skin     Malnutrition Assessment     Skin (Micronutrient): (zia-17)  Teeth (Micronutrient): (missing some)       Weight Loss (Malnutrition): (11/14/20 81.3 kg)       Edema  (Fluid Accumulation): 0-->no edema present        Nutrition Follow-Up    RD Follow-up?: Yes

## 2020-11-16 NOTE — PLAN OF CARE
11/16/20 1224   Medicare Message   Important Message from Medicare regarding Discharge Appeal Rights Explained to patient/caregiver;Given to patient/caregiver;Signed/date by patient/caregiver   Date IMM was signed 11/16/20   Time IMM was signed 1229

## 2020-11-16 NOTE — ASSESSMENT & PLAN NOTE
Likely lower GI bleed. Possibly diverticular. 7.7 to 6.5 Hgb over past 24 hours. Visible bleeding. PPI. GI consult.  Unit of blood.     Hgb from 6.9 to 7.4 today. Patient does not want C-scope eval. Monitor H/H today- if stable- then to   [facility] tomorrow     Hgb at 7. Low but stable. Will give one unit of blood.  Refuses C-scope. GI following.  If H/H stable on 11/10- back to NH  ([place])    Patient and family finally agree with endoscopic evaluation,S/P colonoscopy,has diverticulosis and polyps with no bleeding,but she had  again hematochezia.\GI is aware and recommending CTA abdomen,pelvic if again has hematochezia,but patient has advanced CKD and can not have CTA,no sign of active bleeding at this time,will monitor.    Hgb stable.     Now Hgb to 6.8.  Will transfuse a unit of blood .  Talk to GI on 11/16.

## 2020-11-16 NOTE — PLAN OF CARE
Pt AAOx4, able to verbalize needs.  PICC saline locked, clean and dry.  Pt voids and BM's per brief, no blood noted.  Telemetry box 8656 monitored, NSR.  Diabetic diet tolerated well, turned Q2 hours and as needed.  Neck pain treated with PRN tylenol. VSS, bed in low position, wheels locked, call light in reach for assistance, will continue to monitor.      Problem: Adult Inpatient Plan of Care  Goal: Plan of Care Review  Outcome: Ongoing, Progressing     Problem: Adult Inpatient Plan of Care  Goal: Patient-Specific Goal (Individualization)  Outcome: Ongoing, Progressing     Problem: Adult Inpatient Plan of Care  Goal: Absence of Hospital-Acquired Illness or Injury  Outcome: Ongoing, Progressing     Problem: Adult Inpatient Plan of Care  Goal: Optimal Comfort and Wellbeing  Outcome: Ongoing, Progressing     Problem: Diabetes Comorbidity  Goal: Blood Glucose Level Within Desired Range  Outcome: Ongoing, Progressing     Problem: Coping Ineffective  Goal: Effective Coping  Outcome: Ongoing, Progressing     Problem: Bleeding (Gastrointestinal Bleeding)  Goal: Hemostasis  Outcome: Ongoing, Progressing

## 2020-11-16 NOTE — PLAN OF CARE
Recommendations    1. Cotninue diabetic cardiac diet.   2. If meal intake < 50% of meals add boost glucose control BID for added kcal and protein.   3. Weigh pt weekly.    Goals: 1. Consume >/=75% of meals by discharge  Nutrition Goal Status: new  Communication of RD Recs: (plan of care)

## 2020-11-16 NOTE — PROGRESS NOTES
[facility] [place] [facility]  Progress Note    Patient Name: Joyce Riley  MRN: [id number]  Patient Class: IP- Inpatient   Admission Date: 11/7/2020  Length of Stay: 9 days  Attending Physician: Shahab Chinchilla MD  Primary Care Provider: Kalpana Staton MD        Subjective:     Principal Problem:Acute blood loss anemia        HPI:  Mrs. Riley is a 86 yo F known to me for an admission and discharge to [facility] yesterday.  She had a few day hospital stay for evaluation and management of resistant UTI with Klebsiella and pseudomonas. She was discharged back to [facility] (for Abx) on 11/6 with Meropenem for 10 days per ID recs.  The patient returns today with dark and maroon stools with visible blood clots. Not on any blood thinners. No abdominal pain. Hgb was 7.7 yesterday- today 6.5. GI was consulted and received one unit of blood.  Patient has a LAPOST for DNR but daughter says full code.      Overview/Hospital Course:  Mrs. Riley is a 86 yo F known to me for an admission and discharge to [facility] .  She had a few day hospital stay for evaluation and management of resistant UTI with Klebsiella and pseudomonas. She was discharged back to [facility] (for Abx) on 11/6 with Meropenem for 10 days per ID recs.  The patient returns today with dark and maroon stools with visible blood clots. Not on any blood thinners. No abdominal pain. Hgb was 7.7 then  6.5. GI was consulted and received one unit of blood.  Patient has a LAPOST for DNR but daughter says full code.  Palliative care investigating further.remains full code, GI consulted and patient initially refuses C-scope.  Another unit of blood given on 11/9. PT/OT eval- back to NH ()- basic.   Patient and family finally agree with endoscopic evaluation,S/P colonoscopy,has diverticulosis and polyps with no bleeding,but she has still occasional again hematochezia,GI is aware and recommending CTA abdomen,pelvic if again has hematochezia,but  patient has advanced CKD and can not have CTA,no sign of active bleeding at this time,will monitor.  Still complain of neck pain,spoke with neurosurgery,they know patient,require surgery,but patient is not good candidate for procedure,daughter did not want intervention neither,changed pain medications to oxycodone.  Finishing meropenem today.    Plan,S/P coloscopy,has diverticulosis with polyps,still has occasional hematochezia,GI is aware and recommending CTA abdomen,pelvic if again has hematochezia,but patient has advanced CKD and can not have CTA,no sign of active bleeding at this time,will monitor,still has neck pain ,started on percocet,spoke with neurosurgery,fishiging meropenem today.    Patient required another unit of blood on 11/15.      Interval History: No new issues     Review of Systems   Constitutional: Negative for activity change, appetite change, chills, diaphoresis, fatigue and fever.   HENT: Negative for congestion and dental problem.    Eyes: Negative for discharge and itching.   Respiratory: Negative for cough, choking, shortness of breath and stridor.    Cardiovascular: Negative for chest pain, palpitations and leg swelling.   Gastrointestinal: Negative for abdominal pain, blood in stool, constipation, diarrhea, nausea and rectal pain.   Genitourinary: Negative for difficulty urinating, dyspareunia and enuresis.   Musculoskeletal: Positive for arthralgias and neck pain.   Neurological: Negative for dizziness.   Hematological: Negative for adenopathy.   Psychiatric/Behavioral: Negative for agitation and behavioral problems.     Objective:     Vital Signs (Most Recent):  Temp: 99.4 °F (37.4 °C) (11/16/20 0808)  Pulse: 89 (11/16/20 0808)  Resp: 10 (11/16/20 0826)  BP: (!) 148/67 (11/16/20 0808)  SpO2: 98 % (11/16/20 0808) Vital Signs (24h Range):  Temp:  [98.3 °F (36.8 °C)-99.4 °F (37.4 °C)] 99.4 °F (37.4 °C)  Pulse:  [80-92] 89  Resp:  [10-20] 10  SpO2:  [97 %-99 %] 98 %  BP: (115-157)/(58-72)  148/67     Weight: 81.3 kg (179 lb 3.7 oz)  Body mass index is 27.25 kg/m².    Intake/Output Summary (Last 24 hours) at 11/16/2020 0941  Last data filed at 11/15/2020 1800  Gross per 24 hour   Intake 300 ml   Output --   Net 300 ml      Physical Exam  Vitals signs and nursing note reviewed.   Constitutional:       General: She is not in acute distress.     Appearance: Normal appearance. She is not ill-appearing, toxic-appearing or diaphoretic.   HENT:      Head: Normocephalic and atraumatic.   Cardiovascular:      Rate and Rhythm: Normal rate and regular rhythm.      Heart sounds: No murmur. No gallop.    Pulmonary:      Effort: Pulmonary effort is normal. No respiratory distress.      Breath sounds: No stridor. No wheezing or rales.   Chest:      Chest wall: No tenderness.   Abdominal:      General: Abdomen is flat. Bowel sounds are normal.      Tenderness: There is no abdominal tenderness. There is no guarding or rebound.   Neurological:      General: No focal deficit present.      Mental Status: She is alert and oriented to person, place, and time.   Psychiatric:         Mood and Affect: Mood normal.         Behavior: Behavior normal.         Thought Content: Thought content normal.         Significant Labs:   BMP:   Recent Labs   Lab 11/16/20  0514   GLU 66*      K 5.8*      CO2 24   BUN 41*   CREATININE 4.2*   CALCIUM 8.1*     CBC:   Recent Labs   Lab 11/15/20  0539 11/16/20  0514   WBC 8.38 9.58   HGB 6.8* 7.3*   HCT 20.9* 23.2*    200       Significant Imaging:      Assessment/Plan:      * Acute blood loss anemia  Likely lower GI bleed. Possibly diverticular. 7.7 to 6.5 Hgb over past 24 hours. Visible bleeding. PPI. GI consult.  Unit of blood.     Hgb from 6.9 to 7.4 today. Patient does not want C-scope eval. Monitor H/H today- if stable- then to   [facility] tomorrow     Hgb at 7. Low but stable. Will give one unit of blood.  Refuses C-scope. GI following.  If H/H stable on 11/10- back to NH   ([place])    Patient and family finally agree with endoscopic evaluation,S/P colonoscopy,has diverticulosis and polyps with no bleeding,but she had  again hematochezia.\GI is aware and recommending CTA abdomen,pelvic if again has hematochezia,but patient has advanced CKD and can not have CTA,no sign of active bleeding at this time,will monitor.    Hgb stable.     Now Hgb to 6.8.  Will transfuse a unit of blood .  Talk to GI on 11/16.        Anemia of renal disease  Now with acute blood loss anemia as noted.       UTI (urinary tract infection)  Reported R Klebsiella (?)  Pseudomonas here at our hospital.  6 more days of Meropenem. End date 11/13.   No sepsis     Meropenem on d/c  11/13 end. Has picc line ,finishing today.      Palliative care by specialist  Remains full code.      Lower GI bleeding  Patient and family finally agree with endoscopic evaluation,S/P colonoscopy,has diverticulosis and polyps with no bleeding,but she had today again hematochezia.\as above.        Palliative care encounter  Advance Care Planning         Patient's daughter stated full code over the weekend. This is different than DNR status in past with [facility].  Palliative care will address/clarify on 11/9 (Today).      Neck pain, chronic    Still complain of neck pain,    Still complain of neck pain,spoke with neurosurgery,they know patient,require surgery,but patient is not good candidate for procedure,daughter did not want intervention neither,changed pain medications to oxycodone.      History of stroke  No acute issues   Last time PT/OT eval- return to NH basic      Paroxysmal atrial fibrillation  Does not appear to be on any NOAC   History of bleeding reported in the past       Chronic diastolic heart failure  Stable       Renal hypertension  No acute issues. Resume home meds       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  No acute issues      CKD stage 5  At baseline      Type 2 diabetes mellitus, controlled, with renal  complications  No acute issues. A1c last stay. Sliding scale for now. accu checks  Home insulin         VTE Risk Mitigation (From admission, onward)    None          Discharge Planning   PAM:      Code Status: Full Code   Is the patient medically ready for discharge?:     Reason for patient still in hospital (select all that apply): Patient unstable  Discharge Plan A: Return to nursing home   Discharge Delays: None known at this time              Shahab Oscar MD  Department of Hospital Medicine   [facility]

## 2020-11-16 NOTE — SUBJECTIVE & OBJECTIVE
Interval History: No new issues     Review of Systems   Constitutional: Negative for activity change, appetite change, chills, diaphoresis, fatigue and fever.   HENT: Negative for congestion and dental problem.    Eyes: Negative for discharge and itching.   Respiratory: Negative for cough, choking, shortness of breath and stridor.    Cardiovascular: Negative for chest pain, palpitations and leg swelling.   Gastrointestinal: Negative for abdominal pain, blood in stool, constipation, diarrhea, nausea and rectal pain.   Genitourinary: Negative for difficulty urinating, dyspareunia and enuresis.   Musculoskeletal: Positive for arthralgias and neck pain.   Neurological: Negative for dizziness.   Hematological: Negative for adenopathy.   Psychiatric/Behavioral: Negative for agitation and behavioral problems.     Objective:     Vital Signs (Most Recent):  Temp: 99.4 °F (37.4 °C) (11/16/20 0808)  Pulse: 89 (11/16/20 0808)  Resp: 10 (11/16/20 0826)  BP: (!) 148/67 (11/16/20 0808)  SpO2: 98 % (11/16/20 0808) Vital Signs (24h Range):  Temp:  [98.3 °F (36.8 °C)-99.4 °F (37.4 °C)] 99.4 °F (37.4 °C)  Pulse:  [80-92] 89  Resp:  [10-20] 10  SpO2:  [97 %-99 %] 98 %  BP: (115-157)/(58-72) 148/67     Weight: 81.3 kg (179 lb 3.7 oz)  Body mass index is 27.25 kg/m².    Intake/Output Summary (Last 24 hours) at 11/16/2020 0941  Last data filed at 11/15/2020 1800  Gross per 24 hour   Intake 300 ml   Output --   Net 300 ml      Physical Exam  Vitals signs and nursing note reviewed.   Constitutional:       General: She is not in acute distress.     Appearance: Normal appearance. She is not ill-appearing, toxic-appearing or diaphoretic.   HENT:      Head: Normocephalic and atraumatic.   Cardiovascular:      Rate and Rhythm: Normal rate and regular rhythm.      Heart sounds: No murmur. No gallop.    Pulmonary:      Effort: Pulmonary effort is normal. No respiratory distress.      Breath sounds: No stridor. No wheezing or rales.   Chest:       Chest wall: No tenderness.   Abdominal:      General: Abdomen is flat. Bowel sounds are normal.      Tenderness: There is no abdominal tenderness. There is no guarding or rebound.   Neurological:      General: No focal deficit present.      Mental Status: She is alert and oriented to person, place, and time.   Psychiatric:         Mood and Affect: Mood normal.         Behavior: Behavior normal.         Thought Content: Thought content normal.         Significant Labs:   BMP:   Recent Labs   Lab 11/16/20 0514   GLU 66*      K 5.8*      CO2 24   BUN 41*   CREATININE 4.2*   CALCIUM 8.1*     CBC:   Recent Labs   Lab 11/15/20  0539 11/16/20 0514   WBC 8.38 9.58   HGB 6.8* 7.3*   HCT 20.9* 23.2*    200       Significant Imaging:

## 2020-11-17 LAB
ANION GAP SERPL CALC-SCNC: 9 MMOL/L (ref 8–16)
BASOPHILS # BLD AUTO: 0.07 K/UL (ref 0–0.2)
BASOPHILS NFR BLD: 0.8 % (ref 0–1.9)
BUN SERPL-MCNC: 45 MG/DL (ref 8–23)
CALCIUM SERPL-MCNC: 8.6 MG/DL (ref 8.7–10.5)
CHLORIDE SERPL-SCNC: 106 MMOL/L (ref 95–110)
CO2 SERPL-SCNC: 26 MMOL/L (ref 23–29)
CREAT SERPL-MCNC: 4.6 MG/DL (ref 0.5–1.4)
DIFFERENTIAL METHOD: ABNORMAL
EOSINOPHIL # BLD AUTO: 0.3 K/UL (ref 0–0.5)
EOSINOPHIL NFR BLD: 3.3 % (ref 0–8)
ERYTHROCYTE [DISTWIDTH] IN BLOOD BY AUTOMATED COUNT: 14.7 % (ref 11.5–14.5)
EST. GFR  (AFRICAN AMERICAN): 9 ML/MIN/1.73 M^2
EST. GFR  (NON AFRICAN AMERICAN): 8 ML/MIN/1.73 M^2
FINAL PATHOLOGIC DIAGNOSIS: NORMAL
GLUCOSE SERPL-MCNC: 77 MG/DL (ref 70–110)
GROSS: NORMAL
HCT VFR BLD AUTO: 23.4 % (ref 37–48.5)
HGB BLD-MCNC: 7.4 G/DL (ref 12–16)
IMM GRANULOCYTES # BLD AUTO: 0.06 K/UL (ref 0–0.04)
IMM GRANULOCYTES NFR BLD AUTO: 0.7 % (ref 0–0.5)
LYMPHOCYTES # BLD AUTO: 1.5 K/UL (ref 1–4.8)
LYMPHOCYTES NFR BLD: 17.2 % (ref 18–48)
Lab: NORMAL
MCH RBC QN AUTO: 30.6 PG (ref 27–31)
MCHC RBC AUTO-ENTMCNC: 31.6 G/DL (ref 32–36)
MCV RBC AUTO: 97 FL (ref 82–98)
MICROSCOPIC EXAM: NORMAL
MONOCYTES # BLD AUTO: 0.4 K/UL (ref 0.3–1)
MONOCYTES NFR BLD: 4.7 % (ref 4–15)
NEUTROPHILS # BLD AUTO: 6.4 K/UL (ref 1.8–7.7)
NEUTROPHILS NFR BLD: 73.3 % (ref 38–73)
NRBC BLD-RTO: 0 /100 WBC
PLATELET # BLD AUTO: 202 K/UL (ref 150–350)
PMV BLD AUTO: 10.1 FL (ref 9.2–12.9)
POCT GLUCOSE: 125 MG/DL (ref 70–110)
POCT GLUCOSE: 60 MG/DL (ref 70–110)
POCT GLUCOSE: 91 MG/DL (ref 70–110)
POCT GLUCOSE: 92 MG/DL (ref 70–110)
POTASSIUM SERPL-SCNC: 4.7 MMOL/L (ref 3.5–5.1)
RBC # BLD AUTO: 2.42 M/UL (ref 4–5.4)
SODIUM SERPL-SCNC: 141 MMOL/L (ref 136–145)
WBC # BLD AUTO: 8.68 K/UL (ref 3.9–12.7)

## 2020-11-17 PROCEDURE — C9113 INJ PANTOPRAZOLE SODIUM, VIA: HCPCS | Performed by: INTERNAL MEDICINE

## 2020-11-17 PROCEDURE — 25000003 PHARM REV CODE 250: Performed by: HOSPITALIST

## 2020-11-17 PROCEDURE — 85025 COMPLETE CBC W/AUTO DIFF WBC: CPT

## 2020-11-17 PROCEDURE — 99900035 HC TECH TIME PER 15 MIN (STAT)

## 2020-11-17 PROCEDURE — 80048 BASIC METABOLIC PNL TOTAL CA: CPT

## 2020-11-17 PROCEDURE — 63600175 PHARM REV CODE 636 W HCPCS: Performed by: INTERNAL MEDICINE

## 2020-11-17 PROCEDURE — 21400001 HC TELEMETRY ROOM

## 2020-11-17 PROCEDURE — 25000003 PHARM REV CODE 250: Performed by: INTERNAL MEDICINE

## 2020-11-17 PROCEDURE — 94761 N-INVAS EAR/PLS OXIMETRY MLT: CPT

## 2020-11-17 RX ORDER — HYDRALAZINE HYDROCHLORIDE 25 MG/1
50 TABLET, FILM COATED ORAL EVERY 8 HOURS
Status: DISCONTINUED | OUTPATIENT
Start: 2020-11-17 | End: 2020-11-18 | Stop reason: HOSPADM

## 2020-11-17 RX ADMIN — Medication: at 09:11

## 2020-11-17 RX ADMIN — SODIUM BICARBONATE TAB 325 MG 1300 MG: 325 TAB at 09:11

## 2020-11-17 RX ADMIN — SODIUM BICARBONATE TAB 325 MG 1300 MG: 325 TAB at 03:11

## 2020-11-17 RX ADMIN — DOCUSATE SODIUM 50 MG AND SENNOSIDES 8.6 MG 1 TABLET: 8.6; 5 TABLET, FILM COATED ORAL at 09:11

## 2020-11-17 RX ADMIN — OXYCODONE HYDROCHLORIDE AND ACETAMINOPHEN 1 TABLET: 5; 325 TABLET ORAL at 03:11

## 2020-11-17 RX ADMIN — CARVEDILOL 12.5 MG: 12.5 TABLET, FILM COATED ORAL at 09:11

## 2020-11-17 RX ADMIN — PANTOPRAZOLE SODIUM 40 MG: 40 INJECTION, POWDER, FOR SOLUTION INTRAVENOUS at 09:11

## 2020-11-17 RX ADMIN — OXYCODONE HYDROCHLORIDE AND ACETAMINOPHEN 1 TABLET: 5; 325 TABLET ORAL at 10:11

## 2020-11-17 RX ADMIN — HYDRALAZINE HYDROCHLORIDE 50 MG: 25 TABLET, FILM COATED ORAL at 09:11

## 2020-11-17 RX ADMIN — HYDRALAZINE HYDROCHLORIDE 25 MG: 25 TABLET ORAL at 05:11

## 2020-11-17 RX ADMIN — DIPHENHYDRAMINE HYDROCHLORIDE 25 MG: 50 INJECTION INTRAMUSCULAR; INTRAVENOUS at 09:11

## 2020-11-17 RX ADMIN — LIDOCAINE 1 PATCH: 50 PATCH TOPICAL at 01:11

## 2020-11-17 RX ADMIN — HYDRALAZINE HYDROCHLORIDE 50 MG: 25 TABLET, FILM COATED ORAL at 01:11

## 2020-11-17 RX ADMIN — NIFEDIPINE 90 MG: 30 TABLET, FILM COATED, EXTENDED RELEASE ORAL at 09:11

## 2020-11-17 NOTE — ASSESSMENT & PLAN NOTE
Still complain of neck pain,    Still complain of neck pain,spoke with neurosurgery,they know patient,require surgery,but patient is not good candidate for procedure,daughter did not want intervention neither,changed pain medications to oxycodone.    Neuro surgery evaluated.

## 2020-11-17 NOTE — ASSESSMENT & PLAN NOTE
Likely lower GI bleed. Possibly diverticular. 7.7 to 6.5 Hgb over past 24 hours. Visible bleeding. PPI. GI consult.  Unit of blood.     Hgb from 6.9 to 7.4 today. Patient does not want C-scope eval. Monitor H/H today- if stable- then to   [facility] tomorrow     Hgb at 7. Low but stable. Will give one unit of blood.  Refuses C-scope. GI following.  If H/H stable on 11/10- back to NH  ([place])    Patient and family finally agree with endoscopic evaluation,S/P colonoscopy,has diverticulosis and polyps with no bleeding,but she had  again hematochezia.\GI is aware and recommending CTA abdomen,pelvic if again has hematochezia,but patient has advanced CKD and can not have CTA,no sign of active bleeding at this time,will monitor.    Hgb stable.     Now Hgb to 6.8.  Will transfuse a unit of blood .  Talk to GI on 11/16.    H/H low but stable.

## 2020-11-17 NOTE — PROGRESS NOTES
[facility]  Progress Note    Patient Name: Joyce Riley  MRN: [id number]  Patient Class: IP- Inpatient   Admission Date: 11/7/2020  Length of Stay: 10 days  Attending Physician: Shahab Chinchilla MD  Primary Care Provider: Kalpana Staton MD        Subjective:     Principal Problem:Acute blood loss anemia        HPI:  Mrs. Riley is a 88 yo F known to me for an admission and discharge to [facility] yesterday.  She had a few day hospital stay for evaluation and management of resistant UTI with Klebsiella and pseudomonas. She was discharged back to [facility] (for Abx) on 11/6 with Meropenem for 10 days per ID recs.  The patient returns today with dark and maroon stools with visible blood clots. Not on any blood thinners. No abdominal pain. Hgb was 7.7 yesterday- today 6.5. GI was consulted and received one unit of blood.  Patient has a LAPOST for DNR but daughter says full code.      Overview/Hospital Course:  Mrs. Riley is a 88 yo F known to me for an admission and discharge to [facility] .  She had a few day hospital stay for evaluation and management of resistant UTI with Klebsiella and pseudomonas. She was discharged back to [facility] (for Abx) on 11/6 with Meropenem for 10 days per ID recs.  The patient returns today with dark and maroon stools with visible blood clots. Not on any blood thinners. No abdominal pain. Hgb was 7.7 then  6.5. GI was consulted and received one unit of blood.  Patient has a LAPOST for DNR but daughter says full code.  Palliative care investigating further.remains full code, GI consulted and patient initially refuses C-scope.  Another unit of blood given on 11/9. PT/OT eval- back to NH ()- basic.   Patient and family finally agree with endoscopic evaluation,S/P colonoscopy,has diverticulosis and polyps with no bleeding,but she has still occasional again hematochezia,GI is aware and recommending CTA abdomen,pelvic if again has hematochezia,but  patient has advanced CKD and can not have CTA,no sign of active bleeding at this time,will monitor.  Still complain of neck pain,spoke with neurosurgery,they know patient,require surgery,but patient is not good candidate for procedure,daughter did not want intervention neither,changed pain medications to oxycodone.  Finishing meropenem today.    Plan,S/P coloscopy,has diverticulosis with polyps,still has occasional hematochezia,GI is aware and recommending CTA abdomen,pelvic if again has hematochezia,but patient has advanced CKD and can not have CTA,no sign of active bleeding at this time,will monitor,still has neck pain ,started on percocet,spoke with neurosurgery,fishiging meropenem today.    Patient required another unit of blood on 11/15.      Interval History: No new issues     Review of Systems   Constitutional: Negative for activity change, appetite change, chills, diaphoresis, fatigue and fever.   HENT: Negative for congestion and dental problem.    Eyes: Negative for discharge and itching.   Respiratory: Negative for cough, choking, shortness of breath and stridor.    Cardiovascular: Negative for chest pain, palpitations and leg swelling.   Gastrointestinal: Negative for abdominal pain, blood in stool, constipation, diarrhea, nausea and rectal pain.   Genitourinary: Negative for difficulty urinating, dyspareunia and enuresis.   Musculoskeletal: Positive for arthralgias and neck pain.   Neurological: Negative for dizziness.   Hematological: Negative for adenopathy.   Psychiatric/Behavioral: Negative for agitation and behavioral problems.     Objective:     Vital Signs (Most Recent):  Temp: 99.1 °F (37.3 °C) (11/17/20 0806)  Pulse: 83 (11/17/20 0806)  Resp: 18 (11/17/20 1012)  BP: (!) 152/67 (11/17/20 0806)  SpO2: 95 % (11/17/20 0806) Vital Signs (24h Range):  Temp:  [98.4 °F (36.9 °C)-99.3 °F (37.4 °C)] 99.1 °F (37.3 °C)  Pulse:  [77-91] 83  Resp:  [17-18] 18  SpO2:  [93 %-100 %] 95 %  BP: (113-152)/(55-67)  152/67     Weight: 77.1 kg (169 lb 15.6 oz)  Body mass index is 25.84 kg/m².    Intake/Output Summary (Last 24 hours) at 11/17/2020 1018  Last data filed at 11/17/2020 0806  Gross per 24 hour   Intake 480 ml   Output --   Net 480 ml      Physical Exam  Vitals signs and nursing note reviewed.   Constitutional:       General: She is not in acute distress.     Appearance: Normal appearance. She is not ill-appearing, toxic-appearing or diaphoretic.   HENT:      Head: Normocephalic and atraumatic.   Cardiovascular:      Rate and Rhythm: Normal rate and regular rhythm.      Heart sounds: No murmur. No gallop.    Pulmonary:      Effort: Pulmonary effort is normal. No respiratory distress.      Breath sounds: No stridor. No wheezing or rales.   Chest:      Chest wall: No tenderness.   Abdominal:      General: Abdomen is flat. Bowel sounds are normal.      Tenderness: There is no abdominal tenderness. There is no guarding or rebound.   Neurological:      General: No focal deficit present.      Mental Status: She is alert and oriented to person, place, and time.   Psychiatric:         Mood and Affect: Mood normal.         Behavior: Behavior normal.         Thought Content: Thought content normal.         Significant Labs:   BMP:   Recent Labs   Lab 11/17/20  0401   GLU 77      K 4.7      CO2 26   BUN 45*   CREATININE 4.6*   CALCIUM 8.6*     CBC:   Recent Labs   Lab 11/16/20  0514 11/17/20  0401   WBC 9.58 8.68   HGB 7.3* 7.4*   HCT 23.2* 23.4*    202       Significant Imaging:      Assessment/Plan:      * Acute blood loss anemia  Likely lower GI bleed. Possibly diverticular. 7.7 to 6.5 Hgb over past 24 hours. Visible bleeding. PPI. GI consult.  Unit of blood.     Hgb from 6.9 to 7.4 today. Patient does not want C-scope eval. Monitor H/H today- if stable- then to   [facility] tomorrow     Hgb at 7. Low but stable. Will give one unit of blood.  Refuses C-scope. GI following.  If H/H stable on 11/10- back to NH   ([place])    Patient and family finally agree with endoscopic evaluation,S/P colonoscopy,has diverticulosis and polyps with no bleeding,but she had  again hematochezia.\GI is aware and recommending CTA abdomen,pelvic if again has hematochezia,but patient has advanced CKD and can not have CTA,no sign of active bleeding at this time,will monitor.    Hgb stable.     Now Hgb to 6.8.  Will transfuse a unit of blood .  Talk to GI on 11/16.    H/H low but stable.          Anemia of renal disease  Now with acute blood loss anemia as noted.       UTI (urinary tract infection)  Reported R Klebsiella (?)  Pseudomonas here at our hospital.  6 more days of Meropenem. End date 11/13.   No sepsis     Meropenem on d/c  11/13 end. Has picc line ,finishing today.      Palliative care by specialist  Remains full code.      Lower GI bleeding  Patient and family finally agree with endoscopic evaluation,S/P colonoscopy,has diverticulosis and polyps with no bleeding,but she had today again hematochezia.\as above.        Palliative care encounter  Advance Care Planning         Patient's daughter stated full code over the weekend. This is different than DNR status in past with [facility].  Palliative care will address/clarify on 11/9 (Today).      Neck pain, chronic    Still complain of neck pain,    Still complain of neck pain,spoke with neurosurgery,they know patient,require surgery,but patient is not good candidate for procedure,daughter did not want intervention neither,changed pain medications to oxycodone.    Neuro surgery evaluated.        History of stroke  No acute issues   Last time PT/OT eval- return to NH basic      Paroxysmal atrial fibrillation  Does not appear to be on any NOAC   History of bleeding reported in the past       Chronic diastolic heart failure  Stable       Renal hypertension  No acute issues. Resume home meds       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  No acute issues      CKD stage 5  At  baseline      Type 2 diabetes mellitus, controlled, with renal complications  No acute issues. A1c last stay. Sliding scale for now. accu checks  Home insulin         VTE Risk Mitigation (From admission, onward)    None          Discharge Planning   PAM:      Code Status: Full Code   Is the patient medically ready for discharge?:     Reason for patient still in hospital (select all that apply): Patient unstable  Discharge Plan A: Return to nursing home   Discharge Delays: None known at this time      Monitor H/H today. If remains stable- to [facility] tomorrow.           Shahab Oscar MD  Department of Hospital Medicine   [facility]

## 2020-11-17 NOTE — SUBJECTIVE & OBJECTIVE
Interval History: No new issues     Review of Systems   Constitutional: Negative for activity change, appetite change, chills, diaphoresis, fatigue and fever.   HENT: Negative for congestion and dental problem.    Eyes: Negative for discharge and itching.   Respiratory: Negative for cough, choking, shortness of breath and stridor.    Cardiovascular: Negative for chest pain, palpitations and leg swelling.   Gastrointestinal: Negative for abdominal pain, blood in stool, constipation, diarrhea, nausea and rectal pain.   Genitourinary: Negative for difficulty urinating, dyspareunia and enuresis.   Musculoskeletal: Positive for arthralgias and neck pain.   Neurological: Negative for dizziness.   Hematological: Negative for adenopathy.   Psychiatric/Behavioral: Negative for agitation and behavioral problems.     Objective:     Vital Signs (Most Recent):  Temp: 99.1 °F (37.3 °C) (11/17/20 0806)  Pulse: 83 (11/17/20 0806)  Resp: 18 (11/17/20 1012)  BP: (!) 152/67 (11/17/20 0806)  SpO2: 95 % (11/17/20 0806) Vital Signs (24h Range):  Temp:  [98.4 °F (36.9 °C)-99.3 °F (37.4 °C)] 99.1 °F (37.3 °C)  Pulse:  [77-91] 83  Resp:  [17-18] 18  SpO2:  [93 %-100 %] 95 %  BP: (113-152)/(55-67) 152/67     Weight: 77.1 kg (169 lb 15.6 oz)  Body mass index is 25.84 kg/m².    Intake/Output Summary (Last 24 hours) at 11/17/2020 1018  Last data filed at 11/17/2020 0806  Gross per 24 hour   Intake 480 ml   Output --   Net 480 ml      Physical Exam  Vitals signs and nursing note reviewed.   Constitutional:       General: She is not in acute distress.     Appearance: Normal appearance. She is not ill-appearing, toxic-appearing or diaphoretic.   HENT:      Head: Normocephalic and atraumatic.   Cardiovascular:      Rate and Rhythm: Normal rate and regular rhythm.      Heart sounds: No murmur. No gallop.    Pulmonary:      Effort: Pulmonary effort is normal. No respiratory distress.      Breath sounds: No stridor. No wheezing or rales.   Chest:       Chest wall: No tenderness.   Abdominal:      General: Abdomen is flat. Bowel sounds are normal.      Tenderness: There is no abdominal tenderness. There is no guarding or rebound.   Neurological:      General: No focal deficit present.      Mental Status: She is alert and oriented to person, place, and time.   Psychiatric:         Mood and Affect: Mood normal.         Behavior: Behavior normal.         Thought Content: Thought content normal.         Significant Labs:   BMP:   Recent Labs   Lab 11/17/20  0401   GLU 77      K 4.7      CO2 26   BUN 45*   CREATININE 4.6*   CALCIUM 8.6*     CBC:   Recent Labs   Lab 11/16/20  0514 11/17/20  0401   WBC 9.58 8.68   HGB 7.3* 7.4*   HCT 23.2* 23.4*    202       Significant Imaging:

## 2020-11-18 VITALS
RESPIRATION RATE: 18 BRPM | HEART RATE: 77 BPM | WEIGHT: 170 LBS | BODY MASS INDEX: 25.76 KG/M2 | OXYGEN SATURATION: 98 % | SYSTOLIC BLOOD PRESSURE: 139 MMHG | TEMPERATURE: 99 F | DIASTOLIC BLOOD PRESSURE: 65 MMHG | HEIGHT: 68 IN

## 2020-11-18 PROBLEM — N39.0 UTI (URINARY TRACT INFECTION): Status: RESOLVED | Noted: 2020-11-04 | Resolved: 2020-11-18

## 2020-11-18 PROBLEM — D62 ACUTE BLOOD LOSS ANEMIA: Status: RESOLVED | Noted: 2019-07-19 | Resolved: 2020-11-18

## 2020-11-18 PROBLEM — K92.2 LOWER GI BLEEDING: Status: RESOLVED | Noted: 2020-11-07 | Resolved: 2020-11-18

## 2020-11-18 PROBLEM — Z51.5 PALLIATIVE CARE ENCOUNTER: Status: RESOLVED | Noted: 2020-07-31 | Resolved: 2020-11-18

## 2020-11-18 LAB
BASOPHILS # BLD AUTO: 0.09 K/UL (ref 0–0.2)
BASOPHILS NFR BLD: 1 % (ref 0–1.9)
DIFFERENTIAL METHOD: ABNORMAL
EOSINOPHIL # BLD AUTO: 0.3 K/UL (ref 0–0.5)
EOSINOPHIL NFR BLD: 2.8 % (ref 0–8)
ERYTHROCYTE [DISTWIDTH] IN BLOOD BY AUTOMATED COUNT: 14.5 % (ref 11.5–14.5)
HCT VFR BLD AUTO: 24.3 % (ref 37–48.5)
HGB BLD-MCNC: 7.6 G/DL (ref 12–16)
IMM GRANULOCYTES # BLD AUTO: 0.04 K/UL (ref 0–0.04)
IMM GRANULOCYTES NFR BLD AUTO: 0.5 % (ref 0–0.5)
LYMPHOCYTES # BLD AUTO: 1.6 K/UL (ref 1–4.8)
LYMPHOCYTES NFR BLD: 18.2 % (ref 18–48)
MCH RBC QN AUTO: 30.5 PG (ref 27–31)
MCHC RBC AUTO-ENTMCNC: 31.3 G/DL (ref 32–36)
MCV RBC AUTO: 98 FL (ref 82–98)
MONOCYTES # BLD AUTO: 0.4 K/UL (ref 0.3–1)
MONOCYTES NFR BLD: 4.4 % (ref 4–15)
NEUTROPHILS # BLD AUTO: 6.5 K/UL (ref 1.8–7.7)
NEUTROPHILS NFR BLD: 73.1 % (ref 38–73)
NRBC BLD-RTO: 0 /100 WBC
PLATELET # BLD AUTO: 216 K/UL (ref 150–350)
PMV BLD AUTO: 10.4 FL (ref 9.2–12.9)
POCT GLUCOSE: 68 MG/DL (ref 70–110)
POCT GLUCOSE: 83 MG/DL (ref 70–110)
RBC # BLD AUTO: 2.49 M/UL (ref 4–5.4)
WBC # BLD AUTO: 8.88 K/UL (ref 3.9–12.7)

## 2020-11-18 PROCEDURE — C9113 INJ PANTOPRAZOLE SODIUM, VIA: HCPCS | Performed by: INTERNAL MEDICINE

## 2020-11-18 PROCEDURE — 85025 COMPLETE CBC W/AUTO DIFF WBC: CPT

## 2020-11-18 PROCEDURE — 36415 COLL VENOUS BLD VENIPUNCTURE: CPT

## 2020-11-18 PROCEDURE — 25000003 PHARM REV CODE 250: Performed by: NURSE PRACTITIONER

## 2020-11-18 PROCEDURE — 94761 N-INVAS EAR/PLS OXIMETRY MLT: CPT

## 2020-11-18 PROCEDURE — 25000003 PHARM REV CODE 250: Performed by: HOSPITALIST

## 2020-11-18 PROCEDURE — 99900035 HC TECH TIME PER 15 MIN (STAT)

## 2020-11-18 PROCEDURE — 25000003 PHARM REV CODE 250: Performed by: INTERNAL MEDICINE

## 2020-11-18 PROCEDURE — 63600175 PHARM REV CODE 636 W HCPCS: Performed by: INTERNAL MEDICINE

## 2020-11-18 RX ORDER — TROLAMINE SALICYLATE 10 G/100G
CREAM TOPICAL EVERY 6 HOURS PRN
Refills: 0 | COMMUNITY
Start: 2020-11-18 | End: 2021-05-09 | Stop reason: ALTCHOICE

## 2020-11-18 RX ORDER — OXYCODONE AND ACETAMINOPHEN 5; 325 MG/1; MG/1
1 TABLET ORAL EVERY 6 HOURS PRN
Qty: 20 TABLET | Refills: 0 | Status: ON HOLD | OUTPATIENT
Start: 2020-11-18 | End: 2021-01-09 | Stop reason: HOSPADM

## 2020-11-18 RX ORDER — PANTOPRAZOLE SODIUM 40 MG/1
40 TABLET, DELAYED RELEASE ORAL DAILY
Qty: 30 TABLET | Refills: 5
Start: 2020-11-18 | End: 2021-11-18

## 2020-11-18 RX ORDER — LIDOCAINE 50 MG/G
1 PATCH TOPICAL DAILY
Refills: 0
Start: 2020-11-18 | End: 2021-04-05 | Stop reason: SDUPTHER

## 2020-11-18 RX ORDER — TRAMADOL HYDROCHLORIDE 50 MG/1
50 TABLET ORAL EVERY 8 HOURS PRN
Qty: 20 TABLET | Refills: 0 | Status: ON HOLD | OUTPATIENT
Start: 2020-11-18 | End: 2021-03-29 | Stop reason: HOSPADM

## 2020-11-18 RX ADMIN — CALCITRIOL CAPSULES 0.25 MCG 0.25 MCG: 0.25 CAPSULE ORAL at 08:11

## 2020-11-18 RX ADMIN — HYDRALAZINE HYDROCHLORIDE 50 MG: 25 TABLET, FILM COATED ORAL at 01:11

## 2020-11-18 RX ADMIN — OXYCODONE HYDROCHLORIDE AND ACETAMINOPHEN 1 TABLET: 5; 325 TABLET ORAL at 06:11

## 2020-11-18 RX ADMIN — ACETAMINOPHEN 500 MG: 500 TABLET ORAL at 08:11

## 2020-11-18 RX ADMIN — OXYCODONE HYDROCHLORIDE AND ACETAMINOPHEN 1 TABLET: 5; 325 TABLET ORAL at 12:11

## 2020-11-18 RX ADMIN — HYDRALAZINE HYDROCHLORIDE 50 MG: 25 TABLET, FILM COATED ORAL at 05:11

## 2020-11-18 RX ADMIN — SODIUM BICARBONATE TAB 325 MG 1300 MG: 325 TAB at 08:11

## 2020-11-18 RX ADMIN — NIFEDIPINE 90 MG: 30 TABLET, FILM COATED, EXTENDED RELEASE ORAL at 08:11

## 2020-11-18 RX ADMIN — PANTOPRAZOLE SODIUM 40 MG: 40 INJECTION, POWDER, FOR SOLUTION INTRAVENOUS at 08:11

## 2020-11-18 RX ADMIN — DOCUSATE SODIUM 50 MG AND SENNOSIDES 8.6 MG 1 TABLET: 8.6; 5 TABLET, FILM COATED ORAL at 08:11

## 2020-11-18 RX ADMIN — LIDOCAINE 1 PATCH: 50 PATCH TOPICAL at 01:11

## 2020-11-18 RX ADMIN — OXYCODONE HYDROCHLORIDE AND ACETAMINOPHEN 1 TABLET: 5; 325 TABLET ORAL at 01:11

## 2020-11-18 RX ADMIN — CARVEDILOL 12.5 MG: 12.5 TABLET, FILM COATED ORAL at 08:11

## 2020-11-18 NOTE — PLAN OF CARE
Problem: Adult Inpatient Plan of Care  Goal: Plan of Care Review  11/18/2020 1523 by Wanda Deutsch RN  Outcome: Adequate for Care Transition  11/18/2020 1103 by Wanda Deutsch RN  Outcome: Ongoing, Progressing  Goal: Patient-Specific Goal (Individualization)  11/18/2020 1523 by Wanda Deutsch RN  Outcome: Adequate for Care Transition  11/18/2020 1103 by Wanda Deutsch RN  Outcome: Ongoing, Progressing  Goal: Optimal Comfort and Wellbeing  11/18/2020 1523 by Wanda Deutsch RN  Outcome: Adequate for Care Transition  11/18/2020 1103 by Wanda Deutsch RN  Outcome: Ongoing, Progressing  Goal: Readiness for Transition of Care  11/18/2020 1523 by Wanda Deutsch RN  Outcome: Adequate for Care Transition  11/18/2020 1103 by Wanda Deutsch RN  Outcome: Ongoing, Progressing  Goal: Rounds/Family Conference  11/18/2020 1523 by Wanda Deutsch RN  Outcome: Adequate for Care Transition  11/18/2020 1103 by Wanda Deutsch RN  Outcome: Ongoing, Progressing     Problem: Diabetes Comorbidity  Goal: Blood Glucose Level Within Desired Range  11/18/2020 1523 by Wanda Deutsch RN  Outcome: Adequate for Care Transition  11/18/2020 1103 by Wanda Deutsch RN  Outcome: Ongoing, Progressing

## 2020-11-18 NOTE — PLAN OF CARE
Pt received long acting insulin and prn insulin coverage if needed. Provided pt education on signs and symptoms of hypo/hyperglycemia and when to report to the nurse. Carbohydrate replacement snack left at the bedside in the event of hypoglycemia. No significant events over night, BS remained stable. WCTM

## 2020-11-18 NOTE — SUBJECTIVE & OBJECTIVE
Interval History: No new issues     Review of Systems   Constitutional: Negative for activity change, appetite change, chills, diaphoresis, fatigue and fever.   HENT: Negative for congestion and dental problem.    Eyes: Negative for discharge and itching.   Respiratory: Negative for cough, choking, shortness of breath and stridor.    Cardiovascular: Negative for chest pain, palpitations and leg swelling.   Gastrointestinal: Negative for abdominal pain, blood in stool, constipation, diarrhea, nausea and rectal pain.   Genitourinary: Negative for difficulty urinating, dyspareunia and enuresis.   Musculoskeletal: Positive for arthralgias and neck pain.   Neurological: Negative for dizziness.   Hematological: Negative for adenopathy.   Psychiatric/Behavioral: Negative for agitation and behavioral problems.     Objective:     Vital Signs (Most Recent):  Temp: 98.4 °F (36.9 °C) (11/18/20 0815)  Pulse: 84 (11/18/20 0815)  Resp: 16 (11/18/20 0815)  BP: (!) 152/69 (11/18/20 0815)  SpO2: 96 % (11/18/20 0848) Vital Signs (24h Range):  Temp:  [98.4 °F (36.9 °C)-99.4 °F (37.4 °C)] 98.4 °F (36.9 °C)  Pulse:  [81-86] 84  Resp:  [16-20] 16  SpO2:  [96 %-99 %] 96 %  BP: (129-152)/(60-69) 152/69     Weight: 77.1 kg (169 lb 15.6 oz)  Body mass index is 25.84 kg/m².    Intake/Output Summary (Last 24 hours) at 11/18/2020 0912  Last data filed at 11/17/2020 1500  Gross per 24 hour   Intake 480 ml   Output --   Net 480 ml      Physical Exam  Vitals signs and nursing note reviewed.   Constitutional:       General: She is not in acute distress.     Appearance: Normal appearance. She is not ill-appearing, toxic-appearing or diaphoretic.   HENT:      Head: Normocephalic and atraumatic.   Cardiovascular:      Rate and Rhythm: Normal rate and regular rhythm.      Heart sounds: No murmur. No gallop.    Pulmonary:      Effort: Pulmonary effort is normal. No respiratory distress.      Breath sounds: No stridor. No wheezing or rales.   Chest:       Chest wall: No tenderness.   Abdominal:      General: Abdomen is flat. Bowel sounds are normal.      Tenderness: There is no abdominal tenderness. There is no guarding or rebound.   Neurological:      General: No focal deficit present.      Mental Status: She is alert and oriented to person, place, and time.   Psychiatric:         Mood and Affect: Mood normal.         Behavior: Behavior normal.         Thought Content: Thought content normal.         Significant Labs:   BMP:   Recent Labs   Lab 11/17/20  0401   GLU 77      K 4.7      CO2 26   BUN 45*   CREATININE 4.6*   CALCIUM 8.6*     CBC:   Recent Labs   Lab 11/17/20 0401 11/18/20  0424   WBC 8.68 8.88   HGB 7.4* 7.6*   HCT 23.4* 24.3*    216       Significant Imaging:

## 2020-11-18 NOTE — PLAN OF CARE
Problem: Adult Inpatient Plan of Care  Goal: Plan of Care Review  Outcome: Ongoing, Progressing  Goal: Patient-Specific Goal (Individualization)  Outcome: Ongoing, Progressing  Goal: Optimal Comfort and Wellbeing  Outcome: Ongoing, Progressing  Goal: Readiness for Transition of Care  Outcome: Ongoing, Progressing  Goal: Rounds/Family Conference  Outcome: Ongoing, Progressing

## 2020-11-18 NOTE — NURSING
Several attempts were made to give report prior patient retuning to facility, placed on hold several times, will try again to contact nurse.

## 2020-11-18 NOTE — PROGRESS NOTES
[facility]  Progress Note    Patient Name: Joyce Riley  MRN: [id number]  Patient Class: IP- Inpatient   Admission Date: 11/7/2020  Length of Stay: 11 days  Attending Physician: Shahab Chinchilla MD  Primary Care Provider: Kalpana Staton MD        Subjective:     Principal Problem:Acute blood loss anemia        HPI:  Mrs. Riley is a 86 yo F known to me for an admission and discharge to [facility] yesterday.  She had a few day hospital stay for evaluation and management of resistant UTI with Klebsiella and pseudomonas. She was discharged back to [facility] (for Abx) on 11/6 with Meropenem for 10 days per ID recs.  The patient returns today with dark and maroon stools with visible blood clots. Not on any blood thinners. No abdominal pain. Hgb was 7.7 yesterday- today 6.5. GI was consulted and received one unit of blood.  Patient has a LAPOST for DNR but daughter says full code.      Overview/Hospital Course:  Mrs. Riley is a 86 yo F known to me for an admission and discharge to [facility] .  She had a few day hospital stay for evaluation and management of resistant UTI with Klebsiella and pseudomonas. She was discharged back to [facility] (for Abx) on 11/6 with Meropenem for 10 days per ID recs.  The patient returns today with dark and maroon stools with visible blood clots. Not on any blood thinners. No abdominal pain. Hgb was 7.7 then  6.5. GI was consulted and received one unit of blood.  Patient has a LAPOST for DNR but daughter says full code.  Palliative care investigating further.remains full code, GI consulted and patient initially refuses C-scope.  Another unit of blood given on 11/9. PT/OT eval- back to NH ()- basic.   Patient and family finally agree with endoscopic evaluation,S/P colonoscopy,has diverticulosis and polyps with no bleeding,but she has still occasional again hematochezia,GI is aware and recommending CTA abdomen,pelvic if again has hematochezia,but  patient has advanced CKD and can not have CTA,no sign of active bleeding at this time,will monitor.  Still complain of neck pain,spoke with neurosurgery,they know patient,require surgery,but patient is not good candidate for procedure,daughter did not want intervention neither,changed pain medications to oxycodone.  Finishing meropenem today.    Plan,S/P coloscopy,has diverticulosis with polyps,still has occasional hematochezia,GI is aware and recommending CTA abdomen,pelvic if again has hematochezia,but patient has advanced CKD and can not have CTA,no sign of active bleeding at this time,will monitor,still has neck pain ,started on percocet,spoke with neurosurgery,fishiging meropenem today.    Patient required another unit of blood on 11/15.  Patient's blood count remained stable for approx 6 days.  She will be discharged back to [facility] today. Activity as tolerated. Diet-     Interval History: No new issues     Review of Systems   Constitutional: Negative for activity change, appetite change, chills, diaphoresis, fatigue and fever.   HENT: Negative for congestion and dental problem.    Eyes: Negative for discharge and itching.   Respiratory: Negative for cough, choking, shortness of breath and stridor.    Cardiovascular: Negative for chest pain, palpitations and leg swelling.   Gastrointestinal: Negative for abdominal pain, blood in stool, constipation, diarrhea, nausea and rectal pain.   Genitourinary: Negative for difficulty urinating, dyspareunia and enuresis.   Musculoskeletal: Positive for arthralgias and neck pain.   Neurological: Negative for dizziness.   Hematological: Negative for adenopathy.   Psychiatric/Behavioral: Negative for agitation and behavioral problems.     Objective:     Vital Signs (Most Recent):  Temp: 98.4 °F (36.9 °C) (11/18/20 0815)  Pulse: 84 (11/18/20 0815)  Resp: 16 (11/18/20 0815)  BP: (!) 152/69 (11/18/20 0815)  SpO2: 96 % (11/18/20 0848) Vital Signs (24h Range):  Temp:  [98.4 °F  (36.9 °C)-99.4 °F (37.4 °C)] 98.4 °F (36.9 °C)  Pulse:  [81-86] 84  Resp:  [16-20] 16  SpO2:  [96 %-99 %] 96 %  BP: (129-152)/(60-69) 152/69     Weight: 77.1 kg (169 lb 15.6 oz)  Body mass index is 25.84 kg/m².    Intake/Output Summary (Last 24 hours) at 11/18/2020 0912  Last data filed at 11/17/2020 1500  Gross per 24 hour   Intake 480 ml   Output --   Net 480 ml      Physical Exam  Vitals signs and nursing note reviewed.   Constitutional:       General: She is not in acute distress.     Appearance: Normal appearance. She is not ill-appearing, toxic-appearing or diaphoretic.   HENT:      Head: Normocephalic and atraumatic.   Cardiovascular:      Rate and Rhythm: Normal rate and regular rhythm.      Heart sounds: No murmur. No gallop.    Pulmonary:      Effort: Pulmonary effort is normal. No respiratory distress.      Breath sounds: No stridor. No wheezing or rales.   Chest:      Chest wall: No tenderness.   Abdominal:      General: Abdomen is flat. Bowel sounds are normal.      Tenderness: There is no abdominal tenderness. There is no guarding or rebound.   Neurological:      General: No focal deficit present.      Mental Status: She is alert and oriented to person, place, and time.   Psychiatric:         Mood and Affect: Mood normal.         Behavior: Behavior normal.         Thought Content: Thought content normal.         Significant Labs:   BMP:   Recent Labs   Lab 11/17/20  0401   GLU 77      K 4.7      CO2 26   BUN 45*   CREATININE 4.6*   CALCIUM 8.6*     CBC:   Recent Labs   Lab 11/17/20  0401 11/18/20  0424   WBC 8.68 8.88   HGB 7.4* 7.6*   HCT 23.4* 24.3*    216       Significant Imaging:      Assessment/Plan:      * Acute blood loss anemia  Likely lower GI bleed. Possibly diverticular. 7.7 to 6.5 Hgb over past 24 hours. Visible bleeding. PPI. GI consult.  Unit of blood.     Hgb from 6.9 to 7.4 today. Patient does not want C-scope eval. Monitor H/H today- if stable- then to   [facility]  tomorrow     Hgb at 7. Low but stable. Will give one unit of blood.  Refuses C-scope. GI following.  If H/H stable on 11/10- back to NH  ([place])    Patient and family finally agree with endoscopic evaluation,S/P colonoscopy,has diverticulosis and polyps with no bleeding,but she had  again hematochezia.\GI is aware and recommending CTA abdomen,pelvic if again has hematochezia,but patient has advanced CKD and can not have CTA,no sign of active bleeding at this time,will monitor.    Hgb stable.     Now Hgb to 6.8.  Will transfuse a unit of blood .  Talk to GI on 11/16.    H/H low but stable.    Stable for 6-7 days.   DC         Anemia of renal disease  Now with acute blood loss anemia as noted.       UTI (urinary tract infection)  Reported R Klebsiella (?)  Pseudomonas here at our hospital.  6 more days of Meropenem. End date 11/13.   No sepsis     Meropenem on d/c  11/13 end. Has picc line ,finishing today.      Palliative care by specialist  Remains full code.      Lower GI bleeding  Patient and family finally agree with endoscopic evaluation,S/P colonoscopy,has diverticulosis and polyps with no bleeding,but she had today again hematochezia.\as above.        Palliative care encounter  Advance Care Planning         Patient's daughter stated full code over the weekend. This is different than DNR status in past with [facility].  Palliative care will address/clarify on 11/9 (Today).      Neck pain, chronic    Still complain of neck pain,    Still complain of neck pain,spoke with neurosurgery,they know patient,require surgery,but patient is not good candidate for procedure,daughter did not want intervention neither,changed pain medications to oxycodone.    Neuro surgery evaluated.        History of stroke  No acute issues   Last time PT/OT eval- return to NH basic      Paroxysmal atrial fibrillation  Does not appear to be on any NOAC   History of bleeding reported in the past       Chronic diastolic heart failure  Stable        Renal hypertension  No acute issues. Resume home meds       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  No acute issues      CKD stage 5  At baseline      Type 2 diabetes mellitus, controlled, with renal complications  No acute issues. A1c last stay. Sliding scale for now. accu checks  Home insulin         VTE Risk Mitigation (From admission, onward)    None          Discharge Planning   DC to NH           Shahab Oscar MD  Department of Hospital Medicine   [facility]

## 2020-11-18 NOTE — PLAN OF CARE
11/18/20 1341   Final Note   Anticipated Discharge Disposition long-term Nu   What phone number can be called within the next 1-3 days to see how you are doing after discharge? [phone]   Right Care Referral Info   Post Acute Recommendation Other  (long-term)   Referral Type long-term NH   Facility Name [facility]   Post-Acute Status   Post-Acute Authorization Placement   Post-Acute Placement Status Set-up Complete   Patient choice form signed by patient/caregiver List with quality metrics by geographic area provided  (Pt returning to [facility])   Discharge Delays None known at this time

## 2020-11-18 NOTE — PLAN OF CARE
11/18/20 0932   Post-Acute Status   Post-Acute Authorization Placement   Post-Acute Placement Status Pending Payor Review   Patient choice form signed by patient/caregiver List with quality metrics by geographic area provided   Discharge Delays None known at this time   Discharge Plan   Discharge Plan A Return to nursing home     Orders received to transport pt back to NH. [facility] faxed orders, covid test, updated packet, d/c summary to [facility] via [facility]. [facility] waiting call report information.     1:29pm  According to Mariluz with [facility], pt  will be returning to [address] for transport. Have nurse call report to [address] nurse.    1:35pm  ADT 30 order placed for [facility].  Requested  time: 2:30pm  If transportation does not arrive at ETA time nurse will be instructed to follow protocol for transportation below:   How can I get in touch directly with dispatch, if needed?                 · Non-emergent (stretcher): [phone]    · Emergent dispatch: [phone]    ++NURSING:  If [facility] does not arrive at requested time please call the above Non Emergent Dispatcher.  If issue not resolved please escalate to your charge nurse for further instructions.      VIK[facility] notified pt's nurse, Paula, transport has been arranged for 2:30pm and travel packet is located by blue folder.

## 2020-11-18 NOTE — PROGRESS NOTES
[place] attempted d/c teaching but patient is a resident of [facility]..Zenaida Costello, RN, BSN, STN [facility]  11/18/2020

## 2020-11-18 NOTE — ASSESSMENT & PLAN NOTE
Likely lower GI bleed. Possibly diverticular. 7.7 to 6.5 Hgb over past 24 hours. Visible bleeding. PPI. GI consult.  Unit of blood.     Hgb from 6.9 to 7.4 today. Patient does not want C-scope eval. Monitor H/H today- if stable- then to   [facility] tomorrow     Hgb at 7. Low but stable. Will give one unit of blood.  Refuses C-scope. GI following.  If H/H stable on 11/10- back to NH  ([place])    Patient and family finally agree with endoscopic evaluation,S/P colonoscopy,has diverticulosis and polyps with no bleeding,but she had  again hematochezia.\GI is aware and recommending CTA abdomen,pelvic if again has hematochezia,but patient has advanced CKD and can not have CTA,no sign of active bleeding at this time,will monitor.    Hgb stable.     Now Hgb to 6.8.  Will transfuse a unit of blood .  Talk to GI on 11/16.    H/H low but stable.    Stable for 6-7 days.   DC

## 2020-11-18 NOTE — DISCHARGE SUMMARY
[facility] Medicine  Discharge Summary      Patient Name: Joyce Riley  MRN: [id number]  Admission Date: 11/7/2020  Hospital Length of Stay: 11 days  Discharge Date and Time:  11/18/2020 9:17 AM  Attending Physician: Shahab Chinchilla MD   Discharging Provider: Shahab Chinchilla MD  Primary Care Provider: Kalpana Staton MD      HPI:   Mrs. Riley is a 88 yo F known to me for an admission and discharge to [facility] yesterday.  She had a few day hospital stay for evaluation and management of resistant UTI with Klebsiella and pseudomonas. She was discharged back to [facility] (for Abx) on 11/6 with Meropenem for 10 days per ID recs.  The patient returns today with dark and maroon stools with visible blood clots. Not on any blood thinners. No abdominal pain. Hgb was 7.7 yesterday- today 6.5. GI was consulted and received one unit of blood.  Patient has a LAPOST for DNR but daughter says full code.      Procedure(s) (LRB):  COLONOSCOPY (N/A)      Hospital Course:   Mrs. Riley is a 88 yo F known to me for an admission and discharge to [facility] .  She had a few day hospital stay for evaluation and management of resistant UTI with Klebsiella and pseudomonas. She was discharged back to [facility] (for Abx) on 11/6 with Meropenem for 10 days per ID recs.  The patient returns today with dark and maroon stools with visible blood clots. Not on any blood thinners. No abdominal pain. Hgb was 7.7 then  6.5. GI was consulted and received one unit of blood.  Patient has a LAPOST for DNR but daughter says full code.  Palliative care investigating further.remains full code, GI consulted and patient initially refuses C-scope.  Another unit of blood given on 11/9. PT/OT eval- back to NH ()- basic.   Patient and family finally agree with endoscopic evaluation,S/P colonoscopy,has diverticulosis and polyps with no bleeding,but she has still occasional again hematochezia,GI is aware and recommending CTA  abdomen,pelvic if again has hematochezia,but patient has advanced CKD and can not have CTA,no sign of active bleeding at this time,will monitor.  Still complain of neck pain,spoke with neurosurgery,they know patient,require surgery,but patient is not good candidate for procedure,daughter did not want intervention neither,changed pain medications to oxycodone.  Finishing meropenem today.    Plan,S/P coloscopy,has diverticulosis with polyps,still has occasional hematochezia,GI is aware and recommending CTA abdomen,pelvic if again has hematochezia,but patient has advanced CKD and can not have CTA,no sign of active bleeding at this time,will monitor,still has neck pain ,started on percocet,spoke with neurosurgery,fishiging meropenem today.    Patient required another unit of blood on 11/15.  Patient's blood count remained stable for approx 6 days.  She will be discharged back to [facility] today. Activity as tolerated. Diet- low NA, ADA 2000. Follow up with PCP in one week      Consults:   Consults (From admission, onward)        Status Ordering Provider     Inpatient consult to Anesthesiology  Once     Provider:  Scott Quinn MD    Acknowledged JUAN J AGUILAR     Inpatient consult to Gastroenterology  Once     Provider:  Bryant Batista MD    Completed KETAN HENRY     Inpatient consult to Neurosurgery  Once     Provider:  Anil Adair DO    Completed DIAMOND JONES     Inpatient consult to Palliative Care  Once     Provider:  Bonita Coreas NP    Completed MARIANA CASTELAN     Inpatient consult to Spiritual Care  Once     Provider:  (Not yet assigned)    Completed MARIANA CASTELAN          No new Assessment & Plan notes have been filed under this hospital service since the last note was generated.  Service: Hospital Medicine    Final Active Diagnoses:    Diagnosis Date Noted POA    Anemia of renal disease [N18.9, D63.1] 04/01/2019 Yes     Chronic    Neck pain, chronic [M54.2, G89.29]  07/30/2020 Yes    History of stroke [Z86.73] 07/28/2020 Not Applicable     Chronic    Chronic diastolic heart failure [I50.32] 04/10/2019 Yes     Chronic    Paroxysmal atrial fibrillation [I48.0] 04/10/2019 Yes     Chronic    Renal hypertension [I12.9] 04/10/2019 Yes     Chronic    CKD stage 5 [N18.5] 04/01/2019 Yes     Chronic    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 [I27.23] 04/01/2019 Yes     Chronic    Type 2 diabetes mellitus, controlled, 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] 03/31/2019 Yes     Chronic      Problems Resolved During this Admission:    Diagnosis Date Noted Date Resolved POA    PRINCIPAL PROBLEM:  Acute blood loss anemia [D62] 07/19/2019 11/18/2020 Yes    UTI (urinary tract infection) [N39.0] 11/04/2020 11/18/2020 Yes    Palliative care by specialist [Z51.5]  11/18/2020 Not Applicable    Lower GI bleeding [K92.2] 11/07/2020 11/18/2020 Yes    Palliative care encounter [Z51.5] 07/31/2020 11/18/2020 Not Applicable       Discharged Condition: good    Disposition: Long Term Care    Follow Up:  Follow-up Information     Kalpana Staton MD In 1 week.    Specialty: General Practice  Contact information:  [address] [place] [address] [address]  [address]  [place] [address] [phone]                 Patient Instructions:   No discharge procedures on file.    Significant Diagnostic Studies:    Pending Diagnostic Studies:     None         Medications:  Reconciled Home Medications:      Medication List      START taking these medications    lidocaine 5 %  Commonly known as: LIDODERM  Place 1 patch onto the skin once daily. Remove & Discard patch within 12 hours or as directed by MD     oxyCODONE-acetaminophen 5-325 mg per tablet  Commonly known as: PERCOCET  Take 1 tablet by mouth every 6 (six) hours as needed.     pantoprazole 40 MG tablet  Commonly known as: PROTONIX  Take 1 tablet (40 mg total) by mouth once daily.     trolamine salicylate 10 % cream  Commonly known as: ASPERCREME  Apply topically  every 6 (six) hours as needed.        CHANGE how you take these medications    acetaminophen 325 MG tablet  Commonly known as: TYLENOL  Take 2 tablets (650 mg total) by mouth every 4 (four) hours as needed.  What changed: when to take this        CONTINUE taking these medications    albuterol-ipratropium 2.5 mg-0.5 mg/3 mL nebulizer solution  Commonly known as: DUO-NEB  Take 3 mLs by nebulization every 4 (four) hours as needed for Wheezing. Rescue     calcitRIOL 0.25 MCG Cap  Commonly known as: ROCALTROL  Take 1 capsule (0.25 mcg total) by mouth every other day.     carvediloL 12.5 MG tablet  Commonly known as: COREG  Take 1 tablet (12.5 mg total) by mouth 2 (two) times daily.     EPOGEN 10,000 unit/mL injection  Generic drug: epoetin diana  Inject 1 mL (10,000 Units total) into the skin every 7 days.     ergocalciferol 50,000 unit Cap  Commonly known as: ERGOCALCIFEROL  Take 1 capsule (50,000 Units total) by mouth every 7 days.     ferrous sulfate 325 mg (65 mg iron) Tab tablet  Commonly known as: FEOSOL  Take 325 mg by mouth 2 (two) times daily.     furosemide 20 MG tablet  Commonly known as: LASIX  Take 3 tablets (60 mg total) by mouth once daily.     hydrALAZINE 25 MG tablet  Commonly known as: APRESOLINE  Take 1 tablet (25 mg total) by mouth every 8 (eight) hours.     insulin degludec 100 unit/mL (3 mL) Inpn  Commonly known as: TRESIBA FLEXTOUCH U-100  Inject 5 Units into the skin every evening.     lactobacillus acidophilus & bulgar 100 million cell packet  Commonly known as: LACTINEX  Take 1 packet (1 each total) by mouth 2 (two) times daily.     methyl salicylate-menthol 15-10% 15-10 % Crea  Apply topically 4 (four) times daily.     NEPHRO-MICHAEL 0.8 mg Tab  Generic drug: B complex-vitamin C-folic acid  Take by mouth once daily.     NIFEdipine 90 MG (OSM) 24 hr tablet  Commonly known as: PROCARDIA-XL  Take 1 tablet (90 mg total) by mouth once daily.     senna-docusate 8.6-50 mg 8.6-50 mg per tablet  Commonly  known as: PERICOLACE  Take 1 tablet by mouth 2 (two) times daily.     sodium bicarbonate 650 MG tablet  Take 2 tablets (1,300 mg total) by mouth 3 (three) times daily.     traMADoL 50 mg tablet  Commonly known as: ULTRAM  Take 1 tablet (50 mg total) by mouth every 8 (eight) hours as needed for Pain.        STOP taking these medications    meropenem-0.9% sodium chloride 1 gram/50 mL Pgbk            Indwelling Lines/Drains at time of discharge:   Lines/Drains/Airways     Peripherally Inserted Central Catheter Line            PICC Double Lumen 11/06/20 1640 right basilic 11 days                Time spent on the discharge of patient: > 30  minutes  Patient was seen and examined on the date of discharge and determined to be suitable for discharge.         Shahab Oscar MD  Department of Hospital Medicine  [facility]

## 2020-11-24 ENCOUNTER — NURSE TRIAGE (OUTPATIENT)
Dept: ADMINISTRATIVE | Facility: CLINIC | Age: 85
End: 2020-11-24

## 2020-11-25 NOTE — TELEPHONE ENCOUNTER
Attempted to contact pt on behalf of Post Procedural Symptom Tracker. No answer  Reason for Disposition   Message left on unidentified voice mail.  Phone number verified.    Protocols used: NO CONTACT OR DUPLICATE CONTACT CALL-A-YUNG

## 2020-12-02 ENCOUNTER — HOSPITAL ENCOUNTER (EMERGENCY)
Facility: HOSPITAL | Age: 85
Discharge: HOME | End: 2020-12-02
Attending: EMERGENCY MEDICINE
Payer: MEDICARE

## 2020-12-02 VITALS
BODY MASS INDEX: 25.61 KG/M2 | HEIGHT: 68 IN | OXYGEN SATURATION: 97 % | RESPIRATION RATE: 20 BRPM | HEART RATE: 70 BPM | SYSTOLIC BLOOD PRESSURE: 172 MMHG | DIASTOLIC BLOOD PRESSURE: 74 MMHG | WEIGHT: 169 LBS | TEMPERATURE: 98 F

## 2020-12-02 DIAGNOSIS — N39.0 URINARY TRACT INFECTION WITHOUT HEMATURIA, SITE UNSPECIFIED: ICD-10-CM

## 2020-12-02 DIAGNOSIS — N18.5 CHRONIC RENAL FAILURE, STAGE 5: Primary | ICD-10-CM

## 2020-12-02 DIAGNOSIS — N19 UREMIA: ICD-10-CM

## 2020-12-02 LAB
ALBUMIN SERPL BCP-MCNC: 3.1 G/DL (ref 3.5–5.2)
ALP SERPL-CCNC: 77 U/L (ref 55–135)
ALT SERPL W/O P-5'-P-CCNC: 6 U/L (ref 10–44)
ANION GAP SERPL CALC-SCNC: 10 MMOL/L (ref 8–16)
AST SERPL-CCNC: 12 U/L (ref 10–40)
BACTERIA #/AREA URNS HPF: ABNORMAL /HPF
BASOPHILS # BLD AUTO: 0.04 K/UL (ref 0–0.2)
BASOPHILS NFR BLD: 0.5 % (ref 0–1.9)
BILIRUB SERPL-MCNC: 0.4 MG/DL (ref 0.1–1)
BILIRUB UR QL STRIP: NEGATIVE
BUN SERPL-MCNC: 53 MG/DL (ref 8–23)
CALCIUM SERPL-MCNC: 9.6 MG/DL (ref 8.7–10.5)
CHLORIDE SERPL-SCNC: 104 MMOL/L (ref 95–110)
CLARITY UR: ABNORMAL
CO2 SERPL-SCNC: 25 MMOL/L (ref 23–29)
COLOR UR: YELLOW
CREAT SERPL-MCNC: 5.5 MG/DL (ref 0.5–1.4)
DIFFERENTIAL METHOD: ABNORMAL
EOSINOPHIL # BLD AUTO: 0.2 K/UL (ref 0–0.5)
EOSINOPHIL NFR BLD: 2.4 % (ref 0–8)
ERYTHROCYTE [DISTWIDTH] IN BLOOD BY AUTOMATED COUNT: 14.8 % (ref 11.5–14.5)
EST. GFR  (AFRICAN AMERICAN): 7 ML/MIN/1.73 M^2
EST. GFR  (NON AFRICAN AMERICAN): 6 ML/MIN/1.73 M^2
GLUCOSE SERPL-MCNC: 128 MG/DL (ref 70–110)
GLUCOSE UR QL STRIP: NEGATIVE
HCT VFR BLD AUTO: 26.9 % (ref 37–48.5)
HGB BLD-MCNC: 8.9 G/DL (ref 12–16)
HGB UR QL STRIP: NEGATIVE
HYALINE CASTS #/AREA URNS LPF: 0 /LPF
IMM GRANULOCYTES # BLD AUTO: 0.08 K/UL (ref 0–0.04)
IMM GRANULOCYTES NFR BLD AUTO: 1 % (ref 0–0.5)
KETONES UR QL STRIP: NEGATIVE
LEUKOCYTE ESTERASE UR QL STRIP: ABNORMAL
LYMPHOCYTES # BLD AUTO: 1.5 K/UL (ref 1–4.8)
LYMPHOCYTES NFR BLD: 18.4 % (ref 18–48)
MAGNESIUM SERPL-MCNC: 1.7 MG/DL (ref 1.6–2.6)
MCH RBC QN AUTO: 31 PG (ref 27–31)
MCHC RBC AUTO-ENTMCNC: 33.1 G/DL (ref 32–36)
MCV RBC AUTO: 94 FL (ref 82–98)
MICROSCOPIC COMMENT: ABNORMAL
MONOCYTES # BLD AUTO: 0.4 K/UL (ref 0.3–1)
MONOCYTES NFR BLD: 4.6 % (ref 4–15)
NEUTROPHILS # BLD AUTO: 6 K/UL (ref 1.8–7.7)
NEUTROPHILS NFR BLD: 73.1 % (ref 38–73)
NITRITE UR QL STRIP: NEGATIVE
NRBC BLD-RTO: 0 /100 WBC
PH UR STRIP: 6 [PH] (ref 5–8)
PHOSPHATE SERPL-MCNC: 5.2 MG/DL (ref 2.7–4.5)
PLATELET # BLD AUTO: 248 K/UL (ref 150–350)
PMV BLD AUTO: 9.6 FL (ref 9.2–12.9)
POTASSIUM SERPL-SCNC: 4.1 MMOL/L (ref 3.5–5.1)
PROT SERPL-MCNC: 7.1 G/DL (ref 6–8.4)
PROT UR QL STRIP: ABNORMAL
RBC # BLD AUTO: 2.87 M/UL (ref 4–5.4)
RBC #/AREA URNS HPF: 25 /HPF (ref 0–4)
SODIUM SERPL-SCNC: 139 MMOL/L (ref 136–145)
SP GR UR STRIP: 1.01 (ref 1–1.03)
SQUAMOUS #/AREA URNS HPF: 4 /HPF
URN SPEC COLLECT METH UR: ABNORMAL
UROBILINOGEN UR STRIP-ACNC: NEGATIVE EU/DL
WBC # BLD AUTO: 8.24 K/UL (ref 3.9–12.7)
WBC #/AREA URNS HPF: >100 /HPF (ref 0–5)

## 2020-12-02 PROCEDURE — 85025 COMPLETE CBC W/AUTO DIFF WBC: CPT

## 2020-12-02 PROCEDURE — 87086 URINE CULTURE/COLONY COUNT: CPT

## 2020-12-02 PROCEDURE — 93005 ELECTROCARDIOGRAM TRACING: CPT

## 2020-12-02 PROCEDURE — 93010 EKG 12-LEAD: ICD-10-PCS | Mod: ,,, | Performed by: INTERNAL MEDICINE

## 2020-12-02 PROCEDURE — 84100 ASSAY OF PHOSPHORUS: CPT

## 2020-12-02 PROCEDURE — 80053 COMPREHEN METABOLIC PANEL: CPT

## 2020-12-02 PROCEDURE — 83735 ASSAY OF MAGNESIUM: CPT

## 2020-12-02 PROCEDURE — 87186 SC STD MICRODIL/AGAR DIL: CPT

## 2020-12-02 PROCEDURE — 63600175 PHARM REV CODE 636 W HCPCS: Performed by: EMERGENCY MEDICINE

## 2020-12-02 PROCEDURE — 99291 CRITICAL CARE FIRST HOUR: CPT | Mod: 25

## 2020-12-02 PROCEDURE — 81000 URINALYSIS NONAUTO W/SCOPE: CPT

## 2020-12-02 PROCEDURE — 96365 THER/PROPH/DIAG IV INF INIT: CPT

## 2020-12-02 PROCEDURE — 87088 URINE BACTERIA CULTURE: CPT

## 2020-12-02 PROCEDURE — 87077 CULTURE AEROBIC IDENTIFY: CPT

## 2020-12-02 PROCEDURE — 93010 ELECTROCARDIOGRAM REPORT: CPT | Mod: ,,, | Performed by: INTERNAL MEDICINE

## 2020-12-02 RX ORDER — CEPHALEXIN 250 MG/1
250 CAPSULE ORAL EVERY 12 HOURS
Qty: 14 CAPSULE | Refills: 0 | Status: SHIPPED | OUTPATIENT
Start: 2020-12-02 | End: 2020-12-09

## 2020-12-02 RX ADMIN — CEFTRIAXONE 1 G: 1 INJECTION, SOLUTION INTRAVENOUS at 07:12

## 2020-12-02 NOTE — ED TRIAGE NOTES
Pt presents the ED via EMS reporting she was sent here due to an abnormal creatinine level. Pt c/o right sided lower back pain. Pt's son states that the pt has a hx of kidney issues. Pt has a hx of dialysis a year and a half ago three times. Pt denies any other symptoms.

## 2020-12-02 NOTE — ED PROVIDER NOTES
Encounter Date: 12/2/2020    SCRIBE #1 NOTE: I, Josefina Mayfield, am scribing for, and in the presence of,  Mehul Jorge MD. I have scribed the following portions of the note - Other sections scribed: HPI, ROS, PE.       History     Chief Complaint   Patient presents with    Abnormal Lab     EMS reports pt sent to ED today for evaluation of abnormal creatinine of 5.2.      Chief Complaint: Abnormal Lab    History of Present Illness: This is an 87 year old female who has Anemia, Anticoagulant long-term use, Arthritis, Atrial fibrillation, congestive heart failure, chronic kidney disease stage V, Diabetes mellitus, gastroesophageal reflux disease, Gout, Hypertension, and history of Stroke who presents to the emergent department for emergent evaluation of abnormal labs. According to the patients lab results, creatinine was 5.2. Per son, the patient has a history of kidney problems for the past 3 years. She had dialysis once for 3 days last year. She does not have a dialysis access. Per son, the patient's GFR is usually 13 and sometimes dropped down to 10. She was treated for a urinary tract infection 2 weeks ago. She currently denies any urinary symptoms. Patient has chronic neck pain, and is requesting medication for the neck pain. She is unsure of the medication that is usually given for the neck pain in the nursing home. Patient denies leg swelling, shortness of breath, abdominal pain, nausea, or vomiting.    The history is provided by the patient and a relative. No  was used.     Review of patient's allergies indicates:   Allergen Reactions    Indomethacin     Nsaids (non-steroidal anti-inflammatory drug)     Percocet [oxycodone-acetaminophen]      No allergy to acetaminophen. Patient reports she takes all the time for her neck and it helps     Past Medical History:   Diagnosis Date    Anemia     Anticoagulant long-term use     Arthritis     Atrial fibrillation     Cataract     CHF  (congestive heart failure)     CKD (chronic kidney disease), stage V     Dermatophytosis     Diabetes mellitus     Type 2    GERD (gastroesophageal reflux disease)     GI bleed 2018    Gout     Gout     Hypertension     Obese     Requires assistance with all daily activities     Stroke     Wheelchair dependent      Past Surgical History:   Procedure Laterality Date     SECTION      Patient unsure, believe she had 3-4    CHOLECYSTECTOMY      COLONOSCOPY N/A 2020    Procedure: COLONOSCOPY;  Surgeon: Mary Lake MD;  Location: [facility];  Service: Endoscopy;  Laterality: N/A;    EYE SURGERY      HYSTERECTOMY      JOINT REPLACEMENT Right     knee    TOTAL KNEE ARTHROPLASTY Right      Family History   Problem Relation Age of Onset    Cancer Mother          age 98    Diabetes Mother     Hypertension Mother     Cancer Brother      Social History     Tobacco Use    Smoking status: Never Smoker    Smokeless tobacco: Never Used   Substance Use Topics    Alcohol use: No    Drug use: No     Review of Systems   Constitutional: Negative for chills and fever.   HENT: Negative for sore throat.    Respiratory: Negative for cough and shortness of breath.    Cardiovascular: Negative for chest pain and leg swelling.   Gastrointestinal: Negative for abdominal pain, diarrhea, nausea and vomiting.   Genitourinary: Negative for difficulty urinating, dysuria, flank pain, frequency, hematuria and urgency.   Musculoskeletal: Positive for neck pain (chronic). Negative for back pain.   Skin: Negative for rash.   Neurological: Negative for weakness.   Hematological: Does not bruise/bleed easily.       Physical Exam     Initial Vitals [20 1524]   BP Pulse Resp Temp SpO2   (!) 145/68 78 18 97.7 °F (36.5 °C) 99 %      MAP       --         Physical Exam    Nursing note and vitals reviewed.  Constitutional: She appears well-developed and well-nourished. She is not diaphoretic. No  distress.   HENT:   Head: Normocephalic and atraumatic.   Right Ear: External ear normal.   Left Ear: External ear normal.   Nose: Nose normal.   Eyes: EOM are normal. Pupils are equal, round, and reactive to light.   Neck: Normal range of motion. Neck supple. No thyromegaly present. No JVD present.   Cardiovascular: Normal rate and regular rhythm. Exam reveals no gallop and no friction rub.    No murmur heard.  Pulmonary/Chest: Breath sounds normal. No respiratory distress.   Abdominal: Soft. Bowel sounds are normal. There is no abdominal tenderness.   Musculoskeletal: Normal range of motion. No tenderness or edema.   Neurological: She is alert and oriented to person, place, and time. GCS score is 15. GCS eye subscore is 4. GCS verbal subscore is 5. GCS motor subscore is 6.   Skin: Skin is warm and dry. Capillary refill takes less than 2 seconds.   Psychiatric: She has a normal mood and affect.         ED Course   Critical Care    Date/Time: 12/2/2020 8:57 PM  Performed by: Mehul Jorge MD  Authorized by: Mehul Jorge MD   Direct patient critical care time: 20 minutes  Additional history critical care time: 8 minutes  Ordering / reviewing critical care time: 8 minutes  Documentation critical care time: 5 minutes  Consulting other physicians critical care time: 10 minutes  Total critical care time (exclusive of procedural time) : 51 minutes  Critical care time was exclusive of separately billable procedures and treating other patients and teaching time.  Critical care was necessary to treat or prevent imminent or life-threatening deterioration of the following conditions: renal failure.  Critical care was time spent personally by me on the following activities: development of treatment plan with patient or surrogate, discussions with consultants, examination of patient, obtaining history from patient or surrogate, ordering and performing treatments and interventions, ordering and review of laboratory  studies, ordering and review of radiographic studies, pulse oximetry, re-evaluation of patient's condition and review of old charts.        Labs Reviewed   CBC W/ AUTO DIFFERENTIAL - Abnormal; Notable for the following components:       Result Value    RBC 2.87 (*)     Hemoglobin 8.9 (*)     Hematocrit 26.9 (*)     RDW 14.8 (*)     Immature Granulocytes 1.0 (*)     Immature Grans (Abs) 0.08 (*)     Gran % 73.1 (*)     All other components within normal limits   COMPREHENSIVE METABOLIC PANEL - Abnormal; Notable for the following components:    Glucose 128 (*)     BUN 53 (*)     Creatinine 5.5 (*)     Albumin 3.1 (*)     ALT 6 (*)     eGFR if  7 (*)     eGFR if non  6 (*)     All other components within normal limits   URINALYSIS - Abnormal; Notable for the following components:    Appearance, UA Cloudy (*)     Protein, UA 2+ (*)     Leukocytes, UA 3+ (*)     All other components within normal limits   PHOSPHORUS - Abnormal; Notable for the following components:    Phosphorus 5.2 (*)     All other components within normal limits   URINALYSIS MICROSCOPIC - Abnormal; Notable for the following components:    RBC, UA 25 (*)     WBC, UA >100 (*)     Bacteria Many (*)     All other components within normal limits   CULTURE, URINE   MAGNESIUM          Imaging Results    None       Patient with UTI and worsening chronic kidney failure.  Creatinine now up to 5.5.  Discussed with the patient's nephrologist Dr. Baca.  He states the patient had this previously refused dialysis.  In discussion with the patient here she is still refusing dialysis.  The patient's nephrologist recommends I discharged her with treatment for UTI and they will repeat labs on Friday and follow-up with patient in clinic.  Patient does not appear to be volume overloaded or in need of emergent dialysis.                Scribe Attestation:   Scribe #1: I performed the above scribed service and the documentation accurately  describes the services I performed. I attest to the accuracy of the note.                      I, Mehul Jorge, personally performed the services described in this documentation. All medical record entries made by the scribe were at my direction and in my presence. I have reviewed the chart and agree that the record reflects my personal performance and is accurate and complete.    Clinical Impression:     ICD-10-CM ICD-9-CM   1. Chronic renal failure, stage 5  N18.5 585.5   2. Uremia  N19 586   3. Urinary tract infection without hematuria, site unspecified  N39.0 599.0                          ED Disposition Condition    Discharge Stable        ED Prescriptions     Medication Sig Dispense Start Date End Date Auth. Provider    cephALEXin (KEFLEX) 250 MG capsule Take 1 capsule (250 mg total) by mouth every 12 (twelve) hours. for 7 days 14 capsule 12/2/2020 12/9/2020 Mehul Jorge MD        Follow-up Information     Follow up With Specialties Details Why Contact Info    Samanta Baca MD Nephrology   [address]  [facility] [address]  [phone]      [facility] Emergency Medicine  Return to the emergency room for fever, change in mental status, difficulty breathing, swelling or any concerns. [address] [facility] [id number] [phone]                                       Mehul Jorge MD  12/02/20 7598

## 2020-12-03 NOTE — ED NOTES
[facility] transported pt back to [place]. Pt awake and alert, in no distress w/ stable VS.

## 2020-12-03 NOTE — ED NOTES
Pt updated on labs at sons request. Pt is awake, alert and in no respiratory distress. VS. Will continue to monitor closely.

## 2020-12-03 NOTE — DISCHARGE INSTRUCTIONS
Your kidney doctor is going to have your blood work redrawn on Friday and then have you follow-up in their clinic.

## 2020-12-03 NOTE — ED NOTES
Pt awake and alert, denies SOB and denies distress. Respirations are even and unlabored. Skin is warm, dry and pink. VS. Will continue to monitor closely.

## 2020-12-03 NOTE — ED NOTES
Called Shahab at [facility] to set up transport back to [place]. Shahab, says no units available right now, but as soon as one comes clear, he will send it this way.

## 2020-12-03 NOTE — ED NOTES
Rec'd report from MERCEDES Mathur RN. Pt is in the semi- milan's position, awake and alert, talking to her son at the BS. No respiratory distress observed. Skin is warm, dry and pink. VS. Pt is connected to the pulse ox, B.P cuff and EKG monitor. Bed is locked and in the low position w/ the side rails up and locked for pt safety. Call bell @ the BS. Will conitnue to monitor closely.

## 2020-12-03 NOTE — ED NOTES
Pt awake, alert and watching TV. Pt denies distress. Respirations are even and unlabored. Skin is warm, dry and pink. Will continue to monitor closely.

## 2020-12-04 LAB — BACTERIA UR CULT: ABNORMAL

## 2021-01-08 ENCOUNTER — HOSPITAL ENCOUNTER (OUTPATIENT)
Facility: HOSPITAL | Age: 86
Discharge: HOME | End: 2021-01-09
Attending: EMERGENCY MEDICINE | Admitting: HOSPITALIST
Payer: MEDICARE

## 2021-01-08 DIAGNOSIS — D64.9 ANEMIA, UNSPECIFIED TYPE: Primary | ICD-10-CM

## 2021-01-08 DIAGNOSIS — D63.1 ANEMIA OF RENAL DISEASE: Chronic | ICD-10-CM

## 2021-01-08 DIAGNOSIS — I48.91 ATRIAL FIBRILLATION: ICD-10-CM

## 2021-01-08 DIAGNOSIS — R07.9 CHEST PAIN: ICD-10-CM

## 2021-01-08 DIAGNOSIS — N18.9 ANEMIA OF RENAL DISEASE: Chronic | ICD-10-CM

## 2021-01-08 LAB
ABO + RH BLD: NORMAL
ALBUMIN SERPL BCP-MCNC: 3.2 G/DL (ref 3.5–5.2)
ALP SERPL-CCNC: 60 U/L (ref 55–135)
ALT SERPL W/O P-5'-P-CCNC: 9 U/L (ref 10–44)
ANION GAP SERPL CALC-SCNC: 15 MMOL/L (ref 8–16)
AST SERPL-CCNC: 21 U/L (ref 10–40)
BACTERIA #/AREA URNS HPF: NORMAL /HPF
BASOPHILS # BLD AUTO: 0.07 K/UL (ref 0–0.2)
BASOPHILS NFR BLD: 0.8 % (ref 0–1.9)
BILIRUB SERPL-MCNC: 0.4 MG/DL (ref 0.1–1)
BILIRUB UR QL STRIP: NEGATIVE
BLD GP AB SCN CELLS X3 SERPL QL: NORMAL
BUN SERPL-MCNC: 57 MG/DL (ref 8–23)
CALCIUM SERPL-MCNC: 9.8 MG/DL (ref 8.7–10.5)
CHLORIDE SERPL-SCNC: 103 MMOL/L (ref 95–110)
CLARITY UR: CLEAR
CO2 SERPL-SCNC: 24 MMOL/L (ref 23–29)
COLOR UR: YELLOW
CREAT SERPL-MCNC: 4.4 MG/DL (ref 0.5–1.4)
CTP QC/QA: YES
CTP QC/QA: YES
DIFFERENTIAL METHOD: ABNORMAL
EOSINOPHIL # BLD AUTO: 0.1 K/UL (ref 0–0.5)
EOSINOPHIL NFR BLD: 1.6 % (ref 0–8)
ERYTHROCYTE [DISTWIDTH] IN BLOOD BY AUTOMATED COUNT: 16.5 % (ref 11.5–14.5)
EST. GFR  (AFRICAN AMERICAN): 10 ML/MIN/1.73 M^2
EST. GFR  (NON AFRICAN AMERICAN): 8 ML/MIN/1.73 M^2
FECAL OCCULT BLOOD, POC: NEGATIVE
GLUCOSE SERPL-MCNC: 98 MG/DL (ref 70–110)
GLUCOSE UR QL STRIP: NEGATIVE
HCT VFR BLD AUTO: 24.5 % (ref 37–48.5)
HGB BLD-MCNC: 7.9 G/DL (ref 12–16)
HGB UR QL STRIP: NEGATIVE
HYALINE CASTS #/AREA URNS LPF: 0 /LPF
IMM GRANULOCYTES # BLD AUTO: 0.04 K/UL (ref 0–0.04)
IMM GRANULOCYTES NFR BLD AUTO: 0.5 % (ref 0–0.5)
KETONES UR QL STRIP: NEGATIVE
LEUKOCYTE ESTERASE UR QL STRIP: NEGATIVE
LYMPHOCYTES # BLD AUTO: 1.5 K/UL (ref 1–4.8)
LYMPHOCYTES NFR BLD: 17 % (ref 18–48)
MCH RBC QN AUTO: 30.6 PG (ref 27–31)
MCHC RBC AUTO-ENTMCNC: 32.2 G/DL (ref 32–36)
MCV RBC AUTO: 95 FL (ref 82–98)
MICROSCOPIC COMMENT: NORMAL
MONOCYTES # BLD AUTO: 0.4 K/UL (ref 0.3–1)
MONOCYTES NFR BLD: 4.8 % (ref 4–15)
NEUTROPHILS # BLD AUTO: 6.6 K/UL (ref 1.8–7.7)
NEUTROPHILS NFR BLD: 75.3 % (ref 38–73)
NITRITE UR QL STRIP: NEGATIVE
NRBC BLD-RTO: 0 /100 WBC
PH UR STRIP: 8 [PH] (ref 5–8)
PLATELET # BLD AUTO: 237 K/UL (ref 150–350)
PMV BLD AUTO: 10.1 FL (ref 9.2–12.9)
POTASSIUM SERPL-SCNC: 4.4 MMOL/L (ref 3.5–5.1)
PROT SERPL-MCNC: 7.4 G/DL (ref 6–8.4)
PROT UR QL STRIP: ABNORMAL
RBC # BLD AUTO: 2.58 M/UL (ref 4–5.4)
RBC #/AREA URNS HPF: 0 /HPF (ref 0–4)
SARS-COV-2 RDRP RESP QL NAA+PROBE: NEGATIVE
SODIUM SERPL-SCNC: 142 MMOL/L (ref 136–145)
SP GR UR STRIP: 1.01 (ref 1–1.03)
SQUAMOUS #/AREA URNS HPF: 11 /HPF
URN SPEC COLLECT METH UR: ABNORMAL
UROBILINOGEN UR STRIP-ACNC: NEGATIVE EU/DL
WBC # BLD AUTO: 8.72 K/UL (ref 3.9–12.7)
WBC #/AREA URNS HPF: 2 /HPF (ref 0–5)
WBC CLUMPS URNS QL MICRO: NORMAL

## 2021-01-08 PROCEDURE — 80053 COMPREHEN METABOLIC PANEL: CPT

## 2021-01-08 PROCEDURE — 99285 EMERGENCY DEPT VISIT HI MDM: CPT | Mod: 25

## 2021-01-08 PROCEDURE — 81000 URINALYSIS NONAUTO W/SCOPE: CPT

## 2021-01-08 PROCEDURE — 86920 COMPATIBILITY TEST SPIN: CPT

## 2021-01-08 PROCEDURE — 63600175 PHARM REV CODE 636 W HCPCS: Performed by: NURSE PRACTITIONER

## 2021-01-08 PROCEDURE — 85025 COMPLETE CBC W/AUTO DIFF WBC: CPT

## 2021-01-08 PROCEDURE — 25000003 PHARM REV CODE 250: Performed by: NURSE PRACTITIONER

## 2021-01-08 PROCEDURE — U0002 COVID-19 LAB TEST NON-CDC: HCPCS | Performed by: NURSE PRACTITIONER

## 2021-01-08 PROCEDURE — G0378 HOSPITAL OBSERVATION PER HR: HCPCS

## 2021-01-08 PROCEDURE — 86900 BLOOD TYPING SEROLOGIC ABO: CPT

## 2021-01-08 PROCEDURE — 96374 THER/PROPH/DIAG INJ IV PUSH: CPT

## 2021-01-08 RX ORDER — HYDROCODONE BITARTRATE AND ACETAMINOPHEN 500; 5 MG/1; MG/1
TABLET ORAL ONCE
Status: DISCONTINUED | OUTPATIENT
Start: 2021-01-08 | End: 2021-01-09 | Stop reason: HOSPADM

## 2021-01-08 RX ORDER — MORPHINE SULFATE 4 MG/ML
2 INJECTION, SOLUTION INTRAMUSCULAR; INTRAVENOUS
Status: COMPLETED | OUTPATIENT
Start: 2021-01-08 | End: 2021-01-08

## 2021-01-08 RX ADMIN — SODIUM CHLORIDE 1000 ML: 0.9 INJECTION, SOLUTION INTRAVENOUS at 08:01

## 2021-01-08 RX ADMIN — MORPHINE SULFATE 2 MG: 4 INJECTION INTRAVENOUS at 08:01

## 2021-01-09 VITALS
SYSTOLIC BLOOD PRESSURE: 144 MMHG | DIASTOLIC BLOOD PRESSURE: 67 MMHG | TEMPERATURE: 98 F | HEIGHT: 67 IN | OXYGEN SATURATION: 100 % | RESPIRATION RATE: 18 BRPM | HEART RATE: 86 BPM | WEIGHT: 150.38 LBS | BODY MASS INDEX: 23.6 KG/M2

## 2021-01-09 PROBLEM — I10 HTN (HYPERTENSION): Status: ACTIVE | Noted: 2021-01-09

## 2021-01-09 PROBLEM — D64.9 ANEMIA: Status: RESOLVED | Noted: 2021-01-08 | Resolved: 2021-01-09

## 2021-01-09 LAB
ANION GAP SERPL CALC-SCNC: 12 MMOL/L (ref 8–16)
BASOPHILS # BLD AUTO: 0.08 K/UL (ref 0–0.2)
BASOPHILS NFR BLD: 0.7 % (ref 0–1.9)
BLD PROD TYP BPU: NORMAL
BLOOD UNIT EXPIRATION DATE: NORMAL
BLOOD UNIT TYPE CODE: 5100
BLOOD UNIT TYPE: NORMAL
BUN SERPL-MCNC: 54 MG/DL (ref 8–23)
CALCIUM SERPL-MCNC: 9.5 MG/DL (ref 8.7–10.5)
CHLORIDE SERPL-SCNC: 106 MMOL/L (ref 95–110)
CO2 SERPL-SCNC: 23 MMOL/L (ref 23–29)
CODING SYSTEM: NORMAL
CREAT SERPL-MCNC: 4.3 MG/DL (ref 0.5–1.4)
DIFFERENTIAL METHOD: ABNORMAL
DISPENSE STATUS: NORMAL
EOSINOPHIL # BLD AUTO: 0.2 K/UL (ref 0–0.5)
EOSINOPHIL NFR BLD: 1.4 % (ref 0–8)
ERYTHROCYTE [DISTWIDTH] IN BLOOD BY AUTOMATED COUNT: 17.6 % (ref 11.5–14.5)
EST. GFR  (AFRICAN AMERICAN): 10 ML/MIN/1.73 M^2
EST. GFR  (NON AFRICAN AMERICAN): 9 ML/MIN/1.73 M^2
FERRITIN SERPL-MCNC: 956 NG/ML (ref 20–300)
GLUCOSE SERPL-MCNC: 86 MG/DL (ref 70–110)
HCT VFR BLD AUTO: 25 % (ref 37–48.5)
HGB BLD-MCNC: 8.1 G/DL (ref 12–16)
IMM GRANULOCYTES # BLD AUTO: 0.06 K/UL (ref 0–0.04)
IMM GRANULOCYTES NFR BLD AUTO: 0.5 % (ref 0–0.5)
INR PPP: 1 (ref 0.8–1.2)
IRON SERPL-MCNC: 89 UG/DL (ref 30–160)
LYMPHOCYTES # BLD AUTO: 1.2 K/UL (ref 1–4.8)
LYMPHOCYTES NFR BLD: 10.6 % (ref 18–48)
MCH RBC QN AUTO: 30.9 PG (ref 27–31)
MCHC RBC AUTO-ENTMCNC: 32.4 G/DL (ref 32–36)
MCV RBC AUTO: 95 FL (ref 82–98)
MONOCYTES # BLD AUTO: 0.5 K/UL (ref 0.3–1)
MONOCYTES NFR BLD: 4.4 % (ref 4–15)
NEUTROPHILS # BLD AUTO: 9.6 K/UL (ref 1.8–7.7)
NEUTROPHILS NFR BLD: 82.4 % (ref 38–73)
NRBC BLD-RTO: 0 /100 WBC
PLATELET # BLD AUTO: 226 K/UL (ref 150–350)
PMV BLD AUTO: 9.5 FL (ref 9.2–12.9)
POCT GLUCOSE: 144 MG/DL (ref 70–110)
POCT GLUCOSE: 90 MG/DL (ref 70–110)
POCT GLUCOSE: 97 MG/DL (ref 70–110)
POTASSIUM SERPL-SCNC: 3.8 MMOL/L (ref 3.5–5.1)
PROTHROMBIN TIME: 11.5 SEC (ref 9–12.5)
RBC # BLD AUTO: 2.62 M/UL (ref 4–5.4)
SATURATED IRON: 40 % (ref 20–50)
SODIUM SERPL-SCNC: 141 MMOL/L (ref 136–145)
TOTAL IRON BINDING CAPACITY: 225 UG/DL (ref 250–450)
TRANS ERYTHROCYTES VOL PATIENT: NORMAL ML
TRANSFERRIN SERPL-MCNC: 152 MG/DL (ref 200–375)
WBC # BLD AUTO: 11.65 K/UL (ref 3.9–12.7)

## 2021-01-09 PROCEDURE — 85025 COMPLETE CBC W/AUTO DIFF WBC: CPT

## 2021-01-09 PROCEDURE — 83540 ASSAY OF IRON: CPT

## 2021-01-09 PROCEDURE — 82728 ASSAY OF FERRITIN: CPT

## 2021-01-09 PROCEDURE — 80048 BASIC METABOLIC PNL TOTAL CA: CPT

## 2021-01-09 PROCEDURE — 36430 TRANSFUSION BLD/BLD COMPNT: CPT

## 2021-01-09 PROCEDURE — P9021 RED BLOOD CELLS UNIT: HCPCS

## 2021-01-09 PROCEDURE — 85610 PROTHROMBIN TIME: CPT

## 2021-01-09 PROCEDURE — 25000003 PHARM REV CODE 250: Performed by: NURSE PRACTITIONER

## 2021-01-09 PROCEDURE — G0378 HOSPITAL OBSERVATION PER HR: HCPCS

## 2021-01-09 PROCEDURE — 36415 COLL VENOUS BLD VENIPUNCTURE: CPT

## 2021-01-09 RX ORDER — CARVEDILOL 12.5 MG/1
12.5 TABLET ORAL 2 TIMES DAILY
Status: DISCONTINUED | OUTPATIENT
Start: 2021-01-09 | End: 2021-01-09 | Stop reason: HOSPADM

## 2021-01-09 RX ORDER — PANTOPRAZOLE SODIUM 40 MG/1
40 TABLET, DELAYED RELEASE ORAL DAILY
Status: DISCONTINUED | OUTPATIENT
Start: 2021-01-09 | End: 2021-01-09 | Stop reason: HOSPADM

## 2021-01-09 RX ORDER — FERROUS SULFATE 325(65) MG
325 TABLET ORAL
Refills: 0
Start: 2021-01-09 | End: 2021-01-09 | Stop reason: HOSPADM

## 2021-01-09 RX ORDER — SODIUM CHLORIDE 0.9 % (FLUSH) 0.9 %
10 SYRINGE (ML) INJECTION
Status: DISCONTINUED | OUTPATIENT
Start: 2021-01-09 | End: 2021-01-09 | Stop reason: HOSPADM

## 2021-01-09 RX ORDER — IBUPROFEN 200 MG
16 TABLET ORAL
Status: DISCONTINUED | OUTPATIENT
Start: 2021-01-09 | End: 2021-01-09 | Stop reason: HOSPADM

## 2021-01-09 RX ORDER — GLUCAGON 1 MG
1 KIT INJECTION
Status: DISCONTINUED | OUTPATIENT
Start: 2021-01-09 | End: 2021-01-09 | Stop reason: HOSPADM

## 2021-01-09 RX ORDER — INSULIN DEGLUDEC 100 U/ML
5 INJECTION, SOLUTION SUBCUTANEOUS NIGHTLY
Status: DISCONTINUED | OUTPATIENT
Start: 2021-01-09 | End: 2021-01-09

## 2021-01-09 RX ORDER — FERROUS SULFATE 325(65) MG
325 TABLET, DELAYED RELEASE (ENTERIC COATED) ORAL 2 TIMES DAILY
Status: DISCONTINUED | OUTPATIENT
Start: 2021-01-09 | End: 2021-01-09 | Stop reason: HOSPADM

## 2021-01-09 RX ORDER — SODIUM BICARBONATE 325 MG/1
1300 TABLET ORAL 3 TIMES DAILY
Status: DISCONTINUED | OUTPATIENT
Start: 2021-01-09 | End: 2021-01-09 | Stop reason: HOSPADM

## 2021-01-09 RX ORDER — AMOXICILLIN 250 MG
1 CAPSULE ORAL 2 TIMES DAILY
Status: DISCONTINUED | OUTPATIENT
Start: 2021-01-09 | End: 2021-01-09 | Stop reason: HOSPADM

## 2021-01-09 RX ORDER — IBUPROFEN 200 MG
24 TABLET ORAL
Status: DISCONTINUED | OUTPATIENT
Start: 2021-01-09 | End: 2021-01-09 | Stop reason: HOSPADM

## 2021-01-09 RX ORDER — NIFEDIPINE 30 MG/1
90 TABLET, EXTENDED RELEASE ORAL DAILY
Status: DISCONTINUED | OUTPATIENT
Start: 2021-01-09 | End: 2021-01-09 | Stop reason: HOSPADM

## 2021-01-09 RX ORDER — INSULIN ASPART 100 [IU]/ML
0-5 INJECTION, SOLUTION INTRAVENOUS; SUBCUTANEOUS
Status: DISCONTINUED | OUTPATIENT
Start: 2021-01-09 | End: 2021-01-09 | Stop reason: HOSPADM

## 2021-01-09 RX ORDER — LIDOCAINE 50 MG/G
1 PATCH TOPICAL
Status: DISCONTINUED | OUTPATIENT
Start: 2021-01-09 | End: 2021-01-09 | Stop reason: HOSPADM

## 2021-01-09 RX ORDER — POLYETHYLENE GLYCOL 3350 17 G/17G
17 POWDER, FOR SOLUTION ORAL DAILY
Status: DISCONTINUED | OUTPATIENT
Start: 2021-01-09 | End: 2021-01-09 | Stop reason: HOSPADM

## 2021-01-09 RX ORDER — HYDRALAZINE HYDROCHLORIDE 25 MG/1
25 TABLET, FILM COATED ORAL EVERY 8 HOURS
Status: DISCONTINUED | OUTPATIENT
Start: 2021-01-09 | End: 2021-01-09 | Stop reason: HOSPADM

## 2021-01-09 RX ADMIN — CARVEDILOL 12.5 MG: 12.5 TABLET, FILM COATED ORAL at 02:01

## 2021-01-09 RX ADMIN — HYDRALAZINE HYDROCHLORIDE 25 MG: 25 TABLET, FILM COATED ORAL at 02:01

## 2021-01-09 RX ADMIN — SODIUM BICARBONATE TAB 325 MG 1300 MG: 325 TAB at 10:01

## 2021-01-09 RX ADMIN — NIFEDIPINE 90 MG: 30 TABLET, FILM COATED, EXTENDED RELEASE ORAL at 02:01

## 2021-01-09 RX ADMIN — Medication 325 MG: at 10:01

## 2021-01-09 RX ADMIN — POLYETHYLENE GLYCOL 3350 17 G: 17 POWDER, FOR SOLUTION ORAL at 10:01

## 2021-01-09 RX ADMIN — LIDOCAINE 1 PATCH: 50 PATCH TOPICAL at 02:01

## 2021-01-09 RX ADMIN — NEPHROCAP 1 CAPSULE: 1 CAP ORAL at 10:01

## 2021-01-09 RX ADMIN — DOCUSATE SODIUM 50 MG AND SENNOSIDES 8.6 MG 1 TABLET: 8.6; 5 TABLET, FILM COATED ORAL at 10:01

## 2021-01-09 RX ADMIN — PANTOPRAZOLE SODIUM 40 MG: 40 TABLET, DELAYED RELEASE ORAL at 10:01

## 2021-01-09 RX ADMIN — CARVEDILOL 12.5 MG: 12.5 TABLET, FILM COATED ORAL at 10:01

## 2021-03-09 ENCOUNTER — HOSPITAL ENCOUNTER (EMERGENCY)
Facility: HOSPITAL | Age: 86
Discharge: HOME | End: 2021-03-09
Attending: EMERGENCY MEDICINE
Payer: MEDICARE

## 2021-03-09 VITALS
RESPIRATION RATE: 20 BRPM | OXYGEN SATURATION: 89 % | BODY MASS INDEX: 24.64 KG/M2 | HEIGHT: 67 IN | TEMPERATURE: 98 F | DIASTOLIC BLOOD PRESSURE: 87 MMHG | SYSTOLIC BLOOD PRESSURE: 196 MMHG | HEART RATE: 98 BPM | WEIGHT: 157 LBS

## 2021-03-09 DIAGNOSIS — D64.9 ANEMIA, UNSPECIFIED TYPE: ICD-10-CM

## 2021-03-09 DIAGNOSIS — N18.4 CHRONIC RENAL INSUFFICIENCY, STAGE 4 (SEVERE): Primary | ICD-10-CM

## 2021-03-09 LAB
ABO + RH BLD: NORMAL
ALBUMIN SERPL BCP-MCNC: 2.9 G/DL (ref 3.5–5.2)
ALP SERPL-CCNC: 76 U/L (ref 55–135)
ALT SERPL W/O P-5'-P-CCNC: 13 U/L (ref 10–44)
ANION GAP SERPL CALC-SCNC: 15 MMOL/L (ref 8–16)
AST SERPL-CCNC: 15 U/L (ref 10–40)
BASOPHILS # BLD AUTO: 0.07 K/UL (ref 0–0.2)
BASOPHILS NFR BLD: 0.6 % (ref 0–1.9)
BILIRUB SERPL-MCNC: 0.3 MG/DL (ref 0.1–1)
BLD GP AB SCN CELLS X3 SERPL QL: NORMAL
BUN SERPL-MCNC: 58 MG/DL (ref 8–23)
CALCIUM SERPL-MCNC: 9.8 MG/DL (ref 8.7–10.5)
CHLORIDE SERPL-SCNC: 104 MMOL/L (ref 95–110)
CO2 SERPL-SCNC: 19 MMOL/L (ref 23–29)
CREAT SERPL-MCNC: 5.1 MG/DL (ref 0.5–1.4)
DIFFERENTIAL METHOD: ABNORMAL
EOSINOPHIL # BLD AUTO: 0.2 K/UL (ref 0–0.5)
EOSINOPHIL NFR BLD: 1.3 % (ref 0–8)
ERYTHROCYTE [DISTWIDTH] IN BLOOD BY AUTOMATED COUNT: 15.5 % (ref 11.5–14.5)
EST. GFR  (AFRICAN AMERICAN): 8 ML/MIN/1.73 M^2
EST. GFR  (NON AFRICAN AMERICAN): 7 ML/MIN/1.73 M^2
GLUCOSE SERPL-MCNC: 72 MG/DL (ref 70–110)
HCT VFR BLD AUTO: 26.3 % (ref 37–48.5)
HGB BLD-MCNC: 8.1 G/DL (ref 12–16)
IMM GRANULOCYTES # BLD AUTO: 0.17 K/UL (ref 0–0.04)
IMM GRANULOCYTES NFR BLD AUTO: 1.5 % (ref 0–0.5)
INR PPP: 1 (ref 0.8–1.2)
LYMPHOCYTES # BLD AUTO: 1.2 K/UL (ref 1–4.8)
LYMPHOCYTES NFR BLD: 10.9 % (ref 18–48)
MCH RBC QN AUTO: 30.9 PG (ref 27–31)
MCHC RBC AUTO-ENTMCNC: 30.8 G/DL (ref 32–36)
MCV RBC AUTO: 100 FL (ref 82–98)
MONOCYTES # BLD AUTO: 0.5 K/UL (ref 0.3–1)
MONOCYTES NFR BLD: 4.1 % (ref 4–15)
NEUTROPHILS # BLD AUTO: 9.2 K/UL (ref 1.8–7.7)
NEUTROPHILS NFR BLD: 81.6 % (ref 38–73)
NRBC BLD-RTO: 0 /100 WBC
PLATELET # BLD AUTO: 315 K/UL (ref 150–350)
PMV BLD AUTO: 9.8 FL (ref 9.2–12.9)
POTASSIUM SERPL-SCNC: 4.5 MMOL/L (ref 3.5–5.1)
PROT SERPL-MCNC: 7.8 G/DL (ref 6–8.4)
PROTHROMBIN TIME: 11 SEC (ref 9–12.5)
RBC # BLD AUTO: 2.62 M/UL (ref 4–5.4)
SODIUM SERPL-SCNC: 138 MMOL/L (ref 136–145)
WBC # BLD AUTO: 11.24 K/UL (ref 3.9–12.7)

## 2021-03-09 PROCEDURE — 85610 PROTHROMBIN TIME: CPT | Performed by: EMERGENCY MEDICINE

## 2021-03-09 PROCEDURE — 99285 EMERGENCY DEPT VISIT HI MDM: CPT | Mod: 25

## 2021-03-09 PROCEDURE — 86900 BLOOD TYPING SEROLOGIC ABO: CPT | Performed by: EMERGENCY MEDICINE

## 2021-03-09 PROCEDURE — 25000003 PHARM REV CODE 250: Performed by: EMERGENCY MEDICINE

## 2021-03-09 PROCEDURE — 85025 COMPLETE CBC W/AUTO DIFF WBC: CPT | Performed by: EMERGENCY MEDICINE

## 2021-03-09 PROCEDURE — 80053 COMPREHEN METABOLIC PANEL: CPT | Performed by: EMERGENCY MEDICINE

## 2021-03-09 RX ORDER — AMLODIPINE BESYLATE 5 MG/1
5 TABLET ORAL
Status: DISCONTINUED | OUTPATIENT
Start: 2021-03-09 | End: 2021-03-09

## 2021-03-09 RX ORDER — CLONIDINE HYDROCHLORIDE 0.1 MG/1
0.1 TABLET ORAL
Status: DISCONTINUED | OUTPATIENT
Start: 2021-03-09 | End: 2021-03-09

## 2021-03-09 RX ORDER — HYDRALAZINE HYDROCHLORIDE 25 MG/1
25 TABLET, FILM COATED ORAL
Status: COMPLETED | OUTPATIENT
Start: 2021-03-09 | End: 2021-03-09

## 2021-03-09 RX ORDER — CARVEDILOL 12.5 MG/1
12.5 TABLET ORAL
Status: COMPLETED | OUTPATIENT
Start: 2021-03-09 | End: 2021-03-09

## 2021-03-09 RX ORDER — ACETAMINOPHEN 325 MG/1
650 TABLET ORAL
Status: COMPLETED | OUTPATIENT
Start: 2021-03-09 | End: 2021-03-09

## 2021-03-09 RX ADMIN — ACETAMINOPHEN 650 MG: 325 TABLET ORAL at 06:03

## 2021-03-09 RX ADMIN — HYDRALAZINE HYDROCHLORIDE 25 MG: 25 TABLET, FILM COATED ORAL at 07:03

## 2021-03-09 RX ADMIN — CARVEDILOL 12.5 MG: 12.5 TABLET, FILM COATED ORAL at 07:03

## 2021-03-20 ENCOUNTER — HOSPITAL ENCOUNTER (INPATIENT)
Facility: HOSPITAL | Age: 86
LOS: 3 days | Discharge: NURSING, REHAB OR LONG-TERM CARE | DRG: 291 | End: 2021-03-23
Attending: EMERGENCY MEDICINE | Admitting: INTERNAL MEDICINE
Payer: MEDICARE

## 2021-03-20 DIAGNOSIS — D72.829 LEUKOCYTOSIS, UNSPECIFIED TYPE: ICD-10-CM

## 2021-03-20 DIAGNOSIS — R06.82 TACHYPNEA: ICD-10-CM

## 2021-03-20 DIAGNOSIS — I10 UNCONTROLLED HYPERTENSION: ICD-10-CM

## 2021-03-20 DIAGNOSIS — R41.0 CONFUSION AND DISORIENTATION: Primary | ICD-10-CM

## 2021-03-20 DIAGNOSIS — G92.9 ENCEPHALOPATHY, TOXIC: ICD-10-CM

## 2021-03-20 PROBLEM — N17.9 AKI (ACUTE KIDNEY INJURY): Status: ACTIVE | Noted: 2021-03-20

## 2021-03-20 LAB
ALBUMIN SERPL BCP-MCNC: 3.4 G/DL (ref 3.5–5.2)
ALP SERPL-CCNC: 80 U/L (ref 55–135)
ALT SERPL W/O P-5'-P-CCNC: 7 U/L (ref 10–44)
ANION GAP SERPL CALC-SCNC: 15 MMOL/L (ref 8–16)
AST SERPL-CCNC: 19 U/L (ref 10–40)
BACTERIA #/AREA URNS HPF: ABNORMAL /HPF
BASOPHILS # BLD AUTO: 0.06 K/UL (ref 0–0.2)
BASOPHILS NFR BLD: 0.4 % (ref 0–1.9)
BILIRUB SERPL-MCNC: 0.4 MG/DL (ref 0.1–1)
BILIRUB UR QL STRIP: NEGATIVE
BUN SERPL-MCNC: 67 MG/DL (ref 8–23)
CALCIUM SERPL-MCNC: 10.4 MG/DL (ref 8.7–10.5)
CHLORIDE SERPL-SCNC: 106 MMOL/L (ref 95–110)
CLARITY UR: CLEAR
CO2 SERPL-SCNC: 20 MMOL/L (ref 23–29)
COLOR UR: COLORLESS
CREAT SERPL-MCNC: 5.4 MG/DL (ref 0.5–1.4)
CTP QC/QA: YES
DIFFERENTIAL METHOD: ABNORMAL
EOSINOPHIL # BLD AUTO: 0.1 K/UL (ref 0–0.5)
EOSINOPHIL NFR BLD: 0.7 % (ref 0–8)
ERYTHROCYTE [DISTWIDTH] IN BLOOD BY AUTOMATED COUNT: 16.2 % (ref 11.5–14.5)
EST. GFR  (AFRICAN AMERICAN): 8 ML/MIN/1.73 M^2
EST. GFR  (NON AFRICAN AMERICAN): 7 ML/MIN/1.73 M^2
GLUCOSE SERPL-MCNC: 93 MG/DL (ref 70–110)
GLUCOSE UR QL STRIP: NEGATIVE
HCT VFR BLD AUTO: 25.8 % (ref 37–48.5)
HGB BLD-MCNC: 8.1 G/DL (ref 12–16)
HGB UR QL STRIP: NEGATIVE
HYALINE CASTS #/AREA URNS LPF: 0 /LPF
IMM GRANULOCYTES # BLD AUTO: 0.12 K/UL (ref 0–0.04)
IMM GRANULOCYTES NFR BLD AUTO: 0.8 % (ref 0–0.5)
KETONES UR QL STRIP: NEGATIVE
LACTATE SERPL-SCNC: 0.8 MMOL/L (ref 0.5–2.2)
LEUKOCYTE ESTERASE UR QL STRIP: NEGATIVE
LYMPHOCYTES # BLD AUTO: 1.2 K/UL (ref 1–4.8)
LYMPHOCYTES NFR BLD: 8.1 % (ref 18–48)
MAGNESIUM SERPL-MCNC: 1.7 MG/DL (ref 1.6–2.6)
MCH RBC QN AUTO: 31.3 PG (ref 27–31)
MCHC RBC AUTO-ENTMCNC: 31.4 G/DL (ref 32–36)
MCV RBC AUTO: 100 FL (ref 82–98)
MICROSCOPIC COMMENT: ABNORMAL
MONOCYTES # BLD AUTO: 0.6 K/UL (ref 0.3–1)
MONOCYTES NFR BLD: 4.1 % (ref 4–15)
NEUTROPHILS # BLD AUTO: 12.5 K/UL (ref 1.8–7.7)
NEUTROPHILS NFR BLD: 85.9 % (ref 38–73)
NITRITE UR QL STRIP: NEGATIVE
NRBC BLD-RTO: 0 /100 WBC
PH UR STRIP: 7 [PH] (ref 5–8)
PHOSPHATE SERPL-MCNC: 5.8 MG/DL (ref 2.7–4.5)
PLATELET # BLD AUTO: 282 K/UL (ref 150–350)
PMV BLD AUTO: 10.1 FL (ref 9.2–12.9)
POCT GLUCOSE: 98 MG/DL (ref 70–110)
POTASSIUM SERPL-SCNC: 4.7 MMOL/L (ref 3.5–5.1)
PROT SERPL-MCNC: 8.3 G/DL (ref 6–8.4)
PROT UR QL STRIP: ABNORMAL
RBC # BLD AUTO: 2.59 M/UL (ref 4–5.4)
RBC #/AREA URNS HPF: 2 /HPF (ref 0–4)
SARS-COV-2 RDRP RESP QL NAA+PROBE: NEGATIVE
SODIUM SERPL-SCNC: 141 MMOL/L (ref 136–145)
SP GR UR STRIP: 1.01 (ref 1–1.03)
SQUAMOUS #/AREA URNS HPF: 4 /HPF
URN SPEC COLLECT METH UR: ABNORMAL
UROBILINOGEN UR STRIP-ACNC: NEGATIVE EU/DL
WBC # BLD AUTO: 14.53 K/UL (ref 3.9–12.7)
WBC #/AREA URNS HPF: 6 /HPF (ref 0–5)

## 2021-03-20 PROCEDURE — U0002 COVID-19 LAB TEST NON-CDC: HCPCS | Performed by: EMERGENCY MEDICINE

## 2021-03-20 PROCEDURE — 83605 ASSAY OF LACTIC ACID: CPT | Performed by: EMERGENCY MEDICINE

## 2021-03-20 PROCEDURE — 25000003 PHARM REV CODE 250: Performed by: INTERNAL MEDICINE

## 2021-03-20 PROCEDURE — 63600175 PHARM REV CODE 636 W HCPCS: Performed by: INTERNAL MEDICINE

## 2021-03-20 PROCEDURE — 83735 ASSAY OF MAGNESIUM: CPT | Performed by: EMERGENCY MEDICINE

## 2021-03-20 PROCEDURE — 80053 COMPREHEN METABOLIC PANEL: CPT | Performed by: EMERGENCY MEDICINE

## 2021-03-20 PROCEDURE — 63600175 PHARM REV CODE 636 W HCPCS: Performed by: EMERGENCY MEDICINE

## 2021-03-20 PROCEDURE — 93010 ELECTROCARDIOGRAM REPORT: CPT | Mod: ,,, | Performed by: INTERNAL MEDICINE

## 2021-03-20 PROCEDURE — 84100 ASSAY OF PHOSPHORUS: CPT | Performed by: EMERGENCY MEDICINE

## 2021-03-20 PROCEDURE — 96376 TX/PRO/DX INJ SAME DRUG ADON: CPT

## 2021-03-20 PROCEDURE — 99222 1ST HOSP IP/OBS MODERATE 55: CPT | Mod: ,,, | Performed by: PSYCHIATRY & NEUROLOGY

## 2021-03-20 PROCEDURE — 25000003 PHARM REV CODE 250: Performed by: EMERGENCY MEDICINE

## 2021-03-20 PROCEDURE — 81000 URINALYSIS NONAUTO W/SCOPE: CPT | Performed by: EMERGENCY MEDICINE

## 2021-03-20 PROCEDURE — 93010 EKG 12-LEAD: ICD-10-PCS | Mod: ,,, | Performed by: INTERNAL MEDICINE

## 2021-03-20 PROCEDURE — 99222 PR INITIAL HOSPITAL CARE,LEVL II: ICD-10-PCS | Mod: ,,, | Performed by: PSYCHIATRY & NEUROLOGY

## 2021-03-20 PROCEDURE — 51702 INSERT TEMP BLADDER CATH: CPT

## 2021-03-20 PROCEDURE — 85025 COMPLETE CBC W/AUTO DIFF WBC: CPT | Performed by: EMERGENCY MEDICINE

## 2021-03-20 PROCEDURE — 82962 GLUCOSE BLOOD TEST: CPT

## 2021-03-20 PROCEDURE — 99291 CRITICAL CARE FIRST HOUR: CPT | Mod: 25

## 2021-03-20 PROCEDURE — 21400001 HC TELEMETRY ROOM

## 2021-03-20 PROCEDURE — 93005 ELECTROCARDIOGRAM TRACING: CPT

## 2021-03-20 PROCEDURE — 96374 THER/PROPH/DIAG INJ IV PUSH: CPT

## 2021-03-20 RX ORDER — HYDRALAZINE HYDROCHLORIDE 25 MG/1
50 TABLET, FILM COATED ORAL
Status: COMPLETED | OUTPATIENT
Start: 2021-03-20 | End: 2021-03-20

## 2021-03-20 RX ORDER — AMOXICILLIN 250 MG
1 CAPSULE ORAL 2 TIMES DAILY
Status: DISCONTINUED | OUTPATIENT
Start: 2021-03-20 | End: 2021-03-23 | Stop reason: HOSPADM

## 2021-03-20 RX ORDER — NIFEDIPINE 30 MG/1
30 TABLET, EXTENDED RELEASE ORAL DAILY
Status: DISCONTINUED | OUTPATIENT
Start: 2021-03-20 | End: 2021-03-20

## 2021-03-20 RX ORDER — CLONIDINE HYDROCHLORIDE 0.1 MG/1
0.1 TABLET ORAL
Status: COMPLETED | OUTPATIENT
Start: 2021-03-20 | End: 2021-03-20

## 2021-03-20 RX ORDER — SODIUM CHLORIDE, SODIUM LACTATE, POTASSIUM CHLORIDE, CALCIUM CHLORIDE 600; 310; 30; 20 MG/100ML; MG/100ML; MG/100ML; MG/100ML
INJECTION, SOLUTION INTRAVENOUS CONTINUOUS
Status: DISCONTINUED | OUTPATIENT
Start: 2021-03-20 | End: 2021-03-21

## 2021-03-20 RX ORDER — ACETAMINOPHEN 325 MG/1
650 TABLET ORAL 2 TIMES DAILY
Status: DISCONTINUED | OUTPATIENT
Start: 2021-03-20 | End: 2021-03-22

## 2021-03-20 RX ORDER — CARVEDILOL 12.5 MG/1
12.5 TABLET ORAL 2 TIMES DAILY
Status: DISCONTINUED | OUTPATIENT
Start: 2021-03-20 | End: 2021-03-23 | Stop reason: HOSPADM

## 2021-03-20 RX ORDER — HYDRALAZINE HYDROCHLORIDE 25 MG/1
50 TABLET, FILM COATED ORAL EVERY 8 HOURS
Status: DISCONTINUED | OUTPATIENT
Start: 2021-03-20 | End: 2021-03-22

## 2021-03-20 RX ORDER — CALCITRIOL 0.25 UG/1
0.25 CAPSULE ORAL EVERY OTHER DAY
Status: DISCONTINUED | OUTPATIENT
Start: 2021-03-21 | End: 2021-03-23 | Stop reason: HOSPADM

## 2021-03-20 RX ORDER — HYDRALAZINE HYDROCHLORIDE 20 MG/ML
10 INJECTION INTRAMUSCULAR; INTRAVENOUS
Status: DISCONTINUED | OUTPATIENT
Start: 2021-03-20 | End: 2021-03-20

## 2021-03-20 RX ORDER — MUPIROCIN 20 MG/G
OINTMENT TOPICAL 2 TIMES DAILY
Status: DISCONTINUED | OUTPATIENT
Start: 2021-03-20 | End: 2021-03-23 | Stop reason: HOSPADM

## 2021-03-20 RX ORDER — NIFEDIPINE 30 MG/1
90 TABLET, EXTENDED RELEASE ORAL DAILY
Status: DISCONTINUED | OUTPATIENT
Start: 2021-03-20 | End: 2021-03-23 | Stop reason: HOSPADM

## 2021-03-20 RX ORDER — HYDRALAZINE HYDROCHLORIDE 20 MG/ML
20 INJECTION INTRAMUSCULAR; INTRAVENOUS
Status: COMPLETED | OUTPATIENT
Start: 2021-03-20 | End: 2021-03-20

## 2021-03-20 RX ORDER — SODIUM BICARBONATE 325 MG/1
1300 TABLET ORAL 3 TIMES DAILY
Status: DISCONTINUED | OUTPATIENT
Start: 2021-03-20 | End: 2021-03-22

## 2021-03-20 RX ORDER — HEPARIN SODIUM 5000 [USP'U]/ML
5000 INJECTION, SOLUTION INTRAVENOUS; SUBCUTANEOUS EVERY 12 HOURS
Status: DISCONTINUED | OUTPATIENT
Start: 2021-03-20 | End: 2021-03-23 | Stop reason: HOSPADM

## 2021-03-20 RX ORDER — HYDRALAZINE HYDROCHLORIDE 25 MG/1
25 TABLET, FILM COATED ORAL EVERY 8 HOURS
Status: DISCONTINUED | OUTPATIENT
Start: 2021-03-20 | End: 2021-03-20

## 2021-03-20 RX ORDER — HYDRALAZINE HYDROCHLORIDE 20 MG/ML
10 INJECTION INTRAMUSCULAR; INTRAVENOUS
Status: COMPLETED | OUTPATIENT
Start: 2021-03-20 | End: 2021-03-20

## 2021-03-20 RX ORDER — LIDOCAINE 50 MG/G
1 PATCH TOPICAL DAILY
Status: DISCONTINUED | OUTPATIENT
Start: 2021-03-20 | End: 2021-03-23 | Stop reason: HOSPADM

## 2021-03-20 RX ADMIN — HYDRALAZINE HYDROCHLORIDE 20 MG: 20 INJECTION INTRAMUSCULAR; INTRAVENOUS at 02:03

## 2021-03-20 RX ADMIN — HYDRALAZINE HYDROCHLORIDE 50 MG: 25 TABLET, FILM COATED ORAL at 09:03

## 2021-03-20 RX ADMIN — MUPIROCIN: 20 OINTMENT TOPICAL at 09:03

## 2021-03-20 RX ADMIN — HEPARIN SODIUM 5000 UNITS: 5000 INJECTION INTRAVENOUS; SUBCUTANEOUS at 09:03

## 2021-03-20 RX ADMIN — CLONIDINE HYDROCHLORIDE 0.1 MG: 0.1 TABLET ORAL at 03:03

## 2021-03-20 RX ADMIN — SODIUM BICARBONATE TAB 325 MG 1300 MG: 325 TAB at 09:03

## 2021-03-20 RX ADMIN — ACETAMINOPHEN 650 MG: 325 TABLET ORAL at 09:03

## 2021-03-20 RX ADMIN — HYDRALAZINE HYDROCHLORIDE 10 MG: 20 INJECTION INTRAMUSCULAR; INTRAVENOUS at 12:03

## 2021-03-20 RX ADMIN — DOCUSATE SODIUM 50 MG AND SENNOSIDES 8.6 MG 1 TABLET: 8.6; 5 TABLET, FILM COATED ORAL at 09:03

## 2021-03-20 RX ADMIN — HYDRALAZINE HYDROCHLORIDE 50 MG: 25 TABLET, FILM COATED ORAL at 03:03

## 2021-03-20 RX ADMIN — CARVEDILOL 12.5 MG: 12.5 TABLET, FILM COATED ORAL at 07:03

## 2021-03-20 RX ADMIN — CLONIDINE HYDROCHLORIDE 0.1 MG: 0.1 TABLET ORAL at 12:03

## 2021-03-20 RX ADMIN — LIDOCAINE 1 PATCH: 50 PATCH TOPICAL at 05:03

## 2021-03-20 RX ADMIN — NIFEDIPINE 90 MG: 30 TABLET, FILM COATED, EXTENDED RELEASE ORAL at 07:03

## 2021-03-20 RX ADMIN — SODIUM CHLORIDE, SODIUM LACTATE, POTASSIUM CHLORIDE, AND CALCIUM CHLORIDE: .6; .31; .03; .02 INJECTION, SOLUTION INTRAVENOUS at 04:03

## 2021-03-21 PROBLEM — Z71.89 GOALS OF CARE, COUNSELING/DISCUSSION: Status: ACTIVE | Noted: 2021-03-21

## 2021-03-21 PROBLEM — G92.9 ENCEPHALOPATHY, TOXIC: Status: ACTIVE | Noted: 2021-03-20

## 2021-03-21 LAB
ABO + RH BLD: NORMAL
ALBUMIN SERPL BCP-MCNC: 2.7 G/DL (ref 3.5–5.2)
ALP SERPL-CCNC: 64 U/L (ref 55–135)
ALT SERPL W/O P-5'-P-CCNC: 8 U/L (ref 10–44)
ANION GAP SERPL CALC-SCNC: 11 MMOL/L (ref 8–16)
AST SERPL-CCNC: 13 U/L (ref 10–40)
BASOPHILS # BLD AUTO: 0.03 K/UL (ref 0–0.2)
BASOPHILS NFR BLD: 0.4 % (ref 0–1.9)
BILIRUB SERPL-MCNC: 0.3 MG/DL (ref 0.1–1)
BLD GP AB SCN CELLS X3 SERPL QL: NORMAL
BLD PROD TYP BPU: NORMAL
BLOOD UNIT EXPIRATION DATE: NORMAL
BLOOD UNIT TYPE CODE: 5100
BLOOD UNIT TYPE: NORMAL
BUN SERPL-MCNC: 62 MG/DL (ref 8–23)
CALCIUM SERPL-MCNC: 9.5 MG/DL (ref 8.7–10.5)
CHLORIDE SERPL-SCNC: 107 MMOL/L (ref 95–110)
CO2 SERPL-SCNC: 23 MMOL/L (ref 23–29)
CODING SYSTEM: NORMAL
CREAT SERPL-MCNC: 5.1 MG/DL (ref 0.5–1.4)
DIFFERENTIAL METHOD: ABNORMAL
DISPENSE STATUS: NORMAL
EOSINOPHIL # BLD AUTO: 0.1 K/UL (ref 0–0.5)
EOSINOPHIL NFR BLD: 1.5 % (ref 0–8)
ERYTHROCYTE [DISTWIDTH] IN BLOOD BY AUTOMATED COUNT: 16.4 % (ref 11.5–14.5)
EST. GFR  (AFRICAN AMERICAN): 8 ML/MIN/1.73 M^2
EST. GFR  (NON AFRICAN AMERICAN): 7 ML/MIN/1.73 M^2
FERRITIN SERPL-MCNC: 1140 NG/ML (ref 20–300)
GLUCOSE SERPL-MCNC: 124 MG/DL (ref 70–110)
HCT VFR BLD AUTO: 21.5 % (ref 37–48.5)
HGB BLD-MCNC: 6.7 G/DL (ref 12–16)
IMM GRANULOCYTES # BLD AUTO: 0.05 K/UL (ref 0–0.04)
IMM GRANULOCYTES NFR BLD AUTO: 0.7 % (ref 0–0.5)
IRON SERPL-MCNC: 67 UG/DL (ref 30–160)
LYMPHOCYTES # BLD AUTO: 1 K/UL (ref 1–4.8)
LYMPHOCYTES NFR BLD: 13.4 % (ref 18–48)
MCH RBC QN AUTO: 31.2 PG (ref 27–31)
MCHC RBC AUTO-ENTMCNC: 31.2 G/DL (ref 32–36)
MCV RBC AUTO: 100 FL (ref 82–98)
MONOCYTES # BLD AUTO: 0.3 K/UL (ref 0.3–1)
MONOCYTES NFR BLD: 3.5 % (ref 4–15)
NEUTROPHILS # BLD AUTO: 5.8 K/UL (ref 1.8–7.7)
NEUTROPHILS NFR BLD: 80.5 % (ref 38–73)
NRBC BLD-RTO: 0 /100 WBC
PLATELET # BLD AUTO: 261 K/UL (ref 150–350)
PLATELET BLD QL SMEAR: ABNORMAL
PMV BLD AUTO: 10.1 FL (ref 9.2–12.9)
POTASSIUM SERPL-SCNC: 4.2 MMOL/L (ref 3.5–5.1)
PROT SERPL-MCNC: 6.8 G/DL (ref 6–8.4)
RBC # BLD AUTO: 2.15 M/UL (ref 4–5.4)
SATURATED IRON: 35 % (ref 20–50)
SODIUM SERPL-SCNC: 141 MMOL/L (ref 136–145)
TOTAL IRON BINDING CAPACITY: 192 UG/DL (ref 250–450)
TRANS ERYTHROCYTES VOL PATIENT: NORMAL ML
TRANSFERRIN SERPL-MCNC: 130 MG/DL (ref 200–375)
WBC # BLD AUTO: 7.18 K/UL (ref 3.9–12.7)

## 2021-03-21 PROCEDURE — 25000003 PHARM REV CODE 250: Performed by: INTERNAL MEDICINE

## 2021-03-21 PROCEDURE — 36430 TRANSFUSION BLD/BLD COMPNT: CPT

## 2021-03-21 PROCEDURE — 63600175 PHARM REV CODE 636 W HCPCS: Performed by: INTERNAL MEDICINE

## 2021-03-21 PROCEDURE — 36415 COLL VENOUS BLD VENIPUNCTURE: CPT | Performed by: INTERNAL MEDICINE

## 2021-03-21 PROCEDURE — 80053 COMPREHEN METABOLIC PANEL: CPT | Performed by: INTERNAL MEDICINE

## 2021-03-21 PROCEDURE — 82607 VITAMIN B-12: CPT | Performed by: INTERNAL MEDICINE

## 2021-03-21 PROCEDURE — 99232 PR SUBSEQUENT HOSPITAL CARE,LEVL II: ICD-10-PCS | Mod: ,,, | Performed by: PSYCHIATRY & NEUROLOGY

## 2021-03-21 PROCEDURE — 86920 COMPATIBILITY TEST SPIN: CPT | Performed by: INTERNAL MEDICINE

## 2021-03-21 PROCEDURE — 85025 COMPLETE CBC W/AUTO DIFF WBC: CPT | Performed by: INTERNAL MEDICINE

## 2021-03-21 PROCEDURE — 82728 ASSAY OF FERRITIN: CPT | Performed by: INTERNAL MEDICINE

## 2021-03-21 PROCEDURE — 21400001 HC TELEMETRY ROOM

## 2021-03-21 PROCEDURE — 83540 ASSAY OF IRON: CPT | Performed by: INTERNAL MEDICINE

## 2021-03-21 PROCEDURE — 82746 ASSAY OF FOLIC ACID SERUM: CPT | Performed by: INTERNAL MEDICINE

## 2021-03-21 PROCEDURE — P9021 RED BLOOD CELLS UNIT: HCPCS | Performed by: INTERNAL MEDICINE

## 2021-03-21 PROCEDURE — 86900 BLOOD TYPING SEROLOGIC ABO: CPT | Performed by: INTERNAL MEDICINE

## 2021-03-21 PROCEDURE — 99232 SBSQ HOSP IP/OBS MODERATE 35: CPT | Mod: ,,, | Performed by: PSYCHIATRY & NEUROLOGY

## 2021-03-21 RX ORDER — HYDROCODONE BITARTRATE AND ACETAMINOPHEN 500; 5 MG/1; MG/1
TABLET ORAL
Status: DISCONTINUED | OUTPATIENT
Start: 2021-03-21 | End: 2021-03-23 | Stop reason: HOSPADM

## 2021-03-21 RX ORDER — LABETALOL HYDROCHLORIDE 5 MG/ML
10 INJECTION, SOLUTION INTRAVENOUS ONCE
Status: COMPLETED | OUTPATIENT
Start: 2021-03-21 | End: 2021-03-21

## 2021-03-21 RX ORDER — ONDANSETRON 2 MG/ML
4 INJECTION INTRAMUSCULAR; INTRAVENOUS EVERY 6 HOURS PRN
Status: DISCONTINUED | OUTPATIENT
Start: 2021-03-21 | End: 2021-03-23 | Stop reason: HOSPADM

## 2021-03-21 RX ORDER — CLONIDINE 0.1 MG/24H
1 PATCH, EXTENDED RELEASE TRANSDERMAL
Status: DISCONTINUED | OUTPATIENT
Start: 2021-03-21 | End: 2021-03-23 | Stop reason: HOSPADM

## 2021-03-21 RX ADMIN — MUPIROCIN: 20 OINTMENT TOPICAL at 08:03

## 2021-03-21 RX ADMIN — ACETAMINOPHEN 650 MG: 325 TABLET ORAL at 09:03

## 2021-03-21 RX ADMIN — CARVEDILOL 12.5 MG: 12.5 TABLET, FILM COATED ORAL at 09:03

## 2021-03-21 RX ADMIN — CLONIDINE 1 PATCH: 0.1 PATCH TRANSDERMAL at 03:03

## 2021-03-21 RX ADMIN — CARVEDILOL 12.5 MG: 12.5 TABLET, FILM COATED ORAL at 08:03

## 2021-03-21 RX ADMIN — HEPARIN SODIUM 5000 UNITS: 5000 INJECTION INTRAVENOUS; SUBCUTANEOUS at 09:03

## 2021-03-21 RX ADMIN — LABETALOL HYDROCHLORIDE 10 MG: 5 INJECTION INTRAVENOUS at 03:03

## 2021-03-21 RX ADMIN — MUPIROCIN: 20 OINTMENT TOPICAL at 09:03

## 2021-03-21 RX ADMIN — ACETAMINOPHEN 650 MG: 325 TABLET ORAL at 08:03

## 2021-03-21 RX ADMIN — LIDOCAINE 1 PATCH: 50 PATCH TOPICAL at 08:03

## 2021-03-21 RX ADMIN — SODIUM BICARBONATE TAB 325 MG 1300 MG: 325 TAB at 09:03

## 2021-03-21 RX ADMIN — HYDRALAZINE HYDROCHLORIDE 50 MG: 25 TABLET, FILM COATED ORAL at 02:03

## 2021-03-21 RX ADMIN — SODIUM BICARBONATE TAB 325 MG 1300 MG: 325 TAB at 02:03

## 2021-03-21 RX ADMIN — NEPHROCAP 1 CAPSULE: 1 CAP ORAL at 08:03

## 2021-03-21 RX ADMIN — CALCITRIOL CAPSULES 0.25 MCG 0.25 MCG: 0.25 CAPSULE ORAL at 08:03

## 2021-03-21 RX ADMIN — NIFEDIPINE 90 MG: 30 TABLET, FILM COATED, EXTENDED RELEASE ORAL at 08:03

## 2021-03-21 RX ADMIN — HEPARIN SODIUM 5000 UNITS: 5000 INJECTION INTRAVENOUS; SUBCUTANEOUS at 08:03

## 2021-03-21 RX ADMIN — HYDRALAZINE HYDROCHLORIDE 50 MG: 25 TABLET, FILM COATED ORAL at 09:03

## 2021-03-21 RX ADMIN — HYDRALAZINE HYDROCHLORIDE 50 MG: 25 TABLET, FILM COATED ORAL at 05:03

## 2021-03-21 RX ADMIN — SODIUM BICARBONATE TAB 325 MG 1300 MG: 325 TAB at 08:03

## 2021-03-21 RX ADMIN — DOCUSATE SODIUM 50 MG AND SENNOSIDES 8.6 MG 1 TABLET: 8.6; 5 TABLET, FILM COATED ORAL at 09:03

## 2021-03-22 PROBLEM — D63.1 ANEMIA IN CHRONIC KIDNEY DISEASE (CKD): Status: ACTIVE | Noted: 2021-03-22

## 2021-03-22 PROBLEM — N18.9 ANEMIA IN CHRONIC KIDNEY DISEASE (CKD): Status: ACTIVE | Noted: 2021-03-22

## 2021-03-22 LAB
ALBUMIN SERPL BCP-MCNC: 2.9 G/DL (ref 3.5–5.2)
ALP SERPL-CCNC: 63 U/L (ref 55–135)
ALT SERPL W/O P-5'-P-CCNC: 7 U/L (ref 10–44)
ANION GAP SERPL CALC-SCNC: 15 MMOL/L (ref 8–16)
AST SERPL-CCNC: 14 U/L (ref 10–40)
BASOPHILS # BLD AUTO: 0.04 K/UL (ref 0–0.2)
BASOPHILS NFR BLD: 0.4 % (ref 0–1.9)
BILIRUB SERPL-MCNC: 0.6 MG/DL (ref 0.1–1)
BUN SERPL-MCNC: 65 MG/DL (ref 8–23)
CALCIUM SERPL-MCNC: 9.3 MG/DL (ref 8.7–10.5)
CHLORIDE SERPL-SCNC: 108 MMOL/L (ref 95–110)
CO2 SERPL-SCNC: 18 MMOL/L (ref 23–29)
CREAT SERPL-MCNC: 5.2 MG/DL (ref 0.5–1.4)
DIFFERENTIAL METHOD: ABNORMAL
EOSINOPHIL # BLD AUTO: 0.1 K/UL (ref 0–0.5)
EOSINOPHIL NFR BLD: 0.8 % (ref 0–8)
ERYTHROCYTE [DISTWIDTH] IN BLOOD BY AUTOMATED COUNT: 15.7 % (ref 11.5–14.5)
EST. GFR  (AFRICAN AMERICAN): 8 ML/MIN/1.73 M^2
EST. GFR  (NON AFRICAN AMERICAN): 7 ML/MIN/1.73 M^2
FOLATE SERPL-MCNC: 38.3 NG/ML (ref 4–24)
GLUCOSE SERPL-MCNC: 90 MG/DL (ref 70–110)
HCT VFR BLD AUTO: 24.4 % (ref 37–48.5)
HGB BLD-MCNC: 7.9 G/DL (ref 12–16)
IMM GRANULOCYTES # BLD AUTO: 0.07 K/UL (ref 0–0.04)
IMM GRANULOCYTES NFR BLD AUTO: 0.7 % (ref 0–0.5)
LYMPHOCYTES # BLD AUTO: 1.3 K/UL (ref 1–4.8)
LYMPHOCYTES NFR BLD: 12.8 % (ref 18–48)
MCH RBC QN AUTO: 31.5 PG (ref 27–31)
MCHC RBC AUTO-ENTMCNC: 32.4 G/DL (ref 32–36)
MCV RBC AUTO: 97 FL (ref 82–98)
MONOCYTES # BLD AUTO: 0.5 K/UL (ref 0.3–1)
MONOCYTES NFR BLD: 4.8 % (ref 4–15)
NEUTROPHILS # BLD AUTO: 8.3 K/UL (ref 1.8–7.7)
NEUTROPHILS NFR BLD: 80.5 % (ref 38–73)
NRBC BLD-RTO: 0 /100 WBC
PLATELET # BLD AUTO: 278 K/UL (ref 150–350)
PMV BLD AUTO: 10.1 FL (ref 9.2–12.9)
POCT GLUCOSE: 108 MG/DL (ref 70–110)
POCT GLUCOSE: 234 MG/DL (ref 70–110)
POCT GLUCOSE: 79 MG/DL (ref 70–110)
POTASSIUM SERPL-SCNC: 4.4 MMOL/L (ref 3.5–5.1)
PROT SERPL-MCNC: 6.9 G/DL (ref 6–8.4)
RBC # BLD AUTO: 2.51 M/UL (ref 4–5.4)
SODIUM SERPL-SCNC: 141 MMOL/L (ref 136–145)
VIT B12 SERPL-MCNC: 759 PG/ML (ref 210–950)
WBC # BLD AUTO: 10.36 K/UL (ref 3.9–12.7)

## 2021-03-22 PROCEDURE — 25000003 PHARM REV CODE 250: Performed by: INTERNAL MEDICINE

## 2021-03-22 PROCEDURE — 80053 COMPREHEN METABOLIC PANEL: CPT | Performed by: INTERNAL MEDICINE

## 2021-03-22 PROCEDURE — 63600175 PHARM REV CODE 636 W HCPCS: Performed by: PHYSICIAN ASSISTANT

## 2021-03-22 PROCEDURE — 85025 COMPLETE CBC W/AUTO DIFF WBC: CPT | Performed by: INTERNAL MEDICINE

## 2021-03-22 PROCEDURE — 36415 COLL VENOUS BLD VENIPUNCTURE: CPT | Performed by: INTERNAL MEDICINE

## 2021-03-22 PROCEDURE — 99232 SBSQ HOSP IP/OBS MODERATE 35: CPT | Mod: ,,, | Performed by: PSYCHIATRY & NEUROLOGY

## 2021-03-22 PROCEDURE — 21400001 HC TELEMETRY ROOM

## 2021-03-22 PROCEDURE — 99232 PR SUBSEQUENT HOSPITAL CARE,LEVL II: ICD-10-PCS | Mod: ,,, | Performed by: PSYCHIATRY & NEUROLOGY

## 2021-03-22 PROCEDURE — 63600175 PHARM REV CODE 636 W HCPCS: Performed by: INTERNAL MEDICINE

## 2021-03-22 RX ORDER — SODIUM CITRATE AND CITRIC ACID MONOHYDRATE 334; 500 MG/5ML; MG/5ML
30 SOLUTION ORAL 2 TIMES DAILY
Status: DISCONTINUED | OUTPATIENT
Start: 2021-03-22 | End: 2021-03-23 | Stop reason: HOSPADM

## 2021-03-22 RX ORDER — ACETAMINOPHEN 325 MG/1
650 TABLET ORAL EVERY 6 HOURS PRN
Status: DISCONTINUED | OUTPATIENT
Start: 2021-03-22 | End: 2021-03-23

## 2021-03-22 RX ORDER — HYDRALAZINE HYDROCHLORIDE 25 MG/1
75 TABLET, FILM COATED ORAL EVERY 8 HOURS
Status: DISCONTINUED | OUTPATIENT
Start: 2021-03-22 | End: 2021-03-23 | Stop reason: HOSPADM

## 2021-03-22 RX ORDER — CLONIDINE 0.1 MG/24H
1 PATCH, EXTENDED RELEASE TRANSDERMAL
Qty: 4 PATCH | Refills: 11 | Status: ON HOLD
Start: 2021-03-28 | End: 2021-06-28 | Stop reason: HOSPADM

## 2021-03-22 RX ORDER — HYDRALAZINE HYDROCHLORIDE 50 MG/1
50 TABLET, FILM COATED ORAL EVERY 8 HOURS
Qty: 90 TABLET | Refills: 11 | Status: ON HOLD
Start: 2021-03-22 | End: 2021-03-29 | Stop reason: HOSPADM

## 2021-03-22 RX ADMIN — SODIUM BICARBONATE TAB 325 MG 1300 MG: 325 TAB at 02:03

## 2021-03-22 RX ADMIN — DOCUSATE SODIUM 50 MG AND SENNOSIDES 8.6 MG 1 TABLET: 8.6; 5 TABLET, FILM COATED ORAL at 08:03

## 2021-03-22 RX ADMIN — NEPHROCAP 1 CAPSULE: 1 CAP ORAL at 08:03

## 2021-03-22 RX ADMIN — HEPARIN SODIUM 5000 UNITS: 5000 INJECTION INTRAVENOUS; SUBCUTANEOUS at 09:03

## 2021-03-22 RX ADMIN — ACETAMINOPHEN 650 MG: 325 TABLET ORAL at 08:03

## 2021-03-22 RX ADMIN — EPOETIN ALFA-EPBX 10000 UNITS: 10000 INJECTION, SOLUTION INTRAVENOUS; SUBCUTANEOUS at 09:03

## 2021-03-22 RX ADMIN — CARVEDILOL 12.5 MG: 12.5 TABLET, FILM COATED ORAL at 08:03

## 2021-03-22 RX ADMIN — HYDRALAZINE HYDROCHLORIDE 50 MG: 25 TABLET, FILM COATED ORAL at 05:03

## 2021-03-22 RX ADMIN — DOCUSATE SODIUM 50 MG AND SENNOSIDES 8.6 MG 1 TABLET: 8.6; 5 TABLET, FILM COATED ORAL at 09:03

## 2021-03-22 RX ADMIN — NIFEDIPINE 90 MG: 30 TABLET, FILM COATED, EXTENDED RELEASE ORAL at 08:03

## 2021-03-22 RX ADMIN — HYDRALAZINE HYDROCHLORIDE 75 MG: 25 TABLET, FILM COATED ORAL at 09:03

## 2021-03-22 RX ADMIN — SODIUM BICARBONATE TAB 325 MG 1300 MG: 325 TAB at 08:03

## 2021-03-22 RX ADMIN — LIDOCAINE 1 PATCH: 50 PATCH TOPICAL at 08:03

## 2021-03-22 RX ADMIN — HEPARIN SODIUM 5000 UNITS: 5000 INJECTION INTRAVENOUS; SUBCUTANEOUS at 08:03

## 2021-03-22 RX ADMIN — CARVEDILOL 12.5 MG: 12.5 TABLET, FILM COATED ORAL at 09:03

## 2021-03-22 RX ADMIN — ACETAMINOPHEN 650 MG: 325 TABLET ORAL at 06:03

## 2021-03-22 RX ADMIN — HYDRALAZINE HYDROCHLORIDE 50 MG: 25 TABLET, FILM COATED ORAL at 02:03

## 2021-03-23 VITALS
HEIGHT: 64 IN | TEMPERATURE: 98 F | BODY MASS INDEX: 27.67 KG/M2 | DIASTOLIC BLOOD PRESSURE: 73 MMHG | WEIGHT: 162.06 LBS | OXYGEN SATURATION: 98 % | HEART RATE: 72 BPM | RESPIRATION RATE: 18 BRPM | SYSTOLIC BLOOD PRESSURE: 161 MMHG

## 2021-03-23 LAB
ALBUMIN SERPL BCP-MCNC: 2.8 G/DL (ref 3.5–5.2)
ALP SERPL-CCNC: 57 U/L (ref 55–135)
ALT SERPL W/O P-5'-P-CCNC: 9 U/L (ref 10–44)
ANION GAP SERPL CALC-SCNC: 15 MMOL/L (ref 8–16)
AST SERPL-CCNC: 16 U/L (ref 10–40)
BACTERIA #/AREA URNS HPF: ABNORMAL /HPF
BASOPHILS # BLD AUTO: 0.05 K/UL (ref 0–0.2)
BASOPHILS NFR BLD: 0.6 % (ref 0–1.9)
BILIRUB SERPL-MCNC: 0.3 MG/DL (ref 0.1–1)
BILIRUB UR QL STRIP: NEGATIVE
BUN SERPL-MCNC: 61 MG/DL (ref 8–23)
CALCIUM SERPL-MCNC: 8.8 MG/DL (ref 8.7–10.5)
CHLORIDE SERPL-SCNC: 107 MMOL/L (ref 95–110)
CLARITY UR: CLEAR
CO2 SERPL-SCNC: 18 MMOL/L (ref 23–29)
COLOR UR: YELLOW
CREAT SERPL-MCNC: 5 MG/DL (ref 0.5–1.4)
DIFFERENTIAL METHOD: ABNORMAL
EOSINOPHIL # BLD AUTO: 0.1 K/UL (ref 0–0.5)
EOSINOPHIL NFR BLD: 1.6 % (ref 0–8)
ERYTHROCYTE [DISTWIDTH] IN BLOOD BY AUTOMATED COUNT: 15.9 % (ref 11.5–14.5)
EST. GFR  (AFRICAN AMERICAN): 8 ML/MIN/1.73 M^2
EST. GFR  (NON AFRICAN AMERICAN): 7 ML/MIN/1.73 M^2
GLUCOSE SERPL-MCNC: 78 MG/DL (ref 70–110)
GLUCOSE UR QL STRIP: NEGATIVE
HCT VFR BLD AUTO: 23.1 % (ref 37–48.5)
HGB BLD-MCNC: 7.4 G/DL (ref 12–16)
HGB UR QL STRIP: NEGATIVE
HYALINE CASTS #/AREA URNS LPF: 1 /LPF
IMM GRANULOCYTES # BLD AUTO: 0.08 K/UL (ref 0–0.04)
IMM GRANULOCYTES NFR BLD AUTO: 1 % (ref 0–0.5)
KETONES UR QL STRIP: NEGATIVE
LEUKOCYTE ESTERASE UR QL STRIP: ABNORMAL
LYMPHOCYTES # BLD AUTO: 1.5 K/UL (ref 1–4.8)
LYMPHOCYTES NFR BLD: 18.4 % (ref 18–48)
MCH RBC QN AUTO: 31.5 PG (ref 27–31)
MCHC RBC AUTO-ENTMCNC: 32 G/DL (ref 32–36)
MCV RBC AUTO: 98 FL (ref 82–98)
MICROSCOPIC COMMENT: ABNORMAL
MONOCYTES # BLD AUTO: 0.5 K/UL (ref 0.3–1)
MONOCYTES NFR BLD: 6.2 % (ref 4–15)
NEUTROPHILS # BLD AUTO: 6 K/UL (ref 1.8–7.7)
NEUTROPHILS NFR BLD: 72.2 % (ref 38–73)
NITRITE UR QL STRIP: NEGATIVE
NRBC BLD-RTO: 0 /100 WBC
PH UR STRIP: 7 [PH] (ref 5–8)
PHOSPHATE SERPL-MCNC: 4.6 MG/DL (ref 2.7–4.5)
PLATELET # BLD AUTO: 241 K/UL (ref 150–350)
PMV BLD AUTO: 10.2 FL (ref 9.2–12.9)
POTASSIUM SERPL-SCNC: 4.2 MMOL/L (ref 3.5–5.1)
PROT SERPL-MCNC: 6.6 G/DL (ref 6–8.4)
PROT UR QL STRIP: ABNORMAL
RBC # BLD AUTO: 2.35 M/UL (ref 4–5.4)
RBC #/AREA URNS HPF: 0 /HPF (ref 0–4)
SODIUM SERPL-SCNC: 140 MMOL/L (ref 136–145)
SP GR UR STRIP: 1.01 (ref 1–1.03)
SQUAMOUS #/AREA URNS HPF: 2 /HPF
URATE SERPL-MCNC: 8.2 MG/DL (ref 2.4–5.7)
URN SPEC COLLECT METH UR: ABNORMAL
UROBILINOGEN UR STRIP-ACNC: NEGATIVE EU/DL
WBC # BLD AUTO: 8.28 K/UL (ref 3.9–12.7)
WBC #/AREA URNS HPF: 20 /HPF (ref 0–5)
WBC CLUMPS URNS QL MICRO: ABNORMAL

## 2021-03-23 PROCEDURE — 99232 SBSQ HOSP IP/OBS MODERATE 35: CPT | Mod: ,,, | Performed by: PSYCHIATRY & NEUROLOGY

## 2021-03-23 PROCEDURE — 85025 COMPLETE CBC W/AUTO DIFF WBC: CPT | Performed by: INTERNAL MEDICINE

## 2021-03-23 PROCEDURE — 63600175 PHARM REV CODE 636 W HCPCS: Performed by: INTERNAL MEDICINE

## 2021-03-23 PROCEDURE — 84100 ASSAY OF PHOSPHORUS: CPT | Performed by: PHYSICIAN ASSISTANT

## 2021-03-23 PROCEDURE — 81000 URINALYSIS NONAUTO W/SCOPE: CPT | Performed by: INTERNAL MEDICINE

## 2021-03-23 PROCEDURE — 87086 URINE CULTURE/COLONY COUNT: CPT | Performed by: INTERNAL MEDICINE

## 2021-03-23 PROCEDURE — 99232 PR SUBSEQUENT HOSPITAL CARE,LEVL II: ICD-10-PCS | Mod: ,,, | Performed by: PSYCHIATRY & NEUROLOGY

## 2021-03-23 PROCEDURE — 84550 ASSAY OF BLOOD/URIC ACID: CPT | Performed by: PHYSICIAN ASSISTANT

## 2021-03-23 PROCEDURE — 80053 COMPREHEN METABOLIC PANEL: CPT | Performed by: INTERNAL MEDICINE

## 2021-03-23 PROCEDURE — 36415 COLL VENOUS BLD VENIPUNCTURE: CPT | Performed by: INTERNAL MEDICINE

## 2021-03-23 PROCEDURE — 25000003 PHARM REV CODE 250: Performed by: INTERNAL MEDICINE

## 2021-03-23 RX ORDER — AMOXICILLIN AND CLAVULANATE POTASSIUM 250; 125 MG/1; MG/1
1 TABLET, FILM COATED ORAL EVERY 12 HOURS
Qty: 10 TABLET | Refills: 0 | Status: ON HOLD
Start: 2021-03-23 | End: 2021-03-29 | Stop reason: HOSPADM

## 2021-03-23 RX ORDER — CAPSAICIN 0.03 G/100G
CREAM TOPICAL 2 TIMES DAILY
Status: DISCONTINUED | OUTPATIENT
Start: 2021-03-23 | End: 2021-03-23 | Stop reason: HOSPADM

## 2021-03-23 RX ORDER — ACETAMINOPHEN 500 MG
1000 TABLET ORAL EVERY 6 HOURS PRN
Status: DISCONTINUED | OUTPATIENT
Start: 2021-03-23 | End: 2021-03-23 | Stop reason: HOSPADM

## 2021-03-23 RX ADMIN — ACETAMINOPHEN 1000 MG: 500 TABLET ORAL at 02:03

## 2021-03-23 RX ADMIN — CAPSAICIN: 0.25 CREAM TOPICAL at 11:03

## 2021-03-23 RX ADMIN — LIDOCAINE 1 PATCH: 50 PATCH TOPICAL at 09:03

## 2021-03-23 RX ADMIN — CALCITRIOL CAPSULES 0.25 MCG 0.25 MCG: 0.25 CAPSULE ORAL at 09:03

## 2021-03-23 RX ADMIN — NIFEDIPINE 90 MG: 30 TABLET, FILM COATED, EXTENDED RELEASE ORAL at 09:03

## 2021-03-23 RX ADMIN — HEPARIN SODIUM 5000 UNITS: 5000 INJECTION INTRAVENOUS; SUBCUTANEOUS at 09:03

## 2021-03-23 RX ADMIN — HYDRALAZINE HYDROCHLORIDE 75 MG: 25 TABLET, FILM COATED ORAL at 02:03

## 2021-03-23 RX ADMIN — HYDRALAZINE HYDROCHLORIDE 75 MG: 25 TABLET, FILM COATED ORAL at 06:03

## 2021-03-23 RX ADMIN — DOCUSATE SODIUM 50 MG AND SENNOSIDES 8.6 MG 1 TABLET: 8.6; 5 TABLET, FILM COATED ORAL at 09:03

## 2021-03-23 RX ADMIN — CARVEDILOL 12.5 MG: 12.5 TABLET, FILM COATED ORAL at 09:03

## 2021-03-23 RX ADMIN — NEPHROCAP 1 CAPSULE: 1 CAP ORAL at 09:03

## 2021-03-24 ENCOUNTER — HOSPITAL ENCOUNTER (INPATIENT)
Facility: HOSPITAL | Age: 86
LOS: 5 days | Discharge: HOME | DRG: 689 | End: 2021-03-29
Attending: EMERGENCY MEDICINE | Admitting: INTERNAL MEDICINE
Payer: MEDICARE

## 2021-03-24 DIAGNOSIS — R41.82 ALTERED MENTAL STATUS: Primary | ICD-10-CM

## 2021-03-24 DIAGNOSIS — G82.20 PARAPLEGIA: ICD-10-CM

## 2021-03-24 DIAGNOSIS — R82.81 PYURIA: ICD-10-CM

## 2021-03-24 DIAGNOSIS — N18.9 CHRONIC RENAL IMPAIRMENT, UNSPECIFIED CKD STAGE: ICD-10-CM

## 2021-03-24 PROBLEM — N39.0 COMPLICATED UTI (URINARY TRACT INFECTION): Status: ACTIVE | Noted: 2021-03-24

## 2021-03-24 PROBLEM — N39.0 UTI (URINARY TRACT INFECTION): Status: ACTIVE | Noted: 2021-03-24

## 2021-03-24 LAB
ABO GROUP BLD: NORMAL
ALBUMIN SERPL BCP-MCNC: 3.3 G/DL (ref 3.5–5.2)
ALP SERPL-CCNC: 71 U/L (ref 55–135)
ALT SERPL W/O P-5'-P-CCNC: 14 U/L (ref 10–44)
ANION GAP SERPL CALC-SCNC: 16 MMOL/L (ref 8–16)
AST SERPL-CCNC: 25 U/L (ref 10–40)
BACTERIA #/AREA URNS HPF: ABNORMAL /HPF
BASOPHILS # BLD AUTO: 0.07 K/UL (ref 0–0.2)
BASOPHILS NFR BLD: 0.6 % (ref 0–1.9)
BILIRUB SERPL-MCNC: 0.3 MG/DL (ref 0.1–1)
BILIRUB UR QL STRIP: NEGATIVE
BLD GP AB SCN CELLS X3 SERPL QL: NORMAL
BUN SERPL-MCNC: 59 MG/DL (ref 8–23)
CALCIUM SERPL-MCNC: 9.8 MG/DL (ref 8.7–10.5)
CHLORIDE SERPL-SCNC: 106 MMOL/L (ref 95–110)
CLARITY UR: ABNORMAL
CO2 SERPL-SCNC: 17 MMOL/L (ref 23–29)
COLOR UR: YELLOW
CREAT SERPL-MCNC: 5 MG/DL (ref 0.5–1.4)
CTP QC/QA: YES
DIFFERENTIAL METHOD: ABNORMAL
EOSINOPHIL # BLD AUTO: 0.2 K/UL (ref 0–0.5)
EOSINOPHIL NFR BLD: 1.7 % (ref 0–8)
ERYTHROCYTE [DISTWIDTH] IN BLOOD BY AUTOMATED COUNT: 15.9 % (ref 11.5–14.5)
EST. GFR  (AFRICAN AMERICAN): 8 ML/MIN/1.73 M^2
EST. GFR  (NON AFRICAN AMERICAN): 7 ML/MIN/1.73 M^2
GLUCOSE SERPL-MCNC: 103 MG/DL (ref 70–110)
GLUCOSE UR QL STRIP: NEGATIVE
HCT VFR BLD AUTO: 27.7 % (ref 37–48.5)
HGB BLD-MCNC: 8.9 G/DL (ref 12–16)
HGB UR QL STRIP: ABNORMAL
HYALINE CASTS #/AREA URNS LPF: 0 /LPF
IMM GRANULOCYTES # BLD AUTO: 0.2 K/UL (ref 0–0.04)
IMM GRANULOCYTES NFR BLD AUTO: 1.8 % (ref 0–0.5)
KETONES UR QL STRIP: NEGATIVE
LACTATE SERPL-SCNC: 0.4 MMOL/L (ref 0.5–2.2)
LEUKOCYTE ESTERASE UR QL STRIP: ABNORMAL
LYMPHOCYTES # BLD AUTO: 1.3 K/UL (ref 1–4.8)
LYMPHOCYTES NFR BLD: 12.1 % (ref 18–48)
MAGNESIUM SERPL-MCNC: 1.7 MG/DL (ref 1.6–2.6)
MCH RBC QN AUTO: 31.7 PG (ref 27–31)
MCHC RBC AUTO-ENTMCNC: 32.1 G/DL (ref 32–36)
MCV RBC AUTO: 99 FL (ref 82–98)
MICROSCOPIC COMMENT: ABNORMAL
MONOCYTES # BLD AUTO: 0.4 K/UL (ref 0.3–1)
MONOCYTES NFR BLD: 3.9 % (ref 4–15)
NEUTROPHILS # BLD AUTO: 8.8 K/UL (ref 1.8–7.7)
NEUTROPHILS NFR BLD: 79.9 % (ref 38–73)
NITRITE UR QL STRIP: NEGATIVE
NRBC BLD-RTO: 0 /100 WBC
PH UR STRIP: 6 [PH] (ref 5–8)
PHOSPHATE SERPL-MCNC: 4.6 MG/DL (ref 2.7–4.5)
PLATELET # BLD AUTO: 251 K/UL (ref 150–350)
PMV BLD AUTO: 10.2 FL (ref 9.2–12.9)
POTASSIUM SERPL-SCNC: 4.8 MMOL/L (ref 3.5–5.1)
PROCALCITONIN SERPL IA-MCNC: 0.23 NG/ML
PROT SERPL-MCNC: 7.8 G/DL (ref 6–8.4)
PROT UR QL STRIP: ABNORMAL
RBC # BLD AUTO: 2.81 M/UL (ref 4–5.4)
RBC #/AREA URNS HPF: 61 /HPF (ref 0–4)
RH BLD: NORMAL
SARS-COV-2 RDRP RESP QL NAA+PROBE: NEGATIVE
SODIUM SERPL-SCNC: 139 MMOL/L (ref 136–145)
SP GR UR STRIP: 1.01 (ref 1–1.03)
UNIDENT CRYS URNS QL MICRO: ABNORMAL
URN SPEC COLLECT METH UR: ABNORMAL
UROBILINOGEN UR STRIP-ACNC: NEGATIVE EU/DL
WBC # BLD AUTO: 11.05 K/UL (ref 3.9–12.7)
WBC #/AREA URNS HPF: >100 /HPF (ref 0–5)
WBC CLUMPS URNS QL MICRO: ABNORMAL
YEAST URNS QL MICRO: ABNORMAL

## 2021-03-24 PROCEDURE — 87086 URINE CULTURE/COLONY COUNT: CPT | Performed by: EMERGENCY MEDICINE

## 2021-03-24 PROCEDURE — 63600175 PHARM REV CODE 636 W HCPCS: Performed by: INTERNAL MEDICINE

## 2021-03-24 PROCEDURE — 96374 THER/PROPH/DIAG INJ IV PUSH: CPT

## 2021-03-24 PROCEDURE — 80053 COMPREHEN METABOLIC PANEL: CPT | Performed by: EMERGENCY MEDICINE

## 2021-03-24 PROCEDURE — 81000 URINALYSIS NONAUTO W/SCOPE: CPT | Performed by: EMERGENCY MEDICINE

## 2021-03-24 PROCEDURE — 86850 RBC ANTIBODY SCREEN: CPT | Performed by: EMERGENCY MEDICINE

## 2021-03-24 PROCEDURE — 83735 ASSAY OF MAGNESIUM: CPT | Performed by: EMERGENCY MEDICINE

## 2021-03-24 PROCEDURE — 92610 EVALUATE SWALLOWING FUNCTION: CPT

## 2021-03-24 PROCEDURE — 21400001 HC TELEMETRY ROOM

## 2021-03-24 PROCEDURE — 87040 BLOOD CULTURE FOR BACTERIA: CPT | Mod: 59 | Performed by: EMERGENCY MEDICINE

## 2021-03-24 PROCEDURE — 83605 ASSAY OF LACTIC ACID: CPT | Performed by: EMERGENCY MEDICINE

## 2021-03-24 PROCEDURE — 92523 SPEECH SOUND LANG COMPREHEN: CPT

## 2021-03-24 PROCEDURE — 86900 BLOOD TYPING SEROLOGIC ABO: CPT | Performed by: EMERGENCY MEDICINE

## 2021-03-24 PROCEDURE — 25000003 PHARM REV CODE 250: Performed by: INTERNAL MEDICINE

## 2021-03-24 PROCEDURE — 84145 PROCALCITONIN (PCT): CPT | Performed by: EMERGENCY MEDICINE

## 2021-03-24 PROCEDURE — 84100 ASSAY OF PHOSPHORUS: CPT | Performed by: EMERGENCY MEDICINE

## 2021-03-24 PROCEDURE — 96365 THER/PROPH/DIAG IV INF INIT: CPT

## 2021-03-24 PROCEDURE — U0002 COVID-19 LAB TEST NON-CDC: HCPCS | Performed by: EMERGENCY MEDICINE

## 2021-03-24 PROCEDURE — 93010 EKG 12-LEAD: ICD-10-PCS | Mod: ,,, | Performed by: INTERNAL MEDICINE

## 2021-03-24 PROCEDURE — 86901 BLOOD TYPING SEROLOGIC RH(D): CPT | Performed by: EMERGENCY MEDICINE

## 2021-03-24 PROCEDURE — 93005 ELECTROCARDIOGRAM TRACING: CPT

## 2021-03-24 PROCEDURE — 63600175 PHARM REV CODE 636 W HCPCS: Performed by: EMERGENCY MEDICINE

## 2021-03-24 PROCEDURE — 93010 ELECTROCARDIOGRAM REPORT: CPT | Mod: ,,, | Performed by: INTERNAL MEDICINE

## 2021-03-24 PROCEDURE — 99291 CRITICAL CARE FIRST HOUR: CPT | Mod: 25

## 2021-03-24 PROCEDURE — 96366 THER/PROPH/DIAG IV INF ADDON: CPT

## 2021-03-24 PROCEDURE — 85025 COMPLETE CBC W/AUTO DIFF WBC: CPT | Performed by: EMERGENCY MEDICINE

## 2021-03-24 RX ORDER — IBUPROFEN 200 MG
16 TABLET ORAL
Status: DISCONTINUED | OUTPATIENT
Start: 2021-03-24 | End: 2021-03-29 | Stop reason: HOSPADM

## 2021-03-24 RX ORDER — LIDOCAINE 50 MG/G
1 PATCH TOPICAL DAILY
Status: DISCONTINUED | OUTPATIENT
Start: 2021-03-24 | End: 2021-03-29 | Stop reason: HOSPADM

## 2021-03-24 RX ORDER — HEPARIN SODIUM 5000 [USP'U]/ML
5000 INJECTION, SOLUTION INTRAVENOUS; SUBCUTANEOUS EVERY 8 HOURS
Status: DISCONTINUED | OUTPATIENT
Start: 2021-03-24 | End: 2021-03-29 | Stop reason: HOSPADM

## 2021-03-24 RX ORDER — CLONIDINE 0.1 MG/24H
1 PATCH, EXTENDED RELEASE TRANSDERMAL
Status: DISCONTINUED | OUTPATIENT
Start: 2021-03-24 | End: 2021-03-29 | Stop reason: HOSPADM

## 2021-03-24 RX ORDER — SODIUM CHLORIDE 0.9 % (FLUSH) 0.9 %
10 SYRINGE (ML) INJECTION
Status: DISCONTINUED | OUTPATIENT
Start: 2021-03-24 | End: 2021-03-29 | Stop reason: HOSPADM

## 2021-03-24 RX ORDER — CALCITRIOL 0.25 UG/1
0.25 CAPSULE ORAL EVERY OTHER DAY
Status: DISCONTINUED | OUTPATIENT
Start: 2021-03-25 | End: 2021-03-29 | Stop reason: HOSPADM

## 2021-03-24 RX ORDER — IBUPROFEN 200 MG
24 TABLET ORAL
Status: DISCONTINUED | OUTPATIENT
Start: 2021-03-24 | End: 2021-03-29 | Stop reason: HOSPADM

## 2021-03-24 RX ORDER — AMOXICILLIN 250 MG
1 CAPSULE ORAL 2 TIMES DAILY
Status: DISCONTINUED | OUTPATIENT
Start: 2021-03-24 | End: 2021-03-29 | Stop reason: HOSPADM

## 2021-03-24 RX ORDER — NIFEDIPINE 30 MG/1
90 TABLET, EXTENDED RELEASE ORAL DAILY
Status: DISCONTINUED | OUTPATIENT
Start: 2021-03-24 | End: 2021-03-29 | Stop reason: HOSPADM

## 2021-03-24 RX ORDER — HYDRALAZINE HYDROCHLORIDE 25 MG/1
75 TABLET, FILM COATED ORAL EVERY 8 HOURS
Status: DISCONTINUED | OUTPATIENT
Start: 2021-03-24 | End: 2021-03-29 | Stop reason: HOSPADM

## 2021-03-24 RX ORDER — CARVEDILOL 12.5 MG/1
12.5 TABLET ORAL 2 TIMES DAILY
Status: DISCONTINUED | OUTPATIENT
Start: 2021-03-24 | End: 2021-03-26

## 2021-03-24 RX ORDER — CAPSAICIN 0.03 G/100G
CREAM TOPICAL 2 TIMES DAILY
Status: DISCONTINUED | OUTPATIENT
Start: 2021-03-24 | End: 2021-03-27

## 2021-03-24 RX ORDER — MUPIROCIN 20 MG/G
OINTMENT TOPICAL 2 TIMES DAILY
Status: COMPLETED | OUTPATIENT
Start: 2021-03-24 | End: 2021-03-25

## 2021-03-24 RX ORDER — SODIUM CHLORIDE, SODIUM LACTATE, POTASSIUM CHLORIDE, CALCIUM CHLORIDE 600; 310; 30; 20 MG/100ML; MG/100ML; MG/100ML; MG/100ML
INJECTION, SOLUTION INTRAVENOUS CONTINUOUS
Status: DISCONTINUED | OUTPATIENT
Start: 2021-03-24 | End: 2021-03-26

## 2021-03-24 RX ORDER — ACETAMINOPHEN 500 MG
1000 TABLET ORAL EVERY 8 HOURS PRN
Status: DISCONTINUED | OUTPATIENT
Start: 2021-03-24 | End: 2021-03-29 | Stop reason: HOSPADM

## 2021-03-24 RX ORDER — SODIUM CITRATE AND CITRIC ACID MONOHYDRATE 334; 500 MG/5ML; MG/5ML
30 SOLUTION ORAL 2 TIMES DAILY
Status: DISCONTINUED | OUTPATIENT
Start: 2021-03-24 | End: 2021-03-27

## 2021-03-24 RX ORDER — GLUCAGON 1 MG
1 KIT INJECTION
Status: DISCONTINUED | OUTPATIENT
Start: 2021-03-24 | End: 2021-03-29 | Stop reason: HOSPADM

## 2021-03-24 RX ADMIN — HEPARIN SODIUM 5000 UNITS: 5000 INJECTION INTRAVENOUS; SUBCUTANEOUS at 09:03

## 2021-03-24 RX ADMIN — CLONIDINE 1 PATCH: 0.1 PATCH TRANSDERMAL at 04:03

## 2021-03-24 RX ADMIN — CAPSAICIN: 0.25 CREAM TOPICAL at 11:03

## 2021-03-24 RX ADMIN — CAPSAICIN: 0.25 CREAM TOPICAL at 09:03

## 2021-03-24 RX ADMIN — MUPIROCIN: 20 OINTMENT TOPICAL at 12:03

## 2021-03-24 RX ADMIN — CEFTRIAXONE 2 G: 2 INJECTION, SOLUTION INTRAVENOUS at 06:03

## 2021-03-24 RX ADMIN — MUPIROCIN: 20 OINTMENT TOPICAL at 09:03

## 2021-03-24 RX ADMIN — LIDOCAINE 1 PATCH: 50 PATCH TOPICAL at 12:03

## 2021-03-24 RX ADMIN — NIFEDIPINE 90 MG: 30 TABLET, FILM COATED, EXTENDED RELEASE ORAL at 12:03

## 2021-03-24 RX ADMIN — EPOETIN ALFA-EPBX 10000 UNITS: 10000 INJECTION, SOLUTION INTRAVENOUS; SUBCUTANEOUS at 04:03

## 2021-03-24 RX ADMIN — ACETAMINOPHEN 1000 MG: 500 TABLET ORAL at 04:03

## 2021-03-24 RX ADMIN — ACETAMINOPHEN 1000 MG: 500 TABLET ORAL at 09:03

## 2021-03-24 RX ADMIN — CARVEDILOL 12.5 MG: 12.5 TABLET, FILM COATED ORAL at 09:03

## 2021-03-24 RX ADMIN — CARVEDILOL 12.5 MG: 12.5 TABLET, FILM COATED ORAL at 12:03

## 2021-03-24 RX ADMIN — HYDRALAZINE HYDROCHLORIDE 75 MG: 25 TABLET, FILM COATED ORAL at 09:03

## 2021-03-24 RX ADMIN — NEPHROCAP 1 CAPSULE: 1 CAP ORAL at 12:03

## 2021-03-24 RX ADMIN — DOCUSATE SODIUM 50 MG AND SENNOSIDES 8.6 MG 1 TABLET: 8.6; 5 TABLET, FILM COATED ORAL at 12:03

## 2021-03-24 RX ADMIN — SODIUM CHLORIDE, SODIUM LACTATE, POTASSIUM CHLORIDE, AND CALCIUM CHLORIDE: .6; .31; .03; .02 INJECTION, SOLUTION INTRAVENOUS at 12:03

## 2021-03-24 RX ADMIN — SODIUM CITRATE AND CITRIC ACID MONOHYDRATE 30 ML: 500; 334 SOLUTION ORAL at 09:03

## 2021-03-24 RX ADMIN — HEPARIN SODIUM 5000 UNITS: 5000 INJECTION INTRAVENOUS; SUBCUTANEOUS at 02:03

## 2021-03-25 LAB
ANION GAP SERPL CALC-SCNC: 14 MMOL/L (ref 8–16)
BACTERIA UR CULT: NORMAL
BASOPHILS # BLD AUTO: 0.06 K/UL (ref 0–0.2)
BASOPHILS NFR BLD: 0.6 % (ref 0–1.9)
BUN SERPL-MCNC: 54 MG/DL (ref 8–23)
CALCIUM SERPL-MCNC: 9.4 MG/DL (ref 8.7–10.5)
CHLORIDE SERPL-SCNC: 107 MMOL/L (ref 95–110)
CO2 SERPL-SCNC: 18 MMOL/L (ref 23–29)
CREAT SERPL-MCNC: 4.6 MG/DL (ref 0.5–1.4)
DIFFERENTIAL METHOD: ABNORMAL
EOSINOPHIL # BLD AUTO: 0.2 K/UL (ref 0–0.5)
EOSINOPHIL NFR BLD: 1.6 % (ref 0–8)
ERYTHROCYTE [DISTWIDTH] IN BLOOD BY AUTOMATED COUNT: 16 % (ref 11.5–14.5)
EST. GFR  (AFRICAN AMERICAN): 9 ML/MIN/1.73 M^2
EST. GFR  (NON AFRICAN AMERICAN): 8 ML/MIN/1.73 M^2
GLUCOSE SERPL-MCNC: 71 MG/DL (ref 70–110)
HCT VFR BLD AUTO: 24.2 % (ref 37–48.5)
HGB BLD-MCNC: 7.7 G/DL (ref 12–16)
IMM GRANULOCYTES # BLD AUTO: 0.12 K/UL (ref 0–0.04)
IMM GRANULOCYTES NFR BLD AUTO: 1.3 % (ref 0–0.5)
LYMPHOCYTES # BLD AUTO: 1.6 K/UL (ref 1–4.8)
LYMPHOCYTES NFR BLD: 16.6 % (ref 18–48)
MCH RBC QN AUTO: 31.7 PG (ref 27–31)
MCHC RBC AUTO-ENTMCNC: 31.8 G/DL (ref 32–36)
MCV RBC AUTO: 100 FL (ref 82–98)
MONOCYTES # BLD AUTO: 0.4 K/UL (ref 0.3–1)
MONOCYTES NFR BLD: 4.6 % (ref 4–15)
NEUTROPHILS # BLD AUTO: 7.1 K/UL (ref 1.8–7.7)
NEUTROPHILS NFR BLD: 75.3 % (ref 38–73)
NRBC BLD-RTO: 0 /100 WBC
PLATELET # BLD AUTO: 211 K/UL (ref 150–350)
PMV BLD AUTO: 10 FL (ref 9.2–12.9)
POCT GLUCOSE: 100 MG/DL (ref 70–110)
POCT GLUCOSE: 101 MG/DL (ref 70–110)
POCT GLUCOSE: 123 MG/DL (ref 70–110)
POCT GLUCOSE: 79 MG/DL (ref 70–110)
POCT GLUCOSE: 98 MG/DL (ref 70–110)
POTASSIUM SERPL-SCNC: 4.3 MMOL/L (ref 3.5–5.1)
RBC # BLD AUTO: 2.43 M/UL (ref 4–5.4)
SODIUM SERPL-SCNC: 139 MMOL/L (ref 136–145)
WBC # BLD AUTO: 9.45 K/UL (ref 3.9–12.7)

## 2021-03-25 PROCEDURE — 85025 COMPLETE CBC W/AUTO DIFF WBC: CPT | Performed by: INTERNAL MEDICINE

## 2021-03-25 PROCEDURE — 36415 COLL VENOUS BLD VENIPUNCTURE: CPT | Performed by: INTERNAL MEDICINE

## 2021-03-25 PROCEDURE — 25000003 PHARM REV CODE 250: Performed by: HOSPITALIST

## 2021-03-25 PROCEDURE — 25000003 PHARM REV CODE 250: Performed by: INTERNAL MEDICINE

## 2021-03-25 PROCEDURE — 21400001 HC TELEMETRY ROOM

## 2021-03-25 PROCEDURE — 63600175 PHARM REV CODE 636 W HCPCS: Performed by: INTERNAL MEDICINE

## 2021-03-25 PROCEDURE — 80048 BASIC METABOLIC PNL TOTAL CA: CPT | Performed by: INTERNAL MEDICINE

## 2021-03-25 RX ORDER — GUAIFENESIN/DEXTROMETHORPHAN 100-10MG/5
10 SYRUP ORAL EVERY 4 HOURS PRN
Status: DISCONTINUED | OUTPATIENT
Start: 2021-03-25 | End: 2021-03-29 | Stop reason: HOSPADM

## 2021-03-25 RX ADMIN — NEPHROCAP 1 CAPSULE: 1 CAP ORAL at 09:03

## 2021-03-25 RX ADMIN — DOCUSATE SODIUM 50 MG AND SENNOSIDES 8.6 MG 1 TABLET: 8.6; 5 TABLET, FILM COATED ORAL at 09:03

## 2021-03-25 RX ADMIN — CAPSAICIN: 0.25 CREAM TOPICAL at 09:03

## 2021-03-25 RX ADMIN — MUPIROCIN: 20 OINTMENT TOPICAL at 09:03

## 2021-03-25 RX ADMIN — CARVEDILOL 12.5 MG: 12.5 TABLET, FILM COATED ORAL at 09:03

## 2021-03-25 RX ADMIN — HYDRALAZINE HYDROCHLORIDE 75 MG: 25 TABLET, FILM COATED ORAL at 02:03

## 2021-03-25 RX ADMIN — ACETAMINOPHEN 1000 MG: 500 TABLET ORAL at 10:03

## 2021-03-25 RX ADMIN — CALCITRIOL CAPSULES 0.25 MCG 0.25 MCG: 0.25 CAPSULE ORAL at 09:03

## 2021-03-25 RX ADMIN — HEPARIN SODIUM 5000 UNITS: 5000 INJECTION INTRAVENOUS; SUBCUTANEOUS at 09:03

## 2021-03-25 RX ADMIN — HEPARIN SODIUM 5000 UNITS: 5000 INJECTION INTRAVENOUS; SUBCUTANEOUS at 02:03

## 2021-03-25 RX ADMIN — GUAIFENESIN AND DEXTROMETHORPHAN 10 ML: 100; 10 SYRUP ORAL at 10:03

## 2021-03-25 RX ADMIN — NIFEDIPINE 90 MG: 30 TABLET, FILM COATED, EXTENDED RELEASE ORAL at 09:03

## 2021-03-25 RX ADMIN — SODIUM CITRATE AND CITRIC ACID MONOHYDRATE 30 ML: 500; 334 SOLUTION ORAL at 09:03

## 2021-03-25 RX ADMIN — HYDRALAZINE HYDROCHLORIDE 75 MG: 25 TABLET, FILM COATED ORAL at 05:03

## 2021-03-25 RX ADMIN — ACETAMINOPHEN 1000 MG: 500 TABLET ORAL at 06:03

## 2021-03-25 RX ADMIN — HYDRALAZINE HYDROCHLORIDE 75 MG: 25 TABLET, FILM COATED ORAL at 09:03

## 2021-03-25 RX ADMIN — LIDOCAINE 1 PATCH: 50 PATCH TOPICAL at 09:03

## 2021-03-25 RX ADMIN — HEPARIN SODIUM 5000 UNITS: 5000 INJECTION INTRAVENOUS; SUBCUTANEOUS at 05:03

## 2021-03-26 LAB
ANION GAP SERPL CALC-SCNC: 13 MMOL/L (ref 8–16)
BACTERIA UR CULT: NORMAL
BASOPHILS # BLD AUTO: 0.05 K/UL (ref 0–0.2)
BASOPHILS NFR BLD: 0.6 % (ref 0–1.9)
BUN SERPL-MCNC: 49 MG/DL (ref 8–23)
CALCIUM SERPL-MCNC: 8.8 MG/DL (ref 8.7–10.5)
CHLORIDE SERPL-SCNC: 107 MMOL/L (ref 95–110)
CO2 SERPL-SCNC: 19 MMOL/L (ref 23–29)
CREAT SERPL-MCNC: 4.5 MG/DL (ref 0.5–1.4)
DIFFERENTIAL METHOD: ABNORMAL
EOSINOPHIL # BLD AUTO: 0.1 K/UL (ref 0–0.5)
EOSINOPHIL NFR BLD: 1.3 % (ref 0–8)
ERYTHROCYTE [DISTWIDTH] IN BLOOD BY AUTOMATED COUNT: 16.2 % (ref 11.5–14.5)
EST. GFR  (AFRICAN AMERICAN): 9 ML/MIN/1.73 M^2
EST. GFR  (NON AFRICAN AMERICAN): 8 ML/MIN/1.73 M^2
GLUCOSE SERPL-MCNC: 86 MG/DL (ref 70–110)
HCT VFR BLD AUTO: 22.4 % (ref 37–48.5)
HGB BLD-MCNC: 7.2 G/DL (ref 12–16)
IMM GRANULOCYTES # BLD AUTO: 0.13 K/UL (ref 0–0.04)
IMM GRANULOCYTES NFR BLD AUTO: 1.6 % (ref 0–0.5)
LYMPHOCYTES # BLD AUTO: 1.5 K/UL (ref 1–4.8)
LYMPHOCYTES NFR BLD: 18.1 % (ref 18–48)
MCH RBC QN AUTO: 31.9 PG (ref 27–31)
MCHC RBC AUTO-ENTMCNC: 32.1 G/DL (ref 32–36)
MCV RBC AUTO: 99 FL (ref 82–98)
MONOCYTES # BLD AUTO: 0.5 K/UL (ref 0.3–1)
MONOCYTES NFR BLD: 5.9 % (ref 4–15)
NEUTROPHILS # BLD AUTO: 5.9 K/UL (ref 1.8–7.7)
NEUTROPHILS NFR BLD: 72.5 % (ref 38–73)
NRBC BLD-RTO: 0 /100 WBC
PLATELET # BLD AUTO: 223 K/UL (ref 150–350)
PMV BLD AUTO: 11.1 FL (ref 9.2–12.9)
POCT GLUCOSE: 90 MG/DL (ref 70–110)
POCT GLUCOSE: 91 MG/DL (ref 70–110)
POCT GLUCOSE: 93 MG/DL (ref 70–110)
POCT GLUCOSE: 94 MG/DL (ref 70–110)
POTASSIUM SERPL-SCNC: 4.1 MMOL/L (ref 3.5–5.1)
RBC # BLD AUTO: 2.26 M/UL (ref 4–5.4)
SODIUM SERPL-SCNC: 139 MMOL/L (ref 136–145)
WBC # BLD AUTO: 8.18 K/UL (ref 3.9–12.7)

## 2021-03-26 PROCEDURE — 85025 COMPLETE CBC W/AUTO DIFF WBC: CPT | Performed by: INTERNAL MEDICINE

## 2021-03-26 PROCEDURE — 36415 COLL VENOUS BLD VENIPUNCTURE: CPT | Performed by: INTERNAL MEDICINE

## 2021-03-26 PROCEDURE — 25000003 PHARM REV CODE 250: Performed by: INTERNAL MEDICINE

## 2021-03-26 PROCEDURE — 25000003 PHARM REV CODE 250: Performed by: STUDENT IN AN ORGANIZED HEALTH CARE EDUCATION/TRAINING PROGRAM

## 2021-03-26 PROCEDURE — 63600175 PHARM REV CODE 636 W HCPCS: Performed by: INTERNAL MEDICINE

## 2021-03-26 PROCEDURE — 80048 BASIC METABOLIC PNL TOTAL CA: CPT | Performed by: INTERNAL MEDICINE

## 2021-03-26 PROCEDURE — 21400001 HC TELEMETRY ROOM

## 2021-03-26 RX ORDER — CAPSAICIN 0.03 G/100G
CREAM TOPICAL 2 TIMES DAILY
Refills: 0 | Status: CANCELLED | COMMUNITY
Start: 2021-03-26

## 2021-03-26 RX ORDER — TRAMADOL HYDROCHLORIDE 50 MG/1
50 TABLET ORAL EVERY 6 HOURS PRN
Status: DISCONTINUED | OUTPATIENT
Start: 2021-03-26 | End: 2021-03-27

## 2021-03-26 RX ORDER — CARVEDILOL 12.5 MG/1
25 TABLET ORAL 2 TIMES DAILY
Status: DISCONTINUED | OUTPATIENT
Start: 2021-03-26 | End: 2021-03-29 | Stop reason: HOSPADM

## 2021-03-26 RX ORDER — TRAMADOL HYDROCHLORIDE 50 MG/1
50 TABLET ORAL ONCE
Status: COMPLETED | OUTPATIENT
Start: 2021-03-26 | End: 2021-03-26

## 2021-03-26 RX ADMIN — HYDRALAZINE HYDROCHLORIDE 75 MG: 25 TABLET, FILM COATED ORAL at 06:03

## 2021-03-26 RX ADMIN — DOCUSATE SODIUM 50 MG AND SENNOSIDES 8.6 MG 1 TABLET: 8.6; 5 TABLET, FILM COATED ORAL at 09:03

## 2021-03-26 RX ADMIN — LIDOCAINE 1 PATCH: 50 PATCH TOPICAL at 09:03

## 2021-03-26 RX ADMIN — HEPARIN SODIUM 5000 UNITS: 5000 INJECTION INTRAVENOUS; SUBCUTANEOUS at 10:03

## 2021-03-26 RX ADMIN — HEPARIN SODIUM 5000 UNITS: 5000 INJECTION INTRAVENOUS; SUBCUTANEOUS at 04:03

## 2021-03-26 RX ADMIN — ACETAMINOPHEN 1000 MG: 500 TABLET ORAL at 02:03

## 2021-03-26 RX ADMIN — CARVEDILOL 25 MG: 12.5 TABLET, FILM COATED ORAL at 09:03

## 2021-03-26 RX ADMIN — CAPSAICIN: 0.25 CREAM TOPICAL at 09:03

## 2021-03-26 RX ADMIN — SODIUM CITRATE AND CITRIC ACID MONOHYDRATE 30 ML: 500; 334 SOLUTION ORAL at 09:03

## 2021-03-26 RX ADMIN — NIFEDIPINE 90 MG: 30 TABLET, FILM COATED, EXTENDED RELEASE ORAL at 09:03

## 2021-03-26 RX ADMIN — NEPHROCAP 1 CAPSULE: 1 CAP ORAL at 09:03

## 2021-03-26 RX ADMIN — SODIUM CHLORIDE, SODIUM LACTATE, POTASSIUM CHLORIDE, AND CALCIUM CHLORIDE: .6; .31; .03; .02 INJECTION, SOLUTION INTRAVENOUS at 02:03

## 2021-03-26 RX ADMIN — HYDRALAZINE HYDROCHLORIDE 75 MG: 25 TABLET, FILM COATED ORAL at 10:03

## 2021-03-26 RX ADMIN — CARVEDILOL 25 MG: 12.5 TABLET, FILM COATED ORAL at 10:03

## 2021-03-26 RX ADMIN — TRAMADOL HYDROCHLORIDE 50 MG: 50 TABLET, FILM COATED ORAL at 12:03

## 2021-03-26 RX ADMIN — CAPSAICIN: 0.25 CREAM TOPICAL at 10:03

## 2021-03-26 RX ADMIN — DOCUSATE SODIUM 50 MG AND SENNOSIDES 8.6 MG 1 TABLET: 8.6; 5 TABLET, FILM COATED ORAL at 10:03

## 2021-03-26 RX ADMIN — HEPARIN SODIUM 5000 UNITS: 5000 INJECTION INTRAVENOUS; SUBCUTANEOUS at 06:03

## 2021-03-26 RX ADMIN — HYDRALAZINE HYDROCHLORIDE 75 MG: 25 TABLET, FILM COATED ORAL at 04:03

## 2021-03-26 RX ADMIN — SODIUM CITRATE AND CITRIC ACID MONOHYDRATE 30 ML: 500; 334 SOLUTION ORAL at 10:03

## 2021-03-27 LAB
ANION GAP SERPL CALC-SCNC: 15 MMOL/L (ref 8–16)
BASOPHILS # BLD AUTO: 0.07 K/UL (ref 0–0.2)
BASOPHILS NFR BLD: 0.9 % (ref 0–1.9)
BUN SERPL-MCNC: 47 MG/DL (ref 8–23)
CALCIUM SERPL-MCNC: 8.9 MG/DL (ref 8.7–10.5)
CHLORIDE SERPL-SCNC: 109 MMOL/L (ref 95–110)
CO2 SERPL-SCNC: 16 MMOL/L (ref 23–29)
CREAT SERPL-MCNC: 4.6 MG/DL (ref 0.5–1.4)
DIFFERENTIAL METHOD: ABNORMAL
EOSINOPHIL # BLD AUTO: 0.1 K/UL (ref 0–0.5)
EOSINOPHIL NFR BLD: 1.6 % (ref 0–8)
ERYTHROCYTE [DISTWIDTH] IN BLOOD BY AUTOMATED COUNT: 16.7 % (ref 11.5–14.5)
EST. GFR  (AFRICAN AMERICAN): 9 ML/MIN/1.73 M^2
EST. GFR  (NON AFRICAN AMERICAN): 8 ML/MIN/1.73 M^2
GLUCOSE SERPL-MCNC: 74 MG/DL (ref 70–110)
HCT VFR BLD AUTO: 23.6 % (ref 37–48.5)
HGB BLD-MCNC: 7.5 G/DL (ref 12–16)
IMM GRANULOCYTES # BLD AUTO: 0.12 K/UL (ref 0–0.04)
IMM GRANULOCYTES NFR BLD AUTO: 1.5 % (ref 0–0.5)
LYMPHOCYTES # BLD AUTO: 1.3 K/UL (ref 1–4.8)
LYMPHOCYTES NFR BLD: 16.2 % (ref 18–48)
MCH RBC QN AUTO: 31.8 PG (ref 27–31)
MCHC RBC AUTO-ENTMCNC: 31.8 G/DL (ref 32–36)
MCV RBC AUTO: 100 FL (ref 82–98)
MONOCYTES # BLD AUTO: 0.4 K/UL (ref 0.3–1)
MONOCYTES NFR BLD: 4.7 % (ref 4–15)
NEUTROPHILS # BLD AUTO: 6 K/UL (ref 1.8–7.7)
NEUTROPHILS NFR BLD: 75.1 % (ref 38–73)
NRBC BLD-RTO: 0 /100 WBC
PLATELET # BLD AUTO: 226 K/UL (ref 150–350)
PMV BLD AUTO: 10.7 FL (ref 9.2–12.9)
POCT GLUCOSE: 119 MG/DL (ref 70–110)
POCT GLUCOSE: 129 MG/DL (ref 70–110)
POCT GLUCOSE: 152 MG/DL (ref 70–110)
POCT GLUCOSE: 80 MG/DL (ref 70–110)
POTASSIUM SERPL-SCNC: 4.1 MMOL/L (ref 3.5–5.1)
RBC # BLD AUTO: 2.36 M/UL (ref 4–5.4)
SODIUM SERPL-SCNC: 140 MMOL/L (ref 136–145)
WBC # BLD AUTO: 8.03 K/UL (ref 3.9–12.7)

## 2021-03-27 PROCEDURE — 25000003 PHARM REV CODE 250: Performed by: STUDENT IN AN ORGANIZED HEALTH CARE EDUCATION/TRAINING PROGRAM

## 2021-03-27 PROCEDURE — 80048 BASIC METABOLIC PNL TOTAL CA: CPT | Performed by: INTERNAL MEDICINE

## 2021-03-27 PROCEDURE — 21400001 HC TELEMETRY ROOM

## 2021-03-27 PROCEDURE — 25000003 PHARM REV CODE 250: Performed by: INTERNAL MEDICINE

## 2021-03-27 PROCEDURE — 63600175 PHARM REV CODE 636 W HCPCS: Performed by: INTERNAL MEDICINE

## 2021-03-27 PROCEDURE — 36415 COLL VENOUS BLD VENIPUNCTURE: CPT | Performed by: INTERNAL MEDICINE

## 2021-03-27 PROCEDURE — 85025 COMPLETE CBC W/AUTO DIFF WBC: CPT | Performed by: INTERNAL MEDICINE

## 2021-03-27 RX ORDER — TRAMADOL HYDROCHLORIDE 50 MG/1
50 TABLET ORAL EVERY 8 HOURS PRN
Status: DISCONTINUED | OUTPATIENT
Start: 2021-03-27 | End: 2021-03-29 | Stop reason: HOSPADM

## 2021-03-27 RX ORDER — DICLOFENAC SODIUM 10 MG/G
2 GEL TOPICAL 3 TIMES DAILY
Status: DISCONTINUED | OUTPATIENT
Start: 2021-03-27 | End: 2021-03-29 | Stop reason: HOSPADM

## 2021-03-27 RX ORDER — SODIUM BICARBONATE 325 MG/1
650 TABLET ORAL 3 TIMES DAILY
Status: DISCONTINUED | OUTPATIENT
Start: 2021-03-27 | End: 2021-03-29 | Stop reason: HOSPADM

## 2021-03-27 RX ADMIN — HEPARIN SODIUM 5000 UNITS: 5000 INJECTION INTRAVENOUS; SUBCUTANEOUS at 05:03

## 2021-03-27 RX ADMIN — HEPARIN SODIUM 5000 UNITS: 5000 INJECTION INTRAVENOUS; SUBCUTANEOUS at 10:03

## 2021-03-27 RX ADMIN — TRAMADOL HYDROCHLORIDE 50 MG: 50 TABLET, FILM COATED ORAL at 04:03

## 2021-03-27 RX ADMIN — SODIUM BICARBONATE TAB 325 MG 650 MG: 325 TAB at 10:03

## 2021-03-27 RX ADMIN — DICLOFENAC SODIUM 2 G: 10 GEL TOPICAL at 10:03

## 2021-03-27 RX ADMIN — NIFEDIPINE 90 MG: 30 TABLET, FILM COATED, EXTENDED RELEASE ORAL at 09:03

## 2021-03-27 RX ADMIN — NEPHROCAP 1 CAPSULE: 1 CAP ORAL at 09:03

## 2021-03-27 RX ADMIN — ACETAMINOPHEN 1000 MG: 500 TABLET ORAL at 10:03

## 2021-03-27 RX ADMIN — SODIUM CITRATE AND CITRIC ACID MONOHYDRATE 30 ML: 500; 334 SOLUTION ORAL at 09:03

## 2021-03-27 RX ADMIN — CAPSAICIN: 0.25 CREAM TOPICAL at 09:03

## 2021-03-27 RX ADMIN — HYDRALAZINE HYDROCHLORIDE 75 MG: 25 TABLET, FILM COATED ORAL at 04:03

## 2021-03-27 RX ADMIN — HEPARIN SODIUM 5000 UNITS: 5000 INJECTION INTRAVENOUS; SUBCUTANEOUS at 04:03

## 2021-03-27 RX ADMIN — DOCUSATE SODIUM 50 MG AND SENNOSIDES 8.6 MG 1 TABLET: 8.6; 5 TABLET, FILM COATED ORAL at 10:03

## 2021-03-27 RX ADMIN — LIDOCAINE 1 PATCH: 50 PATCH TOPICAL at 09:03

## 2021-03-27 RX ADMIN — HYDRALAZINE HYDROCHLORIDE 75 MG: 25 TABLET, FILM COATED ORAL at 05:03

## 2021-03-27 RX ADMIN — CALCITRIOL CAPSULES 0.25 MCG 0.25 MCG: 0.25 CAPSULE ORAL at 09:03

## 2021-03-27 RX ADMIN — SODIUM BICARBONATE TAB 325 MG 650 MG: 325 TAB at 04:03

## 2021-03-27 RX ADMIN — DICLOFENAC SODIUM 2 G: 10 GEL TOPICAL at 04:03

## 2021-03-27 RX ADMIN — HYDRALAZINE HYDROCHLORIDE 75 MG: 25 TABLET, FILM COATED ORAL at 10:03

## 2021-03-27 RX ADMIN — CARVEDILOL 25 MG: 12.5 TABLET, FILM COATED ORAL at 09:03

## 2021-03-27 RX ADMIN — CARVEDILOL 25 MG: 12.5 TABLET, FILM COATED ORAL at 10:03

## 2021-03-28 LAB
ANION GAP SERPL CALC-SCNC: 14 MMOL/L (ref 8–16)
BASOPHILS # BLD AUTO: 0.08 K/UL (ref 0–0.2)
BASOPHILS NFR BLD: 1 % (ref 0–1.9)
BUN SERPL-MCNC: 45 MG/DL (ref 8–23)
CALCIUM SERPL-MCNC: 9.2 MG/DL (ref 8.7–10.5)
CHLORIDE SERPL-SCNC: 107 MMOL/L (ref 95–110)
CO2 SERPL-SCNC: 18 MMOL/L (ref 23–29)
CREAT SERPL-MCNC: 4.5 MG/DL (ref 0.5–1.4)
DIFFERENTIAL METHOD: ABNORMAL
EOSINOPHIL # BLD AUTO: 0.2 K/UL (ref 0–0.5)
EOSINOPHIL NFR BLD: 1.9 % (ref 0–8)
ERYTHROCYTE [DISTWIDTH] IN BLOOD BY AUTOMATED COUNT: 17.2 % (ref 11.5–14.5)
EST. GFR  (AFRICAN AMERICAN): 9 ML/MIN/1.73 M^2
EST. GFR  (NON AFRICAN AMERICAN): 8 ML/MIN/1.73 M^2
GLUCOSE SERPL-MCNC: 85 MG/DL (ref 70–110)
HCT VFR BLD AUTO: 24.2 % (ref 37–48.5)
HGB BLD-MCNC: 7.4 G/DL (ref 12–16)
IMM GRANULOCYTES # BLD AUTO: 0.1 K/UL (ref 0–0.04)
IMM GRANULOCYTES NFR BLD AUTO: 1.3 % (ref 0–0.5)
LYMPHOCYTES # BLD AUTO: 1.5 K/UL (ref 1–4.8)
LYMPHOCYTES NFR BLD: 18.8 % (ref 18–48)
MCH RBC QN AUTO: 31.4 PG (ref 27–31)
MCHC RBC AUTO-ENTMCNC: 30.6 G/DL (ref 32–36)
MCV RBC AUTO: 103 FL (ref 82–98)
MONOCYTES # BLD AUTO: 0.5 K/UL (ref 0.3–1)
MONOCYTES NFR BLD: 6.3 % (ref 4–15)
NEUTROPHILS # BLD AUTO: 5.6 K/UL (ref 1.8–7.7)
NEUTROPHILS NFR BLD: 70.7 % (ref 38–73)
NRBC BLD-RTO: 0 /100 WBC
PLATELET # BLD AUTO: 211 K/UL (ref 150–350)
PMV BLD AUTO: 10.6 FL (ref 9.2–12.9)
POCT GLUCOSE: 106 MG/DL (ref 70–110)
POCT GLUCOSE: 126 MG/DL (ref 70–110)
POCT GLUCOSE: 94 MG/DL (ref 70–110)
POCT GLUCOSE: 95 MG/DL (ref 70–110)
POTASSIUM SERPL-SCNC: 4.1 MMOL/L (ref 3.5–5.1)
RBC # BLD AUTO: 2.36 M/UL (ref 4–5.4)
SODIUM SERPL-SCNC: 139 MMOL/L (ref 136–145)
WBC # BLD AUTO: 7.88 K/UL (ref 3.9–12.7)

## 2021-03-28 PROCEDURE — 21400001 HC TELEMETRY ROOM

## 2021-03-28 PROCEDURE — 85025 COMPLETE CBC W/AUTO DIFF WBC: CPT | Performed by: INTERNAL MEDICINE

## 2021-03-28 PROCEDURE — 25000003 PHARM REV CODE 250: Performed by: INTERNAL MEDICINE

## 2021-03-28 PROCEDURE — 25000003 PHARM REV CODE 250: Performed by: STUDENT IN AN ORGANIZED HEALTH CARE EDUCATION/TRAINING PROGRAM

## 2021-03-28 PROCEDURE — 80048 BASIC METABOLIC PNL TOTAL CA: CPT | Performed by: INTERNAL MEDICINE

## 2021-03-28 PROCEDURE — 63600175 PHARM REV CODE 636 W HCPCS: Performed by: INTERNAL MEDICINE

## 2021-03-28 PROCEDURE — 36415 COLL VENOUS BLD VENIPUNCTURE: CPT | Performed by: INTERNAL MEDICINE

## 2021-03-28 RX ADMIN — HEPARIN SODIUM 5000 UNITS: 5000 INJECTION INTRAVENOUS; SUBCUTANEOUS at 03:03

## 2021-03-28 RX ADMIN — TRAMADOL HYDROCHLORIDE 50 MG: 50 TABLET, FILM COATED ORAL at 10:03

## 2021-03-28 RX ADMIN — CARVEDILOL 25 MG: 12.5 TABLET, FILM COATED ORAL at 09:03

## 2021-03-28 RX ADMIN — TRAMADOL HYDROCHLORIDE 50 MG: 50 TABLET, FILM COATED ORAL at 12:03

## 2021-03-28 RX ADMIN — DICLOFENAC SODIUM 2 G: 10 GEL TOPICAL at 09:03

## 2021-03-28 RX ADMIN — HYDRALAZINE HYDROCHLORIDE 75 MG: 25 TABLET, FILM COATED ORAL at 05:03

## 2021-03-28 RX ADMIN — DICLOFENAC SODIUM 2 G: 10 GEL TOPICAL at 03:03

## 2021-03-28 RX ADMIN — HEPARIN SODIUM 5000 UNITS: 5000 INJECTION INTRAVENOUS; SUBCUTANEOUS at 10:03

## 2021-03-28 RX ADMIN — DICLOFENAC SODIUM 2 G: 10 GEL TOPICAL at 10:03

## 2021-03-28 RX ADMIN — TRAMADOL HYDROCHLORIDE 50 MG: 50 TABLET, FILM COATED ORAL at 04:03

## 2021-03-28 RX ADMIN — CARVEDILOL 25 MG: 12.5 TABLET, FILM COATED ORAL at 10:03

## 2021-03-28 RX ADMIN — SODIUM BICARBONATE TAB 325 MG 650 MG: 325 TAB at 03:03

## 2021-03-28 RX ADMIN — LIDOCAINE 1 PATCH: 50 PATCH TOPICAL at 09:03

## 2021-03-28 RX ADMIN — SODIUM BICARBONATE TAB 325 MG 650 MG: 325 TAB at 10:03

## 2021-03-28 RX ADMIN — NEPHROCAP 1 CAPSULE: 1 CAP ORAL at 09:03

## 2021-03-28 RX ADMIN — NIFEDIPINE 90 MG: 30 TABLET, FILM COATED, EXTENDED RELEASE ORAL at 09:03

## 2021-03-28 RX ADMIN — HYDRALAZINE HYDROCHLORIDE 75 MG: 25 TABLET, FILM COATED ORAL at 03:03

## 2021-03-28 RX ADMIN — SODIUM BICARBONATE TAB 325 MG 650 MG: 325 TAB at 09:03

## 2021-03-28 RX ADMIN — HEPARIN SODIUM 5000 UNITS: 5000 INJECTION INTRAVENOUS; SUBCUTANEOUS at 05:03

## 2021-03-28 RX ADMIN — HYDRALAZINE HYDROCHLORIDE 75 MG: 25 TABLET, FILM COATED ORAL at 10:03

## 2021-03-29 VITALS
BODY MASS INDEX: 26.42 KG/M2 | DIASTOLIC BLOOD PRESSURE: 69 MMHG | OXYGEN SATURATION: 94 % | WEIGHT: 154.75 LBS | HEART RATE: 70 BPM | RESPIRATION RATE: 17 BRPM | SYSTOLIC BLOOD PRESSURE: 165 MMHG | HEIGHT: 64 IN | TEMPERATURE: 99 F

## 2021-03-29 LAB
ANION GAP SERPL CALC-SCNC: 10 MMOL/L (ref 8–16)
BACTERIA BLD CULT: NORMAL
BACTERIA BLD CULT: NORMAL
BASOPHILS # BLD AUTO: 0.06 K/UL (ref 0–0.2)
BASOPHILS NFR BLD: 0.8 % (ref 0–1.9)
BUN SERPL-MCNC: 43 MG/DL (ref 8–23)
CALCIUM SERPL-MCNC: 9.4 MG/DL (ref 8.7–10.5)
CHLORIDE SERPL-SCNC: 107 MMOL/L (ref 95–110)
CO2 SERPL-SCNC: 21 MMOL/L (ref 23–29)
CREAT SERPL-MCNC: 4.4 MG/DL (ref 0.5–1.4)
DIFFERENTIAL METHOD: ABNORMAL
EOSINOPHIL # BLD AUTO: 0.2 K/UL (ref 0–0.5)
EOSINOPHIL NFR BLD: 2 % (ref 0–8)
ERYTHROCYTE [DISTWIDTH] IN BLOOD BY AUTOMATED COUNT: 17.7 % (ref 11.5–14.5)
EST. GFR  (AFRICAN AMERICAN): 10 ML/MIN/1.73 M^2
EST. GFR  (NON AFRICAN AMERICAN): 8 ML/MIN/1.73 M^2
GLUCOSE SERPL-MCNC: 80 MG/DL (ref 70–110)
HCT VFR BLD AUTO: 24.5 % (ref 37–48.5)
HGB BLD-MCNC: 7.7 G/DL (ref 12–16)
IMM GRANULOCYTES # BLD AUTO: 0.07 K/UL (ref 0–0.04)
IMM GRANULOCYTES NFR BLD AUTO: 0.9 % (ref 0–0.5)
LYMPHOCYTES # BLD AUTO: 1.5 K/UL (ref 1–4.8)
LYMPHOCYTES NFR BLD: 19.6 % (ref 18–48)
MCH RBC QN AUTO: 32.4 PG (ref 27–31)
MCHC RBC AUTO-ENTMCNC: 31.4 G/DL (ref 32–36)
MCV RBC AUTO: 103 FL (ref 82–98)
MONOCYTES # BLD AUTO: 0.4 K/UL (ref 0.3–1)
MONOCYTES NFR BLD: 5.1 % (ref 4–15)
NEUTROPHILS # BLD AUTO: 5.3 K/UL (ref 1.8–7.7)
NEUTROPHILS NFR BLD: 71.6 % (ref 38–73)
NRBC BLD-RTO: 0 /100 WBC
PLATELET # BLD AUTO: 206 K/UL (ref 150–450)
PMV BLD AUTO: 10.3 FL (ref 9.2–12.9)
POCT GLUCOSE: 80 MG/DL (ref 70–110)
POCT GLUCOSE: 98 MG/DL (ref 70–110)
POTASSIUM SERPL-SCNC: 4.2 MMOL/L (ref 3.5–5.1)
RBC # BLD AUTO: 2.38 M/UL (ref 4–5.4)
SODIUM SERPL-SCNC: 138 MMOL/L (ref 136–145)
WBC # BLD AUTO: 7.45 K/UL (ref 3.9–12.7)

## 2021-03-29 PROCEDURE — 25000003 PHARM REV CODE 250: Performed by: INTERNAL MEDICINE

## 2021-03-29 PROCEDURE — 25000003 PHARM REV CODE 250: Performed by: STUDENT IN AN ORGANIZED HEALTH CARE EDUCATION/TRAINING PROGRAM

## 2021-03-29 PROCEDURE — 94761 N-INVAS EAR/PLS OXIMETRY MLT: CPT

## 2021-03-29 PROCEDURE — 85025 COMPLETE CBC W/AUTO DIFF WBC: CPT | Performed by: INTERNAL MEDICINE

## 2021-03-29 PROCEDURE — 80048 BASIC METABOLIC PNL TOTAL CA: CPT | Performed by: INTERNAL MEDICINE

## 2021-03-29 PROCEDURE — 63600175 PHARM REV CODE 636 W HCPCS: Performed by: INTERNAL MEDICINE

## 2021-03-29 PROCEDURE — 36415 COLL VENOUS BLD VENIPUNCTURE: CPT | Performed by: INTERNAL MEDICINE

## 2021-03-29 RX ORDER — DICLOFENAC SODIUM 10 MG/G
2 GEL TOPICAL 3 TIMES DAILY
Qty: 100 G | Refills: 3 | Status: ON HOLD | OUTPATIENT
Start: 2021-03-29 | End: 2021-06-28 | Stop reason: HOSPADM

## 2021-03-29 RX ORDER — CARVEDILOL 25 MG/1
25 TABLET ORAL 2 TIMES DAILY
Qty: 60 TABLET | Refills: 11 | Status: SHIPPED | OUTPATIENT
Start: 2021-03-29 | End: 2022-03-29

## 2021-03-29 RX ORDER — FERROUS SULFATE 325(65) MG
325 TABLET ORAL DAILY
Refills: 0 | Status: ON HOLD
Start: 2021-03-29 | End: 2021-06-28 | Stop reason: HOSPADM

## 2021-03-29 RX ORDER — HYDRALAZINE HYDROCHLORIDE 25 MG/1
75 TABLET, FILM COATED ORAL EVERY 8 HOURS
Qty: 270 TABLET | Refills: 11 | Status: ON HOLD | OUTPATIENT
Start: 2021-03-29 | End: 2021-06-28 | Stop reason: HOSPADM

## 2021-03-29 RX ORDER — CEPHALEXIN 500 MG/1
500 CAPSULE ORAL EVERY 12 HOURS
Qty: 8 CAPSULE | Refills: 0
Start: 2021-03-29 | End: 2021-03-31

## 2021-03-29 RX ADMIN — CARVEDILOL 25 MG: 12.5 TABLET, FILM COATED ORAL at 09:03

## 2021-03-29 RX ADMIN — SODIUM BICARBONATE TAB 325 MG 650 MG: 325 TAB at 09:03

## 2021-03-29 RX ADMIN — CALCITRIOL CAPSULES 0.25 MCG 0.25 MCG: 0.25 CAPSULE ORAL at 09:03

## 2021-03-29 RX ADMIN — NIFEDIPINE 90 MG: 30 TABLET, FILM COATED, EXTENDED RELEASE ORAL at 09:03

## 2021-03-29 RX ADMIN — DICLOFENAC SODIUM 2 G: 10 GEL TOPICAL at 09:03

## 2021-03-29 RX ADMIN — HEPARIN SODIUM 5000 UNITS: 5000 INJECTION INTRAVENOUS; SUBCUTANEOUS at 06:03

## 2021-03-29 RX ADMIN — DICLOFENAC SODIUM 2 G: 10 GEL TOPICAL at 02:03

## 2021-03-29 RX ADMIN — DOCUSATE SODIUM 50 MG AND SENNOSIDES 8.6 MG 1 TABLET: 8.6; 5 TABLET, FILM COATED ORAL at 09:03

## 2021-03-29 RX ADMIN — HYDRALAZINE HYDROCHLORIDE 75 MG: 25 TABLET, FILM COATED ORAL at 06:03

## 2021-03-29 RX ADMIN — LIDOCAINE 1 PATCH: 50 PATCH TOPICAL at 09:03

## 2021-03-29 RX ADMIN — HEPARIN SODIUM 5000 UNITS: 5000 INJECTION INTRAVENOUS; SUBCUTANEOUS at 01:03

## 2021-03-29 RX ADMIN — HYDRALAZINE HYDROCHLORIDE 75 MG: 25 TABLET, FILM COATED ORAL at 01:03

## 2021-03-29 RX ADMIN — NEPHROCAP 1 CAPSULE: 1 CAP ORAL at 09:03

## 2021-04-04 ENCOUNTER — HOSPITAL ENCOUNTER (EMERGENCY)
Facility: HOSPITAL | Age: 86
Discharge: HOME | End: 2021-04-05
Attending: EMERGENCY MEDICINE
Payer: MEDICARE

## 2021-04-04 DIAGNOSIS — R03.0 ELEVATED BLOOD PRESSURE READING: ICD-10-CM

## 2021-04-04 DIAGNOSIS — R51.9 ACUTE NONINTRACTABLE HEADACHE, UNSPECIFIED HEADACHE TYPE: Primary | ICD-10-CM

## 2021-04-04 DIAGNOSIS — M54.2 NECK PAIN: ICD-10-CM

## 2021-04-04 LAB
ALBUMIN SERPL BCP-MCNC: 3.3 G/DL (ref 3.5–5.2)
ALP SERPL-CCNC: 67 U/L (ref 55–135)
ALT SERPL W/O P-5'-P-CCNC: 16 U/L (ref 10–44)
ANION GAP SERPL CALC-SCNC: 12 MMOL/L (ref 8–16)
ANION GAP SERPL CALC-SCNC: 17 MMOL/L (ref 8–16)
AST SERPL-CCNC: 43 U/L (ref 10–40)
BACTERIA #/AREA URNS HPF: NORMAL /HPF
BASOPHILS # BLD AUTO: 0.04 K/UL (ref 0–0.2)
BASOPHILS NFR BLD: 0.4 % (ref 0–1.9)
BILIRUB SERPL-MCNC: 0.5 MG/DL (ref 0.1–1)
BILIRUB UR QL STRIP: NEGATIVE
BUN SERPL-MCNC: 61 MG/DL (ref 8–23)
BUN SERPL-MCNC: 62 MG/DL (ref 8–23)
CALCIUM SERPL-MCNC: 10.2 MG/DL (ref 8.7–10.5)
CALCIUM SERPL-MCNC: 10.5 MG/DL (ref 8.7–10.5)
CHLORIDE SERPL-SCNC: 101 MMOL/L (ref 95–110)
CHLORIDE SERPL-SCNC: 103 MMOL/L (ref 95–110)
CLARITY UR: CLEAR
CO2 SERPL-SCNC: 18 MMOL/L (ref 23–29)
CO2 SERPL-SCNC: 23 MMOL/L (ref 23–29)
COLOR UR: YELLOW
CREAT SERPL-MCNC: 5.3 MG/DL (ref 0.5–1.4)
CREAT SERPL-MCNC: 5.3 MG/DL (ref 0.5–1.4)
DIFFERENTIAL METHOD: ABNORMAL
EOSINOPHIL # BLD AUTO: 0 K/UL (ref 0–0.5)
EOSINOPHIL NFR BLD: 0.3 % (ref 0–8)
ERYTHROCYTE [DISTWIDTH] IN BLOOD BY AUTOMATED COUNT: 16.7 % (ref 11.5–14.5)
EST. GFR  (AFRICAN AMERICAN): 8 ML/MIN/1.73 M^2
EST. GFR  (AFRICAN AMERICAN): 8 ML/MIN/1.73 M^2
EST. GFR  (NON AFRICAN AMERICAN): 7 ML/MIN/1.73 M^2
EST. GFR  (NON AFRICAN AMERICAN): 7 ML/MIN/1.73 M^2
GLUCOSE SERPL-MCNC: 150 MG/DL (ref 70–110)
GLUCOSE SERPL-MCNC: 151 MG/DL (ref 70–110)
GLUCOSE UR QL STRIP: NEGATIVE
HCT VFR BLD AUTO: 29.1 % (ref 37–48.5)
HGB BLD-MCNC: 9 G/DL (ref 12–16)
HGB UR QL STRIP: NEGATIVE
HYALINE CASTS #/AREA URNS LPF: 0 /LPF
IMM GRANULOCYTES # BLD AUTO: 0.08 K/UL (ref 0–0.04)
IMM GRANULOCYTES NFR BLD AUTO: 0.8 % (ref 0–0.5)
KETONES UR QL STRIP: NEGATIVE
LEUKOCYTE ESTERASE UR QL STRIP: NEGATIVE
LYMPHOCYTES # BLD AUTO: 0.4 K/UL (ref 1–4.8)
LYMPHOCYTES NFR BLD: 3.9 % (ref 18–48)
MCH RBC QN AUTO: 32 PG (ref 27–31)
MCHC RBC AUTO-ENTMCNC: 30.9 G/DL (ref 32–36)
MCV RBC AUTO: 104 FL (ref 82–98)
MICROSCOPIC COMMENT: NORMAL
MONOCYTES # BLD AUTO: 0.3 K/UL (ref 0.3–1)
MONOCYTES NFR BLD: 2.6 % (ref 4–15)
NEUTROPHILS # BLD AUTO: 8.9 K/UL (ref 1.8–7.7)
NEUTROPHILS NFR BLD: 92 % (ref 38–73)
NITRITE UR QL STRIP: NEGATIVE
NRBC BLD-RTO: 0 /100 WBC
PH UR STRIP: 6 [PH] (ref 5–8)
PLATELET # BLD AUTO: 242 K/UL (ref 150–450)
PMV BLD AUTO: 10.6 FL (ref 9.2–12.9)
POCT GLUCOSE: 168 MG/DL (ref 70–110)
POTASSIUM SERPL-SCNC: 5.1 MMOL/L (ref 3.5–5.1)
POTASSIUM SERPL-SCNC: 6.1 MMOL/L (ref 3.5–5.1)
PROT SERPL-MCNC: 8 G/DL (ref 6–8.4)
PROT UR QL STRIP: ABNORMAL
RBC # BLD AUTO: 2.81 M/UL (ref 4–5.4)
RBC #/AREA URNS HPF: 1 /HPF (ref 0–4)
SODIUM SERPL-SCNC: 136 MMOL/L (ref 136–145)
SODIUM SERPL-SCNC: 138 MMOL/L (ref 136–145)
SP GR UR STRIP: 1.01 (ref 1–1.03)
SQUAMOUS #/AREA URNS HPF: 2 /HPF
TROPONIN I SERPL DL<=0.01 NG/ML-MCNC: 0.02 NG/ML (ref 0–0.03)
URN SPEC COLLECT METH UR: ABNORMAL
UROBILINOGEN UR STRIP-ACNC: NEGATIVE EU/DL
WBC # BLD AUTO: 9.67 K/UL (ref 3.9–12.7)
WBC #/AREA URNS HPF: 1 /HPF (ref 0–5)

## 2021-04-04 PROCEDURE — 25000003 PHARM REV CODE 250: Performed by: EMERGENCY MEDICINE

## 2021-04-04 PROCEDURE — 84484 ASSAY OF TROPONIN QUANT: CPT | Performed by: EMERGENCY MEDICINE

## 2021-04-04 PROCEDURE — 93010 EKG 12-LEAD: ICD-10-PCS | Mod: ,,, | Performed by: INTERNAL MEDICINE

## 2021-04-04 PROCEDURE — 99285 EMERGENCY DEPT VISIT HI MDM: CPT | Mod: 25

## 2021-04-04 PROCEDURE — 85025 COMPLETE CBC W/AUTO DIFF WBC: CPT | Performed by: EMERGENCY MEDICINE

## 2021-04-04 PROCEDURE — 93010 ELECTROCARDIOGRAM REPORT: CPT | Mod: ,,, | Performed by: INTERNAL MEDICINE

## 2021-04-04 PROCEDURE — 81000 URINALYSIS NONAUTO W/SCOPE: CPT | Performed by: EMERGENCY MEDICINE

## 2021-04-04 PROCEDURE — 80048 BASIC METABOLIC PNL TOTAL CA: CPT | Performed by: EMERGENCY MEDICINE

## 2021-04-04 PROCEDURE — 82962 GLUCOSE BLOOD TEST: CPT

## 2021-04-04 PROCEDURE — 80053 COMPREHEN METABOLIC PANEL: CPT | Performed by: EMERGENCY MEDICINE

## 2021-04-04 PROCEDURE — 93005 ELECTROCARDIOGRAM TRACING: CPT

## 2021-04-04 RX ORDER — LIDOCAINE 50 MG/G
1 PATCH TOPICAL ONCE
Status: DISCONTINUED | OUTPATIENT
Start: 2021-04-04 | End: 2021-04-05 | Stop reason: HOSPADM

## 2021-04-04 RX ORDER — MECLIZINE HYDROCHLORIDE 25 MG/1
25 TABLET ORAL
Status: COMPLETED | OUTPATIENT
Start: 2021-04-04 | End: 2021-04-04

## 2021-04-04 RX ORDER — METHOCARBAMOL 500 MG/1
500 TABLET, FILM COATED ORAL
Status: COMPLETED | OUTPATIENT
Start: 2021-04-04 | End: 2021-04-04

## 2021-04-04 RX ORDER — ACETAMINOPHEN 500 MG
1000 TABLET ORAL
Status: COMPLETED | OUTPATIENT
Start: 2021-04-04 | End: 2021-04-04

## 2021-04-04 RX ORDER — LABETALOL HYDROCHLORIDE 5 MG/ML
10 INJECTION, SOLUTION INTRAVENOUS
Status: DISCONTINUED | OUTPATIENT
Start: 2021-04-04 | End: 2021-04-04

## 2021-04-04 RX ADMIN — METHOCARBAMOL 500 MG: 500 TABLET ORAL at 08:04

## 2021-04-04 RX ADMIN — ACETAMINOPHEN 1000 MG: 500 TABLET, FILM COATED ORAL at 04:04

## 2021-04-04 RX ADMIN — MECLIZINE HYDROCHLORIDE 25 MG: 25 TABLET ORAL at 08:04

## 2021-04-04 RX ADMIN — LIDOCAINE 1 PATCH: 50 PATCH TOPICAL at 08:04

## 2021-04-05 VITALS
RESPIRATION RATE: 18 BRPM | HEART RATE: 73 BPM | HEIGHT: 64 IN | TEMPERATURE: 98 F | WEIGHT: 155 LBS | BODY MASS INDEX: 26.46 KG/M2 | OXYGEN SATURATION: 100 % | DIASTOLIC BLOOD PRESSURE: 79 MMHG | SYSTOLIC BLOOD PRESSURE: 181 MMHG

## 2021-04-05 RX ORDER — LIDOCAINE 50 MG/G
1 PATCH TOPICAL DAILY PRN
Qty: 15 PATCH | Refills: 1 | Status: SHIPPED | OUTPATIENT
Start: 2021-04-05

## 2021-04-05 RX ORDER — METHOCARBAMOL 500 MG/1
500 TABLET, FILM COATED ORAL 3 TIMES DAILY PRN
Qty: 15 TABLET | Refills: 1 | Status: SHIPPED | OUTPATIENT
Start: 2021-04-05 | End: 2021-04-10

## 2021-05-09 ENCOUNTER — HOSPITAL ENCOUNTER (INPATIENT)
Facility: HOSPITAL | Age: 86
LOS: 3 days | Discharge: HOME | DRG: 378 | End: 2021-05-12
Attending: EMERGENCY MEDICINE | Admitting: HOSPITALIST
Payer: MEDICARE

## 2021-05-09 DIAGNOSIS — I50.9 CHF (CONGESTIVE HEART FAILURE): ICD-10-CM

## 2021-05-09 DIAGNOSIS — K92.2 ACUTE LOWER GI BLEEDING: Primary | ICD-10-CM

## 2021-05-09 DIAGNOSIS — Z45.2 ENCOUNTER FOR CENTRAL LINE PLACEMENT: ICD-10-CM

## 2021-05-09 PROBLEM — R41.89 COGNITIVE IMPAIRMENT: Status: ACTIVE | Noted: 2021-05-09

## 2021-05-09 LAB
ABO + RH BLD: NORMAL
ALBUMIN SERPL BCP-MCNC: 2.9 G/DL (ref 3.5–5.2)
ALP SERPL-CCNC: 64 U/L (ref 55–135)
ALT SERPL W/O P-5'-P-CCNC: 6 U/L (ref 10–44)
ANION GAP SERPL CALC-SCNC: 12 MMOL/L (ref 8–16)
APTT BLDCRRT: 27.5 SEC (ref 21–32)
AST SERPL-CCNC: 11 U/L (ref 10–40)
BASOPHILS # BLD AUTO: 0.07 K/UL (ref 0–0.2)
BASOPHILS NFR BLD: 0.7 % (ref 0–1.9)
BILIRUB SERPL-MCNC: 0.4 MG/DL (ref 0.1–1)
BLD GP AB SCN CELLS X3 SERPL QL: NORMAL
BUN SERPL-MCNC: 54 MG/DL (ref 8–23)
CALCIUM SERPL-MCNC: 9.5 MG/DL (ref 8.7–10.5)
CHLORIDE SERPL-SCNC: 105 MMOL/L (ref 95–110)
CO2 SERPL-SCNC: 25 MMOL/L (ref 23–29)
CREAT SERPL-MCNC: 6 MG/DL (ref 0.5–1.4)
CTP QC/QA: YES
DIFFERENTIAL METHOD: ABNORMAL
EOSINOPHIL # BLD AUTO: 0.1 K/UL (ref 0–0.5)
EOSINOPHIL NFR BLD: 1.4 % (ref 0–8)
ERYTHROCYTE [DISTWIDTH] IN BLOOD BY AUTOMATED COUNT: 15.7 % (ref 11.5–14.5)
EST. GFR  (AFRICAN AMERICAN): 7 ML/MIN/1.73 M^2
EST. GFR  (NON AFRICAN AMERICAN): 6 ML/MIN/1.73 M^2
GLUCOSE SERPL-MCNC: 113 MG/DL (ref 70–110)
HCT VFR BLD AUTO: 25.2 % (ref 37–48.5)
HGB BLD-MCNC: 7.9 G/DL (ref 12–16)
IMM GRANULOCYTES # BLD AUTO: 0.05 K/UL (ref 0–0.04)
IMM GRANULOCYTES NFR BLD AUTO: 0.5 % (ref 0–0.5)
INR PPP: 1.1 (ref 0.8–1.2)
LYMPHOCYTES # BLD AUTO: 0.9 K/UL (ref 1–4.8)
LYMPHOCYTES NFR BLD: 9.8 % (ref 18–48)
MCH RBC QN AUTO: 31 PG (ref 27–31)
MCHC RBC AUTO-ENTMCNC: 31.3 G/DL (ref 32–36)
MCV RBC AUTO: 99 FL (ref 82–98)
MONOCYTES # BLD AUTO: 0.4 K/UL (ref 0.3–1)
MONOCYTES NFR BLD: 4.5 % (ref 4–15)
NEUTROPHILS # BLD AUTO: 7.8 K/UL (ref 1.8–7.7)
NEUTROPHILS NFR BLD: 83.1 % (ref 38–73)
NRBC BLD-RTO: 0 /100 WBC
PLATELET # BLD AUTO: 257 K/UL (ref 150–450)
PMV BLD AUTO: 9.7 FL (ref 9.2–12.9)
POTASSIUM SERPL-SCNC: 3.9 MMOL/L (ref 3.5–5.1)
PROT SERPL-MCNC: 6.7 G/DL (ref 6–8.4)
PROTHROMBIN TIME: 11.4 SEC (ref 9–12.5)
RBC # BLD AUTO: 2.55 M/UL (ref 4–5.4)
SARS-COV-2 RDRP RESP QL NAA+PROBE: NEGATIVE
SODIUM SERPL-SCNC: 142 MMOL/L (ref 136–145)
WBC # BLD AUTO: 9.36 K/UL (ref 3.9–12.7)

## 2021-05-09 PROCEDURE — 99291 CRITICAL CARE FIRST HOUR: CPT | Mod: 25

## 2021-05-09 PROCEDURE — 36556 INSERT NON-TUNNEL CV CATH: CPT | Mod: 25

## 2021-05-09 PROCEDURE — C9113 INJ PANTOPRAZOLE SODIUM, VIA: HCPCS | Performed by: HOSPITALIST

## 2021-05-09 PROCEDURE — 25000003 PHARM REV CODE 250: Performed by: HOSPITALIST

## 2021-05-09 PROCEDURE — U0002 COVID-19 LAB TEST NON-CDC: HCPCS | Performed by: EMERGENCY MEDICINE

## 2021-05-09 PROCEDURE — 86900 BLOOD TYPING SEROLOGIC ABO: CPT | Performed by: EMERGENCY MEDICINE

## 2021-05-09 PROCEDURE — 85730 THROMBOPLASTIN TIME PARTIAL: CPT | Performed by: EMERGENCY MEDICINE

## 2021-05-09 PROCEDURE — 63600175 PHARM REV CODE 636 W HCPCS: Performed by: HOSPITALIST

## 2021-05-09 PROCEDURE — 85025 COMPLETE CBC W/AUTO DIFF WBC: CPT | Performed by: EMERGENCY MEDICINE

## 2021-05-09 PROCEDURE — 99232 SBSQ HOSP IP/OBS MODERATE 35: CPT | Mod: ,,, | Performed by: INTERNAL MEDICINE

## 2021-05-09 PROCEDURE — 80053 COMPREHEN METABOLIC PANEL: CPT | Performed by: EMERGENCY MEDICINE

## 2021-05-09 PROCEDURE — 99232 PR SUBSEQUENT HOSPITAL CARE,LEVL II: ICD-10-PCS | Mod: ,,, | Performed by: INTERNAL MEDICINE

## 2021-05-09 PROCEDURE — 85610 PROTHROMBIN TIME: CPT | Performed by: EMERGENCY MEDICINE

## 2021-05-09 PROCEDURE — 11000001 HC ACUTE MED/SURG PRIVATE ROOM

## 2021-05-09 RX ORDER — MIRTAZAPINE 7.5 MG/1
7.5 TABLET, FILM COATED ORAL NIGHTLY PRN
Status: DISCONTINUED | OUTPATIENT
Start: 2021-05-09 | End: 2021-05-12 | Stop reason: HOSPADM

## 2021-05-09 RX ORDER — IPRATROPIUM BROMIDE AND ALBUTEROL SULFATE 2.5; .5 MG/3ML; MG/3ML
3 SOLUTION RESPIRATORY (INHALATION) EVERY 4 HOURS PRN
Status: DISCONTINUED | OUTPATIENT
Start: 2021-05-09 | End: 2021-05-12 | Stop reason: HOSPADM

## 2021-05-09 RX ORDER — METHOCARBAMOL 500 MG/1
500 TABLET, FILM COATED ORAL 3 TIMES DAILY PRN
Status: ON HOLD | COMMUNITY
End: 2021-06-28 | Stop reason: HOSPADM

## 2021-05-09 RX ORDER — CLONIDINE 0.1 MG/24H
1 PATCH, EXTENDED RELEASE TRANSDERMAL
Status: DISCONTINUED | OUTPATIENT
Start: 2021-05-10 | End: 2021-05-12 | Stop reason: HOSPADM

## 2021-05-09 RX ORDER — CARVEDILOL 12.5 MG/1
25 TABLET ORAL 2 TIMES DAILY
Status: DISCONTINUED | OUTPATIENT
Start: 2021-05-09 | End: 2021-05-12 | Stop reason: HOSPADM

## 2021-05-09 RX ORDER — SODIUM BICARBONATE 325 MG/1
1300 TABLET ORAL 3 TIMES DAILY
Status: DISCONTINUED | OUTPATIENT
Start: 2021-05-09 | End: 2021-05-12 | Stop reason: HOSPADM

## 2021-05-09 RX ORDER — PANTOPRAZOLE SODIUM 40 MG/10ML
40 INJECTION, POWDER, LYOPHILIZED, FOR SOLUTION INTRAVENOUS 2 TIMES DAILY
Status: DISCONTINUED | OUTPATIENT
Start: 2021-05-09 | End: 2021-05-11

## 2021-05-09 RX ORDER — ONDANSETRON 2 MG/ML
4 INJECTION INTRAMUSCULAR; INTRAVENOUS EVERY 6 HOURS PRN
Status: DISCONTINUED | OUTPATIENT
Start: 2021-05-09 | End: 2021-05-12 | Stop reason: HOSPADM

## 2021-05-09 RX ORDER — HYDRALAZINE HYDROCHLORIDE 25 MG/1
75 TABLET, FILM COATED ORAL EVERY 8 HOURS
Status: DISCONTINUED | OUTPATIENT
Start: 2021-05-09 | End: 2021-05-10

## 2021-05-09 RX ORDER — CALCITRIOL 0.25 UG/1
0.25 CAPSULE ORAL EVERY OTHER DAY
Status: DISCONTINUED | OUTPATIENT
Start: 2021-05-10 | End: 2021-05-10

## 2021-05-09 RX ORDER — ACETAMINOPHEN 325 MG/1
650 TABLET ORAL EVERY 6 HOURS PRN
Status: DISCONTINUED | OUTPATIENT
Start: 2021-05-09 | End: 2021-05-12 | Stop reason: HOSPADM

## 2021-05-09 RX ORDER — NIFEDIPINE 30 MG/1
90 TABLET, EXTENDED RELEASE ORAL DAILY
Status: DISCONTINUED | OUTPATIENT
Start: 2021-05-10 | End: 2021-05-12 | Stop reason: HOSPADM

## 2021-05-09 RX ADMIN — SODIUM BICARBONATE TAB 325 MG 1300 MG: 325 TAB at 08:05

## 2021-05-09 RX ADMIN — PANTOPRAZOLE SODIUM 40 MG: 40 INJECTION, POWDER, FOR SOLUTION INTRAVENOUS at 09:05

## 2021-05-09 RX ADMIN — HYDRALAZINE HYDROCHLORIDE 75 MG: 25 TABLET ORAL at 10:05

## 2021-05-09 RX ADMIN — CARVEDILOL 25 MG: 12.5 TABLET, FILM COATED ORAL at 08:05

## 2021-05-09 RX ADMIN — ACETAMINOPHEN 650 MG: 325 TABLET ORAL at 08:05

## 2021-05-10 LAB
ALBUMIN SERPL BCP-MCNC: 2.7 G/DL (ref 3.5–5.2)
ALP SERPL-CCNC: 53 U/L (ref 55–135)
ALT SERPL W/O P-5'-P-CCNC: 5 U/L (ref 10–44)
ANION GAP SERPL CALC-SCNC: 15 MMOL/L (ref 8–16)
ASCENDING AORTA: 2.74 CM
AST SERPL-CCNC: 11 U/L (ref 10–40)
AV INDEX (PROSTH): 0.88
AV MEAN GRADIENT: 11 MMHG
AV PEAK GRADIENT: 21 MMHG
AV VALVE AREA: 1.8 CM2
AV VELOCITY RATIO: 0.67
BASOPHILS # BLD AUTO: 0.07 K/UL (ref 0–0.2)
BASOPHILS # BLD AUTO: 0.08 K/UL (ref 0–0.2)
BASOPHILS NFR BLD: 0.9 % (ref 0–1.9)
BASOPHILS NFR BLD: 1 % (ref 0–1.9)
BILIRUB SERPL-MCNC: 0.5 MG/DL (ref 0.1–1)
BSA FOR ECHO PROCEDURE: 1.49 M2
BUN SERPL-MCNC: 54 MG/DL (ref 8–23)
CALCIUM SERPL-MCNC: 9.9 MG/DL (ref 8.7–10.5)
CHLORIDE SERPL-SCNC: 104 MMOL/L (ref 95–110)
CO2 SERPL-SCNC: 24 MMOL/L (ref 23–29)
CREAT SERPL-MCNC: 5.9 MG/DL (ref 0.5–1.4)
CV ECHO LV RWT: 1.03 CM
DIFFERENTIAL METHOD: ABNORMAL
DIFFERENTIAL METHOD: ABNORMAL
DOP CALC AO PEAK VEL: 2.31 M/S
DOP CALC AO VTI: 37.03 CM
DOP CALC LVOT AREA: 2 CM2
DOP CALC LVOT DIAMETER: 1.61 CM
DOP CALC LVOT PEAK VEL: 1.54 M/S
DOP CALC LVOT STROKE VOLUME: 66.5 CM3
DOP CALC RVOT PEAK VEL: 1.01 M/S
DOP CALC RVOT VTI: 24.58 CM
DOP CALCLVOT PEAK VEL VTI: 32.68 CM
E WAVE DECELERATION TIME: 200.23 MSEC
E/A RATIO: 1.02
E/E' RATIO: 26.83 M/S
ECHO LV POSTERIOR WALL: 1.99 CM (ref 0.6–1.1)
EJECTION FRACTION: 65 %
EOSINOPHIL # BLD AUTO: 0.2 K/UL (ref 0–0.5)
EOSINOPHIL # BLD AUTO: 0.2 K/UL (ref 0–0.5)
EOSINOPHIL NFR BLD: 2 % (ref 0–8)
EOSINOPHIL NFR BLD: 2.1 % (ref 0–8)
ERYTHROCYTE [DISTWIDTH] IN BLOOD BY AUTOMATED COUNT: 15.6 % (ref 11.5–14.5)
ERYTHROCYTE [DISTWIDTH] IN BLOOD BY AUTOMATED COUNT: 15.7 % (ref 11.5–14.5)
EST. GFR  (AFRICAN AMERICAN): 7 ML/MIN/1.73 M^2
EST. GFR  (NON AFRICAN AMERICAN): 6 ML/MIN/1.73 M^2
FRACTIONAL SHORTENING: 45 % (ref 28–44)
GLUCOSE SERPL-MCNC: 68 MG/DL (ref 70–110)
HCT VFR BLD AUTO: 24.2 % (ref 37–48.5)
HCT VFR BLD AUTO: 26.2 % (ref 37–48.5)
HGB BLD-MCNC: 7.5 G/DL (ref 12–16)
HGB BLD-MCNC: 8 G/DL (ref 12–16)
IMM GRANULOCYTES # BLD AUTO: 0.07 K/UL (ref 0–0.04)
IMM GRANULOCYTES # BLD AUTO: 0.08 K/UL (ref 0–0.04)
IMM GRANULOCYTES NFR BLD AUTO: 0.9 % (ref 0–0.5)
IMM GRANULOCYTES NFR BLD AUTO: 1 % (ref 0–0.5)
INTERVENTRICULAR SEPTUM: 1.78 CM (ref 0.6–1.1)
IVRT: 110.73 MSEC
LA MAJOR: 5.84 CM
LA MINOR: 7.12 CM
LA WIDTH: 4.73 CM
LEFT ATRIUM SIZE: 5.34 CM
LEFT ATRIUM VOLUME INDEX: 91.8 ML/M2
LEFT ATRIUM VOLUME: 137.77 CM3
LEFT INTERNAL DIMENSION IN SYSTOLE: 2.14 CM (ref 2.1–4)
LEFT VENTRICLE DIASTOLIC VOLUME INDEX: 43.11 ML/M2
LEFT VENTRICLE DIASTOLIC VOLUME: 64.67 ML
LEFT VENTRICLE MASS INDEX: 216 G/M2
LEFT VENTRICLE SYSTOLIC VOLUME INDEX: 10.1 ML/M2
LEFT VENTRICLE SYSTOLIC VOLUME: 15.13 ML
LEFT VENTRICULAR INTERNAL DIMENSION IN DIASTOLE: 3.87 CM (ref 3.5–6)
LEFT VENTRICULAR MASS: 323.4 G
LV LATERAL E/E' RATIO: 20.13 M/S
LV SEPTAL E/E' RATIO: 40.25 M/S
LYMPHOCYTES # BLD AUTO: 0.7 K/UL (ref 1–4.8)
LYMPHOCYTES # BLD AUTO: 1 K/UL (ref 1–4.8)
LYMPHOCYTES NFR BLD: 12.2 % (ref 18–48)
LYMPHOCYTES NFR BLD: 8.7 % (ref 18–48)
MAGNESIUM SERPL-MCNC: 1.5 MG/DL (ref 1.6–2.6)
MCH RBC QN AUTO: 30.7 PG (ref 27–31)
MCH RBC QN AUTO: 30.9 PG (ref 27–31)
MCHC RBC AUTO-ENTMCNC: 30.5 G/DL (ref 32–36)
MCHC RBC AUTO-ENTMCNC: 31 G/DL (ref 32–36)
MCV RBC AUTO: 100 FL (ref 82–98)
MCV RBC AUTO: 100 FL (ref 82–98)
MONOCYTES # BLD AUTO: 0.3 K/UL (ref 0.3–1)
MONOCYTES # BLD AUTO: 0.4 K/UL (ref 0.3–1)
MONOCYTES NFR BLD: 4.1 % (ref 4–15)
MONOCYTES NFR BLD: 4.6 % (ref 4–15)
MV MEAN GRADIENT: 1 MMHG
MV PEAK A VEL: 1.58 M/S
MV PEAK E VEL: 1.61 M/S
MV PEAK GRADIENT: 19 MMHG
NEUTROPHILS # BLD AUTO: 6.5 K/UL (ref 1.8–7.7)
NEUTROPHILS # BLD AUTO: 7 K/UL (ref 1.8–7.7)
NEUTROPHILS NFR BLD: 79.3 % (ref 38–73)
NEUTROPHILS NFR BLD: 83.2 % (ref 38–73)
NRBC BLD-RTO: 0 /100 WBC
NRBC BLD-RTO: 0 /100 WBC
PHOSPHATE SERPL-MCNC: 5.6 MG/DL (ref 2.7–4.5)
PISA TR MAX VEL: 4.15 M/S
PLATELET # BLD AUTO: 246 K/UL (ref 150–450)
PLATELET # BLD AUTO: 282 K/UL (ref 150–450)
PMV BLD AUTO: 9.3 FL (ref 9.2–12.9)
PMV BLD AUTO: 9.8 FL (ref 9.2–12.9)
POTASSIUM SERPL-SCNC: 3.8 MMOL/L (ref 3.5–5.1)
PROT SERPL-MCNC: 6.2 G/DL (ref 6–8.4)
PV MEAN GRADIENT: 2 MMHG
PV PEAK VELOCITY: 1.41 CM/S
RA MAJOR: 5.77 CM
RA PRESSURE: 15 MMHG
RA WIDTH: 4.98 CM
RBC # BLD AUTO: 2.43 M/UL (ref 4–5.4)
RBC # BLD AUTO: 2.61 M/UL (ref 4–5.4)
RV TISSUE DOPPLER FREE WALL SYSTOLIC VELOCITY 1 (APICAL 4 CHAMBER VIEW): 14.11 CM/S
SINUS: 3.14 CM
SODIUM SERPL-SCNC: 143 MMOL/L (ref 136–145)
STJ: 2.61 CM
TDI LATERAL: 0.08 M/S
TDI SEPTAL: 0.04 M/S
TDI: 0.06 M/S
TR MAX PG: 69 MMHG
TRICUSPID ANNULAR PLANE SYSTOLIC EXCURSION: 2.19 CM
TV REST PULMONARY ARTERY PRESSURE: 84 MMHG
WBC # BLD AUTO: 8.13 K/UL (ref 3.9–12.7)
WBC # BLD AUTO: 8.39 K/UL (ref 3.9–12.7)

## 2021-05-10 PROCEDURE — 84100 ASSAY OF PHOSPHORUS: CPT | Performed by: HOSPITALIST

## 2021-05-10 PROCEDURE — 85025 COMPLETE CBC W/AUTO DIFF WBC: CPT | Mod: 91 | Performed by: HOSPITALIST

## 2021-05-10 PROCEDURE — C9113 INJ PANTOPRAZOLE SODIUM, VIA: HCPCS | Performed by: HOSPITALIST

## 2021-05-10 PROCEDURE — 99232 PR SUBSEQUENT HOSPITAL CARE,LEVL II: ICD-10-PCS | Mod: GC,,, | Performed by: STUDENT IN AN ORGANIZED HEALTH CARE EDUCATION/TRAINING PROGRAM

## 2021-05-10 PROCEDURE — 11000001 HC ACUTE MED/SURG PRIVATE ROOM

## 2021-05-10 PROCEDURE — 99232 SBSQ HOSP IP/OBS MODERATE 35: CPT | Mod: GC,,, | Performed by: STUDENT IN AN ORGANIZED HEALTH CARE EDUCATION/TRAINING PROGRAM

## 2021-05-10 PROCEDURE — 63600175 PHARM REV CODE 636 W HCPCS: Performed by: HOSPITALIST

## 2021-05-10 PROCEDURE — 25000003 PHARM REV CODE 250: Performed by: HOSPITALIST

## 2021-05-10 PROCEDURE — 80053 COMPREHEN METABOLIC PANEL: CPT | Performed by: HOSPITALIST

## 2021-05-10 PROCEDURE — 63600175 PHARM REV CODE 636 W HCPCS: Performed by: PHYSICIAN ASSISTANT

## 2021-05-10 PROCEDURE — 83735 ASSAY OF MAGNESIUM: CPT | Performed by: HOSPITALIST

## 2021-05-10 PROCEDURE — 25000003 PHARM REV CODE 250: Performed by: STUDENT IN AN ORGANIZED HEALTH CARE EDUCATION/TRAINING PROGRAM

## 2021-05-10 RX ORDER — DICLOFENAC SODIUM 10 MG/G
2 GEL TOPICAL 4 TIMES DAILY
Status: DISCONTINUED | OUTPATIENT
Start: 2021-05-10 | End: 2021-05-12 | Stop reason: HOSPADM

## 2021-05-10 RX ORDER — TALC
6 POWDER (GRAM) TOPICAL NIGHTLY PRN
Status: DISCONTINUED | OUTPATIENT
Start: 2021-05-10 | End: 2021-05-10

## 2021-05-10 RX ORDER — HYDROCORTISONE 1 %
CREAM (GRAM) TOPICAL 2 TIMES DAILY
Status: DISCONTINUED | OUTPATIENT
Start: 2021-05-10 | End: 2021-05-12 | Stop reason: HOSPADM

## 2021-05-10 RX ORDER — HYDRALAZINE HYDROCHLORIDE 25 MG/1
100 TABLET, FILM COATED ORAL EVERY 8 HOURS
Status: DISCONTINUED | OUTPATIENT
Start: 2021-05-10 | End: 2021-05-12 | Stop reason: HOSPADM

## 2021-05-10 RX ADMIN — NIFEDIPINE 90 MG: 30 TABLET, FILM COATED, EXTENDED RELEASE ORAL at 08:05

## 2021-05-10 RX ADMIN — DICLOFENAC SODIUM 2 G: 10 GEL TOPICAL at 08:05

## 2021-05-10 RX ADMIN — CALCITRIOL CAPSULES 0.25 MCG 0.25 MCG: 0.25 CAPSULE ORAL at 08:05

## 2021-05-10 RX ADMIN — CARVEDILOL 25 MG: 12.5 TABLET, FILM COATED ORAL at 08:05

## 2021-05-10 RX ADMIN — ACETAMINOPHEN 650 MG: 325 TABLET ORAL at 08:05

## 2021-05-10 RX ADMIN — SODIUM BICARBONATE TAB 325 MG 1300 MG: 325 TAB at 08:05

## 2021-05-10 RX ADMIN — ACETAMINOPHEN 650 MG: 325 TABLET ORAL at 03:05

## 2021-05-10 RX ADMIN — CLONIDINE 1 PATCH: 0.1 PATCH TRANSDERMAL at 09:05

## 2021-05-10 RX ADMIN — SODIUM BICARBONATE TAB 325 MG 1300 MG: 325 TAB at 02:05

## 2021-05-10 RX ADMIN — PANTOPRAZOLE SODIUM 40 MG: 40 INJECTION, POWDER, FOR SOLUTION INTRAVENOUS at 08:05

## 2021-05-10 RX ADMIN — HYDRALAZINE HYDROCHLORIDE 75 MG: 25 TABLET ORAL at 05:05

## 2021-05-10 RX ADMIN — MIRTAZAPINE 7.5 MG: 7.5 TABLET ORAL at 09:05

## 2021-05-10 RX ADMIN — HYDRALAZINE HYDROCHLORIDE 75 MG: 25 TABLET ORAL at 02:05

## 2021-05-10 RX ADMIN — DICLOFENAC SODIUM 2 G: 10 GEL TOPICAL at 05:05

## 2021-05-10 RX ADMIN — EPOETIN ALFA-EPBX 20000 UNITS: 10000 INJECTION, SOLUTION INTRAVENOUS; SUBCUTANEOUS at 05:05

## 2021-05-10 RX ADMIN — HYDRALAZINE HYDROCHLORIDE 100 MG: 25 TABLET, FILM COATED ORAL at 09:05

## 2021-05-10 RX ADMIN — HYDROCORTISONE: 0.01 CREAM TOPICAL at 10:05

## 2021-05-11 LAB
25(OH)D3+25(OH)D2 SERPL-MCNC: 25 NG/ML (ref 30–96)
ALBUMIN SERPL BCP-MCNC: 2.9 G/DL (ref 3.5–5.2)
ALP SERPL-CCNC: 53 U/L (ref 55–135)
ALT SERPL W/O P-5'-P-CCNC: 5 U/L (ref 10–44)
ANION GAP SERPL CALC-SCNC: 14 MMOL/L (ref 8–16)
AST SERPL-CCNC: 11 U/L (ref 10–40)
BASOPHILS # BLD AUTO: 0.06 K/UL (ref 0–0.2)
BASOPHILS NFR BLD: 0.7 % (ref 0–1.9)
BILIRUB SERPL-MCNC: 0.5 MG/DL (ref 0.1–1)
BUN SERPL-MCNC: 48 MG/DL (ref 8–23)
CALCIUM SERPL-MCNC: 9.6 MG/DL (ref 8.7–10.5)
CHLORIDE SERPL-SCNC: 104 MMOL/L (ref 95–110)
CK SERPL-CCNC: 35 U/L (ref 20–180)
CO2 SERPL-SCNC: 23 MMOL/L (ref 23–29)
CREAT SERPL-MCNC: 5.7 MG/DL (ref 0.5–1.4)
DIFFERENTIAL METHOD: ABNORMAL
EOSINOPHIL # BLD AUTO: 0.2 K/UL (ref 0–0.5)
EOSINOPHIL NFR BLD: 2.5 % (ref 0–8)
ERYTHROCYTE [DISTWIDTH] IN BLOOD BY AUTOMATED COUNT: 15.8 % (ref 11.5–14.5)
EST. GFR  (AFRICAN AMERICAN): 7 ML/MIN/1.73 M^2
EST. GFR  (NON AFRICAN AMERICAN): 6 ML/MIN/1.73 M^2
GLUCOSE SERPL-MCNC: 59 MG/DL (ref 70–110)
HCT VFR BLD AUTO: 25.8 % (ref 37–48.5)
HGB BLD-MCNC: 8.1 G/DL (ref 12–16)
IMM GRANULOCYTES # BLD AUTO: 0.07 K/UL (ref 0–0.04)
IMM GRANULOCYTES NFR BLD AUTO: 0.8 % (ref 0–0.5)
LYMPHOCYTES # BLD AUTO: 1.3 K/UL (ref 1–4.8)
LYMPHOCYTES NFR BLD: 15.4 % (ref 18–48)
MAGNESIUM SERPL-MCNC: 1.5 MG/DL (ref 1.6–2.6)
MCH RBC QN AUTO: 30.6 PG (ref 27–31)
MCHC RBC AUTO-ENTMCNC: 31.4 G/DL (ref 32–36)
MCV RBC AUTO: 97 FL (ref 82–98)
MONOCYTES # BLD AUTO: 0.4 K/UL (ref 0.3–1)
MONOCYTES NFR BLD: 5.1 % (ref 4–15)
NEUTROPHILS # BLD AUTO: 6.4 K/UL (ref 1.8–7.7)
NEUTROPHILS NFR BLD: 75.5 % (ref 38–73)
NRBC BLD-RTO: 0 /100 WBC
PHOSPHATE SERPL-MCNC: 4.7 MG/DL (ref 2.7–4.5)
PLATELET # BLD AUTO: 265 K/UL (ref 150–450)
PMV BLD AUTO: 9.7 FL (ref 9.2–12.9)
POTASSIUM SERPL-SCNC: 3.7 MMOL/L (ref 3.5–5.1)
PROT SERPL-MCNC: 6.4 G/DL (ref 6–8.4)
PTH-INTACT SERPL-MCNC: 329.8 PG/ML (ref 9–77)
RBC # BLD AUTO: 2.65 M/UL (ref 4–5.4)
SODIUM SERPL-SCNC: 141 MMOL/L (ref 136–145)
URATE SERPL-MCNC: 9.3 MG/DL (ref 2.4–5.7)
WBC # BLD AUTO: 8.5 K/UL (ref 3.9–12.7)

## 2021-05-11 PROCEDURE — 82550 ASSAY OF CK (CPK): CPT | Performed by: PHYSICIAN ASSISTANT

## 2021-05-11 PROCEDURE — 36415 COLL VENOUS BLD VENIPUNCTURE: CPT | Performed by: PHYSICIAN ASSISTANT

## 2021-05-11 PROCEDURE — 82306 VITAMIN D 25 HYDROXY: CPT | Performed by: PHYSICIAN ASSISTANT

## 2021-05-11 PROCEDURE — 85025 COMPLETE CBC W/AUTO DIFF WBC: CPT | Performed by: HOSPITALIST

## 2021-05-11 PROCEDURE — 84550 ASSAY OF BLOOD/URIC ACID: CPT | Performed by: PHYSICIAN ASSISTANT

## 2021-05-11 PROCEDURE — 83970 ASSAY OF PARATHORMONE: CPT | Performed by: PHYSICIAN ASSISTANT

## 2021-05-11 PROCEDURE — 99223 1ST HOSP IP/OBS HIGH 75: CPT | Mod: ,,, | Performed by: NURSE PRACTITIONER

## 2021-05-11 PROCEDURE — 99497 ADVNCD CARE PLAN 30 MIN: CPT | Mod: 25,,, | Performed by: NURSE PRACTITIONER

## 2021-05-11 PROCEDURE — 94761 N-INVAS EAR/PLS OXIMETRY MLT: CPT

## 2021-05-11 PROCEDURE — 80053 COMPREHEN METABOLIC PANEL: CPT | Performed by: HOSPITALIST

## 2021-05-11 PROCEDURE — 99232 PR SUBSEQUENT HOSPITAL CARE,LEVL II: ICD-10-PCS | Mod: ,,, | Performed by: NURSE PRACTITIONER

## 2021-05-11 PROCEDURE — 25000003 PHARM REV CODE 250: Performed by: STUDENT IN AN ORGANIZED HEALTH CARE EDUCATION/TRAINING PROGRAM

## 2021-05-11 PROCEDURE — 99232 SBSQ HOSP IP/OBS MODERATE 35: CPT | Mod: ,,, | Performed by: NURSE PRACTITIONER

## 2021-05-11 PROCEDURE — 11000001 HC ACUTE MED/SURG PRIVATE ROOM

## 2021-05-11 PROCEDURE — 99223 PR INITIAL HOSPITAL CARE,LEVL III: ICD-10-PCS | Mod: ,,, | Performed by: NURSE PRACTITIONER

## 2021-05-11 PROCEDURE — 99497 PR ADVNCD CARE PLAN 30 MIN: ICD-10-PCS | Mod: 25,,, | Performed by: NURSE PRACTITIONER

## 2021-05-11 PROCEDURE — 25000003 PHARM REV CODE 250: Performed by: HOSPITALIST

## 2021-05-11 PROCEDURE — 63600175 PHARM REV CODE 636 W HCPCS: Performed by: HOSPITALIST

## 2021-05-11 PROCEDURE — 83735 ASSAY OF MAGNESIUM: CPT | Performed by: HOSPITALIST

## 2021-05-11 PROCEDURE — C9113 INJ PANTOPRAZOLE SODIUM, VIA: HCPCS | Performed by: HOSPITALIST

## 2021-05-11 PROCEDURE — 84100 ASSAY OF PHOSPHORUS: CPT | Performed by: HOSPITALIST

## 2021-05-11 RX ORDER — PANTOPRAZOLE SODIUM 40 MG/1
40 TABLET, DELAYED RELEASE ORAL DAILY
Status: DISCONTINUED | OUTPATIENT
Start: 2021-05-12 | End: 2021-05-12 | Stop reason: HOSPADM

## 2021-05-11 RX ADMIN — HYDRALAZINE HYDROCHLORIDE 100 MG: 25 TABLET, FILM COATED ORAL at 09:05

## 2021-05-11 RX ADMIN — NIFEDIPINE 90 MG: 30 TABLET, FILM COATED, EXTENDED RELEASE ORAL at 10:05

## 2021-05-11 RX ADMIN — HYDROCORTISONE: 0.01 CREAM TOPICAL at 10:05

## 2021-05-11 RX ADMIN — SODIUM BICARBONATE TAB 325 MG 1300 MG: 325 TAB at 08:05

## 2021-05-11 RX ADMIN — DICLOFENAC SODIUM 2 G: 10 GEL TOPICAL at 10:05

## 2021-05-11 RX ADMIN — HYDROCORTISONE: 0.01 CREAM TOPICAL at 08:05

## 2021-05-11 RX ADMIN — SODIUM BICARBONATE TAB 325 MG 1300 MG: 325 TAB at 10:05

## 2021-05-11 RX ADMIN — SODIUM BICARBONATE TAB 325 MG 1300 MG: 325 TAB at 04:05

## 2021-05-11 RX ADMIN — HYDRALAZINE HYDROCHLORIDE 100 MG: 25 TABLET, FILM COATED ORAL at 05:05

## 2021-05-11 RX ADMIN — HYDRALAZINE HYDROCHLORIDE 100 MG: 25 TABLET, FILM COATED ORAL at 02:05

## 2021-05-11 RX ADMIN — DICLOFENAC SODIUM 2 G: 10 GEL TOPICAL at 02:05

## 2021-05-11 RX ADMIN — DICLOFENAC SODIUM 2 G: 10 GEL TOPICAL at 08:05

## 2021-05-11 RX ADMIN — CARVEDILOL 25 MG: 12.5 TABLET, FILM COATED ORAL at 10:05

## 2021-05-11 RX ADMIN — CARVEDILOL 25 MG: 12.5 TABLET, FILM COATED ORAL at 08:05

## 2021-05-11 RX ADMIN — ACETAMINOPHEN 650 MG: 325 TABLET ORAL at 06:05

## 2021-05-11 RX ADMIN — DICLOFENAC SODIUM 2 G: 10 GEL TOPICAL at 04:05

## 2021-05-12 VITALS
TEMPERATURE: 98 F | OXYGEN SATURATION: 99 % | DIASTOLIC BLOOD PRESSURE: 65 MMHG | WEIGHT: 110 LBS | HEART RATE: 70 BPM | BODY MASS INDEX: 19.49 KG/M2 | HEIGHT: 63 IN | SYSTOLIC BLOOD PRESSURE: 149 MMHG | RESPIRATION RATE: 18 BRPM

## 2021-05-12 PROBLEM — N17.9 AKI (ACUTE KIDNEY INJURY): Status: RESOLVED | Noted: 2021-03-20 | Resolved: 2021-05-12

## 2021-05-12 PROBLEM — K92.2 ACUTE LOWER GI BLEEDING: Status: RESOLVED | Noted: 2021-05-09 | Resolved: 2021-05-12

## 2021-05-12 LAB
ALBUMIN SERPL BCP-MCNC: 2.7 G/DL (ref 3.5–5.2)
ANION GAP SERPL CALC-SCNC: 12 MMOL/L (ref 8–16)
BACTERIA #/AREA URNS HPF: ABNORMAL /HPF
BILIRUB UR QL STRIP: NEGATIVE
BUN SERPL-MCNC: 43 MG/DL (ref 8–23)
CALCIUM SERPL-MCNC: 9.1 MG/DL (ref 8.7–10.5)
CHLORIDE SERPL-SCNC: 106 MMOL/L (ref 95–110)
CLARITY UR: ABNORMAL
CO2 SERPL-SCNC: 24 MMOL/L (ref 23–29)
COLOR UR: YELLOW
CREAT SERPL-MCNC: 5.6 MG/DL (ref 0.5–1.4)
CREAT UR-MCNC: 39.1 MG/DL (ref 15–325)
EST. GFR  (AFRICAN AMERICAN): 7 ML/MIN/1.73 M^2
EST. GFR  (NON AFRICAN AMERICAN): 6 ML/MIN/1.73 M^2
GLUCOSE SERPL-MCNC: 77 MG/DL (ref 70–110)
GLUCOSE UR QL STRIP: NEGATIVE
HGB UR QL STRIP: NEGATIVE
HYALINE CASTS #/AREA URNS LPF: 0 /LPF
KETONES UR QL STRIP: NEGATIVE
LEUKOCYTE ESTERASE UR QL STRIP: ABNORMAL
MICROSCOPIC COMMENT: ABNORMAL
NITRITE UR QL STRIP: NEGATIVE
PH UR STRIP: 7 [PH] (ref 5–8)
PHOSPHATE SERPL-MCNC: 4.7 MG/DL (ref 2.7–4.5)
POTASSIUM SERPL-SCNC: 3.5 MMOL/L (ref 3.5–5.1)
PROT UR QL STRIP: ABNORMAL
RBC #/AREA URNS HPF: 0 /HPF (ref 0–4)
SODIUM SERPL-SCNC: 142 MMOL/L (ref 136–145)
SODIUM UR-SCNC: 51 MMOL/L (ref 20–250)
SP GR UR STRIP: 1.01 (ref 1–1.03)
SQUAMOUS #/AREA URNS HPF: 1 /HPF
URN SPEC COLLECT METH UR: ABNORMAL
UROBILINOGEN UR STRIP-ACNC: NEGATIVE EU/DL
WBC #/AREA URNS HPF: 3 /HPF (ref 0–5)
WBC CLUMPS URNS QL MICRO: ABNORMAL

## 2021-05-12 PROCEDURE — 82570 ASSAY OF URINE CREATININE: CPT | Performed by: PHYSICIAN ASSISTANT

## 2021-05-12 PROCEDURE — 80069 RENAL FUNCTION PANEL: CPT | Performed by: INTERNAL MEDICINE

## 2021-05-12 PROCEDURE — 25000003 PHARM REV CODE 250: Performed by: STUDENT IN AN ORGANIZED HEALTH CARE EDUCATION/TRAINING PROGRAM

## 2021-05-12 PROCEDURE — 36415 COLL VENOUS BLD VENIPUNCTURE: CPT | Performed by: INTERNAL MEDICINE

## 2021-05-12 PROCEDURE — 25000003 PHARM REV CODE 250: Performed by: HOSPITALIST

## 2021-05-12 PROCEDURE — 81000 URINALYSIS NONAUTO W/SCOPE: CPT | Performed by: PHYSICIAN ASSISTANT

## 2021-05-12 PROCEDURE — 25000003 PHARM REV CODE 250: Performed by: INTERNAL MEDICINE

## 2021-05-12 PROCEDURE — 84300 ASSAY OF URINE SODIUM: CPT | Performed by: PHYSICIAN ASSISTANT

## 2021-05-12 RX ORDER — HYDRALAZINE HYDROCHLORIDE 20 MG/ML
10 INJECTION INTRAMUSCULAR; INTRAVENOUS EVERY 8 HOURS PRN
Status: DISCONTINUED | OUTPATIENT
Start: 2021-05-12 | End: 2021-05-12 | Stop reason: HOSPADM

## 2021-05-12 RX ORDER — FUROSEMIDE 20 MG/1
60 TABLET ORAL DAILY
Status: ON HOLD | COMMUNITY
End: 2021-06-28 | Stop reason: HOSPADM

## 2021-05-12 RX ORDER — DEXTROMETHORPHAN HYDROBROMIDE, GUAIFENESIN 5; 100 MG/5ML; MG/5ML
650 LIQUID ORAL EVERY 8 HOURS PRN
Status: ON HOLD | COMMUNITY
End: 2021-06-28 | Stop reason: HOSPADM

## 2021-05-12 RX ADMIN — HYDRALAZINE HYDROCHLORIDE 100 MG: 25 TABLET, FILM COATED ORAL at 03:05

## 2021-05-12 RX ADMIN — DICLOFENAC SODIUM 2 G: 10 GEL TOPICAL at 04:05

## 2021-05-12 RX ADMIN — SODIUM BICARBONATE TAB 325 MG 1300 MG: 325 TAB at 03:05

## 2021-05-12 RX ADMIN — CARVEDILOL 25 MG: 12.5 TABLET, FILM COATED ORAL at 08:05

## 2021-05-12 RX ADMIN — DICLOFENAC SODIUM 2 G: 10 GEL TOPICAL at 02:05

## 2021-05-12 RX ADMIN — NIFEDIPINE 90 MG: 30 TABLET, FILM COATED, EXTENDED RELEASE ORAL at 08:05

## 2021-05-12 RX ADMIN — SODIUM BICARBONATE TAB 325 MG 1300 MG: 325 TAB at 08:05

## 2021-05-12 RX ADMIN — FUROSEMIDE 60 MG: 40 TABLET ORAL at 11:05

## 2021-05-12 RX ADMIN — DICLOFENAC SODIUM 2 G: 10 GEL TOPICAL at 08:05

## 2021-05-12 RX ADMIN — HYDROCORTISONE: 0.01 CREAM TOPICAL at 08:05

## 2021-05-12 RX ADMIN — PANTOPRAZOLE SODIUM 40 MG: 40 TABLET, DELAYED RELEASE ORAL at 08:05

## 2021-05-12 RX ADMIN — HYDRALAZINE HYDROCHLORIDE 100 MG: 25 TABLET, FILM COATED ORAL at 05:05

## 2021-05-17 ENCOUNTER — HOSPITAL ENCOUNTER (EMERGENCY)
Facility: HOSPITAL | Age: 86
Discharge: HOME | End: 2021-05-17
Attending: EMERGENCY MEDICINE
Payer: MEDICARE

## 2021-05-17 VITALS
WEIGHT: 160 LBS | HEIGHT: 66 IN | DIASTOLIC BLOOD PRESSURE: 60 MMHG | SYSTOLIC BLOOD PRESSURE: 138 MMHG | HEART RATE: 77 BPM | OXYGEN SATURATION: 98 % | TEMPERATURE: 99 F | BODY MASS INDEX: 25.71 KG/M2 | RESPIRATION RATE: 16 BRPM

## 2021-05-17 DIAGNOSIS — S12.100K CLOSED ODONTOID FRACTURE WITH NONUNION, SUBSEQUENT ENCOUNTER: ICD-10-CM

## 2021-05-17 DIAGNOSIS — M54.2 NECK PAIN: Primary | ICD-10-CM

## 2021-05-17 PROCEDURE — 25000003 PHARM REV CODE 250: Performed by: EMERGENCY MEDICINE

## 2021-05-17 PROCEDURE — 99283 EMERGENCY DEPT VISIT LOW MDM: CPT | Mod: 25

## 2021-05-17 RX ORDER — HYDROCODONE BITARTRATE AND ACETAMINOPHEN 5; 325 MG/1; MG/1
1 TABLET ORAL
Qty: 3 TABLET | Refills: 0 | Status: SHIPPED | OUTPATIENT
Start: 2021-05-17 | End: 2021-05-20

## 2021-05-17 RX ORDER — ACETAMINOPHEN 325 MG/1
650 TABLET ORAL
Status: COMPLETED | OUTPATIENT
Start: 2021-05-17 | End: 2021-05-17

## 2021-05-17 RX ORDER — METHOCARBAMOL 500 MG/1
500 TABLET, FILM COATED ORAL
Status: COMPLETED | OUTPATIENT
Start: 2021-05-17 | End: 2021-05-17

## 2021-05-17 RX ADMIN — METHOCARBAMOL 500 MG: 500 TABLET ORAL at 12:05

## 2021-05-17 RX ADMIN — ACETAMINOPHEN 650 MG: 325 TABLET ORAL at 12:05

## 2021-06-01 PROCEDURE — 36430 TRANSFUSION BLD/BLD COMPNT: CPT

## 2021-06-01 PROCEDURE — 96374 THER/PROPH/DIAG INJ IV PUSH: CPT

## 2021-06-01 PROCEDURE — 99291 CRITICAL CARE FIRST HOUR: CPT | Mod: 25

## 2021-06-01 PROCEDURE — 96372 THER/PROPH/DIAG INJ SC/IM: CPT | Mod: 59

## 2021-06-01 PROCEDURE — 96375 TX/PRO/DX INJ NEW DRUG ADDON: CPT

## 2021-06-02 ENCOUNTER — HOSPITAL ENCOUNTER (OUTPATIENT)
Facility: HOSPITAL | Age: 86
End: 2021-06-02
Attending: EMERGENCY MEDICINE | Admitting: EMERGENCY MEDICINE
Payer: MEDICARE

## 2021-06-02 VITALS
OXYGEN SATURATION: 100 % | BODY MASS INDEX: 25.71 KG/M2 | WEIGHT: 160 LBS | HEART RATE: 81 BPM | RESPIRATION RATE: 37 BRPM | HEIGHT: 66 IN | TEMPERATURE: 99 F | SYSTOLIC BLOOD PRESSURE: 147 MMHG | DIASTOLIC BLOOD PRESSURE: 67 MMHG

## 2021-06-02 DIAGNOSIS — N18.5 ANEMIA IN STAGE 5 CHRONIC KIDNEY DISEASE, NOT ON CHRONIC DIALYSIS: Primary | ICD-10-CM

## 2021-06-02 DIAGNOSIS — N18.5 CKD (CHRONIC KIDNEY DISEASE), SYMPTOM MANAGEMENT ONLY, STAGE 5: ICD-10-CM

## 2021-06-02 DIAGNOSIS — D63.1 ANEMIA IN STAGE 5 CHRONIC KIDNEY DISEASE, NOT ON CHRONIC DIALYSIS: Primary | ICD-10-CM

## 2021-06-02 DIAGNOSIS — I50.32 CHRONIC DIASTOLIC HEART FAILURE: ICD-10-CM

## 2021-06-02 DIAGNOSIS — D64.9 ANEMIA, UNSPECIFIED TYPE: ICD-10-CM

## 2021-06-02 DIAGNOSIS — N39.0 COMPLICATED UTI (URINARY TRACT INFECTION): ICD-10-CM

## 2021-06-02 DIAGNOSIS — Z99.2 ESRD (END STAGE RENAL DISEASE) ON DIALYSIS: ICD-10-CM

## 2021-06-02 DIAGNOSIS — D63.1 ANEMIA DUE TO STAGE 5 CHRONIC KIDNEY DISEASE, NOT ON CHRONIC DIALYSIS: ICD-10-CM

## 2021-06-02 DIAGNOSIS — R41.89 COGNITIVE IMPAIRMENT: ICD-10-CM

## 2021-06-02 DIAGNOSIS — N18.6 ESRD (END STAGE RENAL DISEASE) ON DIALYSIS: ICD-10-CM

## 2021-06-02 DIAGNOSIS — N18.5 STAGE 5 CHRONIC KIDNEY DISEASE NOT ON CHRONIC DIALYSIS: ICD-10-CM

## 2021-06-02 DIAGNOSIS — N18.5 ANEMIA DUE TO STAGE 5 CHRONIC KIDNEY DISEASE, NOT ON CHRONIC DIALYSIS: ICD-10-CM

## 2021-06-02 DIAGNOSIS — E11.9 TYPE 2 DIABETES MELLITUS WITHOUT COMPLICATION, WITH LONG-TERM CURRENT USE OF INSULIN: ICD-10-CM

## 2021-06-02 DIAGNOSIS — I10 MALIGNANT HYPERTENSION: ICD-10-CM

## 2021-06-02 DIAGNOSIS — Z79.4 TYPE 2 DIABETES MELLITUS WITHOUT COMPLICATION, WITH LONG-TERM CURRENT USE OF INSULIN: ICD-10-CM

## 2021-06-02 PROBLEM — M54.12 CERVICAL RADICULOPATHY: Status: ACTIVE | Noted: 2021-06-02

## 2021-06-02 LAB
ABO + RH BLD: NORMAL
ALBUMIN SERPL BCP-MCNC: 2.7 G/DL (ref 3.5–5.2)
ALP SERPL-CCNC: 54 U/L (ref 55–135)
ALT SERPL W/O P-5'-P-CCNC: 8 U/L (ref 10–44)
ANION GAP SERPL CALC-SCNC: 13 MMOL/L (ref 8–16)
ANION GAP SERPL CALC-SCNC: 17 MMOL/L (ref 8–16)
APTT BLDCRRT: 29 SEC (ref 21–32)
AST SERPL-CCNC: 27 U/L (ref 10–40)
BASOPHILS # BLD AUTO: 0.05 K/UL (ref 0–0.2)
BASOPHILS NFR BLD: 0.5 % (ref 0–1.9)
BILIRUB SERPL-MCNC: 0.4 MG/DL (ref 0.1–1)
BLD GP AB SCN CELLS X3 SERPL QL: NORMAL
BLD PROD TYP BPU: NORMAL
BLOOD UNIT EXPIRATION DATE: NORMAL
BLOOD UNIT TYPE CODE: 5100
BLOOD UNIT TYPE: NORMAL
BUN SERPL-MCNC: 75 MG/DL (ref 8–23)
BUN SERPL-MCNC: 76 MG/DL (ref 8–23)
CALCIUM SERPL-MCNC: 9 MG/DL (ref 8.7–10.5)
CALCIUM SERPL-MCNC: 9.1 MG/DL (ref 8.7–10.5)
CHLORIDE SERPL-SCNC: 101 MMOL/L (ref 95–110)
CHLORIDE SERPL-SCNC: 102 MMOL/L (ref 95–110)
CO2 SERPL-SCNC: 22 MMOL/L (ref 23–29)
CO2 SERPL-SCNC: 25 MMOL/L (ref 23–29)
CODING SYSTEM: NORMAL
CREAT SERPL-MCNC: 6.6 MG/DL (ref 0.5–1.4)
CREAT SERPL-MCNC: 6.8 MG/DL (ref 0.5–1.4)
CTP QC/QA: YES
DIFFERENTIAL METHOD: ABNORMAL
DISPENSE STATUS: NORMAL
EOSINOPHIL # BLD AUTO: 0.2 K/UL (ref 0–0.5)
EOSINOPHIL NFR BLD: 2 % (ref 0–8)
ERYTHROCYTE [DISTWIDTH] IN BLOOD BY AUTOMATED COUNT: 17.6 % (ref 11.5–14.5)
EST. GFR  (AFRICAN AMERICAN): 6 ML/MIN/1.73 M^2
EST. GFR  (AFRICAN AMERICAN): 6 ML/MIN/1.73 M^2
EST. GFR  (NON AFRICAN AMERICAN): 5 ML/MIN/1.73 M^2
EST. GFR  (NON AFRICAN AMERICAN): 5 ML/MIN/1.73 M^2
GLUCOSE SERPL-MCNC: 102 MG/DL (ref 70–110)
GLUCOSE SERPL-MCNC: 114 MG/DL (ref 70–110)
HCT VFR BLD AUTO: 22.5 % (ref 37–48.5)
HGB BLD-MCNC: 6.7 G/DL (ref 12–16)
HGB BLD-MCNC: 7.5 G/DL (ref 12–16)
IMM GRANULOCYTES # BLD AUTO: 0.07 K/UL (ref 0–0.04)
IMM GRANULOCYTES NFR BLD AUTO: 0.8 % (ref 0–0.5)
INR PPP: 1.2 (ref 0.8–1.2)
LYMPHOCYTES # BLD AUTO: 1 K/UL (ref 1–4.8)
LYMPHOCYTES NFR BLD: 11.1 % (ref 18–48)
MCH RBC QN AUTO: 29.4 PG (ref 27–31)
MCHC RBC AUTO-ENTMCNC: 29.8 G/DL (ref 32–36)
MCV RBC AUTO: 99 FL (ref 82–98)
MONOCYTES # BLD AUTO: 0.4 K/UL (ref 0.3–1)
MONOCYTES NFR BLD: 4.8 % (ref 4–15)
NEUTROPHILS # BLD AUTO: 7.5 K/UL (ref 1.8–7.7)
NEUTROPHILS NFR BLD: 80.8 % (ref 38–73)
NRBC BLD-RTO: 0 /100 WBC
PLATELET # BLD AUTO: 299 K/UL (ref 150–450)
PMV BLD AUTO: 10.1 FL (ref 9.2–12.9)
POTASSIUM SERPL-SCNC: 3.6 MMOL/L (ref 3.5–5.1)
POTASSIUM SERPL-SCNC: 4.6 MMOL/L (ref 3.5–5.1)
PROT SERPL-MCNC: 7 G/DL (ref 6–8.4)
PROTHROMBIN TIME: 12.2 SEC (ref 9–12.5)
RBC # BLD AUTO: 2.28 M/UL (ref 4–5.4)
SARS-COV-2 RDRP RESP QL NAA+PROBE: NEGATIVE
SODIUM SERPL-SCNC: 140 MMOL/L (ref 136–145)
SODIUM SERPL-SCNC: 140 MMOL/L (ref 136–145)
TRANS ERYTHROCYTES VOL PATIENT: NORMAL ML
WBC # BLD AUTO: 9.22 K/UL (ref 3.9–12.7)

## 2021-06-02 PROCEDURE — 80053 COMPREHEN METABOLIC PANEL: CPT | Performed by: EMERGENCY MEDICINE

## 2021-06-02 PROCEDURE — 86900 BLOOD TYPING SEROLOGIC ABO: CPT | Performed by: EMERGENCY MEDICINE

## 2021-06-02 PROCEDURE — 63600175 PHARM REV CODE 636 W HCPCS: Mod: JG,EC | Performed by: STUDENT IN AN ORGANIZED HEALTH CARE EDUCATION/TRAINING PROGRAM

## 2021-06-02 PROCEDURE — P9021 RED BLOOD CELLS UNIT: HCPCS | Performed by: EMERGENCY MEDICINE

## 2021-06-02 PROCEDURE — 85018 HEMOGLOBIN: CPT | Mod: 91 | Performed by: NURSE PRACTITIONER

## 2021-06-02 PROCEDURE — 85730 THROMBOPLASTIN TIME PARTIAL: CPT | Performed by: EMERGENCY MEDICINE

## 2021-06-02 PROCEDURE — 25000003 PHARM REV CODE 250: Performed by: NURSE PRACTITIONER

## 2021-06-02 PROCEDURE — 63600175 PHARM REV CODE 636 W HCPCS: Performed by: EMERGENCY MEDICINE

## 2021-06-02 PROCEDURE — 86920 COMPATIBILITY TEST SPIN: CPT | Performed by: EMERGENCY MEDICINE

## 2021-06-02 PROCEDURE — C9113 INJ PANTOPRAZOLE SODIUM, VIA: HCPCS | Performed by: EMERGENCY MEDICINE

## 2021-06-02 PROCEDURE — 85025 COMPLETE CBC W/AUTO DIFF WBC: CPT | Performed by: EMERGENCY MEDICINE

## 2021-06-02 PROCEDURE — 63600175 PHARM REV CODE 636 W HCPCS: Performed by: HOSPITALIST

## 2021-06-02 PROCEDURE — U0002 COVID-19 LAB TEST NON-CDC: HCPCS | Performed by: EMERGENCY MEDICINE

## 2021-06-02 PROCEDURE — 36430 TRANSFUSION BLD/BLD COMPNT: CPT

## 2021-06-02 PROCEDURE — 85610 PROTHROMBIN TIME: CPT | Performed by: EMERGENCY MEDICINE

## 2021-06-02 PROCEDURE — 63600175 PHARM REV CODE 636 W HCPCS: Performed by: NURSE PRACTITIONER

## 2021-06-02 PROCEDURE — 80048 BASIC METABOLIC PNL TOTAL CA: CPT | Performed by: NURSE PRACTITIONER

## 2021-06-02 RX ORDER — HEPARIN SODIUM 5000 [USP'U]/ML
5000 INJECTION, SOLUTION INTRAVENOUS; SUBCUTANEOUS EVERY 8 HOURS
Status: DISCONTINUED | OUTPATIENT
Start: 2021-06-02 | End: 2021-06-02 | Stop reason: HOSPADM

## 2021-06-02 RX ORDER — PANTOPRAZOLE SODIUM 40 MG/10ML
40 INJECTION, POWDER, LYOPHILIZED, FOR SOLUTION INTRAVENOUS
Status: COMPLETED | OUTPATIENT
Start: 2021-06-02 | End: 2021-06-02

## 2021-06-02 RX ORDER — TRAMADOL HYDROCHLORIDE 50 MG/1
50 TABLET ORAL EVERY 8 HOURS PRN
Status: DISCONTINUED | OUTPATIENT
Start: 2021-06-02 | End: 2021-06-02 | Stop reason: HOSPADM

## 2021-06-02 RX ORDER — HYDROCODONE BITARTRATE AND ACETAMINOPHEN 500; 5 MG/1; MG/1
TABLET ORAL
Status: DISCONTINUED | OUTPATIENT
Start: 2021-06-02 | End: 2021-06-02 | Stop reason: HOSPADM

## 2021-06-02 RX ORDER — SODIUM CHLORIDE 0.9 % (FLUSH) 0.9 %
10 SYRINGE (ML) INJECTION
Status: DISCONTINUED | OUTPATIENT
Start: 2021-06-02 | End: 2021-06-02 | Stop reason: HOSPADM

## 2021-06-02 RX ORDER — ACETAMINOPHEN 325 MG/1
650 TABLET ORAL EVERY 4 HOURS PRN
Status: DISCONTINUED | OUTPATIENT
Start: 2021-06-02 | End: 2021-06-02 | Stop reason: HOSPADM

## 2021-06-02 RX ORDER — NIFEDIPINE 30 MG/1
90 TABLET, EXTENDED RELEASE ORAL DAILY
Status: DISCONTINUED | OUTPATIENT
Start: 2021-06-02 | End: 2021-06-02 | Stop reason: HOSPADM

## 2021-06-02 RX ORDER — ONDANSETRON 2 MG/ML
4 INJECTION INTRAMUSCULAR; INTRAVENOUS EVERY 8 HOURS PRN
Status: DISCONTINUED | OUTPATIENT
Start: 2021-06-02 | End: 2021-06-02 | Stop reason: HOSPADM

## 2021-06-02 RX ORDER — CARVEDILOL 12.5 MG/1
25 TABLET ORAL 2 TIMES DAILY
Status: DISCONTINUED | OUTPATIENT
Start: 2021-06-02 | End: 2021-06-02 | Stop reason: HOSPADM

## 2021-06-02 RX ORDER — TALC
6 POWDER (GRAM) TOPICAL NIGHTLY PRN
Status: DISCONTINUED | OUTPATIENT
Start: 2021-06-02 | End: 2021-06-02 | Stop reason: HOSPADM

## 2021-06-02 RX ORDER — HYDRALAZINE HYDROCHLORIDE 25 MG/1
75 TABLET, FILM COATED ORAL EVERY 8 HOURS
Status: DISCONTINUED | OUTPATIENT
Start: 2021-06-02 | End: 2021-06-02 | Stop reason: HOSPADM

## 2021-06-02 RX ADMIN — NIFEDIPINE 90 MG: 30 TABLET, FILM COATED, EXTENDED RELEASE ORAL at 08:06

## 2021-06-02 RX ADMIN — CARVEDILOL 25 MG: 12.5 TABLET, FILM COATED ORAL at 08:06

## 2021-06-02 RX ADMIN — HYDRALAZINE HYDROCHLORIDE 75 MG: 25 TABLET, FILM COATED ORAL at 08:06

## 2021-06-02 RX ADMIN — ONDANSETRON 4 MG: 2 INJECTION INTRAMUSCULAR; INTRAVENOUS at 11:06

## 2021-06-02 RX ADMIN — ERYTHROPOIETIN 20000 UNITS: 20000 INJECTION, SOLUTION INTRAVENOUS; SUBCUTANEOUS at 03:06

## 2021-06-02 RX ADMIN — TRAMADOL HYDROCHLORIDE 50 MG: 50 TABLET, FILM COATED ORAL at 01:06

## 2021-06-02 RX ADMIN — HEPARIN SODIUM 5000 UNITS: 5000 INJECTION INTRAVENOUS; SUBCUTANEOUS at 01:06

## 2021-06-02 RX ADMIN — PANTOPRAZOLE SODIUM 40 MG: 40 INJECTION, POWDER, FOR SOLUTION INTRAVENOUS at 04:06

## 2021-06-02 RX ADMIN — HYDRALAZINE HYDROCHLORIDE 75 MG: 25 TABLET, FILM COATED ORAL at 01:06

## 2021-06-15 ENCOUNTER — OFFICE VISIT (OUTPATIENT)
Dept: NEUROSURGERY | Facility: CLINIC | Age: 86
End: 2021-06-15
Payer: MEDICARE

## 2021-06-15 ENCOUNTER — TELEPHONE (OUTPATIENT)
Dept: PREADMISSION TESTING | Facility: HOSPITAL | Age: 86
End: 2021-06-15

## 2021-06-15 VITALS
TEMPERATURE: 98 F | HEART RATE: 87 BPM | OXYGEN SATURATION: 99 % | WEIGHT: 160.06 LBS | HEIGHT: 66 IN | DIASTOLIC BLOOD PRESSURE: 88 MMHG | SYSTOLIC BLOOD PRESSURE: 136 MMHG | BODY MASS INDEX: 25.72 KG/M2

## 2021-06-15 DIAGNOSIS — G89.29 NECK PAIN, CHRONIC: ICD-10-CM

## 2021-06-15 DIAGNOSIS — M06.38 RHEUMATOID PANNUS OF CERVICAL SPINE: Primary | ICD-10-CM

## 2021-06-15 DIAGNOSIS — M54.2 NECK PAIN, CHRONIC: ICD-10-CM

## 2021-06-15 PROCEDURE — 3288F FALL RISK ASSESSMENT DOCD: CPT | Mod: CPTII,S$GLB,, | Performed by: NEUROLOGICAL SURGERY

## 2021-06-15 PROCEDURE — 99999 PR PBB SHADOW E&M-EST. PATIENT-LVL V: CPT | Mod: PBBFAC,,, | Performed by: NEUROLOGICAL SURGERY

## 2021-06-15 PROCEDURE — 1159F PR MEDICATION LIST DOCUMENTED IN MEDICAL RECORD: ICD-10-PCS | Mod: S$GLB,,, | Performed by: NEUROLOGICAL SURGERY

## 2021-06-15 PROCEDURE — 1125F AMNT PAIN NOTED PAIN PRSNT: CPT | Mod: S$GLB,,, | Performed by: NEUROLOGICAL SURGERY

## 2021-06-15 PROCEDURE — 1101F PT FALLS ASSESS-DOCD LE1/YR: CPT | Mod: CPTII,S$GLB,, | Performed by: NEUROLOGICAL SURGERY

## 2021-06-15 PROCEDURE — 1125F PR PAIN SEVERITY QUANTIFIED, PAIN PRESENT: ICD-10-PCS | Mod: S$GLB,,, | Performed by: NEUROLOGICAL SURGERY

## 2021-06-15 PROCEDURE — 99215 PR OFFICE/OUTPT VISIT, EST, LEVL V, 40-54 MIN: ICD-10-PCS | Mod: S$GLB,,, | Performed by: NEUROLOGICAL SURGERY

## 2021-06-15 PROCEDURE — 3288F PR FALLS RISK ASSESSMENT DOCUMENTED: ICD-10-PCS | Mod: CPTII,S$GLB,, | Performed by: NEUROLOGICAL SURGERY

## 2021-06-15 PROCEDURE — 99999 PR PBB SHADOW E&M-EST. PATIENT-LVL V: ICD-10-PCS | Mod: PBBFAC,,, | Performed by: NEUROLOGICAL SURGERY

## 2021-06-15 PROCEDURE — 1101F PR PT FALLS ASSESS DOC 0-1 FALLS W/OUT INJ PAST YR: ICD-10-PCS | Mod: CPTII,S$GLB,, | Performed by: NEUROLOGICAL SURGERY

## 2021-06-15 PROCEDURE — 1159F MED LIST DOCD IN RCRD: CPT | Mod: S$GLB,,, | Performed by: NEUROLOGICAL SURGERY

## 2021-06-15 PROCEDURE — 99215 OFFICE O/P EST HI 40 MIN: CPT | Mod: S$GLB,,, | Performed by: NEUROLOGICAL SURGERY

## 2021-06-19 ENCOUNTER — HOSPITAL ENCOUNTER (INPATIENT)
Facility: HOSPITAL | Age: 86
LOS: 8 days | Discharge: HOSPICE | DRG: 291 | End: 2021-06-28
Attending: STUDENT IN AN ORGANIZED HEALTH CARE EDUCATION/TRAINING PROGRAM | Admitting: INTERNAL MEDICINE
Payer: MEDICARE

## 2021-06-19 DIAGNOSIS — N30.00 ACUTE CYSTITIS WITHOUT HEMATURIA: ICD-10-CM

## 2021-06-19 DIAGNOSIS — Z51.5 PALLIATIVE CARE ENCOUNTER: ICD-10-CM

## 2021-06-19 DIAGNOSIS — R09.02 HYPOXIA: Primary | ICD-10-CM

## 2021-06-19 DIAGNOSIS — N17.9 ACUTE KIDNEY INJURY: ICD-10-CM

## 2021-06-19 DIAGNOSIS — E87.70 HYPERVOLEMIA, UNSPECIFIED HYPERVOLEMIA TYPE: ICD-10-CM

## 2021-06-19 DIAGNOSIS — R07.9 CHEST PAIN: ICD-10-CM

## 2021-06-19 DIAGNOSIS — R10.9 ABDOMINAL PAIN: ICD-10-CM

## 2021-06-19 DIAGNOSIS — I50.9 CONGESTIVE HEART FAILURE, UNSPECIFIED HF CHRONICITY, UNSPECIFIED HEART FAILURE TYPE: ICD-10-CM

## 2021-06-19 DIAGNOSIS — N18.5 CKD (CHRONIC KIDNEY DISEASE), SYMPTOM MANAGEMENT ONLY, STAGE 5: Chronic | ICD-10-CM

## 2021-06-19 DIAGNOSIS — I50.33 ACUTE ON CHRONIC DIASTOLIC HEART FAILURE: ICD-10-CM

## 2021-06-19 LAB
ALBUMIN SERPL BCP-MCNC: 2.9 G/DL (ref 3.5–5.2)
ALP SERPL-CCNC: 60 U/L (ref 55–135)
ALT SERPL W/O P-5'-P-CCNC: 6 U/L (ref 10–44)
ANION GAP SERPL CALC-SCNC: 20 MMOL/L (ref 8–16)
AST SERPL-CCNC: 21 U/L (ref 10–40)
BACTERIA #/AREA URNS HPF: ABNORMAL /HPF
BASOPHILS # BLD AUTO: 0.04 K/UL (ref 0–0.2)
BASOPHILS NFR BLD: 0.5 % (ref 0–1.9)
BILIRUB SERPL-MCNC: 0.6 MG/DL (ref 0.1–1)
BILIRUB UR QL STRIP: NEGATIVE
BNP SERPL-MCNC: 3135 PG/ML (ref 0–99)
BUN SERPL-MCNC: 88 MG/DL (ref 8–23)
CALCIUM SERPL-MCNC: 9.7 MG/DL (ref 8.7–10.5)
CHLORIDE SERPL-SCNC: 100 MMOL/L (ref 95–110)
CLARITY UR: ABNORMAL
CO2 SERPL-SCNC: 20 MMOL/L (ref 23–29)
COLOR UR: YELLOW
CREAT SERPL-MCNC: 8.1 MG/DL (ref 0.5–1.4)
CTP QC/QA: YES
DIFFERENTIAL METHOD: ABNORMAL
EOSINOPHIL # BLD AUTO: 0 K/UL (ref 0–0.5)
EOSINOPHIL NFR BLD: 0 % (ref 0–8)
ERYTHROCYTE [DISTWIDTH] IN BLOOD BY AUTOMATED COUNT: 17.9 % (ref 11.5–14.5)
EST. GFR  (AFRICAN AMERICAN): 5 ML/MIN/1.73 M^2
EST. GFR  (NON AFRICAN AMERICAN): 4 ML/MIN/1.73 M^2
GLUCOSE SERPL-MCNC: 106 MG/DL (ref 70–110)
GLUCOSE UR QL STRIP: NEGATIVE
HCT VFR BLD AUTO: 28 % (ref 37–48.5)
HGB BLD-MCNC: 8.7 G/DL (ref 12–16)
HGB UR QL STRIP: ABNORMAL
HYALINE CASTS #/AREA URNS LPF: 0 /LPF
IMM GRANULOCYTES # BLD AUTO: 0.04 K/UL (ref 0–0.04)
IMM GRANULOCYTES NFR BLD AUTO: 0.5 % (ref 0–0.5)
KETONES UR QL STRIP: NEGATIVE
LEUKOCYTE ESTERASE UR QL STRIP: ABNORMAL
LIPASE SERPL-CCNC: 10 U/L (ref 4–60)
LYMPHOCYTES # BLD AUTO: 0.5 K/UL (ref 1–4.8)
LYMPHOCYTES NFR BLD: 6.3 % (ref 18–48)
MCH RBC QN AUTO: 29.6 PG (ref 27–31)
MCHC RBC AUTO-ENTMCNC: 31.1 G/DL (ref 32–36)
MCV RBC AUTO: 95 FL (ref 82–98)
MICROSCOPIC COMMENT: ABNORMAL
MONOCYTES # BLD AUTO: 0.1 K/UL (ref 0.3–1)
MONOCYTES NFR BLD: 1.7 % (ref 4–15)
NEUTROPHILS # BLD AUTO: 7.1 K/UL (ref 1.8–7.7)
NEUTROPHILS NFR BLD: 91 % (ref 38–73)
NITRITE UR QL STRIP: NEGATIVE
NON-SQ EPI CELLS #/AREA URNS HPF: 1 /HPF
NRBC BLD-RTO: 0 /100 WBC
PH UR STRIP: 6 [PH] (ref 5–8)
PLATELET # BLD AUTO: 297 K/UL (ref 150–450)
PMV BLD AUTO: 10.1 FL (ref 9.2–12.9)
POTASSIUM SERPL-SCNC: 4.6 MMOL/L (ref 3.5–5.1)
PROT SERPL-MCNC: 7.7 G/DL (ref 6–8.4)
PROT UR QL STRIP: ABNORMAL
RBC # BLD AUTO: 2.94 M/UL (ref 4–5.4)
RBC #/AREA URNS HPF: 8 /HPF (ref 0–4)
SARS-COV-2 RDRP RESP QL NAA+PROBE: NEGATIVE
SODIUM SERPL-SCNC: 140 MMOL/L (ref 136–145)
SP GR UR STRIP: 1.01 (ref 1–1.03)
SQUAMOUS #/AREA URNS HPF: 5 /HPF
TROPONIN I SERPL DL<=0.01 NG/ML-MCNC: 0.06 NG/ML (ref 0–0.03)
URN SPEC COLLECT METH UR: ABNORMAL
UROBILINOGEN UR STRIP-ACNC: NEGATIVE EU/DL
WBC # BLD AUTO: 7.78 K/UL (ref 3.9–12.7)
WBC #/AREA URNS HPF: >100 /HPF (ref 0–5)
WBC CLUMPS URNS QL MICRO: ABNORMAL

## 2021-06-19 PROCEDURE — 96372 THER/PROPH/DIAG INJ SC/IM: CPT

## 2021-06-19 PROCEDURE — 93005 ELECTROCARDIOGRAM TRACING: CPT

## 2021-06-19 PROCEDURE — 93010 EKG 12-LEAD: ICD-10-PCS | Mod: ,,, | Performed by: INTERNAL MEDICINE

## 2021-06-19 PROCEDURE — 85025 COMPLETE CBC W/AUTO DIFF WBC: CPT | Performed by: STUDENT IN AN ORGANIZED HEALTH CARE EDUCATION/TRAINING PROGRAM

## 2021-06-19 PROCEDURE — 36410 VNPNXR 3YR/> PHY/QHP DX/THER: CPT

## 2021-06-19 PROCEDURE — 87077 CULTURE AEROBIC IDENTIFY: CPT | Performed by: STUDENT IN AN ORGANIZED HEALTH CARE EDUCATION/TRAINING PROGRAM

## 2021-06-19 PROCEDURE — U0002 COVID-19 LAB TEST NON-CDC: HCPCS | Performed by: EMERGENCY MEDICINE

## 2021-06-19 PROCEDURE — 83880 ASSAY OF NATRIURETIC PEPTIDE: CPT | Performed by: STUDENT IN AN ORGANIZED HEALTH CARE EDUCATION/TRAINING PROGRAM

## 2021-06-19 PROCEDURE — 25000003 PHARM REV CODE 250: Performed by: EMERGENCY MEDICINE

## 2021-06-19 PROCEDURE — 87088 URINE BACTERIA CULTURE: CPT | Performed by: STUDENT IN AN ORGANIZED HEALTH CARE EDUCATION/TRAINING PROGRAM

## 2021-06-19 PROCEDURE — 93010 ELECTROCARDIOGRAM REPORT: CPT | Mod: ,,, | Performed by: INTERNAL MEDICINE

## 2021-06-19 PROCEDURE — 87086 URINE CULTURE/COLONY COUNT: CPT | Performed by: STUDENT IN AN ORGANIZED HEALTH CARE EDUCATION/TRAINING PROGRAM

## 2021-06-19 PROCEDURE — 99285 EMERGENCY DEPT VISIT HI MDM: CPT | Mod: 25

## 2021-06-19 PROCEDURE — 87186 SC STD MICRODIL/AGAR DIL: CPT | Performed by: STUDENT IN AN ORGANIZED HEALTH CARE EDUCATION/TRAINING PROGRAM

## 2021-06-19 PROCEDURE — 84484 ASSAY OF TROPONIN QUANT: CPT | Performed by: STUDENT IN AN ORGANIZED HEALTH CARE EDUCATION/TRAINING PROGRAM

## 2021-06-19 PROCEDURE — 83690 ASSAY OF LIPASE: CPT | Performed by: STUDENT IN AN ORGANIZED HEALTH CARE EDUCATION/TRAINING PROGRAM

## 2021-06-19 PROCEDURE — 63600175 PHARM REV CODE 636 W HCPCS: Performed by: EMERGENCY MEDICINE

## 2021-06-19 PROCEDURE — 81000 URINALYSIS NONAUTO W/SCOPE: CPT | Performed by: STUDENT IN AN ORGANIZED HEALTH CARE EDUCATION/TRAINING PROGRAM

## 2021-06-19 PROCEDURE — 80053 COMPREHEN METABOLIC PANEL: CPT | Performed by: STUDENT IN AN ORGANIZED HEALTH CARE EDUCATION/TRAINING PROGRAM

## 2021-06-19 RX ORDER — ONDANSETRON 2 MG/ML
4 INJECTION INTRAMUSCULAR; INTRAVENOUS
Status: DISCONTINUED | OUTPATIENT
Start: 2021-06-19 | End: 2021-06-19

## 2021-06-19 RX ORDER — CEFTRIAXONE 1 G/1
1 INJECTION, POWDER, FOR SOLUTION INTRAMUSCULAR; INTRAVENOUS
Status: COMPLETED | OUTPATIENT
Start: 2021-06-19 | End: 2021-06-19

## 2021-06-19 RX ORDER — ONDANSETRON 4 MG/1
4 TABLET, ORALLY DISINTEGRATING ORAL
Status: COMPLETED | OUTPATIENT
Start: 2021-06-19 | End: 2021-06-19

## 2021-06-19 RX ADMIN — ONDANSETRON 4 MG: 4 TABLET, ORALLY DISINTEGRATING ORAL at 10:06

## 2021-06-19 RX ADMIN — CEFTRIAXONE 1 G: 1 INJECTION, POWDER, FOR SOLUTION INTRAMUSCULAR; INTRAVENOUS at 10:06

## 2021-06-20 PROBLEM — N18.5 ACUTE RENAL FAILURE SUPERIMPOSED ON STAGE 5 CHRONIC KIDNEY DISEASE, NOT ON CHRONIC DIALYSIS: Status: ACTIVE | Noted: 2021-06-20

## 2021-06-20 PROBLEM — N17.9 ACUTE KIDNEY INJURY: Status: RESOLVED | Noted: 2021-06-20 | Resolved: 2021-06-20

## 2021-06-20 PROBLEM — N17.9 ACUTE KIDNEY INJURY: Status: ACTIVE | Noted: 2021-06-20

## 2021-06-20 PROBLEM — N17.9 ACUTE RENAL FAILURE SUPERIMPOSED ON STAGE 5 CHRONIC KIDNEY DISEASE, NOT ON CHRONIC DIALYSIS: Status: ACTIVE | Noted: 2021-06-20

## 2021-06-20 PROBLEM — K21.9 GERD (GASTROESOPHAGEAL REFLUX DISEASE): Chronic | Status: ACTIVE | Noted: 2021-06-20

## 2021-06-20 PROBLEM — I50.33 ACUTE ON CHRONIC DIASTOLIC HEART FAILURE: Status: ACTIVE | Noted: 2021-06-20

## 2021-06-20 LAB
ALBUMIN SERPL BCP-MCNC: 2.5 G/DL (ref 3.5–5.2)
ALP SERPL-CCNC: 56 U/L (ref 55–135)
ALT SERPL W/O P-5'-P-CCNC: 7 U/L (ref 10–44)
ANION GAP SERPL CALC-SCNC: 13 MMOL/L (ref 8–16)
ANION GAP SERPL CALC-SCNC: 16 MMOL/L (ref 8–16)
AST SERPL-CCNC: 14 U/L (ref 10–40)
BASOPHILS # BLD AUTO: 0.01 K/UL (ref 0–0.2)
BASOPHILS NFR BLD: 0.2 % (ref 0–1.9)
BILIRUB SERPL-MCNC: 0.4 MG/DL (ref 0.1–1)
BUN SERPL-MCNC: 86 MG/DL (ref 8–23)
BUN SERPL-MCNC: 86 MG/DL (ref 8–23)
CALCIUM SERPL-MCNC: 9.1 MG/DL (ref 8.7–10.5)
CALCIUM SERPL-MCNC: 9.3 MG/DL (ref 8.7–10.5)
CHLORIDE SERPL-SCNC: 101 MMOL/L (ref 95–110)
CHLORIDE SERPL-SCNC: 101 MMOL/L (ref 95–110)
CO2 SERPL-SCNC: 25 MMOL/L (ref 23–29)
CO2 SERPL-SCNC: 28 MMOL/L (ref 23–29)
CREAT SERPL-MCNC: 8 MG/DL (ref 0.5–1.4)
CREAT SERPL-MCNC: 8 MG/DL (ref 0.5–1.4)
DIFFERENTIAL METHOD: ABNORMAL
EOSINOPHIL # BLD AUTO: 0 K/UL (ref 0–0.5)
EOSINOPHIL NFR BLD: 0 % (ref 0–8)
ERYTHROCYTE [DISTWIDTH] IN BLOOD BY AUTOMATED COUNT: 17.8 % (ref 11.5–14.5)
EST. GFR  (AFRICAN AMERICAN): 5 ML/MIN/1.73 M^2
EST. GFR  (AFRICAN AMERICAN): 5 ML/MIN/1.73 M^2
EST. GFR  (NON AFRICAN AMERICAN): 4 ML/MIN/1.73 M^2
EST. GFR  (NON AFRICAN AMERICAN): 4 ML/MIN/1.73 M^2
ESTIMATED AVG GLUCOSE: ABNORMAL MG/DL (ref 68–131)
GLUCOSE SERPL-MCNC: 109 MG/DL (ref 70–110)
GLUCOSE SERPL-MCNC: 71 MG/DL (ref 70–110)
HBA1C MFR BLD: <4 % (ref 4–5.6)
HCT VFR BLD AUTO: 24.9 % (ref 37–48.5)
HGB BLD-MCNC: 7.4 G/DL (ref 12–16)
IMM GRANULOCYTES # BLD AUTO: 0.05 K/UL (ref 0–0.04)
IMM GRANULOCYTES NFR BLD AUTO: 0.8 % (ref 0–0.5)
LYMPHOCYTES # BLD AUTO: 0.6 K/UL (ref 1–4.8)
LYMPHOCYTES NFR BLD: 9.6 % (ref 18–48)
MAGNESIUM SERPL-MCNC: 1.4 MG/DL (ref 1.6–2.6)
MCH RBC QN AUTO: 29.5 PG (ref 27–31)
MCHC RBC AUTO-ENTMCNC: 29.7 G/DL (ref 32–36)
MCV RBC AUTO: 99 FL (ref 82–98)
MONOCYTES # BLD AUTO: 0.2 K/UL (ref 0.3–1)
MONOCYTES NFR BLD: 3.5 % (ref 4–15)
NEUTROPHILS # BLD AUTO: 5.5 K/UL (ref 1.8–7.7)
NEUTROPHILS NFR BLD: 85.9 % (ref 38–73)
NRBC BLD-RTO: 0 /100 WBC
PHOSPHATE SERPL-MCNC: 8.2 MG/DL (ref 2.7–4.5)
PLATELET # BLD AUTO: 235 K/UL (ref 150–450)
PMV BLD AUTO: 10 FL (ref 9.2–12.9)
POCT GLUCOSE: 115 MG/DL (ref 70–110)
POCT GLUCOSE: 116 MG/DL (ref 70–110)
POCT GLUCOSE: 76 MG/DL (ref 70–110)
POCT GLUCOSE: 78 MG/DL (ref 70–110)
POCT GLUCOSE: 78 MG/DL (ref 70–110)
POTASSIUM SERPL-SCNC: 4.2 MMOL/L (ref 3.5–5.1)
POTASSIUM SERPL-SCNC: 4.6 MMOL/L (ref 3.5–5.1)
PROT SERPL-MCNC: 6.7 G/DL (ref 6–8.4)
RBC # BLD AUTO: 2.51 M/UL (ref 4–5.4)
SODIUM SERPL-SCNC: 142 MMOL/L (ref 136–145)
SODIUM SERPL-SCNC: 142 MMOL/L (ref 136–145)
TROPONIN I SERPL DL<=0.01 NG/ML-MCNC: 0.05 NG/ML (ref 0–0.03)
WBC # BLD AUTO: 6.36 K/UL (ref 3.9–12.7)

## 2021-06-20 PROCEDURE — 80053 COMPREHEN METABOLIC PANEL: CPT | Performed by: INTERNAL MEDICINE

## 2021-06-20 PROCEDURE — 63600175 PHARM REV CODE 636 W HCPCS: Performed by: INTERNAL MEDICINE

## 2021-06-20 PROCEDURE — 83036 HEMOGLOBIN GLYCOSYLATED A1C: CPT | Performed by: INTERNAL MEDICINE

## 2021-06-20 PROCEDURE — 83735 ASSAY OF MAGNESIUM: CPT | Performed by: INTERNAL MEDICINE

## 2021-06-20 PROCEDURE — 51798 US URINE CAPACITY MEASURE: CPT

## 2021-06-20 PROCEDURE — 11000001 HC ACUTE MED/SURG PRIVATE ROOM

## 2021-06-20 PROCEDURE — 84100 ASSAY OF PHOSPHORUS: CPT | Performed by: INTERNAL MEDICINE

## 2021-06-20 PROCEDURE — 36415 COLL VENOUS BLD VENIPUNCTURE: CPT | Performed by: INTERNAL MEDICINE

## 2021-06-20 PROCEDURE — 80048 BASIC METABOLIC PNL TOTAL CA: CPT | Performed by: PHYSICIAN ASSISTANT

## 2021-06-20 PROCEDURE — 63600175 PHARM REV CODE 636 W HCPCS: Performed by: EMERGENCY MEDICINE

## 2021-06-20 PROCEDURE — 63600175 PHARM REV CODE 636 W HCPCS: Performed by: HOSPITALIST

## 2021-06-20 PROCEDURE — 85025 COMPLETE CBC W/AUTO DIFF WBC: CPT | Performed by: INTERNAL MEDICINE

## 2021-06-20 PROCEDURE — 25000003 PHARM REV CODE 250: Performed by: HOSPITALIST

## 2021-06-20 PROCEDURE — 84484 ASSAY OF TROPONIN QUANT: CPT | Mod: 91 | Performed by: INTERNAL MEDICINE

## 2021-06-20 PROCEDURE — A4216 STERILE WATER/SALINE, 10 ML: HCPCS | Performed by: HOSPITALIST

## 2021-06-20 PROCEDURE — 25000003 PHARM REV CODE 250: Performed by: INTERNAL MEDICINE

## 2021-06-20 RX ORDER — GLUCAGON 1 MG
1 KIT INJECTION
Status: DISCONTINUED | OUTPATIENT
Start: 2021-06-20 | End: 2021-06-20

## 2021-06-20 RX ORDER — AMOXICILLIN 250 MG
1 CAPSULE ORAL 2 TIMES DAILY PRN
Status: DISCONTINUED | OUTPATIENT
Start: 2021-06-20 | End: 2021-06-28 | Stop reason: HOSPADM

## 2021-06-20 RX ORDER — HYDRALAZINE HYDROCHLORIDE 25 MG/1
75 TABLET, FILM COATED ORAL EVERY 8 HOURS
Status: DISCONTINUED | OUTPATIENT
Start: 2021-06-20 | End: 2021-06-24

## 2021-06-20 RX ORDER — MAGNESIUM 30 MG
250 TABLET ORAL DAILY
Status: ON HOLD | COMMUNITY
End: 2021-06-28 | Stop reason: HOSPADM

## 2021-06-20 RX ORDER — ACETAMINOPHEN 325 MG/1
650 TABLET ORAL EVERY 4 HOURS PRN
Status: DISCONTINUED | OUTPATIENT
Start: 2021-06-20 | End: 2021-06-28 | Stop reason: HOSPADM

## 2021-06-20 RX ORDER — ACETAMINOPHEN 325 MG/1
650 TABLET ORAL EVERY 8 HOURS PRN
Status: DISCONTINUED | OUTPATIENT
Start: 2021-06-20 | End: 2021-06-28 | Stop reason: HOSPADM

## 2021-06-20 RX ORDER — ACETAMINOPHEN 500 MG
500 TABLET ORAL EVERY 6 HOURS PRN
Status: DISCONTINUED | OUTPATIENT
Start: 2021-06-20 | End: 2021-06-20

## 2021-06-20 RX ORDER — IBUPROFEN 200 MG
24 TABLET ORAL
Status: DISCONTINUED | OUTPATIENT
Start: 2021-06-20 | End: 2021-06-20

## 2021-06-20 RX ORDER — LANOLIN ALCOHOL/MO/W.PET/CERES
800 CREAM (GRAM) TOPICAL
Status: DISCONTINUED | OUTPATIENT
Start: 2021-06-20 | End: 2021-06-28 | Stop reason: HOSPADM

## 2021-06-20 RX ORDER — TALC
6 POWDER (GRAM) TOPICAL NIGHTLY PRN
Status: DISCONTINUED | OUTPATIENT
Start: 2021-06-20 | End: 2021-06-28 | Stop reason: HOSPADM

## 2021-06-20 RX ORDER — TALC
9 POWDER (GRAM) TOPICAL NIGHTLY PRN
Status: DISCONTINUED | OUTPATIENT
Start: 2021-06-20 | End: 2021-06-20

## 2021-06-20 RX ORDER — SODIUM,POTASSIUM PHOSPHATES 280-250MG
2 POWDER IN PACKET (EA) ORAL
Status: DISCONTINUED | OUTPATIENT
Start: 2021-06-20 | End: 2021-06-28 | Stop reason: HOSPADM

## 2021-06-20 RX ORDER — IBUPROFEN 200 MG
16 TABLET ORAL
Status: DISCONTINUED | OUTPATIENT
Start: 2021-06-20 | End: 2021-06-20

## 2021-06-20 RX ORDER — PANTOPRAZOLE SODIUM 40 MG/1
40 TABLET, DELAYED RELEASE ORAL DAILY
Status: DISCONTINUED | OUTPATIENT
Start: 2021-06-20 | End: 2021-06-28 | Stop reason: HOSPADM

## 2021-06-20 RX ORDER — FUROSEMIDE 10 MG/ML
40 INJECTION INTRAMUSCULAR; INTRAVENOUS
Status: DISCONTINUED | OUTPATIENT
Start: 2021-06-20 | End: 2021-06-20

## 2021-06-20 RX ORDER — NIFEDIPINE 30 MG/1
90 TABLET, EXTENDED RELEASE ORAL DAILY
Status: DISCONTINUED | OUTPATIENT
Start: 2021-06-20 | End: 2021-06-28 | Stop reason: HOSPADM

## 2021-06-20 RX ORDER — GLUCAGON 1 MG
1 KIT INJECTION
Status: DISCONTINUED | OUTPATIENT
Start: 2021-06-20 | End: 2021-06-28 | Stop reason: HOSPADM

## 2021-06-20 RX ORDER — SODIUM CHLORIDE 0.9 % (FLUSH) 0.9 %
3 SYRINGE (ML) INJECTION EVERY 8 HOURS
Status: DISCONTINUED | OUTPATIENT
Start: 2021-06-20 | End: 2021-06-28 | Stop reason: HOSPADM

## 2021-06-20 RX ORDER — POLYETHYLENE GLYCOL 3350 17 G/17G
17 POWDER, FOR SOLUTION ORAL DAILY
Status: DISCONTINUED | OUTPATIENT
Start: 2021-06-20 | End: 2021-06-28 | Stop reason: HOSPADM

## 2021-06-20 RX ORDER — CARVEDILOL 12.5 MG/1
25 TABLET ORAL 2 TIMES DAILY
Status: DISCONTINUED | OUTPATIENT
Start: 2021-06-20 | End: 2021-06-28 | Stop reason: HOSPADM

## 2021-06-20 RX ORDER — IBUPROFEN 200 MG
16 TABLET ORAL
Status: DISCONTINUED | OUTPATIENT
Start: 2021-06-20 | End: 2021-06-28 | Stop reason: HOSPADM

## 2021-06-20 RX ORDER — CLONIDINE 0.1 MG/24H
1 PATCH, EXTENDED RELEASE TRANSDERMAL
Status: DISCONTINUED | OUTPATIENT
Start: 2021-06-20 | End: 2021-06-20

## 2021-06-20 RX ORDER — FUROSEMIDE 10 MG/ML
60 INJECTION INTRAMUSCULAR; INTRAVENOUS
Status: COMPLETED | OUTPATIENT
Start: 2021-06-20 | End: 2021-06-20

## 2021-06-20 RX ORDER — HEPARIN SODIUM 5000 [USP'U]/ML
5000 INJECTION, SOLUTION INTRAVENOUS; SUBCUTANEOUS EVERY 8 HOURS
Status: DISCONTINUED | OUTPATIENT
Start: 2021-06-20 | End: 2021-06-28 | Stop reason: HOSPADM

## 2021-06-20 RX ORDER — INSULIN ASPART 100 [IU]/ML
3 INJECTION, SOLUTION INTRAVENOUS; SUBCUTANEOUS
Status: DISCONTINUED | OUTPATIENT
Start: 2021-06-20 | End: 2021-06-20

## 2021-06-20 RX ORDER — ONDANSETRON 2 MG/ML
8 INJECTION INTRAMUSCULAR; INTRAVENOUS EVERY 8 HOURS PRN
Status: DISCONTINUED | OUTPATIENT
Start: 2021-06-20 | End: 2021-06-20

## 2021-06-20 RX ORDER — FUROSEMIDE 10 MG/ML
20 INJECTION INTRAMUSCULAR; INTRAVENOUS
Status: DISCONTINUED | OUTPATIENT
Start: 2021-06-20 | End: 2021-06-21

## 2021-06-20 RX ORDER — ONDANSETRON 2 MG/ML
8 INJECTION INTRAMUSCULAR; INTRAVENOUS EVERY 6 HOURS PRN
Status: DISCONTINUED | OUTPATIENT
Start: 2021-06-20 | End: 2021-06-28 | Stop reason: HOSPADM

## 2021-06-20 RX ORDER — IBUPROFEN 200 MG
24 TABLET ORAL
Status: DISCONTINUED | OUTPATIENT
Start: 2021-06-20 | End: 2021-06-28 | Stop reason: HOSPADM

## 2021-06-20 RX ORDER — HYDROCODONE BITARTRATE AND ACETAMINOPHEN 5; 325 MG/1; MG/1
TABLET ORAL 2 TIMES DAILY
Status: ON HOLD | COMMUNITY
Start: 2021-06-05 | End: 2021-06-28 | Stop reason: HOSPADM

## 2021-06-20 RX ORDER — INSULIN ASPART 100 [IU]/ML
0-5 INJECTION, SOLUTION INTRAVENOUS; SUBCUTANEOUS
Status: DISCONTINUED | OUTPATIENT
Start: 2021-06-20 | End: 2021-06-28 | Stop reason: HOSPADM

## 2021-06-20 RX ORDER — INSULIN ASPART 100 [IU]/ML
1-10 INJECTION, SOLUTION INTRAVENOUS; SUBCUTANEOUS
Status: DISCONTINUED | OUTPATIENT
Start: 2021-06-20 | End: 2021-06-20

## 2021-06-20 RX ADMIN — CEFTRIAXONE 1 G: 1 INJECTION, SOLUTION INTRAVENOUS at 06:06

## 2021-06-20 RX ADMIN — Medication 3 ML: at 10:06

## 2021-06-20 RX ADMIN — FUROSEMIDE 60 MG: 10 INJECTION, SOLUTION INTRAMUSCULAR; INTRAVENOUS at 01:06

## 2021-06-20 RX ADMIN — CARVEDILOL 25 MG: 12.5 TABLET, FILM COATED ORAL at 08:06

## 2021-06-20 RX ADMIN — HYDRALAZINE HYDROCHLORIDE 75 MG: 25 TABLET ORAL at 05:06

## 2021-06-20 RX ADMIN — Medication 3 ML: at 01:06

## 2021-06-20 RX ADMIN — HEPARIN SODIUM 5000 UNITS: 5000 INJECTION INTRAVENOUS; SUBCUTANEOUS at 09:06

## 2021-06-20 RX ADMIN — PANTOPRAZOLE SODIUM 40 MG: 40 TABLET, DELAYED RELEASE ORAL at 08:06

## 2021-06-20 RX ADMIN — FUROSEMIDE 40 MG: 10 INJECTION, SOLUTION INTRAVENOUS at 08:06

## 2021-06-20 RX ADMIN — HEPARIN SODIUM 5000 UNITS: 5000 INJECTION INTRAVENOUS; SUBCUTANEOUS at 01:06

## 2021-06-20 RX ADMIN — FUROSEMIDE 20 MG: 10 INJECTION, SOLUTION INTRAVENOUS at 09:06

## 2021-06-20 RX ADMIN — HEPARIN SODIUM 5000 UNITS: 5000 INJECTION INTRAVENOUS; SUBCUTANEOUS at 06:06

## 2021-06-20 RX ADMIN — HYDRALAZINE HYDROCHLORIDE 75 MG: 25 TABLET ORAL at 01:06

## 2021-06-20 RX ADMIN — HYDRALAZINE HYDROCHLORIDE 75 MG: 25 TABLET ORAL at 09:06

## 2021-06-20 RX ADMIN — NIFEDIPINE 90 MG: 30 TABLET, FILM COATED, EXTENDED RELEASE ORAL at 08:06

## 2021-06-20 RX ADMIN — CARVEDILOL 25 MG: 12.5 TABLET, FILM COATED ORAL at 09:06

## 2021-06-20 RX ADMIN — POLYETHYLENE GLYCOL 3350 17 G: 17 POWDER, FOR SOLUTION ORAL at 11:06

## 2021-06-21 PROBLEM — E11.29 DM (DIABETES MELLITUS) TYPE II CONTROLLED WITH RENAL MANIFESTATION: Status: ACTIVE | Noted: 2019-03-31

## 2021-06-21 LAB
ALBUMIN SERPL BCP-MCNC: 2.4 G/DL (ref 3.5–5.2)
ANION GAP SERPL CALC-SCNC: 14 MMOL/L (ref 8–16)
BACTERIA UR CULT: ABNORMAL
BASOPHILS # BLD AUTO: 0.03 K/UL (ref 0–0.2)
BASOPHILS NFR BLD: 0.4 % (ref 0–1.9)
BUN SERPL-MCNC: 87 MG/DL (ref 8–23)
CALCIUM SERPL-MCNC: 8.7 MG/DL (ref 8.7–10.5)
CHLORIDE SERPL-SCNC: 98 MMOL/L (ref 95–110)
CK SERPL-CCNC: 12 U/L (ref 20–180)
CO2 SERPL-SCNC: 29 MMOL/L (ref 23–29)
CREAT SERPL-MCNC: 8 MG/DL (ref 0.5–1.4)
DIFFERENTIAL METHOD: ABNORMAL
EOSINOPHIL # BLD AUTO: 0 K/UL (ref 0–0.5)
EOSINOPHIL NFR BLD: 0.5 % (ref 0–8)
ERYTHROCYTE [DISTWIDTH] IN BLOOD BY AUTOMATED COUNT: 17.7 % (ref 11.5–14.5)
EST. GFR  (AFRICAN AMERICAN): 5 ML/MIN/1.73 M^2
EST. GFR  (NON AFRICAN AMERICAN): 4 ML/MIN/1.73 M^2
GLUCOSE SERPL-MCNC: 86 MG/DL (ref 70–110)
HCT VFR BLD AUTO: 24.1 % (ref 37–48.5)
HGB BLD-MCNC: 7.1 G/DL (ref 12–16)
IMM GRANULOCYTES # BLD AUTO: 0.1 K/UL (ref 0–0.04)
IMM GRANULOCYTES NFR BLD AUTO: 1.3 % (ref 0–0.5)
LYMPHOCYTES # BLD AUTO: 0.7 K/UL (ref 1–4.8)
LYMPHOCYTES NFR BLD: 9.5 % (ref 18–48)
MCH RBC QN AUTO: 29.5 PG (ref 27–31)
MCHC RBC AUTO-ENTMCNC: 29.5 G/DL (ref 32–36)
MCV RBC AUTO: 100 FL (ref 82–98)
MONOCYTES # BLD AUTO: 0.2 K/UL (ref 0.3–1)
MONOCYTES NFR BLD: 3 % (ref 4–15)
NEUTROPHILS # BLD AUTO: 6.4 K/UL (ref 1.8–7.7)
NEUTROPHILS NFR BLD: 85.3 % (ref 38–73)
NRBC BLD-RTO: 0 /100 WBC
PHOSPHATE SERPL-MCNC: 8 MG/DL (ref 2.7–4.5)
PLATELET # BLD AUTO: 215 K/UL (ref 150–450)
PMV BLD AUTO: 10.1 FL (ref 9.2–12.9)
POCT GLUCOSE: 126 MG/DL (ref 70–110)
POCT GLUCOSE: 140 MG/DL (ref 70–110)
POCT GLUCOSE: 141 MG/DL (ref 70–110)
POCT GLUCOSE: 96 MG/DL (ref 70–110)
POTASSIUM SERPL-SCNC: 4.6 MMOL/L (ref 3.5–5.1)
RBC # BLD AUTO: 2.41 M/UL (ref 4–5.4)
SODIUM SERPL-SCNC: 141 MMOL/L (ref 136–145)
URATE SERPL-MCNC: 10.7 MG/DL (ref 2.4–5.7)
WBC # BLD AUTO: 7.56 K/UL (ref 3.9–12.7)

## 2021-06-21 PROCEDURE — 36415 COLL VENOUS BLD VENIPUNCTURE: CPT | Performed by: PHYSICIAN ASSISTANT

## 2021-06-21 PROCEDURE — 63600175 PHARM REV CODE 636 W HCPCS: Performed by: HOSPITALIST

## 2021-06-21 PROCEDURE — A4216 STERILE WATER/SALINE, 10 ML: HCPCS | Performed by: HOSPITALIST

## 2021-06-21 PROCEDURE — 97535 SELF CARE MNGMENT TRAINING: CPT

## 2021-06-21 PROCEDURE — 97161 PT EVAL LOW COMPLEX 20 MIN: CPT

## 2021-06-21 PROCEDURE — 63600175 PHARM REV CODE 636 W HCPCS: Performed by: PHYSICIAN ASSISTANT

## 2021-06-21 PROCEDURE — 25000003 PHARM REV CODE 250: Performed by: HOSPITALIST

## 2021-06-21 PROCEDURE — 82550 ASSAY OF CK (CPK): CPT | Performed by: PHYSICIAN ASSISTANT

## 2021-06-21 PROCEDURE — 97165 OT EVAL LOW COMPLEX 30 MIN: CPT

## 2021-06-21 PROCEDURE — 51798 US URINE CAPACITY MEASURE: CPT

## 2021-06-21 PROCEDURE — 80069 RENAL FUNCTION PANEL: CPT | Performed by: PHYSICIAN ASSISTANT

## 2021-06-21 PROCEDURE — 84550 ASSAY OF BLOOD/URIC ACID: CPT | Performed by: PHYSICIAN ASSISTANT

## 2021-06-21 PROCEDURE — 85025 COMPLETE CBC W/AUTO DIFF WBC: CPT | Performed by: PHYSICIAN ASSISTANT

## 2021-06-21 PROCEDURE — 25000003 PHARM REV CODE 250: Performed by: INTERNAL MEDICINE

## 2021-06-21 PROCEDURE — 11000001 HC ACUTE MED/SURG PRIVATE ROOM

## 2021-06-21 RX ORDER — DIPHENHYDRAMINE HCL 25 MG
25 CAPSULE ORAL EVERY 6 HOURS PRN
Status: DISCONTINUED | OUTPATIENT
Start: 2021-06-21 | End: 2021-06-28 | Stop reason: HOSPADM

## 2021-06-21 RX ORDER — LIDOCAINE 50 MG/G
1 PATCH TOPICAL
Status: DISCONTINUED | OUTPATIENT
Start: 2021-06-21 | End: 2021-06-28 | Stop reason: HOSPADM

## 2021-06-21 RX ORDER — MAGNESIUM SULFATE HEPTAHYDRATE 40 MG/ML
2 INJECTION, SOLUTION INTRAVENOUS ONCE
Status: COMPLETED | OUTPATIENT
Start: 2021-06-21 | End: 2021-06-21

## 2021-06-21 RX ORDER — HYDROCODONE BITARTRATE AND ACETAMINOPHEN 5; 325 MG/1; MG/1
1 TABLET ORAL 2 TIMES DAILY PRN
Status: DISCONTINUED | OUTPATIENT
Start: 2021-06-21 | End: 2021-06-28 | Stop reason: HOSPADM

## 2021-06-21 RX ORDER — FUROSEMIDE 10 MG/ML
80 INJECTION INTRAMUSCULAR; INTRAVENOUS
Status: DISCONTINUED | OUTPATIENT
Start: 2021-06-21 | End: 2021-06-22

## 2021-06-21 RX ORDER — DICLOFENAC SODIUM 10 MG/G
2 GEL TOPICAL 3 TIMES DAILY
Status: DISCONTINUED | OUTPATIENT
Start: 2021-06-21 | End: 2021-06-28 | Stop reason: HOSPADM

## 2021-06-21 RX ADMIN — DICLOFENAC SODIUM 2 G: 10 GEL TOPICAL at 03:06

## 2021-06-21 RX ADMIN — DIPHENHYDRAMINE HYDROCHLORIDE 25 MG: 25 CAPSULE ORAL at 05:06

## 2021-06-21 RX ADMIN — CARVEDILOL 25 MG: 12.5 TABLET, FILM COATED ORAL at 09:06

## 2021-06-21 RX ADMIN — PANTOPRAZOLE SODIUM 40 MG: 40 TABLET, DELAYED RELEASE ORAL at 08:06

## 2021-06-21 RX ADMIN — LIDOCAINE 1 PATCH: 50 PATCH CUTANEOUS at 10:06

## 2021-06-21 RX ADMIN — CEFTRIAXONE 1 G: 1 INJECTION, SOLUTION INTRAVENOUS at 06:06

## 2021-06-21 RX ADMIN — HEPARIN SODIUM 5000 UNITS: 5000 INJECTION INTRAVENOUS; SUBCUTANEOUS at 09:06

## 2021-06-21 RX ADMIN — HYDRALAZINE HYDROCHLORIDE 75 MG: 25 TABLET ORAL at 09:06

## 2021-06-21 RX ADMIN — FUROSEMIDE 80 MG: 10 INJECTION, SOLUTION INTRAVENOUS at 09:06

## 2021-06-21 RX ADMIN — CARVEDILOL 25 MG: 12.5 TABLET, FILM COATED ORAL at 08:06

## 2021-06-21 RX ADMIN — HEPARIN SODIUM 5000 UNITS: 5000 INJECTION INTRAVENOUS; SUBCUTANEOUS at 03:06

## 2021-06-21 RX ADMIN — EPOETIN ALFA-EPBX 20000 UNITS: 20000 INJECTION, SOLUTION INTRAVENOUS; SUBCUTANEOUS at 05:06

## 2021-06-21 RX ADMIN — DICLOFENAC SODIUM 2 G: 10 GEL TOPICAL at 09:06

## 2021-06-21 RX ADMIN — HYDRALAZINE HYDROCHLORIDE 75 MG: 25 TABLET ORAL at 03:06

## 2021-06-21 RX ADMIN — HYDROCODONE BITARTRATE AND ACETAMINOPHEN 1 TABLET: 5; 325 TABLET ORAL at 10:06

## 2021-06-21 RX ADMIN — Medication 3 ML: at 06:06

## 2021-06-21 RX ADMIN — Medication 3 ML: at 03:06

## 2021-06-21 RX ADMIN — ACETAMINOPHEN 650 MG: 325 TABLET ORAL at 09:06

## 2021-06-21 RX ADMIN — HEPARIN SODIUM 5000 UNITS: 5000 INJECTION INTRAVENOUS; SUBCUTANEOUS at 05:06

## 2021-06-21 RX ADMIN — HYDRALAZINE HYDROCHLORIDE 75 MG: 25 TABLET ORAL at 05:06

## 2021-06-21 RX ADMIN — Medication 3 ML: at 10:06

## 2021-06-21 RX ADMIN — FUROSEMIDE 20 MG: 10 INJECTION, SOLUTION INTRAVENOUS at 08:06

## 2021-06-21 RX ADMIN — NIFEDIPINE 90 MG: 30 TABLET, FILM COATED, EXTENDED RELEASE ORAL at 08:06

## 2021-06-21 RX ADMIN — ACETAMINOPHEN 650 MG: 325 TABLET ORAL at 05:06

## 2021-06-21 RX ADMIN — POLYETHYLENE GLYCOL 3350 17 G: 17 POWDER, FOR SOLUTION ORAL at 08:06

## 2021-06-21 RX ADMIN — MAGNESIUM SULFATE HEPTAHYDRATE 2 G: 40 INJECTION, SOLUTION INTRAVENOUS at 08:06

## 2021-06-22 PROBLEM — Z51.5 PALLIATIVE CARE ENCOUNTER: Status: ACTIVE | Noted: 2021-06-22

## 2021-06-22 LAB
ABO + RH BLD: NORMAL
ALBUMIN SERPL BCP-MCNC: 2.4 G/DL (ref 3.5–5.2)
ANION GAP SERPL CALC-SCNC: 14 MMOL/L (ref 8–16)
ANION GAP SERPL CALC-SCNC: 16 MMOL/L (ref 8–16)
BASOPHILS # BLD AUTO: 0.02 K/UL (ref 0–0.2)
BASOPHILS NFR BLD: 0.3 % (ref 0–1.9)
BLD GP AB SCN CELLS X3 SERPL QL: NORMAL
BLD PROD TYP BPU: NORMAL
BLOOD UNIT EXPIRATION DATE: NORMAL
BLOOD UNIT TYPE CODE: 5100
BLOOD UNIT TYPE: NORMAL
BUN SERPL-MCNC: 92 MG/DL (ref 8–23)
BUN SERPL-MCNC: 93 MG/DL (ref 8–23)
CALCIUM SERPL-MCNC: 8.6 MG/DL (ref 8.7–10.5)
CALCIUM SERPL-MCNC: 8.7 MG/DL (ref 8.7–10.5)
CHLORIDE SERPL-SCNC: 100 MMOL/L (ref 95–110)
CHLORIDE SERPL-SCNC: 98 MMOL/L (ref 95–110)
CO2 SERPL-SCNC: 23 MMOL/L (ref 23–29)
CO2 SERPL-SCNC: 24 MMOL/L (ref 23–29)
CODING SYSTEM: NORMAL
CREAT SERPL-MCNC: 8.2 MG/DL (ref 0.5–1.4)
CREAT SERPL-MCNC: 8.3 MG/DL (ref 0.5–1.4)
DIFFERENTIAL METHOD: ABNORMAL
DISPENSE STATUS: NORMAL
EOSINOPHIL # BLD AUTO: 0.1 K/UL (ref 0–0.5)
EOSINOPHIL NFR BLD: 1.4 % (ref 0–8)
ERYTHROCYTE [DISTWIDTH] IN BLOOD BY AUTOMATED COUNT: 17.7 % (ref 11.5–14.5)
EST. GFR  (AFRICAN AMERICAN): 5 ML/MIN/1.73 M^2
EST. GFR  (AFRICAN AMERICAN): 5 ML/MIN/1.73 M^2
EST. GFR  (NON AFRICAN AMERICAN): 4 ML/MIN/1.73 M^2
EST. GFR  (NON AFRICAN AMERICAN): 4 ML/MIN/1.73 M^2
GLUCOSE SERPL-MCNC: 103 MG/DL (ref 70–110)
GLUCOSE SERPL-MCNC: 144 MG/DL (ref 70–110)
HCT VFR BLD AUTO: 23.3 % (ref 37–48.5)
HGB BLD-MCNC: 6.8 G/DL (ref 12–16)
IMM GRANULOCYTES # BLD AUTO: 0.06 K/UL (ref 0–0.04)
IMM GRANULOCYTES NFR BLD AUTO: 1 % (ref 0–0.5)
LYMPHOCYTES # BLD AUTO: 0.6 K/UL (ref 1–4.8)
LYMPHOCYTES NFR BLD: 10 % (ref 18–48)
MAGNESIUM SERPL-MCNC: 1.8 MG/DL (ref 1.6–2.6)
MCH RBC QN AUTO: 29.1 PG (ref 27–31)
MCHC RBC AUTO-ENTMCNC: 29.2 G/DL (ref 32–36)
MCV RBC AUTO: 100 FL (ref 82–98)
MONOCYTES # BLD AUTO: 0.3 K/UL (ref 0.3–1)
MONOCYTES NFR BLD: 4.3 % (ref 4–15)
NEUTROPHILS # BLD AUTO: 5.2 K/UL (ref 1.8–7.7)
NEUTROPHILS NFR BLD: 83 % (ref 38–73)
NRBC BLD-RTO: 0 /100 WBC
NUM UNITS TRANS PACKED RBC: NORMAL
PHOSPHATE SERPL-MCNC: 7.4 MG/DL (ref 2.7–4.5)
PLATELET # BLD AUTO: 196 K/UL (ref 150–450)
PMV BLD AUTO: 10 FL (ref 9.2–12.9)
POCT GLUCOSE: 113 MG/DL (ref 70–110)
POCT GLUCOSE: 118 MG/DL (ref 70–110)
POCT GLUCOSE: 141 MG/DL (ref 70–110)
POCT GLUCOSE: 150 MG/DL (ref 70–110)
POTASSIUM SERPL-SCNC: 4.7 MMOL/L (ref 3.5–5.1)
POTASSIUM SERPL-SCNC: 5.3 MMOL/L (ref 3.5–5.1)
RBC # BLD AUTO: 2.34 M/UL (ref 4–5.4)
SODIUM SERPL-SCNC: 137 MMOL/L (ref 136–145)
SODIUM SERPL-SCNC: 138 MMOL/L (ref 136–145)
WBC # BLD AUTO: 6.27 K/UL (ref 3.9–12.7)

## 2021-06-22 PROCEDURE — 63600175 PHARM REV CODE 636 W HCPCS: Performed by: HOSPITALIST

## 2021-06-22 PROCEDURE — 99223 1ST HOSP IP/OBS HIGH 75: CPT | Mod: ,,, | Performed by: NURSE PRACTITIONER

## 2021-06-22 PROCEDURE — 80048 BASIC METABOLIC PNL TOTAL CA: CPT | Performed by: PHYSICIAN ASSISTANT

## 2021-06-22 PROCEDURE — 85025 COMPLETE CBC W/AUTO DIFF WBC: CPT | Performed by: PHYSICIAN ASSISTANT

## 2021-06-22 PROCEDURE — 63600175 PHARM REV CODE 636 W HCPCS: Performed by: PHYSICIAN ASSISTANT

## 2021-06-22 PROCEDURE — 99497 ADVNCD CARE PLAN 30 MIN: CPT | Mod: 25,,, | Performed by: NURSE PRACTITIONER

## 2021-06-22 PROCEDURE — 99498 PR ADVNCD CARE PLAN ADDL 30 MIN: ICD-10-PCS | Mod: ,,, | Performed by: NURSE PRACTITIONER

## 2021-06-22 PROCEDURE — 86900 BLOOD TYPING SEROLOGIC ABO: CPT | Performed by: HOSPITALIST

## 2021-06-22 PROCEDURE — 86920 COMPATIBILITY TEST SPIN: CPT | Performed by: HOSPITALIST

## 2021-06-22 PROCEDURE — 83735 ASSAY OF MAGNESIUM: CPT | Performed by: PHYSICIAN ASSISTANT

## 2021-06-22 PROCEDURE — 80069 RENAL FUNCTION PANEL: CPT | Performed by: PHYSICIAN ASSISTANT

## 2021-06-22 PROCEDURE — 99498 ADVNCD CARE PLAN ADDL 30 MIN: CPT | Mod: ,,, | Performed by: NURSE PRACTITIONER

## 2021-06-22 PROCEDURE — 99497 PR ADVNCD CARE PLAN 30 MIN: ICD-10-PCS | Mod: 25,,, | Performed by: NURSE PRACTITIONER

## 2021-06-22 PROCEDURE — 25000003 PHARM REV CODE 250: Performed by: INTERNAL MEDICINE

## 2021-06-22 PROCEDURE — 36415 COLL VENOUS BLD VENIPUNCTURE: CPT | Performed by: PHYSICIAN ASSISTANT

## 2021-06-22 PROCEDURE — 36415 COLL VENOUS BLD VENIPUNCTURE: CPT | Performed by: HOSPITALIST

## 2021-06-22 PROCEDURE — P9016 RBC LEUKOCYTES REDUCED: HCPCS | Performed by: HOSPITALIST

## 2021-06-22 PROCEDURE — A4216 STERILE WATER/SALINE, 10 ML: HCPCS | Performed by: HOSPITALIST

## 2021-06-22 PROCEDURE — 36430 TRANSFUSION BLD/BLD COMPNT: CPT

## 2021-06-22 PROCEDURE — 11000001 HC ACUTE MED/SURG PRIVATE ROOM

## 2021-06-22 PROCEDURE — 99223 PR INITIAL HOSPITAL CARE,LEVL III: ICD-10-PCS | Mod: ,,, | Performed by: NURSE PRACTITIONER

## 2021-06-22 PROCEDURE — 25000003 PHARM REV CODE 250: Performed by: HOSPITALIST

## 2021-06-22 PROCEDURE — 25000003 PHARM REV CODE 250: Performed by: PHYSICIAN ASSISTANT

## 2021-06-22 RX ORDER — HYDROCODONE BITARTRATE AND ACETAMINOPHEN 500; 5 MG/1; MG/1
TABLET ORAL
Status: DISCONTINUED | OUTPATIENT
Start: 2021-06-22 | End: 2021-06-28 | Stop reason: HOSPADM

## 2021-06-22 RX ORDER — FUROSEMIDE 10 MG/ML
40 INJECTION INTRAMUSCULAR; INTRAVENOUS
Status: DISCONTINUED | OUTPATIENT
Start: 2021-06-22 | End: 2021-06-22

## 2021-06-22 RX ORDER — ALLOPURINOL 100 MG/1
100 TABLET ORAL DAILY
Status: DISCONTINUED | OUTPATIENT
Start: 2021-06-22 | End: 2021-06-28 | Stop reason: HOSPADM

## 2021-06-22 RX ADMIN — CEFTRIAXONE 1 G: 1 INJECTION, SOLUTION INTRAVENOUS at 06:06

## 2021-06-22 RX ADMIN — SODIUM POLYSTYRENE SULFONATE 15 G: 15 SUSPENSION ORAL; RECTAL at 06:06

## 2021-06-22 RX ADMIN — HYDROCODONE BITARTRATE AND ACETAMINOPHEN 1 TABLET: 5; 325 TABLET ORAL at 02:06

## 2021-06-22 RX ADMIN — NIFEDIPINE 90 MG: 30 TABLET, FILM COATED, EXTENDED RELEASE ORAL at 09:06

## 2021-06-22 RX ADMIN — LIDOCAINE 1 PATCH: 50 PATCH CUTANEOUS at 10:06

## 2021-06-22 RX ADMIN — CARVEDILOL 25 MG: 12.5 TABLET, FILM COATED ORAL at 09:06

## 2021-06-22 RX ADMIN — DICLOFENAC SODIUM 2 G: 10 GEL TOPICAL at 09:06

## 2021-06-22 RX ADMIN — HEPARIN SODIUM 5000 UNITS: 5000 INJECTION INTRAVENOUS; SUBCUTANEOUS at 10:06

## 2021-06-22 RX ADMIN — ACETAMINOPHEN 650 MG: 325 TABLET ORAL at 03:06

## 2021-06-22 RX ADMIN — DICLOFENAC SODIUM 2 G: 10 GEL TOPICAL at 03:06

## 2021-06-22 RX ADMIN — Medication 3 ML: at 06:06

## 2021-06-22 RX ADMIN — HYDRALAZINE HYDROCHLORIDE 75 MG: 25 TABLET ORAL at 01:06

## 2021-06-22 RX ADMIN — ACETAMINOPHEN 650 MG: 325 TABLET ORAL at 09:06

## 2021-06-22 RX ADMIN — HEPARIN SODIUM 5000 UNITS: 5000 INJECTION INTRAVENOUS; SUBCUTANEOUS at 06:06

## 2021-06-22 RX ADMIN — ALLOPURINOL 100 MG: 100 TABLET ORAL at 09:06

## 2021-06-22 RX ADMIN — Medication 3 ML: at 10:06

## 2021-06-22 RX ADMIN — HEPARIN SODIUM 5000 UNITS: 5000 INJECTION INTRAVENOUS; SUBCUTANEOUS at 01:06

## 2021-06-22 RX ADMIN — HYDRALAZINE HYDROCHLORIDE 75 MG: 25 TABLET ORAL at 06:06

## 2021-06-22 RX ADMIN — DIPHENHYDRAMINE HYDROCHLORIDE 25 MG: 25 CAPSULE ORAL at 01:06

## 2021-06-22 RX ADMIN — PANTOPRAZOLE SODIUM 40 MG: 40 TABLET, DELAYED RELEASE ORAL at 09:06

## 2021-06-22 RX ADMIN — POLYETHYLENE GLYCOL 3350 17 G: 17 POWDER, FOR SOLUTION ORAL at 09:06

## 2021-06-22 RX ADMIN — FUROSEMIDE 40 MG: 10 INJECTION, SOLUTION INTRAVENOUS at 09:06

## 2021-06-22 RX ADMIN — HYDRALAZINE HYDROCHLORIDE 75 MG: 25 TABLET ORAL at 09:06

## 2021-06-22 RX ADMIN — Medication 3 ML: at 01:06

## 2021-06-22 RX ADMIN — ONDANSETRON 8 MG: 2 INJECTION INTRAMUSCULAR; INTRAVENOUS at 09:06

## 2021-06-23 LAB
ALBUMIN SERPL BCP-MCNC: 2.5 G/DL (ref 3.5–5.2)
ANION GAP SERPL CALC-SCNC: 14 MMOL/L (ref 8–16)
BASOPHILS # BLD AUTO: 0.03 K/UL (ref 0–0.2)
BASOPHILS NFR BLD: 0.5 % (ref 0–1.9)
BUN SERPL-MCNC: 91 MG/DL (ref 8–23)
CALCIUM SERPL-MCNC: 8.7 MG/DL (ref 8.7–10.5)
CHLORIDE SERPL-SCNC: 98 MMOL/L (ref 95–110)
CO2 SERPL-SCNC: 26 MMOL/L (ref 23–29)
CREAT SERPL-MCNC: 8 MG/DL (ref 0.5–1.4)
DIFFERENTIAL METHOD: ABNORMAL
EOSINOPHIL # BLD AUTO: 0.1 K/UL (ref 0–0.5)
EOSINOPHIL NFR BLD: 0.9 % (ref 0–8)
ERYTHROCYTE [DISTWIDTH] IN BLOOD BY AUTOMATED COUNT: 17.3 % (ref 11.5–14.5)
EST. GFR  (AFRICAN AMERICAN): 5 ML/MIN/1.73 M^2
EST. GFR  (NON AFRICAN AMERICAN): 4 ML/MIN/1.73 M^2
GLUCOSE SERPL-MCNC: 86 MG/DL (ref 70–110)
HCT VFR BLD AUTO: 26.1 % (ref 37–48.5)
HGB BLD-MCNC: 8 G/DL (ref 12–16)
IMM GRANULOCYTES # BLD AUTO: 0.06 K/UL (ref 0–0.04)
IMM GRANULOCYTES NFR BLD AUTO: 0.9 % (ref 0–0.5)
LYMPHOCYTES # BLD AUTO: 0.6 K/UL (ref 1–4.8)
LYMPHOCYTES NFR BLD: 10 % (ref 18–48)
MCH RBC QN AUTO: 29.6 PG (ref 27–31)
MCHC RBC AUTO-ENTMCNC: 30.7 G/DL (ref 32–36)
MCV RBC AUTO: 97 FL (ref 82–98)
MONOCYTES # BLD AUTO: 0.2 K/UL (ref 0.3–1)
MONOCYTES NFR BLD: 3.7 % (ref 4–15)
NEUTROPHILS # BLD AUTO: 5.4 K/UL (ref 1.8–7.7)
NEUTROPHILS NFR BLD: 84 % (ref 38–73)
NRBC BLD-RTO: 0 /100 WBC
PHOSPHATE SERPL-MCNC: 7.8 MG/DL (ref 2.7–4.5)
PLATELET # BLD AUTO: 205 K/UL (ref 150–450)
PMV BLD AUTO: 10.4 FL (ref 9.2–12.9)
POCT GLUCOSE: 86 MG/DL (ref 70–110)
POCT GLUCOSE: 87 MG/DL (ref 70–110)
POCT GLUCOSE: 87 MG/DL (ref 70–110)
POCT GLUCOSE: 96 MG/DL (ref 70–110)
POTASSIUM SERPL-SCNC: 4.8 MMOL/L (ref 3.5–5.1)
RBC # BLD AUTO: 2.7 M/UL (ref 4–5.4)
SODIUM SERPL-SCNC: 138 MMOL/L (ref 136–145)
WBC # BLD AUTO: 6.42 K/UL (ref 3.9–12.7)

## 2021-06-23 PROCEDURE — 94761 N-INVAS EAR/PLS OXIMETRY MLT: CPT

## 2021-06-23 PROCEDURE — 63600175 PHARM REV CODE 636 W HCPCS: Performed by: NURSE PRACTITIONER

## 2021-06-23 PROCEDURE — 63600175 PHARM REV CODE 636 W HCPCS: Performed by: HOSPITALIST

## 2021-06-23 PROCEDURE — 80069 RENAL FUNCTION PANEL: CPT | Performed by: PHYSICIAN ASSISTANT

## 2021-06-23 PROCEDURE — 25000003 PHARM REV CODE 250: Performed by: PHYSICIAN ASSISTANT

## 2021-06-23 PROCEDURE — 36415 COLL VENOUS BLD VENIPUNCTURE: CPT | Performed by: PHYSICIAN ASSISTANT

## 2021-06-23 PROCEDURE — 27000221 HC OXYGEN, UP TO 24 HOURS

## 2021-06-23 PROCEDURE — 99900035 HC TECH TIME PER 15 MIN (STAT)

## 2021-06-23 PROCEDURE — A4216 STERILE WATER/SALINE, 10 ML: HCPCS | Performed by: HOSPITALIST

## 2021-06-23 PROCEDURE — 25000003 PHARM REV CODE 250: Performed by: INTERNAL MEDICINE

## 2021-06-23 PROCEDURE — 85025 COMPLETE CBC W/AUTO DIFF WBC: CPT | Performed by: PHYSICIAN ASSISTANT

## 2021-06-23 PROCEDURE — 11000001 HC ACUTE MED/SURG PRIVATE ROOM

## 2021-06-23 PROCEDURE — 25000003 PHARM REV CODE 250: Performed by: HOSPITALIST

## 2021-06-23 PROCEDURE — 25000003 PHARM REV CODE 250: Performed by: NURSE PRACTITIONER

## 2021-06-23 PROCEDURE — 92610 EVALUATE SWALLOWING FUNCTION: CPT

## 2021-06-23 RX ORDER — SEVELAMER CARBONATE 800 MG/1
1600 TABLET, FILM COATED ORAL
Status: DISCONTINUED | OUTPATIENT
Start: 2021-06-23 | End: 2021-06-25

## 2021-06-23 RX ORDER — HEPARIN SODIUM 5000 [USP'U]/ML
5000 INJECTION, SOLUTION INTRAVENOUS; SUBCUTANEOUS
Status: DISCONTINUED | OUTPATIENT
Start: 2021-06-23 | End: 2021-06-23

## 2021-06-23 RX ADMIN — HYDRALAZINE HYDROCHLORIDE 75 MG: 25 TABLET ORAL at 02:06

## 2021-06-23 RX ADMIN — HEPARIN SODIUM 5000 UNITS: 5000 INJECTION INTRAVENOUS; SUBCUTANEOUS at 09:06

## 2021-06-23 RX ADMIN — DICLOFENAC SODIUM 2 G: 10 GEL TOPICAL at 08:06

## 2021-06-23 RX ADMIN — Medication 3 ML: at 02:06

## 2021-06-23 RX ADMIN — NIFEDIPINE 90 MG: 30 TABLET, FILM COATED, EXTENDED RELEASE ORAL at 08:06

## 2021-06-23 RX ADMIN — HEPARIN SODIUM 5000 UNITS: 5000 INJECTION INTRAVENOUS; SUBCUTANEOUS at 05:06

## 2021-06-23 RX ADMIN — PANTOPRAZOLE SODIUM 40 MG: 40 TABLET, DELAYED RELEASE ORAL at 08:06

## 2021-06-23 RX ADMIN — ACETAMINOPHEN 650 MG: 325 TABLET ORAL at 03:06

## 2021-06-23 RX ADMIN — HYDRALAZINE HYDROCHLORIDE 75 MG: 25 TABLET ORAL at 09:06

## 2021-06-23 RX ADMIN — HYDROCODONE BITARTRATE AND ACETAMINOPHEN 1 TABLET: 5; 325 TABLET ORAL at 07:06

## 2021-06-23 RX ADMIN — FUROSEMIDE 5 MG/HR: 10 INJECTION, SOLUTION INTRAVENOUS at 02:06

## 2021-06-23 RX ADMIN — CARVEDILOL 25 MG: 12.5 TABLET, FILM COATED ORAL at 09:06

## 2021-06-23 RX ADMIN — CEFTRIAXONE 1 G: 1 INJECTION, SOLUTION INTRAVENOUS at 06:06

## 2021-06-23 RX ADMIN — DICLOFENAC SODIUM 2 G: 10 GEL TOPICAL at 02:06

## 2021-06-23 RX ADMIN — DICLOFENAC SODIUM 2 G: 10 GEL TOPICAL at 09:06

## 2021-06-23 RX ADMIN — ACETAMINOPHEN 650 MG: 325 TABLET ORAL at 05:06

## 2021-06-23 RX ADMIN — Medication 3 ML: at 09:06

## 2021-06-23 RX ADMIN — HYDRALAZINE HYDROCHLORIDE 75 MG: 25 TABLET ORAL at 05:06

## 2021-06-23 RX ADMIN — HEPARIN SODIUM 5000 UNITS: 5000 INJECTION INTRAVENOUS; SUBCUTANEOUS at 02:06

## 2021-06-23 RX ADMIN — CARVEDILOL 25 MG: 12.5 TABLET, FILM COATED ORAL at 08:06

## 2021-06-23 RX ADMIN — POLYETHYLENE GLYCOL 3350 17 G: 17 POWDER, FOR SOLUTION ORAL at 08:06

## 2021-06-23 RX ADMIN — LIDOCAINE 1 PATCH: 50 PATCH CUTANEOUS at 11:06

## 2021-06-23 RX ADMIN — ALLOPURINOL 100 MG: 100 TABLET ORAL at 08:06

## 2021-06-23 RX ADMIN — SEVELAMER CARBONATE 1600 MG: 800 TABLET, FILM COATED ORAL at 11:06

## 2021-06-24 LAB
ALBUMIN SERPL BCP-MCNC: 2.4 G/DL (ref 3.5–5.2)
ANION GAP SERPL CALC-SCNC: 13 MMOL/L (ref 8–16)
ANION GAP SERPL CALC-SCNC: 17 MMOL/L (ref 8–16)
BASOPHILS # BLD AUTO: 0.03 K/UL (ref 0–0.2)
BASOPHILS NFR BLD: 0.5 % (ref 0–1.9)
BUN SERPL-MCNC: 87 MG/DL (ref 8–23)
BUN SERPL-MCNC: 92 MG/DL (ref 8–23)
CALCIUM SERPL-MCNC: 9.2 MG/DL (ref 8.7–10.5)
CALCIUM SERPL-MCNC: 9.2 MG/DL (ref 8.7–10.5)
CHLORIDE SERPL-SCNC: 97 MMOL/L (ref 95–110)
CHLORIDE SERPL-SCNC: 98 MMOL/L (ref 95–110)
CO2 SERPL-SCNC: 24 MMOL/L (ref 23–29)
CO2 SERPL-SCNC: 25 MMOL/L (ref 23–29)
CREAT SERPL-MCNC: 8.3 MG/DL (ref 0.5–1.4)
CREAT SERPL-MCNC: 8.3 MG/DL (ref 0.5–1.4)
DIFFERENTIAL METHOD: ABNORMAL
EOSINOPHIL # BLD AUTO: 0 K/UL (ref 0–0.5)
EOSINOPHIL NFR BLD: 0.3 % (ref 0–8)
ERYTHROCYTE [DISTWIDTH] IN BLOOD BY AUTOMATED COUNT: 17.2 % (ref 11.5–14.5)
EST. GFR  (AFRICAN AMERICAN): 5 ML/MIN/1.73 M^2
EST. GFR  (AFRICAN AMERICAN): 5 ML/MIN/1.73 M^2
EST. GFR  (NON AFRICAN AMERICAN): 4 ML/MIN/1.73 M^2
EST. GFR  (NON AFRICAN AMERICAN): 4 ML/MIN/1.73 M^2
GLUCOSE SERPL-MCNC: 58 MG/DL (ref 70–110)
GLUCOSE SERPL-MCNC: 95 MG/DL (ref 70–110)
HCT VFR BLD AUTO: 28.6 % (ref 37–48.5)
HGB BLD-MCNC: 8.6 G/DL (ref 12–16)
IMM GRANULOCYTES # BLD AUTO: 0.16 K/UL (ref 0–0.04)
IMM GRANULOCYTES NFR BLD AUTO: 2.5 % (ref 0–0.5)
LYMPHOCYTES # BLD AUTO: 0.7 K/UL (ref 1–4.8)
LYMPHOCYTES NFR BLD: 11.2 % (ref 18–48)
MCH RBC QN AUTO: 30.1 PG (ref 27–31)
MCHC RBC AUTO-ENTMCNC: 30.1 G/DL (ref 32–36)
MCV RBC AUTO: 100 FL (ref 82–98)
MONOCYTES # BLD AUTO: 0.3 K/UL (ref 0.3–1)
MONOCYTES NFR BLD: 4.3 % (ref 4–15)
NEUTROPHILS # BLD AUTO: 5.3 K/UL (ref 1.8–7.7)
NEUTROPHILS NFR BLD: 81.2 % (ref 38–73)
NRBC BLD-RTO: 0 /100 WBC
PHOSPHATE SERPL-MCNC: 8.9 MG/DL (ref 2.7–4.5)
PLATELET # BLD AUTO: 215 K/UL (ref 150–450)
PMV BLD AUTO: 10.8 FL (ref 9.2–12.9)
POCT GLUCOSE: 104 MG/DL (ref 70–110)
POCT GLUCOSE: 72 MG/DL (ref 70–110)
POCT GLUCOSE: 90 MG/DL (ref 70–110)
POCT GLUCOSE: 91 MG/DL (ref 70–110)
POTASSIUM SERPL-SCNC: 4.8 MMOL/L (ref 3.5–5.1)
POTASSIUM SERPL-SCNC: 4.9 MMOL/L (ref 3.5–5.1)
RBC # BLD AUTO: 2.86 M/UL (ref 4–5.4)
SODIUM SERPL-SCNC: 136 MMOL/L (ref 136–145)
SODIUM SERPL-SCNC: 138 MMOL/L (ref 136–145)
WBC # BLD AUTO: 6.49 K/UL (ref 3.9–12.7)

## 2021-06-24 PROCEDURE — 27000221 HC OXYGEN, UP TO 24 HOURS

## 2021-06-24 PROCEDURE — 80048 BASIC METABOLIC PNL TOTAL CA: CPT | Performed by: PHYSICIAN ASSISTANT

## 2021-06-24 PROCEDURE — 80069 RENAL FUNCTION PANEL: CPT | Performed by: PHYSICIAN ASSISTANT

## 2021-06-24 PROCEDURE — 63600175 PHARM REV CODE 636 W HCPCS: Performed by: NURSE PRACTITIONER

## 2021-06-24 PROCEDURE — 25000003 PHARM REV CODE 250: Performed by: PHYSICIAN ASSISTANT

## 2021-06-24 PROCEDURE — 85025 COMPLETE CBC W/AUTO DIFF WBC: CPT | Performed by: PHYSICIAN ASSISTANT

## 2021-06-24 PROCEDURE — 99900035 HC TECH TIME PER 15 MIN (STAT)

## 2021-06-24 PROCEDURE — 94761 N-INVAS EAR/PLS OXIMETRY MLT: CPT

## 2021-06-24 PROCEDURE — A4216 STERILE WATER/SALINE, 10 ML: HCPCS | Performed by: HOSPITALIST

## 2021-06-24 PROCEDURE — 63600175 PHARM REV CODE 636 W HCPCS: Performed by: HOSPITALIST

## 2021-06-24 PROCEDURE — 11000001 HC ACUTE MED/SURG PRIVATE ROOM

## 2021-06-24 PROCEDURE — 25000003 PHARM REV CODE 250: Performed by: INTERNAL MEDICINE

## 2021-06-24 PROCEDURE — 63600175 PHARM REV CODE 636 W HCPCS: Mod: JG | Performed by: PHYSICIAN ASSISTANT

## 2021-06-24 PROCEDURE — 36415 COLL VENOUS BLD VENIPUNCTURE: CPT | Performed by: PHYSICIAN ASSISTANT

## 2021-06-24 PROCEDURE — 25000003 PHARM REV CODE 250: Performed by: HOSPITALIST

## 2021-06-24 PROCEDURE — 25000003 PHARM REV CODE 250: Performed by: NURSE PRACTITIONER

## 2021-06-24 PROCEDURE — P9047 ALBUMIN (HUMAN), 25%, 50ML: HCPCS | Mod: JG | Performed by: PHYSICIAN ASSISTANT

## 2021-06-24 RX ORDER — ALBUMIN HUMAN 250 G/1000ML
25 SOLUTION INTRAVENOUS ONCE
Status: COMPLETED | OUTPATIENT
Start: 2021-06-24 | End: 2021-06-24

## 2021-06-24 RX ORDER — HYDRALAZINE HYDROCHLORIDE 20 MG/ML
10 INJECTION INTRAMUSCULAR; INTRAVENOUS EVERY 4 HOURS PRN
Status: DISCONTINUED | OUTPATIENT
Start: 2021-06-24 | End: 2021-06-28 | Stop reason: HOSPADM

## 2021-06-24 RX ADMIN — FUROSEMIDE 5 MG/HR: 10 INJECTION, SOLUTION INTRAVENOUS at 02:06

## 2021-06-24 RX ADMIN — Medication 3 ML: at 06:06

## 2021-06-24 RX ADMIN — DICLOFENAC SODIUM 2 G: 10 GEL TOPICAL at 02:06

## 2021-06-24 RX ADMIN — ONDANSETRON 8 MG: 2 INJECTION INTRAMUSCULAR; INTRAVENOUS at 08:06

## 2021-06-24 RX ADMIN — HEPARIN SODIUM 5000 UNITS: 5000 INJECTION INTRAVENOUS; SUBCUTANEOUS at 02:06

## 2021-06-24 RX ADMIN — HEPARIN SODIUM 5000 UNITS: 5000 INJECTION INTRAVENOUS; SUBCUTANEOUS at 06:06

## 2021-06-24 RX ADMIN — DICLOFENAC SODIUM 2 G: 10 GEL TOPICAL at 08:06

## 2021-06-24 RX ADMIN — HYDROCODONE BITARTRATE AND ACETAMINOPHEN 1 TABLET: 5; 325 TABLET ORAL at 08:06

## 2021-06-24 RX ADMIN — SEVELAMER CARBONATE 1600 MG: 800 TABLET, FILM COATED ORAL at 12:06

## 2021-06-24 RX ADMIN — CARVEDILOL 25 MG: 12.5 TABLET, FILM COATED ORAL at 08:06

## 2021-06-24 RX ADMIN — SEVELAMER CARBONATE 1600 MG: 800 TABLET, FILM COATED ORAL at 04:06

## 2021-06-24 RX ADMIN — Medication 3 ML: at 02:06

## 2021-06-24 RX ADMIN — Medication 3 ML: at 09:06

## 2021-06-24 RX ADMIN — DIPHENHYDRAMINE HYDROCHLORIDE 25 MG: 25 CAPSULE ORAL at 08:06

## 2021-06-24 RX ADMIN — HYDRALAZINE HYDROCHLORIDE 75 MG: 25 TABLET ORAL at 06:06

## 2021-06-24 RX ADMIN — PANTOPRAZOLE SODIUM 40 MG: 40 TABLET, DELAYED RELEASE ORAL at 08:06

## 2021-06-24 RX ADMIN — SEVELAMER CARBONATE 1600 MG: 800 TABLET, FILM COATED ORAL at 08:06

## 2021-06-24 RX ADMIN — POLYETHYLENE GLYCOL 3350 17 G: 17 POWDER, FOR SOLUTION ORAL at 08:06

## 2021-06-24 RX ADMIN — ACETAMINOPHEN 650 MG: 325 TABLET ORAL at 06:06

## 2021-06-24 RX ADMIN — LIDOCAINE 1 PATCH: 50 PATCH CUTANEOUS at 10:06

## 2021-06-24 RX ADMIN — NIFEDIPINE 90 MG: 30 TABLET, FILM COATED, EXTENDED RELEASE ORAL at 08:06

## 2021-06-24 RX ADMIN — HYDRALAZINE HYDROCHLORIDE 75 MG: 25 TABLET ORAL at 02:06

## 2021-06-24 RX ADMIN — ALLOPURINOL 100 MG: 100 TABLET ORAL at 08:06

## 2021-06-24 RX ADMIN — HEPARIN SODIUM 5000 UNITS: 5000 INJECTION INTRAVENOUS; SUBCUTANEOUS at 09:06

## 2021-06-24 RX ADMIN — ALBUMIN (HUMAN) 25 G: 0.25 INJECTION, SOLUTION INTRAVENOUS at 10:06

## 2021-06-25 LAB
ALBUMIN SERPL BCP-MCNC: 2.8 G/DL (ref 3.5–5.2)
ANION GAP SERPL CALC-SCNC: 17 MMOL/L (ref 8–16)
BASOPHILS # BLD AUTO: 0.02 K/UL (ref 0–0.2)
BASOPHILS NFR BLD: 0.3 % (ref 0–1.9)
BUN SERPL-MCNC: 92 MG/DL (ref 8–23)
CALCIUM SERPL-MCNC: 9.2 MG/DL (ref 8.7–10.5)
CHLORIDE SERPL-SCNC: 95 MMOL/L (ref 95–110)
CO2 SERPL-SCNC: 26 MMOL/L (ref 23–29)
CREAT SERPL-MCNC: 8.8 MG/DL (ref 0.5–1.4)
DIFFERENTIAL METHOD: ABNORMAL
EOSINOPHIL # BLD AUTO: 0 K/UL (ref 0–0.5)
EOSINOPHIL NFR BLD: 0.5 % (ref 0–8)
ERYTHROCYTE [DISTWIDTH] IN BLOOD BY AUTOMATED COUNT: 17 % (ref 11.5–14.5)
EST. GFR  (AFRICAN AMERICAN): 4 ML/MIN/1.73 M^2
EST. GFR  (NON AFRICAN AMERICAN): 4 ML/MIN/1.73 M^2
GLUCOSE SERPL-MCNC: 76 MG/DL (ref 70–110)
HCT VFR BLD AUTO: 28.5 % (ref 37–48.5)
HGB BLD-MCNC: 8.5 G/DL (ref 12–16)
IMM GRANULOCYTES # BLD AUTO: 0.16 K/UL (ref 0–0.04)
IMM GRANULOCYTES NFR BLD AUTO: 2.2 % (ref 0–0.5)
LYMPHOCYTES # BLD AUTO: 0.7 K/UL (ref 1–4.8)
LYMPHOCYTES NFR BLD: 9.6 % (ref 18–48)
MCH RBC QN AUTO: 29.8 PG (ref 27–31)
MCHC RBC AUTO-ENTMCNC: 29.8 G/DL (ref 32–36)
MCV RBC AUTO: 100 FL (ref 82–98)
MONOCYTES # BLD AUTO: 0.3 K/UL (ref 0.3–1)
MONOCYTES NFR BLD: 4.2 % (ref 4–15)
NEUTROPHILS # BLD AUTO: 6.2 K/UL (ref 1.8–7.7)
NEUTROPHILS NFR BLD: 83.2 % (ref 38–73)
NRBC BLD-RTO: 0 /100 WBC
PHOSPHATE SERPL-MCNC: 8.9 MG/DL (ref 2.7–4.5)
PLATELET # BLD AUTO: 225 K/UL (ref 150–450)
PMV BLD AUTO: 10.5 FL (ref 9.2–12.9)
POCT GLUCOSE: 100 MG/DL (ref 70–110)
POCT GLUCOSE: 76 MG/DL (ref 70–110)
POCT GLUCOSE: 81 MG/DL (ref 70–110)
POCT GLUCOSE: 84 MG/DL (ref 70–110)
POTASSIUM SERPL-SCNC: 5.1 MMOL/L (ref 3.5–5.1)
RBC # BLD AUTO: 2.85 M/UL (ref 4–5.4)
SODIUM SERPL-SCNC: 138 MMOL/L (ref 136–145)
WBC # BLD AUTO: 7.39 K/UL (ref 3.9–12.7)

## 2021-06-25 PROCEDURE — 25000003 PHARM REV CODE 250: Performed by: NURSE PRACTITIONER

## 2021-06-25 PROCEDURE — 25000003 PHARM REV CODE 250: Performed by: PHYSICIAN ASSISTANT

## 2021-06-25 PROCEDURE — 80069 RENAL FUNCTION PANEL: CPT | Performed by: PHYSICIAN ASSISTANT

## 2021-06-25 PROCEDURE — 11000001 HC ACUTE MED/SURG PRIVATE ROOM

## 2021-06-25 PROCEDURE — S0171 BUMETANIDE 0.5 MG: HCPCS | Performed by: NURSE PRACTITIONER

## 2021-06-25 PROCEDURE — 36415 COLL VENOUS BLD VENIPUNCTURE: CPT | Performed by: PHYSICIAN ASSISTANT

## 2021-06-25 PROCEDURE — 25000003 PHARM REV CODE 250: Performed by: HOSPITALIST

## 2021-06-25 PROCEDURE — 85025 COMPLETE CBC W/AUTO DIFF WBC: CPT | Performed by: PHYSICIAN ASSISTANT

## 2021-06-25 PROCEDURE — A4216 STERILE WATER/SALINE, 10 ML: HCPCS | Performed by: HOSPITALIST

## 2021-06-25 PROCEDURE — 25000003 PHARM REV CODE 250: Performed by: INTERNAL MEDICINE

## 2021-06-25 PROCEDURE — 63600175 PHARM REV CODE 636 W HCPCS: Performed by: HOSPITALIST

## 2021-06-25 RX ORDER — SEVELAMER CARBONATE 800 MG/1
2400 TABLET, FILM COATED ORAL
Status: DISCONTINUED | OUTPATIENT
Start: 2021-06-25 | End: 2021-06-28 | Stop reason: HOSPADM

## 2021-06-25 RX ORDER — BUMETANIDE 0.25 MG/ML
1 INJECTION INTRAMUSCULAR; INTRAVENOUS ONCE
Status: COMPLETED | OUTPATIENT
Start: 2021-06-25 | End: 2021-06-25

## 2021-06-25 RX ADMIN — DICLOFENAC SODIUM 2 G: 10 GEL TOPICAL at 09:06

## 2021-06-25 RX ADMIN — SEVELAMER CARBONATE 2400 MG: 800 TABLET, FILM COATED ORAL at 05:06

## 2021-06-25 RX ADMIN — HEPARIN SODIUM 5000 UNITS: 5000 INJECTION INTRAVENOUS; SUBCUTANEOUS at 05:06

## 2021-06-25 RX ADMIN — Medication 3 ML: at 03:06

## 2021-06-25 RX ADMIN — Medication 3 ML: at 05:06

## 2021-06-25 RX ADMIN — HYDROCODONE BITARTRATE AND ACETAMINOPHEN 1 TABLET: 5; 325 TABLET ORAL at 04:06

## 2021-06-25 RX ADMIN — NIFEDIPINE 90 MG: 30 TABLET, FILM COATED, EXTENDED RELEASE ORAL at 09:06

## 2021-06-25 RX ADMIN — HEPARIN SODIUM 5000 UNITS: 5000 INJECTION INTRAVENOUS; SUBCUTANEOUS at 03:06

## 2021-06-25 RX ADMIN — PANTOPRAZOLE SODIUM 40 MG: 40 TABLET, DELAYED RELEASE ORAL at 09:06

## 2021-06-25 RX ADMIN — HEPARIN SODIUM 5000 UNITS: 5000 INJECTION INTRAVENOUS; SUBCUTANEOUS at 09:06

## 2021-06-25 RX ADMIN — ACETAMINOPHEN 650 MG: 325 TABLET ORAL at 09:06

## 2021-06-25 RX ADMIN — SEVELAMER CARBONATE 1600 MG: 800 TABLET, FILM COATED ORAL at 09:06

## 2021-06-25 RX ADMIN — POLYETHYLENE GLYCOL 3350 17 G: 17 POWDER, FOR SOLUTION ORAL at 09:06

## 2021-06-25 RX ADMIN — LIDOCAINE 1 PATCH: 50 PATCH CUTANEOUS at 12:06

## 2021-06-25 RX ADMIN — ALLOPURINOL 100 MG: 100 TABLET ORAL at 09:06

## 2021-06-25 RX ADMIN — SEVELAMER CARBONATE 2400 MG: 800 TABLET, FILM COATED ORAL at 12:06

## 2021-06-25 RX ADMIN — BUMETANIDE 1 MG: 0.25 INJECTION INTRAMUSCULAR; INTRAVENOUS at 02:06

## 2021-06-25 RX ADMIN — CARVEDILOL 25 MG: 12.5 TABLET, FILM COATED ORAL at 09:06

## 2021-06-25 RX ADMIN — DICLOFENAC SODIUM 2 G: 10 GEL TOPICAL at 03:06

## 2021-06-25 RX ADMIN — Medication 3 ML: at 09:06

## 2021-06-26 LAB
ALBUMIN SERPL BCP-MCNC: 2.7 G/DL (ref 3.5–5.2)
ANION GAP SERPL CALC-SCNC: 15 MMOL/L (ref 8–16)
BASOPHILS # BLD AUTO: 0.02 K/UL (ref 0–0.2)
BASOPHILS NFR BLD: 0.3 % (ref 0–1.9)
BUN SERPL-MCNC: 99 MG/DL (ref 8–23)
CALCIUM SERPL-MCNC: 8.6 MG/DL (ref 8.7–10.5)
CHLORIDE SERPL-SCNC: 97 MMOL/L (ref 95–110)
CO2 SERPL-SCNC: 25 MMOL/L (ref 23–29)
CREAT SERPL-MCNC: 8.8 MG/DL (ref 0.5–1.4)
DIFFERENTIAL METHOD: ABNORMAL
EOSINOPHIL # BLD AUTO: 0 K/UL (ref 0–0.5)
EOSINOPHIL NFR BLD: 0.4 % (ref 0–8)
ERYTHROCYTE [DISTWIDTH] IN BLOOD BY AUTOMATED COUNT: 17 % (ref 11.5–14.5)
EST. GFR  (AFRICAN AMERICAN): 4 ML/MIN/1.73 M^2
EST. GFR  (NON AFRICAN AMERICAN): 4 ML/MIN/1.73 M^2
GLUCOSE SERPL-MCNC: 74 MG/DL (ref 70–110)
HCT VFR BLD AUTO: 26.1 % (ref 37–48.5)
HGB BLD-MCNC: 7.9 G/DL (ref 12–16)
IMM GRANULOCYTES # BLD AUTO: 0.07 K/UL (ref 0–0.04)
IMM GRANULOCYTES NFR BLD AUTO: 1 % (ref 0–0.5)
LYMPHOCYTES # BLD AUTO: 0.8 K/UL (ref 1–4.8)
LYMPHOCYTES NFR BLD: 11.4 % (ref 18–48)
MCH RBC QN AUTO: 30.2 PG (ref 27–31)
MCHC RBC AUTO-ENTMCNC: 30.3 G/DL (ref 32–36)
MCV RBC AUTO: 100 FL (ref 82–98)
MONOCYTES # BLD AUTO: 0.3 K/UL (ref 0.3–1)
MONOCYTES NFR BLD: 4.2 % (ref 4–15)
NEUTROPHILS # BLD AUTO: 5.9 K/UL (ref 1.8–7.7)
NEUTROPHILS NFR BLD: 82.7 % (ref 38–73)
NRBC BLD-RTO: 0 /100 WBC
PHOSPHATE SERPL-MCNC: 7.8 MG/DL (ref 2.7–4.5)
PLATELET # BLD AUTO: 220 K/UL (ref 150–450)
PMV BLD AUTO: 10.3 FL (ref 9.2–12.9)
POCT GLUCOSE: 75 MG/DL (ref 70–110)
POCT GLUCOSE: 79 MG/DL (ref 70–110)
POCT GLUCOSE: 95 MG/DL (ref 70–110)
POCT GLUCOSE: 96 MG/DL (ref 70–110)
POCT GLUCOSE: 99 MG/DL (ref 70–110)
POTASSIUM SERPL-SCNC: 5 MMOL/L (ref 3.5–5.1)
RBC # BLD AUTO: 2.62 M/UL (ref 4–5.4)
SODIUM SERPL-SCNC: 137 MMOL/L (ref 136–145)
WBC # BLD AUTO: 7.09 K/UL (ref 3.9–12.7)

## 2021-06-26 PROCEDURE — 11000001 HC ACUTE MED/SURG PRIVATE ROOM

## 2021-06-26 PROCEDURE — 25000003 PHARM REV CODE 250: Performed by: PHYSICIAN ASSISTANT

## 2021-06-26 PROCEDURE — 36415 COLL VENOUS BLD VENIPUNCTURE: CPT | Performed by: PHYSICIAN ASSISTANT

## 2021-06-26 PROCEDURE — A4216 STERILE WATER/SALINE, 10 ML: HCPCS | Performed by: HOSPITALIST

## 2021-06-26 PROCEDURE — 25000003 PHARM REV CODE 250: Performed by: HOSPITALIST

## 2021-06-26 PROCEDURE — 80069 RENAL FUNCTION PANEL: CPT | Performed by: PHYSICIAN ASSISTANT

## 2021-06-26 PROCEDURE — 25000003 PHARM REV CODE 250: Performed by: INTERNAL MEDICINE

## 2021-06-26 PROCEDURE — S0171 BUMETANIDE 0.5 MG: HCPCS | Performed by: NURSE PRACTITIONER

## 2021-06-26 PROCEDURE — 63600175 PHARM REV CODE 636 W HCPCS: Performed by: HOSPITALIST

## 2021-06-26 PROCEDURE — 25000003 PHARM REV CODE 250: Performed by: NURSE PRACTITIONER

## 2021-06-26 PROCEDURE — 85025 COMPLETE CBC W/AUTO DIFF WBC: CPT | Performed by: PHYSICIAN ASSISTANT

## 2021-06-26 RX ORDER — BUMETANIDE 0.25 MG/ML
1 INJECTION INTRAMUSCULAR; INTRAVENOUS 2 TIMES DAILY
Status: DISCONTINUED | OUTPATIENT
Start: 2021-06-26 | End: 2021-06-27

## 2021-06-26 RX ADMIN — SEVELAMER CARBONATE 2400 MG: 800 TABLET, FILM COATED ORAL at 08:06

## 2021-06-26 RX ADMIN — SEVELAMER CARBONATE 2400 MG: 800 TABLET, FILM COATED ORAL at 12:06

## 2021-06-26 RX ADMIN — HYDROCODONE BITARTRATE AND ACETAMINOPHEN 1 TABLET: 5; 325 TABLET ORAL at 10:06

## 2021-06-26 RX ADMIN — SEVELAMER CARBONATE 2400 MG: 800 TABLET, FILM COATED ORAL at 06:06

## 2021-06-26 RX ADMIN — NIFEDIPINE 90 MG: 30 TABLET, FILM COATED, EXTENDED RELEASE ORAL at 08:06

## 2021-06-26 RX ADMIN — HEPARIN SODIUM 5000 UNITS: 5000 INJECTION INTRAVENOUS; SUBCUTANEOUS at 05:06

## 2021-06-26 RX ADMIN — HEPARIN SODIUM 5000 UNITS: 5000 INJECTION INTRAVENOUS; SUBCUTANEOUS at 02:06

## 2021-06-26 RX ADMIN — CARVEDILOL 25 MG: 12.5 TABLET, FILM COATED ORAL at 09:06

## 2021-06-26 RX ADMIN — LIDOCAINE 1 PATCH: 50 PATCH CUTANEOUS at 12:06

## 2021-06-26 RX ADMIN — Medication 3 ML: at 09:06

## 2021-06-26 RX ADMIN — DICLOFENAC SODIUM 2 G: 10 GEL TOPICAL at 02:06

## 2021-06-26 RX ADMIN — Medication 3 ML: at 02:06

## 2021-06-26 RX ADMIN — HEPARIN SODIUM 5000 UNITS: 5000 INJECTION INTRAVENOUS; SUBCUTANEOUS at 09:06

## 2021-06-26 RX ADMIN — POLYETHYLENE GLYCOL 3350 17 G: 17 POWDER, FOR SOLUTION ORAL at 08:06

## 2021-06-26 RX ADMIN — ALLOPURINOL 100 MG: 100 TABLET ORAL at 08:06

## 2021-06-26 RX ADMIN — BUMETANIDE 1 MG: 0.25 INJECTION INTRAMUSCULAR; INTRAVENOUS at 09:06

## 2021-06-26 RX ADMIN — ACETAMINOPHEN 650 MG: 325 TABLET ORAL at 06:06

## 2021-06-26 RX ADMIN — DICLOFENAC SODIUM 2 G: 10 GEL TOPICAL at 09:06

## 2021-06-26 RX ADMIN — PANTOPRAZOLE SODIUM 40 MG: 40 TABLET, DELAYED RELEASE ORAL at 08:06

## 2021-06-26 RX ADMIN — Medication 3 ML: at 06:06

## 2021-06-26 RX ADMIN — CARVEDILOL 25 MG: 12.5 TABLET, FILM COATED ORAL at 08:06

## 2021-06-26 RX ADMIN — BUMETANIDE 1 MG: 0.25 INJECTION INTRAMUSCULAR; INTRAVENOUS at 12:06

## 2021-06-26 RX ADMIN — DICLOFENAC SODIUM 2 G: 10 GEL TOPICAL at 08:06

## 2021-06-27 LAB
ALBUMIN SERPL BCP-MCNC: 2.6 G/DL (ref 3.5–5.2)
ANION GAP SERPL CALC-SCNC: 14 MMOL/L (ref 8–16)
BASOPHILS # BLD AUTO: 0.03 K/UL (ref 0–0.2)
BASOPHILS NFR BLD: 0.4 % (ref 0–1.9)
BUN SERPL-MCNC: 100 MG/DL (ref 8–23)
CALCIUM SERPL-MCNC: 8.6 MG/DL (ref 8.7–10.5)
CHLORIDE SERPL-SCNC: 99 MMOL/L (ref 95–110)
CO2 SERPL-SCNC: 24 MMOL/L (ref 23–29)
CREAT SERPL-MCNC: 9.5 MG/DL (ref 0.5–1.4)
DIFFERENTIAL METHOD: ABNORMAL
EOSINOPHIL # BLD AUTO: 0 K/UL (ref 0–0.5)
EOSINOPHIL NFR BLD: 0.5 % (ref 0–8)
ERYTHROCYTE [DISTWIDTH] IN BLOOD BY AUTOMATED COUNT: 17.2 % (ref 11.5–14.5)
EST. GFR  (AFRICAN AMERICAN): 4 ML/MIN/1.73 M^2
EST. GFR  (NON AFRICAN AMERICAN): 3 ML/MIN/1.73 M^2
GLUCOSE SERPL-MCNC: 68 MG/DL (ref 70–110)
HCT VFR BLD AUTO: 25.4 % (ref 37–48.5)
HGB BLD-MCNC: 7.5 G/DL (ref 12–16)
IMM GRANULOCYTES # BLD AUTO: 0.08 K/UL (ref 0–0.04)
IMM GRANULOCYTES NFR BLD AUTO: 1 % (ref 0–0.5)
LYMPHOCYTES # BLD AUTO: 0.7 K/UL (ref 1–4.8)
LYMPHOCYTES NFR BLD: 9.1 % (ref 18–48)
MCH RBC QN AUTO: 29.4 PG (ref 27–31)
MCHC RBC AUTO-ENTMCNC: 29.5 G/DL (ref 32–36)
MCV RBC AUTO: 100 FL (ref 82–98)
MONOCYTES # BLD AUTO: 0.4 K/UL (ref 0.3–1)
MONOCYTES NFR BLD: 4.7 % (ref 4–15)
NEUTROPHILS # BLD AUTO: 6.7 K/UL (ref 1.8–7.7)
NEUTROPHILS NFR BLD: 84.3 % (ref 38–73)
NRBC BLD-RTO: 0 /100 WBC
PHOSPHATE SERPL-MCNC: 7.3 MG/DL (ref 2.7–4.5)
PLATELET # BLD AUTO: 207 K/UL (ref 150–450)
PMV BLD AUTO: 10.7 FL (ref 9.2–12.9)
POCT GLUCOSE: 106 MG/DL (ref 70–110)
POCT GLUCOSE: 62 MG/DL (ref 70–110)
POCT GLUCOSE: 82 MG/DL (ref 70–110)
POCT GLUCOSE: 93 MG/DL (ref 70–110)
POTASSIUM SERPL-SCNC: 5 MMOL/L (ref 3.5–5.1)
RBC # BLD AUTO: 2.55 M/UL (ref 4–5.4)
SODIUM SERPL-SCNC: 137 MMOL/L (ref 136–145)
WBC # BLD AUTO: 7.89 K/UL (ref 3.9–12.7)

## 2021-06-27 PROCEDURE — 25000003 PHARM REV CODE 250: Performed by: PHYSICIAN ASSISTANT

## 2021-06-27 PROCEDURE — 63600175 PHARM REV CODE 636 W HCPCS: Performed by: HOSPITALIST

## 2021-06-27 PROCEDURE — 36415 COLL VENOUS BLD VENIPUNCTURE: CPT | Performed by: PHYSICIAN ASSISTANT

## 2021-06-27 PROCEDURE — A4216 STERILE WATER/SALINE, 10 ML: HCPCS | Performed by: HOSPITALIST

## 2021-06-27 PROCEDURE — 25000003 PHARM REV CODE 250: Performed by: HOSPITALIST

## 2021-06-27 PROCEDURE — 25000003 PHARM REV CODE 250: Performed by: INTERNAL MEDICINE

## 2021-06-27 PROCEDURE — 85025 COMPLETE CBC W/AUTO DIFF WBC: CPT | Performed by: PHYSICIAN ASSISTANT

## 2021-06-27 PROCEDURE — 94761 N-INVAS EAR/PLS OXIMETRY MLT: CPT

## 2021-06-27 PROCEDURE — 11000001 HC ACUTE MED/SURG PRIVATE ROOM

## 2021-06-27 PROCEDURE — S0171 BUMETANIDE 0.5 MG: HCPCS | Performed by: NURSE PRACTITIONER

## 2021-06-27 PROCEDURE — 25000003 PHARM REV CODE 250: Performed by: NURSE PRACTITIONER

## 2021-06-27 PROCEDURE — 80069 RENAL FUNCTION PANEL: CPT | Performed by: PHYSICIAN ASSISTANT

## 2021-06-27 PROCEDURE — 27000221 HC OXYGEN, UP TO 24 HOURS

## 2021-06-27 RX ADMIN — CARVEDILOL 25 MG: 12.5 TABLET, FILM COATED ORAL at 09:06

## 2021-06-27 RX ADMIN — SEVELAMER CARBONATE 2400 MG: 800 TABLET, FILM COATED ORAL at 04:06

## 2021-06-27 RX ADMIN — ALLOPURINOL 100 MG: 100 TABLET ORAL at 09:06

## 2021-06-27 RX ADMIN — POLYETHYLENE GLYCOL 3350 17 G: 17 POWDER, FOR SOLUTION ORAL at 09:06

## 2021-06-27 RX ADMIN — HEPARIN SODIUM 5000 UNITS: 5000 INJECTION INTRAVENOUS; SUBCUTANEOUS at 05:06

## 2021-06-27 RX ADMIN — HEPARIN SODIUM 5000 UNITS: 5000 INJECTION INTRAVENOUS; SUBCUTANEOUS at 02:06

## 2021-06-27 RX ADMIN — HYDROCODONE BITARTRATE AND ACETAMINOPHEN 1 TABLET: 5; 325 TABLET ORAL at 09:06

## 2021-06-27 RX ADMIN — Medication 3 ML: at 09:06

## 2021-06-27 RX ADMIN — DICLOFENAC SODIUM 2 G: 10 GEL TOPICAL at 02:06

## 2021-06-27 RX ADMIN — DICLOFENAC SODIUM 2 G: 10 GEL TOPICAL at 09:06

## 2021-06-27 RX ADMIN — Medication 3 ML: at 10:06

## 2021-06-27 RX ADMIN — SEVELAMER CARBONATE 2400 MG: 800 TABLET, FILM COATED ORAL at 09:06

## 2021-06-27 RX ADMIN — LIDOCAINE 1 PATCH: 50 PATCH CUTANEOUS at 11:06

## 2021-06-27 RX ADMIN — NIFEDIPINE 90 MG: 30 TABLET, FILM COATED, EXTENDED RELEASE ORAL at 09:06

## 2021-06-27 RX ADMIN — PANTOPRAZOLE SODIUM 40 MG: 40 TABLET, DELAYED RELEASE ORAL at 09:06

## 2021-06-27 RX ADMIN — SEVELAMER CARBONATE 2400 MG: 800 TABLET, FILM COATED ORAL at 11:06

## 2021-06-27 RX ADMIN — BUMETANIDE 1 MG: 0.25 INJECTION INTRAMUSCULAR; INTRAVENOUS at 09:06

## 2021-06-27 RX ADMIN — HEPARIN SODIUM 5000 UNITS: 5000 INJECTION INTRAVENOUS; SUBCUTANEOUS at 09:06

## 2021-06-27 RX ADMIN — Medication 3 ML: at 02:06

## 2021-06-28 VITALS
WEIGHT: 160.94 LBS | RESPIRATION RATE: 17 BRPM | SYSTOLIC BLOOD PRESSURE: 136 MMHG | DIASTOLIC BLOOD PRESSURE: 64 MMHG | HEIGHT: 66 IN | HEART RATE: 68 BPM | TEMPERATURE: 98 F | BODY MASS INDEX: 25.86 KG/M2 | OXYGEN SATURATION: 99 %

## 2021-06-28 PROBLEM — I50.33 ACUTE ON CHRONIC DIASTOLIC HEART FAILURE: Status: RESOLVED | Noted: 2021-06-20 | Resolved: 2021-06-28

## 2021-06-28 PROBLEM — N39.0 UTI (URINARY TRACT INFECTION): Status: RESOLVED | Noted: 2021-03-24 | Resolved: 2021-06-28

## 2021-06-28 LAB
ALBUMIN SERPL BCP-MCNC: 2.7 G/DL (ref 3.5–5.2)
ANION GAP SERPL CALC-SCNC: 16 MMOL/L (ref 8–16)
BASOPHILS # BLD AUTO: 0.05 K/UL (ref 0–0.2)
BASOPHILS NFR BLD: 0.5 % (ref 0–1.9)
BUN SERPL-MCNC: 107 MG/DL (ref 8–23)
CALCIUM SERPL-MCNC: 9.4 MG/DL (ref 8.7–10.5)
CHLORIDE SERPL-SCNC: 96 MMOL/L (ref 95–110)
CO2 SERPL-SCNC: 26 MMOL/L (ref 23–29)
CREAT SERPL-MCNC: 9.7 MG/DL (ref 0.5–1.4)
DIFFERENTIAL METHOD: ABNORMAL
EOSINOPHIL # BLD AUTO: 0.1 K/UL (ref 0–0.5)
EOSINOPHIL NFR BLD: 1 % (ref 0–8)
ERYTHROCYTE [DISTWIDTH] IN BLOOD BY AUTOMATED COUNT: 17.3 % (ref 11.5–14.5)
EST. GFR  (AFRICAN AMERICAN): 4 ML/MIN/1.73 M^2
EST. GFR  (NON AFRICAN AMERICAN): 3 ML/MIN/1.73 M^2
GLUCOSE SERPL-MCNC: 119 MG/DL (ref 70–110)
HCT VFR BLD AUTO: 26.6 % (ref 37–48.5)
HGB BLD-MCNC: 7.9 G/DL (ref 12–16)
IMM GRANULOCYTES # BLD AUTO: 0.09 K/UL (ref 0–0.04)
IMM GRANULOCYTES NFR BLD AUTO: 1 % (ref 0–0.5)
LYMPHOCYTES # BLD AUTO: 0.7 K/UL (ref 1–4.8)
LYMPHOCYTES NFR BLD: 7.2 % (ref 18–48)
MCH RBC QN AUTO: 29.8 PG (ref 27–31)
MCHC RBC AUTO-ENTMCNC: 29.7 G/DL (ref 32–36)
MCV RBC AUTO: 100 FL (ref 82–98)
MONOCYTES # BLD AUTO: 0.3 K/UL (ref 0.3–1)
MONOCYTES NFR BLD: 3.6 % (ref 4–15)
NEUTROPHILS # BLD AUTO: 8.2 K/UL (ref 1.8–7.7)
NEUTROPHILS NFR BLD: 86.7 % (ref 38–73)
NRBC BLD-RTO: 0 /100 WBC
PHOSPHATE SERPL-MCNC: 6.9 MG/DL (ref 2.7–4.5)
PLATELET # BLD AUTO: 222 K/UL (ref 150–450)
PMV BLD AUTO: 10.8 FL (ref 9.2–12.9)
POCT GLUCOSE: 149 MG/DL (ref 70–110)
POCT GLUCOSE: 88 MG/DL (ref 70–110)
POTASSIUM SERPL-SCNC: 4.8 MMOL/L (ref 3.5–5.1)
RBC # BLD AUTO: 2.65 M/UL (ref 4–5.4)
SARS-COV-2 RDRP RESP QL NAA+PROBE: NEGATIVE
SODIUM SERPL-SCNC: 138 MMOL/L (ref 136–145)
WBC # BLD AUTO: 9.43 K/UL (ref 3.9–12.7)

## 2021-06-28 PROCEDURE — 63600175 PHARM REV CODE 636 W HCPCS: Performed by: HOSPITALIST

## 2021-06-28 PROCEDURE — 80069 RENAL FUNCTION PANEL: CPT | Performed by: PHYSICIAN ASSISTANT

## 2021-06-28 PROCEDURE — 25000003 PHARM REV CODE 250: Performed by: HOSPITALIST

## 2021-06-28 PROCEDURE — 25000003 PHARM REV CODE 250: Performed by: INTERNAL MEDICINE

## 2021-06-28 PROCEDURE — 36415 COLL VENOUS BLD VENIPUNCTURE: CPT | Performed by: PHYSICIAN ASSISTANT

## 2021-06-28 PROCEDURE — U0002 COVID-19 LAB TEST NON-CDC: HCPCS | Performed by: INTERNAL MEDICINE

## 2021-06-28 PROCEDURE — 25000003 PHARM REV CODE 250: Performed by: PHYSICIAN ASSISTANT

## 2021-06-28 PROCEDURE — 25000003 PHARM REV CODE 250: Performed by: NURSE PRACTITIONER

## 2021-06-28 PROCEDURE — A4216 STERILE WATER/SALINE, 10 ML: HCPCS | Performed by: HOSPITALIST

## 2021-06-28 PROCEDURE — 85025 COMPLETE CBC W/AUTO DIFF WBC: CPT | Performed by: PHYSICIAN ASSISTANT

## 2021-06-28 RX ORDER — ALLOPURINOL 100 MG/1
100 TABLET ORAL DAILY
Start: 2021-06-28

## 2021-06-28 RX ORDER — POLYETHYLENE GLYCOL 3350 17 G/17G
17 POWDER, FOR SOLUTION ORAL DAILY
Refills: 0
Start: 2021-06-28

## 2021-06-28 RX ORDER — SEVELAMER CARBONATE 800 MG/1
2400 TABLET, FILM COATED ORAL
Qty: 270 TABLET | Refills: 11 | Status: SHIPPED | OUTPATIENT
Start: 2021-06-28 | End: 2022-06-28

## 2021-06-28 RX ADMIN — SEVELAMER CARBONATE 2400 MG: 800 TABLET, FILM COATED ORAL at 07:06

## 2021-06-28 RX ADMIN — Medication 3 ML: at 05:06

## 2021-06-28 RX ADMIN — Medication 3 ML: at 01:06

## 2021-06-28 RX ADMIN — DICLOFENAC SODIUM 2 G: 10 GEL TOPICAL at 09:06

## 2021-06-28 RX ADMIN — CARVEDILOL 25 MG: 12.5 TABLET, FILM COATED ORAL at 09:06

## 2021-06-28 RX ADMIN — POLYETHYLENE GLYCOL 3350 17 G: 17 POWDER, FOR SOLUTION ORAL at 09:06

## 2021-06-28 RX ADMIN — SEVELAMER CARBONATE 2400 MG: 800 TABLET, FILM COATED ORAL at 12:06

## 2021-06-28 RX ADMIN — HYDROCODONE BITARTRATE AND ACETAMINOPHEN 1 TABLET: 5; 325 TABLET ORAL at 09:06

## 2021-06-28 RX ADMIN — HEPARIN SODIUM 5000 UNITS: 5000 INJECTION INTRAVENOUS; SUBCUTANEOUS at 05:06

## 2021-06-28 RX ADMIN — NIFEDIPINE 90 MG: 30 TABLET, FILM COATED, EXTENDED RELEASE ORAL at 09:06

## 2021-06-28 RX ADMIN — ALLOPURINOL 100 MG: 100 TABLET ORAL at 09:06

## 2021-06-28 RX ADMIN — PANTOPRAZOLE SODIUM 40 MG: 40 TABLET, DELAYED RELEASE ORAL at 09:06

## 2021-06-28 RX ADMIN — LIDOCAINE 1 PATCH: 50 PATCH CUTANEOUS at 11:06

## 2021-06-28 RX ADMIN — HEPARIN SODIUM 5000 UNITS: 5000 INJECTION INTRAVENOUS; SUBCUTANEOUS at 01:06

## 2021-07-15 ENCOUNTER — TELEPHONE (OUTPATIENT)
Dept: PAIN MEDICINE | Facility: CLINIC | Age: 86
End: 2021-07-15

## 2021-07-19 ENCOUNTER — TELEPHONE (OUTPATIENT)
Dept: NEUROSURGERY | Facility: CLINIC | Age: 86
End: 2021-07-19

## 2022-02-23 DIAGNOSIS — D84.9 IMMUNOSUPPRESSED STATUS: ICD-10-CM

## 2022-05-18 NOTE — PLAN OF CARE
Problem: Occupational Therapy Goal  Goal: Occupational Therapy Goal  Goals to be met by: 08/01/19     Patient will increase functional independence with ADLs by performing:    Feeding with [place].  UE Dressing with Set-up Assistance.  LE Dressing with Set-up Assistance.  Grooming while seated with Set-up Assistance.  Sitting at edge of bed x15 minutes with Modified [place].  Rolling to Bilateral with Modified [place].   Supine to sit with Modified [place].  Upper extremity exercise program x15 reps per handout, with independence.     Outcome: Ongoing (interventions implemented as appropriate)  Pt progressing towards goals and will continue to benefit from acute OT. OT rec. SNF at d/c        1

## 2022-10-14 NOTE — ASSESSMENT & PLAN NOTE
As addressed above. Improved and now stable on NC.   Health Maintenance Due   Topic Date Due   • Hepatitis B Vaccine (1 of 3 - 3-dose series) Never done   • COVID-19 Vaccine (1) Never done   • Shingles Vaccine (1 of 2) Never done   • Pneumococcal Vaccine 0-64 (2 - PCV) 09/18/2021   • Influenza Vaccine (1) 09/01/2022   • Depression Screening  03/28/2023       Patient is due for topics as listed above but is not proceeding with Immunization(s) COVID-19, Influenza and Pneumococcal at this time.

## 2023-01-26 NOTE — PLAN OF CARE
[facility]     Department of Hospital Medicine     [address] [place] [address]     [phone] after hours  [phone] fax       NURSING HOME ORDERS    11/18/2020    Admit to Nursing Home:  Regular Bed [place]                                            Diagnoses:  Debility/Gi bleed     Active Hospital Problems    Diagnosis  POA    Anemia of renal disease [N18.9, D63.1]  Yes     Priority: 2      Chronic    Neck pain, chronic [M54.2, G89.29]  Yes    History of stroke [Z86.73]  Not Applicable     Chronic    Chronic diastolic heart failure [I50.32]  Yes     Chronic    Paroxysmal atrial fibrillation [I48.0]  Yes     Chronic    Renal hypertension [I12.9]  Yes     Chronic    CKD stage 5 [N18.5]  Yes     Chronic    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 [I27.23]  Yes     Chronic    Type 2 diabetes mellitus, controlled, 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]  Yes     Chronic      Resolved Hospital Problems    Diagnosis Date Resolved POA    *Acute blood loss anemia [D62] 11/18/2020 Yes     Priority: 1 - High    UTI (urinary tract infection) [N39.0] 11/18/2020 Yes     Priority: 3     Palliative care by specialist [Z51.5] 11/18/2020 Not Applicable    Lower GI bleeding [K92.2] 11/18/2020 Yes    Palliative care encounter [Z51.5] 11/18/2020 Not Applicable       Patient is homebound due to:  Acute blood loss anemia    Allergies:  Review of patient's allergies indicates:   Allergen Reactions    Indomethacin     Nsaids (non-steroidal anti-inflammatory drug)     Percocet [oxycodone-acetaminophen]      No allergy to acetaminophen. Patient reports she takes all the time for her neck and it helps       Vitals:       Routine    Diet:   Low NA, ADA 2000 sruthi diet     Acitivities:     - Up in a chair each morning as tolerated       LABS:  Per facility protocol  CBC/bmp in one week     Nursing Precautions:       - Aspiration precautions:    - Fall precautions per  nursing home protocol   - Seizure precaution per detention protocol   - Decubitus precautions:        -  for positioning   - Pressure reducing foam mattress   - Turn patient every two hours. Use wedge pillows to anchor patient    CONSULTS:       MISCELLANEOUS CARE:                             DIABETES CARE:     Check blood sugar:         Fingerstick blood sugar AC and HS   Fingerstick blood sugar every 6 hours if unable to eat      Report CBG < 60 or > 400 to physician.                                          Insulin Sliding Scale          Glucose  Novolog Insulin Subcutaneous        0 - 60   Orange juice or glucose tablet, hold insulin      No insulin   201-250  2 units   251-300  4 units   301-350  6 units   351-400  8 units   >400   10 units then call physician      Medications: Discontinue all previous medication orders, if any. See new list below.     Joyce Riley   Home Medication Instructions WILLA:[id number]    Printed on:11/18/20 3211   Medication Information                      acetaminophen (TYLENOL) 325 MG tablet  Take 2 tablets (650 mg total) by mouth every 4 (four) hours as needed.             albuterol-ipratropium (DUO-NEB) 2.5 mg-0.5 mg/3 mL nebulizer solution  Take 3 mLs by nebulization every 4 (four) hours as needed for Wheezing. Rescue             B complex-vitamin C-folic acid (NEPHRO-MICHAEL) 0.8 mg Tab  Take by mouth once daily.             calcitRIOL (ROCALTROL) 0.25 MCG Cap  Take 1 capsule (0.25 mcg total) by mouth every other day.             carvediloL (COREG) 12.5 MG tablet  Take 1 tablet (12.5 mg total) by mouth 2 (two) times daily.             epoetin diana (EPOGEN) 10,000 unit/mL injection  Inject 1 mL (10,000 Units total) into the skin every 7 days.             ergocalciferol (ERGOCALCIFEROL) 50,000 unit Cap  Take 1 capsule (50,000 Units total) by mouth every 7 days.             ferrous sulfate (FEOSOL) 325 mg (65 mg iron) Tab tablet  Take 325 mg by mouth 2  (two) times daily.             furosemide (LASIX) 20 MG tablet  Take 3 tablets (60 mg total) by mouth once daily.             hydrALAZINE (APRESOLINE) 25 MG tablet  Take 1 tablet (25 mg total) by mouth every 8 (eight) hours.             insulin degludec (TRESIBA FLEXTOUCH U-100) 100 unit/mL (3 mL) InPn  Inject 5 Units into the skin every evening.             lactobacillus acidophilus & bulgar (LACTINEX) 100 million cell packet  Take 1 packet (1 each total) by mouth 2 (two) times daily.             lidocaine (LIDODERM) 5 %  Place 1 patch onto the skin once daily. Remove & Discard patch within 12 hours or as directed by MD             methyl salicylate-menthol 15-10% 15-10 % Crea  Apply topically 4 (four) times daily.             NIFEdipine (PROCARDIA-XL) 90 MG (OSM) 24 hr tablet  Take 1 tablet (90 mg total) by mouth once daily.             oxyCODONE-acetaminophen (PERCOCET) 5-325 mg per tablet  Take 1 tablet by mouth every 6 (six) hours as needed.             pantoprazole (PROTONIX) 40 MG tablet  Take 1 tablet (40 mg total) by mouth once daily.             senna-docusate 8.6-50 mg (PERICOLACE) 8.6-50 mg per tablet  Take 1 tablet by mouth 2 (two) times daily.             sodium bicarbonate 650 MG tablet  Take 2 tablets (1,300 mg total) by mouth 3 (three) times daily.             traMADoL (ULTRAM) 50 mg tablet  Take 1 tablet (50 mg total) by mouth every 8 (eight) hours as needed for Pain.             trolamine salicylate (ASPERCREME) 10 % cream  Apply topically every 6 (six) hours as needed.                       _________________________________  Shahab Oscar MD  11/18/2020   Clindamycin Counseling: I counseled the patient regarding use of clindamycin as an antibiotic for prophylactic and/or therapeutic purposes. Clindamycin is active against numerous classes of bacteria, including skin bacteria. Side effects may include nausea, diarrhea, gastrointestinal upset, rash, hives, yeast infections, and in rare cases, colitis.

## 2023-08-21 NOTE — ED NOTES
Soiled and saturated diaper removed from off of pt.  Hemorrhoids noted, stool is dark black, family reports pt takes iron supplements.     1